# Patient Record
Sex: FEMALE | Race: WHITE | NOT HISPANIC OR LATINO | Employment: OTHER | URBAN - METROPOLITAN AREA
[De-identification: names, ages, dates, MRNs, and addresses within clinical notes are randomized per-mention and may not be internally consistent; named-entity substitution may affect disease eponyms.]

---

## 2017-01-25 ENCOUNTER — HISTORICAL (OUTPATIENT)
Dept: ADMINISTRATIVE | Facility: HOSPITAL | Age: 72
End: 2017-01-25

## 2017-01-25 LAB
ALBUMIN SERPL-MCNC: 4.4 G/DL (ref 3.1–4.7)
ALP SERPL-CCNC: 67 IU/L (ref 40–104)
ALT (SGPT): 21 IU/L (ref 3–33)
AST SERPL-CCNC: 46 IU/L (ref 10–40)
BASOPHILS NFR BLD: 0 K/UL (ref 0–0.2)
BASOPHILS NFR BLD: 0.6 %
BILIRUB SERPL-MCNC: 0.7 MG/DL (ref 0.3–1)
BUN SERPL-MCNC: 16 MG/DL (ref 8–20)
CALCIUM SERPL-MCNC: 9.7 MG/DL (ref 7.7–10.4)
CHLORIDE: 103 MMOL/L (ref 98–110)
CO2 SERPL-SCNC: 27.5 MMOL/L (ref 22.8–31.6)
CREATININE: 0.7 MG/DL (ref 0.6–1.4)
CRP SERPL-MCNC: 0.04 MG/DL (ref 0–1.4)
EOSINOPHIL NFR BLD: 0.2 K/UL (ref 0–0.7)
EOSINOPHIL NFR BLD: 2.2 %
ERYTHROCYTE [DISTWIDTH] IN BLOOD BY AUTOMATED COUNT: 13.2 % (ref 11.7–14.9)
GLUCOSE: 74 MG/DL (ref 70–99)
GRAN #: 3.5 K/UL (ref 1.4–6.5)
GRAN%: 52.1 %
HCT VFR BLD AUTO: 40.7 % (ref 36–48)
HGB BLD-MCNC: 13.5 G/DL (ref 12–15)
IMMATURE GRANS (ABS): 0 K/UL (ref 0–1)
IMMATURE GRANULOCYTES: 0.1 %
LYMPH #: 2.4 K/UL (ref 1.2–3.4)
LYMPH%: 35.6 %
MCH RBC QN AUTO: 33.5 PG (ref 25–35)
MCHC RBC AUTO-ENTMCNC: 33.2 G/DL (ref 31–36)
MCV RBC AUTO: 101 FL (ref 79–98)
MONO #: 0.6 K/UL (ref 0.1–0.6)
MONO%: 9.4 %
NUCLEATED RBCS: 0 %
PLATELET # BLD AUTO: 199 K/UL (ref 140–440)
PMV BLD AUTO: 12.7 FL (ref 8.8–12.7)
POTASSIUM SERPL-SCNC: 4.6 MMOL/L (ref 3.5–5)
PROT SERPL-MCNC: 7.5 G/DL (ref 6–8.2)
RBC # BLD AUTO: 4.03 M/UL (ref 3.5–5.5)
SODIUM: 138 MMOL/L (ref 134–144)
WBC # BLD AUTO: 6.7 K/UL (ref 5–10)

## 2017-01-31 DIAGNOSIS — E03.9 ACQUIRED HYPOTHYROIDISM: ICD-10-CM

## 2017-01-31 RX ORDER — LEVOTHYROXINE SODIUM 112 UG/1
TABLET ORAL
Qty: 30 TABLET | Refills: 0 | Status: SHIPPED | OUTPATIENT
Start: 2017-01-31 | End: 2017-03-02 | Stop reason: SDUPTHER

## 2017-02-20 ENCOUNTER — DOCUMENTATION ONLY (OUTPATIENT)
Dept: FAMILY MEDICINE | Facility: CLINIC | Age: 72
End: 2017-02-20

## 2017-02-20 NOTE — PROGRESS NOTES
Pre-Visit Chart Review  For Appointment Scheduled on (date) 3/2/17    Health Maintenance Due   Topic Date Due    Hepatitis C Screening  1945    TETANUS VACCINE  01/24/1964    Mammogram  01/24/1986    Zoster Vaccine  01/24/2006    Pneumococcal (65+) (1 of 2 - PCV13) 01/24/2011    Influenza Vaccine  08/01/2016

## 2017-03-02 ENCOUNTER — OFFICE VISIT (OUTPATIENT)
Dept: FAMILY MEDICINE | Facility: CLINIC | Age: 72
End: 2017-03-02
Payer: MEDICARE

## 2017-03-02 ENCOUNTER — LAB VISIT (OUTPATIENT)
Dept: LAB | Facility: HOSPITAL | Age: 72
End: 2017-03-02
Attending: FAMILY MEDICINE
Payer: MEDICARE

## 2017-03-02 VITALS
BODY MASS INDEX: 27.06 KG/M2 | HEIGHT: 68 IN | SYSTOLIC BLOOD PRESSURE: 131 MMHG | WEIGHT: 178.56 LBS | TEMPERATURE: 98 F | HEART RATE: 73 BPM | RESPIRATION RATE: 18 BRPM | DIASTOLIC BLOOD PRESSURE: 72 MMHG

## 2017-03-02 DIAGNOSIS — I10 ESSENTIAL HYPERTENSION, BENIGN: Primary | ICD-10-CM

## 2017-03-02 DIAGNOSIS — R30.0 DYSURIA: ICD-10-CM

## 2017-03-02 DIAGNOSIS — J45.20 ASTHMA, CHRONIC, MILD INTERMITTENT, UNCOMPLICATED: ICD-10-CM

## 2017-03-02 DIAGNOSIS — E03.9 ACQUIRED HYPOTHYROIDISM: ICD-10-CM

## 2017-03-02 DIAGNOSIS — M06.9 RHEUMATOID ARTHRITIS, INVOLVING UNSPECIFIED SITE, UNSPECIFIED RHEUMATOID FACTOR PRESENCE: ICD-10-CM

## 2017-03-02 DIAGNOSIS — E78.2 MIXED HYPERLIPIDEMIA: ICD-10-CM

## 2017-03-02 LAB
BACTERIA #/AREA URNS AUTO: ABNORMAL /HPF
BILIRUB UR QL STRIP: NEGATIVE
CLARITY UR REFRACT.AUTO: CLEAR
COLOR UR AUTO: YELLOW
GLUCOSE UR QL STRIP: NEGATIVE
HGB UR QL STRIP: NEGATIVE
KETONES UR QL STRIP: NEGATIVE
LEUKOCYTE ESTERASE UR QL STRIP: ABNORMAL
MICROSCOPIC COMMENT: ABNORMAL
NITRITE UR QL STRIP: NEGATIVE
PH UR STRIP: 6 [PH] (ref 5–8)
PROT UR QL STRIP: NEGATIVE
RBC #/AREA URNS AUTO: 6 /HPF (ref 0–4)
SP GR UR STRIP: 1.01 (ref 1–1.03)
URN SPEC COLLECT METH UR: ABNORMAL
UROBILINOGEN UR STRIP-ACNC: NEGATIVE EU/DL
WBC #/AREA URNS AUTO: 24 /HPF (ref 0–5)

## 2017-03-02 PROCEDURE — 81001 URINALYSIS AUTO W/SCOPE: CPT

## 2017-03-02 PROCEDURE — 99999 PR PBB SHADOW E&M-EST. PATIENT-LVL III: CPT | Mod: PBBFAC,,, | Performed by: FAMILY MEDICINE

## 2017-03-02 PROCEDURE — 99213 OFFICE O/P EST LOW 20 MIN: CPT | Mod: S$PBB,,, | Performed by: FAMILY MEDICINE

## 2017-03-02 RX ORDER — FLUTICASONE PROPIONATE AND SALMETEROL 250; 50 UG/1; UG/1
1 POWDER RESPIRATORY (INHALATION) 2 TIMES DAILY
Qty: 3 EACH | Refills: 3 | Status: SHIPPED | OUTPATIENT
Start: 2017-03-02 | End: 2018-03-25 | Stop reason: SDUPTHER

## 2017-03-02 RX ORDER — AMOXICILLIN 875 MG/1
875 TABLET, FILM COATED ORAL 2 TIMES DAILY
Qty: 10 TABLET | Refills: 0 | Status: SHIPPED | OUTPATIENT
Start: 2017-03-02 | End: 2017-03-07

## 2017-03-02 RX ORDER — LEVOTHYROXINE SODIUM 112 UG/1
112 TABLET ORAL DAILY
Qty: 90 TABLET | Refills: 3 | Status: SHIPPED | OUTPATIENT
Start: 2017-03-02 | End: 2018-03-08 | Stop reason: SDUPTHER

## 2017-03-02 NOTE — MR AVS SNAPSHOT
[facility]  [address]  [place] [address]  Phone: [phone]  Fax: [phone]                  Kirstie Bowden   3/2/2017 10:00 AM   Office Visit    Description:  Female : 1945   Provider:  Elías Luis Jr., MD   Department:  [place]           Reason for Visit     Annual Exam           Diagnoses this Visit        Comments    Essential hypertension, benign    -  Primary     Mixed hyperlipidemia         Acquired hypothyroidism         Rheumatoid arthritis, involving unspecified site, unspecified rheumatoid factor presence         Asthma, chronic, mild intermittent, uncomplicated         Dysuria                To Do List           Goals (5 Years of Data)     None      Follow-Up and Disposition     Return in about 1 year (around 3/2/2018).       These Medications        Disp Refills Start End    fluticasone-salmeterol 250-50 mcg/dose (ADVAIR DISKUS) 250-50 mcg/dose diskus inhaler 3 each 3 3/2/2017     Inhale 1 puff into the lungs 2 (two) times daily. - Inhalation    Pharmacy: [address] #: [phone]       levothyroxine (SYNTHROID) 112 MCG tablet 90 tablet 3 3/2/2017     Take 1 tablet (112 mcg total) by mouth once daily. - Oral    Pharmacy: [address] #: [phone]       amoxicillin (AMOXIL) 875 MG tablet 10 tablet 0 3/2/2017 3/7/2017    Take 1 tablet (875 mg total) by mouth 2 (two) times daily. - Oral    Pharmacy: [address] Ph #: [phone]         [facility]     [facility] On Call Nurse Care Line -  Assistance  Registered nurses in the [facility] provide clinical advisement, health education, appointment booking, and other advisory services.  Call for this free service at [phone].             Medications           Message regarding Medications     Verify the changes and/or  additions to your medication regime listed below are the same as discussed with your clinician today.  If any of these changes or additions are incorrect, please notify your healthcare provider.        START taking these NEW medications        Refills    amoxicillin (AMOXIL) 875 MG tablet 0    Sig: Take 1 tablet (875 mg total) by mouth 2 (two) times daily.    Class: Print    Route: Oral      CHANGE how you are taking these medications     Start Taking Instead of    levothyroxine (SYNTHROID) 112 MCG tablet levothyroxine (SYNTHROID) 112 MCG tablet    Dosage:  Take 1 tablet (112 mcg total) by mouth once daily. Dosage:  TAKE ONE TABLET BY MOUTH ONCE DAILY    Reason for Change:  Reorder       STOP taking these medications     ENBREL SURECLICK 50 mg/mL (0.98 mL) PnIj     FLUZONE HIGH-DOSE 2015-16, PF, 180 mcg/0.5 mL Syrg     MULTIVITAMIN (MULTIPLE VITAMIN ORAL) Every day    scopolamine (TRANSDERM-SCOP) 1.5 mg Place 1 patch (1.5 mg total) onto the skin Every 3 (three) days.    triamterene-hydrochlorothiazide 75-50 mg (MAXZIDE) 75-50 mg per tablet TAKE ONE-HALF TABLET BY MOUTH ONCE DAILY           Verify that the below list of medications is an accurate representation of the medications you are currently taking.  If none reported, the list may be blank. If incorrect, please contact your healthcare provider. Carry this list with you in case of emergency.           Current Medications     amlodipine (NORVASC) 5 MG tablet Take 1 tablet (5 mg total) by mouth once daily.    atorvastatin (LIPITOR) 20 MG tablet Take 1 tablet (20 mg total) by mouth once daily.    CALCIUM CARBONATE/VITAMIN D3 (VITAMIN D-3 ORAL) Take by mouth.    etanercept (ENBREL) 50 mg/mL (0.98 mL) injection Inject 50 mg into the skin once a week.    FLAXSEED ORAL Take by mouth.    fluticasone-salmeterol 250-50 mcg/dose (ADVAIR DISKUS) 250-50 mcg/dose diskus inhaler Inhale 1 puff into the lungs 2 (two) times daily.    folic acid (FOLVITE) 1 MG tablet Every day     levothyroxine (SYNTHROID) 112 MCG tablet Take 1 tablet (112 mcg total) by mouth once daily.    methotrexate 2.5 MG tablet 4 Tablet(s) Oral PRN Weekly.    VITAMIN B COMPLEX ORAL Every day    amoxicillin (AMOXIL) 875 MG tablet Take 1 tablet (875 mg total) by mouth 2 (two) times daily.           Clinical Reference Information           Your Vitals Were     BP                   131/72 (BP Location: Right arm, Patient Position: Sitting, BP Method: Automatic)           Blood Pressure          Most Recent Value    BP  131/72      Allergies as of 3/2/2017     Sulfa (Sulfonamide Antibiotics)    Sulfamethoxazole-trimethoprim      Immunizations Administered on Date of Encounter - 3/2/2017     None      Orders Placed During Today's Visit     Future Labs/Procedures Expected by Expires    Lipid panel  3/2/2017 3/3/2018    TSH  3/2/2017 3/3/2018    Urinalysis  3/2/2017 5/1/2018      [facility] Sign-Up     Activating your [facility] account is as easy as 1-2-3!     1) Visit [email or web address], select Sign Up Now, enter this activation code and your date of birth, then select Next.  [email or web address]  Expires: 4/16/2017 10:58 AM      2) Create a username and password to use when you visit [facility] in the future and select a security question in case you lose your password and select Next.    3) Enter your e-mail address and click Sign Up!    Additional Information  If you have questions, please e-mail [email or web address] or call [phone] to talk to our [facility] staff. Remember, [facility] is NOT to be used for urgent needs. For medical emergencies, dial 911.         Smoking Cessation     If you would like to quit smoking:   You may be eligible for free services if you are a [place] resident and started smoking cigarettes before September 1, 1988.  Call the [employer]oking [employer]essation [employer] (SCT) toll free at (935) 574-3870 or (263) 235-9300.   Call 4-603-QUIT-NOW if you do not meet the above criteria.            Language Assistance  Services     ATTENTION: Language assistance services are available, free of charge. Please call [phone].      ATENCIÓN: Si habla amadeo, tiene a schreiber disposición servicios gratuitos de asistencia lingüística. Llame al [phone].     CHÚ Ý: N?u b?n nói Ti?ng Vi?t, có các d?ch v? h? tr? ngôn ng? mi?n phí dành cho b?n. G?i s? [phone].         [facility] complies with applicable Federal civil rights laws and does not discriminate on the basis of race, color, national origin, age, disability, or sex.

## 2017-03-03 ENCOUNTER — TELEPHONE (OUTPATIENT)
Dept: FAMILY MEDICINE | Facility: CLINIC | Age: 72
End: 2017-03-03

## 2017-03-03 NOTE — TELEPHONE ENCOUNTER
----- Message from Elías Luis Jr., MD sent at 3/3/2017  2:23 PM CST -----  Please let patient know that her urinalysis does show a UTI, and she needs to take the antibiotic I gave to her yesterday.

## 2017-03-06 NOTE — PROGRESS NOTES
Subjective:       Patient ID: Kirstie Bowden is a 72 y.o. female.    Chief Complaint: Hypertension; Hyperlipidemia; Hypothyroidism; Rheumatoid Arthritis; and Asthma    HPI Comments: Patient presents here for annual follow-up of hypertension, hyperlipidemia, rheumatoid arthritis, hypothyroidism, and asthma.  Her hypertension is well controlled on her present medications and she is tolerating her medications well.  She is following a low-sodium diet and is exercising on a regular basis.  Her hyperlipidemia is also well controlled with her low-fat low-cholesterol diet and her present dose of atorvastatin.  She is having no side effects with the atorvastatin.  Her hypothyroidism is stable at this time on her present dose of Synthroid 112 µg daily.  Her rheumatoid arthritis is stable on her present medications which include methotrexate and Enbrel.  She is followed on a regular basis by rheumatology and her liver and renal functions are stable and her CBC is stable.  She does complain of some dysuria over the last few days and is concerned about having a urinary tract infection.  As far as screening, she does need lab work including hepatitis C screening and lipid profile.  She also needs a tetanus vaccine and her screening mammogram.  She cannot take the shingles vaccine due to the fact that it is a live virus, and she is on immunosuppressive drugs for her rheumatoid arthritis.  She has had her mammogram and DEXA scan.    Hypertension   This is a chronic problem. The problem is unchanged. The problem is controlled. Pertinent negatives include no chest pain, headaches, palpitations or shortness of breath. Risk factors for coronary artery disease include dyslipidemia, obesity and post-menopausal state. Past treatments include calcium channel blockers and diuretics. The current treatment provides moderate improvement. There are no compliance problems.  There is no history of kidney disease, CAD/MI, CVA or heart  failure.   Hyperlipidemia   This is a chronic problem. The problem is controlled. Recent lipid tests were reviewed and are normal. Pertinent negatives include no chest pain or shortness of breath. Current antihyperlipidemic treatment includes statins. The current treatment provides moderate improvement of lipids. There are no compliance problems.  Risk factors for coronary artery disease include dyslipidemia, hypertension and post-menopausal.     Review of Systems   Constitutional: Negative for chills, fatigue, fever and unexpected weight change.   HENT: Negative for congestion, ear pain, postnasal drip and sore throat.    Respiratory: Negative for cough and shortness of breath.    Cardiovascular: Negative for chest pain and palpitations.   Gastrointestinal: Negative for abdominal pain, blood in stool, constipation, diarrhea, nausea and vomiting.   Genitourinary: Positive for dysuria. Negative for difficulty urinating, flank pain and pelvic pain.   Musculoskeletal: Negative for arthralgias and back pain.   Neurological: Negative for dizziness, light-headedness and headaches.   Hematological: Negative for adenopathy. Does not bruise/bleed easily.   Psychiatric/Behavioral: Negative for sleep disturbance. The patient is not nervous/anxious.        Objective:      Physical Exam   Constitutional: She is oriented to person, place, and time. She appears well-developed and well-nourished.   HENT:   Head: Normocephalic and atraumatic.   Right Ear: External ear normal.   Left Ear: External ear normal.   Nose: Nose normal.   Mouth/Throat: Oropharynx is clear and moist.   Eyes: EOM are normal.   Neck: Normal range of motion. Neck supple. No thyromegaly present.   Cardiovascular: Normal rate, regular rhythm, normal heart sounds and intact distal pulses.    No murmur heard.  Pulmonary/Chest: Effort normal and breath sounds normal. She has no wheezes. She has no rales.   Abdominal: Soft. Bowel sounds are normal. She exhibits no  mass. There is no tenderness. There is no rebound.   Musculoskeletal: Normal range of motion. She exhibits no edema or tenderness.   Lymphadenopathy:     She has no cervical adenopathy.   Neurological: She is alert and oriented to person, place, and time. She has normal reflexes. No cranial nerve deficit.   Skin: Skin is warm and dry. No rash noted.   Psychiatric: She has a normal mood and affect. Her behavior is normal.   Vitals reviewed.      Assessment:       1. Essential hypertension, benign    2. Mixed hyperlipidemia    3. Acquired hypothyroidism    4. Rheumatoid arthritis, involving unspecified site, unspecified rheumatoid factor presence    5. Asthma, chronic, mild intermittent, uncomplicated    6. Dysuria        Plan:       1.  Continue present medications as her hypertension, hyperlipidemia, rheumatoid arthritis, and hypothyroidism are all stable as is her asthma  2.  Urinalysis today to evaluate for UTI  3.  Lipid profile and TSH today  4.  Patient is strongly encouraged to increase her exercise  5.  Continue low-sodium, low-fat low-cholesterol diet  6.  Follow-up with me in one year or when necessary        Patient readiness: acceptance and barriers:none    During the course of the visit the patient was educated and counseled about the following:     Hypertension:   Dietary sodium restriction.  Regular aerobic exercise.  Follow up: 6 months and as needed.    Goals: Hypertension: Reduce Blood Pressure    Did patient meet goals/outcomes: Yes    The following self management tools provided: blood pressure log    Patient Instructions  was given to the patient/family.     Time spent with patient: 30 minutes

## 2017-03-30 RX ORDER — AMOXICILLIN 875 MG/1
TABLET, FILM COATED ORAL
Qty: 10 TABLET | Refills: 0 | Status: SHIPPED | OUTPATIENT
Start: 2017-03-30 | End: 2017-10-25 | Stop reason: ALTCHOICE

## 2017-04-25 ENCOUNTER — HISTORICAL (OUTPATIENT)
Dept: ADMINISTRATIVE | Facility: HOSPITAL | Age: 72
End: 2017-04-25

## 2017-04-25 LAB
ALBUMIN SERPL-MCNC: 4.7 G/DL (ref 3.1–4.7)
ALP SERPL-CCNC: 68 IU/L (ref 40–104)
ALT (SGPT): 24 IU/L (ref 3–33)
AST SERPL-CCNC: 24 IU/L (ref 10–40)
BASOPHILS NFR BLD: 0 K/UL (ref 0–0.2)
BASOPHILS NFR BLD: 0.5 %
BILIRUB SERPL-MCNC: 0.7 MG/DL (ref 0.3–1)
BUN SERPL-MCNC: 16 MG/DL (ref 8–20)
CALCIUM SERPL-MCNC: 10.2 MG/DL (ref 7.7–10.4)
CHLORIDE: 105 MMOL/L (ref 98–110)
CO2 SERPL-SCNC: 28.2 MMOL/L (ref 22.8–31.6)
CREATININE: 0.81 MG/DL (ref 0.6–1.4)
CRP SERPL-MCNC: 0.15 MG/DL (ref 0–1.4)
EOSINOPHIL NFR BLD: 0.2 K/UL (ref 0–0.7)
EOSINOPHIL NFR BLD: 2.8 %
ERYTHROCYTE [DISTWIDTH] IN BLOOD BY AUTOMATED COUNT: 13.4 % (ref 11.7–14.9)
GLUCOSE: 76 MG/DL (ref 70–99)
GRAN #: 2.9 K/UL (ref 1.4–6.5)
GRAN%: 49.4 %
HCT VFR BLD AUTO: 41.2 % (ref 36–48)
HGB BLD-MCNC: 13.8 G/DL (ref 12–15)
IMMATURE GRANS (ABS): 0 K/UL (ref 0–1)
IMMATURE GRANULOCYTES: 0.2 %
LYMPH #: 2.2 K/UL (ref 1.2–3.4)
LYMPH%: 37.2 %
MCH RBC QN AUTO: 34.5 PG (ref 25–35)
MCHC RBC AUTO-ENTMCNC: 33.5 G/DL (ref 31–36)
MCV RBC AUTO: 103 FL (ref 79–98)
MONO #: 0.6 K/UL (ref 0.1–0.6)
MONO%: 9.9 %
NUCLEATED RBCS: 0 %
PLATELET # BLD AUTO: 218 K/UL (ref 140–440)
PMV BLD AUTO: 12.4 FL (ref 8.8–12.7)
POTASSIUM SERPL-SCNC: 4.7 MMOL/L (ref 3.5–5)
PROT SERPL-MCNC: 7.8 G/DL (ref 6–8.2)
RBC # BLD AUTO: 4 M/UL (ref 3.5–5.5)
SODIUM: 142 MMOL/L (ref 134–144)
TSH SERPL DL<=0.005 MIU/L-ACNC: 0.38 ULU/ML (ref 0.3–5.6)
WBC # BLD AUTO: 5.8 K/UL (ref 5–10)

## 2017-05-04 DIAGNOSIS — E78.5 HYPERLIPIDEMIA: ICD-10-CM

## 2017-05-05 RX ORDER — ATORVASTATIN CALCIUM 20 MG/1
TABLET, FILM COATED ORAL
Qty: 90 TABLET | Refills: 0 | Status: SHIPPED | OUTPATIENT
Start: 2017-05-05 | End: 2017-10-04 | Stop reason: SDUPTHER

## 2017-05-05 NOTE — TELEPHONE ENCOUNTER
Patient has been notified.  Patient also states she had labs done on last week by Dr Medrano at [facility]

## 2017-05-10 ENCOUNTER — TELEPHONE (OUTPATIENT)
Dept: FAMILY MEDICINE | Facility: CLINIC | Age: 72
End: 2017-05-10

## 2017-05-10 NOTE — TELEPHONE ENCOUNTER
----- Message from Price Brown sent at 5/10/2017 10:01 AM CDT -----  Contact: self    [phone]  Patient called stating she had ordered labs by Dr Luis done at  Dr Jelani Stacy office. The doctor will fax the lab results to the office.  Thanks!

## 2017-05-16 RX ORDER — METHOTREXATE 2.5 MG/1
TABLET ORAL
Qty: 48 TABLET | Refills: 0 | Status: SHIPPED | OUTPATIENT
Start: 2017-05-16 | End: 2017-08-23 | Stop reason: SDUPTHER

## 2017-07-04 DIAGNOSIS — I10 ESSENTIAL HYPERTENSION, BENIGN: ICD-10-CM

## 2017-07-04 RX ORDER — AMLODIPINE BESYLATE 5 MG/1
TABLET ORAL
Qty: 90 TABLET | Refills: 3 | Status: SHIPPED | OUTPATIENT
Start: 2017-07-04 | End: 2018-07-13 | Stop reason: SDUPTHER

## 2017-07-25 ENCOUNTER — OFFICE VISIT (OUTPATIENT)
Dept: RHEUMATOLOGY | Facility: CLINIC | Age: 72
End: 2017-07-25
Payer: MEDICARE

## 2017-07-25 VITALS
HEIGHT: 68 IN | DIASTOLIC BLOOD PRESSURE: 64 MMHG | SYSTOLIC BLOOD PRESSURE: 122 MMHG | BODY MASS INDEX: 27.74 KG/M2 | WEIGHT: 183 LBS

## 2017-07-25 DIAGNOSIS — M05.79 SEROPOSITIVE RHEUMATOID ARTHRITIS OF MULTIPLE SITES: Primary | ICD-10-CM

## 2017-07-25 LAB
ALBUMIN SERPL-MCNC: 4.5 G/DL (ref 3.1–4.7)
ALP SERPL-CCNC: 59 IU/L (ref 40–104)
ALT (SGPT): 25 IU/L (ref 3–33)
AST SERPL-CCNC: 26 IU/L (ref 10–40)
BASOPHILS NFR BLD: 0 K/UL (ref 0–0.2)
BASOPHILS NFR BLD: 0.4 %
BILIRUB SERPL-MCNC: 0.7 MG/DL (ref 0.3–1)
BUN SERPL-MCNC: 14 MG/DL (ref 8–20)
CALCIUM SERPL-MCNC: 10 MG/DL (ref 7.7–10.4)
CHLORIDE: 101 MMOL/L (ref 98–110)
CO2 SERPL-SCNC: 29.6 MMOL/L (ref 22.8–31.6)
CREATININE: 0.88 MG/DL (ref 0.6–1.4)
CRP SERPL-MCNC: 0.52 MG/DL (ref 0–1.4)
EOSINOPHIL NFR BLD: 0.1 K/UL (ref 0–0.7)
EOSINOPHIL NFR BLD: 1.9 %
ERYTHROCYTE [DISTWIDTH] IN BLOOD BY AUTOMATED COUNT: 13.3 % (ref 11.7–14.9)
GLUCOSE: 80 MG/DL (ref 70–99)
GRAN #: 4.2 K/UL (ref 1.4–6.5)
GRAN%: 55.3 %
HCT VFR BLD AUTO: 40.6 % (ref 36–48)
HGB BLD-MCNC: 13.9 G/DL (ref 12–15)
IMMATURE GRANS (ABS): 0 K/UL (ref 0–1)
IMMATURE GRANULOCYTES: 0.3 %
IMMATURE PLATELET FRACTION: 12.4 % (ref 0.5–7.5)
LYMPH #: 2.7 K/UL (ref 1.2–3.4)
LYMPH%: 35.1 %
MCH RBC QN AUTO: 34.2 PG (ref 25–35)
MCHC RBC AUTO-ENTMCNC: 34.2 G/DL (ref 31–36)
MCV RBC AUTO: 99.8 FL (ref 79–98)
MONO #: 0.5 K/UL (ref 0.1–0.6)
MONO%: 7 %
NUCLEATED RBCS: 0 %
PLATELET # BLD AUTO: 155 K/UL (ref 140–440)
PMV BLD AUTO: 13.1 FL (ref 8.8–12.7)
POTASSIUM SERPL-SCNC: 4.7 MMOL/L (ref 3.5–5)
PROT SERPL-MCNC: 8 G/DL (ref 6–8.2)
RBC # BLD AUTO: 4.07 M/UL (ref 3.5–5.5)
SODIUM: 140 MMOL/L (ref 134–144)
WBC # BLD AUTO: 7.6 K/UL (ref 5–10)

## 2017-07-25 PROCEDURE — 1126F AMNT PAIN NOTED NONE PRSNT: CPT | Mod: ,,, | Performed by: INTERNAL MEDICINE

## 2017-07-25 PROCEDURE — 99213 OFFICE O/P EST LOW 20 MIN: CPT | Mod: ,,, | Performed by: INTERNAL MEDICINE

## 2017-07-25 PROCEDURE — 1159F MED LIST DOCD IN RCRD: CPT | Mod: ,,, | Performed by: INTERNAL MEDICINE

## 2017-07-25 NOTE — PROGRESS NOTES
[facility] RHEUMATOLOGY            PROGRESS NOTE      Subjective:       Patient ID:   NAME: Kirstie Bowden : 1945     72 y.o. female    Referring Doc: No ref. provider found  Other Physicians:    Chief Complaint:  Rheumatoid Arthritis (follow up. no changes since last visit)      History of Present Illness:     Patient returns today for a regularly scheduled follow-up visit for  RA     The patient is doing well  Occ arthralgias in wrists  No fevers,cough or SOB            ROS:   GEN:  No  fever, night sweats . weight is stable   No fatigue  SKIN: no rashes, no bruising, no ulcerations, no Raynaud's  HEENT: no HA's, No visual changes, no mucosal ulcers, no sicca symptoms,  CV:   no CP, SOB, PND, MCKEON, no orthopnea, no palpitations  PULM: normal with no SOB, cough, hemoptysis, sputum or pleuritic pain  GI:  no abdominal pain, nausea, vomiting, constipation, diarrhea, melanotic stools, BRBPR, hematemesis, no dysphagia  :   no dysuria  NEURO:  Paresthesias in hands,no  headaches, visual disturbances, muscle weakness  MUSCULOSKELETAL:no joint swelling, prolonged AM stiffness, no back pain, no muscle pain  Allergies:  Review of patient's allergies indicates:   Allergen Reactions    Sulfa (sulfonamide antibiotics)     Sulfamethoxazole-trimethoprim      Other reaction(s): per dr stearns       Medications:    Current Outpatient Prescriptions:     amlodipine (NORVASC) 5 MG tablet, TAKE ONE TABLET BY MOUTH ONCE DAILY, Disp: 90 tablet, Rfl: 3    amoxicillin (AMOXIL) 875 MG tablet, TAKE ONE TABLET BY MOUTH TWICE DAILY, Disp: 10 tablet, Rfl: 0    atorvastatin (LIPITOR) 20 MG tablet, TAKE ONE TABLET BY MOUTH ONCE DAILY, Disp: 90 tablet, Rfl: 0    CALCIUM CARBONATE/VITAMIN D3 (VITAMIN D-3 ORAL), Take by mouth., Disp: , Rfl:     etanercept (ENBREL) 50 mg/mL (0.98 mL) injection, Inject 50 mg into the skin once a week., Disp: , Rfl:     FLAXSEED ORAL, Take by mouth., Disp: , Rfl:     fluticasone-salmeterol  "250-50 mcg/dose (ADVAIR DISKUS) 250-50 mcg/dose diskus inhaler, Inhale 1 puff into the lungs 2 (two) times daily., Disp: 3 each, Rfl: 3    folic acid (FOLVITE) 1 MG tablet, Every day, Disp: , Rfl:     levothyroxine (SYNTHROID) 112 MCG tablet, Take 1 tablet (112 mcg total) by mouth once daily., Disp: 90 tablet, Rfl: 3    methotrexate 2.5 MG Tab, TAKE FOUR TABLETS BY MOUTH ONCE A WEEK, Disp: 48 tablet, Rfl: 0    VITAMIN B COMPLEX ORAL, Every day, Disp: , Rfl:     PMHx/PSHx Updates:    Objective:     Vitals:  Blood pressure 122/64, height 5' 8" (1.727 m), weight 83 kg (183 lb).    Physical Examination:   GEN: no apparent distress, comfortable; AAOx3  SKIN: no rashes,no ulceration, no Raynaud's, no petechiae, no SQ nodules,  HEAD: normal  EYES: no pallor, no icterus,  NECK: no masses, thyroid normal, trachea midline, no LAD/LN's, supple  CV: RRR with no murmur; l S1 and S2 reg. ,no gallop no rubs,   CHEST: Normal respiratory effort; CTAB; normal breath sounds; no wheeze or crackles  ABDOM: nontender and nondistended; soft; no masses; no rebound/guarding  MUSC/Skeletal: ROM normal; no crepitus; joints without synovitis,  no deformities  No joint swelling or tenderness of PIP, MCP, wrist, elbow, shoulder, or knee joints  EXTREM: no clubbing, cyanosis, no edema,normal  pulses   NEURO: grossly intact; motor WNL; AAOx3; ,   PSYCH: normal mood, affect and behavior  LYMPH: normal cervical, supraclavicular          Labs:   Lab Results   Component Value Date    WBC 5.8 04/25/2017    HGB 13.8 04/25/2017    HCT 41.2 04/25/2017    .0 (H) 04/25/2017     04/25/2017    CMP  @LASTLAB(NA,K,CL,CO2,GLU,BUN,Creatinine,Calcium,PROT,Albumin,Bilitot,Alkphos,AST,ALT,CRP,ESR,RF,CCP,RODNEY,SSA,CPK,uric acid) )@  I have reviewed all available lab results and radiology reports.    Radiology/Diagnostic Studies:        Assessment/Plan:   (1) 72 y.o. female with diagnosis of RA.  She is stable      CBC,CMP,CRP        Discussion:     I " have explained all of the above in detail and the patient understands all of the current recommendation(s). I have answered all questions to the best of my ability and to their complete satisfaction.       The patient is to continue with the current management plan         RTC in  3 months       Electronically signed by Jelani Stacy MD

## 2017-07-27 RX ORDER — ETANERCEPT 50 MG/ML
SOLUTION SUBCUTANEOUS
Qty: 4 SYRINGE | Refills: 0 | Status: SHIPPED | OUTPATIENT
Start: 2017-07-27 | End: 2017-08-07 | Stop reason: SDUPTHER

## 2017-08-07 RX ORDER — ETANERCEPT 50 MG/ML
SOLUTION SUBCUTANEOUS
Qty: 4 SYRINGE | Refills: 3 | Status: SHIPPED | OUTPATIENT
Start: 2017-08-07 | End: 2017-10-25 | Stop reason: SDUPTHER

## 2017-08-23 RX ORDER — METHOTREXATE 2.5 MG/1
TABLET ORAL
Qty: 48 TABLET | Refills: 0 | Status: SHIPPED | OUTPATIENT
Start: 2017-08-23 | End: 2017-10-25 | Stop reason: SDUPTHER

## 2017-10-04 DIAGNOSIS — E78.5 HYPERLIPIDEMIA: ICD-10-CM

## 2017-10-04 RX ORDER — ATORVASTATIN CALCIUM 20 MG/1
TABLET, FILM COATED ORAL
Qty: 90 TABLET | Refills: 0 | Status: SHIPPED | OUTPATIENT
Start: 2017-10-04 | End: 2017-10-25 | Stop reason: ALTCHOICE

## 2017-10-11 ENCOUNTER — LAB VISIT (OUTPATIENT)
Dept: LAB | Facility: HOSPITAL | Age: 72
End: 2017-10-11
Attending: FAMILY MEDICINE
Payer: MEDICARE

## 2017-10-11 DIAGNOSIS — E78.5 HYPERLIPIDEMIA: ICD-10-CM

## 2017-10-11 LAB
CHOLEST SERPL-MCNC: 164 MG/DL
CHOLEST/HDLC SERPL: 3.9 {RATIO}
HDLC SERPL-MCNC: 42 MG/DL
HDLC SERPL: 25.6 %
LDLC SERPL CALC-MCNC: 76.8 MG/DL
NONHDLC SERPL-MCNC: 122 MG/DL
TRIGL SERPL-MCNC: 226 MG/DL

## 2017-10-11 PROCEDURE — 80061 LIPID PANEL: CPT

## 2017-10-11 PROCEDURE — 36415 COLL VENOUS BLD VENIPUNCTURE: CPT | Mod: PO

## 2017-10-12 ENCOUNTER — TELEPHONE (OUTPATIENT)
Dept: FAMILY MEDICINE | Facility: CLINIC | Age: 72
End: 2017-10-12

## 2017-10-12 NOTE — TELEPHONE ENCOUNTER
----- Message from Elías Luis Jr., MD sent at 10/12/2017  3:43 PM CDT -----  Your total cholesterol is  164, HDL (good cholesterol)  42, LDL (bad cholesterol)  76, Triglycerides  226 . Continue present treatment.

## 2017-10-25 ENCOUNTER — OFFICE VISIT (OUTPATIENT)
Dept: RHEUMATOLOGY | Facility: CLINIC | Age: 72
End: 2017-10-25
Payer: MEDICARE

## 2017-10-25 VITALS
HEIGHT: 68 IN | SYSTOLIC BLOOD PRESSURE: 148 MMHG | BODY MASS INDEX: 27.74 KG/M2 | DIASTOLIC BLOOD PRESSURE: 88 MMHG | WEIGHT: 183 LBS

## 2017-10-25 DIAGNOSIS — M15.9 OSTEOARTHRITIS, GENERALIZED: ICD-10-CM

## 2017-10-25 DIAGNOSIS — M05.79 RHEUMATOID ARTHRITIS INVOLVING MULTIPLE SITES WITH POSITIVE RHEUMATOID FACTOR: Primary | ICD-10-CM

## 2017-10-25 LAB
ALBUMIN SERPL-MCNC: 4.7 G/DL (ref 3.1–4.7)
ALP SERPL-CCNC: 56 IU/L (ref 40–104)
ALT (SGPT): 33 IU/L (ref 3–33)
AST SERPL-CCNC: 29 IU/L (ref 10–40)
BASOPHILS NFR BLD: 0 K/UL (ref 0–0.2)
BASOPHILS NFR BLD: 0.5 %
BILIRUB SERPL-MCNC: 0.5 MG/DL (ref 0.3–1)
BUN SERPL-MCNC: 15 MG/DL (ref 8–20)
CALCIUM SERPL-MCNC: 10.3 MG/DL (ref 7.7–10.4)
CHLORIDE: 102 MMOL/L (ref 98–110)
CO2 SERPL-SCNC: 27.7 MMOL/L (ref 22.8–31.6)
CREATININE: 0.81 MG/DL (ref 0.6–1.4)
CRP SERPL-MCNC: 0.06 MG/DL (ref 0–1.4)
EOSINOPHIL NFR BLD: 0.2 K/UL (ref 0–0.7)
EOSINOPHIL NFR BLD: 2.4 %
ERYTHROCYTE [DISTWIDTH] IN BLOOD BY AUTOMATED COUNT: 13.3 % (ref 11.7–14.9)
GLUCOSE: 65 MG/DL (ref 70–99)
GRAN #: 3.6 K/UL (ref 1.4–6.5)
GRAN%: 47.6 %
HCT VFR BLD AUTO: 40.6 % (ref 36–48)
HGB BLD-MCNC: 13.6 G/DL (ref 12–15)
IMMATURE GRANS (ABS): 0 K/UL (ref 0–1)
IMMATURE GRANULOCYTES: 0.3 %
LYMPH #: 2.9 K/UL (ref 1.2–3.4)
LYMPH%: 38 %
MCH RBC QN AUTO: 34.5 PG (ref 25–35)
MCHC RBC AUTO-ENTMCNC: 33.5 G/DL (ref 31–36)
MCV RBC AUTO: 103 FL (ref 79–98)
MONO #: 0.9 K/UL (ref 0.1–0.6)
MONO%: 11.2 %
NUCLEATED RBCS: 0 %
PLATELET # BLD AUTO: 218 K/UL (ref 140–440)
PMV BLD AUTO: 12.2 FL (ref 8.8–12.7)
POTASSIUM SERPL-SCNC: 4.3 MMOL/L (ref 3.5–5)
PROT SERPL-MCNC: 7.7 G/DL (ref 6–8.2)
RBC # BLD AUTO: 3.94 M/UL (ref 3.5–5.5)
SODIUM: 143 MMOL/L (ref 134–144)
WBC # BLD AUTO: 7.7 K/UL (ref 5–10)

## 2017-10-25 PROCEDURE — 99212 OFFICE O/P EST SF 10 MIN: CPT | Mod: ,,, | Performed by: INTERNAL MEDICINE

## 2017-10-25 RX ORDER — METHOTREXATE 2.5 MG/1
TABLET ORAL
Qty: 48 TABLET | Refills: 1 | Status: SHIPPED | OUTPATIENT
Start: 2017-10-25 | End: 2018-01-23 | Stop reason: SDUPTHER

## 2017-10-25 NOTE — PROGRESS NOTES
[facility] RHEUMATOLOGY            PROGRESS NOTE      Subjective:       Patient ID:   NAME: Kirstie Bowden : 1945     72 y.o. female    Referring Doc: No ref. provider found  Other Physicians:    Chief Complaint:  No chief complaint on file.  Rheumatoid Arthritis    History of Present Illness:     Patient returns today for a regularly scheduled follow-up visit for  Rheumatoid arthritis     The patient is doing well. No fevers, cough or shortness of breath. No chest pains. No fatigue. No prolonged morning stiffness or joint swelling. No gastrointestinal complaints            ROS:   GEN:  No  fever, night sweats . weight is stable   No fatigue  SKIN: no rashes, no bruising, no ulcerations, no Raynaud's  HEENT: no HA's, No visual changes, no mucosal ulcers, no sicca symptoms,  CV:   no CP, SOB, PND, MCKEON, no orthopnea, no palpitations  PULM: normal with no SOB, cough, hemoptysis, sputum or pleuritic pain  GI:  no abdominal pain, nausea, vomiting, constipation, diarrhea, melanotic stools, BRBPR, hematemesis, no dysphagia  :   no dysuria  NEURO: no paresthesias, headaches, visual disturbances, muscle weakness  MUSCULOSKELETAL:no joint swelling, prolonged AM stiffness, no back pain, no muscle pain  Allergies:  Review of patient's allergies indicates:   Allergen Reactions    Sulfa (sulfonamide antibiotics)     Sulfamethoxazole-trimethoprim      Other reaction(s): per dr stearns       Medications:    Current Outpatient Prescriptions:     amlodipine (NORVASC) 5 MG tablet, TAKE ONE TABLET BY MOUTH ONCE DAILY, Disp: 90 tablet, Rfl: 3    CALCIUM CARBONATE/VITAMIN D3 (VITAMIN D-3 ORAL), Take by mouth., Disp: , Rfl:     ENBREL SURECLICK 50 mg/mL (0.98 mL) PnIj, INJECT THE CONTENTS OF ONE SURECLICK (50 MG) SUBCUTANEOUSLY ONCE PER WEEK AS DIRECTED BY YOUR PHYSICIAN. SINGLE-USE DEVICE. KEEP REFRIGERATE, Disp: 4 Syringe, Rfl: 3    FLAXSEED ORAL, Take by mouth., Disp: , Rfl:     fluticasone-salmeterol 250-50  "mcg/dose (ADVAIR DISKUS) 250-50 mcg/dose diskus inhaler, Inhale 1 puff into the lungs 2 (two) times daily., Disp: 3 each, Rfl: 3    folic acid (FOLVITE) 1 MG tablet, Every day, Disp: , Rfl:     levothyroxine (SYNTHROID) 112 MCG tablet, Take 1 tablet (112 mcg total) by mouth once daily., Disp: 90 tablet, Rfl: 3    methotrexate 2.5 MG Tab, TAKE FOUR TABLETS BY MOUTH ONCE A WEEK, Disp: 48 tablet, Rfl: 0    VITAMIN B COMPLEX ORAL, Every day, Disp: , Rfl:     PMHx/PSHx Upd    Objective:     Vitals:  Blood pressure (!) 148/88, height 5' 8" (1.727 m), weight 83 kg (183 lb).    Physical Examination:   GEN: no apparent distress, comfortable; AAOx3  SKIN: no rashes,no ulceration, no Raynaud's, no petechiae, no SQ nodules,  HEAD: normal  EYES: no pallor, no icterus,   NECK: no masses, thyroid normal, trachea midline, no LAD/LN's, supple  CV: RRR with no murmur; l S1 and S2 reg. ,no gallop no rubs,   CHEST: Normal respiratory effort; CTAB; normal breath sounds; no wheeze or crackles  MUSC/Skeletal: ROM normal; no crepitus; joints without synovitis,  no deformities  No joint swelling or tenderness of PIP, MCP, wrist, elbow, shoulder, or knee joints  EXTREM: no clubbing, cyanosis, no edema,normal  pulses   NEURO: grossly intact; motor WNL; AAOx3; ,  PSYCH: normal mood, affect and behavior  LYMPH: normal cervical, supraclavicular          Labs:   Lab Results   Component Value Date    WBC 7.6 07/25/2017    HGB 13.9 07/25/2017    HCT 40.6 07/25/2017    MCV 99.8 (H) 07/25/2017     07/25/2017    CMP  @LASTLAB(NA,K,CL,CO2,GLU,BUN,Creatinine,Calcium,PROT,Albumin,Bilitot,Alkphos,AST,ALT,CRP,ESR,RF,CCP,RODNEY,SSA,CPK,uric acid) )@  I have reviewed all available lab results and radiology reports.    Radiology/Diagnostic Studies:Last TB test July 2017: negative    Assessment/Plan:   (1) 72 y.o. female with diagnosis of Rheumatoid arthritis. Doing well.      CBC CMP and CRP      Discussion:     I have explained all of the above in " detail and the patient understands all of the current recommendation(s). I have answered all questions to the best of my ability and to their complete satisfaction.       The patient is to continue with the current management plan         RTC in   3 months      Electronically signed by Jelani Stacy MD

## 2018-01-04 DIAGNOSIS — E78.5 HYPERLIPIDEMIA: ICD-10-CM

## 2018-01-04 RX ORDER — ATORVASTATIN CALCIUM 20 MG/1
TABLET, FILM COATED ORAL
Qty: 90 TABLET | Refills: 3 | Status: SHIPPED | OUTPATIENT
Start: 2018-01-04 | End: 2019-01-11 | Stop reason: SDUPTHER

## 2018-01-23 ENCOUNTER — OFFICE VISIT (OUTPATIENT)
Dept: RHEUMATOLOGY | Facility: CLINIC | Age: 73
End: 2018-01-23
Payer: MEDICARE

## 2018-01-23 VITALS
HEIGHT: 68 IN | SYSTOLIC BLOOD PRESSURE: 130 MMHG | BODY MASS INDEX: 27.58 KG/M2 | DIASTOLIC BLOOD PRESSURE: 68 MMHG | WEIGHT: 182 LBS

## 2018-01-23 DIAGNOSIS — M89.9 BONE DISEASE: ICD-10-CM

## 2018-01-23 DIAGNOSIS — M05.79 RHEUMATOID ARTHRITIS INVOLVING MULTIPLE SITES WITH POSITIVE RHEUMATOID FACTOR: Primary | ICD-10-CM

## 2018-01-23 DIAGNOSIS — M15.9 OSTEOARTHRITIS, GENERALIZED: ICD-10-CM

## 2018-01-23 LAB
ALBUMIN SERPL-MCNC: 4.6 G/DL (ref 3.1–4.7)
ALP SERPL-CCNC: 51 IU/L (ref 40–104)
ALT (SGPT): 32 IU/L (ref 3–33)
AST SERPL-CCNC: 34 IU/L (ref 10–40)
BASOPHILS NFR BLD: 0 K/UL (ref 0–0.2)
BASOPHILS NFR BLD: 0.3 %
BILIRUB SERPL-MCNC: 0.6 MG/DL (ref 0.3–1)
BUN SERPL-MCNC: 20 MG/DL (ref 8–20)
CALCIUM SERPL-MCNC: 10.4 MG/DL (ref 7.7–10.4)
CHLORIDE: 103 MMOL/L (ref 98–110)
CO2 SERPL-SCNC: 29.3 MMOL/L (ref 22.8–31.6)
CREATININE: 0.94 MG/DL (ref 0.6–1.4)
CRP SERPL-MCNC: 0.16 MG/DL (ref 0–1.4)
EOSINOPHIL NFR BLD: 0.1 K/UL (ref 0–0.7)
EOSINOPHIL NFR BLD: 2.1 %
ERYTHROCYTE [DISTWIDTH] IN BLOOD BY AUTOMATED COUNT: 13.6 % (ref 11.7–14.9)
GLUCOSE: 92 MG/DL (ref 70–99)
GRAN #: 3.3 K/UL (ref 1.4–6.5)
GRAN%: 52.9 %
HCT VFR BLD AUTO: 40.8 % (ref 36–48)
HGB BLD-MCNC: 13.8 G/DL (ref 12–15)
IMMATURE GRANS (ABS): 0 K/UL (ref 0–1)
IMMATURE GRANULOCYTES: 0.2 %
LYMPH #: 2.1 K/UL (ref 1.2–3.4)
LYMPH%: 34 %
MCH RBC QN AUTO: 34.8 PG (ref 25–35)
MCHC RBC AUTO-ENTMCNC: 33.8 G/DL (ref 31–36)
MCV RBC AUTO: 102.8 FL (ref 79–98)
MONO #: 0.7 K/UL (ref 0.1–0.6)
MONO%: 10.5 %
NUCLEATED RBCS: 0 %
PLATELET # BLD AUTO: 202 K/UL (ref 140–440)
PMV BLD AUTO: 12.4 FL (ref 8.8–12.7)
POTASSIUM SERPL-SCNC: 4.3 MMOL/L (ref 3.5–5)
PROT SERPL-MCNC: 7.5 G/DL (ref 6–8.2)
RBC # BLD AUTO: 3.97 M/UL (ref 3.5–5.5)
SODIUM: 141 MMOL/L (ref 134–144)
WBC # BLD AUTO: 6.3 K/UL (ref 5–10)

## 2018-01-23 PROCEDURE — 99212 OFFICE O/P EST SF 10 MIN: CPT | Mod: ,,, | Performed by: INTERNAL MEDICINE

## 2018-01-23 RX ORDER — METHOTREXATE 2.5 MG/1
TABLET ORAL
Qty: 48 TABLET | Refills: 1 | Status: SHIPPED | OUTPATIENT
Start: 2018-01-23 | End: 2018-05-01 | Stop reason: SDUPTHER

## 2018-01-23 NOTE — PROGRESS NOTES
[facility] RHEUMATOLOGY            PROGRESS NOTE      Subjective:       Patient ID:   NAME: Kirstie Bowden : 1945     72 y.o. female    Referring Doc: No ref. provider found  Other Physicians:    Chief Complaint:  No chief complaint on file.      History of Present Illness:     Patient returns today for a regularly scheduled follow-up visit for  Rheumatoid Arthritis     The patient is doing well now  Had flare up over the holidays, with pain and swelling in both hand joints  Ok now  No prolonged AM stiffness.No joint swelling            ROS:   GEN:  No  fever, night sweats . weight is stable   No fatigue  SKIN: no rashes, no bruising, no ulcerations, no Raynaud's  HEENT: no HA's, No visual changes, no mucosal ulcers, no sicca symptoms,  CV:   no CP, SOB, PND, MCKEON, no orthopnea, no palpitations  PULM: normal with no SOB, cough, hemoptysis, sputum or pleuritic pain  GI:  no abdominal pain, nausea, vomiting, constipation, diarrhea, melanotic stools, BRBPR, hematemesis, no dysphagia  :   no dysuria  NEURO: no paresthesias, headaches, visual disturbances, muscle weakness  MUSCULOSKELETAL:no joint swelling, prolonged AM stiffness, no back pain, no muscle pain  Allergies:  Review of patient's allergies indicates:   Allergen Reactions    Sulfa (sulfonamide antibiotics)     Sulfamethoxazole-trimethoprim      Other reaction(s): per dr stearns       Medications:    Current Outpatient Prescriptions:     amlodipine (NORVASC) 5 MG tablet, TAKE ONE TABLET BY MOUTH ONCE DAILY, Disp: 90 tablet, Rfl: 3    atorvastatin (LIPITOR) 20 MG tablet, TAKE ONE TABLET BY MOUTH ONCE DAILY, Disp: 90 tablet, Rfl: 3    CALCIUM CARBONATE/VITAMIN D3 (VITAMIN D-3 ORAL), Take by mouth., Disp: , Rfl:     etanercept (ENBREL SURECLICK) 50 mg/mL (0.98 mL) PnIj, INJECT THE CONTENTS OF ONE SURECLICK (50 MG) SUBCUTANEOUSLY ONCE PER WEEK AS DIRECTED BY YOUR PHYSICIAN. SINGLE-USE DEVICE. KEEP REFRIGERATE, Disp: 12 Syringe, Rfl: 3     "FLAXSEED ORAL, Take by mouth., Disp: , Rfl:     fluticasone-salmeterol 250-50 mcg/dose (ADVAIR DISKUS) 250-50 mcg/dose diskus inhaler, Inhale 1 puff into the lungs 2 (two) times daily., Disp: 3 each, Rfl: 3    folic acid (FOLVITE) 1 MG tablet, Every day, Disp: , Rfl:     levothyroxine (SYNTHROID) 112 MCG tablet, Take 1 tablet (112 mcg total) by mouth once daily., Disp: 90 tablet, Rfl: 3    methotrexate 2.5 MG Tab, TAKE FOUR TABLETS BY MOUTH ONCE A WEEK, Disp: 48 tablet, Rfl: 1    VITAMIN B COMPLEX ORAL, Every day, Disp: , Rfl:     PMHx/PSHx Updates:    Objective:     Vitals:  Blood pressure 130/68, height 5' 8" (1.727 m), weight 82.6 kg (182 lb).    Physical Examination:   GEN: no apparent distress, comfortable; AAOx3  SKIN: no rashes,no ulceration, no Raynaud's, no petechiae, no SQ nodules,  HEAD: normal  EYES: no pallor, no icterus,  NECK: no masses, thyroid normal, trachea midline, no LAD/LN's, supple  CV: RRR with no murmur; l S1 and S2 reg. ,no gallop no rubs,   CHEST: Normal respiratory effort; CTAB; normal breath sounds; no wheeze or crackles  MUSC/Skeletal: ROM normal; no crepitus; joints without synovitis,  no deformities  No joint swelling or tenderness of PIP, MCP, wrist, elbow, shoulder, or knee joints  EXTREM: no clubbing, cyanosis, no edema,normal  pulses   NEURO: grossly intact; motor WNL; AAOx3; ,  PSYCH: normal mood, affect and behavior  LYMPH: normal cervical, supraclavicular          Labs:   Lab Results   Component Value Date    WBC 7.7 10/25/2017    HGB 13.6 10/25/2017    HCT 40.6 10/25/2017    .0 (H) 10/25/2017     10/25/2017    CMP  @LASTLAB(NA,K,CL,CO2,GLU,BUN,Creatinine,Calcium,PROT,Albumin,Bilitot,Alkphos,AST,ALT,CRP,ESR,RF,CCP,RODNEY,SSA,CPK,uric acid) )@  I have reviewed all available lab results and radiology reports.    Radiology/Diagnostic Studies:        Assessment/Plan:   (1) 72 y.o. female with diagnosis of RA, she is stable    PLAN: CBC,CMP,CRP    DEXA Bone " Density        Discussion:     I have explained all of the above in detail and the patient understands all of the current recommendation(s). I have answered all questions to the best of my ability and to their complete satisfaction.       The patient is to continue with the current management plan         RTC in   3 months      Electronically signed by Jelani Stacy MD

## 2018-02-27 RX ORDER — FOLIC ACID 1 MG/1
1 TABLET ORAL DAILY
Qty: 30 TABLET | Refills: 10 | Status: SHIPPED | OUTPATIENT
Start: 2018-02-27 | End: 2019-02-20 | Stop reason: SDUPTHER

## 2018-03-08 DIAGNOSIS — E03.9 ACQUIRED HYPOTHYROIDISM: ICD-10-CM

## 2018-03-08 RX ORDER — LEVOTHYROXINE SODIUM 112 UG/1
TABLET ORAL
Qty: 90 TABLET | Refills: 3 | Status: SHIPPED | OUTPATIENT
Start: 2018-03-08 | End: 2019-03-06 | Stop reason: SDUPTHER

## 2018-03-25 DIAGNOSIS — J45.20 ASTHMA, CHRONIC, MILD INTERMITTENT, UNCOMPLICATED: ICD-10-CM

## 2018-03-25 RX ORDER — FLUTICASONE PROPIONATE AND SALMETEROL 50; 250 UG/1; UG/1
POWDER RESPIRATORY (INHALATION)
Qty: 60 EACH | Refills: 11 | Status: SHIPPED | OUTPATIENT
Start: 2018-03-25 | End: 2019-06-17 | Stop reason: SDUPTHER

## 2018-05-01 ENCOUNTER — OFFICE VISIT (OUTPATIENT)
Dept: RHEUMATOLOGY | Facility: CLINIC | Age: 73
End: 2018-05-01
Payer: MEDICARE

## 2018-05-01 VITALS
BODY MASS INDEX: 27.78 KG/M2 | DIASTOLIC BLOOD PRESSURE: 84 MMHG | SYSTOLIC BLOOD PRESSURE: 146 MMHG | WEIGHT: 182.69 LBS

## 2018-05-01 DIAGNOSIS — M15.9 OSTEOARTHRITIS, GENERALIZED: ICD-10-CM

## 2018-05-01 DIAGNOSIS — M05.79 RHEUMATOID ARTHRITIS INVOLVING MULTIPLE SITES WITH POSITIVE RHEUMATOID FACTOR: Primary | ICD-10-CM

## 2018-05-01 LAB
ALBUMIN SERPL-MCNC: 4.2 G/DL (ref 3.1–4.7)
ALP SERPL-CCNC: 56 IU/L (ref 40–104)
ALT (SGPT): 25 IU/L (ref 3–33)
AST SERPL-CCNC: 30 IU/L (ref 10–40)
BASOPHILS NFR BLD: 0 K/UL (ref 0–0.2)
BASOPHILS NFR BLD: 0.4 %
BILIRUB SERPL-MCNC: 0.4 MG/DL (ref 0.3–1)
BUN SERPL-MCNC: 19 MG/DL (ref 8–20)
CALCIUM SERPL-MCNC: 10.3 MG/DL (ref 7.7–10.4)
CHLORIDE: 104 MMOL/L (ref 98–110)
CO2 SERPL-SCNC: 31 MMOL/L (ref 22.8–31.6)
CREATININE: 0.81 MG/DL (ref 0.6–1.4)
CRP SERPL-MCNC: 0.13 MG/DL (ref 0–1.4)
EOSINOPHIL NFR BLD: 0.1 K/UL (ref 0–0.7)
EOSINOPHIL NFR BLD: 2.5 %
ERYTHROCYTE [DISTWIDTH] IN BLOOD BY AUTOMATED COUNT: 13.2 % (ref 11.7–14.9)
GLUCOSE: 105 MG/DL (ref 70–99)
GRAN #: 3.1 K/UL (ref 1.4–6.5)
GRAN%: 53.6 %
HCT VFR BLD AUTO: 40.6 % (ref 36–48)
HGB BLD-MCNC: 13.6 G/DL (ref 12–15)
IMMATURE GRANS (ABS): 0 K/UL (ref 0–1)
IMMATURE GRANULOCYTES: 0.4 %
LYMPH #: 2 K/UL (ref 1.2–3.4)
LYMPH%: 34.2 %
MCH RBC QN AUTO: 34.5 PG (ref 25–35)
MCHC RBC AUTO-ENTMCNC: 33.5 G/DL (ref 31–36)
MCV RBC AUTO: 103 FL (ref 79–98)
MONO #: 0.5 K/UL (ref 0.1–0.6)
MONO%: 8.9 %
NUCLEATED RBCS: 0 %
PLATELET # BLD AUTO: 208 K/UL (ref 140–440)
PMV BLD AUTO: 11.6 FL (ref 8.8–12.7)
POTASSIUM SERPL-SCNC: 4.5 MMOL/L (ref 3.5–5)
PROT SERPL-MCNC: 7.7 G/DL (ref 6–8.2)
RBC # BLD AUTO: 3.94 M/UL (ref 3.5–5.5)
SODIUM: 142 MMOL/L (ref 134–144)
WBC # BLD AUTO: 5.7 K/UL (ref 5–10)

## 2018-05-01 PROCEDURE — 99212 OFFICE O/P EST SF 10 MIN: CPT | Mod: ,,, | Performed by: INTERNAL MEDICINE

## 2018-05-01 RX ORDER — METHOTREXATE 2.5 MG/1
TABLET ORAL
Qty: 48 TABLET | Refills: 1 | Status: SHIPPED | OUTPATIENT
Start: 2018-05-01 | End: 2019-02-26 | Stop reason: SDUPTHER

## 2018-05-01 NOTE — PROGRESS NOTES
[facility] RHEUMATOLOGY            PROGRESS NOTE      Subjective:       Patient ID:   NAME: Kirstie Bowden : 1945     73 y.o. female    Referring Doc: No ref. provider found  Other Physicians:    Chief Complaint:  Rheumatoid Arthritis      History of Present Illness:     Patient returns today for a regularly scheduled follow-up visit for Rheumatoid arthritis      The patient is doing well. Occasional arthralgias but no joint swelling. No prolonged morning stiffness. No chest pains, cough shortness of breath.            ROS:   GEN:  No  fever, night sweats . weight is stable   No fatigue  SKIN: no rashes, no bruising, no ulcerations, no Raynaud's  HEENT: no HA's, No visual changes, no mucosal ulcers, no sicca symptoms,  CV:   no CP, SOB, PND, MCKEON, no orthopnea, no palpitations  PULM: normal with no SOB, cough, hemoptysis, sputum or pleuritic pain  GI:  no abdominal pain, nausea, vomiting, constipation, diarrhea, melanotic stools, BRBPR, hematemesis, no dysphagia  :   no dysuria  NEURO: no paresthesias, headaches, visual disturbances, muscle weakness  MUSCULOSKELETAL:no joint swelling, prolonged AM stiffness, no back pain, no muscle pain  Allergies:  Review of patient's allergies indicates:   Allergen Reactions    Sulfa (sulfonamide antibiotics)     Sulfamethoxazole-trimethoprim      Other reaction(s): per dr stearns       Medications:    Current Outpatient Prescriptions:     ADVAIR DISKUS 250-50 mcg/dose diskus inhaler, INHALE ONE DOSE BY MOUTH TWICE DAILY, Disp: 60 each, Rfl: 11    amlodipine (NORVASC) 5 MG tablet, TAKE ONE TABLET BY MOUTH ONCE DAILY, Disp: 90 tablet, Rfl: 3    atorvastatin (LIPITOR) 20 MG tablet, TAKE ONE TABLET BY MOUTH ONCE DAILY, Disp: 90 tablet, Rfl: 3    CALCIUM CARBONATE/VITAMIN D3 (VITAMIN D-3 ORAL), Take by mouth., Disp: , Rfl:     etanercept (ENBREL SURECLICK) 50 mg/mL (0.98 mL) Bora, INJECT THE CONTENTS OF ONE SURECLICK (50 MG) SUBCUTANEOUSLY ONCE PER WEEK AS  DIRECTED BY YOUR PHYSICIAN. SINGLE-USE DEVICE. KEEP REFRIGERATE, Disp: 12 Syringe, Rfl: 3    FLAXSEED ORAL, Take by mouth., Disp: , Rfl:     folic acid (FOLVITE) 1 MG tablet, Take 1 tablet (1 mg total) by mouth once daily. Every day, Disp: 30 tablet, Rfl: 10    levothyroxine (SYNTHROID) 112 MCG tablet, TAKE ONE TABLET BY MOUTH ONCE DAILY, Disp: 90 tablet, Rfl: 3    methotrexate 2.5 MG Tab, TAKE FOUR TABLETS BY MOUTH ONCE A WEEK, Disp: 48 tablet, Rfl: 1    VITAMIN B COMPLEX ORAL, Every day, Disp: , Rfl:     PMHx/PSHx Updates:        Objective:     Vitals:  Blood pressure (!) 146/84, weight 82.9 kg (182 lb 11.2 oz).    Physical Examination:   GEN: no apparent distress, comfortable; AAOx3  SKIN: no rashes,no ulceration, no Raynaud's, no petechiae, no SQ nodules,  HEAD: normal  EYES: no pallor, no icterus,  NECK: no masses, thyroid normal, trachea midline, no LAD/LN's, supple  CV: RRR with no murmur; l S1 and S2 reg. ,no gallop no rubs,   CHEST: Normal respiratory effort; CTAB; normal breath sounds; no wheeze or crackles  MUSC/Skeletal: ROM normal; no crepitus; joints without synovitis,  no deformities  No joint swelling or tenderness of PIP, MCP, wrist, elbow, shoulder, or knee joints  EXTREM: no clubbing, cyanosis, no edema,normal  pulses   NEURO: grossly intact; motor WNL; AAOx3;   PSYCH: normal mood, affect and behavior  LYMPH: normal cervical, supraclavicular          Labs:   Lab Results   Component Value Date    WBC 6.3 01/23/2018    HGB 13.8 01/23/2018    HCT 40.8 01/23/2018    .8 (H) 01/23/2018     01/23/2018    CMP  @LASTLAB(NA,K,CL,CO2,GLU,BUN,Creatinine,Calcium,PROT,Albumin,Bilitot,Alkphos,AST,ALT,CRP,ESR,RF,CCP,RODNEY,SSA,CPK,uric acid) )@  I have reviewed all available lab results and radiology reports.    Radiology/Diagnostic Studies:        Assessment/Plan:   (1) 73 y.o. female with diagnosis of Rheumatoid arthritis. The patient is stable.  PLAN: Blood work                Discussion:     I  have explained all of the above in detail and the patient understands all of the current recommendation(s). I have answered all questions to the best of my ability and to their complete satisfaction.       The patient is to continue with the current management plan         RTC in   3 months or before if needed      Electronically signed by Jelani Stacy MD

## 2018-07-13 DIAGNOSIS — I10 ESSENTIAL HYPERTENSION, BENIGN: ICD-10-CM

## 2018-07-13 RX ORDER — AMLODIPINE BESYLATE 5 MG/1
TABLET ORAL
Qty: 90 TABLET | Refills: 1 | Status: SHIPPED | OUTPATIENT
Start: 2018-07-13 | End: 2019-01-11 | Stop reason: SDUPTHER

## 2018-08-10 DIAGNOSIS — I10 ESSENTIAL HYPERTENSION, BENIGN: ICD-10-CM

## 2018-08-10 RX ORDER — TRIAMTERENE AND HYDROCHLOROTHIAZIDE 75; 50 MG/1; MG/1
TABLET ORAL
Qty: 30 TABLET | Refills: 0 | Status: SHIPPED | OUTPATIENT
Start: 2018-08-10 | End: 2019-02-21

## 2018-08-10 NOTE — TELEPHONE ENCOUNTER
I will refill the patient's medication for 1 month supply.  It has been over 1 year since she has been seen.  She must make appointment for follow-up unless she is being seen elsewhere for her primary care needs

## 2018-08-13 NOTE — TELEPHONE ENCOUNTER
Patient states he is a patient of Dr. Luis and she will see him in January. Understanding verbalized.

## 2018-08-29 ENCOUNTER — OFFICE VISIT (OUTPATIENT)
Dept: RHEUMATOLOGY | Facility: CLINIC | Age: 73
End: 2018-08-29
Payer: MEDICARE

## 2018-08-29 VITALS
DIASTOLIC BLOOD PRESSURE: 72 MMHG | BODY MASS INDEX: 27.08 KG/M2 | SYSTOLIC BLOOD PRESSURE: 122 MMHG | WEIGHT: 178.13 LBS

## 2018-08-29 DIAGNOSIS — M06.9 RHEUMATOID ARTHRITIS INVOLVING MULTIPLE SITES, UNSPECIFIED RHEUMATOID FACTOR PRESENCE: Primary | ICD-10-CM

## 2018-08-29 LAB
ALBUMIN SERPL-MCNC: 4.6 G/DL (ref 3.1–4.7)
ALP SERPL-CCNC: 61 IU/L (ref 40–104)
ALT (SGPT): 22 IU/L (ref 3–33)
AST SERPL-CCNC: 25 IU/L (ref 10–40)
BASOPHILS NFR BLD: 0 K/UL (ref 0–0.2)
BASOPHILS NFR BLD: 0.3 %
BILIRUB SERPL-MCNC: 0.8 MG/DL (ref 0.3–1)
BUN SERPL-MCNC: 17 MG/DL (ref 8–20)
CALCIUM SERPL-MCNC: 9.7 MG/DL (ref 7.7–10.4)
CHLORIDE: 102 MMOL/L (ref 98–110)
CO2 SERPL-SCNC: 30.1 MMOL/L (ref 22.8–31.6)
CREATININE: 0.82 MG/DL (ref 0.6–1.4)
CRP SERPL-MCNC: 0.2 MG/DL (ref 0–1.4)
EOSINOPHIL NFR BLD: 0.2 K/UL (ref 0–0.7)
EOSINOPHIL NFR BLD: 2.8 %
ERYTHROCYTE [DISTWIDTH] IN BLOOD BY AUTOMATED COUNT: 13.5 % (ref 11.7–14.9)
GLUCOSE: 102 MG/DL (ref 70–99)
GRAN #: 3 K/UL (ref 1.4–6.5)
GRAN%: 49.6 %
HCT VFR BLD AUTO: 40.1 % (ref 36–48)
HGB BLD-MCNC: 13.3 G/DL (ref 12–15)
IMMATURE GRANS (ABS): 0 K/UL (ref 0–1)
IMMATURE GRANULOCYTES: 0.2 %
LYMPH #: 2.3 K/UL (ref 1.2–3.4)
LYMPH%: 37.5 %
MCH RBC QN AUTO: 33.3 PG (ref 25–35)
MCHC RBC AUTO-ENTMCNC: 33.2 G/DL (ref 31–36)
MCV RBC AUTO: 100.3 FL (ref 79–98)
MONO #: 0.6 K/UL (ref 0.1–0.6)
MONO%: 9.6 %
NUCLEATED RBCS: 0 %
PLATELET # BLD AUTO: 211 K/UL (ref 140–440)
PMV BLD AUTO: 11.4 FL (ref 8.8–12.7)
POTASSIUM SERPL-SCNC: 4 MMOL/L (ref 3.5–5)
PROT SERPL-MCNC: 7.6 G/DL (ref 6–8.2)
RBC # BLD AUTO: 4 M/UL (ref 3.5–5.5)
SODIUM: 140 MMOL/L (ref 134–144)
WBC # BLD AUTO: 6 K/UL (ref 5–10)

## 2018-08-29 PROCEDURE — 99213 OFFICE O/P EST LOW 20 MIN: CPT | Mod: ,,, | Performed by: INTERNAL MEDICINE

## 2018-08-29 RX ORDER — METHOTREXATE 2.5 MG/1
10 TABLET ORAL
Qty: 48 TABLET | Refills: 1 | Status: SHIPPED | OUTPATIENT
Start: 2018-08-29 | End: 2018-11-28 | Stop reason: SDUPTHER

## 2018-08-29 NOTE — PROGRESS NOTES
[facility] RHEUMATOLOGY            PROGRESS NOTE      Subjective:       Patient ID:   NAME: Kirstie Bowden : 1945     73 y.o. female    Referring Doc: No ref. provider found  Other Physicians:    Chief Complaint:  Rheumatoid Arthritis      History of Present Illness:     Patient returns today for a regularly scheduled follow-up visit for   Rheumatoid arthritis.    The patient is doing well. No chronic prolonged morning stiffness or significant arthralgias. No joint swelling. No significant fatigue, fevers cough or shortness of breath. No chest pains. No gastrointestinal complaints            ROS:   GEN:  No  fever, night sweats . weight is stable   No fatigue  SKIN: no rashes, no bruising, no ulcerations, no Raynaud's  HEENT: no HA's, No visual changes, no mucosal ulcers, no sicca symptoms,  CV:   no CP, SOB, PND, MCKEON, no orthopnea, no palpitations  PULM: normal with no SOB, cough, hemoptysis, sputum or pleuritic pain  GI:  no abdominal pain, nausea, vomiting, constipation, diarrhea, melanotic stools, BRBPR, hematemesis, no dysphagia  :   no dysuria  NEURO: no paresthesias, headaches, visual disturbances, muscle weakness  MUSCULOSKELETAL:no joint swelling, prolonged AM stiffness, no back pain, no muscle pain  Allergies:  Review of patient's allergies indicates:   Allergen Reactions    Sulfa (sulfonamide antibiotics)     Sulfamethoxazole-trimethoprim      Other reaction(s): per dr stearns       Medications:    Current Outpatient Medications:     ADVAIR DISKUS 250-50 mcg/dose diskus inhaler, INHALE ONE DOSE BY MOUTH TWICE DAILY, Disp: 60 each, Rfl: 11    amLODIPine (NORVASC) 5 MG tablet, TAKE ONE TABLET BY MOUTH ONCE DAILY, Disp: 90 tablet, Rfl: 1    atorvastatin (LIPITOR) 20 MG tablet, TAKE ONE TABLET BY MOUTH ONCE DAILY, Disp: 90 tablet, Rfl: 3    CALCIUM CARBONATE/VITAMIN D3 (VITAMIN D-3 ORAL), Take by mouth., Disp: , Rfl:     etanercept (ENBREL SURECLICK) 50 mg/mL (0.98 mL) PnAndressa, INJECT  THE CONTENTS OF ONE SURECLICK (50 MG) SUBCUTANEOUSLY ONCE PER WEEK AS DIRECTED BY YOUR PHYSICIAN. SINGLE-USE DEVICE. KEEP REFRIGERATE, Disp: 12 Syringe, Rfl: 3    FLAXSEED ORAL, Take by mouth., Disp: , Rfl:     folic acid (FOLVITE) 1 MG tablet, Take 1 tablet (1 mg total) by mouth once daily. Every day, Disp: 30 tablet, Rfl: 10    levothyroxine (SYNTHROID) 112 MCG tablet, TAKE ONE TABLET BY MOUTH ONCE DAILY, Disp: 90 tablet, Rfl: 3    methotrexate 2.5 MG Tab, TAKE FOUR TABLETS BY MOUTH ONCE A WEEK, Disp: 48 tablet, Rfl: 1    triamterene-hydrochlorothiazide 75-50 mg (MAXZIDE) 75-50 mg per tablet, TAKE ONE-HALF TABLET BY MOUTH ONCE DAILY, Disp: 30 tablet, Rfl: 0    VITAMIN B COMPLEX ORAL, Every day, Disp: , Rfl:     methotrexate 2.5 MG Tab, Take 4 tablets (10 mg total) by mouth every 7 days., Disp: 48 tablet, Rfl: 1    PMHx/PSHx Updates:          Objective:     Vitals:  Blood pressure 122/72, weight 80.8 kg (178 lb 1.6 oz).    Physical Examination:   GEN: no apparent distress, comfortable; AAOx3  SKIN: no rashes,no ulceration, no Raynaud's, no petechiae, no SQ nodules,  HEAD: normal  EYES: no pallor, no icterus,  NECK: no masses, thyroid normal, trachea midline, no LAD/LN's, supple  CV: RRR with no murmur; l S1 and S2 reg. ,no gallop no rubs,   CHEST: Normal respiratory effort; CTAB; normal breath sounds; no wheeze or crackles  MUSC/Skeletal: ROM normal; no crepitus; joints without synovitis,  no deformities  No joint swelling or tenderness of PIP, MCP, wrist, elbow, shoulder, or knee joints  EXTREM: no clubbing, cyanosis, no edema,normal  pulses   NEURO: grossly intact; motor WNL; AAOx3; ,  PSYCH: normal mood, affect and behavior  LYMPH: normal cervical, supraclavicular          Labs:   Lab Results   Component Value Date    WBC 5.7 05/01/2018    HGB 13.6 05/01/2018    HCT 40.6 05/01/2018    .0 (H) 05/01/2018     05/01/2018     CMP  @LASTLAB(NA,K,CL,CO2,GLU,BUN,Creatinine,Calcium,PROT,Albumin,Bilitot,Alkphos,AST,ALT,CRP,ESR,RF,CCP,RODNEY,SSA,CPK,uric acid) )@  I have reviewed all available lab results and radiology reports.    Radiology/Diagnostic Studies:        Assessment/Plan:   (1) 73 y.o. female with diagnosis of rheumatoid arthritis. She is stable.      CBC CMP CRP and TB Gold          Discussion:     I have explained all of the above in detail and the patient understands all of the current recommendation(s). I have answered all questions to the best of my ability and to their complete satisfaction.       The patient is to continue with the current management plan         RTC in   3 months or before if needed      Electronically signed by Jelani Stacy MD

## 2018-11-04 DIAGNOSIS — I10 ESSENTIAL HYPERTENSION, BENIGN: ICD-10-CM

## 2018-11-05 RX ORDER — TRIAMTERENE AND HYDROCHLOROTHIAZIDE 75; 50 MG/1; MG/1
TABLET ORAL
Qty: 30 TABLET | Refills: 0 | OUTPATIENT
Start: 2018-11-05

## 2018-11-28 ENCOUNTER — OFFICE VISIT (OUTPATIENT)
Dept: RHEUMATOLOGY | Facility: CLINIC | Age: 73
End: 2018-11-28
Payer: MEDICARE

## 2018-11-28 VITALS
SYSTOLIC BLOOD PRESSURE: 135 MMHG | DIASTOLIC BLOOD PRESSURE: 74 MMHG | HEART RATE: 70 BPM | WEIGHT: 179 LBS | BODY MASS INDEX: 27.22 KG/M2

## 2018-11-28 DIAGNOSIS — M05.79 RHEUMATOID ARTHRITIS INVOLVING MULTIPLE SITES WITH POSITIVE RHEUMATOID FACTOR: Primary | ICD-10-CM

## 2018-11-28 LAB
ALBUMIN SERPL-MCNC: 4 G/DL (ref 3.1–4.7)
ALP SERPL-CCNC: 58 IU/L (ref 40–104)
ALT (SGPT): 35 IU/L (ref 3–33)
AST SERPL-CCNC: 30 IU/L (ref 10–40)
BASOPHILS NFR BLD: 0 K/UL (ref 0–0.2)
BASOPHILS NFR BLD: 0.5 %
BILIRUB SERPL-MCNC: 0.4 MG/DL (ref 0.3–1)
BUN SERPL-MCNC: 14 MG/DL (ref 8–20)
CALCIUM SERPL-MCNC: 9.5 MG/DL (ref 7.7–10.4)
CHLORIDE: 103 MMOL/L (ref 98–110)
CO2 SERPL-SCNC: 29.8 MMOL/L (ref 22.8–31.6)
CREATININE: 0.86 MG/DL (ref 0.6–1.4)
CRP SERPL-MCNC: 0.09 MG/DL (ref 0–1.4)
EOSINOPHIL NFR BLD: 0.2 K/UL (ref 0–0.7)
EOSINOPHIL NFR BLD: 3.2 %
ERYTHROCYTE [DISTWIDTH] IN BLOOD BY AUTOMATED COUNT: 14.2 % (ref 11.7–14.9)
GLUCOSE: 118 MG/DL (ref 70–99)
GRAN #: 2.8 K/UL (ref 1.4–6.5)
GRAN%: 49.4 %
HCT VFR BLD AUTO: 40.7 % (ref 36–48)
HGB BLD-MCNC: 13.5 G/DL (ref 12–15)
IMMATURE GRANS (ABS): 0 K/UL (ref 0–1)
IMMATURE GRANULOCYTES: 0.2 %
LYMPH #: 2.1 K/UL (ref 1.2–3.4)
LYMPH%: 38.4 %
MCH RBC QN AUTO: 35.1 PG (ref 25–35)
MCHC RBC AUTO-ENTMCNC: 33.2 G/DL (ref 31–36)
MCV RBC AUTO: 105.7 FL (ref 79–98)
MONO #: 0.5 K/UL (ref 0.1–0.6)
MONO%: 8.3 %
NUCLEATED RBCS: 0 %
PLATELET # BLD AUTO: 198 K/UL (ref 140–440)
PMV BLD AUTO: 11.4 FL (ref 8.8–12.7)
POTASSIUM SERPL-SCNC: 4.4 MMOL/L (ref 3.5–5)
PROT SERPL-MCNC: 7.3 G/DL (ref 6–8.2)
RBC # BLD AUTO: 3.85 M/UL (ref 3.5–5.5)
SODIUM: 141 MMOL/L (ref 134–144)
URATE SERPL-MCNC: 4.3 MG/DL (ref 2.6–7.8)
WBC # BLD AUTO: 5.6 K/UL (ref 5–10)

## 2018-11-28 PROCEDURE — 99213 OFFICE O/P EST LOW 20 MIN: CPT | Mod: ,,, | Performed by: INTERNAL MEDICINE

## 2018-11-28 RX ORDER — METHOTREXATE 2.5 MG/1
10 TABLET ORAL
Qty: 48 TABLET | Refills: 1 | Status: SHIPPED | OUTPATIENT
Start: 2018-11-28 | End: 2019-02-26 | Stop reason: SDUPTHER

## 2018-11-28 NOTE — PROGRESS NOTES
[facility] RHEUMATOLOGY            PROGRESS NOTE      Subjective:       Patient ID:   NAME: Kirstie Bowden : 1945     73 y.o. female    Referring Doc: No ref. provider found  Other Physicians:    Chief Complaint:  Rheumatoid Arthritis      History of Present Illness:     Patient returns today for a regularly scheduled follow-up visit for  Rheumatoid arthritis.     The patient is doing well. No fevers or  chest pains. Occasional cough. No shortness of breath ( had CXRay recently,ok as per pt). No prolonged morning stiffness or joint swelling. No GI complaints            ROS:   GEN:  No  fever, night sweats . weight is stable   No fatigue  SKIN: no rashes, no bruising, no ulcerations, no Raynaud's  HEENT: no HA's, No visual changes, no mucosal ulcers, no sicca symptoms,  CV:   no CP, SOB, PND, MCKEON, no orthopnea, no palpitations  PULM: normal with no SOB, cough, hemoptysis, sputum or pleuritic pain  GI:  no abdominal pain, nausea, vomiting, constipation, diarrhea, melanotic stools, BRBPR, hematemesis, no dysphagia  :   no dysuria  NEURO: no paresthesias, headaches, visual disturbances, muscle weakness  MUSCULOSKELETAL:no joint swelling, prolonged AM stiffness, no back pain, no muscle pain  Allergies:  Review of patient's allergies indicates:   Allergen Reactions    Sulfa (sulfonamide antibiotics)     Sulfamethoxazole-trimethoprim      Other reaction(s): per dr stearns       Medications:    Current Outpatient Medications:     ADVAIR DISKUS 250-50 mcg/dose diskus inhaler, INHALE ONE DOSE BY MOUTH TWICE DAILY, Disp: 60 each, Rfl: 11    amLODIPine (NORVASC) 5 MG tablet, TAKE ONE TABLET BY MOUTH ONCE DAILY, Disp: 90 tablet, Rfl: 1    atorvastatin (LIPITOR) 20 MG tablet, TAKE ONE TABLET BY MOUTH ONCE DAILY, Disp: 90 tablet, Rfl: 3    CALCIUM CARBONATE/VITAMIN D3 (VITAMIN D-3 ORAL), Take by mouth., Disp: , Rfl:     etanercept (ENBREL SURECLICK) 50 mg/mL (0.98 mL) Bora, INJECT THE CONTENTS OF ONE  SURECLICK (50 MG) SUBCUTANEOUSLY ONCE PER WEEK AS DIRECTED BY YOUR PHYSICIAN. SINGLE-USE DEVICE. KEEP REFRIGERATE, Disp: 12 Syringe, Rfl: 3    FLAXSEED ORAL, Take by mouth., Disp: , Rfl:     folic acid (FOLVITE) 1 MG tablet, Take 1 tablet (1 mg total) by mouth once daily. Every day, Disp: 30 tablet, Rfl: 10    levothyroxine (SYNTHROID) 112 MCG tablet, TAKE ONE TABLET BY MOUTH ONCE DAILY, Disp: 90 tablet, Rfl: 3    methotrexate 2.5 MG Tab, TAKE FOUR TABLETS BY MOUTH ONCE A WEEK, Disp: 48 tablet, Rfl: 1    methotrexate 2.5 MG Tab, Take 4 tablets (10 mg total) by mouth every 7 days., Disp: 48 tablet, Rfl: 1    triamterene-hydrochlorothiazide 75-50 mg (MAXZIDE) 75-50 mg per tablet, TAKE ONE-HALF TABLET BY MOUTH ONCE DAILY, Disp: 30 tablet, Rfl: 0    VITAMIN B COMPLEX ORAL, Every day, Disp: , Rfl:     PMHx/PSHx Updates:        Objective:     Vitals:  Blood pressure 135/74, pulse 70, weight 81.2 kg (179 lb).    Physical Examination:   GEN: no apparent distress, comfortable; AAOx3  SKIN: no rashes,no ulceration, no Raynaud's, no petechiae, no SQ nodules,  HEAD: normal  EYES: no pallor, no icterus,  NECK: no masses, thyroid normal, trachea midline, no LAD/LN's, supple  CV: RRR with no murmur; l S1 and S2 reg. ,no gallop no rubs,   CHEST: Normal respiratory effort; CTAB; normal breath sounds; no wheeze or crackles  MUSC/Skeletal: ROM normal; no crepitus; joints without synovitis,  no deformities  No joint swelling or tenderness of PIP, MCP, wrist, elbow, shoulder, or knee joints  EXTREM: no clubbing, cyanosis, no edema,normal  pulses   NEURO: grossly intact; motor WNL; AAOx3; ,  PSYCH: normal mood, affect and behavior  LYMPH: normal cervical, supraclavicular          Labs:   Lab Results   Component Value Date    WBC 6.0 08/29/2018    HGB 13.3 08/29/2018    HCT 40.1 08/29/2018    .3 (H) 08/29/2018     08/29/2018     CMP  @LASTLAB(NA,K,CL,CO2,GLU,BUN,Creatinine,Calcium,PROT,Albumin,Bilitot,Alkphos,AST,ALT,CRP,ESR,RF,CCP,RODNEY,SSA,CPK,uric acid) )@  I have reviewed all available lab results and radiology reports.    Radiology/Diagnostic Studies:        Assessment/Plan:   (1) 73 y.o. female with diagnosis of Rheumatoid  arthritis. She is stable      CBC CMP CRP          Discussion:     I have explained all of the above in detail and the patient understands all of the current recommendation(s). I have answered all questions to the best of my ability and to their complete satisfaction.       The patient is to continue with the current management plan         RTC in   3 months or before if needed      Electronically signed by Jelani Stacy MD

## 2018-12-19 ENCOUNTER — PATIENT OUTREACH (OUTPATIENT)
Dept: ADMINISTRATIVE | Facility: HOSPITAL | Age: 73
End: 2018-12-19

## 2018-12-19 DIAGNOSIS — Z12.39 SCREENING FOR BREAST CANCER: Primary | ICD-10-CM

## 2018-12-28 ENCOUNTER — HOSPITAL ENCOUNTER (OUTPATIENT)
Dept: RADIOLOGY | Facility: CLINIC | Age: 73
Discharge: HOME | End: 2018-12-28
Attending: FAMILY MEDICINE
Payer: MEDICARE

## 2018-12-28 DIAGNOSIS — Z12.39 SCREENING FOR BREAST CANCER: ICD-10-CM

## 2018-12-28 PROCEDURE — 77063 BREAST TOMOSYNTHESIS BI: CPT | Mod: TC,PO

## 2018-12-28 PROCEDURE — 77063 BREAST TOMOSYNTHESIS BI: CPT | Mod: 26,,, | Performed by: RADIOLOGY

## 2018-12-28 PROCEDURE — 77067 SCR MAMMO BI INCL CAD: CPT | Mod: 26,,, | Performed by: RADIOLOGY

## 2019-01-04 ENCOUNTER — TELEPHONE (OUTPATIENT)
Dept: RADIOLOGY | Facility: HOSPITAL | Age: 74
End: 2019-01-04

## 2019-01-11 DIAGNOSIS — E78.5 HYPERLIPIDEMIA: ICD-10-CM

## 2019-01-11 DIAGNOSIS — I10 ESSENTIAL HYPERTENSION, BENIGN: ICD-10-CM

## 2019-01-12 RX ORDER — ATORVASTATIN CALCIUM 20 MG/1
TABLET, FILM COATED ORAL
Qty: 90 TABLET | Refills: 1 | Status: SHIPPED | OUTPATIENT
Start: 2019-01-12 | End: 2019-07-21 | Stop reason: SDUPTHER

## 2019-01-12 RX ORDER — AMLODIPINE BESYLATE 5 MG/1
TABLET ORAL
Qty: 90 TABLET | Refills: 1 | Status: SHIPPED | OUTPATIENT
Start: 2019-01-12 | End: 2019-07-21 | Stop reason: SDUPTHER

## 2019-01-15 ENCOUNTER — HOSPITAL ENCOUNTER (OUTPATIENT)
Dept: RADIOLOGY | Facility: HOSPITAL | Age: 74
Discharge: HOME | End: 2019-01-15
Attending: FAMILY MEDICINE
Payer: MEDICARE

## 2019-01-15 ENCOUNTER — TELEPHONE (OUTPATIENT)
Dept: FAMILY MEDICINE | Facility: CLINIC | Age: 74
End: 2019-01-15

## 2019-01-15 DIAGNOSIS — R92.8 ABNORMAL MAMMOGRAM OF LEFT BREAST: ICD-10-CM

## 2019-01-15 PROCEDURE — 77061 BREAST TOMOSYNTHESIS UNI: CPT | Mod: TC,LT

## 2019-01-15 PROCEDURE — 76642 ULTRASOUND BREAST LIMITED: CPT | Mod: TC,LT

## 2019-01-15 PROCEDURE — 77061 BREAST TOMOSYNTHESIS UNI: CPT | Mod: 26,LT,, | Performed by: RADIOLOGY

## 2019-01-15 PROCEDURE — 77061 MAMMO DIGITAL DIAGNOSTIC LEFT WITH TOMOSYNTHESIS_CAD: ICD-10-PCS | Mod: 26,LT,, | Performed by: RADIOLOGY

## 2019-01-15 PROCEDURE — 77065 DX MAMMO INCL CAD UNI: CPT | Mod: 26,LT,, | Performed by: RADIOLOGY

## 2019-01-15 PROCEDURE — 76642 US BREAST LEFT LIMITED: ICD-10-PCS | Mod: 26,LT,, | Performed by: RADIOLOGY

## 2019-01-15 PROCEDURE — 77065 DX MAMMO INCL CAD UNI: CPT | Mod: TC,LT

## 2019-01-15 PROCEDURE — 77065 MAMMO DIGITAL DIAGNOSTIC LEFT WITH TOMOSYNTHESIS_CAD: ICD-10-PCS | Mod: 26,LT,, | Performed by: RADIOLOGY

## 2019-01-15 PROCEDURE — 76642 ULTRASOUND BREAST LIMITED: CPT | Mod: 26,LT,, | Performed by: RADIOLOGY

## 2019-01-15 NOTE — TELEPHONE ENCOUNTER
Dr. Giron from Radiology called wanting to update that they found a mass on pt's left breast and they will need to do biopsy. They are taking care of this matter.

## 2019-01-16 ENCOUNTER — TELEPHONE (OUTPATIENT)
Dept: RADIOLOGY | Facility: HOSPITAL | Age: 74
End: 2019-01-16

## 2019-01-16 NOTE — TELEPHONE ENCOUNTER
..Patient notified of diagnostic mammogram results.  Left breast biopsy is scheduled on  1/23/2019.

## 2019-01-18 ENCOUNTER — TELEPHONE (OUTPATIENT)
Dept: FAMILY MEDICINE | Facility: CLINIC | Age: 74
End: 2019-01-18

## 2019-01-18 DIAGNOSIS — R22.2 MASS ON BACK: Primary | ICD-10-CM

## 2019-01-18 NOTE — TELEPHONE ENCOUNTER
Pt stated she has a lump on her back x1 month where her bra sits, plum size, feels like soft lemon.  Denied pain or discomfort. She's asking if she needs to be seen in office or can referral be placed for her to see a specialist. Please advise.

## 2019-01-18 NOTE — TELEPHONE ENCOUNTER
----- Message from Price Brown sent at 1/18/2019 11:17 AM CST -----  Type:  Sooner Apoointment Request    Caller is requesting a sooner appointment.  Caller declined first available appointment listed below.  Caller will not accept being placed on the waitlist and is requesting a message be sent to doctor.    Name of Caller:  When is the first available appointment?    Symptoms:  Lump on back   Best Call Back Number:  [phone]  Additional Information:  Patient asking for an earlier appointment to be seen for lump on back.

## 2019-01-21 NOTE — TELEPHONE ENCOUNTER
Advised pt general surgery referral was placed. Scheduled pt on 01/29/19. Understanding verbalized.

## 2019-01-23 ENCOUNTER — HOSPITAL ENCOUNTER (OUTPATIENT)
Dept: RADIOLOGY | Facility: HOSPITAL | Age: 74
Discharge: HOME | End: 2019-01-23
Attending: FAMILY MEDICINE
Payer: MEDICARE

## 2019-01-23 DIAGNOSIS — R92.8 ABNORMAL MAMMOGRAM OF LEFT BREAST: ICD-10-CM

## 2019-01-23 PROCEDURE — 88360 TISSUE SPECIMEN TO PATHOLOGY, RADIOLOGY: ICD-10-PCS | Mod: 26,,, | Performed by: PATHOLOGY

## 2019-01-23 PROCEDURE — A4648 IMPLANTABLE TISSUE MARKER: HCPCS

## 2019-01-23 PROCEDURE — 88305 TISSUE EXAM BY PATHOLOGIST: CPT | Mod: 26,,, | Performed by: PATHOLOGY

## 2019-01-23 PROCEDURE — 88305 TISSUE SPECIMEN TO PATHOLOGY, RADIOLOGY: ICD-10-PCS | Mod: 26,,, | Performed by: PATHOLOGY

## 2019-01-23 PROCEDURE — 88305 TISSUE EXAM BY PATHOLOGIST: CPT | Performed by: PATHOLOGY

## 2019-01-23 PROCEDURE — 19083 BX BREAST 1ST LESION US IMAG: CPT | Mod: 50,,, | Performed by: FAMILY MEDICINE

## 2019-01-23 PROCEDURE — 88342 IMHCHEM/IMCYTCHM 1ST ANTB: CPT | Mod: 26,59,, | Performed by: PATHOLOGY

## 2019-01-23 PROCEDURE — 27201044 US BREAST BIOPSY WITH IMAGING 1ST SITE LEFT

## 2019-01-23 PROCEDURE — 88360 TUMOR IMMUNOHISTOCHEM/MANUAL: CPT | Mod: 26,,, | Performed by: PATHOLOGY

## 2019-01-23 PROCEDURE — 19083 US BREAST BIOPSY WITH IMAGING 1ST SITE LEFT: ICD-10-PCS | Mod: 50,,, | Performed by: FAMILY MEDICINE

## 2019-01-23 PROCEDURE — 88342 TISSUE SPECIMEN TO PATHOLOGY, RADIOLOGY: ICD-10-PCS | Mod: 26,59,, | Performed by: PATHOLOGY

## 2019-01-23 PROCEDURE — 88360 TUMOR IMMUNOHISTOCHEM/MANUAL: CPT | Performed by: PATHOLOGY

## 2019-01-28 ENCOUNTER — TELEPHONE (OUTPATIENT)
Dept: RADIOLOGY | Facility: HOSPITAL | Age: 74
End: 2019-01-28

## 2019-01-28 NOTE — TELEPHONE ENCOUNTER
..Patient notified of breast biopsy results     FINAL PATHOLOGIC DIAGNOSIS  LEFT BREAST MASS, BIOPSY:  -Invasive lobular carcinoma, combined grade 2  Tubule formation: Score 3  Nuclear pleomorphism: Score 2  Mitotic rate: Score 1  -Longest continuous tumor focus is 11 mm.  -Immunohistochemical stains for hormone receptors with adequate controls showed the following results:  Estrogen receptor: Positive, strong staining in more than 90% of tumor cell nuclei.  Progesterone receptor: Positive, weak staining in 2-3% of tumor cell nuclei.  Her2: Pending, will be reported in an addendum.    Appointment scheduled with Dr Gonzalez on 2./13/2019

## 2019-02-13 ENCOUNTER — OFFICE VISIT (OUTPATIENT)
Dept: SURGERY | Facility: CLINIC | Age: 74
End: 2019-02-13
Payer: MEDICARE

## 2019-02-13 VITALS
SYSTOLIC BLOOD PRESSURE: 163 MMHG | BODY MASS INDEX: 27.22 KG/M2 | WEIGHT: 179 LBS | HEART RATE: 75 BPM | DIASTOLIC BLOOD PRESSURE: 76 MMHG

## 2019-02-13 DIAGNOSIS — Z01.818 PREOP TESTING: ICD-10-CM

## 2019-02-13 DIAGNOSIS — C50.912 MALIGNANT NEOPLASM OF LEFT FEMALE BREAST, UNSPECIFIED ESTROGEN RECEPTOR STATUS, UNSPECIFIED SITE OF BREAST: Primary | ICD-10-CM

## 2019-02-13 PROCEDURE — 99204 PR OFFICE/OUTPT VISIT, NEW, LEVL IV, 45-59 MIN: ICD-10-PCS | Mod: S$PBB,,, | Performed by: SURGERY

## 2019-02-13 PROCEDURE — 99215 OFFICE O/P EST HI 40 MIN: CPT | Mod: PBBFAC,PO | Performed by: SURGERY

## 2019-02-13 PROCEDURE — 99204 OFFICE O/P NEW MOD 45 MIN: CPT | Mod: S$PBB,,, | Performed by: SURGERY

## 2019-02-13 PROCEDURE — 99999 PR PBB SHADOW E&M-EST. PATIENT-LVL V: CPT | Mod: PBBFAC,,, | Performed by: SURGERY

## 2019-02-13 PROCEDURE — 99999 PR PBB SHADOW E&M-EST. PATIENT-LVL V: ICD-10-PCS | Mod: PBBFAC,,, | Performed by: SURGERY

## 2019-02-13 RX ORDER — LIDOCAINE HYDROCHLORIDE 10 MG/ML
1 INJECTION, SOLUTION EPIDURAL; INFILTRATION; INTRACAUDAL; PERINEURAL ONCE
Status: CANCELLED | OUTPATIENT
Start: 2019-03-14

## 2019-02-13 RX ORDER — SODIUM CHLORIDE 9 MG/ML
INJECTION, SOLUTION INTRAVENOUS CONTINUOUS
Status: CANCELLED | OUTPATIENT
Start: 2019-03-14

## 2019-02-13 NOTE — PATIENT INSTRUCTIONS
.PRE-OP INSTRUCTIONS    Your procedure has been scheduled at:    [facility]pre-admit nurse  [phone]      DAY Thursday     DATE 03/14/19       Please call the pre-op nurse to schedule your pre-op testing and registration  Someone from the hospital will call you the evening before surgery to let you know what time you need to be at the hospital for surgery.                                               1:  DO NOT EAT OR DRINK ANYTHING AFTER MIDNIGHT THE NIGHT BEFORE SURGERY.     2:  You will need to stop any blood thinners 1 week prior to surgery.  This includes Aspirin, fish oil, Pradaxa, Coumadin, Plavix, Pletal.  Please contact the prescribing doctor to be sure it is ok to stop these medicines.    3:  Pre-admit nurse will go over your medicines and let you know which ones not to take the morning of surgery    4:  Plan to have someone drive you home after you are released from the hospital.  You WILL NOT be able to drive yourself.    5:  If you have any questions or need to change your surgery date, please call Jen at [phone]    AFTER SURGERY:    You can shower 48 hours after surgery, REMOVE WET BANDAGES AND BANDAIDS, leave the steri- strips on.  If you have not had a bowel movement within 3 days after surgery you may take a laxative of your choice.  Do not lift anything over 5-10 pounds.    You need to have a follow up appointment 7-14 days after surgery, call the office to schedule or if you have questions or concerns.    For [facility] patients, you will receive a [facility] message with a link to schedule your post op appointment.

## 2019-02-20 RX ORDER — FOLIC ACID 1 MG/1
TABLET ORAL
Qty: 30 TABLET | Refills: 10 | Status: SHIPPED | OUTPATIENT
Start: 2019-02-20 | End: 2019-02-26 | Stop reason: SDUPTHER

## 2019-02-21 ENCOUNTER — HOSPITAL ENCOUNTER (OUTPATIENT)
Dept: PREADMISSION TESTING | Facility: HOSPITAL | Age: 74
Discharge: HOME | End: 2019-02-21
Attending: SURGERY
Payer: MEDICARE

## 2019-02-21 ENCOUNTER — HOSPITAL ENCOUNTER (OUTPATIENT)
Dept: RADIOLOGY | Facility: HOSPITAL | Age: 74
Discharge: HOME | End: 2019-02-21
Attending: SURGERY
Payer: MEDICARE

## 2019-02-21 VITALS — WEIGHT: 179 LBS | BODY MASS INDEX: 27.13 KG/M2 | HEIGHT: 68 IN

## 2019-02-21 DIAGNOSIS — Z01.818 PRE-OP EXAM: ICD-10-CM

## 2019-02-21 DIAGNOSIS — C50.912 MALIGNANT NEOPLASM OF LEFT FEMALE BREAST, UNSPECIFIED ESTROGEN RECEPTOR STATUS, UNSPECIFIED SITE OF BREAST: Primary | ICD-10-CM

## 2019-02-21 LAB
ALBUMIN SERPL BCP-MCNC: 4.2 G/DL
ALP SERPL-CCNC: 68 U/L
ALT SERPL W/O P-5'-P-CCNC: 22 U/L
ANION GAP SERPL CALC-SCNC: 9 MMOL/L
AST SERPL-CCNC: 22 U/L
BASOPHILS # BLD AUTO: 0 K/UL
BASOPHILS NFR BLD: 0.2 %
BILIRUB SERPL-MCNC: 0.4 MG/DL
BUN SERPL-MCNC: 18 MG/DL
CALCIUM SERPL-MCNC: 10.7 MG/DL
CHLORIDE SERPL-SCNC: 105 MMOL/L
CO2 SERPL-SCNC: 30 MMOL/L
CREAT SERPL-MCNC: 0.9 MG/DL
DIFFERENTIAL METHOD: ABNORMAL
EOSINOPHIL # BLD AUTO: 0.1 K/UL
EOSINOPHIL NFR BLD: 1.8 %
ERYTHROCYTE [DISTWIDTH] IN BLOOD BY AUTOMATED COUNT: 14.1 %
EST. GFR  (AFRICAN AMERICAN): >60 ML/MIN/1.73 M^2
EST. GFR  (NON AFRICAN AMERICAN): >60 ML/MIN/1.73 M^2
GLUCOSE SERPL-MCNC: 113 MG/DL
HCT VFR BLD AUTO: 42.1 %
HGB BLD-MCNC: 14 G/DL
LYMPHOCYTES # BLD AUTO: 2.5 K/UL
LYMPHOCYTES NFR BLD: 36.5 %
MCH RBC QN AUTO: 33.2 PG
MCHC RBC AUTO-ENTMCNC: 33.2 G/DL
MCV RBC AUTO: 100 FL
MONOCYTES # BLD AUTO: 0.5 K/UL
MONOCYTES NFR BLD: 7.7 %
NEUTROPHILS # BLD AUTO: 3.7 K/UL
NEUTROPHILS NFR BLD: 53.8 %
PLATELET # BLD AUTO: 213 K/UL
PMV BLD AUTO: 10.1 FL
POTASSIUM SERPL-SCNC: 4.5 MMOL/L
PROT SERPL-MCNC: 7.6 G/DL
RBC # BLD AUTO: 4.2 M/UL
SODIUM SERPL-SCNC: 144 MMOL/L
WBC # BLD AUTO: 6.9 K/UL

## 2019-02-21 PROCEDURE — 85025 COMPLETE CBC W/AUTO DIFF WBC: CPT

## 2019-02-21 PROCEDURE — 99900103 DSU ONLY-NO CHARGE-INITIAL HR (STAT)

## 2019-02-21 PROCEDURE — 36415 COLL VENOUS BLD VENIPUNCTURE: CPT

## 2019-02-21 PROCEDURE — 80053 COMPREHEN METABOLIC PANEL: CPT

## 2019-02-21 PROCEDURE — 71046 XR CHEST PA AND LATERAL PRE-OP: ICD-10-PCS | Mod: 26,,, | Performed by: RADIOLOGY

## 2019-02-21 PROCEDURE — 71046 X-RAY EXAM CHEST 2 VIEWS: CPT | Mod: TC,FY

## 2019-02-21 PROCEDURE — 99900104 DSU ONLY-NO CHARGE-EA ADD'L HR (STAT)

## 2019-02-21 PROCEDURE — 93010 ELECTROCARDIOGRAM REPORT: CPT | Mod: ,,, | Performed by: INTERNAL MEDICINE

## 2019-02-21 PROCEDURE — 93010 EKG 12-LEAD: ICD-10-PCS | Mod: ,,, | Performed by: INTERNAL MEDICINE

## 2019-02-21 PROCEDURE — 71046 X-RAY EXAM CHEST 2 VIEWS: CPT | Mod: 26,,, | Performed by: RADIOLOGY

## 2019-02-21 PROCEDURE — 93005 ELECTROCARDIOGRAM TRACING: CPT

## 2019-02-21 NOTE — DISCHARGE INSTRUCTIONS
To confirm, Your doctor has instructed you that surgery is scheduled for:     Please report to [facility], Registration the morning of surgery. You must check-in and receive a wristband before going to your procedure.    Pre-Op will call the afternoon prior to surgery between 1:00 and 6:00 PM with the final arrival time.  Phone number: [phone]    PLEASE NOTE:  The surgery schedule has many variables which may affect the time of your surgery case.  Family members should be available if your surgery time changes.  Plan to be here the day of your procedure between 4-6 hours.    MEDICATIONS:  TAKE ONLY THESE MEDICATIONS WITH A SMALL SIP OF WATER THE MORNING OF YOUR PROCEDURE:  LEVOTHYROXINE, ADVAIR IF NEEDED      DO NOT TAKE THESE MEDICATIONS 5-7 DAYS PRIOR to your procedure or per your surgeon's request: ASPIRIN, ALEVE, ADVIL, IBUPROFEN, FISH OIL VITAMIN E, HERBALS  (May take Tylenol)  FLAXSEED OIL    ONLY if you are prescribed any types of blood thinners such as:  Aspirin, Coumadin, Plavix, Pradaxa, Xarelto, Aggrenox, Effient, Eliquis, Savasya, Brilinta, or any other, ask your surgeon whether you should stop taking them and how long before surgery you should stop.  You may also need to verify with the prescribing physician if it is ok to stop your medication.      INSTRUCTIONS IMPORTANT!!  · Do not eat or drink anything between midnight and the time of your procedure- this includes gum, mints, and candy.  · Do not smoke or drink alcoholic beverages 24 hours prior to your procedure.  · Shower the night before AND the morning of your procedure with a Chlorhexidine wash such as Hibiclens or Dial antibacterial soap from the neck down.  Do not get it on your face or in your eyes.  You may use your own shampoo and face wash. This helps your skin to be as bacteria free as possible.    · If you wear contact lenses, dentures, hearing aids or glasses, bring a container to put them in during surgery  and give to a family member for safe keeping.  Please leave all jewelry, piercing's and valuables at home.   · DO NOT remove hair from the surgery site.  Do not shave the incision site unless you are given specific instructions to do so.    · ONLY if you have been diagnosed with sleep apnea please bring your C-PAP machine.  · ONLY if you wear home oxygen please bring your portable oxygen tank the day of your procedure.  · ONLY if you have a history of OPEN HEART SURGERY you will need a clearance from your Cardiologist per Anesthesia.      · ONLY for patients requiring bowel prep, written instructions will be given by your doctor's office.  · ONLY if you have a neuro stimulator, please bring the controller with you the morning of surgery  · ONLY if a type and screen test is needed before surgery, please return:  · If your doctor has scheduled you for an overnight stay, bring a small overnight bag with any personal items you need.  · Make arrangements in advance for transportation home by a responsible adult.  It is not safe to drive a vehicle during the 24 hours after anesthesia.      · Visiting hours are 10:00AM to 8:30PM.  For the safety of all patients, visitors under the age of 12 are not allowed above the first floor of the hospital.    · All [facility] facilities and properties are tobacco free.  Smoking is NOT allowed.       If you have any questions about these instructions, call Pre-Op Admit  Nursing at [phone] or the Pre-Op Day Surgery Unit at [phone].

## 2019-02-25 NOTE — PROGRESS NOTES
Subjective:       Patient ID: Kirstie Bowden is a 74 y.o. female.    Chief Complaint: Consult (left breast abnormal mammo )      HPI 74-year-old female with recently diagnosed left breast cancer.  This is invasive lobular carcinoma which is ERPR positive and HER2 negative. This was found on a screening test.  There is no family history of breast or ovarian cancer.  Age of menarche was 14.  She had hysterectomy in her 30s and has never taken hormone replacement.  She has 2 children the 1st born when she was 24.  She has not had any previous breast problems.    Past Medical History:   Diagnosis Date    Arthritis     Asthma     Cancer     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    Hyperlipidemia     Hypertension     Thyroid disease      Past Surgical History:   Procedure Laterality Date    BREAST BIOPSY Left 20 yrs ago    benign    CHOLECYSTECTOMY      EYE SURGERY Bilateral     cataracts    HYSTERECTOMY      TONSILLECTOMY           Current Outpatient Medications:     ADVAIR DISKUS 250-50 mcg/dose diskus inhaler, INHALE ONE DOSE BY MOUTH TWICE DAILY, Disp: 60 each, Rfl: 11    amLODIPine (NORVASC) 5 MG tablet, TAKE 1 TABLET BY MOUTH ONCE DAILY, Disp: 90 tablet, Rfl: 1    atorvastatin (LIPITOR) 20 MG tablet, TAKE ONE TABLET BY MOUTH ONCE DAILY, Disp: 90 tablet, Rfl: 1    CALCIUM CARBONATE/VITAMIN D3 (VITAMIN D-3 ORAL), Take by mouth once daily. , Disp: , Rfl:     etanercept (ENBREL SURECLICK) 50 mg/mL (0.98 mL) PnIj, INJECT THE CONTENTS OF ONE SURECLICK (50 MG) SUBCUTANEOUSLY ONCE PER WEEK AS DIRECTED BY YOUR PHYSICIAN. SINGLE-USE DEVICE. KEEP REFRIGERATE, Disp: 12 Syringe, Rfl: 3    FLAXSEED ORAL, Take by mouth., Disp: , Rfl:     levothyroxine (SYNTHROID) 112 MCG tablet, TAKE ONE TABLET BY MOUTH ONCE DAILY, Disp: 90 tablet, Rfl: 3    methotrexate 2.5 MG Tab, TAKE FOUR TABLETS BY MOUTH ONCE A WEEK, Disp: 48 tablet, Rfl: 1    methotrexate 2.5 MG Tab, Take 4 tablets (10 mg total) by mouth every 7 days., Disp: 48  tablet, Rfl: 1    VITAMIN B COMPLEX ORAL, Every day, Disp: , Rfl:     folic acid (FOLVITE) 1 MG tablet, TAKE 1 TABLET BY MOUTH ONCE DAILY EVERY DAY, Disp: 30 tablet, Rfl: 10    UNABLE TO FIND, once daily. VITAMIN D1 AND D6 Q DAY, Disp: , Rfl:     Review of patient's allergies indicates:   Allergen Reactions    Sulfamethoxazole-trimethoprim      Other reaction(s): per dr daryn Rodríguez (sulfonamide antibiotics)      NOT SURE REACTION       Family History   Problem Relation Age of Onset    Heart disease Mother     Hypertension Mother     Alzheimer's disease Mother     Cancer Father      Social History     Socioeconomic History    Marital status:      Spouse name: Not on file    Number of children: Not on file    Years of education: Not on file    Highest education level: Not on file   Social Needs    Financial resource strain: Not on file    Food insecurity - worry: Not on file    Food insecurity - inability: Not on file    Transportation needs - medical: Not on file    Transportation needs - non-medical: Not on file   Occupational History     Employer: [employer]   Tobacco Use    Smoking status: Current Every Day Smoker     Packs/day: 0.50     Years: 46.00     Pack years: 23.00     Types: Cigarettes    Smokeless tobacco: Never Used   Substance and Sexual Activity    Alcohol use: Yes     Comment: Social    Drug use: No    Sexual activity: No   Other Topics Concern    Not on file   Social History Narrative    Not on file       Review of Systems   Constitutional: Negative for activity change, chills, fever and unexpected weight change.   HENT: Negative for congestion, sore throat, trouble swallowing and voice change.    Eyes: Negative for redness and visual disturbance.   Respiratory: Negative for cough, shortness of breath and wheezing.    Cardiovascular: Negative for chest pain and palpitations.   Gastrointestinal: Negative for abdominal pain, blood in stool, nausea and vomiting.    Endocrine: Negative.    Genitourinary: Negative for dysuria, frequency and hematuria.   Musculoskeletal: Negative for arthralgias, back pain and neck pain.   Skin: Negative for rash and wound.   Allergic/Immunologic: Negative.    Neurological: Negative for dizziness, weakness and headaches.   Hematological: Negative for adenopathy.   Psychiatric/Behavioral: Negative for agitation and dysphoric mood. The patient is not nervous/anxious.      Objective:     Physical Exam   Constitutional: She is oriented to person, place, and time. She appears well-developed and well-nourished. No distress.   HENT:   Head: Normocephalic and atraumatic.   Mouth/Throat: Oropharynx is clear and moist. No oropharyngeal exudate.   Eyes: Conjunctivae and EOM are normal. Pupils are equal, round, and reactive to light. No scleral icterus.   Neck: Normal range of motion. No thyromegaly present.   Cardiovascular: Normal rate and regular rhythm.   No murmur heard.  Pulmonary/Chest: Effort normal and breath sounds normal. She has no wheezes. She has no rales.   Left Breast Exam:  There are no palpable dominant masses.  There are no overlying skin changes.  There is no contour change of the breast.  There is no nipple discharge.  There is no significant breast tenderness.  There is no palpable axillary or supraclavicular adenopathy.    There is no abnormality detected on physical exam of the contralateral breast.   Abdominal: Soft. Bowel sounds are normal. She exhibits no distension and no mass. There is no tenderness. No hernia.   Musculoskeletal: Normal range of motion. She exhibits no edema.   Lymphadenopathy:     She has no cervical adenopathy.   Neurological: She is alert and oriented to person, place, and time. No cranial nerve deficit.   Skin: Skin is warm and dry. No rash noted. No erythema.   Psychiatric: She has a normal mood and affect. Her behavior is normal.     FINAL PATHOLOGIC DIAGNOSIS  LEFT BREAST MASS, BIOPSY:  -Invasive lobular  carcinoma, combined grade 2  Tubule formation: Score 3  Nuclear pleomorphism: Score 2  Mitotic rate: Score 1  -Longest continuous tumor focus is 11 mm.  -Immunohistochemical stains for hormone receptors with adequate controls showed the following results:  Estrogen receptor: Positive, strong staining in more than 90% of tumor cell nuclei.  Progesterone receptor: Positive, weak staining in 2-3% of tumor cell nuclei.  Her2: Pending, will be reported in an addendum.  Ki67: Approximately 14%.  -E cadherin stain is negative in tumor cells which supports the lobular nature. Control stained appropriately.  Also reviewed by Dr. Baugh who agrees with the diagnosis.      Assessment:     Encounter Diagnoses   Name Primary?    Preop testing     Malignant neoplasm of left female breast, unspecified estrogen receptor status, unspecified site of breast Yes       Plan:      1.  Long discussion about the options with the patient. She has decided to proceed with a left partial mastectomy and sentinel node biopsy followed by radiation.  All questions were answered.  2. Risks and benefits of the planned procedure were discussed at length with the patient.  Risks and benefits of not proceeding with the procedure were discussed as well. All questions were answered. The patient expressed clear understanding and would like to proceed with the procedure as discussed.

## 2019-02-25 NOTE — H&P (VIEW-ONLY)
Subjective:       Patient ID: Kirstie Bowden is a 74 y.o. female.    Chief Complaint: Consult (left breast abnormal mammo )      HPI 74-year-old female with recently diagnosed left breast cancer.  This is invasive lobular carcinoma which is ERPR positive and HER2 negative. This was found on a screening test.  There is no family history of breast or ovarian cancer.  Age of menarche was 14.  She had hysterectomy in her 30s and has never taken hormone replacement.  She has 2 children the 1st born when she was 24.  She has not had any previous breast problems.    Past Medical History:   Diagnosis Date    Arthritis     Asthma     Cancer     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    Hyperlipidemia     Hypertension     Thyroid disease      Past Surgical History:   Procedure Laterality Date    BREAST BIOPSY Left 20 yrs ago    benign    CHOLECYSTECTOMY      EYE SURGERY Bilateral     cataracts    HYSTERECTOMY      TONSILLECTOMY           Current Outpatient Medications:     ADVAIR DISKUS 250-50 mcg/dose diskus inhaler, INHALE ONE DOSE BY MOUTH TWICE DAILY, Disp: 60 each, Rfl: 11    amLODIPine (NORVASC) 5 MG tablet, TAKE 1 TABLET BY MOUTH ONCE DAILY, Disp: 90 tablet, Rfl: 1    atorvastatin (LIPITOR) 20 MG tablet, TAKE ONE TABLET BY MOUTH ONCE DAILY, Disp: 90 tablet, Rfl: 1    CALCIUM CARBONATE/VITAMIN D3 (VITAMIN D-3 ORAL), Take by mouth once daily. , Disp: , Rfl:     etanercept (ENBREL SURECLICK) 50 mg/mL (0.98 mL) PnIj, INJECT THE CONTENTS OF ONE SURECLICK (50 MG) SUBCUTANEOUSLY ONCE PER WEEK AS DIRECTED BY YOUR PHYSICIAN. SINGLE-USE DEVICE. KEEP REFRIGERATE, Disp: 12 Syringe, Rfl: 3    FLAXSEED ORAL, Take by mouth., Disp: , Rfl:     levothyroxine (SYNTHROID) 112 MCG tablet, TAKE ONE TABLET BY MOUTH ONCE DAILY, Disp: 90 tablet, Rfl: 3    methotrexate 2.5 MG Tab, TAKE FOUR TABLETS BY MOUTH ONCE A WEEK, Disp: 48 tablet, Rfl: 1    methotrexate 2.5 MG Tab, Take 4 tablets (10 mg total) by mouth every 7 days., Disp: 48  tablet, Rfl: 1    VITAMIN B COMPLEX ORAL, Every day, Disp: , Rfl:     folic acid (FOLVITE) 1 MG tablet, TAKE 1 TABLET BY MOUTH ONCE DAILY EVERY DAY, Disp: 30 tablet, Rfl: 10    UNABLE TO FIND, once daily. VITAMIN D1 AND D6 Q DAY, Disp: , Rfl:     Review of patient's allergies indicates:   Allergen Reactions    Sulfamethoxazole-trimethoprim      Other reaction(s): per dr daryn Rodríguez (sulfonamide antibiotics)      NOT SURE REACTION       Family History   Problem Relation Age of Onset    Heart disease Mother     Hypertension Mother     Alzheimer's disease Mother     Cancer Father      Social History     Socioeconomic History    Marital status:      Spouse name: Not on file    Number of children: Not on file    Years of education: Not on file    Highest education level: Not on file   Social Needs    Financial resource strain: Not on file    Food insecurity - worry: Not on file    Food insecurity - inability: Not on file    Transportation needs - medical: Not on file    Transportation needs - non-medical: Not on file   Occupational History     Employer: [employer]   Tobacco Use    Smoking status: Current Every Day Smoker     Packs/day: 0.50     Years: 46.00     Pack years: 23.00     Types: Cigarettes    Smokeless tobacco: Never Used   Substance and Sexual Activity    Alcohol use: Yes     Comment: Social    Drug use: No    Sexual activity: No   Other Topics Concern    Not on file   Social History Narrative    Not on file       Review of Systems   Constitutional: Negative for activity change, chills, fever and unexpected weight change.   HENT: Negative for congestion, sore throat, trouble swallowing and voice change.    Eyes: Negative for redness and visual disturbance.   Respiratory: Negative for cough, shortness of breath and wheezing.    Cardiovascular: Negative for chest pain and palpitations.   Gastrointestinal: Negative for abdominal pain, blood in stool, nausea and vomiting.    Endocrine: Negative.    Genitourinary: Negative for dysuria, frequency and hematuria.   Musculoskeletal: Negative for arthralgias, back pain and neck pain.   Skin: Negative for rash and wound.   Allergic/Immunologic: Negative.    Neurological: Negative for dizziness, weakness and headaches.   Hematological: Negative for adenopathy.   Psychiatric/Behavioral: Negative for agitation and dysphoric mood. The patient is not nervous/anxious.      Objective:     Physical Exam   Constitutional: She is oriented to person, place, and time. She appears well-developed and well-nourished. No distress.   HENT:   Head: Normocephalic and atraumatic.   Mouth/Throat: Oropharynx is clear and moist. No oropharyngeal exudate.   Eyes: Conjunctivae and EOM are normal. Pupils are equal, round, and reactive to light. No scleral icterus.   Neck: Normal range of motion. No thyromegaly present.   Cardiovascular: Normal rate and regular rhythm.   No murmur heard.  Pulmonary/Chest: Effort normal and breath sounds normal. She has no wheezes. She has no rales.   Left Breast Exam:  There are no palpable dominant masses.  There are no overlying skin changes.  There is no contour change of the breast.  There is no nipple discharge.  There is no significant breast tenderness.  There is no palpable axillary or supraclavicular adenopathy.    There is no abnormality detected on physical exam of the contralateral breast.   Abdominal: Soft. Bowel sounds are normal. She exhibits no distension and no mass. There is no tenderness. No hernia.   Musculoskeletal: Normal range of motion. She exhibits no edema.   Lymphadenopathy:     She has no cervical adenopathy.   Neurological: She is alert and oriented to person, place, and time. No cranial nerve deficit.   Skin: Skin is warm and dry. No rash noted. No erythema.   Psychiatric: She has a normal mood and affect. Her behavior is normal.     FINAL PATHOLOGIC DIAGNOSIS  LEFT BREAST MASS, BIOPSY:  -Invasive lobular  carcinoma, combined grade 2  Tubule formation: Score 3  Nuclear pleomorphism: Score 2  Mitotic rate: Score 1  -Longest continuous tumor focus is 11 mm.  -Immunohistochemical stains for hormone receptors with adequate controls showed the following results:  Estrogen receptor: Positive, strong staining in more than 90% of tumor cell nuclei.  Progesterone receptor: Positive, weak staining in 2-3% of tumor cell nuclei.  Her2: Pending, will be reported in an addendum.  Ki67: Approximately 14%.  -E cadherin stain is negative in tumor cells which supports the lobular nature. Control stained appropriately.  Also reviewed by Dr. Baugh who agrees with the diagnosis.      Assessment:     Encounter Diagnoses   Name Primary?    Preop testing     Malignant neoplasm of left female breast, unspecified estrogen receptor status, unspecified site of breast Yes       Plan:      1.  Long discussion about the options with the patient. She has decided to proceed with a left partial mastectomy and sentinel node biopsy followed by radiation.  All questions were answered.  2. Risks and benefits of the planned procedure were discussed at length with the patient.  Risks and benefits of not proceeding with the procedure were discussed as well. All questions were answered. The patient expressed clear understanding and would like to proceed with the procedure as discussed.

## 2019-02-26 ENCOUNTER — OFFICE VISIT (OUTPATIENT)
Dept: RHEUMATOLOGY | Facility: CLINIC | Age: 74
End: 2019-02-26
Payer: MEDICARE

## 2019-02-26 VITALS — WEIGHT: 177.63 LBS | DIASTOLIC BLOOD PRESSURE: 78 MMHG | BODY MASS INDEX: 27 KG/M2 | SYSTOLIC BLOOD PRESSURE: 146 MMHG

## 2019-02-26 DIAGNOSIS — E83.52 HYPERCALCEMIA: Primary | ICD-10-CM

## 2019-02-26 LAB
CALCIUM SERPL-MCNC: 9.8 MG/DL (ref 7.7–10.4)
MAGNESIUM SERPL-MCNC: 2.1 MG/DL (ref 1.5–2.6)

## 2019-02-26 PROCEDURE — 99213 PR OFFICE/OUTPT VISIT, EST, LEVL III, 20-29 MIN: ICD-10-PCS | Mod: ,,, | Performed by: INTERNAL MEDICINE

## 2019-02-26 PROCEDURE — 99213 OFFICE O/P EST LOW 20 MIN: CPT | Mod: ,,, | Performed by: INTERNAL MEDICINE

## 2019-02-26 RX ORDER — METHOTREXATE 2.5 MG/1
10 TABLET ORAL
Qty: 48 TABLET | Refills: 1 | Status: SHIPPED | OUTPATIENT
Start: 2019-02-26 | End: 2019-06-10

## 2019-02-26 RX ORDER — FOLIC ACID 1 MG/1
TABLET ORAL
Qty: 30 TABLET | Refills: 10 | Status: SHIPPED | OUTPATIENT
Start: 2019-02-26 | End: 2020-03-25

## 2019-02-26 NOTE — PROGRESS NOTES
[facility] RHEUMATOLOGY            PROGRESS NOTE      Subjective:       Patient ID:   NAME: Kirstie Bowden : 1945     74 y.o. female    Referring Doc: No ref. provider found  Other Physicians:    Chief Complaint:  Rheumatoid Arthritis      History of Present Illness:     Patient returns today for a regularly scheduled follow-up visit for  RA     The patient shortly a flareup 2 months ago of her rheumatoid affecting  MCP and PIP joints. Doing well now. No prolonged morning stiffness. No chest pains cough or shortness of breath. She was recently diagnosed with left breast cancer. Will have lumpectomy in a few weeks.            ROS:   GEN:  No  fever, night sweats . weight is stable   No fatigue  SKIN: no rashes, no bruising, no ulcerations, no Raynaud's  HEENT: no HA's, No visual changes, no mucosal ulcers, no sicca symptoms,  CV:   no CP, SOB, PND, MCKEON, no orthopnea, no palpitations  PULM: normal with no SOB, cough, hemoptysis, sputum or pleuritic pain  GI:  no abdominal pain, nausea, vomiting, constipation, diarrhea, melanotic stools, BRBPR, hematemesis, no dysphagia  :   no dysuria  NEURO: no paresthesias, headaches, visual disturbances, muscle weakness  MUSCULOSKELETAL:no joint swelling, prolonged AM stiffness, no back pain, no muscle pain  Allergies:  Review of patient's allergies indicates:   Allergen Reactions    Sulfamethoxazole-trimethoprim      Other reaction(s): per dr daryn Rodríguez (sulfonamide antibiotics)      NOT SURE REACTION       Medications:    Current Outpatient Medications:     ADVAIR DISKUS 250-50 mcg/dose diskus inhaler, INHALE ONE DOSE BY MOUTH TWICE DAILY, Disp: 60 each, Rfl: 11    amLODIPine (NORVASC) 5 MG tablet, TAKE 1 TABLET BY MOUTH ONCE DAILY, Disp: 90 tablet, Rfl: 1    atorvastatin (LIPITOR) 20 MG tablet, TAKE ONE TABLET BY MOUTH ONCE DAILY, Disp: 90 tablet, Rfl: 1    CALCIUM CARBONATE/VITAMIN D3 (VITAMIN D-3 ORAL), Take by mouth once daily. , Disp: , Rfl:      etanercept (ENBREL SURECLICK) 50 mg/mL (0.98 mL) PnIj, INJECT THE CONTENTS OF ONE SURECLICK (50 MG) SUBCUTANEOUSLY ONCE PER WEEK AS DIRECTED BY YOUR PHYSICIAN. SINGLE-USE DEVICE. KEEP REFRIGERATE, Disp: 12 Syringe, Rfl: 3    FLAXSEED ORAL, Take by mouth., Disp: , Rfl:     folic acid (FOLVITE) 1 MG tablet, TAKE 1 TABLET BY MOUTH ONCE DAILY EVERY DAY, Disp: 30 tablet, Rfl: 10    levothyroxine (SYNTHROID) 112 MCG tablet, TAKE ONE TABLET BY MOUTH ONCE DAILY, Disp: 90 tablet, Rfl: 3    methotrexate 2.5 MG Tab, Take 4 tablets (10 mg total) by mouth every 7 days., Disp: 48 tablet, Rfl: 1    UNABLE TO FIND, once daily. VITAMIN D1 AND D6 Q DAY, Disp: , Rfl:     VITAMIN B COMPLEX ORAL, Every day, Disp: , Rfl:     PMHx/PSHx Updates:          Objective:     Vitals:  Blood pressure (!) 146/78, weight 80.6 kg (177 lb 9.6 oz).    Physical Examination:   GEN: no apparent distress, comfortable; AAOx3  SKIN: no rashes,no ulceration, no Raynaud's, no petechiae, no SQ nodules,  HEAD: normal  EYES: no pallor, no icterus  NECK: no masses, thyroid normal, trachea midline, no LAD/LN's, supple  CV: RRR with no murmur; l S1 and S2 reg. ,no gallop no rubs,   CHEST: Normal respiratory effort; CTAB; normal breath sounds; no wheeze or crackles  MUSC/Skeletal: ROM normal; no crepitus; joints without synovitis,  no deformities  No joint swelling or tenderness of PIP, MCP, wrist, elbow, shoulder, or knee joints  EXTREM: no clubbing, cyanosis, no edema,normal  pulses   NEURO: grossly intact; motor WNL; AAOx3; ,  PSYCH: normal mood, affect and behavior  LYMPH: normal cervical, supraclavicular          Labs:   Lab Results   Component Value Date    WBC 6.90 02/21/2019    HGB 14.0 02/21/2019    HCT 42.1 02/21/2019     (H) 02/21/2019     02/21/2019    CMP  @LASTLAB(NA,K,CL,CO2,GLU,BUN,Creatinine,Calcium,PROT,Albumin,Bilitot,Alkphos,AST,ALT,CRP,ESR,RF,CCP,RODNEY,SSA,CPK,uric acid) )@  I have reviewed all available lab results and  radiology reports.    Radiology/Diagnostic Studies:        Assessment/Plan:   (1) 74 y.o. female with diagnosis of rheumatoid  arthritis. She is stable  2) Recent dx of l breast cancer      PLAN: stop Enbrel  labs        Discussion:     I have explained all of the above in detail and the patient understands all of the current recommendation(s). I have answered all questions to the best of my ability and to their complete satisfaction.       The patient is to continue with the current management plan         RTC in   3 months      Electronically signed by Jelani Stacy MD

## 2019-03-06 DIAGNOSIS — E03.9 ACQUIRED HYPOTHYROIDISM: ICD-10-CM

## 2019-03-06 RX ORDER — LEVOTHYROXINE SODIUM 112 UG/1
TABLET ORAL
Qty: 90 TABLET | Refills: 3 | Status: SHIPPED | OUTPATIENT
Start: 2019-03-06 | End: 2020-03-02

## 2019-03-13 ENCOUNTER — ANESTHESIA EVENT (OUTPATIENT)
Dept: SURGERY | Facility: HOSPITAL | Age: 74
End: 2019-03-13
Payer: MEDICARE

## 2019-03-14 ENCOUNTER — HOSPITAL ENCOUNTER (OUTPATIENT)
Dept: RADIOLOGY | Facility: HOSPITAL | Age: 74
Discharge: HOME | End: 2019-03-14
Attending: SURGERY | Admitting: SURGERY
Payer: MEDICARE

## 2019-03-14 ENCOUNTER — HOSPITAL ENCOUNTER (OUTPATIENT)
Facility: HOSPITAL | Age: 74
Discharge: HOME | End: 2019-03-14
Attending: SURGERY | Admitting: SURGERY
Payer: MEDICARE

## 2019-03-14 ENCOUNTER — HOSPITAL ENCOUNTER (OUTPATIENT)
Dept: RADIOLOGY | Facility: HOSPITAL | Age: 74
Discharge: HOME | End: 2019-03-14
Attending: SURGERY
Payer: MEDICARE

## 2019-03-14 ENCOUNTER — ANESTHESIA (OUTPATIENT)
Dept: SURGERY | Facility: HOSPITAL | Age: 74
End: 2019-03-14
Payer: MEDICARE

## 2019-03-14 DIAGNOSIS — C50.912 MALIGNANT NEOPLASM OF LEFT FEMALE BREAST, UNSPECIFIED ESTROGEN RECEPTOR STATUS, UNSPECIFIED SITE OF BREAST: ICD-10-CM

## 2019-03-14 DIAGNOSIS — C50.912 MALIGNANT NEOPLASM OF LEFT FEMALE BREAST, UNSPECIFIED ESTROGEN RECEPTOR STATUS, UNSPECIFIED SITE OF BREAST: Primary | ICD-10-CM

## 2019-03-14 DIAGNOSIS — Z01.818 PREOP TESTING: ICD-10-CM

## 2019-03-14 PROCEDURE — 37000009 HC ANESTHESIA EA ADD 15 MINS: Performed by: SURGERY

## 2019-03-14 PROCEDURE — 88307 TISSUE SPECIMEN TO PATHOLOGY - SURGERY: ICD-10-PCS | Mod: 26,,, | Performed by: PATHOLOGY

## 2019-03-14 PROCEDURE — 19281 MAMMO NEEDLE LOC WITH MAMMO GUIDANCE 1ST SITE: ICD-10-PCS | Mod: LT,,, | Performed by: RADIOLOGY

## 2019-03-14 PROCEDURE — 76098 MAMMO BREAST SPECIMEN: ICD-10-PCS | Mod: 26,LT,, | Performed by: RADIOLOGY

## 2019-03-14 PROCEDURE — 19281 PERQ DEVICE BREAST 1ST IMAG: CPT | Mod: LT,,, | Performed by: RADIOLOGY

## 2019-03-14 PROCEDURE — 38792 RA TRACER ID OF SENTINL NODE: CPT | Mod: LT,,, | Performed by: RADIOLOGY

## 2019-03-14 PROCEDURE — 19301 PR MASTECTOMY, PARTIAL: ICD-10-PCS | Mod: LT,,, | Performed by: SURGERY

## 2019-03-14 PROCEDURE — 38792 NM SENTINEL LYMPH NODE INJECTION ONLY_RAD PERFORMED: ICD-10-PCS | Mod: LT,,, | Performed by: RADIOLOGY

## 2019-03-14 PROCEDURE — 25000003 PHARM REV CODE 250: Performed by: NURSE ANESTHETIST, CERTIFIED REGISTERED

## 2019-03-14 PROCEDURE — 88304 TISSUE SPECIMEN TO PATHOLOGY - SURGERY: ICD-10-PCS | Mod: 26,,, | Performed by: PATHOLOGY

## 2019-03-14 PROCEDURE — 38900 PR INTRAOPERATIVE SENTINEL LYMPH NODE ID W DYE INJECTION: ICD-10-PCS | Mod: LT,,, | Performed by: SURGERY

## 2019-03-14 PROCEDURE — 38525 PR BIOPSY/REM LYMPH NODES, AXILLARY: ICD-10-PCS | Mod: 51,LT,, | Performed by: SURGERY

## 2019-03-14 PROCEDURE — A9520 TC99 TILMANOCEPT DIAG 0.5MCI: HCPCS

## 2019-03-14 PROCEDURE — 88342 IMHCHEM/IMCYTCHM 1ST ANTB: CPT | Performed by: PATHOLOGY

## 2019-03-14 PROCEDURE — 88307 TISSUE EXAM BY PATHOLOGIST: CPT | Mod: 26,,, | Performed by: PATHOLOGY

## 2019-03-14 PROCEDURE — 71000033 HC RECOVERY, INTIAL HOUR: Performed by: SURGERY

## 2019-03-14 PROCEDURE — 99900104 DSU ONLY-NO CHARGE-EA ADD'L HR (STAT): Performed by: SURGERY

## 2019-03-14 PROCEDURE — 21931 PR EXCISION TUMOR SOFT TISSUE BACK/FLANK SUBQ 3+CM: ICD-10-PCS | Mod: 51,,, | Performed by: SURGERY

## 2019-03-14 PROCEDURE — 71000015 HC POSTOP RECOV 1ST HR: Performed by: SURGERY

## 2019-03-14 PROCEDURE — 99900103 DSU ONLY-NO CHARGE-INITIAL HR (STAT): Performed by: SURGERY

## 2019-03-14 PROCEDURE — 88342 TISSUE SPECIMEN TO PATHOLOGY - SURGERY: ICD-10-PCS | Mod: 26,,, | Performed by: PATHOLOGY

## 2019-03-14 PROCEDURE — 71000039 HC RECOVERY, EACH ADD'L HOUR: Performed by: SURGERY

## 2019-03-14 PROCEDURE — 27200651 HC AIRWAY, LMA: Performed by: NURSE ANESTHETIST, CERTIFIED REGISTERED

## 2019-03-14 PROCEDURE — 38792 RA TRACER ID OF SENTINL NODE: CPT | Mod: TC,59

## 2019-03-14 PROCEDURE — 19281 PERQ DEVICE BREAST 1ST IMAG: CPT | Mod: TC

## 2019-03-14 PROCEDURE — 76098 X-RAY EXAM SURGICAL SPECIMEN: CPT | Mod: 26,LT,, | Performed by: RADIOLOGY

## 2019-03-14 PROCEDURE — D9220A PRA ANESTHESIA: Mod: CRNA,,, | Performed by: NURSE ANESTHETIST, CERTIFIED REGISTERED

## 2019-03-14 PROCEDURE — D9220A PRA ANESTHESIA: Mod: ANES,,, | Performed by: ANESTHESIOLOGY

## 2019-03-14 PROCEDURE — C1729 CATH, DRAINAGE: HCPCS | Performed by: SURGERY

## 2019-03-14 PROCEDURE — 37000008 HC ANESTHESIA 1ST 15 MINUTES: Performed by: SURGERY

## 2019-03-14 PROCEDURE — 63600175 PHARM REV CODE 636 W HCPCS: Performed by: SURGERY

## 2019-03-14 PROCEDURE — 36000706: Performed by: SURGERY

## 2019-03-14 PROCEDURE — D9220A PRA ANESTHESIA: ICD-10-PCS | Mod: ANES,,, | Performed by: ANESTHESIOLOGY

## 2019-03-14 PROCEDURE — 19301 PARTIAL MASTECTOMY: CPT | Mod: LT,,, | Performed by: SURGERY

## 2019-03-14 PROCEDURE — 76098 X-RAY EXAM SURGICAL SPECIMEN: CPT | Mod: TC

## 2019-03-14 PROCEDURE — 63600175 PHARM REV CODE 636 W HCPCS: Performed by: ANESTHESIOLOGY

## 2019-03-14 PROCEDURE — 88341 IMHCHEM/IMCYTCHM EA ADD ANTB: CPT | Mod: 26,,, | Performed by: PATHOLOGY

## 2019-03-14 PROCEDURE — 63600175 PHARM REV CODE 636 W HCPCS: Performed by: NURSE ANESTHETIST, CERTIFIED REGISTERED

## 2019-03-14 PROCEDURE — 36000707: Performed by: SURGERY

## 2019-03-14 PROCEDURE — 88304 TISSUE EXAM BY PATHOLOGIST: CPT | Mod: 26,,, | Performed by: PATHOLOGY

## 2019-03-14 PROCEDURE — 25000003 PHARM REV CODE 250: Performed by: ANESTHESIOLOGY

## 2019-03-14 PROCEDURE — D9220A PRA ANESTHESIA: ICD-10-PCS | Mod: CRNA,,, | Performed by: NURSE ANESTHETIST, CERTIFIED REGISTERED

## 2019-03-14 PROCEDURE — 25000003 PHARM REV CODE 250: Performed by: SURGERY

## 2019-03-14 PROCEDURE — 88341 PR IHC OR ICC EACH ADD'L SINGLE ANTIBODY  STAINPR: ICD-10-PCS | Mod: 26,,, | Performed by: PATHOLOGY

## 2019-03-14 PROCEDURE — 38900 IO MAP OF SENT LYMPH NODE: CPT | Mod: LT,,, | Performed by: SURGERY

## 2019-03-14 PROCEDURE — 21931 EXC BACK LES SC 3 CM/>: CPT | Mod: 51,,, | Performed by: SURGERY

## 2019-03-14 PROCEDURE — 38525 BIOPSY/REMOVAL LYMPH NODES: CPT | Mod: 51,LT,, | Performed by: SURGERY

## 2019-03-14 RX ORDER — PROPOFOL 10 MG/ML
VIAL (ML) INTRAVENOUS
Status: DISCONTINUED | OUTPATIENT
Start: 2019-03-14 | End: 2019-03-14

## 2019-03-14 RX ORDER — DEXAMETHASONE SODIUM PHOSPHATE 4 MG/ML
INJECTION, SOLUTION INTRA-ARTICULAR; INTRALESIONAL; INTRAMUSCULAR; INTRAVENOUS; SOFT TISSUE
Status: DISCONTINUED | OUTPATIENT
Start: 2019-03-14 | End: 2019-03-14

## 2019-03-14 RX ORDER — MIDAZOLAM HYDROCHLORIDE 1 MG/ML
INJECTION, SOLUTION INTRAMUSCULAR; INTRAVENOUS
Status: DISCONTINUED | OUTPATIENT
Start: 2019-03-14 | End: 2019-03-14

## 2019-03-14 RX ORDER — SODIUM CHLORIDE 0.9 % (FLUSH) 0.9 %
3 SYRINGE (ML) INJECTION
Status: CANCELLED | OUTPATIENT
Start: 2019-03-14

## 2019-03-14 RX ORDER — MEPERIDINE HYDROCHLORIDE 50 MG/ML
12.5 INJECTION INTRAMUSCULAR; INTRAVENOUS; SUBCUTANEOUS ONCE AS NEEDED
Status: DISCONTINUED | OUTPATIENT
Start: 2019-03-14 | End: 2019-03-14 | Stop reason: HOSPADM

## 2019-03-14 RX ORDER — DIPHENHYDRAMINE HYDROCHLORIDE 50 MG/ML
25 INJECTION INTRAMUSCULAR; INTRAVENOUS EVERY 6 HOURS PRN
Status: DISCONTINUED | OUTPATIENT
Start: 2019-03-14 | End: 2019-03-14 | Stop reason: HOSPADM

## 2019-03-14 RX ORDER — FENTANYL CITRATE 50 UG/ML
INJECTION, SOLUTION INTRAMUSCULAR; INTRAVENOUS
Status: DISCONTINUED | OUTPATIENT
Start: 2019-03-14 | End: 2019-03-14

## 2019-03-14 RX ORDER — LIDOCAINE HCL/PF 100 MG/5ML
SYRINGE (ML) INTRAVENOUS
Status: DISCONTINUED | OUTPATIENT
Start: 2019-03-14 | End: 2019-03-14

## 2019-03-14 RX ORDER — BUPIVACAINE HYDROCHLORIDE AND EPINEPHRINE 2.5; 5 MG/ML; UG/ML
INJECTION, SOLUTION EPIDURAL; INFILTRATION; INTRACAUDAL; PERINEURAL
Status: DISCONTINUED | OUTPATIENT
Start: 2019-03-14 | End: 2019-03-14 | Stop reason: HOSPADM

## 2019-03-14 RX ORDER — METHYLENE BLUE 10 MG/ML
INJECTION INTRAVENOUS
Status: DISCONTINUED | OUTPATIENT
Start: 2019-03-14 | End: 2019-03-14 | Stop reason: HOSPADM

## 2019-03-14 RX ORDER — ONDANSETRON HYDROCHLORIDE 8 MG/1
8 TABLET, FILM COATED ORAL EVERY 8 HOURS PRN
Qty: 10 TABLET | Refills: 0 | Status: SHIPPED | OUTPATIENT
Start: 2019-03-14 | End: 2019-05-27

## 2019-03-14 RX ORDER — SODIUM CHLORIDE, SODIUM LACTATE, POTASSIUM CHLORIDE, CALCIUM CHLORIDE 600; 310; 30; 20 MG/100ML; MG/100ML; MG/100ML; MG/100ML
INJECTION, SOLUTION INTRAVENOUS CONTINUOUS
Status: DISCONTINUED | OUTPATIENT
Start: 2019-03-14 | End: 2019-03-14 | Stop reason: HOSPADM

## 2019-03-14 RX ORDER — LIDOCAINE HYDROCHLORIDE 10 MG/ML
1 INJECTION, SOLUTION EPIDURAL; INFILTRATION; INTRACAUDAL; PERINEURAL ONCE
Status: DISCONTINUED | OUTPATIENT
Start: 2019-03-14 | End: 2019-03-14

## 2019-03-14 RX ORDER — ONDANSETRON 2 MG/ML
INJECTION INTRAMUSCULAR; INTRAVENOUS
Status: DISCONTINUED | OUTPATIENT
Start: 2019-03-14 | End: 2019-03-14

## 2019-03-14 RX ORDER — CEFAZOLIN SODIUM 2 G/50ML
2 SOLUTION INTRAVENOUS
Status: COMPLETED | OUTPATIENT
Start: 2019-03-14 | End: 2019-03-14

## 2019-03-14 RX ORDER — EPHEDRINE SULFATE 50 MG/ML
INJECTION, SOLUTION INTRAVENOUS
Status: DISCONTINUED | OUTPATIENT
Start: 2019-03-14 | End: 2019-03-14

## 2019-03-14 RX ORDER — ONDANSETRON 2 MG/ML
4 INJECTION INTRAMUSCULAR; INTRAVENOUS ONCE
Status: DISCONTINUED | OUTPATIENT
Start: 2019-03-14 | End: 2019-03-14 | Stop reason: HOSPADM

## 2019-03-14 RX ORDER — SODIUM CHLORIDE 9 MG/ML
INJECTION, SOLUTION INTRAVENOUS CONTINUOUS
Status: CANCELLED | OUTPATIENT
Start: 2019-03-14

## 2019-03-14 RX ORDER — HYDROCODONE BITARTRATE AND ACETAMINOPHEN 5; 325 MG/1; MG/1
1 TABLET ORAL EVERY 6 HOURS PRN
Qty: 20 TABLET | Refills: 0 | Status: SHIPPED | OUTPATIENT
Start: 2019-03-14 | End: 2019-05-27

## 2019-03-14 RX ORDER — FENTANYL CITRATE 50 UG/ML
25 INJECTION, SOLUTION INTRAMUSCULAR; INTRAVENOUS EVERY 5 MIN PRN
Status: COMPLETED | OUTPATIENT
Start: 2019-03-14 | End: 2019-03-14

## 2019-03-14 RX ORDER — OXYCODONE HYDROCHLORIDE 5 MG/1
5 TABLET ORAL
Status: DISCONTINUED | OUTPATIENT
Start: 2019-03-14 | End: 2019-03-14 | Stop reason: HOSPADM

## 2019-03-14 RX ORDER — HYDROMORPHONE HYDROCHLORIDE 2 MG/ML
0.2 INJECTION, SOLUTION INTRAMUSCULAR; INTRAVENOUS; SUBCUTANEOUS EVERY 5 MIN PRN
Status: DISCONTINUED | OUTPATIENT
Start: 2019-03-14 | End: 2019-03-14 | Stop reason: HOSPADM

## 2019-03-14 RX ORDER — SODIUM CHLORIDE, SODIUM LACTATE, POTASSIUM CHLORIDE, CALCIUM CHLORIDE 600; 310; 30; 20 MG/100ML; MG/100ML; MG/100ML; MG/100ML
75 INJECTION, SOLUTION INTRAVENOUS CONTINUOUS
Status: CANCELLED | OUTPATIENT
Start: 2019-03-14

## 2019-03-14 RX ADMIN — EPHEDRINE SULFATE 10 MG: 50 INJECTION, SOLUTION INTRAMUSCULAR; INTRAVENOUS; SUBCUTANEOUS at 09:03

## 2019-03-14 RX ADMIN — MIDAZOLAM 2 MG: 1 INJECTION INTRAMUSCULAR; INTRAVENOUS at 08:03

## 2019-03-14 RX ADMIN — DEXAMETHASONE SODIUM PHOSPHATE 4 MG: 4 INJECTION, SOLUTION INTRAMUSCULAR; INTRAVENOUS at 09:03

## 2019-03-14 RX ADMIN — CEFAZOLIN SODIUM 2 G: 2 SOLUTION INTRAVENOUS at 08:03

## 2019-03-14 RX ADMIN — ONDANSETRON 4 MG: 2 INJECTION, SOLUTION INTRAMUSCULAR; INTRAVENOUS at 09:03

## 2019-03-14 RX ADMIN — FENTANYL CITRATE 25 MCG: 50 INJECTION, SOLUTION INTRAMUSCULAR; INTRAVENOUS at 10:03

## 2019-03-14 RX ADMIN — EPHEDRINE SULFATE 10 MG: 50 INJECTION, SOLUTION INTRAMUSCULAR; INTRAVENOUS; SUBCUTANEOUS at 08:03

## 2019-03-14 RX ADMIN — SODIUM CHLORIDE, SODIUM LACTATE, POTASSIUM CHLORIDE, AND CALCIUM CHLORIDE: .6; .31; .03; .02 INJECTION, SOLUTION INTRAVENOUS at 08:03

## 2019-03-14 RX ADMIN — FENTANYL CITRATE 25 MCG: 50 INJECTION, SOLUTION INTRAMUSCULAR; INTRAVENOUS at 11:03

## 2019-03-14 RX ADMIN — PROPOFOL 200 MG: 10 INJECTION, EMULSION INTRAVENOUS at 08:03

## 2019-03-14 RX ADMIN — OXYCODONE HYDROCHLORIDE 5 MG: 5 TABLET ORAL at 10:03

## 2019-03-14 RX ADMIN — FENTANYL CITRATE 50 MCG: 50 INJECTION, SOLUTION INTRAMUSCULAR; INTRAVENOUS at 08:03

## 2019-03-14 RX ADMIN — LIDOCAINE HYDROCHLORIDE 100 MG: 20 INJECTION, SOLUTION INTRAVENOUS at 08:03

## 2019-03-14 NOTE — BRIEF OP NOTE
Patient: Kirstie Bowden     Date of Procedure: 3/14/2019    Procedure: Left partial mastectomy with wire localization  Prosper node injection  Excision of deep left axillary sentinel node  Excision of back mass (5 cm)    Surgeon: Mp Siddiqui MD    Assistant: Ro Santiago    Pre-op Diagnosis: Malignant neoplasm of left female breast, unspecified estrogen receptor status, unspecified site of breast [C50.912]     Post-op Diagnosis: Malignant neoplasm of left female breast, unspecified estrogen receptor status, unspecified site of breast [C50.912]    Findings: breast cancer  Back mass    Specimen: partial mastectomy  Prosper node  Back mass    EBL: 20 cc    Complications: None

## 2019-03-14 NOTE — DISCHARGE SUMMARY
Discharge Summary  General Surgery      Admit Date: 3/14/2019    Discharge Date :3/14/2019    Attending Physician: Mp Gonzalez     Discharge Physician: Mp Gonzalez    Discharged Condition: good    Discharge Diagnosis: Malignant neoplasm of left female breast, unspecified estrogen receptor status, unspecified site of breast [C50.912]    Treatments/Procedures: Procedure(s) (LRB):  LUMPECTOMY, BREAST (Left)  NEEDLE LOCALIZATION (Left)  BIOPSY, LYMPH NODE, SENTINEL (Left)  LYMPHADENECTOMY, AXILLARY (Left)  REMOVAL-MASS (N/A)    Hospital Course: Uneventful; Discharged home from Recovery    Significant Diagnostic Studies: none    Disposition: Home or Self Care    Diet: Regular    Follow up: Office 10-14 days    Activity: No heavy lifting till seen in office.    Patient Instructions:   Current Discharge Medication List      START taking these medications    Details   HYDROcodone-acetaminophen (NORCO) 5-325 mg per tablet Take 1 tablet by mouth every 6 (six) hours as needed for Pain.  Qty: 20 tablet, Refills: 0         CONTINUE these medications which have NOT CHANGED    Details   ADVAIR DISKUS 250-50 mcg/dose diskus inhaler INHALE ONE DOSE BY MOUTH TWICE DAILY  Qty: 60 each, Refills: 11    Comments: Please consider 90 day supplies to promote better adherence  Associated Diagnoses: Asthma, chronic, mild intermittent, uncomplicated      amLODIPine (NORVASC) 5 MG tablet TAKE 1 TABLET BY MOUTH ONCE DAILY  Qty: 90 tablet, Refills: 1    Associated Diagnoses: Essential hypertension, benign      atorvastatin (LIPITOR) 20 MG tablet TAKE ONE TABLET BY MOUTH ONCE DAILY  Qty: 90 tablet, Refills: 1    Associated Diagnoses: Hyperlipidemia      CALCIUM CARBONATE/VITAMIN D3 (VITAMIN D-3 ORAL) Take by mouth once daily.       folic acid (FOLVITE) 1 MG tablet TAKE 1 TABLET BY MOUTH ONCE DAILY EVERY DAY  Qty: 30 tablet, Refills: 10    Comments: Please consider 90 day supplies to promote better adherence      levothyroxine (SYNTHROID)  112 MCG tablet TAKE 1 TABLET BY MOUTH ONCE DAILY  Qty: 90 tablet, Refills: 3    Associated Diagnoses: Acquired hypothyroidism      UNABLE TO FIND once daily. VITAMIN D1 AND D6 Q DAY      VITAMIN B COMPLEX ORAL Every day      etanercept (ENBREL SURECLICK) 50 mg/mL (0.98 mL) PnIj INJECT THE CONTENTS OF ONE SURECLICK (50 MG) SUBCUTANEOUSLY ONCE PER WEEK AS DIRECTED BY YOUR PHYSICIAN. SINGLE-USE DEVICE. KEEP REFRIGERATE  Qty: 12 Syringe, Refills: 3      FLAXSEED ORAL Take by mouth.      methotrexate 2.5 MG Tab Take 4 tablets (10 mg total) by mouth every 7 days.  Qty: 48 tablet, Refills: 1             Discharge Procedure Orders   Diet general     Remove dressing in 48 hours     Activity as tolerated

## 2019-03-14 NOTE — ANESTHESIA PREPROCEDURE EVALUATION
03/14/2019  Kirstie Bowden is a 74 y.o., female.    Anesthesia Evaluation    I have reviewed the Patient Summary Reports.    I have reviewed the Nursing Notes.   I have reviewed the Medications.     Review of Systems  Anesthesia Hx:  Hx of Anesthetic complications Pseudocholinesterase Deficiency  Family Hx of Anesthesia complications:  Pseudocholinesterase Deficiency, in a sibling  Personal Hx of Anesthesia complications  Pseudocholinesterase Deficiency, based on clinical history per patient   Cardiovascular:   Hypertension    Pulmonary:   Asthma    Neurological:   Neuromuscular Disease,    Endocrine:   Hypothyroidism    Dermatological:   Left breast mass          Anesthesia Plan  Type of Anesthesia, risks & benefits discussed:  Anesthesia Type:  general  Patient's Preference:   Intra-op Monitoring Plan: standard ASA monitors  Intra-op Monitoring Plan Comments:   Post Op Pain Control Plan:   Post Op Pain Control Plan Comments:   Induction:   IV  Beta Blocker:  Patient is not currently on a Beta-Blocker (No further documentation required).       Informed Consent: Patient understands risks and agrees with Anesthesia plan.  Questions answered. Anesthesia consent signed with patient.  ASA Score: 3     Day of Surgery Review of History & Physical: I have interviewed and examined the patient. I have reviewed the patient's H&P dated:  There are no significant changes.  H&P update referred to the surgeon.         Ready For Surgery From Anesthesia Perspective.

## 2019-03-14 NOTE — INTERVAL H&P NOTE
The patient has been examined and the H&P has been reviewed:    I concur with the findings and no changes have occurred since H&P was written.    Anesthesia/Surgery risks, benefits and alternative options discussed and understood by patient/family.          Active Hospital Problems    Diagnosis  POA    Preop testing [Z01.818]  Not Applicable      Resolved Hospital Problems   No resolved problems to display.

## 2019-03-14 NOTE — ANESTHESIA POSTPROCEDURE EVALUATION
Anesthesia Post Evaluation    Patient: Kirstie Bowden    Procedure(s) Performed: Procedure(s) (LRB):  LUMPECTOMY, BREAST (Left)  NEEDLE LOCALIZATION (Left)  BIOPSY, LYMPH NODE, SENTINEL (Left)  LYMPHADENECTOMY, AXILLARY (Left)  REMOVAL-MASS (N/A)    Final Anesthesia Type: general  Patient location during evaluation: PACU  Patient participation: Yes- Able to Participate  Level of consciousness: awake and alert and oriented  Post-procedure vital signs: reviewed and stable  Pain management: adequate  Airway patency: patent  PONV status at discharge: No PONV  Anesthetic complications: no      Cardiovascular status: blood pressure returned to baseline  Respiratory status: unassisted, spontaneous ventilation and room air  Hydration status: euvolemic  Follow-up not needed.        Visit Vitals  BP (!) 173/77   Pulse 62   Temp 36.6 °C (97.9 °F)   Resp 18   Wt 80.7 kg (178 lb)   SpO2 95%   Breastfeeding? No   BMI 27.06 kg/m²       Pain/Cecilia Score: Pain Rating Prior to Med Admin: 5 (3/14/2019 10:45 AM)  Cecilia Score: 8 (3/14/2019 10:13 AM)

## 2019-03-14 NOTE — TRANSFER OF CARE
Anesthesia Transfer of Care Note    Patient: Kirstie Bowden    Procedure(s) Performed: Procedure(s) (LRB):  LUMPECTOMY, BREAST (Left)  NEEDLE LOCALIZATION (Left)  BIOPSY, LYMPH NODE, SENTINEL (Left)  LYMPHADENECTOMY, AXILLARY (Left)  REMOVAL-MASS (N/A)    Patient location: PACU    Anesthesia Type: general    Transport from OR: Transported from OR on 2-3 L/min O2 by NC with adequate spontaneous ventilation    Post pain: adequate analgesia    Post assessment: no apparent anesthetic complications and tolerated procedure well    Post vital signs: stable    Level of consciousness: sedated    Nausea/Vomiting: no nausea/vomiting    Complications: none    Transfer of care protocol was followed      Last vitals:   Visit Vitals  BP (!) 173/77   Pulse 62   Temp 36.8 °C (98.2 °F)   Resp 18   Wt 80.7 kg (178 lb)   SpO2 95%   Breastfeeding? No   BMI 27.06 kg/m²

## 2019-03-14 NOTE — PLAN OF CARE
Report to [facility]. Patients pain level 2, no n/v noted, vs stable, dressing to left breast/back dry intact no drainage. Family presesent

## 2019-03-14 NOTE — OP NOTE
Patient: Kirstie Bowden     Date of Procedure: 3/14/2019    Procedure: 1. Left partial mastectomy with wire localization.  2. [place] node injection.  3. Excision of deep axillary sentinel node.  4. Excision of back mass.    Surgeon: Mp Siddiqui MD    Assistant: Ro Santiago    Pre-op Diagnosis: Malignant neoplasm of left female breast, unspecified estrogen receptor status, unspecified site of breast [C50.912]   Back mass    Post-op Diagnosis: Malignant neoplasm of left female breast, unspecified estrogen receptor status, unspecified site of breast [C50.912]  Back mass    Findings: Breast Cancer.  Axillary sentinel node.  Back mass    Specimen: Partial mastectomy.  Axillary sentinel node.  Back mass    EBL: 20 cc    Complications: None      Procedure in detail:    After appropriate consent was obtained, consent forms signed and questions answered, the breast was marked and the patient was taken to the operating room.  She was positioned and general anesthesia was induced.  2-3 cc of Methylene Blue was injected into the subareolar tissue of the left breast and the breast was massaged in the usual fashion. The x-ray images were reviewed.  The chest was then prepped and draped in the usual sterile fashion.  Prior to the procedure the patient had had wire localization performed by radiology.  Radiolabeled colloid was injected by radiology at that time for identification of the axillary sentinel node.    The neoprobe was used to identify the site of signal in the axilla.  A skin incision was made overlying this site and dissection was performed into the axillary tissue.  One to three sentinel nodes were identified and removed.  These were sent to pathology for permanent section.  Adequate hemostasis was achieved.  Quarter percent Marcaine with epinephrine was injected for local anesthesia.  The deep tissue was reapproximated with 3-0 Vicryl suture and the skin was closed with a 4-0 Monocryl subcuticular  stitch.   The x-ray images were again reviewed identifying the wire and the lesion.  Skin incision was made and dissection was performed into the breast tissue with electrocautery.  A partial mastectomy specimen was then removed which encased the wire.  The specimen was then oriented for the pathologist with a long stitch on the lateral margin, a short stitch on the superior margin, and a double stitch on the deep margin.  The cavity was examined and adequate hemostasis was achieved.  Quarter percent Marcaine with epinephrine was injected for local anesthesia.  The deep tissue was reapproximated with 3-0 Vicryl suture and the skin was then closed with a 4-0 Monocryl subcuticular stitch.   Specimen radiograph confirmed the lesion to be within the partial mastectomy specimen.  Steri-Strips and dressings were then applied.      The patient was repositioned in the lateral position and the area was prepped and draped. Skin incision was made and the back mass was dissected free of surrounding tissue. The incision was closed with Vicryl and 4-0 Monocryl.    The patient was then awakened, extubated and transferred to the recovery room in stable condition.  She tolerated the procedure without complication.

## 2019-03-14 NOTE — DISCHARGE INSTRUCTIONS
Ice to incisional area x24-48 hours.  20 min. On, 20 min. Off.    Remove bandage in 48 hours.  May shower at that time.  Dry well, leave steristrips in place until they fall off.  May cover with bandage if desired.  Just keep dry and clean.   Call MD for any swelling or drainage, redness if it appears.          Getting Your Breast Biopsy Results    Waiting for biopsy results is never easy. But, in most cases, you will know your results within days. You may get the results during a follow-up visit with your health care provider. Or your provider may call you with the results. Either way, your provider will discuss what the test results mean for you. Many noncancerous (benign)  lumps need no care at all. If a lump is cancer (malignant), you will have many things to think about. Try to gain comfort from the fact that more treatment options exist now than ever before.  If a lump is benign  Learning that a lump is not cancer can be a great relief. To stay healthy, be sure to have mammograms as often as directed. Also, be aware of how your breasts normally look and feel so you can notice any changes right away and see your doctor to have them checked.  Treating benign lumps  Lumps that come and go with your periods may not need any treatment. Limiting salt, caffeine, and alcohol can help relieve any soreness and swelling. But in some cases, you may want to have a cyst drained or a painful lump removed.  If a lump is malignant  Having breast cancer means that some cells in your breast are growing abnormally. These cells divide more quickly than normal cells and may spread into other areas of tissue. Yet today the outlook is hopeful. There are new and better treatments. And you play an active role in your care. Talk to your doctor about what needs to happen next and the treatments options that are best for you.  Treating malignant lumps  There is no one right treatment for breast cancer. The best approach for you will  "depend on the type and the stage of cancer you have. It will also depend on your age, overall health, feelings, and needs. Give yourself some time to weigh all your options. Learning as much as you can about breast cancer can help you make wise decisions.  Date Last Reviewed: 10/31/2015  © 3160-6466 [employer]. [address] [place] [address], [place] [address]. All rights reserved. This information is not intended as a substitute for professional medical care. Always follow your healthcare professional's instructions.    Discharge Instructions: After Your Surgery/Procedure  Youve just had surgery. During surgery you were given medicine called anesthesia to keep you relaxed and free of pain. After surgery you may have some pain or nausea. This is common. Here are some tips for feeling better and getting well after surgery.     Stay on schedule with your medication.   Going home  Your doctor or nurse will show you how to take care of yourself when you go home. He or she will also answer your questions. Have an adult family member or friend drive you home.      For your safety we recommend these precaution for the first 24 hours after your procedure:  · Do not drive or use heavy equipment.  · Do not make important decisions or sign legal papers.  · Do not drink alcohol.  · Have someone stay with you, if needed. He or she can watch for problems and help keep you safe.  · Your concentration, balance, coordination, and judgement may be impaired for many hours after anesthesia.  Use caution when ambulating or standing up.     · You may feel weak and "washed out" after anesthesia and surgery.      Subtle residual effects of general anesthesia or sedation with regional / local anesthesia can last more than 24 hours.  Rest for the remainder of the day or longer if your Doctor/Surgeon has advised you to do so.  Although you may feel normal within the first 24 hours, your reflexes and mental ability may be impaired " without you realizing it.  You may feel dizzy, lightheaded or sleepy for 24 hours or longer.      Be sure to go to all follow-up visits with your doctor. And rest after your surgery for as long as your doctor tells you to.  Coping with pain  If you have pain after surgery, pain medicine will help you feel better. Take it as told, before pain becomes severe. Also, ask your doctor or pharmacist about other ways to control pain. This might be with heat, ice, or relaxation. And follow any other instructions your surgeon or nurse gives you.  Tips for taking pain medicine  To get the best relief possible, remember these points:  · Pain medicines can upset your stomach. Taking them with a little food may help.  · Most pain relievers taken by mouth need at least 20 to 30 minutes to start to work.  · Taking medicine on a schedule can help you remember to take it. Try to time your medicine so that you can take it before starting an activity. This might be before you get dressed, go for a walk, or sit down for dinner.  · Constipation is a common side effect of pain medicines. Call your doctor before taking any medicines such as laxatives or stool softeners to help ease constipation. Also ask if you should skip any foods. Drinking lots of fluids and eating foods such as fruits and vegetables that are high in fiber can also help. Remember, do not take laxatives unless your surgeon has prescribed them.  · Drinking alcohol and taking pain medicine can cause dizziness and slow your breathing. It can even be deadly. Do not drink alcohol while taking pain medicine.  · Pain medicine can make you react more slowly to things. Do not drive or run machinery while taking pain medicine.  Your health care provider may tell you to take acetaminophen to help ease your pain. Ask him or her how much you are supposed to take each day. Acetaminophen or other pain relievers may interact with your prescription medicines or other over-the-counter  (OTC) drugs. Some prescription medicines have acetaminophen and other ingredients. Using both prescription and OTC acetaminophen for pain can cause you to overdose. Read the labels on your OTC medicines with care. This will help you to clearly know the list of ingredients, how much to take, and any warnings. It may also help you not take too much acetaminophen. If you have questions or do not understand the information, ask your pharmacist or health care provider to explain it to you before you take the OTC medicine.  Managing nausea  Some people have an upset stomach after surgery. This is often because of anesthesia, pain, or pain medicine, or the stress of surgery. These tips will help you handle nausea and eat healthy foods as you get better. If you were on a special food plan before surgery, ask your doctor if you should follow it while you get better. These tips may help:  · Do not push yourself to eat. Your body will tell you when to eat and how much.  · Start off with clear liquids and soup. They are easier to digest.  · Next try semi-solid foods, such as mashed potatoes, applesauce, and gelatin, as you feel ready.  · Slowly move to solid foods. Dont eat fatty, rich, or spicy foods at first.  · Do not force yourself to have 3 large meals a day. Instead eat smaller amounts more often.  · Take pain medicines with a small amount of solid food, such as crackers or toast, to avoid nausea.     Call your surgeon if  · You still have pain an hour after taking medicine. The medicine may not be strong enough.  · You feel too sleepy, dizzy, or groggy. The medicine may be too strong.  · You have side effects like nausea, vomiting, or skin changes, such as rash, itching, or hives.       If you have obstructive sleep apnea  You were given anesthesia medicine during surgery to keep you comfortable and free of pain. After surgery, you may have more apnea spells because of this medicine and other medicines you were given.  The spells may last longer than usual.   At home:  · Keep using the continuous positive airway pressure (CPAP) device when you sleep. Unless your health care provider tells you not to, use it when you sleep, day or night. CPAP is a common device used to treat obstructive sleep apnea.  · Talk with your provider before taking any pain medicine, muscle relaxants, or sedatives. Your provider will tell you about the possible dangers of taking these medicines.  © 0973-0575 [employer]. [address] [place] [address]. All rights reserved. This information is not intended as a substitute for professional medical care. Always follow your healthcare professional's instructions.      General Information:    1.  Do not drink alcoholic beverages including beer for 24 hours or as long as you are on pain medication..  2.  Do not drive a motor vehicle, operate machinery or power tools, or signs legal papers for 24 hours or as long as you are on pain medication.   3.  You may experience light-headedness, dizziness, and sleepiness following surgery. Please do not stay alone. A responsible adult should be with you for this 24 hour period.  4.  Go home and rest.    5. Progress slowly to a normal diet unless instructed.  Otherwise, begin with liquids such as soft drinks, then soup and crackers working up to solid foods. Drink plenty of nonalcoholic fluids.  6.  Certain anesthetics and pain medications produce nausea and vomiting in certain       individuals. If nausea becomes a problem at home, call you doctor.    7. A nurse will be calling you sometime after surgery. Do not be alarmed. This is our way of finding out how you are doing.    8. Several times every hour while you are awake, take 2-3 deep breaths and cough. If you had stomach surgery hold a pillow or rolled towel firmly against your stomach before you cough. This will help with any pain the cough might cause.  9. Several times every hour while you  are awake, pump and flex your feet 5-6 times and do foot circles. This will help prevent blood clots.    10.Call your doctor for severe pain, bleeding, fever, or signs or symptoms of infection (pain, swelling, redness, foul odor, drainage).    Post op instructions for prevention of DVT  What is deep vein thrombosis?  Deep vein thrombosis (DVT) is the medical term for blood clots in the deep veins of the leg.  These blood clots can be dangerous.  A DVT can block a blood vessel and keep blood from getting where it needs to go.  Another problem is that the clot can travel to other parts of the body such as the lungs.  A clot that travels to the lungs is called a pulmonary embolus (PE) and can cause serious problems with breathing which can lead to death.  Am I at risk for DVT/PE?  If you are not very active, you are at risk of DVT.  Anyone confined to bed, sitting for long periods of time, recovering from surgery, etc. increases the risk of DVT.  Other risk factors are cancer diagnosis, certain medications, estrogen replacement in any form,older age, obesity, pregnancy, smoking, history of clotting disorders, and dehydration.  How will I know if I have a DVT?   Swelling in the lower leg   Pain   Warmth, redness, hardness or bulging of the vein  If you have any of these symptoms, call your doctors office right away.  Some people will not have any symptoms until the clot moves to the lungs.  What are the symptoms of a PE?   Panting, shortness of breath, or trouble breathing   Sharp, knife-like chest pain when you breathe   Coughing or coughing up blood   Rapid heartbeat  If you have any of these symptoms or get worse quickly, call 911 for emergency treatment.  How can I prevent a DVT?   Avoid long periods of inactivity and dont cross your legs--get up and walk around every hour or so.   Stay active--walking after surgery is highly encouraged.  This means you should get out of the house and walk in the  neighborhood.  Going up and down stairs will not impair healing (unless advised against such activity by your doctor).     Drink plenty of noncaffeinated, nonalcoholic fluids each day to prevent dehydration.   Wear special support stockings, if they have been advised by your doctor.   If you travel, stop at least once an hour and walk around.   Avoid smoking (assistance with stopping is available through your healthcare provider)  Always notify your doctor if you are not able to follow the post operative instructions that are given to you at the time of discharge.  It may be necessary to prescribe one of the medications available to prevent DVT.    We hope your stay was comfortable as you heal now, mend and rest.    For we have enjoyed taking care of you by giving your our best.    And as you get better, by regaining your health and strength;   We count it as a privilege to have served you and hope your time at [facility] was well spent.      Thank  You!!!

## 2019-03-15 VITALS
RESPIRATION RATE: 17 BRPM | OXYGEN SATURATION: 97 % | WEIGHT: 178 LBS | DIASTOLIC BLOOD PRESSURE: 77 MMHG | HEART RATE: 79 BPM | BODY MASS INDEX: 27.06 KG/M2 | SYSTOLIC BLOOD PRESSURE: 136 MMHG | TEMPERATURE: 98 F

## 2019-03-20 ENCOUNTER — OFFICE VISIT (OUTPATIENT)
Dept: SURGERY | Facility: CLINIC | Age: 74
End: 2019-03-20
Payer: MEDICARE

## 2019-03-20 VITALS — BODY MASS INDEX: 27.22 KG/M2 | TEMPERATURE: 98 F | WEIGHT: 179 LBS

## 2019-03-20 DIAGNOSIS — Z98.890 POST-OPERATIVE STATE: Primary | ICD-10-CM

## 2019-03-20 PROCEDURE — 99024 PR POST-OP FOLLOW-UP VISIT: ICD-10-PCS | Mod: POP,,, | Performed by: SURGERY

## 2019-03-20 PROCEDURE — 99999 PR PBB SHADOW E&M-EST. PATIENT-LVL III: CPT | Mod: PBBFAC,,, | Performed by: SURGERY

## 2019-03-20 PROCEDURE — 99999 PR PBB SHADOW E&M-EST. PATIENT-LVL III: ICD-10-PCS | Mod: PBBFAC,,, | Performed by: SURGERY

## 2019-03-20 PROCEDURE — 99024 POSTOP FOLLOW-UP VISIT: CPT | Mod: POP,,, | Performed by: SURGERY

## 2019-03-20 PROCEDURE — 99213 OFFICE O/P EST LOW 20 MIN: CPT | Mod: PBBFAC,PO | Performed by: SURGERY

## 2019-03-28 ENCOUNTER — TELEPHONE (OUTPATIENT)
Dept: SURGERY | Facility: CLINIC | Age: 74
End: 2019-03-28

## 2019-03-28 NOTE — TELEPHONE ENCOUNTER
----- Message from Chelsey Sauceda sent at 3/28/2019  4:07 PM CDT -----  Contact: patient  Type:  Test Results    Who Called:  patient  Name of Test (Lab/Mammo/Etc):    Date of Test:  03/14/2019  Ordering Provider:  Dr. Gonzalez  Where the test was performed:    Best Call Back Number:  [phone]  Additional Information:

## 2019-04-01 NOTE — PROGRESS NOTES
POST-OP NOTE    PROCEDURE:  Left partial mastectomy    COMPLAINTS: None.    EXAM: Incision well approximated. No drainage. No infection.      IMPRESSION:  Pathology pending.    PLAN: FU as needed.

## 2019-04-03 ENCOUNTER — OFFICE VISIT (OUTPATIENT)
Dept: SURGERY | Facility: CLINIC | Age: 74
End: 2019-04-03
Payer: MEDICARE

## 2019-04-03 VITALS
HEART RATE: 77 BPM | HEIGHT: 68 IN | TEMPERATURE: 98 F | SYSTOLIC BLOOD PRESSURE: 170 MMHG | DIASTOLIC BLOOD PRESSURE: 72 MMHG | WEIGHT: 178.81 LBS | BODY MASS INDEX: 27.1 KG/M2

## 2019-04-03 DIAGNOSIS — Z17.0 MALIGNANT NEOPLASM OF LEFT BREAST IN FEMALE, ESTROGEN RECEPTOR POSITIVE, UNSPECIFIED SITE OF BREAST: Primary | ICD-10-CM

## 2019-04-03 DIAGNOSIS — C50.912 MALIGNANT NEOPLASM OF LEFT BREAST IN FEMALE, ESTROGEN RECEPTOR POSITIVE, UNSPECIFIED SITE OF BREAST: Primary | ICD-10-CM

## 2019-04-03 PROCEDURE — 99024 POSTOP FOLLOW-UP VISIT: CPT | Mod: POP,,, | Performed by: SURGERY

## 2019-04-03 PROCEDURE — 99999 PR PBB SHADOW E&M-EST. PATIENT-LVL III: ICD-10-PCS | Mod: PBBFAC,,, | Performed by: SURGERY

## 2019-04-03 PROCEDURE — 99999 PR PBB SHADOW E&M-EST. PATIENT-LVL III: CPT | Mod: PBBFAC,,, | Performed by: SURGERY

## 2019-04-03 PROCEDURE — 99213 OFFICE O/P EST LOW 20 MIN: CPT | Mod: PBBFAC,PO | Performed by: SURGERY

## 2019-04-03 PROCEDURE — 99024 PR POST-OP FOLLOW-UP VISIT: ICD-10-PCS | Mod: POP,,, | Performed by: SURGERY

## 2019-04-03 NOTE — PROGRESS NOTES
Patient ID: Kirstie Bowden is a 74 y.o. female.     Chief Complaint: Consult (left breast abnormal mammo )        HPI 74-year-old female with recently diagnosed left breast cancer.  This is invasive lobular carcinoma which is ERPR positive and HER2 negative. This was found on a screening test.  There is no family history of breast or ovarian cancer.  Age of menarche was 14.  She had hysterectomy in her 30s and has never taken hormone replacement.  She has 2 children the 1st born when she was 24.  She has not had any previous breast problems.  Patient underwent left partial mastectomy with sentinel node biopsy.  Final pathology reveals negative sentinel nodes.  The margins on the invasive lobular carcinoma were clear.  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  This will need a re-excision.          Past Medical History:   Diagnosis Date    Arthritis      Asthma      Cancer       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    Hyperlipidemia      Hypertension      Thyroid disease              Past Surgical History:   Procedure Laterality Date    BREAST BIOPSY Left 20 yrs ago     benign    CHOLECYSTECTOMY        EYE SURGERY Bilateral       cataracts    HYSTERECTOMY        TONSILLECTOMY                Current Outpatient Medications:     ADVAIR DISKUS 250-50 mcg/dose diskus inhaler, INHALE ONE DOSE BY MOUTH TWICE DAILY, Disp: 60 each, Rfl: 11    amLODIPine (NORVASC) 5 MG tablet, TAKE 1 TABLET BY MOUTH ONCE DAILY, Disp: 90 tablet, Rfl: 1    atorvastatin (LIPITOR) 20 MG tablet, TAKE ONE TABLET BY MOUTH ONCE DAILY, Disp: 90 tablet, Rfl: 1    CALCIUM CARBONATE/VITAMIN D3 (VITAMIN D-3 ORAL), Take by mouth once daily. , Disp: , Rfl:     etanercept (ENBREL SURECLICK) 50 mg/mL (0.98 mL) PnIj, INJECT THE CONTENTS OF ONE SURECLICK (50 MG) SUBCUTANEOUSLY ONCE PER WEEK AS DIRECTED BY YOUR PHYSICIAN. SINGLE-USE DEVICE. KEEP REFRIGERATE, Disp: 12 Syringe, Rfl: 3     FLAXSEED ORAL, Take by mouth., Disp: , Rfl:     levothyroxine (SYNTHROID) 112 MCG tablet, TAKE ONE TABLET BY MOUTH ONCE DAILY, Disp: 90 tablet, Rfl: 3    methotrexate 2.5 MG Tab, TAKE FOUR TABLETS BY MOUTH ONCE A WEEK, Disp: 48 tablet, Rfl: 1    methotrexate 2.5 MG Tab, Take 4 tablets (10 mg total) by mouth every 7 days., Disp: 48 tablet, Rfl: 1    VITAMIN B COMPLEX ORAL, Every day, Disp: , Rfl:     folic acid (FOLVITE) 1 MG tablet, TAKE 1 TABLET BY MOUTH ONCE DAILY EVERY DAY, Disp: 30 tablet, Rfl: 10    UNABLE TO FIND, once daily. VITAMIN D1 AND D6 Q DAY, Disp: , Rfl:            Review of patient's allergies indicates:   Allergen Reactions    Sulfamethoxazole-trimethoprim         Other reaction(s): per dr daryn Rodríguez (sulfonamide antibiotics)         NOT SURE REACTION               Family History   Problem Relation Age of Onset    Heart disease Mother      Hypertension Mother      Alzheimer's disease Mother      Cancer Father        Social History            Socioeconomic History    Marital status:        Spouse name: Not on file    Number of children: Not on file    Years of education: Not on file    Highest education level: Not on file   Social Needs    Financial resource strain: Not on file    Food insecurity - worry: Not on file    Food insecurity - inability: Not on file    Transportation needs - medical: Not on file    Transportation needs - non-medical: Not on file   Occupational History       Employer: [employer]   Tobacco Use    Smoking status: Current Every Day Smoker       Packs/day: 0.50       Years: 46.00       Pack years: 23.00       Types: Cigarettes    Smokeless tobacco: Never Used   Substance and Sexual Activity    Alcohol use: Yes       Comment: Social    Drug use: No    Sexual activity: No   Other Topics Concern    Not on file   Social History Narrative    Not on file         Review of Systems   Constitutional: Negative for activity change, chills, fever  and unexpected weight change.   HENT: Negative for congestion, sore throat, trouble swallowing and voice change.    Eyes: Negative for redness and visual disturbance.   Respiratory: Negative for cough, shortness of breath and wheezing.    Cardiovascular: Negative for chest pain and palpitations.   Gastrointestinal: Negative for abdominal pain, blood in stool, nausea and vomiting.   Endocrine: Negative.    Genitourinary: Negative for dysuria, frequency and hematuria.   Musculoskeletal: Negative for arthralgias, back pain and neck pain.   Skin: Negative for rash and wound.   Allergic/Immunologic: Negative.    Neurological: Negative for dizziness, weakness and headaches.   Hematological: Negative for adenopathy.   Psychiatric/Behavioral: Negative for agitation and dysphoric mood. The patient is not nervous/anxious.       Objective:      Physical Exam   Constitutional: She is oriented to person, place, and time. She appears well-developed and well-nourished. No distress.   HENT:   Head: Normocephalic and atraumatic.   Mouth/Throat: Oropharynx is clear and moist. No oropharyngeal exudate.   Eyes: Conjunctivae and EOM are normal. Pupils are equal, round, and reactive to light. No scleral icterus.   Neck: Normal range of motion. No thyromegaly present.   Cardiovascular: Normal rate and regular rhythm.   No murmur heard.  Pulmonary/Chest: Effort normal and breath sounds normal. She has no wheezes. She has no rales.   Left Breast Exam:   incisions healing well.    There is no abnormality detected on physical exam of the contralateral breast.   Abdominal: Soft. Bowel sounds are normal. She exhibits no distension and no mass. There is no tenderness. No hernia.   Musculoskeletal: Normal range of motion. She exhibits no edema.   Lymphadenopathy:     She has no cervical adenopathy.   Neurological: She is alert and oriented to person, place, and time. No cranial nerve deficit.   Skin: Skin is warm and dry. No rash noted. No  erythema.   Psychiatric: She has a normal mood and affect. Her behavior is normal.      FINAL PATHOLOGIC DIAGNOSIS  1. LEFT AXILLARY SENTINEL LYMPH NODES (2):  - 2 out of 2 lymph nodes are negative for malignancy by routine and immunohistochemistry staining.  2. LEFT BREAST, WITHOUT SKIN OR NIPPLE, PARTIAL MASTECTOMY:  - Invasive pleomorphic lobular carcinoma, Tana histologic grade 2. All surgical margins are free of invasive  carcinoma; however, the invasive carcinoma is 0.5 mm from the anterior margin (see comment and cancer case  summary).  - Pleomorphic lobular carcinoma in situ and rare focus of classical lobular carcinoma in situ. The pleomorphic  lobular carcinoma in situ is 0.2 mm from the inferior medial margin (see comment).  - Small fibroadenoma, size = 0.8 cm.  - Fibrocystic changes with columnar cell change, columnar cell hyperplasia, focal blunt duct adenosis, focal  sclerosing adenosis, focal usual ductal hyperplasia, apocrine metaplasia and focal microcalcifications.  - Arteriosclerosis.  - Organizing biopsy site with hemorrhage, fibrosis, chronic inflammation and fat necrosis.  3. LEFT BREAST TISSUE, RE-EXCISION OF ANTERIOR MARGIN:  - No evidence of malignancy; there is no invasive or in situ pleomorphic lobular carcinoma.  - Fibrocystic changes.  4. BACK MASS, EXCISION:  - Lipoma with myxoid changes (see comment).  - No evidence of malignancy.  COMMENT: After        Assessment:           Encounter Diagnoses   Name Primary?    Preop testing      Malignant neoplasm of left female breast, unspecified estrogen receptor status, unspecified site of breast Yes         Plan:      1. Plan re-excision of the inferior medial margin.  2. Risks and benefits of the planned procedure were discussed at length with the patient.  Risks and benefits of not proceeding with the procedure were discussed as well. All questions were answered. The patient expressed clear understanding and would like to proceed with  the procedure as discussed.

## 2019-04-03 NOTE — H&P (VIEW-ONLY)
Patient ID: Kirstie Bowden is a 74 y.o. female.     Chief Complaint: Consult (left breast abnormal mammo )        HPI 74-year-old female with recently diagnosed left breast cancer.  This is invasive lobular carcinoma which is ERPR positive and HER2 negative. This was found on a screening test.  There is no family history of breast or ovarian cancer.  Age of menarche was 14.  She had hysterectomy in her 30s and has never taken hormone replacement.  She has 2 children the 1st born when she was 24.  She has not had any previous breast problems.  Patient underwent left partial mastectomy with sentinel node biopsy.  Final pathology reveals negative sentinel nodes.  The margins on the invasive lobular carcinoma were clear.  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  This will need a re-excision.          Past Medical History:   Diagnosis Date    Arthritis      Asthma      Cancer       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    Hyperlipidemia      Hypertension      Thyroid disease              Past Surgical History:   Procedure Laterality Date    BREAST BIOPSY Left 20 yrs ago     benign    CHOLECYSTECTOMY        EYE SURGERY Bilateral       cataracts    HYSTERECTOMY        TONSILLECTOMY                Current Outpatient Medications:     ADVAIR DISKUS 250-50 mcg/dose diskus inhaler, INHALE ONE DOSE BY MOUTH TWICE DAILY, Disp: 60 each, Rfl: 11    amLODIPine (NORVASC) 5 MG tablet, TAKE 1 TABLET BY MOUTH ONCE DAILY, Disp: 90 tablet, Rfl: 1    atorvastatin (LIPITOR) 20 MG tablet, TAKE ONE TABLET BY MOUTH ONCE DAILY, Disp: 90 tablet, Rfl: 1    CALCIUM CARBONATE/VITAMIN D3 (VITAMIN D-3 ORAL), Take by mouth once daily. , Disp: , Rfl:     etanercept (ENBREL SURECLICK) 50 mg/mL (0.98 mL) PnIj, INJECT THE CONTENTS OF ONE SURECLICK (50 MG) SUBCUTANEOUSLY ONCE PER WEEK AS DIRECTED BY YOUR PHYSICIAN. SINGLE-USE DEVICE. KEEP REFRIGERATE, Disp: 12 Syringe, Rfl: 3     FLAXSEED ORAL, Take by mouth., Disp: , Rfl:     levothyroxine (SYNTHROID) 112 MCG tablet, TAKE ONE TABLET BY MOUTH ONCE DAILY, Disp: 90 tablet, Rfl: 3    methotrexate 2.5 MG Tab, TAKE FOUR TABLETS BY MOUTH ONCE A WEEK, Disp: 48 tablet, Rfl: 1    methotrexate 2.5 MG Tab, Take 4 tablets (10 mg total) by mouth every 7 days., Disp: 48 tablet, Rfl: 1    VITAMIN B COMPLEX ORAL, Every day, Disp: , Rfl:     folic acid (FOLVITE) 1 MG tablet, TAKE 1 TABLET BY MOUTH ONCE DAILY EVERY DAY, Disp: 30 tablet, Rfl: 10    UNABLE TO FIND, once daily. VITAMIN D1 AND D6 Q DAY, Disp: , Rfl:            Review of patient's allergies indicates:   Allergen Reactions    Sulfamethoxazole-trimethoprim         Other reaction(s): per dr daryn Rodríguez (sulfonamide antibiotics)         NOT SURE REACTION               Family History   Problem Relation Age of Onset    Heart disease Mother      Hypertension Mother      Alzheimer's disease Mother      Cancer Father        Social History            Socioeconomic History    Marital status:        Spouse name: Not on file    Number of children: Not on file    Years of education: Not on file    Highest education level: Not on file   Social Needs    Financial resource strain: Not on file    Food insecurity - worry: Not on file    Food insecurity - inability: Not on file    Transportation needs - medical: Not on file    Transportation needs - non-medical: Not on file   Occupational History       Employer: [employer]   Tobacco Use    Smoking status: Current Every Day Smoker       Packs/day: 0.50       Years: 46.00       Pack years: 23.00       Types: Cigarettes    Smokeless tobacco: Never Used   Substance and Sexual Activity    Alcohol use: Yes       Comment: Social    Drug use: No    Sexual activity: No   Other Topics Concern    Not on file   Social History Narrative    Not on file         Review of Systems   Constitutional: Negative for activity change, chills, fever  and unexpected weight change.   HENT: Negative for congestion, sore throat, trouble swallowing and voice change.    Eyes: Negative for redness and visual disturbance.   Respiratory: Negative for cough, shortness of breath and wheezing.    Cardiovascular: Negative for chest pain and palpitations.   Gastrointestinal: Negative for abdominal pain, blood in stool, nausea and vomiting.   Endocrine: Negative.    Genitourinary: Negative for dysuria, frequency and hematuria.   Musculoskeletal: Negative for arthralgias, back pain and neck pain.   Skin: Negative for rash and wound.   Allergic/Immunologic: Negative.    Neurological: Negative for dizziness, weakness and headaches.   Hematological: Negative for adenopathy.   Psychiatric/Behavioral: Negative for agitation and dysphoric mood. The patient is not nervous/anxious.       Objective:      Physical Exam   Constitutional: She is oriented to person, place, and time. She appears well-developed and well-nourished. No distress.   HENT:   Head: Normocephalic and atraumatic.   Mouth/Throat: Oropharynx is clear and moist. No oropharyngeal exudate.   Eyes: Conjunctivae and EOM are normal. Pupils are equal, round, and reactive to light. No scleral icterus.   Neck: Normal range of motion. No thyromegaly present.   Cardiovascular: Normal rate and regular rhythm.   No murmur heard.  Pulmonary/Chest: Effort normal and breath sounds normal. She has no wheezes. She has no rales.   Left Breast Exam:   incisions healing well.    There is no abnormality detected on physical exam of the contralateral breast.   Abdominal: Soft. Bowel sounds are normal. She exhibits no distension and no mass. There is no tenderness. No hernia.   Musculoskeletal: Normal range of motion. She exhibits no edema.   Lymphadenopathy:     She has no cervical adenopathy.   Neurological: She is alert and oriented to person, place, and time. No cranial nerve deficit.   Skin: Skin is warm and dry. No rash noted. No  erythema.   Psychiatric: She has a normal mood and affect. Her behavior is normal.      FINAL PATHOLOGIC DIAGNOSIS  1. LEFT AXILLARY SENTINEL LYMPH NODES (2):  - 2 out of 2 lymph nodes are negative for malignancy by routine and immunohistochemistry staining.  2. LEFT BREAST, WITHOUT SKIN OR NIPPLE, PARTIAL MASTECTOMY:  - Invasive pleomorphic lobular carcinoma, Tana histologic grade 2. All surgical margins are free of invasive  carcinoma; however, the invasive carcinoma is 0.5 mm from the anterior margin (see comment and cancer case  summary).  - Pleomorphic lobular carcinoma in situ and rare focus of classical lobular carcinoma in situ. The pleomorphic  lobular carcinoma in situ is 0.2 mm from the inferior medial margin (see comment).  - Small fibroadenoma, size = 0.8 cm.  - Fibrocystic changes with columnar cell change, columnar cell hyperplasia, focal blunt duct adenosis, focal  sclerosing adenosis, focal usual ductal hyperplasia, apocrine metaplasia and focal microcalcifications.  - Arteriosclerosis.  - Organizing biopsy site with hemorrhage, fibrosis, chronic inflammation and fat necrosis.  3. LEFT BREAST TISSUE, RE-EXCISION OF ANTERIOR MARGIN:  - No evidence of malignancy; there is no invasive or in situ pleomorphic lobular carcinoma.  - Fibrocystic changes.  4. BACK MASS, EXCISION:  - Lipoma with myxoid changes (see comment).  - No evidence of malignancy.  COMMENT: After        Assessment:           Encounter Diagnoses   Name Primary?    Preop testing      Malignant neoplasm of left female breast, unspecified estrogen receptor status, unspecified site of breast Yes         Plan:      1. Plan re-excision of the inferior medial margin.  2. Risks and benefits of the planned procedure were discussed at length with the patient.  Risks and benefits of not proceeding with the procedure were discussed as well. All questions were answered. The patient expressed clear understanding and would like to proceed with  the procedure as discussed.

## 2019-04-03 NOTE — PATIENT INSTRUCTIONS
.PRE-OP INSTRUCTIONS    Your procedure has been scheduled at:    [facility]pre-admit nurse  [phone]      DAY thursday    DATE 04/25/19       Please call the pre-op nurse to schedule your pre-op testing and registration  Someone from the hospital will call you the evening before surgery to let you know what time you need to be at the hospital for surgery.                                               1:  DO NOT EAT OR DRINK ANYTHING AFTER MIDNIGHT THE NIGHT BEFORE SURGERY.     2:  You will need to stop any blood thinners 1 week prior to surgery.  This includes Aspirin, fish oil, Pradaxa, Coumadin, Plavix, Pletal.  Please contact the prescribing doctor to be sure it is ok to stop these medicines.    3:  Pre-admit nurse will go over your medicines and let you know which ones not to take the morning of surgery    4:  Plan to have someone drive you home after you are released from the hospital.  You WILL NOT be able to drive yourself.    5:  If you have any questions or need to change your surgery date, please call Jen at [phone]    AFTER SURGERY:    You can shower 48 hours after surgery, REMOVE WET BANDAGES AND BANDAIDS, leave the steri- strips on.  If you have not had a bowel movement within 3 days after surgery you may take a laxative of your choice.  Do not lift anything over 5-10 pounds.    You need to have a follow up appointment 7-14 days after surgery, call the office to schedule or if you have questions or concerns.    For [facility] patients, you will receive a [facility] message with a link to schedule your post op appointment.

## 2019-04-04 RX ORDER — SODIUM CHLORIDE 9 MG/ML
INJECTION, SOLUTION INTRAVENOUS CONTINUOUS
Status: CANCELLED | OUTPATIENT
Start: 2019-04-25

## 2019-04-04 RX ORDER — LIDOCAINE HYDROCHLORIDE 10 MG/ML
1 INJECTION, SOLUTION EPIDURAL; INFILTRATION; INTRACAUDAL; PERINEURAL ONCE
Status: CANCELLED | OUTPATIENT
Start: 2019-04-25

## 2019-04-24 ENCOUNTER — ANESTHESIA EVENT (OUTPATIENT)
Dept: SURGERY | Facility: HOSPITAL | Age: 74
End: 2019-04-24
Payer: MEDICARE

## 2019-04-25 ENCOUNTER — HOSPITAL ENCOUNTER (OUTPATIENT)
Facility: HOSPITAL | Age: 74
Discharge: HOME | End: 2019-04-25
Attending: SURGERY | Admitting: SURGERY
Payer: MEDICARE

## 2019-04-25 ENCOUNTER — ANESTHESIA (OUTPATIENT)
Dept: SURGERY | Facility: HOSPITAL | Age: 74
End: 2019-04-25
Payer: MEDICARE

## 2019-04-25 DIAGNOSIS — C50.912 MALIGNANT NEOPLASM OF LEFT BREAST IN FEMALE, ESTROGEN RECEPTOR POSITIVE, UNSPECIFIED SITE OF BREAST: Primary | ICD-10-CM

## 2019-04-25 DIAGNOSIS — Z17.0 MALIGNANT NEOPLASM OF LEFT BREAST IN FEMALE, ESTROGEN RECEPTOR POSITIVE, UNSPECIFIED SITE OF BREAST: Primary | ICD-10-CM

## 2019-04-25 DIAGNOSIS — Z17.0 ESTROGEN RECEPTOR POSITIVE: ICD-10-CM

## 2019-04-25 DIAGNOSIS — Z01.818 PREOP TESTING: ICD-10-CM

## 2019-04-25 LAB
ANION GAP SERPL CALC-SCNC: 9 MMOL/L (ref 8–16)
BASOPHILS # BLD AUTO: 0 K/UL (ref 0–0.2)
BASOPHILS NFR BLD: 0.6 % (ref 0–1.9)
BUN SERPL-MCNC: 17 MG/DL (ref 8–23)
CALCIUM SERPL-MCNC: 10.6 MG/DL (ref 8.7–10.5)
CHLORIDE SERPL-SCNC: 105 MMOL/L (ref 95–110)
CO2 SERPL-SCNC: 29 MMOL/L (ref 23–29)
CREAT SERPL-MCNC: 1 MG/DL (ref 0.5–1.4)
DIFFERENTIAL METHOD: ABNORMAL
EOSINOPHIL # BLD AUTO: 0.2 K/UL (ref 0–0.5)
EOSINOPHIL NFR BLD: 2.2 % (ref 0–8)
ERYTHROCYTE [DISTWIDTH] IN BLOOD BY AUTOMATED COUNT: 14.4 % (ref 11.5–14.5)
EST. GFR  (AFRICAN AMERICAN): >60 ML/MIN/1.73 M^2
EST. GFR  (NON AFRICAN AMERICAN): 56 ML/MIN/1.73 M^2
GLUCOSE SERPL-MCNC: 90 MG/DL (ref 70–110)
HCT VFR BLD AUTO: 41.9 % (ref 37–48.5)
HGB BLD-MCNC: 13.9 G/DL (ref 12–16)
LYMPHOCYTES # BLD AUTO: 2.7 K/UL (ref 1–4.8)
LYMPHOCYTES NFR BLD: 37.2 % (ref 18–48)
MCH RBC QN AUTO: 32.7 PG (ref 27–31)
MCHC RBC AUTO-ENTMCNC: 33.2 G/DL (ref 32–36)
MCV RBC AUTO: 99 FL (ref 82–98)
MONOCYTES # BLD AUTO: 0.9 K/UL (ref 0.3–1)
MONOCYTES NFR BLD: 12.9 % (ref 4–15)
NEUTROPHILS # BLD AUTO: 3.4 K/UL (ref 1.8–7.7)
NEUTROPHILS NFR BLD: 47.1 % (ref 38–73)
PLATELET # BLD AUTO: 190 K/UL (ref 150–350)
PMV BLD AUTO: 10.1 FL (ref 9.2–12.9)
POTASSIUM SERPL-SCNC: 4.5 MMOL/L (ref 3.5–5.1)
RBC # BLD AUTO: 4.25 M/UL (ref 4–5.4)
SODIUM SERPL-SCNC: 143 MMOL/L (ref 136–145)
WBC # BLD AUTO: 7.3 K/UL (ref 3.9–12.7)

## 2019-04-25 PROCEDURE — 71000015 HC POSTOP RECOV 1ST HR: Performed by: SURGERY

## 2019-04-25 PROCEDURE — 99900103 DSU ONLY-NO CHARGE-INITIAL HR (STAT): Performed by: SURGERY

## 2019-04-25 PROCEDURE — D9220A PRA ANESTHESIA: ICD-10-PCS | Mod: CRNA,,, | Performed by: NURSE ANESTHETIST, CERTIFIED REGISTERED

## 2019-04-25 PROCEDURE — D9220A PRA ANESTHESIA: Mod: CRNA,,, | Performed by: NURSE ANESTHETIST, CERTIFIED REGISTERED

## 2019-04-25 PROCEDURE — 37000008 HC ANESTHESIA 1ST 15 MINUTES: Performed by: SURGERY

## 2019-04-25 PROCEDURE — 88307 TISSUE EXAM BY PATHOLOGIST: CPT | Mod: 26,,, | Performed by: PATHOLOGY

## 2019-04-25 PROCEDURE — 25000003 PHARM REV CODE 250: Performed by: ANESTHESIOLOGY

## 2019-04-25 PROCEDURE — D9220A PRA ANESTHESIA: Mod: ANES,,, | Performed by: ANESTHESIOLOGY

## 2019-04-25 PROCEDURE — 88341 TISSUE SPECIMEN TO PATHOLOGY - SURGERY: ICD-10-PCS | Mod: 26,,, | Performed by: PATHOLOGY

## 2019-04-25 PROCEDURE — 25000003 PHARM REV CODE 250: Performed by: SURGERY

## 2019-04-25 PROCEDURE — 19301 PARTIAL MASTECTOMY: CPT | Mod: 58,LT,, | Performed by: SURGERY

## 2019-04-25 PROCEDURE — 25000003 PHARM REV CODE 250: Performed by: NURSE ANESTHETIST, CERTIFIED REGISTERED

## 2019-04-25 PROCEDURE — 71000033 HC RECOVERY, INTIAL HOUR: Performed by: SURGERY

## 2019-04-25 PROCEDURE — 99900104 DSU ONLY-NO CHARGE-EA ADD'L HR (STAT): Performed by: SURGERY

## 2019-04-25 PROCEDURE — 88342 IMHCHEM/IMCYTCHM 1ST ANTB: CPT | Mod: 26,,, | Performed by: PATHOLOGY

## 2019-04-25 PROCEDURE — 36000706: Performed by: SURGERY

## 2019-04-25 PROCEDURE — 88342 IMHCHEM/IMCYTCHM 1ST ANTB: CPT | Performed by: PATHOLOGY

## 2019-04-25 PROCEDURE — 85025 COMPLETE CBC W/AUTO DIFF WBC: CPT

## 2019-04-25 PROCEDURE — D9220A PRA ANESTHESIA: ICD-10-PCS | Mod: ANES,,, | Performed by: ANESTHESIOLOGY

## 2019-04-25 PROCEDURE — 63600175 PHARM REV CODE 636 W HCPCS: Performed by: NURSE ANESTHETIST, CERTIFIED REGISTERED

## 2019-04-25 PROCEDURE — 27200651 HC AIRWAY, LMA: Performed by: NURSE ANESTHETIST, CERTIFIED REGISTERED

## 2019-04-25 PROCEDURE — 80048 BASIC METABOLIC PNL TOTAL CA: CPT

## 2019-04-25 PROCEDURE — 19301 PR MASTECTOMY, PARTIAL: ICD-10-PCS | Mod: 58,LT,, | Performed by: SURGERY

## 2019-04-25 PROCEDURE — 88307 TISSUE SPECIMEN TO PATHOLOGY - SURGERY: ICD-10-PCS | Mod: 26,,, | Performed by: PATHOLOGY

## 2019-04-25 PROCEDURE — 36000707: Performed by: SURGERY

## 2019-04-25 PROCEDURE — 37000009 HC ANESTHESIA EA ADD 15 MINS: Performed by: SURGERY

## 2019-04-25 PROCEDURE — 88341 IMHCHEM/IMCYTCHM EA ADD ANTB: CPT | Mod: 26,,, | Performed by: PATHOLOGY

## 2019-04-25 PROCEDURE — 63600175 PHARM REV CODE 636 W HCPCS: Performed by: SURGERY

## 2019-04-25 PROCEDURE — 88341 IMHCHEM/IMCYTCHM EA ADD ANTB: CPT | Performed by: PATHOLOGY

## 2019-04-25 PROCEDURE — 36415 COLL VENOUS BLD VENIPUNCTURE: CPT

## 2019-04-25 PROCEDURE — 88342 TISSUE SPECIMEN TO PATHOLOGY - SURGERY: ICD-10-PCS | Mod: 26,,, | Performed by: PATHOLOGY

## 2019-04-25 RX ORDER — PROPOFOL 10 MG/ML
VIAL (ML) INTRAVENOUS
Status: DISCONTINUED | OUTPATIENT
Start: 2019-04-25 | End: 2019-04-25

## 2019-04-25 RX ORDER — LIDOCAINE HCL/PF 100 MG/5ML
SYRINGE (ML) INTRAVENOUS
Status: DISCONTINUED | OUTPATIENT
Start: 2019-04-25 | End: 2019-04-25

## 2019-04-25 RX ORDER — MIDAZOLAM HYDROCHLORIDE 1 MG/ML
INJECTION, SOLUTION INTRAMUSCULAR; INTRAVENOUS
Status: DISCONTINUED | OUTPATIENT
Start: 2019-04-25 | End: 2019-04-25

## 2019-04-25 RX ORDER — ACETAMINOPHEN 10 MG/ML
INJECTION, SOLUTION INTRAVENOUS
Status: DISCONTINUED | OUTPATIENT
Start: 2019-04-25 | End: 2019-04-25

## 2019-04-25 RX ORDER — OXYCODONE HYDROCHLORIDE 5 MG/1
5 TABLET ORAL
Status: DISCONTINUED | OUTPATIENT
Start: 2019-04-25 | End: 2019-04-25 | Stop reason: HOSPADM

## 2019-04-25 RX ORDER — SODIUM CHLORIDE 9 MG/ML
INJECTION, SOLUTION INTRAVENOUS CONTINUOUS
Status: CANCELLED | OUTPATIENT
Start: 2019-04-25

## 2019-04-25 RX ORDER — SODIUM CHLORIDE 0.9 % (FLUSH) 0.9 %
10 SYRINGE (ML) INJECTION
Status: DISCONTINUED | OUTPATIENT
Start: 2019-04-25 | End: 2019-04-25 | Stop reason: HOSPADM

## 2019-04-25 RX ORDER — SODIUM CHLORIDE, SODIUM LACTATE, POTASSIUM CHLORIDE, CALCIUM CHLORIDE 600; 310; 30; 20 MG/100ML; MG/100ML; MG/100ML; MG/100ML
INJECTION, SOLUTION INTRAVENOUS CONTINUOUS
Status: DISCONTINUED | OUTPATIENT
Start: 2019-04-25 | End: 2019-04-25 | Stop reason: HOSPADM

## 2019-04-25 RX ORDER — EPHEDRINE SULFATE 50 MG/ML
INJECTION, SOLUTION INTRAVENOUS
Status: DISCONTINUED | OUTPATIENT
Start: 2019-04-25 | End: 2019-04-25

## 2019-04-25 RX ORDER — METOCLOPRAMIDE HYDROCHLORIDE 5 MG/ML
10 INJECTION INTRAMUSCULAR; INTRAVENOUS EVERY 10 MIN PRN
Status: DISCONTINUED | OUTPATIENT
Start: 2019-04-25 | End: 2019-04-25 | Stop reason: HOSPADM

## 2019-04-25 RX ORDER — FENTANYL CITRATE 50 UG/ML
25 INJECTION, SOLUTION INTRAMUSCULAR; INTRAVENOUS EVERY 5 MIN PRN
Status: DISCONTINUED | OUTPATIENT
Start: 2019-04-25 | End: 2019-04-25 | Stop reason: HOSPADM

## 2019-04-25 RX ORDER — FENTANYL CITRATE 50 UG/ML
INJECTION, SOLUTION INTRAMUSCULAR; INTRAVENOUS
Status: DISCONTINUED | OUTPATIENT
Start: 2019-04-25 | End: 2019-04-25

## 2019-04-25 RX ORDER — DEXAMETHASONE SODIUM PHOSPHATE 4 MG/ML
INJECTION, SOLUTION INTRA-ARTICULAR; INTRALESIONAL; INTRAMUSCULAR; INTRAVENOUS; SOFT TISSUE
Status: DISCONTINUED | OUTPATIENT
Start: 2019-04-25 | End: 2019-04-25

## 2019-04-25 RX ORDER — CEFAZOLIN SODIUM 2 G/50ML
2 SOLUTION INTRAVENOUS
Status: COMPLETED | OUTPATIENT
Start: 2019-04-25 | End: 2019-04-25

## 2019-04-25 RX ORDER — HYDROCODONE BITARTRATE AND ACETAMINOPHEN 5; 325 MG/1; MG/1
1 TABLET ORAL EVERY 6 HOURS PRN
Qty: 10 TABLET | Refills: 0 | Status: SHIPPED | OUTPATIENT
Start: 2019-04-25 | End: 2019-05-27

## 2019-04-25 RX ORDER — ONDANSETRON 2 MG/ML
INJECTION INTRAMUSCULAR; INTRAVENOUS
Status: DISCONTINUED | OUTPATIENT
Start: 2019-04-25 | End: 2019-04-25

## 2019-04-25 RX ORDER — BUPIVACAINE HYDROCHLORIDE AND EPINEPHRINE 2.5; 5 MG/ML; UG/ML
INJECTION, SOLUTION EPIDURAL; INFILTRATION; INTRACAUDAL; PERINEURAL
Status: DISCONTINUED | OUTPATIENT
Start: 2019-04-25 | End: 2019-04-25 | Stop reason: HOSPADM

## 2019-04-25 RX ORDER — LIDOCAINE HYDROCHLORIDE 10 MG/ML
1 INJECTION, SOLUTION EPIDURAL; INFILTRATION; INTRACAUDAL; PERINEURAL ONCE
Status: DISCONTINUED | OUTPATIENT
Start: 2019-04-25 | End: 2019-04-25 | Stop reason: HOSPADM

## 2019-04-25 RX ORDER — LIDOCAINE HYDROCHLORIDE 10 MG/ML
0.5 INJECTION, SOLUTION EPIDURAL; INFILTRATION; INTRACAUDAL; PERINEURAL ONCE
Status: COMPLETED | OUTPATIENT
Start: 2019-04-25 | End: 2019-04-25

## 2019-04-25 RX ADMIN — EPHEDRINE SULFATE 10 MG: 50 INJECTION, SOLUTION INTRAMUSCULAR; INTRAVENOUS; SUBCUTANEOUS at 08:04

## 2019-04-25 RX ADMIN — MIDAZOLAM 2 MG: 1 INJECTION INTRAMUSCULAR; INTRAVENOUS at 07:04

## 2019-04-25 RX ADMIN — DEXAMETHASONE SODIUM PHOSPHATE 8 MG: 4 INJECTION, SOLUTION INTRAMUSCULAR; INTRAVENOUS at 08:04

## 2019-04-25 RX ADMIN — FENTANYL CITRATE 50 MCG: 50 INJECTION, SOLUTION INTRAMUSCULAR; INTRAVENOUS at 08:04

## 2019-04-25 RX ADMIN — OXYCODONE HYDROCHLORIDE 5 MG: 5 TABLET ORAL at 09:04

## 2019-04-25 RX ADMIN — CEFAZOLIN SODIUM 2 G: 2 SOLUTION INTRAVENOUS at 08:04

## 2019-04-25 RX ADMIN — SODIUM CHLORIDE, SODIUM LACTATE, POTASSIUM CHLORIDE, AND CALCIUM CHLORIDE: .6; .31; .03; .02 INJECTION, SOLUTION INTRAVENOUS at 07:04

## 2019-04-25 RX ADMIN — LIDOCAINE HYDROCHLORIDE 100 MG: 20 INJECTION, SOLUTION INTRAVENOUS at 08:04

## 2019-04-25 RX ADMIN — EPHEDRINE SULFATE 15 MG: 50 INJECTION, SOLUTION INTRAMUSCULAR; INTRAVENOUS; SUBCUTANEOUS at 08:04

## 2019-04-25 RX ADMIN — LIDOCAINE HYDROCHLORIDE 5 MG: 10 INJECTION, SOLUTION EPIDURAL; INFILTRATION; INTRACAUDAL; PERINEURAL at 06:04

## 2019-04-25 RX ADMIN — ACETAMINOPHEN 1000 MG: 10 INJECTION, SOLUTION INTRAVENOUS at 08:04

## 2019-04-25 RX ADMIN — ONDANSETRON 4 MG: 2 INJECTION, SOLUTION INTRAMUSCULAR; INTRAVENOUS at 08:04

## 2019-04-25 RX ADMIN — PROPOFOL 110 MG: 10 INJECTION, EMULSION INTRAVENOUS at 08:04

## 2019-04-25 NOTE — OR NURSING
Pt. Awake and alert, vital signs stable.  Tolerated liquids without nausea. Pt. States her incision site has some soreness, very tolerable at 2/10. Ambulates readily to bathroom and able to void spontaneously. IV removed per policy, catheter intact. Discharge instructions reviewed with patient and family members who all were able to teach back..  Written instructions given to patient, who verbalized understanding.  Pt. Meets discharge criteria. Discharge home with family per wheelchair to lobby.

## 2019-04-25 NOTE — BRIEF OP NOTE
Patient: Kirstie Bowden     Date of Procedure: 4/25/2019    Procedure: Re excision of left breast partial mastectomy margin    Surgeon: Mp Siddiqui MD    Assistant: None    Pre-op Diagnosis: Malignant neoplasm of left breast in female, estrogen receptor positive, unspecified site of breast [C50.912, Z17.0]     Post-op Diagnosis: Malignant neoplasm of left breast in female, estrogen receptor positive, unspecified site of breast [C50.912, Z17.0]    Findings: breast tissue    Specimen: inferior medial margin    EBL: 20 cc    Complications: None

## 2019-04-25 NOTE — ANESTHESIA PREPROCEDURE EVALUATION
04/25/2019  Kirstie Bowden is a 74 y.o., female.    Pre-op Assessment    I have reviewed the Patient Summary Reports.     I have reviewed the Nursing Notes.   I have reviewed the Medications.     Review of Systems  Anesthesia Hx:  Hx of Anesthetic complications Pseudocholinesterase Deficiency  Family Hx of Anesthesia complications:  Pseudocholinesterase Deficiency, in a sibling  Personal Hx of Anesthesia complications  Pseudocholinesterase Deficiency, based on clinical history per patient   Cardiovascular:   Hypertension    Pulmonary:   Asthma    Neurological:   Neuromuscular Disease,    Endocrine:   Hypothyroidism    Dermatological:   Left breast mass       Physical Exam  General:  Well nourished    Airway/Jaw/Neck:  Airway Findings: Mouth Opening: Normal Tongue: Normal  General Airway Assessment: Adult  Mallampati: III  TM Distance: Normal, at least 6 cm  Jaw/Neck Findings:  Neck ROM: Extension Decreased, Mod.      Dental:  Dental Findings: Upper front caps   Chest/Lungs:  Chest/Lungs Clear    Heart/Vascular:  Heart Findings: Normal            Anesthesia Plan  Type of Anesthesia, risks & benefits discussed:  Anesthesia Type:  general  Patient's Preference:   Intra-op Monitoring Plan: standard ASA monitors  Intra-op Monitoring Plan Comments:   Post Op Pain Control Plan:   Post Op Pain Control Plan Comments:   Induction:   IV  Beta Blocker:  Patient is not currently on a Beta-Blocker (No further documentation required).       Informed Consent: Patient understands risks and agrees with Anesthesia plan.  Questions answered. Anesthesia consent signed with patient.  ASA Score: 3     Day of Surgery Review of History & Physical:    H&P update referred to the surgeon.         Ready For Surgery From Anesthesia Perspective.

## 2019-04-25 NOTE — PLAN OF CARE
Report to [facility]. Patient in no pain, no nausea present. VS stale, dressing to left breast dry intact no drainage, ice to  Incision. Family present

## 2019-04-25 NOTE — DISCHARGE SUMMARY
Discharge Summary  General Surgery      Admit Date: 4/25/2019    Discharge Date :4/25/2019    Attending Physician: Mp Gonzalez     Discharge Physician: Mp Gonzalez    Discharged Condition: good    Discharge Diagnosis: Malignant neoplasm of left breast in female, estrogen receptor positive, unspecified site of breast [C50.912, Z17.0]    Treatments/Procedures: Procedure(s) (LRB):  REEXCISION (Left)    Hospital Course: Uneventful; Discharged home from [facility]    Significant Diagnostic Studies: none    Disposition: Home or Self Care    Diet: Regular    Follow up: Office 10-14 days    Activity: No heavy lifting till seen in office.    Patient Instructions:   Current Discharge Medication List      START taking these medications    Details   !! HYDROcodone-acetaminophen (NORCO) 5-325 mg per tablet Take 1 tablet by mouth every 6 (six) hours as needed for Pain.  Qty: 10 tablet, Refills: 0       !! - Potential duplicate medications found. Please discuss with provider.      CONTINUE these medications which have NOT CHANGED    Details   ADVAIR DISKUS 250-50 mcg/dose diskus inhaler INHALE ONE DOSE BY MOUTH TWICE DAILY  Qty: 60 each, Refills: 11    Comments: Please consider 90 day supplies to promote better adherence  Associated Diagnoses: Asthma, chronic, mild intermittent, uncomplicated      amLODIPine (NORVASC) 5 MG tablet TAKE 1 TABLET BY MOUTH ONCE DAILY  Qty: 90 tablet, Refills: 1    Associated Diagnoses: Essential hypertension, benign      atorvastatin (LIPITOR) 20 MG tablet TAKE ONE TABLET BY MOUTH ONCE DAILY  Qty: 90 tablet, Refills: 1    Associated Diagnoses: Hyperlipidemia      CALCIUM CARBONATE/VITAMIN D3 (VITAMIN D-3 ORAL) Take by mouth once daily.       etanercept (ENBREL SURECLICK) 50 mg/mL (0.98 mL) PnIj INJECT THE CONTENTS OF ONE SURECLICK (50 MG) SUBCUTANEOUSLY ONCE PER WEEK AS DIRECTED BY YOUR PHYSICIAN. SINGLE-USE DEVICE. KEEP REFRIGERATE  Qty: 12 Syringe, Refills: 3      FLAXSEED ORAL Take by mouth.       folic acid (FOLVITE) 1 MG tablet TAKE 1 TABLET BY MOUTH ONCE DAILY EVERY DAY  Qty: 30 tablet, Refills: 10    Comments: Please consider 90 day supplies to promote better adherence      levothyroxine (SYNTHROID) 112 MCG tablet TAKE 1 TABLET BY MOUTH ONCE DAILY  Qty: 90 tablet, Refills: 3    Associated Diagnoses: Acquired hypothyroidism      methotrexate 2.5 MG Tab Take 4 tablets (10 mg total) by mouth every 7 days.  Qty: 48 tablet, Refills: 1      ondansetron (ZOFRAN) 8 MG tablet Take 1 tablet (8 mg total) by mouth every 8 (eight) hours as needed for Nausea.  Qty: 10 tablet, Refills: 0      UNABLE TO FIND once daily. VITAMIN D1 AND D6 Q DAY      VITAMIN B COMPLEX ORAL Every day      !! HYDROcodone-acetaminophen (NORCO) 5-325 mg per tablet Take 1 tablet by mouth every 6 (six) hours as needed for Pain.  Qty: 20 tablet, Refills: 0       !! - Potential duplicate medications found. Please discuss with provider.          Discharge Procedure Orders   Diet general     Remove dressing in 48 hours

## 2019-04-25 NOTE — OP NOTE
DATE OF PROCEDURE:  04/25/2019.    PROCEDURE:  Reexcision of left breast partial mastectomy.  This included the   inferior medial margin.    PREOPERATIVE DIAGNOSIS:  Closed margin on the left breast post partial   mastectomy.    POSTOPERATIVE DIAGNOSIS:  Closed margin on the left breast post partial   mastectomy.    SURGEON:  Dr. aCrlos M.D.    ASSISTANT:  None.    SPECIMENS:  Left breast inferior medial margin.    PROCEDURE IN DETAIL:  After appropriate consent was obtained, consent forms   signed and questions answered, the patient was taken to the Operating Room,   placed on the operating table where general anesthesia was induced.  Preop   antibiotics were administered and SCDs were in place.  Time-out procedure was   performed.  The breast was prepped and draped in the usual sterile fashion.  A   skin incision was made at the site of the previous partial mastectomy incision.    I dissected into the cavity and the seroma was evacuated.  This healthy   reexcision was performed of the inferior medial margin.  This included the   entire inferior medial margin.  We then marked the specimen with silk at the new   inferior medial margin sent this to Pathology.  A 0.25% Marcaine was injected   for local anesthetic.  Adequate hemostasis was ensured after irrigation.  Deep   tissue was then reapproximated with a running 3-0 Vicryl suture and the skin was   closed with a 4-0 Monocryl subcuticular stitch.  Steri-Strips and dressings   were applied.  The patient was awakened, extubated and transferred to Recovery   Room in stable condition.  She tolerated the procedure without complication.    Estimated blood loss was approximately 20 mL.  Counts were correct at the end of   the procedure.      RTL/HN  dd: 04/25/2019 09:06:15 (CDT)  td: 04/25/2019 09:48:26 (CDT)  Doc ID   #[id number]  Job ID #[id number]    CC:

## 2019-04-25 NOTE — ANESTHESIA POSTPROCEDURE EVALUATION
Anesthesia Post Evaluation    Patient: Kirstie Bowden    Procedure(s) Performed: Procedure(s) (LRB):  REEXCISION (Left)    Final Anesthesia Type: general  Patient location during evaluation: PACU  Patient participation: Yes- Able to Participate  Level of consciousness: awake and alert  Post-procedure vital signs: reviewed and stable  Pain management: adequate  Airway patency: patent  PONV status at discharge: No PONV  Anesthetic complications: no      Cardiovascular status: blood pressure returned to baseline  Respiratory status: unassisted  Hydration status: euvolemic  Follow-up not needed.          Vitals Value Taken Time   /74 4/25/2019  9:47 AM   Temp 36.7 °C (98.1 °F) 4/25/2019  9:30 AM   Pulse 80 4/25/2019  9:47 AM   Resp 17 4/25/2019  9:47 AM   SpO2 95 % 4/25/2019  9:47 AM         Event Time     Out of Recovery 09:25:00          Pain/Cecilia Score: Pain Rating Prior to Med Admin: 2 (4/25/2019  9:15 AM)  Cecilia Score: 10 (4/25/2019 10:12 AM)

## 2019-04-25 NOTE — DISCHARGE INSTRUCTIONS
Lumpectomy     You will have an incision near the tumor on the breast for the lumpectomy. You may also have a second incision under the arm, near the lymph nodes, for sentinel node biopsy or lymph node removal.     Lumpectomy is surgery to remove cancer. It's a breast-conserving surgery, which means most of your breast remains intact. There will be one incision (cut) on the breast for the lumpectomy procedure. You may also have a second incision under the arm for a sentinel lymph node biopsy, or for removal of the lymph nodes. If you're having a lumpectomy, you'll probably also need radiation therapy later, after you heal.    Before surgery  A week or more before the procedure, you will have an exam and routine tests. Before surgery:  · Sign any consent forms.  · Tell your healthcare provider about any medicines, herbs, or supplements that you are taking.  · Avoid eating or drinking for 8 to 12 hours before your surgery.  · Arrange for a trusted adult to drive you home after surgery.  · Bring a soft shirt that buttons in front to wear home.  · Talk to the anesthesia care provider. He or she will explain how you will be kept free of pain during surgery.  ·   During surgery  Your surgeon will make an incision near the tumor. The tumor and a surrounding margin of normal tissue will be removed. A second incision may also be made under the arm to remove some of the nearby axillary lymph nodes. These are checked to see if the cancer has spread to them. When the surgery is finished, the incisions will be closed using stitches. A gauze dressing will cover the incisions.     Right after surgery  You will wake up in the recovery room. You may have an IV (intravenous) line for fluids and medicines. Pain medicines will be given to you as needed. A nurse will check your temperature, pulse, and blood pressure. You'll likely go home the same day.  You will be given instructions on how to care for the incisions, what kind of  pain medicines you should use, and how to take care of yourself as you recover. Make sure you understand all the instructions and know when you need to next see the healthcare provider.     When to call your healthcare provider  Call your healthcare provider right away if you have any of the following after surgery:  · Fever  · Chills  · Increased pain, warmth, drainage, swelling, or redness at the incision(s)  · Cough or shortness of breath  · Pain in the chest or calf  · Bleeding that soaks the dressing  · Any other problems your healthcare providers told you to watch for and report  Be sure you know how to reach your healthcare provider if you have any problems. Know how to get help after office hours, on weekends, and on holidays, too.   Date Last Reviewed: 10/31/2015  © 4655-9306 [employer]. [address] [place] [address]. All rights reserved. This information is not intended as a substitute for professional medical care. Always follow your healthcare professional's instructions.    Discharge Instructions: After Your Surgery/Procedure  Youve just had surgery. During surgery you were given medicine called anesthesia to keep you relaxed and free of pain. After surgery you may have some pain or nausea. This is common. Here are some tips for feeling better and getting well after surgery.     Stay on schedule with your medication.   Going home  Your doctor or nurse will show you how to take care of yourself when you go home. He or she will also answer your questions. Have an adult family member or friend drive you home.      For your safety we recommend these precaution for the first 24 hours after your procedure:  · Do not drive or use heavy equipment.  · Do not make important decisions or sign legal papers.  · Do not drink alcohol.  · Have someone stay with you, if needed. He or she can watch for problems and help keep you safe.  · Your concentration, balance, coordination, and judgement  "may be impaired for many hours after anesthesia.  Use caution when ambulating or standing up.     · You may feel weak and "washed out" after anesthesia and surgery.      Subtle residual effects of general anesthesia or sedation with regional / local anesthesia can last more than 24 hours.  Rest for the remainder of the day or longer if your Doctor/Surgeon has advised you to do so.  Although you may feel normal within the first 24 hours, your reflexes and mental ability may be impaired without you realizing it.  You may feel dizzy, lightheaded or sleepy for 24 hours or longer.      Be sure to go to all follow-up visits with your doctor. And rest after your surgery for as long as your doctor tells you to.    Coping with pain  If you have pain after surgery, pain medicine will help you feel better. Take it as told, before pain becomes severe. Also, ask your doctor or pharmacist about other ways to control pain. This might be with heat, ice, or relaxation. And follow any other instructions your surgeon or nurse gives you.    Tips for taking pain medicine  To get the best relief possible, remember these points:  · Pain medicines can upset your stomach. Taking them with a little food may help.  · Most pain relievers taken by mouth need at least 20 to 30 minutes to start to work.  · Taking medicine on a schedule can help you remember to take it. Try to time your medicine so that you can take it before starting an activity. This might be before you get dressed, go for a walk, or sit down for dinner.  · Constipation is a common side effect of pain medicines. Call your doctor before taking any medicines such as laxatives or stool softeners to help ease constipation. Also ask if you should skip any foods. Drinking lots of fluids and eating foods such as fruits and vegetables that are high in fiber can also help. Remember, do not take laxatives unless your surgeon has prescribed them.  · Drinking alcohol and taking pain " medicine can cause dizziness and slow your breathing. It can even be deadly. Do not drink alcohol while taking pain medicine.  · Pain medicine can make you react more slowly to things. Do not drive or run machinery while taking pain medicine.  Your health care provider may tell you to take acetaminophen to help ease your pain. Ask him or her how much you are supposed to take each day. Acetaminophen or other pain relievers may interact with your prescription medicines or other over-the-counter (OTC) drugs. Some prescription medicines have acetaminophen and other ingredients. Using both prescription and OTC acetaminophen for pain can cause you to overdose. Read the labels on your OTC medicines with care. This will help you to clearly know the list of ingredients, how much to take, and any warnings. It may also help you not take too much acetaminophen. If you have questions or do not understand the information, ask your pharmacist or health care provider to explain it to you before you take the OTC medicine.    Managing nausea  Some people have an upset stomach after surgery. This is often because of anesthesia, pain, or pain medicine, or the stress of surgery. These tips will help you handle nausea and eat healthy foods as you get better. If you were on a special food plan before surgery, ask your doctor if you should follow it while you get better. These tips may help:  · Do not push yourself to eat. Your body will tell you when to eat and how much.  · Start off with clear liquids and soup. They are easier to digest.  · Next try semi-solid foods, such as mashed potatoes, applesauce, and gelatin, as you feel ready.  · Slowly move to solid foods. Dont eat fatty, rich, or spicy foods at first.  · Do not force yourself to have 3 large meals a day. Instead eat smaller amounts more often.  · Take pain medicines with a small amount of solid food, such as crackers or toast, to avoid nausea.     Call your surgeon if  · You  still have pain an hour after taking medicine. The medicine may not be strong enough.  · You feel too sleepy, dizzy, or groggy. The medicine may be too strong.  · You have side effects like nausea, vomiting, or skin changes, such as rash, itching, or hives.       If you have obstructive sleep apnea  You were given anesthesia medicine during surgery to keep you comfortable and free of pain. After surgery, you may have more apnea spells because of this medicine and other medicines you were given. The spells may last longer than usual.   At home:  · Keep using the continuous positive airway pressure (CPAP) device when you sleep. Unless your health care provider tells you not to, use it when you sleep, day or night. CPAP is a common device used to treat obstructive sleep apnea.  · Talk with your provider before taking any pain medicine, muscle relaxants, or sedatives. Your provider will tell you about the possible dangers of taking these medicines.  © 0141-0107 [employer]. [address] [place] [address], [place] [address]. All rights reserved. This information is not intended as a substitute for professional medical care. Always follow your healthcare professional's instructions.    General Information:    1.  Do not drink alcoholic beverages including beer for 24 hours or as long as you are on pain medication..  2.  Do not drive a motor vehicle, operate machinery or power tools, or signs legal papers for 24 hours or as long as you are on pain medication.   3.  You may experience light-headedness, dizziness, and sleepiness following surgery. Please do not stay alone. A responsible adult should be with you for this 24 hour period.  4.  Go home and rest.    5. Progress slowly to a normal diet unless instructed.  Otherwise, begin with liquids such as soft drinks, then soup and crackers working up to solid foods. Drink plenty of nonalcoholic fluids.  6.  Certain anesthetics and pain medications produce nausea and  vomiting in certain       individuals. If nausea becomes a problem at home, call you doctor.    7. A nurse will be calling you sometime after surgery. Do not be alarmed. This is our way of finding out how you are doing.    8. Several times every hour while you are awake, take 2-3 deep breaths and cough. If you had stomach surgery hold a pillow or rolled towel firmly against your stomach before you cough. This will help with any pain the cough might cause.  9. Several times every hour while you are awake, pump and flex your feet 5-6 times and do foot circles. This will help prevent blood clots.    10.Call your doctor for severe pain, bleeding, fever, or signs or symptoms of infection (pain, swelling, redness, foul odor, drainage).    Post op instructions for prevention of DVT  What is deep vein thrombosis?  Deep vein thrombosis (DVT) is the medical term for blood clots in the deep veins of the leg.  These blood clots can be dangerous.  A DVT can block a blood vessel and keep blood from getting where it needs to go.  Another problem is that the clot can travel to other parts of the body such as the lungs.  A clot that travels to the lungs is called a pulmonary embolus (PE) and can cause serious problems with breathing which can lead to death.  Am I at risk for DVT/PE?  If you are not very active, you are at risk of DVT.  Anyone confined to bed, sitting for long periods of time, recovering from surgery, etc. increases the risk of DVT.  Other risk factors are cancer diagnosis, certain medications, estrogen replacement in any form,older age, obesity, pregnancy, smoking, history of clotting disorders, and dehydration.  How will I know if I have a DVT?   Swelling in the lower leg   Pain   Warmth, redness, hardness or bulging of the vein  If you have any of these symptoms, call your doctors office right away.  Some people will not have any symptoms until the clot moves to the lungs.  What are the symptoms of a  PE?   Panting, shortness of breath, or trouble breathing   Sharp, knife-like chest pain when you breathe   Coughing or coughing up blood   Rapid heartbeat  If you have any of these symptoms or get worse quickly, call 911 for emergency treatment.  How can I prevent a DVT?   Avoid long periods of inactivity and dont cross your legs--get up and walk around every hour or so.   Stay active--walking after surgery is highly encouraged.  This means you should get out of the house and walk in the neighborhood.  Going up and down stairs will not impair healing (unless advised against such activity by your doctor).     Drink plenty of noncaffeinated, nonalcoholic fluids each day to prevent dehydration.   Wear special support stockings, if they have been advised by your doctor.   If you travel, stop at least once an hour and walk around.   Avoid smoking (assistance with stopping is available through your healthcare provider)  Always notify your doctor if you are not able to follow the post operative instructions that are given to you at the time of discharge.  It may be necessary to prescribe one of the medications available to prevent DVT.We hope your stay was comfortable as you heal now, mend and rest.    For we have enjoyed taking care of you by giving your our best.    And as you get better, by regaining your health and strength;   We count it as a privilege to have served you and hope your time at [facility] was well spent.      Thank  You!!!

## 2019-04-25 NOTE — TRANSFER OF CARE
"Anesthesia Transfer of Care Note    Patient: Kirstie Bowden    Procedure(s) Performed: Procedure(s) (LRB):  REEXCISION (Left)    Patient location: PACU    Anesthesia Type: general    Transport from OR: Transported from OR on 2-3 L/min O2 by NC with adequate spontaneous ventilation    Post pain: adequate analgesia    Post assessment: no apparent anesthetic complications    Post vital signs: stable    Level of consciousness: awake    Nausea/Vomiting: no nausea/vomiting    Complications: none    Transfer of care protocol was followed      Last vitals:   Visit Vitals  BP (!) 177/77   Pulse 78   Temp 36.7 °C (98.1 °F) (Skin)   Resp 20   Ht 5' 8" (1.727 m)   Wt 80.3 kg (177 lb)   SpO2 96%   Breastfeeding? No   BMI 26.91 kg/m²     "

## 2019-04-26 VITALS
HEART RATE: 80 BPM | DIASTOLIC BLOOD PRESSURE: 74 MMHG | HEIGHT: 68 IN | RESPIRATION RATE: 17 BRPM | SYSTOLIC BLOOD PRESSURE: 173 MMHG | WEIGHT: 177 LBS | TEMPERATURE: 98 F | OXYGEN SATURATION: 95 % | BODY MASS INDEX: 26.83 KG/M2

## 2019-05-08 ENCOUNTER — TELEPHONE (OUTPATIENT)
Dept: SURGERY | Facility: CLINIC | Age: 74
End: 2019-05-08

## 2019-05-08 NOTE — TELEPHONE ENCOUNTER
----- Message from Sarwat Aranda sent at 5/8/2019 12:05 PM CDT -----  Contact: pt  Type: Needs Medical Advice    Who Called:  pt    Best Call Back Number: [phone]  Additional Information: pt would like to know if the pathology report has come back and the results of the report. Pt would like to know where she goes from here with the oncologist.  Please call to advise.

## 2019-05-10 DIAGNOSIS — C50.912 MALIGNANT NEOPLASM OF LEFT FEMALE BREAST, UNSPECIFIED ESTROGEN RECEPTOR STATUS, UNSPECIFIED SITE OF BREAST: Primary | ICD-10-CM

## 2019-05-27 ENCOUNTER — OFFICE VISIT (OUTPATIENT)
Dept: RHEUMATOLOGY | Facility: CLINIC | Age: 74
End: 2019-05-27
Payer: MEDICARE

## 2019-05-27 ENCOUNTER — SOCIAL WORK (OUTPATIENT)
Dept: HEMATOLOGY/ONCOLOGY | Facility: CLINIC | Age: 74
End: 2019-05-27

## 2019-05-27 ENCOUNTER — DOCUMENTATION ONLY (OUTPATIENT)
Dept: RADIATION ONCOLOGY | Facility: CLINIC | Age: 74
End: 2019-05-27

## 2019-05-27 ENCOUNTER — INITIAL CONSULT (OUTPATIENT)
Dept: HEMATOLOGY/ONCOLOGY | Facility: CLINIC | Age: 74
End: 2019-05-27
Payer: MEDICARE

## 2019-05-27 ENCOUNTER — OFFICE VISIT (OUTPATIENT)
Dept: RADIATION ONCOLOGY | Facility: CLINIC | Age: 74
End: 2019-05-27
Payer: MEDICARE

## 2019-05-27 VITALS
SYSTOLIC BLOOD PRESSURE: 158 MMHG | BODY MASS INDEX: 26.53 KG/M2 | TEMPERATURE: 98 F | OXYGEN SATURATION: 90 % | DIASTOLIC BLOOD PRESSURE: 69 MMHG | WEIGHT: 175.06 LBS | RESPIRATION RATE: 24 BRPM | HEIGHT: 68 IN | HEART RATE: 84 BPM

## 2019-05-27 VITALS — WEIGHT: 174 LBS | BODY MASS INDEX: 26.46 KG/M2 | SYSTOLIC BLOOD PRESSURE: 147 MMHG | DIASTOLIC BLOOD PRESSURE: 74 MMHG

## 2019-05-27 VITALS
SYSTOLIC BLOOD PRESSURE: 165 MMHG | BODY MASS INDEX: 26.64 KG/M2 | DIASTOLIC BLOOD PRESSURE: 85 MMHG | WEIGHT: 175.19 LBS | TEMPERATURE: 98 F | RESPIRATION RATE: 20 BRPM | HEART RATE: 75 BPM

## 2019-05-27 DIAGNOSIS — M05.711 RHEUMATOID ARTHRITIS INVOLVING RIGHT SHOULDER WITH POSITIVE RHEUMATOID FACTOR: ICD-10-CM

## 2019-05-27 DIAGNOSIS — I10 ESSENTIAL HYPERTENSION, BENIGN: ICD-10-CM

## 2019-05-27 DIAGNOSIS — M05.79 RHEUMATOID ARTHRITIS INVOLVING MULTIPLE SITES WITH POSITIVE RHEUMATOID FACTOR: Primary | ICD-10-CM

## 2019-05-27 DIAGNOSIS — Z17.0 CARCINOMA OF CENTRAL PORTION OF LEFT BREAST IN FEMALE, ESTROGEN RECEPTOR POSITIVE: ICD-10-CM

## 2019-05-27 DIAGNOSIS — E03.9 ACQUIRED HYPOTHYROIDISM: ICD-10-CM

## 2019-05-27 DIAGNOSIS — Z17.0 MALIGNANT NEOPLASM OF NIPPLE OF LEFT BREAST IN FEMALE, ESTROGEN RECEPTOR POSITIVE: Primary | ICD-10-CM

## 2019-05-27 DIAGNOSIS — E78.00 PURE HYPERCHOLESTEROLEMIA: ICD-10-CM

## 2019-05-27 DIAGNOSIS — C50.112 CARCINOMA OF CENTRAL PORTION OF LEFT BREAST IN FEMALE, ESTROGEN RECEPTOR POSITIVE: ICD-10-CM

## 2019-05-27 DIAGNOSIS — C50.012 MALIGNANT NEOPLASM OF NIPPLE OF LEFT BREAST IN FEMALE, ESTROGEN RECEPTOR POSITIVE: Primary | ICD-10-CM

## 2019-05-27 PROCEDURE — 99205 OFFICE O/P NEW HI 60 MIN: CPT | Mod: ,,, | Performed by: RADIOLOGY

## 2019-05-27 PROCEDURE — 99213 OFFICE O/P EST LOW 20 MIN: CPT | Mod: ,,, | Performed by: INTERNAL MEDICINE

## 2019-05-27 PROCEDURE — 99213 PR OFFICE/OUTPT VISIT, EST, LEVL III, 20-29 MIN: ICD-10-PCS | Mod: ,,, | Performed by: INTERNAL MEDICINE

## 2019-05-27 PROCEDURE — 99204 PR OFFICE/OUTPT VISIT, NEW, LEVL IV, 45-59 MIN: ICD-10-PCS | Mod: S$PBB,,, | Performed by: INTERNAL MEDICINE

## 2019-05-27 PROCEDURE — 99999 PR PBB SHADOW E&M-EST. PATIENT-LVL IV: CPT | Mod: PBBFAC,,, | Performed by: INTERNAL MEDICINE

## 2019-05-27 PROCEDURE — 99999 PR PBB SHADOW E&M-EST. PATIENT-LVL IV: ICD-10-PCS | Mod: PBBFAC,,, | Performed by: INTERNAL MEDICINE

## 2019-05-27 PROCEDURE — 99204 OFFICE O/P NEW MOD 45 MIN: CPT | Mod: S$PBB,,, | Performed by: INTERNAL MEDICINE

## 2019-05-27 PROCEDURE — 99205 PR OFFICE/OUTPT VISIT, NEW, LEVL V, 60-74 MIN: ICD-10-PCS | Mod: ,,, | Performed by: RADIOLOGY

## 2019-05-27 PROCEDURE — 99214 OFFICE O/P EST MOD 30 MIN: CPT | Mod: PBBFAC,PO | Performed by: INTERNAL MEDICINE

## 2019-05-27 RX ORDER — BENAZEPRIL HYDROCHLORIDE 10 MG/1
10 TABLET ORAL DAILY
COMMUNITY
End: 2019-06-17 | Stop reason: ALTCHOICE

## 2019-05-27 RX ORDER — CEFDINIR 300 MG/1
300 CAPSULE ORAL 2 TIMES DAILY
COMMUNITY
End: 2019-06-10 | Stop reason: ALTCHOICE

## 2019-05-27 RX ORDER — LANOLIN ALCOHOL/MO/W.PET/CERES
100 CREAM (GRAM) TOPICAL DAILY
COMMUNITY

## 2019-05-27 NOTE — LETTER
May 27, 2019      Mp Gonzalez MD  [address]  [address]  [place] [address]           [facility]  [address], [address]  [place] [address]  Phone: [phone]          Patient: Kirstie Bowden   MR Number: [id number]   YOB: 1945   Date of Visit: 5/27/2019       Dear Dr. Mp Gonzalez:    Thank you for referring Kirstie Bowden to me for evaluation. Attached you will find relevant portions of my assessment and plan of care.    If you have questions, please do not hesitate to call me. I look forward to following Kirstie Bowden along with you.    Sincerely,    Malina Li MD    Enclosure  CC:  No Recipients    If you would like to receive this communication electronically, please contact [email or web address] or [phone] to request more information on [employer] Link access.    For providers and/or their staff who would like to refer a patient to [facility], please contact us through our one-stop-shop provider referral line, [facility], at [phone].    If you feel you have received this communication in error or would no longer like to receive these types of communications, please e-mail [email or web address]

## 2019-05-27 NOTE — PROGRESS NOTES
Met with patient to complete New Patient Orientation and distress screening; she indicated a distress rating of 3.  Patient declined to sign the consent form to receive information from the [employer].  She denied needing any supportive services at this time.  She has my contact information in the event she needs assistance in the future.

## 2019-05-27 NOTE — PROGRESS NOTES
Kirstie Bowden  [id number]  1945 5/27/2019  [facility]  [address]  [address]  [place] [address]    REASON FOR CONSULTATION: Breast cancer  TREATMENT GOAL: adjuvant    HISTORY OF PRESENT ILLNESS:   74-year-old patient was undergoing routine mammogram screening when it abnormality was discovered at the 1:00 position of the left breast. The lesion had an angular appearance and was measuring 1.1 cm. She did not feeling mass in the left breast and she had no evidence of nipple discharge crusting or nipple inversion.  She underwent biopsy making the diagnosis of lobular carcinoma in situ.    Subsequent lumpectomy, breast conservation therapy revealed the following pathology:  INVASIVE CARCINOMA BREAST, CANCER CASE SUMMARY:  PROCEDURE: Partial mastectomy without skin or nipple.  SPECIMEN LATERALITY: Left.  TUMOR SIZE, GREATEST DIMENSION: 2.5 cm.  HISTOLOGIC TYPE: Invasive pleomorphic lobular carcinoma.  HISTOLOGIC GRADE (LUTHER HISTOLOGIC SCORE):  Glandular (acinar)/tubular differentiation: Score 3.  Nuclear pleomorphism: Score 2.  Mitotic rate: Score 1.  Overall grade: Grade 2  DUCTAL CARCINOMA IN SITU (DCIS): Not identified.  LOBULAR CARCINOMA IN SITU (LCIS): Present, pleomorphic lobular carcinoma in situ with rare focus of  classical lobular carcinoma situ.  Estimated size (extent) of pleomorphic LCIS: At least 2.8 cm.  TUMOR EXTENSION: Not applicable.  MARGINS:  INVASIVE CARCINOMA MARGINS: Uninvolved by invasive carcinoma.  Distance from inferior (closest) margin: 5 mm.  PLEOMORPHIC LOBULAR CARCINOMA IN SITU MARGINS: Uninvolved by pleomorphic LCIS.  Distance from inferior medial (closest) margin: 0.2 mm (see above comment).  REGIONAL LYMPH NODES: Uninvolved by tumor cells.  Number of lymph nodes examined: 2.  Number of sentinel nodes examined: 2.  TREATMENT EFFECT: No known presurgical therapy    She underwent to re-excisions of margins make sure there were clear. This included the  anterior and inferior margins.    In addition her tissue specimen has been sent for Oncotype . A PET scan is also been ordered for staging purposes.    Clinically she is doing very well. The left breast area of surgery has healed quite nicely. She has no evidence of inflammation pain or swelling. She has had no weight loss. She unfortunately does continue to smoke. I do note also she does have active rheumatoid arthritis and is on Embral and methotrexate    Review of Systems   Constitutional: Negative for fatigue and unexpected weight change.   HENT:   Negative for sore throat and voice change.    Respiratory: Negative for chest tightness and cough.    Cardiovascular: Negative for chest pain and leg swelling.   Gastrointestinal: Negative for abdominal distention and abdominal pain.   Genitourinary: Negative for dysuria and pelvic pain.    Musculoskeletal: Negative for arthralgias, back pain and gait problem.   Neurological: Negative for gait problem and headaches.   Hematological: Negative for adenopathy. Does not bruise/bleed easily.   Psychiatric/Behavioral: Negative for confusion. The patient is not nervous/anxious.      Past Medical History:   Diagnosis Date    Arthritis     Asthma     Cancer     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    Hyperlipidemia     Hypertension     Pseudocholinesterase deficiency     family history    Thyroid disease      Past Surgical History:   Procedure Laterality Date    BIOPSY, LYMPH NODE, SENTINEL Left 3/14/2019    [facility]    BREAST BIOPSY Left 20 yrs ago    benign    CHOLECYSTECTOMY      EYE SURGERY Bilateral     cataracts    HYSTERECTOMY      LUMPECTOMY, BREAST Left 3/14/2019    [facility]    LYMPHADENECTOMY, AXILLARY Left 3/14/2019    [facility]    MASTECTOMY, PARTIAL Left 4/25/2019    [facility]    NEEDLE LOCALIZATION Left 3/14/2019     [facility]    REMOVAL-MASS N/A 3/14/2019    [facility]    TONSILLECTOMY       Social History     Socioeconomic History    Marital status:      Spouse name: Not on file    Number of children: Not on file    Years of education: Not on file    Highest education level: Not on file   Occupational History     Employer: [employer]   Social Needs    Financial resource strain: Not on file    Food insecurity:     Worry: Not on file     Inability: Not on file    Transportation needs:     Medical: Not on file     Non-medical: Not on file   Tobacco Use    Smoking status: Current Every Day Smoker     Packs/day: 0.50     Years: 46.00     Pack years: 23.00     Types: Cigarettes     Start date: 4/25/1960    Smokeless tobacco: Never Used    Tobacco comment: patch available    Substance and Sexual Activity    Alcohol use: Yes     Alcohol/week: 1.2 oz     Types: 2 Glasses of wine per week     Comment: Social    Drug use: No    Sexual activity: Never   Lifestyle    Physical activity:     Days per week: Not on file     Minutes per session: Not on file    Stress: Not on file   Relationships    Social connections:     Talks on phone: Not on file     Gets together: Not on file     Attends [employer] service: Not on file     Active member of club or organization: Not on file     Attends meetings of clubs or organizations: Not on file     Relationship status: Not on file   Other Topics Concern    Not on file   Social History Narrative    Not on file     Family History   Problem Relation Age of Onset    Heart disease Mother     Hypertension Mother     Alzheimer's disease Mother     Cancer Father        PRIOR HISTORY OF CHEMOTHERAPY OR RADIOTHERAPY: Please see HPI for patients prior oncologic history.    Medication List with Changes/Refills   Current Medications    ADVAIR DISKUS 250-50 MCG/DOSE DISKUS INHALER    INHALE ONE DOSE BY MOUTH TWICE  DAILY    AMLODIPINE (NORVASC) 5 MG TABLET    TAKE 1 TABLET BY MOUTH ONCE DAILY    ATORVASTATIN (LIPITOR) 20 MG TABLET    TAKE ONE TABLET BY MOUTH ONCE DAILY    BENAZEPRIL (LOTENSIN) 10 MG TABLET    Take 10 mg by mouth once daily.    CALCIUM CARBONATE/VITAMIN D3 (VITAMIN D-3 ORAL)    Take by mouth once daily.     CEFDINIR (OMNICEF) 300 MG CAPSULE    Take 300 mg by mouth 2 (two) times daily.    FLAXSEED ORAL    Take by mouth.    FOLIC ACID (FOLVITE) 1 MG TABLET    TAKE 1 TABLET BY MOUTH ONCE DAILY EVERY DAY    LEVOTHYROXINE (SYNTHROID) 112 MCG TABLET    TAKE 1 TABLET BY MOUTH ONCE DAILY    METHOTREXATE 2.5 MG TAB    Take 4 tablets (10 mg total) by mouth every 7 days.    THIAMINE (VITAMIN B-1) 100 MG TABLET    Take 100 mg by mouth once daily.    VITAMIN B COMPLEX ORAL    Every day    VITAMIN E/FLAXSEED OIL (FLAXSEED OIL-VITAMIN E ORAL)    Take by mouth.     Review of patient's allergies indicates:   Allergen Reactions    Sulfamethoxazole-trimethoprim      Other reaction(s): per dr daryn Rodríguez (sulfonamide antibiotics)      NOT SURE REACTION       QUALITY OF LIFE: 90%- Able to Carry on Normal Activity: Minor Symptoms of Disease    Vitals:    05/27/19 1503   BP: (!) 165/85   Pulse: 75   Resp: 20   Temp: 98.2 °F (36.8 °C)   Weight: 79.5 kg (175 lb 3.2 oz)   PainSc: 0-No pain       PHYSICAL EXAM: Well groomed alert oriented  GENERAL: alert; in no apparent distress.   HEAD: normocephalic, atraumatic.  EYES: pupils are equal, round, reactive to light and accommodation. Sclera anicteric. Conjunctiva not injected.   NOSE/THROAT: no nasal erythema or rhinorrhea. Oropharynx pink, without erythema, ulcerations or thrush.   Right breast-soft and supple no palpable mass nipple discharge or crusting  Left breast- well-healed lumpectomy site, no palpable mass nipple discharge or crusting. There is some inversion noted of the nipple.  NECK: no cervical motion rigidity; supple with no masses.  CHEST: clear to auscultation  bilaterally; no wheezes, crackles or rubs. Patient is speaking comfortably on room air with normal work of breathing without using accessory muscles of respiration.  CARDIOVASCULAR: regular rate and rhythm; no murmurs, rubs or gallops.  ABDOMEN: soft, nontender, nondistended. Bowel sounds present.   MUSCULOSKELETAL: no tenderness to palpation along the spine or scapulae. Normal range of motion.  NEUROLOGIC: cranial nerves II-XII intact bilaterally. Strength 5/5 in bilateral upper and lower extremities. No sensory deficits appreciated. Reflexes globally intact. No cerebellar signs. Normal gait.  LYMPHATIC: no cervical, supraclavicular or axillary adenopathy appreciated bilaterally.   EXTREMITIES: no clubbing, cyanosis, edema.  SKIN: no erythema, rashes, ulcerations noted.     REVIEW OF IMAGING/PATHOLOGY/LABS: Please see HPI. All images reviewed personally by dictating physician.     ASSESSMENT: 74-year-old patient with invasive lobular carcinoma of the left breast status post lumpectomy, tumor size 2.5 cm stage, pT2 pN0M0, negative lymph nodes, ER positive, PET scan and Oncotype score pending  PLAN: I explained in detail to her and her daughter that as part of breast conservation therapy radition therapy is indicated to treat the remaining breast tissue to eradicate possible microscopic disease. I told that this is been shown to be equivalent to mastectomy in terms of controlling the disease.    I will wait for her to complete her Oncotype score testing. Anticipate her PET scan to show no evidence of metastases or metastatic disease.    In terms of radiation therapy I did explain to her that having rheumatoid arthritis this could enhance the acute effects of radiation therapy including but  not limited to more skin irritation and inflammation of the chest wall and lung causing pneumonitis. I did recommend she stop her rheumatoid arthritis medications including methotrexate to avoid methotrexate lung.    I will have  her return to us for simulation and treatment planning if her Oncotype score is favorable., Otherwise she will return to us after she undergoes chemotherapy.    Because of the tumor size and her rheumatoid arthritis condition I do recommend standard fractionation. I recommend 50.4 gray to the left breast using breath-hold technique with a 10 gray boost to the tumor site.      We discussed the risks and benefits of the above treatment and have gone over in detail the acute and late toxicities of radiation therapy to the left breast. The patient expressed  understanding and has signed a consent form which is included in the patients chart. The patient has our contact information and understands that they are free to contact us at any time with questions or concerns regarding radiation therapy.    DISPOSITION: RTC FOR CT SIM    TIME SPENT WITH PATIENT: I have personally seen and evaluated this patient. Greater than 50% of this time was spent discussing coordination of care and/or counseling.     PHYSICIAN: Colby Collins MD

## 2019-05-27 NOTE — PROGRESS NOTES
Subjective:       Patient ID: Kirstie Bowden is a 74 y.o. female.    Chief Complaint: new dx breast ca    Oncologic History:   INVASIVE CARCINOMA BREAST, CANCER CASE SUMMARY:  PROCEDURE: Partial mastectomy without skin or nipple.3/2019  SPECIMEN LATERALITY: Left.  TUMOR SIZE, GREATEST DIMENSION: 2.5 cm.  HISTOLOGIC TYPE: Invasive pleomorphic lobular carcinoma.  HISTOLOGIC GRADE (LUTHER HISTOLOGIC SCORE):  Glandular (acinar)/tubular differentiation: Score 3.  Nuclear pleomorphism: Score 2.  Mitotic rate: Score 1.  Overall grade: Grade 2  DUCTAL CARCINOMA IN SITU (DCIS): Not identified.  LOBULAR CARCINOMA IN SITU (LCIS): Present, pleomorphic lobular carcinoma in situ with rare focus of  classical lobular carcinoma situ.  Estimated size (extent) of pleomorphic LCIS: At least 2.8 cm.  TUMOR EXTENSION: Not applicable.  MARGINS:  INVASIVE CARCINOMA MARGINS: Uninvolved by invasive carcinoma.  Distance from inferior (closest) margin: 5 mm.  PLEOMORPHIC LOBULAR CARCINOMA IN SITU MARGINS: Uninvolved by pleomorphic LCIS.  Distance from inferior medial (closest) margin: 0.2 mm (see above comment).  REGIONAL LYMPH NODES: Uninvolved by tumor cells.  Number of lymph nodes examined: 2.  Number of sentinel nodes examined: 2.  TREATMENT EFFECT: No known presurgical therapy.  PATHOLOGIC STAGE CLASSIFICATION (pTNM, AJCC 8TH EDITION):  pT2: Tumor size = 2.5 cm in greatest dimension.  pN0: No regional lymph node metastasis identified.  pM: Not applicable.    ER: Positive.  HI: Positive.  HER2: Negative (IHC - Equivocal (score 2) and FISH - Negative).  Ki-67: Approximately 14%.  HPI:     Social History     Socioeconomic History    Marital status:      Spouse name: Not on file    Number of children: Not on file    Years of education: Not on file    Highest education level: Not on file   Occupational History     Employer: [employer]   Social Needs    Financial resource strain: Not on file    Food insecurity:      Worry: Not on file     Inability: Not on file    Transportation needs:     Medical: Not on file     Non-medical: Not on file   Tobacco Use    Smoking status: Current Every Day Smoker     Packs/day: 0.50     Years: 46.00     Pack years: 23.00     Types: Cigarettes     Start date: 4/25/1960    Smokeless tobacco: Never Used    Tobacco comment: patch available    Substance and Sexual Activity    Alcohol use: Yes     Alcohol/week: 1.2 oz     Types: 2 Glasses of wine per week     Comment: Social    Drug use: No    Sexual activity: Never   Lifestyle    Physical activity:     Days per week: Not on file     Minutes per session: Not on file    Stress: Not on file   Relationships    Social connections:     Talks on phone: Not on file     Gets together: Not on file     Attends Gnosticism service: Not on file     Active member of club or organization: Not on file     Attends meetings of clubs or organizations: Not on file     Relationship status: Not on file   Other Topics Concern    Not on file   Social History Narrative    Not on file     Family History   Problem Relation Age of Onset    Heart disease Mother     Hypertension Mother     Alzheimer's disease Mother     Cancer Father      Past Surgical History:   Procedure Laterality Date    BIOPSY, LYMPH NODE, SENTINEL Left 3/14/2019    [facility]    BREAST BIOPSY Left 20 yrs ago    benign    CHOLECYSTECTOMY      EYE SURGERY Bilateral     cataracts    HYSTERECTOMY      LUMPECTOMY, BREAST Left 3/14/2019    [facility]    LYMPHADENECTOMY, AXILLARY Left 3/14/2019    [facility]    MASTECTOMY, PARTIAL Left 4/25/2019    [facility]    NEEDLE LOCALIZATION Left 3/14/2019    [facility]    REMOVAL-MASS N/A 3/14/2019    [facility]    TONSILLECTOMY        Past Medical History:   Diagnosis Date    Arthritis     Asthma     Cancer     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    Hyperlipidemia     Hypertension     Pseudocholinesterase deficiency     family history    Thyroid disease        Current Outpatient Medications:     ADVAIR DISKUS 250-50 mcg/dose diskus inhaler, INHALE ONE DOSE BY MOUTH TWICE DAILY, Disp: 60 each, Rfl: 11    amLODIPine (NORVASC) 5 MG tablet, TAKE 1 TABLET BY MOUTH ONCE DAILY, Disp: 90 tablet, Rfl: 1    atorvastatin (LIPITOR) 20 MG tablet, TAKE ONE TABLET BY MOUTH ONCE DAILY, Disp: 90 tablet, Rfl: 1    benazepril (LOTENSIN) 10 MG tablet, Take 10 mg by mouth once daily., Disp: , Rfl:     CALCIUM CARBONATE/VITAMIN D3 (VITAMIN D-3 ORAL), Take by mouth once daily. , Disp: , Rfl:     cefdinir (OMNICEF) 300 MG capsule, Take 300 mg by mouth 2 (two) times daily., Disp: , Rfl:     FLAXSEED ORAL, Take by mouth., Disp: , Rfl:     folic acid (FOLVITE) 1 MG tablet, TAKE 1 TABLET BY MOUTH ONCE DAILY EVERY DAY, Disp: 30 tablet, Rfl: 10    levothyroxine (SYNTHROID) 112 MCG tablet, TAKE 1 TABLET BY MOUTH ONCE DAILY, Disp: 90 tablet, Rfl: 3    methotrexate 2.5 MG Tab, Take 4 tablets (10 mg total) by mouth every 7 days., Disp: 48 tablet, Rfl: 1    thiamine (VITAMIN B-1) 100 MG tablet, Take 100 mg by mouth once daily., Disp: , Rfl:     VITAMIN B COMPLEX ORAL, Every day, Disp: , Rfl:     vitamin E/flaxseed oil (FLAXSEED OIL-VITAMIN E ORAL), Take by mouth., Disp: , Rfl:   Review of patient's allergies indicates:   Allergen Reactions    Sulfamethoxazole-trimethoprim      Other reaction(s): per dr daryn Rodríguez (sulfonamide antibiotics)      NOT SURE REACTION         REVIEW OF SYSTEMS:     CONSTITUTIONAL: The patient denies any weight change. There is no apparent    change in appetite, fever, night sweats, headaches, fatigue, dizziness, or    weakness.      SKIN: Denies rash, issues with nails, non-healing sores, bleeding, blotching    skin or abnormal bruising.  Denies new moles or changes to existing moles.      BREASTS: healing well since lumpectomy    EYES: Denies eye pain, blurred vision, swelling, redness or discharge.      ENT AND MOUTH: Denies runny nose, stuffiness, sinus trouble or sores. Denies    nosebleeds. Denies, hoarseness, change in voice or swelling in front of the    neck.      CARDIOVASCULAR: Denies chest pain, discomfort or palpitations. Denies neck    swelling or episodes of passing out.      RESPIRATORY: Denies cough, sputum production, blood in sputum, and denies    shortness of breath.      GI: Denies trouble swallowing, indigestion, heartburn, abdominal pain, nausea,    vomiting, diarrhea, altered bowel habits, blood in stool, discoloration of    stools, change in nature of stool, bloating, increased abdominal girth.      GENITOURINARY: No discharge. No pelvic pain or lumps. No rash around groin or  lesions. No urinary frequency, hesitation, painful urination or blood in    urine. Denies incontinence. No problems with intercourse.      MUSCULOSKELETAL: Denies neck or back pain. Denies weakness in arms or legs,    joint problems or distended inflamed veins in legs. Denies swelling or abnormal  glands.      NEUROLOGICAL: Denies tingling, numbness, altered mentation changes to nerve    function in the face, weakness to one or both of the body. Denies changes to    gait and denies multiple falls or accidents.      PSYCHIATRIC: Denies nervousness, anxiety, hallucinations, depression, suicidal    ideation, trouble sleeping or changes in behavior noticed by family.      The patient denies recent foreign travel or recent exposure to chemicals or    products of concern or infectious diseases.     PHYSICAL EXAM:     Vitals:    05/27/19 1319   BP: (!) 158/69   Pulse: 84   Resp: (!) 24   Temp: 98.1 °F (36.7 °C)       GENERAL: Comfortable looking patient. Patient is in no distress.  Awake, alert and oriented to time, person and place.  No anxiety, or agitation.       HEENT: Normal conjunctivae and eyelids. WNL.  PERRLA 3 to 4 mm. No icterus, no pallor, no congestion, and no discharge noted.     NECK:  Supple. Trachea is central.  No crepitus.  No JVD or masses.    RESPIRATORY:  No intercostal retractions.  No dullness to percussion.  Chest is clear to auscultation.  No rales, rhonchi or wheezes.  No crepitus.  Good air entry bilaterally.    CARDIOVASCULAR:  S1 and S2 are normally heard without murmurs or gallops.  All peripheral pulses are present.    ABDOMEN:  Normal abdomen.  No hepatosplenomegaly.  No free fluid.  Bowel sounds are present.  No hernia noted. No masses.  No rebound or tenderness.  No guarding or rigidity.  Umbilicus is midline.    LYMPHATICS:  No axillary, cervical, supraclavicular, submental, or inguinal lymphadenopathy.    SKIN/MUSCULOSKELETAL:  There is no evidence of excoriation marks or ecchmosis.  No rashes.  No cyanosis.  No clubbing.  No joint or skeletal deformities noted.  Normal range of motion.    NEUROLOGIC:  Higher functions are appropriate.  No cranial nerve deficits.  Normal asher.  Normal strength.  Motor and sensory functions are normal.  Deep tendon reflexes are normal.    GENITAL/RECTAL:  Exams are deferred.      Laboratory:     CBC:  Lab Results   Component Value Date    WBC 7.30 04/25/2019    RBC 4.25 04/25/2019    HGB 13.9 04/25/2019    HCT 41.9 04/25/2019    MCV 99 (H) 04/25/2019    MCH 32.7 (H) 04/25/2019    MCHC 33.2 04/25/2019    RDW 14.4 04/25/2019     04/25/2019    MPV 10.1 04/25/2019    GRAN 3.4 04/25/2019    GRAN 47.1 04/25/2019    LYMPH 2.7 04/25/2019    LYMPH 37.2 04/25/2019    MONO 0.9 04/25/2019    MONO 12.9 04/25/2019    EOS 0.2 04/25/2019    BASO 0.00 04/25/2019    EOSINOPHIL 2.2 04/25/2019    BASOPHIL 0.6 04/25/2019       BMP: BMP  Lab Results   Component Value Date     04/25/2019    K 4.5 04/25/2019     04/25/2019    CO2 29 04/25/2019    BUN 17 04/25/2019    CREATININE 1.0 04/25/2019    CALCIUM 10.6  (H) 04/25/2019    ANIONGAP 9 04/25/2019    ESTGFRAFRICA >60 04/25/2019    EGFRNONAA 56 (A) 04/25/2019       LFT:   Lab Results   Component Value Date    ALT 22 02/21/2019    AST 22 02/21/2019    ALKPHOS 68 02/21/2019    BILITOT 0.4 02/21/2019         Assessment/Plan:     T2N0Mx left breast ca s/p lumpectmkoy 3/2019    get oncotype    get pET rtc for discussion regrding adj. Therapy   xrt consult will follow   cont f/u for RA, HTH. Lipids, hypothyroidism  Living Will  During this visit, I engaged the patient in the advance care planning process.  The patient and I reviewed the role for advance directives and their purpose in directing future healthcare if the patient's unable to speak for herself.  At this point in time, the patient does have full decision-making capacity.  We discussed different extreme health states that she could experience, and reviewed what kind of medical care she would want in those situations.  The patient communicated that if she were comatose and had little chance of a meaningful recovery, she would notwant machines/life-sustaining treatments used.*  The patient has completed a living will to reflect these preferences.  The patient has already designated a healthcare power of  to make decisions on her behalf.

## 2019-05-27 NOTE — PROGRESS NOTES
[facility] RHEUMATOLOGY            PROGRESS NOTE      Subjective:       Patient ID:   NAME: Kirstie Bowden : 1945     74 y.o. female    Referring Doc: No ref. provider found  Other Physicians:    Chief Complaint:  Rheumatoid Arthritis      History of Present Illness:     Patient returns today for a regularly scheduled follow-up visit for rheumatoid arthritis    The patient is doing well except for upper respiratory infection symptoms. She was seen by primary care provider and  prescribed Medrol Dosepak and other medications. No fevers. No chest pains. No GI complaints. No joint complaints. She held methotrexate and Enbrel this wk. She had surgery since her last visit for breast cancer. She will start radiation treatments soon.            ROS:   GEN:  No  fever, night sweats . weight is stable   No fatigue  SKIN: no rashes, no bruising, no ulcerations, no Raynaud's  HEENT: no HA's, No visual changes, no mucosal ulcers, no sicca symptoms,  CV:   no CP, SOB, PND, MCKEON, no orthopnea, no palpitations  PULM: normal with no SOB, +cough, No hemoptysis, sputum or pleuritic pain  GI:  no abdominal pain, nausea, vomiting, constipation, diarrhea, melanotic stools, BRBPR, hematemesis, no dysphagia  :   no dysuria  NEURO: no paresthesias, headaches, visual disturbances, muscle weakness  MUSCULOSKELETAL:no joint swelling, prolonged AM stiffness, no back pain, no muscle pain  Allergies:  Review of patient's allergies indicates:   Allergen Reactions    Sulfamethoxazole-trimethoprim      Other reaction(s): per dr daryn Rodríguez (sulfonamide antibiotics)      NOT SURE REACTION       Medications:    Current Outpatient Medications:     ADVAIR DISKUS 250-50 mcg/dose diskus inhaler, INHALE ONE DOSE BY MOUTH TWICE DAILY, Disp: 60 each, Rfl: 11    amLODIPine (NORVASC) 5 MG tablet, TAKE 1 TABLET BY MOUTH ONCE DAILY, Disp: 90 tablet, Rfl: 1    atorvastatin (LIPITOR) 20 MG tablet, TAKE ONE TABLET BY MOUTH ONCE DAILY,  Disp: 90 tablet, Rfl: 1    benazepril (LOTENSIN) 10 MG tablet, Take 10 mg by mouth once daily., Disp: , Rfl:     CALCIUM CARBONATE/VITAMIN D3 (VITAMIN D-3 ORAL), Take by mouth once daily. , Disp: , Rfl:     etanercept (ENBREL SURECLICK) 50 mg/mL (0.98 mL) PnIj, INJECT THE CONTENTS OF ONE SURECLICK (50 MG) SUBCUTANEOUSLY ONCE PER WEEK AS DIRECTED BY YOUR PHYSICIAN. SINGLE-USE DEVICE. KEEP REFRIGERATE, Disp: 12 Syringe, Rfl: 3    FLAXSEED ORAL, Take by mouth., Disp: , Rfl:     folic acid (FOLVITE) 1 MG tablet, TAKE 1 TABLET BY MOUTH ONCE DAILY EVERY DAY, Disp: 30 tablet, Rfl: 10    HYDROcodone-acetaminophen (NORCO) 5-325 mg per tablet, Take 1 tablet by mouth every 6 (six) hours as needed for Pain., Disp: 20 tablet, Rfl: 0    HYDROcodone-acetaminophen (NORCO) 5-325 mg per tablet, Take 1 tablet by mouth every 6 (six) hours as needed for Pain., Disp: 10 tablet, Rfl: 0    levothyroxine (SYNTHROID) 112 MCG tablet, TAKE 1 TABLET BY MOUTH ONCE DAILY, Disp: 90 tablet, Rfl: 3    methotrexate 2.5 MG Tab, Take 4 tablets (10 mg total) by mouth every 7 days., Disp: 48 tablet, Rfl: 1    ondansetron (ZOFRAN) 8 MG tablet, Take 1 tablet (8 mg total) by mouth every 8 (eight) hours as needed for Nausea., Disp: 10 tablet, Rfl: 0    UNABLE TO FIND, once daily. VITAMIN D1 AND D6 Q DAY, Disp: , Rfl:     VITAMIN B COMPLEX ORAL, Every day, Disp: , Rfl:     PMHx/PSHx Updates:          Objective:     Vitals:  Blood pressure (!) 147/74, weight 78.9 kg (174 lb).    Physical Examination:   GEN: no apparent distress, comfortable; AAOx3  SKIN: no rashes,no ulceration, no Raynaud's, no petechiae, no SQ nodules,  HEAD: normal  EYES: no pallor, no icterus,  NECK: no masses, thyroid normal, trachea midline, no LAD/LN's, supple  CV: RRR with no murmur; l S1 and S2 reg. ,no gallop no rubs,   CHEST: Normal respiratory effort; CTAB; normal breath sounds; + sl exp wheezes ,no crackles  MUSC/Skeletal: ROM normal; no crepitus; joints without  synovitis,  no deformities  No joint swelling or tenderness of PIP, MCP, wrist, elbow, shoulder, or knee joints  EXTREM: no clubbing, cyanosis, no edema,normal  pulses   NEURO: grossly intact; motor WNL; AAOx3;  PSYCH: normal mood, affect and behavior  LYMPH: normal cervical, supraclavicular          Labs:   Lab Results   Component Value Date    WBC 7.30 04/25/2019    HGB 13.9 04/25/2019    HCT 41.9 04/25/2019    MCV 99 (H) 04/25/2019     04/25/2019    CMP  @LASTLAB(NA,K,CL,CO2,GLU,BUN,Creatinine,Calcium,PROT,Albumin,Bilitot,Alkphos,AST,ALT,CRP,ESR,RF,CCP,RODNEY,SSA,CPK,uric acid) )@  I have reviewed all available lab results and radiology reports.    Radiology/Diagnostic Studies:        Assessment/Plan:   (1) 74 y.o. female with diagnosis of rheumatoid arthritis.This is stable  Patient stopped methotrexate this week due to upper respiratory infection symptoms. She also stopped Enbrel. She was advised not to restart Enbrel in view of her diagnosis of cancer. She can restart methotrexate next week if no infection  2) Breast Cancer              Discussion:     I have explained all of the above in detail and the patient understands all of the current recommendation(s). I have answered all questions to the best of my ability and to their complete satisfaction.       The patient is to continue with the current management plan         RTC in  3 months      Electronically signed by Jelani Stacy MD

## 2019-05-27 NOTE — PATIENT INSTRUCTIONS
Instructions given on breast radiation and skin care.  [facility] program booklet given.  Patient verbalized understanding.

## 2019-06-03 ENCOUNTER — PATIENT OUTREACH (OUTPATIENT)
Dept: ADMINISTRATIVE | Facility: HOSPITAL | Age: 74
End: 2019-06-03

## 2019-06-03 DIAGNOSIS — Z13.6 ENCOUNTER FOR LIPID SCREENING FOR CARDIOVASCULAR DISEASE: ICD-10-CM

## 2019-06-03 DIAGNOSIS — Z13.220 ENCOUNTER FOR LIPID SCREENING FOR CARDIOVASCULAR DISEASE: ICD-10-CM

## 2019-06-03 DIAGNOSIS — Z11.59 ENCOUNTER FOR HEPATITIS C SCREENING TEST FOR LOW RISK PATIENT: Primary | ICD-10-CM

## 2019-06-04 DIAGNOSIS — N13.30 HYDRONEPHROSIS, UNSPECIFIED HYDRONEPHROSIS TYPE: Primary | ICD-10-CM

## 2019-06-10 ENCOUNTER — OFFICE VISIT (OUTPATIENT)
Dept: UROLOGY | Facility: CLINIC | Age: 74
End: 2019-06-10
Payer: MEDICARE

## 2019-06-10 VITALS
SYSTOLIC BLOOD PRESSURE: 135 MMHG | HEART RATE: 79 BPM | DIASTOLIC BLOOD PRESSURE: 71 MMHG | HEIGHT: 68 IN | TEMPERATURE: 98 F | RESPIRATION RATE: 18 BRPM | WEIGHT: 174.19 LBS | BODY MASS INDEX: 26.4 KG/M2

## 2019-06-10 DIAGNOSIS — N13.2 URETERAL STONE WITH HYDRONEPHROSIS: Primary | ICD-10-CM

## 2019-06-10 LAB
BILIRUB SERPL-MCNC: ABNORMAL MG/DL
BLOOD URINE, POC: ABNORMAL
COLOR, POC UA: YELLOW
GLUCOSE UR QL STRIP: ABNORMAL
KETONES UR QL STRIP: ABNORMAL
LEUKOCYTE ESTERASE URINE, POC: ABNORMAL
NITRITE, POC UA: ABNORMAL
PH, POC UA: 5.5
PROTEIN, POC: ABNORMAL
SPECIFIC GRAVITY, POC UA: 1.02
UROBILINOGEN, POC UA: 0.2

## 2019-06-10 PROCEDURE — 99204 PR OFFICE/OUTPT VISIT, NEW, LEVL IV, 45-59 MIN: ICD-10-PCS | Mod: S$PBB,,, | Performed by: NURSE PRACTITIONER

## 2019-06-10 PROCEDURE — 99215 OFFICE O/P EST HI 40 MIN: CPT | Mod: PBBFAC,PN | Performed by: NURSE PRACTITIONER

## 2019-06-10 PROCEDURE — 99999 PR PBB SHADOW E&M-EST. PATIENT-LVL V: CPT | Mod: PBBFAC,,, | Performed by: NURSE PRACTITIONER

## 2019-06-10 PROCEDURE — 87086 URINE CULTURE/COLONY COUNT: CPT

## 2019-06-10 PROCEDURE — 99204 OFFICE O/P NEW MOD 45 MIN: CPT | Mod: S$PBB,,, | Performed by: NURSE PRACTITIONER

## 2019-06-10 PROCEDURE — 99999 PR PBB SHADOW E&M-EST. PATIENT-LVL V: ICD-10-PCS | Mod: PBBFAC,,, | Performed by: NURSE PRACTITIONER

## 2019-06-10 PROCEDURE — 81002 URINALYSIS NONAUTO W/O SCOPE: CPT | Mod: PBBFAC,PN | Performed by: NURSE PRACTITIONER

## 2019-06-10 RX ORDER — CIPROFLOXACIN 500 MG/1
500 TABLET ORAL 2 TIMES DAILY
Qty: 6 TABLET | Refills: 0 | Status: SHIPPED | OUTPATIENT
Start: 2019-06-10 | End: 2019-06-14 | Stop reason: ALTCHOICE

## 2019-06-10 RX ORDER — TAMSULOSIN HYDROCHLORIDE 0.4 MG/1
0.4 CAPSULE ORAL
Qty: 30 CAPSULE | Refills: 1 | Status: SHIPPED | OUTPATIENT
Start: 2019-06-10 | End: 2019-06-14 | Stop reason: ALTCHOICE

## 2019-06-10 NOTE — PATIENT INSTRUCTIONS
Understanding Kidney Stones  Your kidneys are bean-shaped organs. They help filter extra salts, waste, and water from your body. You need to drink enough water every day to help flush the extra salts into your urine.     What are kidney stones?  Kidney stones are made up of chemical crystals that separate out from urine. These crystals clump together to make stones. They form in the calyx of the kidney. They may stay in the kidney or move into the urinary tract.   Why kidney stones form  Kidneys form stones for many reasons. If you dont drink enough water, for instance, you wont have enough urine to dilute chemicals. Then the chemicals may form crystals, which can develop into stones. Here are some reasons why kidney stones form:  · Fluid loss (dehydration). This can concentrate urine, causing stones to form.  · Certain foods. Some foods contain large amounts of the chemicals that sometimes crystallize into stones. Eating foods that contain a lot of meat or salt can lead to more kidney stones.  · Kidney infections. These infections foster stones by slowing urine flow or changing the acid balance of your urine.  · Family history. If family members have had kidney stones, youre more likely to have them, too.  · A lack of certain substances in your urine. Some substances can help protect you from forming stones. If you dont have enough of these in your urine, stone formation can increase.  Where stones form  Stones begin in the cup-shaped part of the kidney (calyx). Some stay in the calyx and grow. Others move into the kidney, pelvis, or into the ureter. There they can lodge, block the flow of urine, and cause pain.  Symptoms  Many stones cause sudden and severe pain and bloody urine. Others cause nausea or frequent, burning urination. Symptoms often depend on your stones size and location. Fever may indicate a serious infection. Call your healthcare provider right away if you develop a fever.  Date Last  Reviewed: 1/1/2017 © 2000-2017 [employer]. [address] [place] [address], [place] [address]. All rights reserved. This information is not intended as a substitute for professional medical care. Always follow your healthcare professional's instructions.        Identifying Kidney Stones  There are 4 general types of kidney stones. Your kidney stones size and shape determine whether it is likely to pass by itself. Knowing what a stone is made of (its composition) helps your healthcare provider find its cause. Then he or she can suggest the best treatment. X-rays or scans can help show the stone's size and shape. Your healthcare provider may also give you a strainer. You can use this to catch the stone while passing urine, and the provider can then test the stone. Other urine and blood tests may be done to help identify the stone. These tests can also help identify causes for different types of stones.     Size  A stone may be as small as a grain of sand. Or it may be as large as a golf ball. Small stones may pass out of your body when you urinate.   Shape  Small, smooth, round stones may pass easily. Jagged-edged stones often lodge inside the kidney or ureter. Staghorn stones can fill the entire renal pelvis and calyces.   Composition  Most stones are made of calcium oxalate, a hard compound. Stones made of cystine or uric acid, or caused by infection (struvite stones), are less dense. Stones often contain more than one chemical.      Your kidneys filter your blood and release chemicals into the urine. If certain chemicals build up in the kidneys, they can form a stone.   Treating your stones  You and your healthcare provider will work together to form a treatment plan. Your provider may suggest that you let your stone pass naturally. Or you may manage it with medicines. Certain procedures may also help, such as SWL (shock wave lithotripsy) or using a camera inside the body to remove the stone (ureteroscopy).  And you will be told how you can help prevent kidney stones in the future.  Date Last Reviewed: 1/1/2017  © 1080-2294 [employer]. [address] [place] [address], [place] [address]. All rights reserved. This information is not intended as a substitute for professional medical care. Always follow your healthcare professional's instructions.

## 2019-06-10 NOTE — PROGRESS NOTES
[facility] Note  Staff: MARGIE Summers-C    PCP: Dr. Elías Luis Jr.    Chief Complaint: Ureteral stone with hydronephrosis    Subjective:        HPI: Kirstie Bowden is a 74 y.o. female NEW PT presents for evaluation of recent incidental finding within her PET scan results.  Pt is here today accompanied by her sister to office visit.  At this time, the pt denies dysuria, hematuria or problems with her daily urinary habits/functions.  She has no pain and has never had any prior pain to this office visit today.      Recent NM PET CT showed: (6/4/19)  Severe left hydronephrosis and proximal hydroureter due to 6 x 6 mm proximal  left ureteral calculus.    February 2019-Pt was diagnosed with breast cancer and she is currently awaiting to start radiation therapy.    REVIEW OF SYSTEMS:  A comprehensive 10 system review was performed and is negative except as noted above in HPI    PMHx:  Past Medical History:   Diagnosis Date    Arthritis     Asthma     Cancer     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    Hyperlipidemia     Hypertension     Pseudocholinesterase deficiency     family history    Thyroid disease      Kidney stones: No history of stones    PSHx:  Past Surgical History:   Procedure Laterality Date    BIOPSY, LYMPH NODE, SENTINEL Left 3/14/2019    [facility]    BREAST BIOPSY Left 20 yrs ago    benign    CHOLECYSTECTOMY      EYE SURGERY Bilateral     cataracts    HYSTERECTOMY      LUMPECTOMY, BREAST Left 3/14/2019    [facility]    LYMPHADENECTOMY, AXILLARY Left 3/14/2019    [facility]    MASTECTOMY, PARTIAL Left 4/25/2019    [facility] OR    NEEDLE LOCALIZATION Left 3/14/2019    [facility]    REMOVAL-MASS N/A 3/14/2019    [facility]    TONSILLECTOMY        Stents/Valves/Foreign Bodies: No  Cardiac Evaluation: No    Screening Studies  Colonoscopy: Last test was this year with Kathy and showed normal findings.    Fam Hx:   malignancies: No    kidney stones: Yes - Mother and siblings have stones     Allergies:  Sulfamethoxazole-trimethoprim and Sulfa (sulfonamide antibiotics)    Medications: reviewed   Anticoagulation: No    Objective:     Vitals:    06/10/19 1000   BP: 135/71   Pulse: 79   Resp: 18   Temp: 98.1 °F (36.7 °C)     Physical Exam   Vitals reviewed.  Constitutional: She is oriented to person, place, and time. She appears well-developed and well-nourished.   HENT:   Head: Normocephalic and atraumatic.   Eyes: Conjunctivae and EOM are normal. Pupils are equal, round, and reactive to light.   Neck: Normal range of motion. Neck supple.   Cardiovascular: Normal rate, regular rhythm, normal heart sounds and intact distal pulses.    Pulmonary/Chest: Effort normal and breath sounds normal.   Abdominal: Soft. Bowel sounds are normal.   Musculoskeletal: Normal range of motion.   Neurological: She is alert and oriented to person, place, and time. She has normal reflexes.   Skin: Skin is warm and dry.     Psychiatric: She has a normal mood and affect. Her behavior is normal. Judgment and thought content normal.     LABS REVIEW:  UA today:   Color:Clear, Yellow  Spec. Grav.  1.025  PH  5.5  Moderate amt Leukocytes  Trace of blood  Assessment:       1. Ureteral stone with hydronephrosis          Plan:   LEFT ureteral stone with hydronephrosis:    1.  Urine culture to be performed today.  2.  KUB and RBUS to be performed for adequate evaluation of ureteral stone and hydro  3.  Cipro 500 mg BID x 3 days prescribed to pt.  4.  Flomax 0.4 mg one tablet daily prescribed to pt today.    Urine strainer, specimen cups, and info regarding kidney stones have been given to pt today with all questions answered.    F/u---We will contact this pt after we review lab/imaging  results in order to determine next best POC for this pt.  Pt and family verbalized understanding at this time.    [facility]: Active    Veronica Stroud, DAY[facility]-C

## 2019-06-10 NOTE — LETTER
Elana 10, 2019      Malina Li MD  [address]  [place] [address]           [place] [facility]  [address] [place] [address] [address]  [place] [address]  Phone: [phone]  Fax: [phone]          Patient: Kirstie Bowden   MR Number: [id number]   YOB: 1945   Date of Visit: 6/10/2019       Dear Dr. Malina Li:    Thank you for referring Kirstie Bowden to me for evaluation. Attached you will find relevant portions of my assessment and plan of care.    If you have questions, please do not hesitate to call me. I look forward to following Kirstie Bowden along with you.    Sincerely,    Veronica Stroud, [facility]    Enclosure  CC:  No Recipients    If you would like to receive this communication electronically, please contact [email or web address] or [phone] to request more information on mention Link access.    For providers and/or their staff who would like to refer a patient to [facility], please contact us through our one-stop-shop provider referral line, [facility], at [phone].    If you feel you have received this communication in error or would no longer like to receive these types of communications, please e-mail [email or web address]

## 2019-06-11 LAB — BACTERIA UR CULT: NO GROWTH

## 2019-06-13 ENCOUNTER — HOSPITAL ENCOUNTER (OUTPATIENT)
Dept: RADIOLOGY | Facility: HOSPITAL | Age: 74
Discharge: HOME | End: 2019-06-13
Attending: NURSE PRACTITIONER
Payer: MEDICARE

## 2019-06-13 DIAGNOSIS — N13.2 URETERAL STONE WITH HYDRONEPHROSIS: ICD-10-CM

## 2019-06-13 PROCEDURE — 74018 RADEX ABDOMEN 1 VIEW: CPT | Mod: 26,,, | Performed by: RADIOLOGY

## 2019-06-13 PROCEDURE — 76770 US RETROPERITONEAL COMPLETE: ICD-10-PCS | Mod: 26,,, | Performed by: RADIOLOGY

## 2019-06-13 PROCEDURE — 76770 US EXAM ABDO BACK WALL COMP: CPT | Mod: 26,,, | Performed by: RADIOLOGY

## 2019-06-13 PROCEDURE — 74018 RADEX ABDOMEN 1 VIEW: CPT | Mod: TC,FY

## 2019-06-13 PROCEDURE — 74018 XR ABDOMEN AP 1 VIEW: ICD-10-PCS | Mod: 26,,, | Performed by: RADIOLOGY

## 2019-06-13 PROCEDURE — 76770 US EXAM ABDO BACK WALL COMP: CPT | Mod: TC

## 2019-06-14 ENCOUNTER — OFFICE VISIT (OUTPATIENT)
Dept: UROLOGY | Facility: CLINIC | Age: 74
End: 2019-06-14
Payer: MEDICARE

## 2019-06-14 VITALS
DIASTOLIC BLOOD PRESSURE: 65 MMHG | TEMPERATURE: 98 F | SYSTOLIC BLOOD PRESSURE: 134 MMHG | HEIGHT: 68 IN | HEART RATE: 72 BPM | WEIGHT: 174.19 LBS | RESPIRATION RATE: 18 BRPM | BODY MASS INDEX: 26.4 KG/M2

## 2019-06-14 DIAGNOSIS — N20.1 URETERAL CALCULUS, LEFT: ICD-10-CM

## 2019-06-14 DIAGNOSIS — N13.2 HYDRONEPHROSIS WITH URINARY OBSTRUCTION DUE TO URETERAL CALCULUS: Primary | ICD-10-CM

## 2019-06-14 DIAGNOSIS — C50.912 MALIGNANT NEOPLASM OF LEFT FEMALE BREAST, UNSPECIFIED ESTROGEN RECEPTOR STATUS, UNSPECIFIED SITE OF BREAST: ICD-10-CM

## 2019-06-14 PROCEDURE — 99214 OFFICE O/P EST MOD 30 MIN: CPT | Mod: S$PBB,,, | Performed by: UROLOGY

## 2019-06-14 PROCEDURE — 99999 PR PBB SHADOW E&M-EST. PATIENT-LVL III: CPT | Mod: PBBFAC,,, | Performed by: UROLOGY

## 2019-06-14 PROCEDURE — 99213 OFFICE O/P EST LOW 20 MIN: CPT | Mod: PBBFAC,PN | Performed by: UROLOGY

## 2019-06-14 PROCEDURE — 99999 PR PBB SHADOW E&M-EST. PATIENT-LVL III: ICD-10-PCS | Mod: PBBFAC,,, | Performed by: UROLOGY

## 2019-06-14 PROCEDURE — 99214 PR OFFICE/OUTPT VISIT, EST, LEVL IV, 30-39 MIN: ICD-10-PCS | Mod: S$PBB,,, | Performed by: UROLOGY

## 2019-06-14 NOTE — PROGRESS NOTES
OFFICE NOTE    CHIEF COMPLAINT:  Left hydronephrosis and proximal left ureteral calculus.    HISTORY OF PRESENT ILLNESS:  This 74-year-old female with a diagnosis of breast   cancer had undergone a PET/CT scan on 06/04/2019, for followup evaluation of her   diagnosis of breast cancer prior to undergoing radiation therapy and as an   incidental finding, she was found to have left hydronephrosis and a proximal   left ureteral calculus of approximately 6 mm, for which she is coming to us for   further evaluation.  She denies any pain.  She has no prior history of urinary   calculi, although she did mention she does have multiple other family members   who have had a history of stones.  On today's visit, she is doing well and   denies any complaints.    In terms of breast cancer, she had undergone left lumpectomy and lymph node   biopsy and excision in March 2019, and April 2019, and subsequently as discussed   above, the plan is for her to undergo external beam radiation therapy.    PHYSICAL EXAMINATION:  ABDOMEN:  Soft, benign and nontender.  No masses.    FINAL IMPRESSION:  1.  Left hydronephrosis with proximal left ureteral calculus with obstruction in   spite of the lack of any flank pain.  2.  Breast cancer.    RECOMMENDATIONS:  We will plan for cystoscopy, retrogrades and left ureteral   stent placement and possible stone manipulation that we are tentatively planning   for on 06/24/2019, prior to her undergoing her external beam radiation therapy   to which she stated she understood and agreed and orders will be placed.      MD/IN  dd: 06/14/2019 09:33:01 (CDT)  td: 06/14/2019 22:18:58 (CDT)  Doc ID   #[id number]  Job ID #[id number]    CC:

## 2019-06-17 ENCOUNTER — DOCUMENTATION ONLY (OUTPATIENT)
Dept: FAMILY MEDICINE | Facility: CLINIC | Age: 74
End: 2019-06-17

## 2019-06-17 ENCOUNTER — OFFICE VISIT (OUTPATIENT)
Dept: FAMILY MEDICINE | Facility: CLINIC | Age: 74
End: 2019-06-17
Payer: MEDICARE

## 2019-06-17 VITALS
DIASTOLIC BLOOD PRESSURE: 70 MMHG | WEIGHT: 175.06 LBS | BODY MASS INDEX: 26.53 KG/M2 | TEMPERATURE: 98 F | OXYGEN SATURATION: 95 % | HEIGHT: 68 IN | HEART RATE: 73 BPM | SYSTOLIC BLOOD PRESSURE: 128 MMHG

## 2019-06-17 DIAGNOSIS — J45.20 ASTHMA, CHRONIC, MILD INTERMITTENT, UNCOMPLICATED: ICD-10-CM

## 2019-06-17 DIAGNOSIS — Z17.0 MALIGNANT NEOPLASM OF NIPPLE OF LEFT BREAST IN FEMALE, ESTROGEN RECEPTOR POSITIVE: ICD-10-CM

## 2019-06-17 DIAGNOSIS — E78.2 MIXED HYPERLIPIDEMIA: ICD-10-CM

## 2019-06-17 DIAGNOSIS — C50.012 MALIGNANT NEOPLASM OF NIPPLE OF LEFT BREAST IN FEMALE, ESTROGEN RECEPTOR POSITIVE: ICD-10-CM

## 2019-06-17 DIAGNOSIS — M05.711 RHEUMATOID ARTHRITIS INVOLVING RIGHT SHOULDER WITH POSITIVE RHEUMATOID FACTOR: ICD-10-CM

## 2019-06-17 DIAGNOSIS — I10 ESSENTIAL HYPERTENSION, BENIGN: Primary | ICD-10-CM

## 2019-06-17 DIAGNOSIS — E03.9 ACQUIRED HYPOTHYROIDISM: ICD-10-CM

## 2019-06-17 PROCEDURE — 99214 OFFICE O/P EST MOD 30 MIN: CPT | Mod: S$PBB,,, | Performed by: FAMILY MEDICINE

## 2019-06-17 PROCEDURE — 99214 OFFICE O/P EST MOD 30 MIN: CPT | Mod: PBBFAC,PO | Performed by: FAMILY MEDICINE

## 2019-06-17 PROCEDURE — 99999 PR PBB SHADOW E&M-EST. PATIENT-LVL IV: ICD-10-PCS | Mod: PBBFAC,,, | Performed by: FAMILY MEDICINE

## 2019-06-17 PROCEDURE — 99999 PR PBB SHADOW E&M-EST. PATIENT-LVL IV: CPT | Mod: PBBFAC,,, | Performed by: FAMILY MEDICINE

## 2019-06-17 PROCEDURE — 99214 PR OFFICE/OUTPT VISIT, EST, LEVL IV, 30-39 MIN: ICD-10-PCS | Mod: S$PBB,,, | Performed by: FAMILY MEDICINE

## 2019-06-17 RX ORDER — PROMETHAZINE HYDROCHLORIDE AND PHENYLEPHRINE HYDROCHLORIDE 6.25; 5 MG/5ML; MG/5ML
5 SYRUP ORAL EVERY 6 HOURS PRN
Qty: 240 ML | Refills: 2 | Status: SHIPPED | OUTPATIENT
Start: 2019-06-17 | End: 2019-09-10

## 2019-06-17 RX ORDER — VARENICLINE TARTRATE 0.5 (11)-1
KIT ORAL
Qty: 1 PACKAGE | Refills: 0 | Status: SHIPPED | OUTPATIENT
Start: 2019-06-17 | End: 2019-09-10

## 2019-06-17 RX ORDER — FLUTICASONE PROPIONATE AND SALMETEROL 250; 50 UG/1; UG/1
POWDER RESPIRATORY (INHALATION)
Qty: 60 EACH | Refills: 11 | Status: ON HOLD | OUTPATIENT
Start: 2019-06-17 | End: 2022-01-04

## 2019-06-17 NOTE — PROGRESS NOTES
Pre-Visit Chart Review  For Appointment Scheduled on 06/17/19    Health Maintenance Due   Topic Date Due    Hepatitis C Screening  1945    TETANUS VACCINE  01/24/1964    Pneumococcal Vaccine (65+ High/Highest Risk) (1 of 2 - PCV13) 01/24/2011    Lipid Panel  10/11/2018

## 2019-06-18 DIAGNOSIS — N20.1 LEFT URETERAL CALCULUS: ICD-10-CM

## 2019-06-18 DIAGNOSIS — R31.29 MICROSCOPIC HEMATURIA: ICD-10-CM

## 2019-06-18 DIAGNOSIS — N13.2 HYDRONEPHROSIS WITH URINARY OBSTRUCTION DUE TO URETERAL CALCULUS: ICD-10-CM

## 2019-06-18 DIAGNOSIS — N20.1 URETERAL CALCULUS, LEFT: Primary | ICD-10-CM

## 2019-06-18 NOTE — H&P
Subjective:       Patient ID: Kirstie Bowden is a 74 y.o. female.    Chief Complaint:  Patient with a diagnosis of breast cancer had undergone a PET-CT scan on 06/04/2019 for follow-up evaluation of a breast cancer diagnosis and also prior to undergoing radiation therapy.  As an incidental finding she was found to have left hydronephrosis with a proximal 6 mm left ureteral calculus.  The plan is for her to undergo cystoscopy and stent placement.    Past Medical History:   Diagnosis Date    Arthritis     Asthma     Cancer     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    Hyperlipidemia     Hypertension     Pseudocholinesterase deficiency     family history    Thyroid disease      Past Surgical History:   Procedure Laterality Date    BIOPSY, LYMPH NODE, SENTINEL Left 3/14/2019    [facility]    BREAST BIOPSY Left 20 yrs ago    benign    CHOLECYSTECTOMY      EYE SURGERY Bilateral     cataracts    HYSTERECTOMY      LUMPECTOMY, BREAST Left 3/14/2019    [facility]    LYMPHADENECTOMY, AXILLARY Left 3/14/2019    [facility]    MASTECTOMY, PARTIAL Left 4/25/2019    [facility]    NEEDLE LOCALIZATION Left 3/14/2019    [facility]    REMOVAL-MASS N/A 3/14/2019    [facility]    TONSILLECTOMY       Family History   Problem Relation Age of Onset    Heart disease Mother     Hypertension Mother     Alzheimer's disease Mother     Cancer Father      Social History     Socioeconomic History    Marital status:      Spouse name: Not on file    Number of children: Not on file    Years of education: Not on file    Highest education level: Not on file   Occupational History     Employer: [employer]   Social Needs    Financial resource strain: Not very hard    Food insecurity:     Worry: Never true     Inability:  Never true    Transportation needs:     Medical: No     Non-medical: No   Tobacco Use    Smoking status: Current Every Day Smoker     Packs/day: 0.50     Years: 46.00     Pack years: 23.00     Types: Cigarettes     Start date: 4/25/1960    Smokeless tobacco: Never Used    Tobacco comment: patch available    Substance and Sexual Activity    Alcohol use: Yes     Alcohol/week: 1.2 oz     Types: 2 Glasses of wine per week     Frequency: Monthly or less     Drinks per session: Patient refused     Binge frequency: Never     Comment: Social    Drug use: No    Sexual activity: Never   Lifestyle    Physical activity:     Days per week: 3 days     Minutes per session: 20 min    Stress: To some extent   Relationships    Social connections:     Talks on phone: More than three times a week     Gets together: Three times a week     Attends Hindu service: Not on file     Active member of club or organization: Yes     Attends meetings of clubs or organizations: Never     Relationship status:    Other Topics Concern    Not on file   Social History Narrative    Not on file       Current Outpatient Medications   Medication Sig Dispense Refill    amLODIPine (NORVASC) 5 MG tablet TAKE 1 TABLET BY MOUTH ONCE DAILY 90 tablet 1    atorvastatin (LIPITOR) 20 MG tablet TAKE ONE TABLET BY MOUTH ONCE DAILY 90 tablet 1    CALCIUM CARBONATE/VITAMIN D3 (VITAMIN D-3 ORAL) Take by mouth once daily.       fluticasone-salmeterol diskus inhaler 250-50 mcg INHALE ONE DOSE BY MOUTH TWICE DAILY 60 each 11    folic acid (FOLVITE) 1 MG tablet TAKE 1 TABLET BY MOUTH ONCE DAILY EVERY DAY 30 tablet 10    levothyroxine (SYNTHROID) 112 MCG tablet TAKE 1 TABLET BY MOUTH ONCE DAILY 90 tablet 3    promethazine-phenylephrine (PROMETHAZINE VC) 6.25-5 mg/5 mL Syrp Take 5 mLs by mouth every 6 (six) hours as needed. 240 mL 2    thiamine (VITAMIN B-1) 100 MG tablet Take 100 mg by mouth once daily.      varenicline (CHANTIX STARTING MONTH  BOX) 0.5 mg (11)- 1 mg (42) tablet Take one 0.5mg tab by mouth once daily X3 days,then increase to one 0.5mg tab twice daily X4 days,then increase to one 1mg tab twice daily 1 Package 0    VITAMIN B COMPLEX ORAL Every day      vitamin E/flaxseed oil (FLAXSEED OIL-VITAMIN E ORAL) Take by mouth.       No current facility-administered medications for this visit.      Review of patient's allergies indicates:   Allergen Reactions    Sulfamethoxazole-trimethoprim      Other reaction(s): per dr daryn Rodríguez (sulfonamide antibiotics)      NOT SURE REACTION       Review of Systems   All other systems reviewed and are negative.      Objective:      There were no vitals filed for this visit.  Physical Exam   Cardiovascular: Normal rate.   Pulmonary/Chest: Effort normal.   Abdominal: Soft.   Genitourinary: Vagina normal.            Assessment:       1. Ureteral calculus, left    2. Hydronephrosis with urinary obstruction due to ureteral calculus    3. Microscopic hematuria    4. Left ureteral calculus        Plan:       Cystoscopy retrogrades and left ureteral stent placement and possible stone manipulation.

## 2019-06-18 NOTE — H&P (VIEW-ONLY)
Subjective:       Patient ID: Kirstie Bowden is a 74 y.o. female.    Chief Complaint:  Patient with a diagnosis of breast cancer had undergone a PET-CT scan on 06/04/2019 for follow-up evaluation of a breast cancer diagnosis and also prior to undergoing radiation therapy.  As an incidental finding she was found to have left hydronephrosis with a proximal 6 mm left ureteral calculus.  The plan is for her to undergo cystoscopy and stent placement.    Past Medical History:   Diagnosis Date    Arthritis     Asthma     Cancer     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    Hyperlipidemia     Hypertension     Pseudocholinesterase deficiency     family history    Thyroid disease      Past Surgical History:   Procedure Laterality Date    BIOPSY, LYMPH NODE, SENTINEL Left 3/14/2019    [facility]    BREAST BIOPSY Left 20 yrs ago    benign    CHOLECYSTECTOMY      EYE SURGERY Bilateral     cataracts    HYSTERECTOMY      LUMPECTOMY, BREAST Left 3/14/2019    [facility]    LYMPHADENECTOMY, AXILLARY Left 3/14/2019    [facility]    MASTECTOMY, PARTIAL Left 4/25/2019    [facility]    NEEDLE LOCALIZATION Left 3/14/2019    [facility]    REMOVAL-MASS N/A 3/14/2019    [facility]    TONSILLECTOMY       Family History   Problem Relation Age of Onset    Heart disease Mother     Hypertension Mother     Alzheimer's disease Mother     Cancer Father      Social History     Socioeconomic History    Marital status:      Spouse name: Not on file    Number of children: Not on file    Years of education: Not on file    Highest education level: Not on file   Occupational History     Employer: [employer]   Social Needs    Financial resource strain: Not very hard    Food insecurity:     Worry: Never true     Inability:  Never true    Transportation needs:     Medical: No     Non-medical: No   Tobacco Use    Smoking status: Current Every Day Smoker     Packs/day: 0.50     Years: 46.00     Pack years: 23.00     Types: Cigarettes     Start date: 4/25/1960    Smokeless tobacco: Never Used    Tobacco comment: patch available    Substance and Sexual Activity    Alcohol use: Yes     Alcohol/week: 1.2 oz     Types: 2 Glasses of wine per week     Frequency: Monthly or less     Drinks per session: Patient refused     Binge frequency: Never     Comment: Social    Drug use: No    Sexual activity: Never   Lifestyle    Physical activity:     Days per week: 3 days     Minutes per session: 20 min    Stress: To some extent   Relationships    Social connections:     Talks on phone: More than three times a week     Gets together: Three times a week     Attends Sabianist service: Not on file     Active member of club or organization: Yes     Attends meetings of clubs or organizations: Never     Relationship status:    Other Topics Concern    Not on file   Social History Narrative    Not on file       Current Outpatient Medications   Medication Sig Dispense Refill    amLODIPine (NORVASC) 5 MG tablet TAKE 1 TABLET BY MOUTH ONCE DAILY 90 tablet 1    atorvastatin (LIPITOR) 20 MG tablet TAKE ONE TABLET BY MOUTH ONCE DAILY 90 tablet 1    CALCIUM CARBONATE/VITAMIN D3 (VITAMIN D-3 ORAL) Take by mouth once daily.       fluticasone-salmeterol diskus inhaler 250-50 mcg INHALE ONE DOSE BY MOUTH TWICE DAILY 60 each 11    folic acid (FOLVITE) 1 MG tablet TAKE 1 TABLET BY MOUTH ONCE DAILY EVERY DAY 30 tablet 10    levothyroxine (SYNTHROID) 112 MCG tablet TAKE 1 TABLET BY MOUTH ONCE DAILY 90 tablet 3    promethazine-phenylephrine (PROMETHAZINE VC) 6.25-5 mg/5 mL Syrp Take 5 mLs by mouth every 6 (six) hours as needed. 240 mL 2    thiamine (VITAMIN B-1) 100 MG tablet Take 100 mg by mouth once daily.      varenicline (CHANTIX STARTING MONTH  BOX) 0.5 mg (11)- 1 mg (42) tablet Take one 0.5mg tab by mouth once daily X3 days,then increase to one 0.5mg tab twice daily X4 days,then increase to one 1mg tab twice daily 1 Package 0    VITAMIN B COMPLEX ORAL Every day      vitamin E/flaxseed oil (FLAXSEED OIL-VITAMIN E ORAL) Take by mouth.       No current facility-administered medications for this visit.      Review of patient's allergies indicates:   Allergen Reactions    Sulfamethoxazole-trimethoprim      Other reaction(s): per dr daryn Rodríguez (sulfonamide antibiotics)      NOT SURE REACTION       Review of Systems   All other systems reviewed and are negative.      Objective:      There were no vitals filed for this visit.  Physical Exam   Cardiovascular: Normal rate.   Pulmonary/Chest: Effort normal.   Abdominal: Soft.   Genitourinary: Vagina normal.            Assessment:       1. Ureteral calculus, left    2. Hydronephrosis with urinary obstruction due to ureteral calculus    3. Microscopic hematuria    4. Left ureteral calculus        Plan:       Cystoscopy retrogrades and left ureteral stent placement and possible stone manipulation.

## 2019-06-19 ENCOUNTER — OFFICE VISIT (OUTPATIENT)
Dept: HEMATOLOGY/ONCOLOGY | Facility: CLINIC | Age: 74
End: 2019-06-19
Payer: MEDICARE

## 2019-06-19 ENCOUNTER — HOSPITAL ENCOUNTER (OUTPATIENT)
Dept: PREADMISSION TESTING | Facility: HOSPITAL | Age: 74
Discharge: HOME | End: 2019-06-19
Attending: UROLOGY
Payer: MEDICARE

## 2019-06-19 VITALS
TEMPERATURE: 98 F | DIASTOLIC BLOOD PRESSURE: 62 MMHG | BODY MASS INDEX: 26.43 KG/M2 | HEART RATE: 77 BPM | WEIGHT: 174.38 LBS | HEIGHT: 68 IN | SYSTOLIC BLOOD PRESSURE: 123 MMHG

## 2019-06-19 VITALS — WEIGHT: 179 LBS | BODY MASS INDEX: 27.13 KG/M2 | HEIGHT: 68 IN

## 2019-06-19 DIAGNOSIS — E03.9 ACQUIRED HYPOTHYROIDISM: ICD-10-CM

## 2019-06-19 DIAGNOSIS — Z17.0 MALIGNANT NEOPLASM OF NIPPLE OF LEFT BREAST IN FEMALE, ESTROGEN RECEPTOR POSITIVE: Primary | ICD-10-CM

## 2019-06-19 DIAGNOSIS — C50.012 MALIGNANT NEOPLASM OF NIPPLE OF LEFT BREAST IN FEMALE, ESTROGEN RECEPTOR POSITIVE: Primary | ICD-10-CM

## 2019-06-19 DIAGNOSIS — M05.711 RHEUMATOID ARTHRITIS INVOLVING RIGHT SHOULDER WITH POSITIVE RHEUMATOID FACTOR: ICD-10-CM

## 2019-06-19 PROCEDURE — 99214 OFFICE O/P EST MOD 30 MIN: CPT | Mod: S$PBB,,, | Performed by: INTERNAL MEDICINE

## 2019-06-19 PROCEDURE — 99900104 DSU ONLY-NO CHARGE-EA ADD'L HR (STAT)

## 2019-06-19 PROCEDURE — 99999 PR PBB SHADOW E&M-EST. PATIENT-LVL III: ICD-10-PCS | Mod: PBBFAC,,, | Performed by: INTERNAL MEDICINE

## 2019-06-19 PROCEDURE — 99213 OFFICE O/P EST LOW 20 MIN: CPT | Mod: PBBFAC,PO | Performed by: INTERNAL MEDICINE

## 2019-06-19 PROCEDURE — 99214 PR OFFICE/OUTPT VISIT, EST, LEVL IV, 30-39 MIN: ICD-10-PCS | Mod: S$PBB,,, | Performed by: INTERNAL MEDICINE

## 2019-06-19 PROCEDURE — 99999 PR PBB SHADOW E&M-EST. PATIENT-LVL III: CPT | Mod: PBBFAC,,, | Performed by: INTERNAL MEDICINE

## 2019-06-19 PROCEDURE — 99900103 DSU ONLY-NO CHARGE-INITIAL HR (STAT)

## 2019-06-19 NOTE — PROGRESS NOTES
Subjective:       Patient ID: Kirstie Bowden is a 74 y.o. female.    Chief Complaint: new dx breast ca   here with PET and onco type results   hydronephrosis+ , getting stent placed in a couple of days    Oncologic History:   INVASIVE CARCINOMA BREAST, CANCER CASE SUMMARY:  PROCEDURE: Partial mastectomy without skin or nipple.3/2019  SPECIMEN LATERALITY: Left.  TUMOR SIZE, GREATEST DIMENSION: 2.5 cm.  HISTOLOGIC TYPE: Invasive pleomorphic lobular carcinoma.  HISTOLOGIC GRADE (LUTHER HISTOLOGIC SCORE):  Glandular (acinar)/tubular differentiation: Score 3.  Nuclear pleomorphism: Score 2.  Mitotic rate: Score 1.  Overall grade: Grade 2  DUCTAL CARCINOMA IN SITU (DCIS): Not identified.  LOBULAR CARCINOMA IN SITU (LCIS): Present, pleomorphic lobular carcinoma in situ with rare focus of  classical lobular carcinoma situ.  Estimated size (extent) of pleomorphic LCIS: At least 2.8 cm.  TUMOR EXTENSION: Not applicable.  MARGINS:  INVASIVE CARCINOMA MARGINS: Uninvolved by invasive carcinoma.  Distance from inferior (closest) margin: 5 mm.  PLEOMORPHIC LOBULAR CARCINOMA IN SITU MARGINS: Uninvolved by pleomorphic LCIS.  Distance from inferior medial (closest) margin: 0.2 mm (see above comment).  REGIONAL LYMPH NODES: Uninvolved by tumor cells.  Number of lymph nodes examined: 2.  Number of sentinel nodes examined: 2.  TREATMENT EFFECT: No known presurgical therapy.  PATHOLOGIC STAGE CLASSIFICATION (pTNM, AJCC 8TH EDITION):  pT2: Tumor size = 2.5 cm in greatest dimension.  pN0: No regional lymph node metastasis identified.  pM: Not applicable.    ER: Positive.  SC: Positive.  HER2: Negative (IHC - Equivocal (score 2) and FISH - Negative).  Ki-67: Approximately 14%.  HPI:     Social History     Socioeconomic History    Marital status:      Spouse name: Not on file    Number of children: Not on file    Years of education: Not on file    Highest education level: Not on file   Occupational History      Employer: [employer]   Social Needs    Financial resource strain: Not very hard    Food insecurity:     Worry: Never true     Inability: Never true    Transportation needs:     Medical: No     Non-medical: No   Tobacco Use    Smoking status: Current Every Day Smoker     Packs/day: 0.50     Years: 46.00     Pack years: 23.00     Types: Cigarettes     Start date: 4/25/1960    Smokeless tobacco: Never Used    Tobacco comment: patch available    Substance and Sexual Activity    Alcohol use: Yes     Alcohol/week: 1.2 oz     Types: 2 Glasses of wine per week     Frequency: Monthly or less     Drinks per session: Patient refused     Binge frequency: Never     Comment: Social    Drug use: No    Sexual activity: Never   Lifestyle    Physical activity:     Days per week: 3 days     Minutes per session: 20 min    Stress: To some extent   Relationships    Social connections:     Talks on phone: More than three times a week     Gets together: Three times a week     Attends Congregation service: Not on file     Active member of club or organization: Yes     Attends meetings of clubs or organizations: Never     Relationship status:    Other Topics Concern    Not on file   Social History Narrative    Not on file     Family History   Problem Relation Age of Onset    Heart disease Mother     Hypertension Mother     Alzheimer's disease Mother     Cancer Father      Past Surgical History:   Procedure Laterality Date    BIOPSY, LYMPH NODE, SENTINEL Left 3/14/2019    [facility]    BREAST BIOPSY Left 20 yrs ago    benign    CHOLECYSTECTOMY      EYE SURGERY Bilateral     cataracts    HYSTERECTOMY      LUMPECTOMY, BREAST Left 3/14/2019    [facility]    LYMPHADENECTOMY, AXILLARY Left 3/14/2019    [facility]    MASTECTOMY, PARTIAL Left 4/25/2019    [facility]    NEEDLE  LOCALIZATION Left 3/14/2019    [facility]    REMOVAL-MASS N/A 3/14/2019    [facility]    TONSILLECTOMY       Past Medical History:   Diagnosis Date    Arthritis     Asthma     Cancer     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    Hyperlipidemia     Hypertension     Pseudocholinesterase deficiency     family history    Thyroid disease        Current Outpatient Medications:     amLODIPine (NORVASC) 5 MG tablet, TAKE 1 TABLET BY MOUTH ONCE DAILY, Disp: 90 tablet, Rfl: 1    atorvastatin (LIPITOR) 20 MG tablet, TAKE ONE TABLET BY MOUTH ONCE DAILY, Disp: 90 tablet, Rfl: 1    CALCIUM CARBONATE/VITAMIN D3 (VITAMIN D-3 ORAL), Take by mouth once daily. , Disp: , Rfl:     fluticasone-salmeterol diskus inhaler 250-50 mcg, INHALE ONE DOSE BY MOUTH TWICE DAILY, Disp: 60 each, Rfl: 11    folic acid (FOLVITE) 1 MG tablet, TAKE 1 TABLET BY MOUTH ONCE DAILY EVERY DAY, Disp: 30 tablet, Rfl: 10    levothyroxine (SYNTHROID) 112 MCG tablet, TAKE 1 TABLET BY MOUTH ONCE DAILY, Disp: 90 tablet, Rfl: 3    promethazine-phenylephrine (PROMETHAZINE VC) 6.25-5 mg/5 mL Syrp, Take 5 mLs by mouth every 6 (six) hours as needed., Disp: 240 mL, Rfl: 2    thiamine (VITAMIN B-1) 100 MG tablet, Take 100 mg by mouth once daily., Disp: , Rfl:     varenicline (CHANTIX STARTING MONTH BOX) 0.5 mg (11)- 1 mg (42) tablet, Take one 0.5mg tab by mouth once daily X3 days,then increase to one 0.5mg tab twice daily X4 days,then increase to one 1mg tab twice daily, Disp: 1 Package, Rfl: 0    VITAMIN B COMPLEX ORAL, Every day, Disp: , Rfl:     vitamin E/flaxseed oil (FLAXSEED OIL-VITAMIN E ORAL), Take by mouth., Disp: , Rfl:   Review of patient's allergies indicates:   Allergen Reactions    Sulfamethoxazole-trimethoprim      Other reaction(s): per dr daryn Rodríguez (sulfonamide antibiotics)      NOT SURE REACTION         REVIEW OF SYSTEMS:     CONSTITUTIONAL: The patient denies any weight change.  There is no apparent    change in appetite, fever, night sweats, headaches, fatigue, dizziness, or    weakness.      SKIN: Denies rash, issues with nails, non-healing sores, bleeding, blotching    skin or abnormal bruising. Denies new moles or changes to existing moles.      BREASTS: healing well since lumpectomy    EYES: Denies eye pain, blurred vision, swelling, redness or discharge.      ENT AND MOUTH: Denies runny nose, stuffiness, sinus trouble or sores. Denies    nosebleeds. Denies, hoarseness, change in voice or swelling in front of the    neck.      CARDIOVASCULAR: Denies chest pain, discomfort or palpitations. Denies neck    swelling or episodes of passing out.      RESPIRATORY: Denies cough, sputum production, blood in sputum, and denies    shortness of breath.      GI: Denies trouble swallowing, indigestion, heartburn, abdominal pain, nausea,    vomiting, diarrhea, altered bowel habits, blood in stool, discoloration of    stools, change in nature of stool, bloating, increased abdominal girth.      GENITOURINARY: No discharge. No pelvic pain or lumps. No rash around groin or  lesions. No urinary frequency, hesitation, painful urination or blood in    urine. Denies incontinence. No problems with intercourse.      MUSCULOSKELETAL: Denies neck or back pain. Denies weakness in arms or legs,    joint problems or distended inflamed veins in legs. Denies swelling or abnormal  glands.      NEUROLOGICAL: Denies tingling, numbness, altered mentation changes to nerve    function in the face, weakness to one or both of the body. Denies changes to    gait and denies multiple falls or accidents.      PSYCHIATRIC: Denies nervousness, anxiety, hallucinations, depression, suicidal    ideation, trouble sleeping or changes in behavior noticed by family.      The patient denies recent foreign travel or recent exposure to chemicals or    products of concern or infectious diseases.     PHYSICAL EXAM:     Vitals:    06/19/19  1314   BP: 123/62   Pulse: 77   Temp: 98.3 °F (36.8 °C)       GENERAL: Comfortable looking patient. Patient is in no distress.  Awake, alert and oriented to time, person and place.  No anxiety, or agitation.      HEENT: Normal conjunctivae and eyelids. WNL.  PERRLA 3 to 4 mm. No icterus, no pallor, no congestion, and no discharge noted.     NECK:  Supple. Trachea is central.  No crepitus.  No JVD or masses.    RESPIRATORY:  No intercostal retractions.  No dullness to percussion.  Chest is clear to auscultation.  No rales, rhonchi or wheezes.  No crepitus.  Good air entry bilaterally.    CARDIOVASCULAR:  S1 and S2 are normally heard without murmurs or gallops.  All peripheral pulses are present.    ABDOMEN:  Normal abdomen.  No hepatosplenomegaly.  No free fluid.  Bowel sounds are present.  No hernia noted. No masses.  No rebound or tenderness.  No guarding or rigidity.  Umbilicus is midline.    LYMPHATICS:  No axillary, cervical, supraclavicular, submental, or inguinal lymphadenopathy.    SKIN/MUSCULOSKELETAL:  There is no evidence of excoriation marks or ecchmosis.  No rashes.  No cyanosis.  No clubbing.  No joint or skeletal deformities noted.  Normal range of motion.    NEUROLOGIC:  Higher functions are appropriate.  No cranial nerve deficits.  Normal asher.  Normal strength.  Motor and sensory functions are normal.  Deep tendon reflexes are normal.    GENITAL/RECTAL:  Exams are deferred.      Laboratory:     CBC:  Lab Results   Component Value Date    WBC 7.30 04/25/2019    RBC 4.25 04/25/2019    HGB 13.9 04/25/2019    HCT 41.9 04/25/2019    MCV 99 (H) 04/25/2019    MCH 32.7 (H) 04/25/2019    MCHC 33.2 04/25/2019    RDW 14.4 04/25/2019     04/25/2019    MPV 10.1 04/25/2019    GRAN 3.4 04/25/2019    GRAN 47.1 04/25/2019    LYMPH 2.7 04/25/2019    LYMPH 37.2 04/25/2019    MONO 0.9 04/25/2019    MONO 12.9 04/25/2019    EOS 0.2 04/25/2019    BASO 0.00 04/25/2019    EOSINOPHIL 2.2 04/25/2019    BASOPHIL 0.6  04/25/2019       BMP: BMP  Lab Results   Component Value Date     04/25/2019    K 4.5 04/25/2019     04/25/2019    CO2 29 04/25/2019    BUN 17 04/25/2019    CREATININE 1.0 04/25/2019    CALCIUM 10.6 (H) 04/25/2019    ANIONGAP 9 04/25/2019    ESTGFRAFRICA >60 04/25/2019    EGFRNONAA 56 (A) 04/25/2019       LFT:   Lab Results   Component Value Date    ALT 22 02/21/2019    AST 22 02/21/2019    ALKPHOS 68 02/21/2019    BILITOT 0.4 02/21/2019     oncotype rec. score is 29  PET 6/2019 neg for malignancy   has renal stones and has hydronephrosis, getting stent placed in 2 days  Assessment/Plan:     T2N0Mx left breast ca s/p lumpectmkoy 3/2019    rec score is borderline elevated, options of chemotherapy discussed with additional benefit of rec. Risk reductiomn   pt will call after she decides which way shed like to proceed     xrt consult will follow ( has seen pt once)   cont f/u for RA, HTH. Lipids, hypothyroidism  Living Will  During past visit, I engaged the patient in the advance care planning process.  The patient and I reviewed the role for advance directives and their purpose in directing future healthcare if the patient's unable to speak for herself.  At this point in time, the patient does have full decision-making capacity.  We discussed different extreme health states that she could experience, and reviewed what kind of medical care she would want in those situations.  The patient communicated that if she were comatose and had little chance of a meaningful recovery, she would notwant machines/life-sustaining treatments used. The patient has completed a living will to reflect these preferences.  The patient has already designated a healthcare power of  to make decisions on her behalf.

## 2019-06-20 ENCOUNTER — TELEPHONE (OUTPATIENT)
Dept: HEMATOLOGY/ONCOLOGY | Facility: CLINIC | Age: 74
End: 2019-06-20

## 2019-06-20 NOTE — TELEPHONE ENCOUNTER
Patient called and she has questions in regards to what type and how long would she have chemo. Patient notified that I will find out what type of chemo Dr Li is going to put her on and get her a time frame and the names of drugs so that she can make a decision if she wants to proceed with chemotherapy.

## 2019-06-20 NOTE — TELEPHONE ENCOUNTER
----- Message from Velia Saini sent at 6/20/2019  3:41 PM CDT -----  Contact: self  Patient requesting to speak to nurse regarding questions she has before scheduling for chemo per patient       Please call to advice [phone] (home)

## 2019-06-20 NOTE — TELEPHONE ENCOUNTER
----- Message from Kiya Pat sent at 6/20/2019  8:34 AM CDT -----  Contact: self  Type: Needs Medical Advice    Who Called:  self  Symptoms (please be specific):    How long has patient had these symptoms:    Pharmacy name and phone #:    Best Call Back Number: [phone]  Additional Information: Patient is calling regarding her recent appointment and decision regarding chemo. Patient also has a few questions she would like to ask. Please call patient. Thanks!

## 2019-06-20 NOTE — TELEPHONE ENCOUNTER
Attempted to contact pt regarding her message. Pt to talk with our Nurse Navigator LEA Franklin.  Left message with contact number to call back.

## 2019-06-21 ENCOUNTER — ANESTHESIA EVENT (OUTPATIENT)
Dept: SURGERY | Facility: HOSPITAL | Age: 74
End: 2019-06-21
Payer: MEDICARE

## 2019-06-23 NOTE — PROGRESS NOTES
Subjective:       Patient ID: Kirstie Bowden is a 74 y.o. female.    Chief Complaint: Hypertension; Hyperlipidemia; Hypothyroidism; Asthma; and Rheumatoid Arthritis    Patient presents here for follow-up of hypertension, hyperlipidemia, hypothyroidism, rheumatoid arthritis, and asthma.  She was diagnosed with left breast cancer in March of 2019 and underwent surgery consisting of lumpectomy and dissection of nodes.  She has been followed up by Oncology and Radiation Oncology and is waiting start her radiation treatments.  Note from oncology were reviewed.  She was also recently diagnosed with kidney stones and is schedule have a stent placement on 06/24/2019.  As far as her hypertension, this is well controlled on her present dose of ACE-inhibitor and amlodipine.  She states she is trying to follow her low-sodium, low-fat low-cholesterol diet fairly well.  As far as her hyperlipidemia, she is on atorvastatin for treatment for this.  Her lipids have been well controlled but her last lipid profile was in 2017.  As far as her hypothyroidism, this is stable on her present dose of levothyroxine.  Her asthma is stable at this time with no acute flare ups are exacerbations recently.  As far as her rheumatoid arthritis, she has been having a few issues with this because she has been off of her methotrexate for her treatment of breast cancer.  As far as health maintenance, she is up-to-date with all of her recommended screening exams except that she needs her lipid profile and hepatitis C screen, and also shingles vaccine and pneumococcal vaccine.  It should be noted that she cannot get a shingles vaccine as it is a live virus and she is on methotrexate for her rheumatoid arthritis.    Hypertension   This is a chronic problem. The problem is unchanged. The problem is controlled. Pertinent negatives include no chest pain, headaches, palpitations or shortness of breath. Past treatments include ACE inhibitors and  calcium channel blockers. The current treatment provides significant improvement. Compliance problems include exercise.  There is no history of kidney disease, CAD/MI, CVA or heart failure.   Hyperlipidemia   This is a chronic problem. The problem is controlled. Recent lipid tests were reviewed and are normal. Exacerbating diseases include hypothyroidism. Pertinent negatives include no chest pain, myalgias or shortness of breath. Current antihyperlipidemic treatment includes statins. The current treatment provides moderate improvement of lipids. Compliance problems include adherence to exercise.  Risk factors for coronary artery disease include dyslipidemia, hypertension, obesity and post-menopausal.     Review of Systems   Constitutional: Negative for chills, fatigue, fever and unexpected weight change.   HENT: Negative for congestion, ear pain, postnasal drip and sore throat.    Respiratory: Negative for cough and shortness of breath.    Cardiovascular: Negative for chest pain and palpitations.   Gastrointestinal: Negative for abdominal pain, diarrhea, nausea and vomiting.   Genitourinary: Negative for difficulty urinating, dysuria, flank pain and pelvic pain.   Musculoskeletal: Positive for arthralgias. Negative for back pain and myalgias.   Neurological: Negative for dizziness, light-headedness and headaches.   Psychiatric/Behavioral: Negative for sleep disturbance. The patient is not nervous/anxious.        Objective:      Physical Exam   Constitutional: She is oriented to person, place, and time. She appears well-developed and well-nourished.   HENT:   Head: Normocephalic and atraumatic.   Right Ear: External ear normal.   Left Ear: External ear normal.   Nose: Nose normal.   Mouth/Throat: Oropharynx is clear and moist.   Neck: Normal range of motion. Neck supple. No thyromegaly present.   Cardiovascular: Normal rate, regular rhythm, normal heart sounds and intact distal pulses.   No murmur  heard.  Pulmonary/Chest: Effort normal and breath sounds normal. She has no wheezes. She has no rales.   Musculoskeletal: Normal range of motion. She exhibits no edema or tenderness.   Lymphadenopathy:     She has no cervical adenopathy.   Neurological: She is alert and oriented to person, place, and time. She has normal reflexes. No cranial nerve deficit.   Psychiatric: She has a normal mood and affect. Her behavior is normal.   Vitals reviewed.      Assessment:       1. Essential hypertension, benign    2. Mixed hyperlipidemia    3. Asthma, chronic, mild intermittent, uncomplicated    4. Acquired hypothyroidism    5. Rheumatoid arthritis involving right shoulder with positive rheumatoid factor    6. Malignant neoplasm of nipple of left breast in female, estrogen receptor positive        Plan:       1.  Continue present medication as all of her chronic medical problems noted above are stable and controlled  2.  Continue low-sodium, low-fat low-cholesterol diet and increase exercise  3.  Continue follow-up with Oncology, Radiation Oncology, and Rheumatology as scheduled  4.  Patient needs lipid profile and hepatitis C screen, and I will try to coordinate this with her blood draws for cancer treatment  5.  Follow up with me in 6 months or p.r.n.  6.  Patient is prescribed Chantix to help her stop smoking.  She is a resident of [place] and is not eligible for the smoking cessation Clinic here.        Patient readiness: acceptance and barriers:none    During the course of the visit the patient was educated and counseled about the following:     Hypertension:   Dietary sodium restriction.  Regular aerobic exercise.  Follow up: 6 months and as needed.    Goals: Hypertension: Reduce Blood Pressure    Did patient meet goals/outcomes: Yes    The following self management tools provided: blood pressure log    Patient Instructions (the written plan) was given to the patient/family.     Time spent with patient: 30  minutes    Barriers to medications present (no )    Adverse reactions to current medications (no)    Over the counter medications reviewed (Yes)

## 2019-06-24 ENCOUNTER — HOSPITAL ENCOUNTER (OUTPATIENT)
Facility: HOSPITAL | Age: 74
Discharge: HOME | End: 2019-06-24
Attending: UROLOGY | Admitting: UROLOGY
Payer: MEDICARE

## 2019-06-24 ENCOUNTER — ANESTHESIA (OUTPATIENT)
Dept: SURGERY | Facility: HOSPITAL | Age: 74
End: 2019-06-24
Payer: MEDICARE

## 2019-06-24 ENCOUNTER — TELEPHONE (OUTPATIENT)
Dept: HEMATOLOGY/ONCOLOGY | Facility: CLINIC | Age: 74
End: 2019-06-24

## 2019-06-24 VITALS
TEMPERATURE: 98 F | RESPIRATION RATE: 14 BRPM | DIASTOLIC BLOOD PRESSURE: 65 MMHG | HEART RATE: 84 BPM | SYSTOLIC BLOOD PRESSURE: 141 MMHG | OXYGEN SATURATION: 97 %

## 2019-06-24 DIAGNOSIS — N20.1 LEFT URETERAL CALCULUS: ICD-10-CM

## 2019-06-24 DIAGNOSIS — N13.2 HYDRONEPHROSIS WITH URINARY OBSTRUCTION DUE TO URETERAL CALCULUS: ICD-10-CM

## 2019-06-24 DIAGNOSIS — N20.1 URETERAL CALCULUS, LEFT: ICD-10-CM

## 2019-06-24 DIAGNOSIS — R31.29 MICROSCOPIC HEMATURIA: ICD-10-CM

## 2019-06-24 PROCEDURE — 74420 PR  X-RAY RETROGRADE PYELOGRAM: ICD-10-PCS | Mod: 26,,, | Performed by: UROLOGY

## 2019-06-24 PROCEDURE — C1726 CATH, BAL DIL, NON-VASCULAR: HCPCS | Performed by: UROLOGY

## 2019-06-24 PROCEDURE — 37000008 HC ANESTHESIA 1ST 15 MINUTES: Performed by: UROLOGY

## 2019-06-24 PROCEDURE — 25000003 PHARM REV CODE 250

## 2019-06-24 PROCEDURE — 52332 CYSTOSCOPY AND TREATMENT: CPT | Mod: 51,LT,, | Performed by: UROLOGY

## 2019-06-24 PROCEDURE — 36000706: Performed by: UROLOGY

## 2019-06-24 PROCEDURE — 74420 UROGRAPHY RTRGR +-KUB: CPT | Mod: 26,,, | Performed by: UROLOGY

## 2019-06-24 PROCEDURE — C2617 STENT, NON-COR, TEM W/O DEL: HCPCS | Performed by: UROLOGY

## 2019-06-24 PROCEDURE — 52351 PR CYSTO/URETERO/PYELOSCOPY, DX: ICD-10-PCS | Mod: ,,, | Performed by: UROLOGY

## 2019-06-24 PROCEDURE — 99900104 DSU ONLY-NO CHARGE-EA ADD'L HR (STAT): Performed by: UROLOGY

## 2019-06-24 PROCEDURE — 52351 CYSTOURETERO & OR PYELOSCOPE: CPT | Mod: ,,, | Performed by: UROLOGY

## 2019-06-24 PROCEDURE — 52332 PR CYSTOSCOPY,INSERT URETERAL STENT: ICD-10-PCS | Mod: 51,LT,, | Performed by: UROLOGY

## 2019-06-24 PROCEDURE — 25000003 PHARM REV CODE 250: Performed by: UROLOGY

## 2019-06-24 PROCEDURE — 63600175 PHARM REV CODE 636 W HCPCS: Performed by: NURSE ANESTHETIST, CERTIFIED REGISTERED

## 2019-06-24 PROCEDURE — C1758 CATHETER, URETERAL: HCPCS | Performed by: UROLOGY

## 2019-06-24 PROCEDURE — 25500020 PHARM REV CODE 255: Performed by: UROLOGY

## 2019-06-24 PROCEDURE — D9220A PRA ANESTHESIA: Mod: CRNA,,, | Performed by: NURSE ANESTHETIST, CERTIFIED REGISTERED

## 2019-06-24 PROCEDURE — 71000033 HC RECOVERY, INTIAL HOUR: Performed by: UROLOGY

## 2019-06-24 PROCEDURE — D9220A PRA ANESTHESIA: ICD-10-PCS | Mod: CRNA,,, | Performed by: NURSE ANESTHETIST, CERTIFIED REGISTERED

## 2019-06-24 PROCEDURE — D9220A PRA ANESTHESIA: ICD-10-PCS | Mod: ANES,,, | Performed by: ANESTHESIOLOGY

## 2019-06-24 PROCEDURE — 37000009 HC ANESTHESIA EA ADD 15 MINS: Performed by: UROLOGY

## 2019-06-24 PROCEDURE — 25000003 PHARM REV CODE 250: Performed by: ANESTHESIOLOGY

## 2019-06-24 PROCEDURE — 71000015 HC POSTOP RECOV 1ST HR: Performed by: UROLOGY

## 2019-06-24 PROCEDURE — 99900103 DSU ONLY-NO CHARGE-INITIAL HR (STAT): Performed by: UROLOGY

## 2019-06-24 PROCEDURE — C1769 GUIDE WIRE: HCPCS | Performed by: UROLOGY

## 2019-06-24 PROCEDURE — 36000707: Performed by: UROLOGY

## 2019-06-24 PROCEDURE — 63600175 PHARM REV CODE 636 W HCPCS: Performed by: UROLOGY

## 2019-06-24 PROCEDURE — D9220A PRA ANESTHESIA: Mod: ANES,,, | Performed by: ANESTHESIOLOGY

## 2019-06-24 DEVICE — STENT URETERAL UNIV 6FR 26CM: Type: IMPLANTABLE DEVICE | Site: URETER | Status: FUNCTIONAL

## 2019-06-24 RX ORDER — MIDAZOLAM HYDROCHLORIDE 1 MG/ML
INJECTION INTRAMUSCULAR; INTRAVENOUS
Status: DISCONTINUED | OUTPATIENT
Start: 2019-06-24 | End: 2019-06-24

## 2019-06-24 RX ORDER — FENTANYL CITRATE 50 UG/ML
25 INJECTION, SOLUTION INTRAMUSCULAR; INTRAVENOUS EVERY 5 MIN PRN
Status: DISCONTINUED | OUTPATIENT
Start: 2019-06-24 | End: 2019-06-24 | Stop reason: HOSPADM

## 2019-06-24 RX ORDER — LIDOCAINE HYDROCHLORIDE 20 MG/ML
JELLY TOPICAL
Status: DISCONTINUED | OUTPATIENT
Start: 2019-06-24 | End: 2019-06-24 | Stop reason: HOSPADM

## 2019-06-24 RX ORDER — PROPOFOL 10 MG/ML
VIAL (ML) INTRAVENOUS
Status: DISCONTINUED | OUTPATIENT
Start: 2019-06-24 | End: 2019-06-24

## 2019-06-24 RX ORDER — HYDROCODONE BITARTRATE AND ACETAMINOPHEN 5; 325 MG/1; MG/1
1 TABLET ORAL EVERY 4 HOURS PRN
Status: DISCONTINUED | OUTPATIENT
Start: 2019-06-24 | End: 2019-06-24 | Stop reason: HOSPADM

## 2019-06-24 RX ORDER — FENTANYL CITRATE 50 UG/ML
INJECTION, SOLUTION INTRAMUSCULAR; INTRAVENOUS
Status: DISCONTINUED | OUTPATIENT
Start: 2019-06-24 | End: 2019-06-24

## 2019-06-24 RX ORDER — LIDOCAINE HCL/PF 100 MG/5ML
SYRINGE (ML) INTRAVENOUS
Status: DISCONTINUED | OUTPATIENT
Start: 2019-06-24 | End: 2019-06-24

## 2019-06-24 RX ORDER — SODIUM CHLORIDE, SODIUM LACTATE, POTASSIUM CHLORIDE, CALCIUM CHLORIDE 600; 310; 30; 20 MG/100ML; MG/100ML; MG/100ML; MG/100ML
INJECTION, SOLUTION INTRAVENOUS CONTINUOUS
Status: DISCONTINUED | OUTPATIENT
Start: 2019-06-24 | End: 2019-06-24

## 2019-06-24 RX ORDER — ONDANSETRON HYDROCHLORIDE 2 MG/ML
INJECTION, SOLUTION INTRAMUSCULAR; INTRAVENOUS
Status: DISCONTINUED | OUTPATIENT
Start: 2019-06-24 | End: 2019-06-24

## 2019-06-24 RX ORDER — PHENAZOPYRIDINE HYDROCHLORIDE 200 MG/1
200 TABLET, FILM COATED ORAL ONCE
Status: COMPLETED | OUTPATIENT
Start: 2019-06-24 | End: 2019-06-24

## 2019-06-24 RX ORDER — METOCLOPRAMIDE HYDROCHLORIDE 5 MG/ML
10 INJECTION INTRAMUSCULAR; INTRAVENOUS EVERY 10 MIN PRN
Status: DISCONTINUED | OUTPATIENT
Start: 2019-06-24 | End: 2019-06-24 | Stop reason: HOSPADM

## 2019-06-24 RX ORDER — HYDROCODONE BITARTRATE AND ACETAMINOPHEN 5; 325 MG/1; MG/1
1 TABLET ORAL
Qty: 20 TABLET | Refills: 0 | Status: SHIPPED | OUTPATIENT
Start: 2019-06-24 | End: 2019-07-11 | Stop reason: ALTCHOICE

## 2019-06-24 RX ORDER — SODIUM CHLORIDE, SODIUM LACTATE, POTASSIUM CHLORIDE, CALCIUM CHLORIDE 600; 310; 30; 20 MG/100ML; MG/100ML; MG/100ML; MG/100ML
INJECTION, SOLUTION INTRAVENOUS CONTINUOUS
Status: DISCONTINUED | OUTPATIENT
Start: 2019-06-24 | End: 2019-06-24 | Stop reason: HOSPADM

## 2019-06-24 RX ORDER — CEFUROXIME AXETIL 500 MG/1
500 TABLET ORAL 2 TIMES DAILY
Qty: 20 TABLET | Refills: 0 | Status: SHIPPED | OUTPATIENT
Start: 2019-06-24 | End: 2019-07-11 | Stop reason: ALTCHOICE

## 2019-06-24 RX ORDER — PHENAZOPYRIDINE HYDROCHLORIDE 100 MG/1
200 TABLET, FILM COATED ORAL
Qty: 20 TABLET | Refills: 0 | Status: SHIPPED | OUTPATIENT
Start: 2019-06-24 | End: 2019-07-11 | Stop reason: ALTCHOICE

## 2019-06-24 RX ORDER — LIDOCAINE HYDROCHLORIDE 10 MG/ML
1 INJECTION, SOLUTION EPIDURAL; INFILTRATION; INTRACAUDAL; PERINEURAL ONCE
Status: ACTIVE | OUTPATIENT
Start: 2019-06-24

## 2019-06-24 RX ORDER — DEXAMETHASONE SODIUM PHOSPHATE 4 MG/ML
INJECTION, SOLUTION INTRA-ARTICULAR; INTRALESIONAL; INTRAMUSCULAR; INTRAVENOUS; SOFT TISSUE
Status: DISCONTINUED | OUTPATIENT
Start: 2019-06-24 | End: 2019-06-24

## 2019-06-24 RX ORDER — CEFAZOLIN SODIUM 2 G/50ML
2 SOLUTION INTRAVENOUS
Status: COMPLETED | OUTPATIENT
Start: 2019-06-24 | End: 2019-06-24

## 2019-06-24 RX ORDER — OXYCODONE HYDROCHLORIDE 5 MG/1
5 TABLET ORAL
Status: DISCONTINUED | OUTPATIENT
Start: 2019-06-24 | End: 2019-06-24 | Stop reason: HOSPADM

## 2019-06-24 RX ADMIN — DEXAMETHASONE SODIUM PHOSPHATE 4 MG: 4 INJECTION, SOLUTION INTRAMUSCULAR; INTRAVENOUS at 02:06

## 2019-06-24 RX ADMIN — LIDOCAINE HYDROCHLORIDE 100 MG: 20 INJECTION, SOLUTION INTRAVENOUS at 02:06

## 2019-06-24 RX ADMIN — MIDAZOLAM HYDROCHLORIDE 1 MG: 1 INJECTION, SOLUTION INTRAMUSCULAR; INTRAVENOUS at 02:06

## 2019-06-24 RX ADMIN — PHENAZOPYRIDINE HYDROCHLORIDE 200 MG: 200 TABLET ORAL at 04:06

## 2019-06-24 RX ADMIN — CEFAZOLIN SODIUM 2 G: 2 SOLUTION INTRAVENOUS at 02:06

## 2019-06-24 RX ADMIN — PROPOFOL 200 MG: 10 INJECTION, EMULSION INTRAVENOUS at 02:06

## 2019-06-24 RX ADMIN — SODIUM CHLORIDE, SODIUM LACTATE, POTASSIUM CHLORIDE, AND CALCIUM CHLORIDE: .6; .31; .03; .02 INJECTION, SOLUTION INTRAVENOUS at 01:06

## 2019-06-24 RX ADMIN — ONDANSETRON 4 MG: 2 INJECTION, SOLUTION INTRAMUSCULAR; INTRAVENOUS at 02:06

## 2019-06-24 RX ADMIN — FENTANYL CITRATE 50 MCG: 50 INJECTION, SOLUTION INTRAMUSCULAR; INTRAVENOUS at 02:06

## 2019-06-24 RX ADMIN — FENTANYL CITRATE 50 MCG: 50 INJECTION, SOLUTION INTRAMUSCULAR; INTRAVENOUS at 03:06

## 2019-06-24 NOTE — BRIEF OP NOTE
Operative Note     SUMMARY     Surgery Date: 6/24/2019     Surgeon(s) and Role:     * Jorden Dickson MD - Primary    Pre-op Diagnosis:  Ureteral calculus, left [N20.1]  Hydronephrosis with urinary obstruction due to ureteral calculus [N13.2]  Microscopic hematuria [R31.29]    Post-op Diagnosis:  Ureteral calculus, left [N20.1]  Hydronephrosis with urinary obstruction due to ureteral calculus [N13.2]  Microscopic hematuria [R31.29]    Procedure(s) (LRB):  CYSTOSCOPY, WITH URETERAL STENT INSERTION (Left)  PYELOGRAM, RETROGRADE (Bilateral)  DILATION, URETER, USING BALLOON (Left)  URETEROSCOPY (Left)    Anesthesia: General    Findings/Key Components:  See Op Note      Estimated Blood Loss: * No values recorded between 6/24/2019  2:47 PM and 6/24/2019  3:38 PM *         Specimens (From admission, onward)    None            Discharge Note      SUMMARY     Admit Date: 6/24/2019    Attending Physician: Jorden Dickson MD     Discharge Physician: Jorden Dickson MD    Discharge Date: 6/24/2019     Final Diagnosis: Ureteral calculus, left [N20.1]  Hydronephrosis with urinary obstruction due to ureteral calculus [N13.2]  Microscopic hematuria [R31.29]    Hospital Course: uneventful, going home same day    Disposition: Home or Self Care    Patient Instructions:   Current Discharge Medication List      START taking these medications    Details   cefUROXime (CEFTIN) 500 MG tablet Take 1 tablet (500 mg total) by mouth 2 (two) times daily.  Qty: 20 tablet, Refills: 0      HYDROcodone-acetaminophen (NORCO) 5-325 mg per tablet Take 1 tablet by mouth every 4 to 6 hours as needed for Pain.  Qty: 20 tablet, Refills: 0      phenazopyridine (PYRIDIUM) 100 MG tablet Take 2 tablets (200 mg total) by mouth 3 (three) times daily with meals.  Qty: 20 tablet, Refills: 0         CONTINUE these medications which have NOT CHANGED    Details   amLODIPine (NORVASC) 5 MG tablet TAKE 1 TABLET BY MOUTH ONCE DAILY  Qty: 90 tablet, Refills: 1     Associated Diagnoses: Essential hypertension, benign      atorvastatin (LIPITOR) 20 MG tablet TAKE ONE TABLET BY MOUTH ONCE DAILY  Qty: 90 tablet, Refills: 1    Associated Diagnoses: Hyperlipidemia      CALCIUM CARBONATE/VITAMIN D3 (VITAMIN D-3 ORAL) Take by mouth once daily.       fluticasone-salmeterol diskus inhaler 250-50 mcg INHALE ONE DOSE BY MOUTH TWICE DAILY  Qty: 60 each, Refills: 11    Comments: Please consider 90 day supplies to promote better adherence  Associated Diagnoses: Asthma, chronic, mild intermittent, uncomplicated      folic acid (FOLVITE) 1 MG tablet TAKE 1 TABLET BY MOUTH ONCE DAILY EVERY DAY  Qty: 30 tablet, Refills: 10    Comments: Please consider 90 day supplies to promote better adherence      levothyroxine (SYNTHROID) 112 MCG tablet TAKE 1 TABLET BY MOUTH ONCE DAILY  Qty: 90 tablet, Refills: 3    Associated Diagnoses: Acquired hypothyroidism      promethazine-phenylephrine (PROMETHAZINE VC) 6.25-5 mg/5 mL Syrp Take 5 mLs by mouth every 6 (six) hours as needed.  Qty: 240 mL, Refills: 2      thiamine (VITAMIN B-1) 100 MG tablet Take 100 mg by mouth once daily.      VITAMIN B COMPLEX ORAL Every day      vitamin E/flaxseed oil (FLAXSEED OIL-VITAMIN E ORAL) Take by mouth.      varenicline (CHANTIX STARTING MONTH BOX) 0.5 mg (11)- 1 mg (42) tablet Take one 0.5mg tab by mouth once daily X3 days,then increase to one 0.5mg tab twice daily X4 days,then increase to one 1mg tab twice daily  Qty: 1 Package, Refills: 0             Discharge Procedure Orders (must include Diet, Follow-up, Activity)  Resume previous diet.  Follow up with PCP as needed.

## 2019-06-24 NOTE — TELEPHONE ENCOUNTER
----- Message from Angeles Peña LPN sent at 6/24/2019  1:34 PM CDT -----  Contact: pt        ----- Message -----  From: RT Jamie  Sent: 6/24/2019   8:38 AM  To: [facility] Staff    pt , requesting a call soon from Nurse Grecia, she have chemo medical questions, thanks.

## 2019-06-24 NOTE — PLAN OF CARE
Discharge instructions given to pt and family who voice understanding.  Taking po fluids without nausea.  Denies pain.  Voiding clear red urine without difficulty

## 2019-06-24 NOTE — ANESTHESIA POSTPROCEDURE EVALUATION
Anesthesia Post Evaluation    Patient: Kirstie Bowden    Procedure(s) Performed: Procedure(s) (LRB):  CYSTOSCOPY, WITH URETERAL STENT INSERTION (Left)  PYELOGRAM, RETROGRADE (Bilateral)  DILATION, URETER, USING BALLOON (Left)  URETEROSCOPY (Left)    Final Anesthesia Type: general  Patient location during evaluation: PACU  Patient participation: Yes- Able to Participate  Level of consciousness: awake and alert  Post-procedure vital signs: reviewed and stable  Pain management: adequate  Airway patency: patent  PONV status at discharge: No PONV  Anesthetic complications: no      Cardiovascular status: blood pressure returned to baseline  Respiratory status: unassisted  Hydration status: euvolemic  Follow-up not needed.          Vitals Value Taken Time   /63 6/24/2019  4:08 PM   Temp 36.6 °C (97.9 °F) 6/24/2019  3:40 PM   Pulse 82 6/24/2019  4:12 PM   Resp 25 6/24/2019  4:12 PM   SpO2 96 % 6/24/2019  4:11 PM   Vitals shown include unvalidated device data.      No case tracking events are documented in the log.      Pain/Cecilia Score: Pain Rating Prior to Med Admin: 0 (6/24/2019  4:00 PM)  Cecilia Score: 10 (6/24/2019  4:00 PM)

## 2019-06-24 NOTE — OP NOTE
DATE OF PROCEDURE:  06/24/2019.    SURGEON:  Jorden Dickson M.D.    PREOPERATIVE DIAGNOSIS:  Left hydronephrosis, proximal left ureteral calculus.    POSTOPERATIVE DIAGNOSIS:  Complete proximal left ureteral obstruction.    PROCEDURES PERFORMED:  Cystourethroscopy, retrograde pyelograms, left ureteral   balloon dilatation, left ureteroscopy and insertion of left 6-Sammarinese 26 cm   double-J stent.    PROCEDURE IN DETAIL:  The patient was brought to Cystoscopy Suite and after   adequate induction of general anesthesia, she was placed in lithotomy position.    Genitalia were prepped and draped in usual manner.  Plain fluoroscopic images   were obtained of the abdomen and pelvis and there was some irregularly edged   radiopaque densities of the area of what appeared to be the proximal left   ureter.  It is unclear whether these were probably  the calculus   that had been seen on CT scan.  Otherwise, no other significant findings were   encountered.  A 22-Sammarinese cystoscope sheath with a foroblique lens and video   camera was inserted under direct vision into the urethra.  Examination of   urethra was unremarkable.  The interior of the bladder was then examined.    Examination of trigone revealed a normal slit-like right ureteral orifice, but   the left ureteral orifice was located in a bulging area of the left side of the   trigone, which had a smooth surface and more of a rounded appearance, almost   obstructive in appearance.  The bladder mucosa was otherwise smooth throughout,   no evidence of any trabeculation or diverticula, no cellules, no lesions, no   hemorrhagic areas.  Initially, an 8-Sammarinese cone tip ureteral catheter was   inserted into the left ureteral orifice and contrast injected.  In this fashion,   a left retrograde pyelogram was obtained.  This revealed essentially normal,   mid and distal ureters, but when the contrast reached the proximal ureter,   complete obstruction was encountered that would not  allow passage of the   contrast beyond it into the proximal ureter or into the pyelocalyceal system.    This was attempted multiple times, it was always unsuccessful.  Subsequently,   attempt was made to pass a #35 guidewire with a soft tip up the left ureter and   again when the point of obstruction was encountered, this ended up with coiling   of the guidewire and would not allow passage of the guidewire into the renal   pelvis.  An attempt was also made to pass a 5-Malagasy open-ended catheter over   the guidewire in an attempt to advance the guidewire and this was also   unsuccessful.  It was eventually decided we will attempt to proceed with left   ureteroscopy.  A ureteral balloon dilatation catheter was threaded over the   guidewire and the balloon positioned in the distal intramural ureter and was   inflated with a Teresa pressure gauge to its full diameter of 15-Malagasy and   maintained in place for several minutes and it was then deflated and removed and   the rigid ureteroscope was then introduced into the left ureter and advanced up   the ureter.  There were some stone fragments encountered in the process, but   when the area of obstruction of the proximal left ureter was reached, there was   a large concentration  of multiple stone fragments within the lumen, which made it very   difficult for passage of any instrument beyond it, but eventually a guidewire   was successfully advanced through the ureteroscope and advanced through a very   narrow opening that the calculus did allow and eventually the guidewire was   advanced all the way up into the renal pelvis.  The 5-Malagasy open-ended catheter   was then threaded over the guidewire until the tip was felt to be in the renal   pelvis.  The guidewire was removed and the contrast was injected through the   open-ended catheter and this did confirm that the tip was in the markedly   dilated hydronephrotic renal pelvis.  It was thus decided that we will proceed    with placement of a double-J stent and plan and further stone management of the   calculi at a later date, but initially to place a stent.  So, a 6-Albanian 26 cm   double-J stent was then threaded over the guidewire, which had been reintroduced   and it was passed up the ureter with the aid of the pusher until the proximal   end was in the renal pelvis and distal end protruding into the bladder from the   left ureteral orifice and x-rays confirmed good positioning of the stent.    Subsequently, right retrograde pyelogram was obtained, which revealed   essentially normal right ureter and pyelocalyceal system.  There was a small   rounded filling defect noted in the mid ritu area.  Repeat images revealed that   it is changing in shape and appeared to be consistent with an air bubble.    Subsequently, the bladder was then drained, instruments removed and the patient   having tolerated the procedure well was then transferred to the Recovery Room in   stable condition.      MD/IN  dd: 06/24/2019 15:54:37 (CDT)  td: 06/24/2019 16:54:32 (OLIVERIO)  Doc ID   #[id number]  Job ID #[id number]    CC:

## 2019-06-24 NOTE — INTERVAL H&P NOTE
The patient has been examined and the H&P has been reviewed:    I concur with the findings and no changes have occurred since H&P was written.    Anesthesia/Surgery risks, benefits and alternative options discussed and understood by patient/family.          Active Hospital Problems    Diagnosis  POA    Left ureteral calculus [N20.1]  Yes      Resolved Hospital Problems   No resolved problems to display.

## 2019-06-24 NOTE — TRANSFER OF CARE
Anesthesia Transfer of Care Note    Patient: Kirstie Bowden    Procedure(s) Performed: Procedure(s) (LRB):  CYSTOSCOPY, WITH URETERAL STENT INSERTION (Left)  PYELOGRAM, RETROGRADE (Bilateral)  DILATION, URETER, USING BALLOON (Left)  URETEROSCOPY (Left)    Patient location: PACU    Anesthesia Type: general    Transport from OR: Transported from OR on 2-3 L/min O2 by NC with adequate spontaneous ventilation    Post pain: adequate analgesia    Post assessment: no apparent anesthetic complications and tolerated procedure well    Post vital signs: stable    Level of consciousness: sedated    Nausea/Vomiting: no nausea/vomiting    Complications: none    Transfer of care protocol was followed      Last vitals:   Visit Vitals  BP (!) 145/67 (BP Location: Left arm, Patient Position: Lying)   Pulse 68   Temp 36.4 °C (97.5 °F) (Temporal)   Resp 16   SpO2 (!) 94%   Breastfeeding? No

## 2019-06-24 NOTE — PLAN OF CARE
Pt. Is AAOx4, calm and cooperative.  Pt. States she had PONV after the most recent surgery, but none after the first procedure here in March.  RN reviewed plan of care with pt. And family who voiced understanding.  Questions answered, comfort assured. Prepared for surgery.

## 2019-06-24 NOTE — ANESTHESIA PREPROCEDURE EVALUATION
06/24/2019  Kirstie Bowden is a 74 y.o., female.    Pre-op Assessment    I have reviewed the Patient Summary Reports.     I have reviewed the Nursing Notes.   I have reviewed the Medications.     Review of Systems  Anesthesia Hx:  Hx of Anesthetic complications Pseudocholinesterase Deficiency  Family Hx of Anesthesia complications:  Pseudocholinesterase Deficiency, in a sibling  Personal Hx of Anesthesia complications  Pseudocholinesterase Deficiency, based on clinical history per patient   Cardiovascular:   Hypertension    Pulmonary:   Asthma    Neurological:   Neuromuscular Disease,    Endocrine:   Hypothyroidism    Dermatological:   Left breast mass       Physical Exam  General:  Well nourished    Airway/Jaw/Neck:  Airway Findings: Mouth Opening: Normal Tongue: Normal  General Airway Assessment: Adult  Mallampati: III  TM Distance: Normal, at least 6 cm  Jaw/Neck Findings:  Neck ROM: Extension Decreased, Mod.      Dental:  Dental Findings: Upper front caps   Chest/Lungs:  Chest/Lungs Clear    Heart/Vascular:  Heart Findings: Normal            Anesthesia Plan  Type of Anesthesia, risks & benefits discussed:  Anesthesia Type:  general  Patient's Preference:   Intra-op Monitoring Plan: standard ASA monitors  Intra-op Monitoring Plan Comments:   Post Op Pain Control Plan:   Post Op Pain Control Plan Comments:   Induction:   IV  Beta Blocker:  Patient is not currently on a Beta-Blocker (No further documentation required).       Informed Consent: Patient understands risks and agrees with Anesthesia plan.  Questions answered. Anesthesia consent signed with patient.  ASA Score: 3     Day of Surgery Review of History & Physical:    H&P update referred to the surgeon.         Ready For Surgery From Anesthesia Perspective.

## 2019-06-24 NOTE — DISCHARGE INSTRUCTIONS
"Drink plenty of fluids  You might see blood in your urine  If urine becomes extremely bloody; you are passing blood clots or unable to urinate, call your physician  Call physician with any sign of infection such as fever or chills      Discharge Instructions: After Your Surgery/Procedure  Youve just had surgery. During surgery you were given medicine called anesthesia to keep you relaxed and free of pain. After surgery you may have some pain or nausea. This is common. Here are some tips for feeling better and getting well after surgery.     Stay on schedule with your medication.   Going home  Your doctor or nurse will show you how to take care of yourself when you go home. He or she will also answer your questions. Have an adult family member or friend drive you home.      For your safety we recommend these precaution for the first 24 hours after your procedure:  · Do not drive or use heavy equipment.  · Do not make important decisions or sign legal papers.  · Do not drink alcohol.  · Have someone stay with you, if needed. He or she can watch for problems and help keep you safe.  · Your concentration, balance, coordination, and judgement may be impaired for many hours after anesthesia.  Use caution when ambulating or standing up.     · You may feel weak and "washed out" after anesthesia and surgery.      Subtle residual effects of general anesthesia or sedation with regional / local anesthesia can last more than 24 hours.  Rest for the remainder of the day or longer if your Doctor/Surgeon has advised you to do so.  Although you may feel normal within the first 24 hours, your reflexes and mental ability may be impaired without you realizing it.  You may feel dizzy, lightheaded or sleepy for 24 hours or longer.      Be sure to go to all follow-up visits with your doctor. And rest after your surgery for as long as your doctor tells you to.  Coping with pain  If you have pain after surgery, pain medicine will help you " feel better. Take it as told, before pain becomes severe. Also, ask your doctor or pharmacist about other ways to control pain. This might be with heat, ice, or relaxation. And follow any other instructions your surgeon or nurse gives you.  Tips for taking pain medicine  To get the best relief possible, remember these points:  · Pain medicines can upset your stomach. Taking them with a little food may help.  · Most pain relievers taken by mouth need at least 20 to 30 minutes to start to work.  · Taking medicine on a schedule can help you remember to take it. Try to time your medicine so that you can take it before starting an activity. This might be before you get dressed, go for a walk, or sit down for dinner.  · Constipation is a common side effect of pain medicines. Call your doctor before taking any medicines such as laxatives or stool softeners to help ease constipation. Also ask if you should skip any foods. Drinking lots of fluids and eating foods such as fruits and vegetables that are high in fiber can also help. Remember, do not take laxatives unless your surgeon has prescribed them.  · Drinking alcohol and taking pain medicine can cause dizziness and slow your breathing. It can even be deadly. Do not drink alcohol while taking pain medicine.  · Pain medicine can make you react more slowly to things. Do not drive or run machinery while taking pain medicine.  Your health care provider may tell you to take acetaminophen to help ease your pain. Ask him or her how much you are supposed to take each day. Acetaminophen or other pain relievers may interact with your prescription medicines or other over-the-counter (OTC) drugs. Some prescription medicines have acetaminophen and other ingredients. Using both prescription and OTC acetaminophen for pain can cause you to overdose. Read the labels on your OTC medicines with care. This will help you to clearly know the list of ingredients, how much to take, and  any warnings. It may also help you not take too much acetaminophen. If you have questions or do not understand the information, ask your pharmacist or health care provider to explain it to you before you take the OTC medicine.  Managing nausea  Some people have an upset stomach after surgery. This is often because of anesthesia, pain, or pain medicine, or the stress of surgery. These tips will help you handle nausea and eat healthy foods as you get better. If you were on a special food plan before surgery, ask your doctor if you should follow it while you get better. These tips may help:  · Do not push yourself to eat. Your body will tell you when to eat and how much.  · Start off with clear liquids and soup. They are easier to digest.  · Next try semi-solid foods, such as mashed potatoes, applesauce, and gelatin, as you feel ready.  · Slowly move to solid foods. Dont eat fatty, rich, or spicy foods at first.  · Do not force yourself to have 3 large meals a day. Instead eat smaller amounts more often.  · Take pain medicines with a small amount of solid food, such as crackers or toast, to avoid nausea.     Call your surgeon if  · You still have pain an hour after taking medicine. The medicine may not be strong enough.  · You feel too sleepy, dizzy, or groggy. The medicine may be too strong.  · You have side effects like nausea, vomiting, or skin changes, such as rash, itching, or hives.       If you have obstructive sleep apnea  You were given anesthesia medicine during surgery to keep you comfortable and free of pain. After surgery, you may have more apnea spells because of this medicine and other medicines you were given. The spells may last longer than usual.   At home:  · Keep using the continuous positive airway pressure (CPAP) device when you sleep. Unless your health care provider tells you not to, use it when you sleep, day or night. CPAP is a common device used to treat obstructive sleep apnea.  · Talk  with your provider before taking any pain medicine, muscle relaxants, or sedatives. Your provider will tell you about the possible dangers of taking these medicines.  © 5441-3304 [employer]. [address] [place] [address], [place] [address]. All rights reserved. This information is not intended as a substitute for professional medical care. Always follow your healthcare professional's instructions.            General Information:    1.  Do not drink alcoholic beverages including beer for 24 hours or as long as you are on pain medication..  2.  Do not drive a motor vehicle, operate machinery or power tools, or signs legal papers for 24 hours or as long as you are on pain medication.   3.  You may experience light-headedness, dizziness, and sleepiness following surgery. Please do not stay alone. A responsible adult should be with you for this 24 hour period.  4.  Go home and rest.    5. Progress slowly to a normal diet unless instructed.  Otherwise, begin with liquids such as soft drinks, then soup and crackers working up to solid foods. Drink plenty of nonalcoholic fluids.  6.  Certain anesthetics and pain medications produce nausea and vomiting in certain       individuals. If nausea becomes a problem at home, call you doctor.    7. A nurse will be calling you sometime after surgery. Do not be alarmed. This is our way of finding out how you are doing.    8. Several times every hour while you are awake, take 2-3 deep breaths and cough. If you had stomach surgery hold a pillow or rolled towel firmly against your stomach before you cough. This will help with any pain the cough might cause.  9. Several times every hour while you are awake, pump and flex your feet 5-6 times and do foot circles. This will help prevent blood clots.    10.Call your doctor for severe pain, bleeding, fever, or signs or symptoms of infection (pain, swelling, redness, foul odor, drainage)          .Post op instructions for prevention of  DVT  What is deep vein thrombosis?  Deep vein thrombosis (DVT) is the medical term for blood clots in the deep veins of the leg.  These blood clots can be dangerous.  A DVT can block a blood vessel and keep blood from getting where it needs to go.  Another problem is that the clot can travel to other parts of the body such as the lungs.  A clot that travels to the lungs is called a pulmonary embolus (PE) and can cause serious problems with breathing which can lead to death.  Am I at risk for DVT/PE?  If you are not very active, you are at risk of DVT.  Anyone confined to bed, sitting for long periods of time, recovering from surgery, etc. increases the risk of DVT.  Other risk factors are cancer diagnosis, certain medications, estrogen replacement in any form,older age, obesity, pregnancy, smoking, history of clotting disorders, and dehydration.  How will I know if I have a DVT?   Swelling in the lower leg   Pain   Warmth, redness, hardness or bulging of the vein  If you have any of these symptoms, call your doctors office right away.  Some people will not have any symptoms until the clot moves to the lungs.  What are the symptoms of a PE?   Panting, shortness of breath, or trouble breathing   Sharp, knife-like chest pain when you breathe   Coughing or coughing up blood   Rapid heartbeat  If you have any of these symptoms or get worse quickly, call 911 for emergency treatment.  How can I prevent a DVT?   Avoid long periods of inactivity and dont cross your legs--get up and walk around every hour or so.   Stay active--walking after surgery is highly encouraged.  This means you should get out of the house and walk in the neighborhood.  Going up and down stairs will not impair healing (unless advised against such activity by your doctor).     Drink plenty of noncaffeinated, nonalcoholic fluids each day to prevent dehydration.   Wear special support stockings, if they have been advised by your doctor.   If  you travel, stop at least once an hour and walk around.   Avoid smoking (assistance with stopping is available through your healthcare provider)  Always notify your doctor if you are not able to follow the post operative instructions that are given to you at the time of discharge.  It may be necessary to prescribe one of the medications available to prevent DVT.

## 2019-06-25 ENCOUNTER — TELEPHONE (OUTPATIENT)
Dept: HEMATOLOGY/ONCOLOGY | Facility: CLINIC | Age: 74
End: 2019-06-25

## 2019-06-25 ENCOUNTER — TELEPHONE (OUTPATIENT)
Dept: UROLOGY | Facility: CLINIC | Age: 74
End: 2019-06-25

## 2019-06-25 NOTE — PLAN OF CARE
START ON PATHWAY REGIMEN - Breast    [id number]        Docetaxel (Taxotere(R))       Cyclophosphamide (Cytoxan(R))           Additional Orders: Premedicate with dexamethasone 8 mg PO bid for three   days beginning 1 day prior to therapy    **Always confirm dose/schedule in your pharmacy ordering system**    Patient Characteristics:  Postoperative without Neoadjuvant Therapy (Pathologic Staging), Invasive   Disease, Adjuvant Therapy, HER2 Negative/Unknown/Equivocal, ER Positive, Node   Negative, pT1a-c, pN0/N1mi or pT2 or Higher, pN0, Oncotype High Risk (? 26)  Therapeutic Status: Postoperative without Neoadjuvant Therapy (Pathologic   Staging)  AJCC Grade: G2  AJCC N Category: pN0  AJCC M Category: cM0  ER Status: Positive (+)  AJCC 8 Stage Grouping: IA  HER2 Status: Negative (-)  Oncotype Dx Recurrence Score: 40  AJCC T Category: pT2  RI Status: Positive (+)  Has this patient completed genomic testing<= Yes - Oncotype DX(R)  Intent of Therapy:  Curative Intent, Discussed with Patient

## 2019-06-25 NOTE — TELEPHONE ENCOUNTER
----- Message from Vivian Alcocer RN sent at 6/24/2019  4:57 PM CDT -----      ----- Message -----  From: John Paul Hernandez LPN  Sent: 6/21/2019   3:48 PM  To: Vivian Alcocer RN    Please call/ advise Pt. She called yesterday  ----- Message -----  From: Price Brown  Sent: 6/21/2019   2:46 PM  To: Guillermo [facility] Staff    Type: Needs Medical Advice    Who Called:  Self   Symptoms (please be specific):   How long has patient had these symptoms: Pharmacy name and phone #:  Best Call Back Number: [phone]  Additional Information: Patient called asking for an update regarding chemo therapy treatment.

## 2019-06-25 NOTE — TELEPHONE ENCOUNTER
----- Message from Lionel Castro sent at 6/25/2019 11:01 AM CDT -----  Contact: Patient  Type: Needs Medical Advice    Who Called:  Patient  Pharmacy name and phone #:    [id number] - [place], [place] - [address]  [address]  [place] [address]  Phone: [phone] Fax: [phone]  Best Call Back Number: [phone]  Additional Information: Patient would like to discuss medication prescribed from previous surgery as the pharmacy only has the generic version. Please call to advise. Thanks!

## 2019-06-25 NOTE — TELEPHONE ENCOUNTER
Returned call and spoke the pharmacy needed the okay to give the generic for pyridium. Call placed to pharmacy and okay given to use generic, they verbally understood.

## 2019-06-28 ENCOUNTER — TELEPHONE (OUTPATIENT)
Dept: HEMATOLOGY/ONCOLOGY | Facility: CLINIC | Age: 74
End: 2019-06-28

## 2019-06-28 DIAGNOSIS — Z17.0 MALIGNANT NEOPLASM OF NIPPLE OF LEFT BREAST IN FEMALE, ESTROGEN RECEPTOR POSITIVE: Primary | ICD-10-CM

## 2019-06-28 DIAGNOSIS — C50.012 MALIGNANT NEOPLASM OF NIPPLE OF LEFT BREAST IN FEMALE, ESTROGEN RECEPTOR POSITIVE: Primary | ICD-10-CM

## 2019-06-28 NOTE — TELEPHONE ENCOUNTER
Spoke with patient and notified her that Dr Li would like her to finish up with Urology before starting chemo.

## 2019-06-28 NOTE — TELEPHONE ENCOUNTER
----- Message from Mauro Giles sent at 6/28/2019 12:23 PM CDT -----  Contact: self   Patient want to speak with a nurse regarding rescheduling upcoming appointment please call back at [phone] (home)

## 2019-07-11 ENCOUNTER — TELEPHONE (OUTPATIENT)
Dept: RHEUMATOLOGY | Facility: CLINIC | Age: 74
End: 2019-07-11

## 2019-07-11 ENCOUNTER — HOSPITAL ENCOUNTER (OUTPATIENT)
Dept: RADIOLOGY | Facility: HOSPITAL | Age: 74
Discharge: HOME | End: 2019-07-11
Attending: UROLOGY
Payer: MEDICARE

## 2019-07-11 ENCOUNTER — OFFICE VISIT (OUTPATIENT)
Dept: UROLOGY | Facility: CLINIC | Age: 74
End: 2019-07-11
Payer: MEDICARE

## 2019-07-11 VITALS
RESPIRATION RATE: 18 BRPM | SYSTOLIC BLOOD PRESSURE: 133 MMHG | HEIGHT: 68 IN | BODY MASS INDEX: 26.43 KG/M2 | DIASTOLIC BLOOD PRESSURE: 77 MMHG | WEIGHT: 174.38 LBS | TEMPERATURE: 98 F | HEART RATE: 78 BPM

## 2019-07-11 DIAGNOSIS — N20.1 URETERAL CALCULUS, LEFT: ICD-10-CM

## 2019-07-11 DIAGNOSIS — N13.2 HYDRONEPHROSIS WITH URINARY OBSTRUCTION DUE TO URETERAL CALCULUS: ICD-10-CM

## 2019-07-11 DIAGNOSIS — N20.1 CALCULUS OF URETER: ICD-10-CM

## 2019-07-11 DIAGNOSIS — C50.912 MALIGNANT NEOPLASM OF LEFT FEMALE BREAST, UNSPECIFIED ESTROGEN RECEPTOR STATUS, UNSPECIFIED SITE OF BREAST: ICD-10-CM

## 2019-07-11 DIAGNOSIS — N20.1 CALCULUS OF URETER: Primary | ICD-10-CM

## 2019-07-11 DIAGNOSIS — Z96.0 RETAINED URETERAL STENT: ICD-10-CM

## 2019-07-11 PROCEDURE — 74018 RADEX ABDOMEN 1 VIEW: CPT | Mod: TC,FY

## 2019-07-11 PROCEDURE — 99213 OFFICE O/P EST LOW 20 MIN: CPT | Mod: PBBFAC,25,PN | Performed by: UROLOGY

## 2019-07-11 PROCEDURE — 99213 PR OFFICE/OUTPT VISIT, EST, LEVL III, 20-29 MIN: ICD-10-PCS | Mod: S$PBB,,, | Performed by: UROLOGY

## 2019-07-11 PROCEDURE — 99213 OFFICE O/P EST LOW 20 MIN: CPT | Mod: S$PBB,,, | Performed by: UROLOGY

## 2019-07-11 PROCEDURE — 74018 XR ABDOMEN AP 1 VIEW: ICD-10-PCS | Mod: 26,,, | Performed by: RADIOLOGY

## 2019-07-11 PROCEDURE — 99999 PR PBB SHADOW E&M-EST. PATIENT-LVL III: CPT | Mod: PBBFAC,,, | Performed by: UROLOGY

## 2019-07-11 PROCEDURE — 99999 PR PBB SHADOW E&M-EST. PATIENT-LVL III: ICD-10-PCS | Mod: PBBFAC,,, | Performed by: UROLOGY

## 2019-07-11 PROCEDURE — 74018 RADEX ABDOMEN 1 VIEW: CPT | Mod: 26,,, | Performed by: RADIOLOGY

## 2019-07-11 NOTE — TELEPHONE ENCOUNTER
Patient has arthritis flare to both hands, left upper arm and neck.   She asks for something to help it.   She starts chemo in 2 weeks for breast cancer.  She is off her Enbrel and MTX    Please send med to [facility] in [place].

## 2019-07-11 NOTE — PROGRESS NOTES
OFFICE NOTE  [unfilled]  [id number]  7/11/2019       CHIEF COMPLAINT:   Left proximal ureteral calculus with left hydronephrosis, left ureteral stent.     HISTORY OF PRESENT ILLNESS:   This 74-year-old female returns for routine recheck.  On 06/24/2019 she had undergone cystoscopy retrograde pyelograms left ureteral balloon dilatation left ureteroscopy and insertion of left double-J ureteral stent.  She was found to have significant left ureteral obstruction with calculi.  It must be noted that she was found to have as an incidental finding left hydronephrosis on a PET-CT scan on 06/04/2019 for further follow-up evaluation of her diagnosis of breast cancer prior to undergoing radiation therapy and chemotherapy.  In spite of the above finding she never did experience any flank pain. On follow-up visit today she is doing well and tolerating the stent without any problems.    Medical history update - she is scheduled to see her oncologist Dr. Li for chemotherapy treatment plan  for her breast cancer    Physical examination - abdomen is soft benign and no evidence of any tenderness       FINAL IMPRESSION:  Obstructing proximal left ureteral calculus with hydronephrosis status post left ureteral stent placement.  Breast cancer.     RECOMMENDATIONS:   KUB to observe the position and status of the ureteral calculi and subsequently to be considered for ESWL but it was mentioned to the patient that we will need to await the timing of the ESWL   around her chemotherapy for her breast cancer which is to be scheduled by Dr. Li.

## 2019-07-11 NOTE — TELEPHONE ENCOUNTER
Patient BS and BP are good she had both done today.  She will continue to monitor both.  MDP called into [facility] in [place]

## 2019-07-15 DIAGNOSIS — N20.1 LEFT URETERAL CALCULUS: Primary | ICD-10-CM

## 2019-07-15 NOTE — H&P (VIEW-ONLY)
Subjective:       Patient ID: Kirstie Bowden is a 74 y.o. female.    Chief Complaint:  13 mm proximal left ureteral calculus for which ureteral stent was placed on 06/24/2019.  She is now scheduled undergo ESWL.    Past Medical History:   Diagnosis Date    Arthritis     Asthma     Cancer     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    Hyperlipidemia     Hypertension     Pseudocholinesterase deficiency     family history    Thyroid disease      Past Surgical History:   Procedure Laterality Date    BIOPSY, LYMPH NODE, SENTINEL Left 3/14/2019    [facility]    BREAST BIOPSY Left 20 yrs ago    benign    CHOLECYSTECTOMY      CYSTOSCOPY, WITH URETERAL STENT INSERTION Left 6/24/2019    [facility] by [facility] at [facility] OR    DILATION, URETER, USING BALLOON Left 6/24/2019    [facility] at [facility] OR    EYE SURGERY Bilateral     cataracts    HYSTERECTOMY      LUMPECTOMY, BREAST Left 3/14/2019    [facility] at [facility]    LYMPHADENECTOMY, AXILLARY Left 3/14/2019    [facility]p [facility] at [facility]    MASTECTOMY, PARTIAL Left 4/25/2019    [facility]p [facility] at [facility]    NEEDLE LOCALIZATION Left 3/14/2019    [facility] at [facility]    PYELOGRAM, RETROGRADE Bilateral 6/24/2019    [facility] at [facility] OR    REMOVAL-MASS N/A 3/14/2019    [facility] at [facility]    TONSILLECTOMY      URETEROSCOPY Left 6/24/2019    [facility] at [facility] OR     Family History   Problem Relation Age of Onset    Heart disease Mother     Hypertension Mother     Alzheimer's disease Mother     Cancer Father      Social History     Socioeconomic History    Marital status:      Spouse name: Not on file    Number of children: Not on file    Years of education: Not on file    Highest education level: Not on file   Occupational History      Employer: [employer]   Social Needs    Financial resource strain: Not very hard    Food insecurity:     Worry: Never true     Inability: Never true    Transportation needs:     Medical: No     Non-medical: No   Tobacco Use    Smoking status: Current Every Day Smoker     Packs/day: 0.50     Years: 46.00     Pack years: 23.00     Types: Cigarettes     Start date: 4/25/1960    Smokeless tobacco: Never Used    Tobacco comment: patch available    Substance and Sexual Activity    Alcohol use: Yes     Alcohol/week: 1.2 oz     Types: 2 Glasses of wine per week     Frequency: Monthly or less     Drinks per session: Patient refused     Binge frequency: Never     Comment: Social    Drug use: No    Sexual activity: Never   Lifestyle    Physical activity:     Days per week: 3 days     Minutes per session: 20 min    Stress: To some extent   Relationships    Social connections:     Talks on phone: More than three times a week     Gets together: Three times a week     Attends [employer] service: Not on file     Active member of club or organization: Yes     Attends meetings of clubs or organizations: Never     Relationship status:    Other Topics Concern    Not on file   Social History Narrative    Not on file       Current Outpatient Medications   Medication Sig Dispense Refill    amLODIPine (NORVASC) 5 MG tablet TAKE 1 TABLET BY MOUTH ONCE DAILY 90 tablet 1    atorvastatin (LIPITOR) 20 MG tablet TAKE ONE TABLET BY MOUTH ONCE DAILY 90 tablet 1    CALCIUM CARBONATE/VITAMIN D3 (VITAMIN D-3 ORAL) Take by mouth once daily.       fluticasone-salmeterol diskus inhaler 250-50 mcg INHALE ONE DOSE BY MOUTH TWICE DAILY 60 each 11    folic acid (FOLVITE) 1 MG tablet TAKE 1 TABLET BY MOUTH ONCE DAILY EVERY DAY 30 tablet 10    levothyroxine (SYNTHROID) 112 MCG tablet TAKE 1 TABLET BY MOUTH ONCE DAILY 90 tablet 3    promethazine-phenylephrine (PROMETHAZINE VC) 6.25-5 mg/5 mL Syrp Take 5 mLs by mouth every 6 (six) hours  as needed. 240 mL 2    thiamine (VITAMIN B-1) 100 MG tablet Take 100 mg by mouth once daily.      varenicline (CHANTIX STARTING MONTH BOX) 0.5 mg (11)- 1 mg (42) tablet Take one 0.5mg tab by mouth once daily X3 days,then increase to one 0.5mg tab twice daily X4 days,then increase to one 1mg tab twice daily 1 Package 0    VITAMIN B COMPLEX ORAL Every day      vitamin E/flaxseed oil (FLAXSEED OIL-VITAMIN E ORAL) Take by mouth.       No current facility-administered medications for this visit.      Facility-Administered Medications Ordered in Other Visits   Medication Dose Route Frequency Provider Last Rate Last Dose    lidocaine (PF) 10 mg/ml (1%) injection 10 mg  1 mL Intradermal Once Eladia Schwartz MD         Review of patient's allergies indicates:   Allergen Reactions    Sulfamethoxazole-trimethoprim      Other reaction(s): per dr daryn Rodríguez (sulfonamide antibiotics)      NOT SURE REACTION       Review of Systems   All other systems reviewed and are negative.      Objective:      There were no vitals filed for this visit.  Physical Exam   Cardiovascular: Normal rate.   Pulmonary/Chest: Effort normal.   Abdominal: Soft.   Genitourinary: Vagina normal.            Assessment:       1. Left ureteral calculus        Plan:       Left ESWL with cystoscopy and stent removal

## 2019-07-15 NOTE — H&P
Subjective:       Patient ID: Kirstie Bowden is a 74 y.o. female.    Chief Complaint:  13 mm proximal left ureteral calculus for which ureteral stent was placed on 06/24/2019.  She is now scheduled undergo ESWL.    Past Medical History:   Diagnosis Date    Arthritis     Asthma     Cancer     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    Hyperlipidemia     Hypertension     Pseudocholinesterase deficiency     family history    Thyroid disease      Past Surgical History:   Procedure Laterality Date    BIOPSY, LYMPH NODE, SENTINEL Left 3/14/2019    [facility]    BREAST BIOPSY Left 20 yrs ago    benign    CHOLECYSTECTOMY      CYSTOSCOPY, WITH URETERAL STENT INSERTION Left 6/24/2019    Performed by [facility] at [facility]    DILATION, URETER, USING BALLOON Left 6/24/2019    Per[facility] at [facility]    EYE SURGERY Bilateral     cataracts    HYSTERECTOMY      LUMPECTOMY, BREAST Left 3/14/2019    Performed by [facility] at [facility]    LYMPHADENECTOMY, AXILLARY Left 3/14/2019    Performed by [facility]    MASTECTOMY, PARTIAL Left 4/25/2019    Performed by [facility] at [facility]    NEEDLE LOCALIZATION Left 3/14/2019    Performed by [facility] at [facility]    PYELOGRAM, RETROGRADE Bilateral 6/24/2019    Performed [facility] at [facility]    REMOVAL-MASS N/A 3/14/2019    Performed by [facility] at [facility]    TONSILLECTOMY      URETEROSCOPY Left 6/24/2019    Performed by [facility] at [facility]     Family History   Problem Relation Age of Onset    Heart disease Mother     Hypertension Mother     Alzheimer's disease Mother     Cancer Father      Social History     Socioeconomic History    Marital status:      Spouse name: Not on file    Number of children: Not on file    Years of education: Not on file    Highest education level: Not on file   Occupational History      Employer: [employer]   Social Needs    Financial resource strain: Not very hard    Food insecurity:     Worry: Never true     Inability: Never true    Transportation needs:     Medical: No     Non-medical: No   Tobacco Use    Smoking status: Current Every Day Smoker     Packs/day: 0.50     Years: 46.00     Pack years: 23.00     Types: Cigarettes     Start date: 4/25/1960    Smokeless tobacco: Never Used    Tobacco comment: patch available    Substance and Sexual Activity    Alcohol use: Yes     Alcohol/week: 1.2 oz     Types: 2 Glasses of wine per week     Frequency: Monthly or less     Drinks per session: Patient refused     Binge frequency: Never     Comment: Social    Drug use: No    Sexual activity: Never   Lifestyle    Physical activity:     Days per week: 3 days     Minutes per session: 20 min    Stress: To some extent   Relationships    Social connections:     Talks on phone: More than three times a week     Gets together: Three times a week     Attends Jehovah's witness service: Not on file     Active member of club or organization: Yes     Attends meetings of clubs or organizations: Never     Relationship status:    Other Topics Concern    Not on file   Social History Narrative    Not on file       Current Outpatient Medications   Medication Sig Dispense Refill    amLODIPine (NORVASC) 5 MG tablet TAKE 1 TABLET BY MOUTH ONCE DAILY 90 tablet 1    atorvastatin (LIPITOR) 20 MG tablet TAKE ONE TABLET BY MOUTH ONCE DAILY 90 tablet 1    CALCIUM CARBONATE/VITAMIN D3 (VITAMIN D-3 ORAL) Take by mouth once daily.       fluticasone-salmeterol diskus inhaler 250-50 mcg INHALE ONE DOSE BY MOUTH TWICE DAILY 60 each 11    folic acid (FOLVITE) 1 MG tablet TAKE 1 TABLET BY MOUTH ONCE DAILY EVERY DAY 30 tablet 10    levothyroxine (SYNTHROID) 112 MCG tablet TAKE 1 TABLET BY MOUTH ONCE DAILY 90 tablet 3    promethazine-phenylephrine (PROMETHAZINE VC) 6.25-5 mg/5 mL Syrp Take 5 mLs by mouth every 6 (six) hours  as needed. 240 mL 2    thiamine (VITAMIN B-1) 100 MG tablet Take 100 mg by mouth once daily.      varenicline (CHANTIX STARTING MONTH BOX) 0.5 mg (11)- 1 mg (42) tablet Take one 0.5mg tab by mouth once daily X3 days,then increase to one 0.5mg tab twice daily X4 days,then increase to one 1mg tab twice daily 1 Package 0    VITAMIN B COMPLEX ORAL Every day      vitamin E/flaxseed oil (FLAXSEED OIL-VITAMIN E ORAL) Take by mouth.       No current facility-administered medications for this visit.      Facility-Administered Medications Ordered in Other Visits   Medication Dose Route Frequency Provider Last Rate Last Dose    lidocaine (PF) 10 mg/ml (1%) injection 10 mg  1 mL Intradermal Once Eladia Schwartz MD         Review of patient's allergies indicates:   Allergen Reactions    Sulfamethoxazole-trimethoprim      Other reaction(s): per dr daryn Rodríguez (sulfonamide antibiotics)      NOT SURE REACTION       Review of Systems   All other systems reviewed and are negative.      Objective:      There were no vitals filed for this visit.  Physical Exam   Cardiovascular: Normal rate.   Pulmonary/Chest: Effort normal.   Abdominal: Soft.   Genitourinary: Vagina normal.            Assessment:       1. Left ureteral calculus        Plan:       Left ESWL with cystoscopy and stent removal

## 2019-07-17 ENCOUNTER — DOCUMENTATION ONLY (OUTPATIENT)
Dept: HEMATOLOGY/ONCOLOGY | Facility: CLINIC | Age: 74
End: 2019-07-17

## 2019-07-17 ENCOUNTER — HOSPITAL ENCOUNTER (OUTPATIENT)
Dept: PREADMISSION TESTING | Facility: HOSPITAL | Age: 74
Discharge: HOME | End: 2019-07-17
Attending: UROLOGY
Payer: MEDICARE

## 2019-07-17 VITALS — WEIGHT: 174 LBS | BODY MASS INDEX: 26.37 KG/M2 | HEIGHT: 68 IN

## 2019-07-17 PROCEDURE — 99900104 DSU ONLY-NO CHARGE-EA ADD'L HR (STAT)

## 2019-07-17 PROCEDURE — 99900103 DSU ONLY-NO CHARGE-INITIAL HR (STAT)

## 2019-07-17 NOTE — DISCHARGE INSTRUCTIONS
To confirm, Your doctor has instructed you that surgery is scheduled for: 7/23/19   [facility]  [phone]    Please report to [facility] the morning of surgery. You must check-in and receive a wristband before going to your procedure.    Pre-Op will call the afternoon prior to surgery between 1:00 and 6:00 PM with the final arrival time.  Phone number: [phone]    PLEASE NOTE:  The surgery schedule has many variables which may affect the time of your surgery case.  Family members should be available if your surgery time changes.  Plan to be here the day of your procedure between 4-6 hours.    MEDICATIONS:  TAKE ONLY THESE MEDICATIONS WITH A SMALL SIP OF WATER THE MORNING OF YOUR PROCEDURE:    LEVOTHYROXINE    DO NOT TAKE THESE MEDICATIONS 5-7 DAYS PRIOR to your procedure or per your surgeon's request: ASPIRIN, ALEVE, ADVIL, IBUPROFEN, FISH OIL VITAMIN E, HERBALS  (May take Tylenol)     LAST DOSE 7/17/19      ONLY if you are prescribed any types of blood thinners such as:  Aspirin, Coumadin, Plavix, Pradaxa, Xarelto, Aggrenox, Effient, Eliquis, Savasya, Brilinta, or any other, ask your surgeon whether you should stop taking them and how long before surgery you should stop.  You may also need to verify with the prescribing physician if it is ok to stop your medication.      INSTRUCTIONS IMPORTANT!!  · Do not eat or drink anything between midnight and the time of your procedure- this includes gum, mints, and candy.  · Do not smoke or drink alcoholic beverages 24 hours prior to your procedure.  · Shower the night before AND the morning of your procedure with a Chlorhexidine wash such as Hibiclens or Dial antibacterial soap from the neck down.  Do not get it on your face or in your eyes.  You may use your own shampoo and face wash. This helps your skin to be as bacteria free as possible.    · If you wear contact lenses, dentures, hearing aids or glasses, bring a container to put  them in during surgery and give to a family member for safe keeping.  Please leave all jewelry, piercing's and valuables at home.   · DO NOT remove hair from the surgery site.  Do not shave the incision site unless you are given specific instructions to do so.    · ONLY if you have been diagnosed with sleep apnea please bring your C-PAP machine.  · ONLY if you wear home oxygen please bring your portable oxygen tank the day of your procedure.  · ONLY if you have a history of OPEN HEART SURGERY you will need a clearance from your Cardiologist per Anesthesia.      · ONLY for patients requiring bowel prep, written instructions will be given by your doctor's office.  · ONLY if you have a neuro stimulator, please bring the controller with you the morning of surgery  · ONLY if a type and screen test is needed before surgery, please return:  · If your doctor has scheduled you for an overnight stay, bring a small overnight bag with any personal items you need.  · Make arrangements in advance for transportation home by a responsible adult.  It is not safe to drive a vehicle during the 24 hours after anesthesia.      · Visiting hours are 10:00AM to 8:30PM.  For the safety of all patients, visitors under the age of 12 are not allowed above the first floor of the hospital.    · All [facility] facilities and properties are tobacco free.  Smoking is NOT allowed.       If you have any questions about these instructions, call Pre-Op Admit  Nursing at [phone] or the Pre-Op Day Surgery Unit at [phone].

## 2019-07-18 ENCOUNTER — CLINICAL SUPPORT (OUTPATIENT)
Dept: HEMATOLOGY/ONCOLOGY | Facility: CLINIC | Age: 74
End: 2019-07-18
Payer: COMMERCIAL

## 2019-07-18 NOTE — PROGRESS NOTES
Tracy Guerrero, RN   [facility] Infusion Nurse       Progress Notes                       Kirstie Bowden   1945     [facility]   Cancer Center     TITLE: PLAN OF CARE FOR THE CHEMOTHERAPY PATIENT / TEACHING PROTOCOL     PURPOSE:     To involve the patient / family in the plan of care and to provide teaching to the significant other & patient receiving chemotherapy.     LEVEL:           Independent.     CONTENT:     The Plan of Care for the chemotherapy patient is individualized and appropriate to the patients needs, strengths, limitations, & goals.  Education includes information regarding chemotherapy side effects, the treatment itself, and self-care  Activities.     GOAL / OUTCOME STANDARDS                  PHYSIOLOGIC: The client will remain free or experience minimal side effects or toxicities throughout the chemotherapy treatment period.                              PSYCHOLOGIC: The client/significant others will demonstrate positive coping mechanisms in relation to chemotherapy and its side effects.                                               COGINITIVE: The client/significant others will verbalize understanding of self-care measure to avoid/minimize side effects of the chemotherapy regime.     EVALUATION / COMMENT KEY:     V =       Audiovisual/Video  S =       Successfully meets outcome  N =       Needs further instruction  NA =    Not applicable to the patient  P =       Previous knowledge  U =       Unable to comprehend  * =        See progress notes              PLAN OF CARE  INFORMATION TO BE DELIVERED / NURSING INTERVENTIONS    DATE EVALUATION 1.   Assessment of client/caregiver,          knowledge of cancer diagnosis,          and chemotherapy as a treatment.  1a. Evaluate patient/caregiver learning ability     b. Plan teaching sessions with patient/caregiver according to needs and present anxiety level/ability to learn.     c. Provide Chemotherapy  Education Packet,         Mouth Care Protocol,          Specific Patient Education Sheets. 07/18/2019  S 2.   Individual chemotherapy treatment          plan.  2a. Review of Chemotherapy Education handout from [email or web address]         07/18/2019      S 3.   Knowledge Deficit & Self-Management of general side effects common to all chemotherapy:  a. Nausea/Vomiting   b.   Diarrhea  c. Mouth Care  d. Dental care  e. Constipation  f. Hair Loss  g. Potential for infection  h. Fatigue    3a. Reinforce that the majority of side effects from chemotherapy are reversible and are  controlled both in the hospital and at home        (blood counts recover, hair grows back).   b.  Refer to the following for reinforcement of         information post-treatment:  1. Mouth Care Protocol.  2. Bowel Protocol for constipation or diarrhea.  3.  Drug Specific Chemotherapy Information Sheets for each medication patient receiving.     07/18/2019       S      PLAN OF CARE  INFORMATION TO BE DELIVERED / NURSING INTERVENTIONS    DATE EVALUATION   h. Potential for bleeding         i. Potential anemia/fatigue         j. Potential sunburn         k. Birth control measures  l. Safety measures post treatment 4.  Chemotherapy Home Care Instruction  and Safety Information Sheet.  A. patient/caregivers to thoroughly cook shellfish (shrimp, crab, etc) to decrease the chance of infection.    B.  Use sunscreen and protective clothing while in the sun.    07/18/2019      4.   Knowledge deficit & Self Management of Drug Specific  Side Effects.     a. BLADDER EFFECTS         (Hemorrhagic Cystitis)           Preventable with adequate hydration; occurs 2-3 days or more post treatment.      1.  Instruct patient to:   a.   Void at least every 2 hours; increase intake.   b.   DO NOT hold urine; go when urge is felt.   c.    Empty bladder at bedtime and on          awakening.   d.   Observe for color changes (red to tea            colored), amount and frequency  changes.   e.   Notify oncologist of any abnormalities           in urine or voiding or if you cannot               drink adequate fluids.     07/18/2019      S    b.   CHANGES IN URINE        COLOR:              1.   Instruct patient:   a.   Most evident in first 2-3 voidings after            administration.  b. Lasts less than 24 hours.  c. If urine is discolored 2 or more days post- treatment, notify oncologist.       07/18/2019 S   c.    KIDNEY EFFECTS           (Nephrotoxicity)    1.  Instruct patient to:  a.   Drink 8-16 glasses of fluid/day the day   pre-treatment and 3-4 days post-treatment to maintain hydration; the best way to minimize kidney problems.  b.   Notify oncologist immediately if unable to drink fluids or if changes are noted in urinary elimination.         7/18/2019      S a.   PULMONARY TOXICITY     1. Instruct patient to report symptoms such as shortness of breath, chest pain, shallow breathing, or chest wall discomfort to physician.  2. Reinforce preventative measures used by the health care team.  a. Baseline and periodic PFT and chest x-ray.    07/18/2019    S      PLAN OF CARE INFORMATION TO BE DELIVERED / NURSING INTERVENTIONS    DATE EVALUATION b.   NERVE & MUSCLE EFFECTS (neurotoxocity; neuropathy, possible visual/hearing changes)           3. Instruct patient to:     a. Report numbness or tingling of the hands/feet, loss of fine motor movement (buttoning shirt, tying shoelaces), or gait changes to your oncologist.  b. If numbness/tingling are present:  1. protect feet with shoes at all times.  2. Use gloves for washing dishes/gardening & potholders in kitchen.          07/18/2019    S c.   CARDIOTOXICITY  Decreased effectiveness of             cardiac function. Effective are                  cumulative and irreversible.                  CARDIAC ARRYTHMIAS              4   Instruct:  a. Heart function may be tested before treatment and perdiocally during treatment.  b. Notify  oncologist of irregular pulse, palpitations, shortness of breath, or swelling in lower extremities/feet.             Taxol and Taxotere can cause arrhythmias on infusion that resolve once infusion discontinued. Instruct nurse if any irregularity felt.    07/18/2019    S d.   EXTRAVASTION  Occurs when vesicants leak outside of vein and cause damage to the skin and underlying tissues.    1. Reinforce preventive measures used to avoid complications.  a. Fresh IV site or central line monitored continuously with vesicant IVP.  b. Continuous infusion via central line site and blood return monitored periodically around the clock.  2. Instruct to:  a. Notify nurse of any discomfort, burning, stinging, etc. at IV site during chemotherapy administration.  b. Notify oncologist of any redness, pain, or swelling at IV site after discharge from hospital.    07/18/2019    S e.   HYPERSENSITIVITY can happen with any medication.    1. Instruct patient:  a. Nurse is with them during the initial part of treatment and will be close by to monitor.  b. Pre-medication ordered by the oncologist must be taken on time. If doses are missed, treatment will need to be re-scheduled.  c. Skin redness, itching, or hives appearing after discharge should be reported to oncologist. 07/18/2019    S         PLAN OF CARE INFORMATION TO BE DELIVERED / NURSING INTERVENTIONS    DATE EVALUATION f.   FLU-LIKE SYNDROME           1. Instruct patient symptoms are hard to prevent and may include fever, shaking chills, muscle and body aches.  a. Taking prescribed medications from physician if needed.  b. Adequate fluids are important.    2. Reinforce the need to call if temperature is         elevated to 100.4 or more  07/18/2019    S g.   HAND-FOOT SYNDROME  causes painful, symmetric swelling and redness of palms and soles          5. Instruct patient to report any numbness or tingling in the hands or feet.  6. Explain prevention techniques, such as     a. Use  heavy moisturizers to lessen skin dryness and itching, but to avoid if skin is cracked or broken  b. Bathe in tepid water, use non-perfumed soap, and wash gently. Baths with oatmeal or diluted baking soda may be soothing.  c. Avoid tight fitting shoes and repetitive actions, such as rubbing hands or applying pressure to hands/feet.  7. Review measures to take should syndrome occur:  a. Cold compresses and elevation for          edema  b. Pain medications and other measures as ordered by oncologist.   4.   Syndrome resolves few weeks after therapy. 07/18/2019    S   5. DISCHARGE PLANNING /        EDUCATION 1.    Explain importance of compliance with follow- up  tests (CBC, CMP).  2.    Verify patient/caregiver know:  a.    Oncologists office phone number.  b.    Dates of follow-up appointments.  c.    Prescriptions given for nausea  3.   Review side effects to monitor and notify          oncologist about.  4.   Reinforce the need for patient and caregivers to:  a.    Review information given.  b.    Call oncologists office with questions          or symptoms  5.   Provide [facility] [place] Brochure make referrals if needed for financial or .    07/18/2019    S      PROGRESS NOTES: I met with the patient and family  today for chemotherapy education. She will be starting treatment with Cytoxan and Taxotere. We discussed the mechanism of action, potential side effects of this treatment as well as ways he can manage them at home. Some of these side effects include but or not limited to fever, nausea, vomiting, decreased appetite, fatigue, weakness, cytopenias, myalgia/arthralgia, constipation, diarrhea, bleeding, headache, shortness of breath, nail changes, taste change, hair thinning/loss, mood disturbances, or edema. We also discussed dietary modifications she should make although this will be discussed in more detail with the dietician. She was provided with a copy of all of the information we  discussed today as well as our contact information. She will be provided a schedule on his first day of treatment.. Patient states deanne get prescription for anti-emetics at next appointment with Dr Li.  All questions were answered and an informed consent was obtained. She was reminded to certainly contact us sooner if needed.  Attached to the patients folder and discussed with the patient the 24 hour/ 7 days a week after hours telephone number for the physician.  Patient notified to call anytime 24/7 because there is a physician on call for any problems that may arise.  Patient also notified to report to [facility] if they can not get in touch with a physician after hours.

## 2019-07-18 NOTE — PROGRESS NOTES
"I met with patient to update her distress screening; she indicated a rating of 3.  She has spoken with her PCP and Oncologist regarding the items checked "yes" under the "physical problems" on the distress screening.  She continues to deny needing psychosocial support or community resource information at this time.  She has my contact information in the event she needs assistance in the future.   "

## 2019-07-18 NOTE — PROGRESS NOTES
Met with pt during chemo school, she has /BC [employer], states her BC picks up the 20% - will keep an eye on her account in case she needs a micah.  Chemo josseline given.

## 2019-07-21 DIAGNOSIS — I10 ESSENTIAL HYPERTENSION, BENIGN: ICD-10-CM

## 2019-07-21 DIAGNOSIS — E78.5 HYPERLIPIDEMIA: ICD-10-CM

## 2019-07-21 RX ORDER — AMLODIPINE BESYLATE 5 MG/1
TABLET ORAL
Qty: 90 TABLET | Refills: 3 | Status: SHIPPED | OUTPATIENT
Start: 2019-07-21 | End: 2020-07-24

## 2019-07-21 RX ORDER — ATORVASTATIN CALCIUM 20 MG/1
TABLET, FILM COATED ORAL
Qty: 90 TABLET | Refills: 3 | Status: SHIPPED | OUTPATIENT
Start: 2019-07-21 | End: 2020-07-24

## 2019-07-22 ENCOUNTER — ANESTHESIA EVENT (OUTPATIENT)
Dept: SURGERY | Facility: HOSPITAL | Age: 74
End: 2019-07-22
Payer: MEDICARE

## 2019-07-23 ENCOUNTER — ANESTHESIA (OUTPATIENT)
Dept: SURGERY | Facility: HOSPITAL | Age: 74
End: 2019-07-23
Payer: MEDICARE

## 2019-07-23 ENCOUNTER — HOSPITAL ENCOUNTER (OUTPATIENT)
Facility: HOSPITAL | Age: 74
Discharge: HOME | End: 2019-07-23
Attending: UROLOGY | Admitting: UROLOGY
Payer: MEDICARE

## 2019-07-23 DIAGNOSIS — N20.1 LEFT URETERAL CALCULUS: ICD-10-CM

## 2019-07-23 PROCEDURE — D9220A PRA ANESTHESIA: ICD-10-PCS | Mod: CRNA,,, | Performed by: NURSE ANESTHETIST, CERTIFIED REGISTERED

## 2019-07-23 PROCEDURE — 52310 PR CYSTOSCOPY,REMV CALCULUS,SIMPLE: ICD-10-PCS | Mod: 59,,, | Performed by: UROLOGY

## 2019-07-23 PROCEDURE — 63600175 PHARM REV CODE 636 W HCPCS: Performed by: UROLOGY

## 2019-07-23 PROCEDURE — 36000707: Performed by: UROLOGY

## 2019-07-23 PROCEDURE — 71000015 HC POSTOP RECOV 1ST HR: Performed by: UROLOGY

## 2019-07-23 PROCEDURE — 27200651 HC AIRWAY, LMA: Performed by: NURSE ANESTHETIST, CERTIFIED REGISTERED

## 2019-07-23 PROCEDURE — 71000033 HC RECOVERY, INTIAL HOUR: Performed by: UROLOGY

## 2019-07-23 PROCEDURE — 63600175 PHARM REV CODE 636 W HCPCS: Performed by: NURSE ANESTHETIST, CERTIFIED REGISTERED

## 2019-07-23 PROCEDURE — D9220A PRA ANESTHESIA: Mod: CRNA,,, | Performed by: NURSE ANESTHETIST, CERTIFIED REGISTERED

## 2019-07-23 PROCEDURE — 25000003 PHARM REV CODE 250: Performed by: UROLOGY

## 2019-07-23 PROCEDURE — 25000003 PHARM REV CODE 250: Performed by: ANESTHESIOLOGY

## 2019-07-23 PROCEDURE — D9220A PRA ANESTHESIA: ICD-10-PCS | Mod: ANES,,, | Performed by: ANESTHESIOLOGY

## 2019-07-23 PROCEDURE — 36000706: Performed by: UROLOGY

## 2019-07-23 PROCEDURE — 52310 CYSTOSCOPY AND TREATMENT: CPT | Mod: 59,,, | Performed by: UROLOGY

## 2019-07-23 PROCEDURE — D9220A PRA ANESTHESIA: Mod: ANES,,, | Performed by: ANESTHESIOLOGY

## 2019-07-23 PROCEDURE — 99900103 DSU ONLY-NO CHARGE-INITIAL HR (STAT): Performed by: UROLOGY

## 2019-07-23 PROCEDURE — 50590 FRAGMENTING OF KIDNEY STONE: CPT | Mod: LT,,, | Performed by: UROLOGY

## 2019-07-23 PROCEDURE — 50590 PR FRAGMENT KIDNEY STONE/ ESWL: ICD-10-PCS | Mod: LT,,, | Performed by: UROLOGY

## 2019-07-23 PROCEDURE — 99900104 DSU ONLY-NO CHARGE-EA ADD'L HR (STAT): Performed by: UROLOGY

## 2019-07-23 PROCEDURE — 37000008 HC ANESTHESIA 1ST 15 MINUTES: Performed by: UROLOGY

## 2019-07-23 PROCEDURE — 37000009 HC ANESTHESIA EA ADD 15 MINS: Performed by: UROLOGY

## 2019-07-23 RX ORDER — LIDOCAINE HYDROCHLORIDE 20 MG/ML
JELLY TOPICAL
Status: DISCONTINUED | OUTPATIENT
Start: 2019-07-23 | End: 2019-07-23 | Stop reason: HOSPADM

## 2019-07-23 RX ORDER — MEPERIDINE HYDROCHLORIDE 50 MG/ML
12.5 INJECTION INTRAMUSCULAR; INTRAVENOUS; SUBCUTANEOUS ONCE AS NEEDED
Status: DISCONTINUED | OUTPATIENT
Start: 2019-07-23 | End: 2019-07-23 | Stop reason: HOSPADM

## 2019-07-23 RX ORDER — PROMETHAZINE HYDROCHLORIDE 25 MG/ML
INJECTION, SOLUTION INTRAMUSCULAR; INTRAVENOUS
Status: DISCONTINUED | OUTPATIENT
Start: 2019-07-23 | End: 2019-07-23

## 2019-07-23 RX ORDER — CEPHALEXIN 500 MG/1
500 CAPSULE ORAL 3 TIMES DAILY
Qty: 15 CAPSULE | Refills: 0 | Status: SHIPPED | OUTPATIENT
Start: 2019-07-23 | End: 2019-08-08 | Stop reason: ALTCHOICE

## 2019-07-23 RX ORDER — PHENAZOPYRIDINE HYDROCHLORIDE 100 MG/1
200 TABLET, FILM COATED ORAL
Qty: 20 TABLET | Refills: 0 | Status: SHIPPED | OUTPATIENT
Start: 2019-07-23 | End: 2019-08-08 | Stop reason: ALTCHOICE

## 2019-07-23 RX ORDER — SCOLOPAMINE TRANSDERMAL SYSTEM 1 MG/1
1 PATCH, EXTENDED RELEASE TRANSDERMAL
Status: DISCONTINUED | OUTPATIENT
Start: 2019-07-23 | End: 2019-07-23 | Stop reason: HOSPADM

## 2019-07-23 RX ORDER — DIPHENHYDRAMINE HYDROCHLORIDE 50 MG/ML
25 INJECTION INTRAMUSCULAR; INTRAVENOUS EVERY 6 HOURS PRN
Status: DISCONTINUED | OUTPATIENT
Start: 2019-07-23 | End: 2019-07-23 | Stop reason: HOSPADM

## 2019-07-23 RX ORDER — HYDROMORPHONE HYDROCHLORIDE 2 MG/ML
0.2 INJECTION, SOLUTION INTRAMUSCULAR; INTRAVENOUS; SUBCUTANEOUS EVERY 5 MIN PRN
Status: DISCONTINUED | OUTPATIENT
Start: 2019-07-23 | End: 2019-07-23 | Stop reason: HOSPADM

## 2019-07-23 RX ORDER — ONDANSETRON 2 MG/ML
4 INJECTION INTRAMUSCULAR; INTRAVENOUS DAILY PRN
Status: DISCONTINUED | OUTPATIENT
Start: 2019-07-23 | End: 2019-07-23 | Stop reason: HOSPADM

## 2019-07-23 RX ORDER — FENTANYL CITRATE 50 UG/ML
25 INJECTION, SOLUTION INTRAMUSCULAR; INTRAVENOUS EVERY 5 MIN PRN
Status: DISCONTINUED | OUTPATIENT
Start: 2019-07-23 | End: 2019-07-23 | Stop reason: HOSPADM

## 2019-07-23 RX ORDER — LIDOCAINE HYDROCHLORIDE 10 MG/ML
1 INJECTION, SOLUTION EPIDURAL; INFILTRATION; INTRACAUDAL; PERINEURAL ONCE
Status: DISCONTINUED | OUTPATIENT
Start: 2019-07-23 | End: 2019-07-23 | Stop reason: HOSPADM

## 2019-07-23 RX ORDER — LIDOCAINE HCL/PF 100 MG/5ML
SYRINGE (ML) INTRAVENOUS
Status: DISCONTINUED | OUTPATIENT
Start: 2019-07-23 | End: 2019-07-23

## 2019-07-23 RX ORDER — FUROSEMIDE 10 MG/ML
20 INJECTION INTRAMUSCULAR; INTRAVENOUS ONCE
Status: COMPLETED | OUTPATIENT
Start: 2019-07-23 | End: 2019-07-23

## 2019-07-23 RX ORDER — HYDROCODONE BITARTRATE AND ACETAMINOPHEN 5; 325 MG/1; MG/1
1 TABLET ORAL
Qty: 20 TABLET | Refills: 0 | Status: SHIPPED | OUTPATIENT
Start: 2019-07-23 | End: 2019-08-08 | Stop reason: ALTCHOICE

## 2019-07-23 RX ORDER — OXYCODONE HYDROCHLORIDE 5 MG/1
5 TABLET ORAL
Status: DISCONTINUED | OUTPATIENT
Start: 2019-07-23 | End: 2019-07-23 | Stop reason: HOSPADM

## 2019-07-23 RX ORDER — DEXAMETHASONE SODIUM PHOSPHATE 4 MG/ML
INJECTION, SOLUTION INTRA-ARTICULAR; INTRALESIONAL; INTRAMUSCULAR; INTRAVENOUS; SOFT TISSUE
Status: DISCONTINUED | OUTPATIENT
Start: 2019-07-23 | End: 2019-07-23

## 2019-07-23 RX ORDER — HYDROCODONE BITARTRATE AND ACETAMINOPHEN 5; 325 MG/1; MG/1
1 TABLET ORAL EVERY 4 HOURS PRN
Status: DISCONTINUED | OUTPATIENT
Start: 2019-07-23 | End: 2019-07-23 | Stop reason: HOSPADM

## 2019-07-23 RX ORDER — CEFAZOLIN SODIUM 2 G/50ML
2 SOLUTION INTRAVENOUS
Status: COMPLETED | OUTPATIENT
Start: 2019-07-23 | End: 2019-07-23

## 2019-07-23 RX ORDER — ONDANSETRON HYDROCHLORIDE 2 MG/ML
INJECTION, SOLUTION INTRAMUSCULAR; INTRAVENOUS
Status: DISCONTINUED | OUTPATIENT
Start: 2019-07-23 | End: 2019-07-23

## 2019-07-23 RX ORDER — SODIUM CHLORIDE 0.9 % (FLUSH) 0.9 %
3 SYRINGE (ML) INJECTION
Status: DISCONTINUED | OUTPATIENT
Start: 2019-07-23 | End: 2019-07-23 | Stop reason: HOSPADM

## 2019-07-23 RX ORDER — PHENAZOPYRIDINE HYDROCHLORIDE 200 MG/1
200 TABLET, FILM COATED ORAL ONCE
Status: COMPLETED | OUTPATIENT
Start: 2019-07-23 | End: 2019-07-23

## 2019-07-23 RX ORDER — SODIUM CHLORIDE 0.9 % (FLUSH) 0.9 %
3 SYRINGE (ML) INJECTION EVERY 8 HOURS
Status: DISCONTINUED | OUTPATIENT
Start: 2019-07-23 | End: 2019-07-23 | Stop reason: HOSPADM

## 2019-07-23 RX ORDER — FENTANYL CITRATE 50 UG/ML
INJECTION, SOLUTION INTRAMUSCULAR; INTRAVENOUS
Status: DISCONTINUED | OUTPATIENT
Start: 2019-07-23 | End: 2019-07-23

## 2019-07-23 RX ORDER — KETOROLAC TROMETHAMINE 30 MG/ML
INJECTION, SOLUTION INTRAMUSCULAR; INTRAVENOUS
Status: DISCONTINUED | OUTPATIENT
Start: 2019-07-23 | End: 2019-07-23

## 2019-07-23 RX ORDER — PROPOFOL 10 MG/ML
VIAL (ML) INTRAVENOUS
Status: DISCONTINUED | OUTPATIENT
Start: 2019-07-23 | End: 2019-07-23

## 2019-07-23 RX ORDER — SODIUM CHLORIDE, SODIUM LACTATE, POTASSIUM CHLORIDE, CALCIUM CHLORIDE 600; 310; 30; 20 MG/100ML; MG/100ML; MG/100ML; MG/100ML
INJECTION, SOLUTION INTRAVENOUS CONTINUOUS
Status: DISCONTINUED | OUTPATIENT
Start: 2019-07-23 | End: 2019-07-23 | Stop reason: HOSPADM

## 2019-07-23 RX ORDER — PHENYLEPHRINE HYDROCHLORIDE 10 MG/ML
INJECTION INTRAVENOUS
Status: DISCONTINUED | OUTPATIENT
Start: 2019-07-23 | End: 2019-07-23

## 2019-07-23 RX ADMIN — PROMETHAZINE HYDROCHLORIDE 6.25 MG: 25 INJECTION INTRAMUSCULAR; INTRAVENOUS at 07:07

## 2019-07-23 RX ADMIN — DEXAMETHASONE SODIUM PHOSPHATE 4 MG: 4 INJECTION, SOLUTION INTRAMUSCULAR; INTRAVENOUS at 07:07

## 2019-07-23 RX ADMIN — SCOPALAMINE 1 PATCH: 1 PATCH, EXTENDED RELEASE TRANSDERMAL at 07:07

## 2019-07-23 RX ADMIN — OXYCODONE HYDROCHLORIDE 5 MG: 5 TABLET ORAL at 08:07

## 2019-07-23 RX ADMIN — LIDOCAINE HYDROCHLORIDE 50 MG: 20 INJECTION, SOLUTION INTRAVENOUS at 07:07

## 2019-07-23 RX ADMIN — FENTANYL CITRATE 50 MCG: 50 INJECTION, SOLUTION INTRAMUSCULAR; INTRAVENOUS at 07:07

## 2019-07-23 RX ADMIN — ONDANSETRON 4 MG: 2 INJECTION, SOLUTION INTRAMUSCULAR; INTRAVENOUS at 07:07

## 2019-07-23 RX ADMIN — PHENYLEPHRINE HYDROCHLORIDE 200 MCG: 10 INJECTION INTRAVENOUS at 07:07

## 2019-07-23 RX ADMIN — SODIUM CHLORIDE, SODIUM LACTATE, POTASSIUM CHLORIDE, AND CALCIUM CHLORIDE: .6; .31; .03; .02 INJECTION, SOLUTION INTRAVENOUS at 06:07

## 2019-07-23 RX ADMIN — PHENAZOPYRIDINE HYDROCHLORIDE 200 MG: 200 TABLET ORAL at 08:07

## 2019-07-23 RX ADMIN — CEFAZOLIN SODIUM 2 G: 2 SOLUTION INTRAVENOUS at 07:07

## 2019-07-23 RX ADMIN — PROPOFOL 140 MG: 10 INJECTION, EMULSION INTRAVENOUS at 07:07

## 2019-07-23 RX ADMIN — SODIUM CHLORIDE, SODIUM LACTATE, POTASSIUM CHLORIDE, AND CALCIUM CHLORIDE: .6; .31; .03; .02 INJECTION, SOLUTION INTRAVENOUS at 08:07

## 2019-07-23 RX ADMIN — FUROSEMIDE 20 MG: 10 INJECTION, SOLUTION INTRAMUSCULAR; INTRAVENOUS at 09:07

## 2019-07-23 RX ADMIN — KETOROLAC TROMETHAMINE 30 MG: 30 INJECTION, SOLUTION INTRAMUSCULAR; INTRAVENOUS at 08:07

## 2019-07-23 NOTE — OP NOTE
PREOPERATIVE DIAGNOSIS: left proximal ureteral calculus, left ureteral stent.    POST OPERATIVE DIAGNOSIS: left proximal ureteral calculus, left ureteral stent    OPERATION: left extracorporeal shock wave lithotripsy, cystoscopy with stent removal    DATE: 7/23/2019    SURGEON: Jorden Dickson MD    ANESTHESIA: General    PROCEDURE IN DETAIL: The patient was brought to the lithotripsy treatment room and placed on the Litho Celine treatment table where the patient's left flank was over the water bag. After adequate induction of general anesthesia, plain and AP fluoroscopic images were obtained of the left renal area and the 13 mm calculus was visualized over the level of the proximal left ureter lying next to the stent. The calculus was positioned at the F2 point. Shock waves were administered at the rate of 120 shocks per minute using the automatic pacing mechanism. This was initially started at 16 kV and then increased to 26 kV and this was tolerated well throughout the procedure. Periodic repeat fluoroscopic images both AP and oblique were obtained of the left kidney and when necessary, repositioning was carried out. The repeat fluoroscopic images did reveal satisfactory fragmentation of the calculus. Cystoscopy was then carried out with removal of the stent.A total of 3000 shocks were administered at the end of which the patient having tolerated the procedure well, was transferred to the recovery room in stable condition.

## 2019-07-23 NOTE — PLAN OF CARE
Discharge instruction given to pt and family member, verbalized understanding. Pt ambulated to the restroom and voided 450mL.

## 2019-07-23 NOTE — TRANSFER OF CARE
"Anesthesia Transfer of Care Note    Patient: Kirstie Bowden    Procedure(s) Performed: Procedure(s) (LRB):  LITHOTRIPSY, ESWL (Left)  CYSTOSCOPY, WITH URETERAL STENT REMOVAL (Left)    Patient location: PACU    Anesthesia Type: general    Transport from OR: Transported from OR on 2-3 L/min O2 by NC with adequate spontaneous ventilation    Post pain: adequate analgesia    Post assessment: no apparent anesthetic complications and tolerated procedure well    Post vital signs: stable    Level of consciousness: awake, alert and oriented    Nausea/Vomiting: no nausea/vomiting    Complications: none    Transfer of care protocol was followed      Last vitals:   Visit Vitals  BP (!) 143/63 (BP Location: Left arm, Patient Position: Lying)   Pulse 77   Temp 36.2 °C (97.2 °F) (Temporal)   Resp 16   Ht 5' 8" (1.727 m)   Wt 78.9 kg (174 lb)   Breastfeeding? No   BMI 26.46 kg/m²     "

## 2019-07-23 NOTE — ANESTHESIA PREPROCEDURE EVALUATION
07/23/2019  iKrstie Bowden is a 74 y.o., female.    Anesthesia Evaluation    I have reviewed the Patient Summary Reports.    I have reviewed the Nursing Notes.   I have reviewed the Medications.     Review of Systems  Anesthesia Hx:  No problems with previous Anesthesia Pseudocholinesterase Deficiency   Social:  Smoker    Hematology/Oncology:         -- Cancer in past history:   Cardiovascular:   Hypertension, well controlled hyperlipidemia    Pulmonary:   Asthma asymptomatic and mild    Renal/:  Renal/ Normal     Musculoskeletal:   Arthritis (RA)     Neurological:   Neuromuscular Disease,    Endocrine:   Hypothyroidism        Physical Exam  General:  Well nourished    Airway/Jaw/Neck:  Airway Findings: Mouth Opening: Normal Tongue: Normal  General Airway Assessment: Adult  Oropharynx Findings:  Mallampati: II  Jaw/Neck Findings:  Neck ROM: Normal ROM     Eyes/Ears/Nose:  Eyes/Ears/Nose Findings:    Dental:  Dental Findings:   Chest/Lungs:  Chest/Lungs Findings: Normal Respiratory Rate     Heart/Vascular:  Heart Findings: Rate: Normal  Rhythm: Regular Rhythm        Mental Status:  Mental Status Findings:  Cooperative, Alert and Oriented         Anesthesia Plan  Type of Anesthesia, risks & benefits discussed:  Anesthesia Type:  general  Patient's Preference:   Intra-op Monitoring Plan: standard ASA monitors  Intra-op Monitoring Plan Comments:   Post Op Pain Control Plan: multimodal analgesia  Post Op Pain Control Plan Comments:   Induction:   IV  Beta Blocker:  Patient is not currently on a Beta-Blocker (No further documentation required).       Informed Consent: Patient understands risks and agrees with Anesthesia plan.  Questions answered. Anesthesia consent signed with patient.  ASA Score: 3     Day of Surgery Review of History & Physical:  There are no significant changes.   H&P completed by  Anesthesiologist.       Ready For Surgery From Anesthesia Perspective.

## 2019-07-23 NOTE — OR NURSING
Pt. Is AAOx4, calm and cooperative.  Uses Advair inhaler as needed.  Has history of PONV and Dr. Jolly was informed and ordered scopolamine patch which was applied behind her left ear.  RN reviewed plan of care with pt. And granddtr. Who both voiced understanding.  Questions answered, comfort assured. Pt. Prepared for surgery.

## 2019-07-23 NOTE — ANESTHESIA POSTPROCEDURE EVALUATION
Anesthesia Post Evaluation    Patient: Kirstie Bowden    Procedure(s) Performed: Procedure(s) (LRB):  LITHOTRIPSY, ESWL (Left)  CYSTOSCOPY, WITH URETERAL STENT REMOVAL (Left)    Final Anesthesia Type: general  Patient location during evaluation: PACU  Patient participation: Yes- Able to Participate  Level of consciousness: awake and alert and oriented  Post-procedure vital signs: reviewed and stable  Pain management: adequate  Airway patency: patent  PONV status at discharge: No PONV  Anesthetic complications: no      Cardiovascular status: blood pressure returned to baseline and stable  Respiratory status: unassisted and spontaneous ventilation  Hydration status: euvolemic  Follow-up not needed.          Vitals Value Taken Time   /64 7/23/2019  9:31 AM   Temp 36.2 °C (97.2 °F) 7/23/2019  9:31 AM   Pulse 67 7/23/2019  9:31 AM   Resp 14 7/23/2019  9:31 AM   SpO2 95 % 7/23/2019  9:31 AM         Event Time     Out of Recovery 09:13:00          Pain/Cecilia Score: Pain Rating Prior to Med Admin: 2 (7/23/2019  8:47 AM)  Pain Rating Post Med Admin: 0 (7/23/2019  9:16 AM)  Cecilia Score: 10 (7/23/2019  9:33 AM)

## 2019-07-23 NOTE — DISCHARGE INSTRUCTIONS
Cystoscopy  Cystoscopy is a procedure that lets your doctor look directly inside your urethra and bladder. It can be used to:  · Help diagnose a problem with your urethra, bladder, or kidneys.  · Take a sample (biopsy) of bladder or urethral tissue.  · Treat certain problems (such as removing kidney stones).  · Place a stent to bypass an obstruction.  · Take special x-rays of the kidneys.  Based on the findings, your doctor may recommend other tests or treatments.  What Is a Cystoscope?  A cystoscope is a telescope-like instrument that contains lenses and fiberoptics (small glass wires that make bright light). The cystoscope may be straight and rigid, or flexible to bend around curves in the urethra. The doctor may look directly into the cystoscope, or project the image onto a monitor.  Getting Ready  To prepare, stop taking any medications as instructed. Ask whether you should avoid eating or drinking anything after midnight before the procedure. Follow any other instructions your doctor gives you.  Tell your doctor before the exam if you:  · Take any medications, such as aspirin or blood thinners  · Have allergies to any medications  · Are pregnant   The Procedure  Cystoscopy is done in the doctors office or hospital. The doctor and sometimes a nurse are present during the procedure. It takes only a few minutes, longer if a biopsy, x-ray, or treatment needs to be done. During the procedure:  · You lie on an exam table on your back, knees bent and legs apart. You are covered with a drape.  · Your urethra and the area around it are washed. Anesthetic jelly may be applied to numb the urethra. Other pain medication is usually not needed. In some cases, you may be offered a mild sedative to help you relax. If a more extensive procedure is to be done, such as a biopsy or kidney stone removal, general anesthesia may be needed.  · The cystoscope is inserted. A sterile fluid is put into the bladder to expand it. You may  feel pressure from this fluid.  · When the procedure is done, the cystoscope is removed.  After the Procedure  If you had a sedative, general anesthesia, or spinal anesthesia, you must have someone drive you home. Once youre home:  · Drink plenty of fluids.  · You may have burning or light bleeding when you urinate--this is normal.  · Medications may be prescribed to ease any discomfort or prevent infection. Take these as directed.  · Call your doctor if you have heavy bleeding or blood clots, burning that lasts more than a day, a fever over 101°F , or trouble urinating.  © 2296-8599 [employer]. [address] [place] [address], [place] [address]. All rights reserved. This information is not intended as a substitute for professional medical care. Always follow your healthcare professional's instructions.      Kidney Stones: Are You at Risk?  People who form kidney stones often share certain risk factors. Middle-aged men, for instance, are more likely to form stones than other people. A family history of stones also increases your risk. Assess your risk factors by checking the questions below yes or no.    Yes No   Do you drink fewer than 8 glasses of water a day? ? ?   Do you live in the [place] or another hot climate? ? ?   Have you ever had a kidney stone before? ? ?   Has anyone in your family had kidney stones? ? ?   Are you a male between the ages of 30 and 50? ? ?   Have you had a kidney infection in the last few months? ? ?   Do you take large doses of vitamin C supplements? ? ?   Does your diet include only low amounts of calcium or potassium?  ? ?   Do you often drink cola, or eat chocolates, spinach, or peanuts (high-oxalate foods)? ? ?   Do you eat foods high in salt and meat content? (Eating a high-animal-protein diet is a risk for uric acid and calcium stones. A high-salt diet is a risk for all types of kidney stones.) ? ?   Do you have gout or hyperparathyroidism? ? ?   Do you eat foods  with a high sugar content? ? ?   How great is your risk?  The more times you answered yes, the greater your risk of forming kidney stones. But you can help reduce your risk. Learn more about kidney stones, how they form, and how to prevent them.  Date Last Reviewed: 1/1/2017  © 7992-3040 [employer]. [address] [place] [address], [place] [address]. All rights reserved. This information is not intended as a substitute for professional medical care. Always follow your healthcare professional's instructions.      General Information:    1.  Do not drink alcoholic beverages including beer for 24 hours or as long as you are on pain medication..  2.  Do not drive a motor vehicle, operate machinery or power tools, or signs legal papers for 24 hours or as long as you are on pain medication.   3.  You may experience light-headedness, dizziness, and sleepiness following surgery. Please do not stay alone. A responsible adult should be with you for this 24 hour period.  4.  Go home and rest.    5. Progress slowly to a normal diet unless instructed.  Otherwise, begin with liquids such as soft drinks, then soup and crackers working up to solid foods. Drink plenty of nonalcoholic fluids.  6.  Certain anesthetics and pain medications produce nausea and vomiting in certain       individuals. If nausea becomes a problem at home, call you doctor.    7. A nurse will be calling you sometime after surgery. Do not be alarmed. This is our way of finding out how you are doing.    8. Several times every hour while you are awake, take 2-3 deep breaths and cough. If you had stomach surgery hold a pillow or rolled towel firmly against your stomach before you cough. This will help with any pain the cough might cause.  9. Several times every hour while you are awake, pump and flex your feet 5-6 times and do foot circles. This will help prevent blood clots.    10.Call your doctor for severe pain, bleeding, fever, or signs or symptoms of  infection (pain, swelling, redness, foul odor, drainage).      Discharge Instructions: After Your Surgery  Youve just had surgery. During surgery, you were given medicine called anesthesia to keep you relaxed and free of pain. After surgery, you may have some pain or nausea. This is common. Here are some tips for feeling better and getting well after surgery.     Stay on schedule with your medicine.   Going home  Your healthcare provider will show you how to take care of yourself when you go home. He or she will also answer your questions. Have an adult family member or friend drive you home. For the first 24 hours after your surgery:  · Do not drive or use heavy equipment.  · Do not make important decisions or sign legal papers.  · Do not drink alcohol.  · Have someone stay with you, if needed. He or she can watch for problems and help keep you safe.  Be sure to go to all follow-up visits with your healthcare provider. And rest after your surgery for as long as your healthcare provider tells you to.  Coping with pain  If you have pain after surgery, pain medicine will help you feel better. Take it as told, before pain becomes severe. Also, ask your healthcare provider or pharmacist about other ways to control pain. This might be with heat, ice, or relaxation. And follow any other instructions your surgeon or nurse gives you.  Tips for taking pain medicine  To get the best relief possible, remember these points:  · Pain medicines can upset your stomach. Taking them with a little food may help.  · Most pain relievers taken by mouth need at least 20 to 30 minutes to start to work.  · Taking medicine on a schedule can help you remember to take it. Try to time your medicine so that you can take it before starting an activity. This might be before you get dressed, go for a walk, or sit down for dinner.  · Constipation is a common side effect of pain medicines. Call your healthcare provider before taking any medicines  such as laxatives or stool softeners to help ease constipation. Also ask if you should skip any foods. Drinking lots of fluids and eating foods such as fruits and vegetables that are high in fiber can also help. Remember, do not take laxatives unless your surgeon has prescribed them.  · Drinking alcohol and taking pain medicine can cause dizziness and slow your breathing. It can even be deadly. Do not drink alcohol while taking pain medicine.  · Pain medicine can make you react more slowly to things. Do not drive or run machinery while taking pain medicine.  Your healthcare provider may tell you to take acetaminophen to help ease your pain. Ask him or her how much you are supposed to take each day. Acetaminophen or other pain relievers may interact with your prescription medicines or other over-the-counter (OTC) medicines. Some prescription medicines have acetaminophen and other ingredients. Using both prescription and OTC acetaminophen for pain can cause you to overdose. Read the labels on your OTC medicines with care. This will help you to clearly know the list of ingredients, how much to take, and any warnings. It may also help you not take too much acetaminophen. If you have questions or do not understand the information, ask your pharmacist or healthcare provider to explain it to you before you take the OTC medicine.  Managing nausea  Some people have an upset stomach after surgery. This is often because of anesthesia, pain, or pain medicine, or the stress of surgery. These tips will help you handle nausea and eat healthy foods as you get better. If you were on a special food plan before surgery, ask your healthcare provider if you should follow it while you get better. These tips may help:  · Do not push yourself to eat. Your body will tell you when to eat and how much.  · Start off with clear liquids and soup. They are easier to digest.  · Next try semi-solid foods, such as mashed potatoes, applesauce, and  gelatin, as you feel ready.  · Slowly move to solid foods. Dont eat fatty, rich, or spicy foods at first.  · Do not force yourself to have 3 large meals a day. Instead eat smaller amounts more often.  · Take pain medicines with a small amount of solid food, such as crackers or toast, to avoid nausea.     Call your surgeon if  · You still have pain an hour after taking medicine. The medicine may not be strong enough.  · You feel too sleepy, dizzy, or groggy. The medicine may be too strong.  · You have side effects like nausea, vomiting, or skin changes, such as rash, itching, or hives.       If you have obstructive sleep apnea  You were given anesthesia medicine during surgery to keep you comfortable and free of pain. After surgery, you may have more apnea spells because of this medicine and other medicines you were given. The spells may last longer than usual.   At home:  · Keep using the continuous positive airway pressure (CPAP) device when you sleep. Unless your healthcare provider tells you not to, use it when you sleep, day or night. CPAP is a common device used to treat obstructive sleep apnea.  · Talk with your provider before taking any pain medicine, muscle relaxants, or sedatives. Your provider will tell you about the possible dangers of taking these medicines.  Date Last Reviewed: 12/1/2016 © 2000-2017 [employer]. [address] [place] [address]. All rights reserved. This information is not intended as a substitute for professional medical care. Always follow your healthcare professional's instructions.    Post op instructions for prevention of DVT  What is deep vein thrombosis?  Deep vein thrombosis (DVT) is the medical term for blood clots in the deep veins of the leg.  These blood clots can be dangerous.  A DVT can block a blood vessel and keep blood from getting where it needs to go.  Another problem is that the clot can travel to other parts of the body such as the lungs.   A clot that travels to the lungs is called a pulmonary embolus (PE) and can cause serious problems with breathing which can lead to death.  Am I at risk for DVT/PE?  If you are not very active, you are at risk of DVT.  Anyone confined to bed, sitting for long periods of time, recovering from surgery, etc. increases the risk of DVT.  Other risk factors are cancer diagnosis, certain medications, estrogen replacement in any form,older age, obesity, pregnancy, smoking, history of clotting disorders, and dehydration.  How will I know if I have a DVT?   Swelling in the lower leg   Pain   Warmth, redness, hardness or bulging of the vein  If you have any of these symptoms, call your doctors office right away.  Some people will not have any symptoms until the clot moves to the lungs.  What are the symptoms of a PE?   Panting, shortness of breath, or trouble breathing   Sharp, knife-like chest pain when you breathe   Coughing or coughing up blood   Rapid heartbeat  If you have any of these symptoms or get worse quickly, call 911 for emergency treatment.  How can I prevent a DVT?   Avoid long periods of inactivity and dont cross your legs--get up and walk around every hour or so.   Stay active--walking after surgery is highly encouraged.  This means you should get out of the house and walk in the neighborhood.  Going up and down stairs will not impair healing (unless advised against such activity by your doctor).     Drink plenty of noncaffeinated, nonalcoholic fluids each day to prevent dehydration.   Wear special support stockings, if they have been advised by your doctor.   If you travel, stop at least once an hour and walk around.   Avoid smoking (assistance with stopping is available through your healthcare provider)  Always notify your doctor if you are not able to follow the post operative instructions that are given to you at the time of discharge.  It may be necessary to prescribe one of the medications  available to prevent DVT.    We hope your stay was comfortable as you heal now, mend and rest.    For we have enjoyed taking care of you by giving your our best.    And as you get better, by regaining your health and strength;   We count it as a privilege to have served you and hope your time at [facility] was well spent.      Thank  You!!!

## 2019-07-23 NOTE — BRIEF OP NOTE
Operative Note     SUMMARY     Surgery Date: 7/23/2019     Surgeon(s) and Role:     * Jorden Dickson MD - Primary    Pre-op Diagnosis:  Left ureteral calculus [N20.1]    Post-op Diagnosis:  Left ureteral calculus [N20.1]    Procedure(s) (LRB):  LITHOTRIPSY, ESWL (Left)  CYSTOSCOPY, WITH URETERAL STENT REMOVAL (Left)    Anesthesia: General    Findings/Key Components:  See Op Note      Estimated Blood Loss: 1 mL         Specimens (From admission, onward)    None            Discharge Note      SUMMARY     Admit Date: 7/23/2019    Attending Physician: Jorden Dickson MD     Discharge Physician: Jorden Dickson MD    Discharge Date: 7/23/2019     Final Diagnosis: Left ureteral calculus [N20.1]    Hospital Course: uneventful, going home same day    Disposition: Home or Self Care    Patient Instructions:   Current Discharge Medication List      START taking these medications    Details   cephALEXin (KEFLEX) 500 MG capsule Take 1 capsule (500 mg total) by mouth 3 (three) times daily.  Qty: 15 capsule, Refills: 0      HYDROcodone-acetaminophen (NORCO) 5-325 mg per tablet Take 1 tablet by mouth every 4 to 6 hours as needed for Pain.  Qty: 20 tablet, Refills: 0      phenazopyridine (PYRIDIUM) 100 MG tablet Take 2 tablets (200 mg total) by mouth 3 (three) times daily with meals.  Qty: 20 tablet, Refills: 0         CONTINUE these medications which have NOT CHANGED    Details   amLODIPine (NORVASC) 5 MG tablet TAKE 1 TABLET BY MOUTH ONCE DAILY  Qty: 90 tablet, Refills: 3    Associated Diagnoses: Essential hypertension, benign      atorvastatin (LIPITOR) 20 MG tablet TAKE 1 TABLET BY MOUTH ONCE DAILY  Qty: 90 tablet, Refills: 3    Associated Diagnoses: Hyperlipidemia      CALCIUM CARBONATE/VITAMIN D3 (VITAMIN D-3 ORAL) Take by mouth once daily.       fluticasone-salmeterol diskus inhaler 250-50 mcg INHALE ONE DOSE BY MOUTH TWICE DAILY  Qty: 60 each, Refills: 11    Comments: Please consider 90 day supplies to promote better  adherence  Associated Diagnoses: Asthma, chronic, mild intermittent, uncomplicated      folic acid (FOLVITE) 1 MG tablet TAKE 1 TABLET BY MOUTH ONCE DAILY EVERY DAY  Qty: 30 tablet, Refills: 10    Comments: Please consider 90 day supplies to promote better adherence      levothyroxine (SYNTHROID) 112 MCG tablet TAKE 1 TABLET BY MOUTH ONCE DAILY  Qty: 90 tablet, Refills: 3    Associated Diagnoses: Acquired hypothyroidism      promethazine-phenylephrine (PROMETHAZINE VC) 6.25-5 mg/5 mL Syrp Take 5 mLs by mouth every 6 (six) hours as needed.  Qty: 240 mL, Refills: 2      thiamine (VITAMIN B-1) 100 MG tablet Take 100 mg by mouth once daily.      VITAMIN B COMPLEX ORAL Every day      vitamin E/flaxseed oil (FLAXSEED OIL-VITAMIN E ORAL) Take by mouth.      varenicline (CHANTIX STARTING MONTH BOX) 0.5 mg (11)- 1 mg (42) tablet Take one 0.5mg tab by mouth once daily X3 days,then increase to one 0.5mg tab twice daily X4 days,then increase to one 1mg tab twice daily  Qty: 1 Package, Refills: 0             Discharge Procedure Orders (must include Diet, Follow-up, Activity)  Resume previous diet.  Follow up with PCP as needed.

## 2019-07-24 VITALS
OXYGEN SATURATION: 95 % | HEART RATE: 67 BPM | DIASTOLIC BLOOD PRESSURE: 64 MMHG | SYSTOLIC BLOOD PRESSURE: 143 MMHG | BODY MASS INDEX: 26.37 KG/M2 | RESPIRATION RATE: 14 BRPM | WEIGHT: 174 LBS | HEIGHT: 68 IN | TEMPERATURE: 97 F

## 2019-07-25 ENCOUNTER — TELEPHONE (OUTPATIENT)
Dept: UROLOGY | Facility: CLINIC | Age: 74
End: 2019-07-25

## 2019-07-25 NOTE — TELEPHONE ENCOUNTER
----- Message from Halima Ruby sent at 7/25/2019  4:12 PM CDT -----  Contact: Pharmacy  Type:  Pharmacy Calling to Clarify an RX    Name of Caller: Marcela  Pharmacy Name:   [id number] - [place], [place] - [address]  [address]  [place] [address]  Phone: [phone] Fax: [phone]  Prescription Name: Pyridium  What do they need to clarify?: Verbal okay is needed to fill the generic brand  Best Call Back Number: Please call her at [phone]  Additional Information: n/a

## 2019-07-25 NOTE — TELEPHONE ENCOUNTER
Returned call to pharmacy, informed per MD, okay to use generic pyridium, she verbally understood.

## 2019-07-26 ENCOUNTER — LAB VISIT (OUTPATIENT)
Dept: LAB | Facility: HOSPITAL | Age: 74
End: 2019-07-26
Attending: INTERNAL MEDICINE
Payer: MEDICARE

## 2019-07-26 ENCOUNTER — OFFICE VISIT (OUTPATIENT)
Dept: HEMATOLOGY/ONCOLOGY | Facility: CLINIC | Age: 74
End: 2019-07-26
Payer: MEDICARE

## 2019-07-26 VITALS
BODY MASS INDEX: 26.9 KG/M2 | HEART RATE: 74 BPM | HEIGHT: 68 IN | SYSTOLIC BLOOD PRESSURE: 160 MMHG | RESPIRATION RATE: 20 BRPM | DIASTOLIC BLOOD PRESSURE: 66 MMHG | OXYGEN SATURATION: 97 % | WEIGHT: 177.5 LBS | TEMPERATURE: 99 F

## 2019-07-26 DIAGNOSIS — Z17.0 MALIGNANT NEOPLASM OF NIPPLE OF LEFT BREAST IN FEMALE, ESTROGEN RECEPTOR POSITIVE: Primary | ICD-10-CM

## 2019-07-26 DIAGNOSIS — C50.012 MALIGNANT NEOPLASM OF NIPPLE OF LEFT BREAST IN FEMALE, ESTROGEN RECEPTOR POSITIVE: Primary | ICD-10-CM

## 2019-07-26 DIAGNOSIS — E03.9 ACQUIRED HYPOTHYROIDISM: ICD-10-CM

## 2019-07-26 DIAGNOSIS — M05.711 RHEUMATOID ARTHRITIS INVOLVING RIGHT SHOULDER WITH POSITIVE RHEUMATOID FACTOR: ICD-10-CM

## 2019-07-26 DIAGNOSIS — Z17.0 MALIGNANT NEOPLASM OF NIPPLE OF LEFT BREAST IN FEMALE, ESTROGEN RECEPTOR POSITIVE: ICD-10-CM

## 2019-07-26 DIAGNOSIS — C50.012 MALIGNANT NEOPLASM OF NIPPLE OF LEFT BREAST IN FEMALE, ESTROGEN RECEPTOR POSITIVE: ICD-10-CM

## 2019-07-26 DIAGNOSIS — I10 ESSENTIAL HYPERTENSION, BENIGN: ICD-10-CM

## 2019-07-26 DIAGNOSIS — E78.00 PURE HYPERCHOLESTEROLEMIA: ICD-10-CM

## 2019-07-26 LAB
ALBUMIN SERPL BCP-MCNC: 3.6 G/DL (ref 3.5–5.2)
ALP SERPL-CCNC: 64 U/L (ref 55–135)
ALT SERPL W/O P-5'-P-CCNC: 18 U/L (ref 10–44)
ANION GAP SERPL CALC-SCNC: 8 MMOL/L (ref 8–16)
AST SERPL-CCNC: 17 U/L (ref 10–40)
BASOPHILS # BLD AUTO: 0.03 K/UL (ref 0–0.2)
BASOPHILS NFR BLD: 0.3 % (ref 0–1.9)
BILIRUB SERPL-MCNC: 0.3 MG/DL (ref 0.1–1)
BUN SERPL-MCNC: 13 MG/DL (ref 8–23)
CALCIUM SERPL-MCNC: 10.1 MG/DL (ref 8.7–10.5)
CHLORIDE SERPL-SCNC: 107 MMOL/L (ref 95–110)
CO2 SERPL-SCNC: 27 MMOL/L (ref 23–29)
CREAT SERPL-MCNC: 0.8 MG/DL (ref 0.5–1.4)
DIFFERENTIAL METHOD: ABNORMAL
EOSINOPHIL # BLD AUTO: 0.2 K/UL (ref 0–0.5)
EOSINOPHIL NFR BLD: 1.6 % (ref 0–8)
ERYTHROCYTE [DISTWIDTH] IN BLOOD BY AUTOMATED COUNT: 13.3 % (ref 11.5–14.5)
EST. GFR  (AFRICAN AMERICAN): >60 ML/MIN/1.73 M^2
EST. GFR  (NON AFRICAN AMERICAN): >60 ML/MIN/1.73 M^2
GLUCOSE SERPL-MCNC: 137 MG/DL (ref 70–110)
HCT VFR BLD AUTO: 41.7 % (ref 37–48.5)
HGB BLD-MCNC: 13.4 G/DL (ref 12–16)
IMM GRANULOCYTES # BLD AUTO: 0.07 K/UL (ref 0–0.04)
LYMPHOCYTES # BLD AUTO: 2.7 K/UL (ref 1–4.8)
LYMPHOCYTES NFR BLD: 24.8 % (ref 18–48)
MCH RBC QN AUTO: 32.1 PG (ref 27–31)
MCHC RBC AUTO-ENTMCNC: 32.1 G/DL (ref 32–36)
MCV RBC AUTO: 100 FL (ref 82–98)
MONOCYTES # BLD AUTO: 1 K/UL (ref 0.3–1)
MONOCYTES NFR BLD: 9.5 % (ref 4–15)
NEUTROPHILS # BLD AUTO: 6.7 K/UL (ref 1.8–7.7)
NEUTROPHILS NFR BLD: 63.1 % (ref 38–73)
NRBC BLD-RTO: 0 /100 WBC
PLATELET # BLD AUTO: 182 K/UL (ref 150–350)
PMV BLD AUTO: 11.2 FL (ref 9.2–12.9)
POTASSIUM SERPL-SCNC: 4 MMOL/L (ref 3.5–5.1)
PROT SERPL-MCNC: 6.9 G/DL (ref 6–8.4)
RBC # BLD AUTO: 4.17 M/UL (ref 4–5.4)
SODIUM SERPL-SCNC: 142 MMOL/L (ref 136–145)
WBC # BLD AUTO: 10.67 K/UL (ref 3.9–12.7)

## 2019-07-26 PROCEDURE — 36415 COLL VENOUS BLD VENIPUNCTURE: CPT

## 2019-07-26 PROCEDURE — 99215 PR OFFICE/OUTPT VISIT, EST, LEVL V, 40-54 MIN: ICD-10-PCS | Mod: S$PBB,,, | Performed by: INTERNAL MEDICINE

## 2019-07-26 PROCEDURE — 99999 PR PBB SHADOW E&M-EST. PATIENT-LVL IV: CPT | Mod: PBBFAC,,, | Performed by: INTERNAL MEDICINE

## 2019-07-26 PROCEDURE — 85025 COMPLETE CBC W/AUTO DIFF WBC: CPT

## 2019-07-26 PROCEDURE — 80053 COMPREHEN METABOLIC PANEL: CPT

## 2019-07-26 PROCEDURE — 99999 PR PBB SHADOW E&M-EST. PATIENT-LVL IV: ICD-10-PCS | Mod: PBBFAC,,, | Performed by: INTERNAL MEDICINE

## 2019-07-26 PROCEDURE — 99215 OFFICE O/P EST HI 40 MIN: CPT | Mod: S$PBB,,, | Performed by: INTERNAL MEDICINE

## 2019-07-26 PROCEDURE — 99214 OFFICE O/P EST MOD 30 MIN: CPT | Mod: PBBFAC,PO | Performed by: INTERNAL MEDICINE

## 2019-07-26 RX ORDER — SODIUM CHLORIDE 0.9 % (FLUSH) 0.9 %
10 SYRINGE (ML) INJECTION
Status: CANCELLED | OUTPATIENT
Start: 2019-07-29

## 2019-07-26 RX ORDER — PROCHLORPERAZINE MALEATE 10 MG
10 TABLET ORAL EVERY 6 HOURS PRN
Qty: 30 TABLET | Refills: 1 | Status: SHIPPED | OUTPATIENT
Start: 2019-07-26 | End: 2019-09-10

## 2019-07-26 RX ORDER — ONDANSETRON 8 MG/1
8 TABLET, ORALLY DISINTEGRATING ORAL EVERY 12 HOURS PRN
Qty: 30 TABLET | Refills: 1 | Status: SHIPPED | OUTPATIENT
Start: 2019-07-26 | End: 2019-09-10

## 2019-07-26 RX ORDER — HEPARIN 100 UNIT/ML
500 SYRINGE INTRAVENOUS
Status: CANCELLED | OUTPATIENT
Start: 2019-07-29

## 2019-07-26 NOTE — PROGRESS NOTES
Subjective:       Patient ID: Kirstie Bowden is a 74 y.o. female.    Chief Complaint: new dx breast ca   here with PET and onco type 29   hydronephrosis+ ,  had stent placed  Discussed options with patient she is here for 4 cycles of TC to start cycle 1 has undergone chemo class does not have antiemetics here with a friend  Oncologic History:   INVASIVE CARCINOMA BREAST, CANCER CASE SUMMARY:  PROCEDURE: Partial mastectomy without skin or nipple.3/2019  SPECIMEN LATERALITY: Left.  TUMOR SIZE, GREATEST DIMENSION: 2.5 cm.  HISTOLOGIC TYPE: Invasive pleomorphic lobular carcinoma.  HISTOLOGIC GRADE (LUTHER HISTOLOGIC SCORE):  Glandular (acinar)/tubular differentiation: Score 3.  Nuclear pleomorphism: Score 2.  Mitotic rate: Score 1.  Overall grade: Grade 2  DUCTAL CARCINOMA IN SITU (DCIS): Not identified.  LOBULAR CARCINOMA IN SITU (LCIS): Present, pleomorphic lobular carcinoma in situ with rare focus of  classical lobular carcinoma situ.  Estimated size (extent) of pleomorphic LCIS: At least 2.8 cm.  TUMOR EXTENSION: Not applicable.  MARGINS:  INVASIVE CARCINOMA MARGINS: Uninvolved by invasive carcinoma.  Distance from inferior (closest) margin: 5 mm.  PLEOMORPHIC LOBULAR CARCINOMA IN SITU MARGINS: Uninvolved by pleomorphic LCIS.  Distance from inferior medial (closest) margin: 0.2 mm (see above comment).  REGIONAL LYMPH NODES: Uninvolved by tumor cells.  Number of lymph nodes examined: 2.  Number of sentinel nodes examined: 2.  TREATMENT EFFECT: No known presurgical therapy.  PATHOLOGIC STAGE CLASSIFICATION (pTNM, AJCC 8TH EDITION):  pT2: Tumor size = 2.5 cm in greatest dimension.  pN0: No regional lymph node metastasis identified.  pM: Not applicable.    ER: Positive.  NV: Positive.  HER2: Negative (IHC - Equivocal (score 2) and FISH - Negative).  Ki-67: Approximately 14%.  HPI:     Social History     Socioeconomic History    Marital status:      Spouse name: Not on file    Number of children:  Not on file    Years of education: Not on file    Highest education level: Not on file   Occupational History     Employer: [employer]   Social Needs    Financial resource strain: Not very hard    Food insecurity:     Worry: Never true     Inability: Never true    Transportation needs:     Medical: No     Non-medical: No   Tobacco Use    Smoking status: Current Every Day Smoker     Packs/day: 0.50     Years: 46.00     Pack years: 23.00     Types: Cigarettes     Start date: 4/25/1960    Smokeless tobacco: Never Used    Tobacco comment: patch available    Substance and Sexual Activity    Alcohol use: Yes     Alcohol/week: 1.2 oz     Types: 2 Glasses of wine per week     Frequency: Monthly or less     Drinks per session: Patient refused     Binge frequency: Never     Comment: Social    Drug use: No    Sexual activity: Never   Lifestyle    Physical activity:     Days per week: 3 days     Minutes per session: 20 min    Stress: To some extent   Relationships    Social connections:     Talks on phone: More than three times a week     Gets together: Three times a week     Attends Yazidi service: Not on file     Active member of club or organization: Yes     Attends meetings of clubs or organizations: Never     Relationship status:    Other Topics Concern    Not on file   Social History Narrative    Not on file     Family History   Problem Relation Age of Onset    Heart disease Mother     Hypertension Mother     Alzheimer's disease Mother     Cancer Father      Past Surgical History:   Procedure Laterality Date    BIOPSY, LYMPH NODE, SENTINEL Left 3/14/2019    [facility]    BREAST BIOPSY Left 20 yrs ago    benign    CHOLECYSTECTOMY      CYSTOSCOPY, WITH URETERAL STENT INSERTION Left 6/24/2019    [facility]    CYSTOSCOPY, WITH URETERAL STENT REMOVAL Left 7/23/2019    [facility]    DILATION,  URETER, USING BALLOON Left 6/24/2019    [facility]    EYE SURGERY Bilateral     cataracts    HYSTERECTOMY      LITHOTRIPSY, ESWL Left 7/23/2019    [facility]    LUMPECTOMY, BREAST Left 3/14/2019    [facility]    LYMPHADENECTOMY, AXILLARY Left 3/14/2019    [facility]    MASTECTOMY, PARTIAL Left 4/25/2019    [facility]    NEEDLE LOCALIZATION Left 3/14/2019    [facility]    PYELOGRAM, RETROGRADE Bilateral 6/24/2019    [facility]    REMOVAL-MASS N/A 3/14/2019    [facility]    TONSILLECTOMY      URETEROSCOPY Left 6/24/2019    [facility]     Past Medical History:   Diagnosis Date    Arthritis     Asthma     Cancer     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    Hyperlipidemia     Hypertension     Pseudocholinesterase deficiency     family history    Thyroid disease        Current Outpatient Medications:     amLODIPine (NORVASC) 5 MG tablet, TAKE 1 TABLET BY MOUTH ONCE DAILY, Disp: 90 tablet, Rfl: 3    atorvastatin (LIPITOR) 20 MG tablet, TAKE 1 TABLET BY MOUTH ONCE DAILY, Disp: 90 tablet, Rfl: 3    CALCIUM CARBONATE/VITAMIN D3 (VITAMIN D-3 ORAL), Take by mouth once daily. , Disp: , Rfl:     cephALEXin (KEFLEX) 500 MG capsule, Take 1 capsule (500 mg total) by mouth 3 (three) times daily., Disp: 15 capsule, Rfl: 0    fluticasone-salmeterol diskus inhaler 250-50 mcg, INHALE ONE DOSE BY MOUTH TWICE DAILY, Disp: 60 each, Rfl: 11    folic acid (FOLVITE) 1 MG tablet, TAKE 1 TABLET BY MOUTH ONCE DAILY EVERY DAY, Disp: 30 tablet, Rfl: 10    HYDROcodone-acetaminophen (NORCO) 5-325 mg per tablet, Take 1 tablet by mouth every 4 to 6 hours as needed for Pain., Disp: 20 tablet, Rfl: 0    levothyroxine (SYNTHROID) 112 MCG  tablet, TAKE 1 TABLET BY MOUTH ONCE DAILY, Disp: 90 tablet, Rfl: 3    phenazopyridine (PYRIDIUM) 100 MG tablet, Take 2 tablets (200 mg total) by mouth 3 (three) times daily with meals., Disp: 20 tablet, Rfl: 0    promethazine-phenylephrine (PROMETHAZINE VC) 6.25-5 mg/5 mL Syrp, Take 5 mLs by mouth every 6 (six) hours as needed., Disp: 240 mL, Rfl: 2    thiamine (VITAMIN B-1) 100 MG tablet, Take 100 mg by mouth once daily., Disp: , Rfl:     varenicline (CHANTIX STARTING MONTH BOX) 0.5 mg (11)- 1 mg (42) tablet, Take one 0.5mg tab by mouth once daily X3 days,then increase to one 0.5mg tab twice daily X4 days,then increase to one 1mg tab twice daily, Disp: 1 Package, Rfl: 0    VITAMIN B COMPLEX ORAL, Every day, Disp: , Rfl:     vitamin E/flaxseed oil (FLAXSEED OIL-VITAMIN E ORAL), Take by mouth., Disp: , Rfl:   No current facility-administered medications for this visit.     Facility-Administered Medications Ordered in Other Visits:     lidocaine (PF) 10 mg/ml (1%) injection 10 mg, 1 mL, Intradermal, Once, Eladia Schwartz MD  Review of patient's allergies indicates:   Allergen Reactions    Sulfamethoxazole-trimethoprim      Other reaction(s): per dr daryn Rodríguez (sulfonamide antibiotics)      NOT SURE REACTION         REVIEW OF SYSTEMS:     CONSTITUTIONAL: The patient denies any weight change. There is no apparent    change in appetite, fever, night sweats, headaches, fatigue, dizziness, or    weakness.      SKIN: Denies rash, issues with nails, non-healing sores, bleeding, blotching    skin or abnormal bruising. Denies new moles or changes to existing moles.      BREASTS: healing well since lumpectomy    EYES: Denies eye pain, blurred vision, swelling, redness or discharge.      ENT AND MOUTH: Denies runny nose, stuffiness, sinus trouble or sores. Denies    nosebleeds. Denies, hoarseness, change in voice or swelling in front of the    neck.      CARDIOVASCULAR: Denies chest pain, discomfort or  palpitations. Denies neck    swelling or episodes of passing out.      RESPIRATORY: Denies cough, sputum production, blood in sputum, and denies    shortness of breath.      GI: Denies trouble swallowing, indigestion, heartburn, abdominal pain, nausea,    vomiting, diarrhea, altered bowel habits, blood in stool, discoloration of    stools, change in nature of stool, bloating, increased abdominal girth.      GENITOURINARY: No discharge. No pelvic pain or lumps. No rash around groin or  lesions. No urinary frequency, hesitation, painful urination or blood in    urine. Denies incontinence. No problems with intercourse.      MUSCULOSKELETAL: Denies neck or back pain. Denies weakness in arms or legs,    joint problems or distended inflamed veins in legs. Denies swelling or abnormal  glands.      NEUROLOGICAL: Denies tingling, numbness, altered mentation changes to nerve    function in the face, weakness to one or both of the body. Denies changes to    gait and denies multiple falls or accidents.        PHYSICAL EXAM:     Vitals:    07/26/19 0916   BP: (!) 160/66   Pulse: 74   Resp: 20   Temp: 98.7 °F (37.1 °C)       GENERAL: Comfortable looking patient. Patient is in no distress.  Awake, alert and oriented to time, person and place.  No anxiety, or agitation.      HEENT: Normal conjunctivae and eyelids. WNL.  PERRLA 3 to 4 mm. No icterus, no pallor, no congestion, and no discharge noted.     NECK:  Supple. Trachea is central.  No crepitus.  No JVD or masses.    RESPIRATORY:  No intercostal retractions.  No dullness to percussion.  Chest is clear to auscultation.  No rales, rhonchi or wheezes.  No crepitus.  Good air entry bilaterally.    CARDIOVASCULAR:  S1 and S2 are normally heard without murmurs or gallops.  All peripheral pulses are present.    ABDOMEN:  Normal abdomen.  No hepatosplenomegaly.  No free fluid.  Bowel sounds are present.  No hernia noted. No masses.  No rebound or tenderness.  No guarding or rigidity.   Umbilicus is midline.    LYMPHATICS:  No axillary, cervical, supraclavicular, submental, or inguinal lymphadenopathy.    SKIN/MUSCULOSKELETAL:  There is no evidence of excoriation marks or ecchmosis.  No rashes.  No cyanosis.  No clubbing.  No joint or skeletal deformities noted.  Normal range of motion.    NEUROLOGIC:  Higher functions are appropriate.  No cranial nerve deficits.  Normal asher.  Normal strength.  Motor and sensory functions are normal.  Deep tendon reflexes are normal.    GENITAL/RECTAL:  Exams are deferred.      Laboratory:     CBC:  Lab Results   Component Value Date    WBC 8.30 06/19/2019    RBC 4.48 06/19/2019    HGB 14.5 06/19/2019    HCT 43.9 06/19/2019    MCV 98 06/19/2019    MCH 32.3 (H) 06/19/2019    MCHC 33.0 06/19/2019    RDW 13.4 06/19/2019     06/19/2019    MPV 10.6 06/19/2019    GRAN 4.9 06/19/2019    GRAN 59.1 06/19/2019    LYMPH 2.4 06/19/2019    LYMPH 29.2 06/19/2019    MONO 0.8 06/19/2019    MONO 9.4 06/19/2019    EOS 0.2 06/19/2019    BASO 0.00 06/19/2019    EOSINOPHIL 2.0 06/19/2019    BASOPHIL 0.3 06/19/2019       BMP: BMP  Lab Results   Component Value Date     06/19/2019    K 4.1 06/19/2019     06/19/2019    CO2 28 06/19/2019    BUN 14 06/19/2019    CREATININE 1.0 06/19/2019    CALCIUM 10.4 06/19/2019    ANIONGAP 9 06/19/2019    ESTGFRAFRICA >60 06/19/2019    EGFRNONAA 56 (A) 06/19/2019       LFT:   Lab Results   Component Value Date    ALT 21 06/19/2019    AST 21 06/19/2019    ALKPHOS 76 06/19/2019    BILITOT 0.3 06/19/2019     oncotype rec. score is 29  PET 6/2019 neg for malignancy   has renal stones and has hydronephrosis, getting stent placed in 2 days  Assessment/Plan:     T2N0Mx left breast ca s/p lumpectomy 3/2019 recurrent score 29  Patient opted to go with 4 cycles of TC  Here for day 1 start  CBC, CMP today proceed with chemotherapy  The Zofran Compazine sent to pharmacy  Last a support for prevention of chemotherapy-induced neutropenia  Return to  clinic in 3 weeks CBC CMP for cycle 2.      xrt consult will follow ( has seen pt once)   cont f/u for RA, HTH. Lipids, hypothyroidism      Living Will  During past visit, I engaged the patient in the advance care planning process.  The patient and I reviewed the role for advance directives and their purpose in directing future healthcare if the patient's unable to speak for herself.  At this point in time, the patient does have full decision-making capacity.  We discussed different extreme health states that she could experience, and reviewed what kind of medical care she would want in those situations.  The patient communicated that if she were comatose and had little chance of a meaningful recovery, she would notwant machines/life-sustaining treatments used. The patient has completed a living will to reflect these preferences.  The patient has already designated a healthcare power of  to make decisions on her behalf.

## 2019-07-29 ENCOUNTER — INFUSION (OUTPATIENT)
Dept: INFUSION THERAPY | Facility: HOSPITAL | Age: 74
End: 2019-07-29
Attending: FAMILY MEDICINE
Payer: MEDICARE

## 2019-07-29 VITALS
OXYGEN SATURATION: 95 % | BODY MASS INDEX: 26.53 KG/M2 | HEART RATE: 74 BPM | DIASTOLIC BLOOD PRESSURE: 77 MMHG | RESPIRATION RATE: 18 BRPM | TEMPERATURE: 98 F | SYSTOLIC BLOOD PRESSURE: 138 MMHG | WEIGHT: 175.06 LBS | HEIGHT: 68 IN

## 2019-07-29 DIAGNOSIS — C50.012 MALIGNANT NEOPLASM OF NIPPLE OF LEFT BREAST IN FEMALE, ESTROGEN RECEPTOR POSITIVE: Primary | ICD-10-CM

## 2019-07-29 DIAGNOSIS — Z17.0 MALIGNANT NEOPLASM OF NIPPLE OF LEFT BREAST IN FEMALE, ESTROGEN RECEPTOR POSITIVE: Primary | ICD-10-CM

## 2019-07-29 PROCEDURE — 96417 CHEMO IV INFUS EACH ADDL SEQ: CPT

## 2019-07-29 PROCEDURE — 96413 CHEMO IV INFUSION 1 HR: CPT

## 2019-07-29 PROCEDURE — 63600175 PHARM REV CODE 636 W HCPCS: Mod: TB | Performed by: INTERNAL MEDICINE

## 2019-07-29 PROCEDURE — 96367 TX/PROPH/DG ADDL SEQ IV INF: CPT

## 2019-07-29 RX ORDER — SODIUM CHLORIDE 0.9 % (FLUSH) 0.9 %
10 SYRINGE (ML) INJECTION
Status: DISCONTINUED | OUTPATIENT
Start: 2019-07-29 | End: 2019-07-29 | Stop reason: HOSPADM

## 2019-07-29 RX ADMIN — PALONOSETRON HYDROCHLORIDE: 0.25 INJECTION, SOLUTION INTRAVENOUS at 09:07

## 2019-07-29 RX ADMIN — DOCETAXEL ANHYDROUS 145 MG: 10 INJECTION, SOLUTION INTRAVENOUS at 10:07

## 2019-07-29 RX ADMIN — APREPITANT 130 MG: 130 INJECTION, EMULSION INTRAVENOUS at 09:07

## 2019-07-29 RX ADMIN — CYCLOPHOSPHAMIDE 1170 MG: 1 INJECTION, POWDER, FOR SOLUTION INTRAVENOUS; ORAL at 12:07

## 2019-07-29 NOTE — PLAN OF CARE
Problem: Nausea and Vomiting  Goal: Fluid and Electrolyte Balance    Intervention: Prevent and Manage Nausea and Vomiting  Antiemetics explained to patient and given prescriptions per MD. Patient voiced understanding

## 2019-07-30 ENCOUNTER — INFUSION (OUTPATIENT)
Dept: INFUSION THERAPY | Facility: HOSPITAL | Age: 74
End: 2019-07-30
Attending: INTERNAL MEDICINE
Payer: MEDICARE

## 2019-07-30 VITALS
WEIGHT: 178.88 LBS | DIASTOLIC BLOOD PRESSURE: 74 MMHG | TEMPERATURE: 98 F | SYSTOLIC BLOOD PRESSURE: 149 MMHG | RESPIRATION RATE: 18 BRPM | BODY MASS INDEX: 27.2 KG/M2 | HEART RATE: 82 BPM

## 2019-07-30 DIAGNOSIS — Z17.0 MALIGNANT NEOPLASM OF NIPPLE OF LEFT BREAST IN FEMALE, ESTROGEN RECEPTOR POSITIVE: Primary | ICD-10-CM

## 2019-07-30 DIAGNOSIS — C50.012 MALIGNANT NEOPLASM OF NIPPLE OF LEFT BREAST IN FEMALE, ESTROGEN RECEPTOR POSITIVE: Primary | ICD-10-CM

## 2019-07-30 PROCEDURE — 63600175 PHARM REV CODE 636 W HCPCS: Mod: JG | Performed by: INTERNAL MEDICINE

## 2019-07-30 PROCEDURE — 96372 THER/PROPH/DIAG INJ SC/IM: CPT

## 2019-07-30 RX ADMIN — PEGFILGRASTIM-CBQV 6 MG: 6 INJECTION, SOLUTION SUBCUTANEOUS at 09:07

## 2019-08-05 ENCOUNTER — TELEPHONE (OUTPATIENT)
Dept: HEMATOLOGY/ONCOLOGY | Facility: CLINIC | Age: 74
End: 2019-08-05

## 2019-08-05 NOTE — TELEPHONE ENCOUNTER
Pt called to state that she had low grade fever 99.9, metal taste,  and diahrrea. Pt did say she took some Pepto Bismol.  Pt notified that 100.4 fever is when she needs to go to the ER. Pt also instructed to take OTC Immodium for the diahrrea. Pt notified to refer to her Chemo book to help with understanding her side effects during chemo and what she can take OTC. Pt verbalized understanding.

## 2019-08-08 ENCOUNTER — HOSPITAL ENCOUNTER (OUTPATIENT)
Dept: RADIOLOGY | Facility: HOSPITAL | Age: 74
Discharge: HOME | End: 2019-08-08
Attending: UROLOGY
Payer: MEDICARE

## 2019-08-08 ENCOUNTER — OFFICE VISIT (OUTPATIENT)
Dept: UROLOGY | Facility: CLINIC | Age: 74
End: 2019-08-08
Payer: MEDICARE

## 2019-08-08 VITALS
DIASTOLIC BLOOD PRESSURE: 61 MMHG | HEIGHT: 68 IN | RESPIRATION RATE: 18 BRPM | TEMPERATURE: 99 F | SYSTOLIC BLOOD PRESSURE: 112 MMHG | BODY MASS INDEX: 27.1 KG/M2 | WEIGHT: 178.81 LBS | HEART RATE: 79 BPM

## 2019-08-08 DIAGNOSIS — Z09 POSTOP CHECK: ICD-10-CM

## 2019-08-08 DIAGNOSIS — N20.1 LEFT URETERAL CALCULUS: ICD-10-CM

## 2019-08-08 DIAGNOSIS — N20.1 LEFT URETERAL CALCULUS: Primary | ICD-10-CM

## 2019-08-08 PROCEDURE — 99999 PR PBB SHADOW E&M-EST. PATIENT-LVL IV: CPT | Mod: PBBFAC,,, | Performed by: UROLOGY

## 2019-08-08 PROCEDURE — 74018 XR ABDOMEN AP 1 VIEW: ICD-10-PCS | Mod: 26,,, | Performed by: RADIOLOGY

## 2019-08-08 PROCEDURE — 99214 OFFICE O/P EST MOD 30 MIN: CPT | Mod: PBBFAC,25,PN | Performed by: UROLOGY

## 2019-08-08 PROCEDURE — 74018 RADEX ABDOMEN 1 VIEW: CPT | Mod: TC,FY

## 2019-08-08 PROCEDURE — 99999 PR PBB SHADOW E&M-EST. PATIENT-LVL IV: ICD-10-PCS | Mod: PBBFAC,,, | Performed by: UROLOGY

## 2019-08-08 PROCEDURE — 74018 RADEX ABDOMEN 1 VIEW: CPT | Mod: 26,,, | Performed by: RADIOLOGY

## 2019-08-08 PROCEDURE — 99024 POSTOP FOLLOW-UP VISIT: CPT | Mod: POP,,, | Performed by: UROLOGY

## 2019-08-08 PROCEDURE — 99024 PR POST-OP FOLLOW-UP VISIT: ICD-10-PCS | Mod: POP,,, | Performed by: UROLOGY

## 2019-08-08 NOTE — PROGRESS NOTES
POST OPERATIVE UROLOGY NOTE    PATIENT NAME: Kirstie Bowden  : 1945    PROCEDURE/DATE OF PROCEDURE:  ESWL proximal left ureteral calculus and cystoscopy with stent removal on 2019    COMPLAINTS/COMMENTS:  Doing very well, no more pain has passed multiple stone fragments.                                                    Patient has also started on her chemotherapy for treatment of her breast cancer    PHYSICAL EXAM:  No flank tenderness    RECOMMENDATIONS:  KUB, stone analysis, recheck 3 weeks

## 2019-08-15 LAB
ANNOTATION COMMENT IMP: NORMAL
COMPN STONE: NORMAL
SPECIMEN SOURCE: NORMAL
STONE ANALYSIS IR-IMP: NORMAL

## 2019-08-16 ENCOUNTER — LAB VISIT (OUTPATIENT)
Dept: LAB | Facility: HOSPITAL | Age: 74
End: 2019-08-16
Attending: INTERNAL MEDICINE
Payer: MEDICARE

## 2019-08-16 ENCOUNTER — OFFICE VISIT (OUTPATIENT)
Dept: HEMATOLOGY/ONCOLOGY | Facility: CLINIC | Age: 74
End: 2019-08-16
Payer: MEDICARE

## 2019-08-16 VITALS
DIASTOLIC BLOOD PRESSURE: 60 MMHG | TEMPERATURE: 99 F | HEART RATE: 84 BPM | WEIGHT: 177.5 LBS | SYSTOLIC BLOOD PRESSURE: 127 MMHG | BODY MASS INDEX: 26.9 KG/M2 | HEIGHT: 68 IN | OXYGEN SATURATION: 96 % | RESPIRATION RATE: 20 BRPM

## 2019-08-16 DIAGNOSIS — C50.012 MALIGNANT NEOPLASM OF NIPPLE OF LEFT BREAST IN FEMALE, ESTROGEN RECEPTOR POSITIVE: ICD-10-CM

## 2019-08-16 DIAGNOSIS — E78.00 PURE HYPERCHOLESTEROLEMIA: ICD-10-CM

## 2019-08-16 DIAGNOSIS — E03.9 ACQUIRED HYPOTHYROIDISM: Primary | ICD-10-CM

## 2019-08-16 DIAGNOSIS — Z17.0 MALIGNANT NEOPLASM OF NIPPLE OF LEFT BREAST IN FEMALE, ESTROGEN RECEPTOR POSITIVE: ICD-10-CM

## 2019-08-16 DIAGNOSIS — M05.711 RHEUMATOID ARTHRITIS INVOLVING RIGHT SHOULDER WITH POSITIVE RHEUMATOID FACTOR: ICD-10-CM

## 2019-08-16 DIAGNOSIS — I10 ESSENTIAL HYPERTENSION, BENIGN: ICD-10-CM

## 2019-08-16 LAB
ALBUMIN SERPL BCP-MCNC: 3.6 G/DL (ref 3.5–5.2)
ALP SERPL-CCNC: 76 U/L (ref 55–135)
ALT SERPL W/O P-5'-P-CCNC: 17 U/L (ref 10–44)
ANION GAP SERPL CALC-SCNC: 9 MMOL/L (ref 8–16)
AST SERPL-CCNC: 18 U/L (ref 10–40)
BASOPHILS # BLD AUTO: 0.07 K/UL (ref 0–0.2)
BASOPHILS NFR BLD: 0.8 % (ref 0–1.9)
BILIRUB SERPL-MCNC: 0.3 MG/DL (ref 0.1–1)
BUN SERPL-MCNC: 12 MG/DL (ref 8–23)
CALCIUM SERPL-MCNC: 10.1 MG/DL (ref 8.7–10.5)
CHLORIDE SERPL-SCNC: 105 MMOL/L (ref 95–110)
CO2 SERPL-SCNC: 30 MMOL/L (ref 23–29)
CREAT SERPL-MCNC: 0.8 MG/DL (ref 0.5–1.4)
DIFFERENTIAL METHOD: ABNORMAL
EOSINOPHIL # BLD AUTO: 0.1 K/UL (ref 0–0.5)
EOSINOPHIL NFR BLD: 0.6 % (ref 0–8)
ERYTHROCYTE [DISTWIDTH] IN BLOOD BY AUTOMATED COUNT: 14.2 % (ref 11.5–14.5)
EST. GFR  (AFRICAN AMERICAN): >60 ML/MIN/1.73 M^2
EST. GFR  (NON AFRICAN AMERICAN): >60 ML/MIN/1.73 M^2
GLUCOSE SERPL-MCNC: 105 MG/DL (ref 70–110)
HCT VFR BLD AUTO: 39.9 % (ref 37–48.5)
HGB BLD-MCNC: 12.7 G/DL (ref 12–16)
IMM GRANULOCYTES # BLD AUTO: 0.03 K/UL (ref 0–0.04)
LYMPHOCYTES # BLD AUTO: 1.6 K/UL (ref 1–4.8)
LYMPHOCYTES NFR BLD: 17.4 % (ref 18–48)
MCH RBC QN AUTO: 32 PG (ref 27–31)
MCHC RBC AUTO-ENTMCNC: 31.8 G/DL (ref 32–36)
MCV RBC AUTO: 101 FL (ref 82–98)
MONOCYTES # BLD AUTO: 1 K/UL (ref 0.3–1)
MONOCYTES NFR BLD: 10.2 % (ref 4–15)
NEUTROPHILS # BLD AUTO: 6.6 K/UL (ref 1.8–7.7)
NEUTROPHILS NFR BLD: 70.7 % (ref 38–73)
NRBC BLD-RTO: 0 /100 WBC
PLATELET # BLD AUTO: 288 K/UL (ref 150–350)
PMV BLD AUTO: 10.4 FL (ref 9.2–12.9)
POTASSIUM SERPL-SCNC: 5 MMOL/L (ref 3.5–5.1)
PROT SERPL-MCNC: 7.1 G/DL (ref 6–8.4)
RBC # BLD AUTO: 3.97 M/UL (ref 4–5.4)
SODIUM SERPL-SCNC: 144 MMOL/L (ref 136–145)
WBC # BLD AUTO: 9.29 K/UL (ref 3.9–12.7)

## 2019-08-16 PROCEDURE — 99213 OFFICE O/P EST LOW 20 MIN: CPT | Mod: PBBFAC,PO | Performed by: INTERNAL MEDICINE

## 2019-08-16 PROCEDURE — 80053 COMPREHEN METABOLIC PANEL: CPT

## 2019-08-16 PROCEDURE — 36415 COLL VENOUS BLD VENIPUNCTURE: CPT

## 2019-08-16 PROCEDURE — 99215 PR OFFICE/OUTPT VISIT, EST, LEVL V, 40-54 MIN: ICD-10-PCS | Mod: S$PBB,,, | Performed by: INTERNAL MEDICINE

## 2019-08-16 PROCEDURE — 99215 OFFICE O/P EST HI 40 MIN: CPT | Mod: S$PBB,,, | Performed by: INTERNAL MEDICINE

## 2019-08-16 PROCEDURE — 99999 PR PBB SHADOW E&M-EST. PATIENT-LVL III: ICD-10-PCS | Mod: PBBFAC,,, | Performed by: INTERNAL MEDICINE

## 2019-08-16 PROCEDURE — 99999 PR PBB SHADOW E&M-EST. PATIENT-LVL III: CPT | Mod: PBBFAC,,, | Performed by: INTERNAL MEDICINE

## 2019-08-16 PROCEDURE — 85025 COMPLETE CBC W/AUTO DIFF WBC: CPT

## 2019-08-16 RX ORDER — SODIUM CHLORIDE 0.9 % (FLUSH) 0.9 %
10 SYRINGE (ML) INJECTION
Status: CANCELLED | OUTPATIENT
Start: 2019-08-19

## 2019-08-16 RX ORDER — ONDANSETRON 8 MG/1
8 TABLET, ORALLY DISINTEGRATING ORAL EVERY 12 HOURS PRN
Qty: 30 TABLET | Refills: 1 | Status: SHIPPED | OUTPATIENT
Start: 2019-08-16 | End: 2019-09-10

## 2019-08-16 RX ORDER — HEPARIN 100 UNIT/ML
500 SYRINGE INTRAVENOUS
Status: CANCELLED | OUTPATIENT
Start: 2019-08-19

## 2019-08-16 NOTE — PROGRESS NOTES
Subjective:       Patient ID: Kirstie Bowden is a 74 y.o. female.    Chief Complaint: new dx breast ca started TC chemo s/p first cycle   rec score of 29   hydronephrosis+ ,  had stent placed    Oncologic History:   INVASIVE CARCINOMA BREAST, CANCER CASE SUMMARY:  PROCEDURE: Partial mastectomy without skin or nipple.3/2019  SPECIMEN LATERALITY: Left.  TUMOR SIZE, GREATEST DIMENSION: 2.5 cm.  HISTOLOGIC TYPE: Invasive pleomorphic lobular carcinoma.  HISTOLOGIC GRADE (LUTHER HISTOLOGIC SCORE):  Glandular (acinar)/tubular differentiation: Score 3.  Nuclear pleomorphism: Score 2.  Mitotic rate: Score 1.  Overall grade: Grade 2  DUCTAL CARCINOMA IN SITU (DCIS): Not identified.  LOBULAR CARCINOMA IN SITU (LCIS): Present, pleomorphic lobular carcinoma in situ with rare focus of  classical lobular carcinoma situ.  Estimated size (extent) of pleomorphic LCIS: At least 2.8 cm.  TUMOR EXTENSION: Not applicable.  MARGINS:  INVASIVE CARCINOMA MARGINS: Uninvolved by invasive carcinoma.  Distance from inferior (closest) margin: 5 mm.  PLEOMORPHIC LOBULAR CARCINOMA IN SITU MARGINS: Uninvolved by pleomorphic LCIS.  Distance from inferior medial (closest) margin: 0.2 mm (see above comment).  REGIONAL LYMPH NODES: Uninvolved by tumor cells.  Number of lymph nodes examined: 2.  Number of sentinel nodes examined: 2.  TREATMENT EFFECT: No known presurgical therapy.  PATHOLOGIC STAGE CLASSIFICATION (pTNM, AJCC 8TH EDITION):  pT2: Tumor size = 2.5 cm in greatest dimension.  pN0: No regional lymph node metastasis identified.  pM: Not applicable.    ER: Positive.  AK: Positive.  HER2: Negative (IHC - Equivocal (score 2) and FISH - Negative).  Ki-67: Approximately 14%.  HPI:     Social History     Socioeconomic History    Marital status:      Spouse name: Not on file    Number of children: Not on file    Years of education: Not on file    Highest education level: Not on file   Occupational History     Employer:  [employer]   Social Needs    Financial resource strain: Not very hard    Food insecurity:     Worry: Never true     Inability: Never true    Transportation needs:     Medical: No     Non-medical: No   Tobacco Use    Smoking status: Current Every Day Smoker     Packs/day: 0.50     Years: 46.00     Pack years: 23.00     Types: Cigarettes     Start date: 4/25/1960    Smokeless tobacco: Never Used    Tobacco comment: patch available    Substance and Sexual Activity    Alcohol use: Yes     Alcohol/week: 1.2 oz     Types: 2 Glasses of wine per week     Frequency: Monthly or less     Drinks per session: Patient refused     Binge frequency: Never     Comment: Social    Drug use: No    Sexual activity: Never   Lifestyle    Physical activity:     Days per week: 3 days     Minutes per session: 20 min    Stress: To some extent   Relationships    Social connections:     Talks on phone: More than three times a week     Gets together: Three times a week     Attends Christianity service: Not on file     Active member of club or organization: Yes     Attends meetings of clubs or organizations: Never     Relationship status:    Other Topics Concern    Not on file   Social History Narrative    Not on file     Family History   Problem Relation Age of Onset    Heart disease Mother     Hypertension Mother     Alzheimer's disease Mother     Cancer Father      Past Surgical History:   Procedure Laterality Date    BIOPSY, LYMPH NODE, SENTINEL Left 3/14/2019    [facility]    BREAST BIOPSY Left 20 yrs ago    benign    CHOLECYSTECTOMY      CYSTOSCOPY, WITH URETERAL STENT INSERTION Left 6/24/2019    [facility]    CYSTOSCOPY, WITH URETERAL STENT REMOVAL Left 7/23/2019    [facility]    DILATION, URETER, USING BALLOON Left 6/24/2019    [facility]    EYE SURGERY Bilateral     cataracts     HYSTERECTOMY      LITHOTRIPSY, ESWL Left 7/23/2019    [facility]    LUMPECTOMY, BREAST Left 3/14/2019    [facility]    LYMPHADENECTOMY, AXILLARY Left 3/14/2019    [facility]    MASTECTOMY, PARTIAL Left 4/25/2019    [facility]    NEEDLE LOCALIZATION Left 3/14/2019    [facility]    PYELOGRAM, RETROGRADE Bilateral 6/24/2019    [facility]    REMOVAL-MASS N/A 3/14/2019    [facility]    TONSILLECTOMY      URETEROSCOPY Left 6/24/2019    [facility]     Past Medical History:   Diagnosis Date    Arthritis     Asthma     Cancer     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    Hyperlipidemia     Hypertension     Pseudocholinesterase deficiency     family history    Thyroid disease        Current Outpatient Medications:     amLODIPine (NORVASC) 5 MG tablet, TAKE 1 TABLET BY MOUTH ONCE DAILY, Disp: 90 tablet, Rfl: 3    atorvastatin (LIPITOR) 20 MG tablet, TAKE 1 TABLET BY MOUTH ONCE DAILY, Disp: 90 tablet, Rfl: 3    CALCIUM CARBONATE/VITAMIN D3 (VITAMIN D-3 ORAL), Take by mouth once daily. , Disp: , Rfl:     etanercept (ENBREL SURECLICK) 50 mg/mL (0.98 mL) PnIj, INJECT THE CONTENTS OF 1 PEN (50 MG) SUBCUTANEOUSLY ONCE PER WEEK AS DIRECTED BY YOUR PHYSICIAN. SINGLE-USE DEVICE. KEEP REFRIGERATED, Disp: 4 Syringe, Rfl: 3    fluticasone-salmeterol diskus inhaler 250-50 mcg, INHALE ONE DOSE BY MOUTH TWICE DAILY, Disp: 60 each, Rfl: 11    folic acid (FOLVITE) 1 MG tablet, TAKE 1 TABLET BY MOUTH ONCE DAILY EVERY DAY, Disp: 30 tablet, Rfl: 10    levothyroxine (SYNTHROID) 112 MCG tablet, TAKE 1 TABLET BY MOUTH ONCE DAILY, Disp: 90 tablet, Rfl: 3    ondansetron (ZOFRAN-ODT) 8 MG TbDL, Take 1 tablet (8 mg total) by mouth every 12 (twelve) hours as needed., Disp:  30 tablet, Rfl: 1    prochlorperazine (COMPAZINE) 10 MG tablet, Take 1 tablet (10 mg total) by mouth every 6 (six) hours as needed., Disp: 30 tablet, Rfl: 1    promethazine-phenylephrine (PROMETHAZINE VC) 6.25-5 mg/5 mL Syrp, Take 5 mLs by mouth every 6 (six) hours as needed., Disp: 240 mL, Rfl: 2    thiamine (VITAMIN B-1) 100 MG tablet, Take 100 mg by mouth once daily., Disp: , Rfl:     varenicline (CHANTIX STARTING MONTH BOX) 0.5 mg (11)- 1 mg (42) tablet, Take one 0.5mg tab by mouth once daily X3 days,then increase to one 0.5mg tab twice daily X4 days,then increase to one 1mg tab twice daily, Disp: 1 Package, Rfl: 0    VITAMIN B COMPLEX ORAL, Every day, Disp: , Rfl:     vitamin E/flaxseed oil (FLAXSEED OIL-VITAMIN E ORAL), Take by mouth., Disp: , Rfl:   No current facility-administered medications for this visit.     Facility-Administered Medications Ordered in Other Visits:     lidocaine (PF) 10 mg/ml (1%) injection 10 mg, 1 mL, Intradermal, Once, Eladia Schwartz MD  Review of patient's allergies indicates:   Allergen Reactions    Sulfamethoxazole-trimethoprim      Other reaction(s): per dr daryn Rodríguez (sulfonamide antibiotics)      NOT SURE REACTION         REVIEW OF SYSTEMS:     CONSTITUTIONAL: The patient denies any weight change. There is no apparent    change in appetite, fever, night sweats, headaches, fatigue, dizziness, or    weakness.      Losing some hair, fatigue+some lose stools    SKIN: Denies rash, issues with nails, non-healing sores, bleeding, blotching    skin or abnormal bruising. Denies new moles or changes to existing moles.      BREASTS: healing well since lumpectomy    EYES: Denies eye pain, blurred vision, swelling, redness or discharge.      ENT AND MOUTH: Denies runny nose, stuffiness, sinus trouble or sores. Denies    nosebleeds. Denies, hoarseness, change in voice or swelling in front of the    neck.      CARDIOVASCULAR: Denies chest pain, discomfort or palpitations.  Denies neck    swelling or episodes of passing out.      RESPIRATORY: Denies cough, sputum production, blood in sputum, and denies    shortness of breath.      GI: Denies trouble swallowing, indigestion, heartburn, abdominal pain, nausea,    vomiting, diarrhea, altered bowel habits, blood in stool, discoloration of    stools, change in nature of stool, bloating, increased abdominal girth.      GENITOURINARY: No discharge. No pelvic pain or lumps. No rash around groin or  lesions. No urinary frequency, hesitation, painful urination or blood in    urine. Denies incontinence. No problems with intercourse.      MUSCULOSKELETAL: Denies neck or back pain. Denies weakness in arms or legs,    joint problems or distended inflamed veins in legs. Denies swelling or abnormal  glands.      NEUROLOGICAL: Denies tingling, numbness, altered mentation changes to nerve    function in the face, weakness to one or both of the body. Denies changes to    gait and denies multiple falls or accidents.        PHYSICAL EXAM:     Vitals:    08/16/19 1317   BP: 127/60   Pulse: 84   Resp: 20   Temp: 98.8 °F (37.1 °C)       GENERAL: Comfortable looking patient. Patient is in no distress.  Awake, alert and oriented to time, person and place.  No anxiety, or agitation.      HEENT: Normal conjunctivae and eyelids. WNL.  PERRLA 3 to 4 mm. No icterus, no pallor, no congestion, and no discharge noted.     NECK:  Supple. Trachea is central.  No crepitus.  No JVD or masses.    RESPIRATORY:  No intercostal retractions.  No dullness to percussion.  Chest is clear to auscultation.  No rales, rhonchi or wheezes.  No crepitus.  Good air entry bilaterally.    CARDIOVASCULAR:  S1 and S2 are normally heard without murmurs or gallops.  All peripheral pulses are present.    ABDOMEN:  Normal abdomen.  No hepatosplenomegaly.  No free fluid.  Bowel sounds are present.  No hernia noted. No masses.  No rebound or tenderness.  No guarding or rigidity.  Umbilicus is  midline.    LYMPHATICS:  No axillary, cervical, supraclavicular, submental, or inguinal lymphadenopathy.    SKIN/MUSCULOSKELETAL:  There is no evidence of excoriation marks or ecchmosis.  No rashes.  No cyanosis.  No clubbing.  No joint or skeletal deformities noted.  Normal range of motion.    NEUROLOGIC:  Higher functions are appropriate.  No cranial nerve deficits.  Normal asher.  Normal strength.  Motor and sensory functions are normal.  Deep tendon reflexes are normal.    GENITAL/RECTAL:  Exams are deferred.      Laboratory:     CBC:  Lab Results   Component Value Date    WBC 9.29 08/16/2019    RBC 3.97 (L) 08/16/2019    HGB 12.7 08/16/2019    HCT 39.9 08/16/2019     (H) 08/16/2019    MCH 32.0 (H) 08/16/2019    MCHC 31.8 (L) 08/16/2019    RDW 14.2 08/16/2019     08/16/2019    MPV 10.4 08/16/2019    GRAN 6.6 08/16/2019    GRAN 70.7 08/16/2019    LYMPH 1.6 08/16/2019    LYMPH 17.4 (L) 08/16/2019    MONO 1.0 08/16/2019    MONO 10.2 08/16/2019    EOS 0.1 08/16/2019    BASO 0.07 08/16/2019    EOSINOPHIL 0.6 08/16/2019    BASOPHIL 0.8 08/16/2019       BMP: BMP  Lab Results   Component Value Date     08/16/2019    K 5.0 08/16/2019     08/16/2019    CO2 30 (H) 08/16/2019    BUN 12 08/16/2019    CREATININE 0.8 08/16/2019    CALCIUM 10.1 08/16/2019    ANIONGAP 9 08/16/2019    ESTGFRAFRICA >60 08/16/2019    EGFRNONAA >60 08/16/2019       LFT:   Lab Results   Component Value Date    ALT 17 08/16/2019    AST 18 08/16/2019    ALKPHOS 76 08/16/2019    BILITOT 0.3 08/16/2019     oncotype rec. score is 29  PET 6/2019 neg for mets   has renal stones and has hydronephrosis, getting stent placed in 2 days  Assessment/Plan:     T2N0Mx left breast ca s/p lumpectomy 3/2019 recurrent score 29  Patients/p cycle #1. 4 cycles of TC ok to proceed with cycle #2  CBC, CMP today proceed with chemotherapy  The Zofran Compazine sent to pharmacy  neulasta for  prevention of chemotherapy-induced neutropenia  Return to  clinic in 3 weeks CBC CMP for cycle 3.      xrt consult will follow ( has seen pt once)   cont f/u for RA, HTH. Lipids, hypothyroidism      Living Will  During past visit, I engaged the patient in the advance care planning process.  The patient and I reviewed the role for advance directives and their purpose in directing future healthcare if the patient's unable to speak for herself.  At this point in time, the patient does have full decision-making capacity.  We discussed different extreme health states that she could experience, and reviewed what kind of medical care she would want in those situations.  The patient communicated that if she were comatose and had little chance of a meaningful recovery, she would notwant machines/life-sustaining treatments used. The patient has completed a living will to reflect these preferences.  The patient has already designated a healthcare power of  to make decisions on her behalf.

## 2019-08-19 ENCOUNTER — INFUSION (OUTPATIENT)
Dept: INFUSION THERAPY | Facility: HOSPITAL | Age: 74
End: 2019-08-19
Attending: INTERNAL MEDICINE
Payer: MEDICARE

## 2019-08-19 VITALS
WEIGHT: 177.56 LBS | BODY MASS INDEX: 26.91 KG/M2 | HEART RATE: 71 BPM | SYSTOLIC BLOOD PRESSURE: 116 MMHG | OXYGEN SATURATION: 93 % | HEIGHT: 68 IN | DIASTOLIC BLOOD PRESSURE: 76 MMHG | RESPIRATION RATE: 18 BRPM | TEMPERATURE: 98 F

## 2019-08-19 DIAGNOSIS — Z17.0 MALIGNANT NEOPLASM OF NIPPLE OF LEFT BREAST IN FEMALE, ESTROGEN RECEPTOR POSITIVE: Primary | ICD-10-CM

## 2019-08-19 DIAGNOSIS — C50.012 MALIGNANT NEOPLASM OF NIPPLE OF LEFT BREAST IN FEMALE, ESTROGEN RECEPTOR POSITIVE: Primary | ICD-10-CM

## 2019-08-19 PROCEDURE — 96413 CHEMO IV INFUSION 1 HR: CPT

## 2019-08-19 PROCEDURE — 96367 TX/PROPH/DG ADDL SEQ IV INF: CPT | Mod: PO

## 2019-08-19 PROCEDURE — 63600175 PHARM REV CODE 636 W HCPCS: Mod: TB,PO | Performed by: INTERNAL MEDICINE

## 2019-08-19 PROCEDURE — 96417 CHEMO IV INFUS EACH ADDL SEQ: CPT

## 2019-08-19 RX ORDER — SODIUM CHLORIDE 0.9 % (FLUSH) 0.9 %
10 SYRINGE (ML) INJECTION
Status: DISCONTINUED | OUTPATIENT
Start: 2019-08-19 | End: 2019-08-19 | Stop reason: HOSPADM

## 2019-08-19 RX ADMIN — CYCLOPHOSPHAMIDE 1170 MG: 1 INJECTION, POWDER, FOR SOLUTION INTRAVENOUS; ORAL at 11:08

## 2019-08-19 RX ADMIN — PALONOSETRON HYDROCHLORIDE: 0.05 INJECTION INTRAVENOUS at 09:08

## 2019-08-19 RX ADMIN — SODIUM CHLORIDE: 0.9 INJECTION, SOLUTION INTRAVENOUS at 09:08

## 2019-08-19 RX ADMIN — DOCETAXEL ANHYDROUS 145 MG: 10 INJECTION, SOLUTION INTRAVENOUS at 10:08

## 2019-08-19 RX ADMIN — APREPITANT 130 MG: 130 INJECTION, EMULSION INTRAVENOUS at 09:08

## 2019-08-19 NOTE — PLAN OF CARE
Problem: Nausea and Vomiting (Chemotherapy Effects)  Goal: Fluid and Electrolyte Balance  Outcome: Ongoing (interventions implemented as appropriate)  Patient given antiemetics per MD order. Patient eduated on home medication therapy

## 2019-08-20 ENCOUNTER — INFUSION (OUTPATIENT)
Dept: INFUSION THERAPY | Facility: HOSPITAL | Age: 74
End: 2019-08-20
Attending: INTERNAL MEDICINE
Payer: MEDICARE

## 2019-08-20 VITALS
HEIGHT: 68 IN | HEART RATE: 84 BPM | SYSTOLIC BLOOD PRESSURE: 135 MMHG | RESPIRATION RATE: 18 BRPM | DIASTOLIC BLOOD PRESSURE: 69 MMHG | BODY MASS INDEX: 27.19 KG/M2 | WEIGHT: 179.44 LBS | TEMPERATURE: 98 F

## 2019-08-20 DIAGNOSIS — C50.012 MALIGNANT NEOPLASM OF NIPPLE OF LEFT BREAST IN FEMALE, ESTROGEN RECEPTOR POSITIVE: Primary | ICD-10-CM

## 2019-08-20 DIAGNOSIS — Z17.0 MALIGNANT NEOPLASM OF NIPPLE OF LEFT BREAST IN FEMALE, ESTROGEN RECEPTOR POSITIVE: Primary | ICD-10-CM

## 2019-08-20 PROCEDURE — 96372 THER/PROPH/DIAG INJ SC/IM: CPT

## 2019-08-20 PROCEDURE — 63600175 PHARM REV CODE 636 W HCPCS: Mod: JG | Performed by: INTERNAL MEDICINE

## 2019-08-20 RX ADMIN — PEGFILGRASTIM-CBQV 6 MG: 6 INJECTION, SOLUTION SUBCUTANEOUS at 01:08

## 2019-08-23 ENCOUNTER — TELEPHONE (OUTPATIENT)
Dept: HEMATOLOGY/ONCOLOGY | Facility: CLINIC | Age: 74
End: 2019-08-23

## 2019-08-23 NOTE — TELEPHONE ENCOUNTER
----- Message from Susan Parker sent at 8/23/2019  1:07 PM CDT -----  Contact: pt  Pt calling states that she had chemo on Monday and is very nauseated and can't take her medication . Would like a call back,please at [phone] today.

## 2019-08-23 NOTE — TELEPHONE ENCOUNTER
Spoke with pt.  Pt is nauseated and can't take po nausea meds, making her gag.  Insrtucted to try otc nausea sea sick patches.  Pt verbalized understanding.  Pt will call office if it doesn't help.

## 2019-09-03 ENCOUNTER — OFFICE VISIT (OUTPATIENT)
Dept: RHEUMATOLOGY | Facility: CLINIC | Age: 74
End: 2019-09-03
Payer: MEDICARE

## 2019-09-03 VITALS
DIASTOLIC BLOOD PRESSURE: 66 MMHG | WEIGHT: 174.38 LBS | SYSTOLIC BLOOD PRESSURE: 118 MMHG | BODY MASS INDEX: 26.52 KG/M2

## 2019-09-03 DIAGNOSIS — M05.79 RHEUMATOID ARTHRITIS INVOLVING MULTIPLE SITES WITH POSITIVE RHEUMATOID FACTOR: Primary | ICD-10-CM

## 2019-09-03 PROCEDURE — 99213 PR OFFICE/OUTPT VISIT, EST, LEVL III, 20-29 MIN: ICD-10-PCS | Mod: S$GLB,,, | Performed by: INTERNAL MEDICINE

## 2019-09-03 PROCEDURE — 99213 OFFICE O/P EST LOW 20 MIN: CPT | Mod: S$GLB,,, | Performed by: INTERNAL MEDICINE

## 2019-09-03 RX ORDER — PREDNISONE 2.5 MG/1
TABLET ORAL
Qty: 100 TABLET | Refills: 0 | Status: SHIPPED | OUTPATIENT
Start: 2019-09-03 | End: 2019-10-10 | Stop reason: SDUPTHER

## 2019-09-03 RX ORDER — HYDROXYCHLOROQUINE SULFATE 200 MG/1
200 TABLET, FILM COATED ORAL DAILY
Qty: 60 TABLET | Refills: 1 | Status: SHIPPED | OUTPATIENT
Start: 2019-09-03 | End: 2019-10-10 | Stop reason: SDUPTHER

## 2019-09-03 NOTE — PROGRESS NOTES
[facility] RHEUMATOLOGY            PROGRESS NOTE      Subjective:       Patient ID:   NAME: Kirstie Bowden : 1945     74 y.o. female    Referring Doc: No ref. provider found  Other Physicians:    Chief Complaint:  Rheumatoid Arthritis      History of Present Illness:     Patient returns today for a regularly scheduled follow-up visit for  RA     The patient is experiencing increased pain and swelling in based on MCP joints. She underwent 2 rounds of chemotherapy for breast cancer. Patient states she has decided not to continue with 2 remaining courses. She has increased fatigue, GI complaints and joint complaints.he has been off Enbrel since diagnosis of breast cancer. She has fatigue. No rashes ;no chest pains, cough or shortness of breath. Prolonged morning stiffness and pain and swelling of wrists and MCP joints            ROS:   GEN:  No  fever, night sweats . weight is stable   No fatigue  SKIN: no rashes, no bruising, no ulcerations, no Raynaud's  HEENT: no HA's, No visual changes, no mucosal ulcers, no sicca symptoms,  CV:   no CP, SOB, PND, MCKEON, no orthopnea, no palpitations  PULM: normal with no SOB, cough, hemoptysis, sputum or pleuritic pain  GI:  no abdominal pain, nausea, vomiting, constipation, diarrhea, melanotic stools, BRBPR, hematemesis, no dysphagia  :   no dysuria  NEURO: no paresthesias, headaches, visual disturbances, muscle weakness  MUSCULOSKELETAL:no joint swelling, prolonged AM stiffness, no back pain, no muscle pain  Allergies:  Review of patient's allergies indicates:   Allergen Reactions    Sulfamethoxazole-trimethoprim      Other reaction(s): per dr daryn Rodríguez (sulfonamide antibiotics)      NOT SURE REACTION       Medications:    Current Outpatient Medications:     amLODIPine (NORVASC) 5 MG tablet, TAKE 1 TABLET BY MOUTH ONCE DAILY, Disp: 90 tablet, Rfl: 3    atorvastatin (LIPITOR) 20 MG tablet, TAKE 1 TABLET BY MOUTH ONCE DAILY, Disp: 90 tablet, Rfl: 3     CALCIUM CARBONATE/VITAMIN D3 (VITAMIN D-3 ORAL), Take by mouth once daily. , Disp: , Rfl:     etanercept (ENBREL SURECLICK) 50 mg/mL (0.98 mL) PnIj, INJECT THE CONTENTS OF 1 PEN (50 MG) SUBCUTANEOUSLY ONCE PER WEEK AS DIRECTED BY YOUR PHYSICIAN. SINGLE-USE DEVICE. KEEP REFRIGERATED, Disp: 4 Syringe, Rfl: 3    fluticasone-salmeterol diskus inhaler 250-50 mcg, INHALE ONE DOSE BY MOUTH TWICE DAILY, Disp: 60 each, Rfl: 11    folic acid (FOLVITE) 1 MG tablet, TAKE 1 TABLET BY MOUTH ONCE DAILY EVERY DAY, Disp: 30 tablet, Rfl: 10    levothyroxine (SYNTHROID) 112 MCG tablet, TAKE 1 TABLET BY MOUTH ONCE DAILY, Disp: 90 tablet, Rfl: 3    ondansetron (ZOFRAN-ODT) 8 MG TbDL, Take 1 tablet (8 mg total) by mouth every 12 (twelve) hours as needed., Disp: 30 tablet, Rfl: 1    ondansetron (ZOFRAN-ODT) 8 MG TbDL, Take 1 tablet (8 mg total) by mouth every 12 (twelve) hours as needed., Disp: 30 tablet, Rfl: 1    prochlorperazine (COMPAZINE) 10 MG tablet, Take 1 tablet (10 mg total) by mouth every 6 (six) hours as needed., Disp: 30 tablet, Rfl: 1    promethazine-phenylephrine (PROMETHAZINE VC) 6.25-5 mg/5 mL Syrp, Take 5 mLs by mouth every 6 (six) hours as needed., Disp: 240 mL, Rfl: 2    thiamine (VITAMIN B-1) 100 MG tablet, Take 100 mg by mouth once daily., Disp: , Rfl:     varenicline (CHANTIX STARTING MONTH BOX) 0.5 mg (11)- 1 mg (42) tablet, Take one 0.5mg tab by mouth once daily X3 days,then increase to one 0.5mg tab twice daily X4 days,then increase to one 1mg tab twice daily, Disp: 1 Package, Rfl: 0    VITAMIN B COMPLEX ORAL, Every day, Disp: , Rfl:     vitamin E/flaxseed oil (FLAXSEED OIL-VITAMIN E ORAL), Take by mouth., Disp: , Rfl:   No current facility-administered medications for this visit.     Facility-Administered Medications Ordered in Other Visits:     lidocaine (PF) 10 mg/ml (1%) injection 10 mg, 1 mL, Intradermal, Once, Eladia Schwartz MD    PMHx/PSHx Updates:          Objective:     Vitals:  Blood  pressure 118/66, weight 79.1 kg (174 lb 6.4 oz).    Physical Examination:   GEN: no apparent distress, comfortable; AAOx3  SKIN: no rashes,no ulceration, no Raynaud's, no petechiae, no SQ nodules,  HEAD: normal  EYES: no pallor, no icterus  NECK: no masses, thyroid normal, trachea midline, no LAD/LN's, supple  CV: RRR with no murmur; l S1 and S2 reg. ,no gallop no rubs,   CHEST: Normal respiratory effort; CTAB; normal breath sounds; no wheeze or crackles  MUSC/Skeletal: ROM normal; no crepitus; joints without synovitis,  no deformities  No joint swelling or tenderness of PIP, MCP, wrist, elbow, shoulder, or knee joints  EXTREM: no clubbing, cyanosis, no edema,normal  pulses   NEURO: grossly intact; motor WNL; AAOx3  PSYCH: normal mood, affect and behavior  LYMPH: normal cervical, supraclavicular          Labs:   Lab Results   Component Value Date    WBC 9.29 08/16/2019    HGB 12.7 08/16/2019    HCT 39.9 08/16/2019     (H) 08/16/2019     08/16/2019    CMP  @LAST[facility](NA,K,CL,CO2,GLU,BUN,Creatinine,Calcium,PROT,Albumin,Bilitot,Alkphos,AST,ALT,CRP,ESR,RF,CCP,RODNEY,SSA,CPK,uric acid) )@  I have reviewed all available lab results and radiology reports.    Radiology/Diagnostic Studies:        Assessment/Plan:   (1) 74 y.o. female with diagnosis of RA..active  Patient is adamant she does not wish to restart chemotherapy.   She can start low dose prednisone and Plaquenil 200 mg twice a day for active Rheumatoid.. Explained to her that Plaquenil is not an immunosuppressive drug,it is a immunomodulator.  He did still have an eye exam before she starts her shortly after and every year.  Follow up in 3 weeks            Discussion:     I have explained all of the above in detail and the patient understands all of the current recommendation(s). I have answered all questions to the best of my ability and to their complete satisfaction.       The patient is to continue with the current management plan         RTC in          Electronically signed by Jelani Stacy MD

## 2019-09-04 ENCOUNTER — LAB VISIT (OUTPATIENT)
Dept: LAB | Facility: HOSPITAL | Age: 74
End: 2019-09-04
Attending: INTERNAL MEDICINE
Payer: MEDICARE

## 2019-09-04 ENCOUNTER — OFFICE VISIT (OUTPATIENT)
Dept: HEMATOLOGY/ONCOLOGY | Facility: CLINIC | Age: 74
End: 2019-09-04
Payer: MEDICARE

## 2019-09-04 VITALS
HEART RATE: 75 BPM | WEIGHT: 175.69 LBS | TEMPERATURE: 99 F | SYSTOLIC BLOOD PRESSURE: 120 MMHG | BODY MASS INDEX: 26.63 KG/M2 | RESPIRATION RATE: 20 BRPM | DIASTOLIC BLOOD PRESSURE: 59 MMHG | OXYGEN SATURATION: 94 % | HEIGHT: 68 IN

## 2019-09-04 DIAGNOSIS — I10 ESSENTIAL HYPERTENSION, BENIGN: ICD-10-CM

## 2019-09-04 DIAGNOSIS — M05.711 RHEUMATOID ARTHRITIS INVOLVING RIGHT SHOULDER WITH POSITIVE RHEUMATOID FACTOR: ICD-10-CM

## 2019-09-04 DIAGNOSIS — C50.012 MALIGNANT NEOPLASM OF NIPPLE OF LEFT BREAST IN FEMALE, ESTROGEN RECEPTOR POSITIVE: Primary | ICD-10-CM

## 2019-09-04 DIAGNOSIS — C50.012 MALIGNANT NEOPLASM OF NIPPLE OF LEFT BREAST IN FEMALE, ESTROGEN RECEPTOR POSITIVE: ICD-10-CM

## 2019-09-04 DIAGNOSIS — M80.00XA AGE-RELATED OSTEOPOROSIS WITH CURRENT PATHOLOGICAL FRACTURE, INITIAL ENCOUNTER: ICD-10-CM

## 2019-09-04 DIAGNOSIS — Z17.0 CARCINOMA OF CENTRAL PORTION OF LEFT BREAST IN FEMALE, ESTROGEN RECEPTOR POSITIVE: ICD-10-CM

## 2019-09-04 DIAGNOSIS — E03.9 ACQUIRED HYPOTHYROIDISM: ICD-10-CM

## 2019-09-04 DIAGNOSIS — Z17.0 MALIGNANT NEOPLASM OF NIPPLE OF LEFT BREAST IN FEMALE, ESTROGEN RECEPTOR POSITIVE: ICD-10-CM

## 2019-09-04 DIAGNOSIS — Z17.0 MALIGNANT NEOPLASM OF NIPPLE OF LEFT BREAST IN FEMALE, ESTROGEN RECEPTOR POSITIVE: Primary | ICD-10-CM

## 2019-09-04 DIAGNOSIS — M15.8 OTHER OSTEOARTHRITIS INVOLVING MULTIPLE JOINTS: ICD-10-CM

## 2019-09-04 DIAGNOSIS — Z79.811 LONG TERM CURRENT USE OF AROMATASE INHIBITOR: ICD-10-CM

## 2019-09-04 DIAGNOSIS — C50.112 CARCINOMA OF CENTRAL PORTION OF LEFT BREAST IN FEMALE, ESTROGEN RECEPTOR POSITIVE: ICD-10-CM

## 2019-09-04 DIAGNOSIS — M15.8 OTHER OSTEOARTHRITIS INVOLVING MULTIPLE JOINTS: Primary | ICD-10-CM

## 2019-09-04 LAB
ALBUMIN SERPL BCP-MCNC: 3.5 G/DL (ref 3.5–5.2)
ALP SERPL-CCNC: 77 U/L (ref 55–135)
ALT SERPL W/O P-5'-P-CCNC: 15 U/L (ref 10–44)
ANION GAP SERPL CALC-SCNC: 8 MMOL/L (ref 8–16)
AST SERPL-CCNC: 17 U/L (ref 10–40)
BASOPHILS # BLD AUTO: 0.05 K/UL (ref 0–0.2)
BASOPHILS NFR BLD: 0.8 % (ref 0–1.9)
BILIRUB SERPL-MCNC: 0.3 MG/DL (ref 0.1–1)
BUN SERPL-MCNC: 13 MG/DL (ref 8–23)
CALCIUM SERPL-MCNC: 9.3 MG/DL (ref 8.7–10.5)
CHLORIDE SERPL-SCNC: 107 MMOL/L (ref 95–110)
CO2 SERPL-SCNC: 27 MMOL/L (ref 23–29)
CREAT SERPL-MCNC: 0.8 MG/DL (ref 0.5–1.4)
DIFFERENTIAL METHOD: ABNORMAL
EOSINOPHIL # BLD AUTO: 0 K/UL (ref 0–0.5)
EOSINOPHIL NFR BLD: 0.6 % (ref 0–8)
ERYTHROCYTE [DISTWIDTH] IN BLOOD BY AUTOMATED COUNT: 14.6 % (ref 11.5–14.5)
EST. GFR  (AFRICAN AMERICAN): >60 ML/MIN/1.73 M^2
EST. GFR  (NON AFRICAN AMERICAN): >60 ML/MIN/1.73 M^2
GLUCOSE SERPL-MCNC: 150 MG/DL (ref 70–110)
HCT VFR BLD AUTO: 37.7 % (ref 37–48.5)
HGB BLD-MCNC: 12 G/DL (ref 12–16)
IMM GRANULOCYTES # BLD AUTO: 0.06 K/UL (ref 0–0.04)
LYMPHOCYTES # BLD AUTO: 1.4 K/UL (ref 1–4.8)
LYMPHOCYTES NFR BLD: 22.1 % (ref 18–48)
MCH RBC QN AUTO: 31.5 PG (ref 27–31)
MCHC RBC AUTO-ENTMCNC: 31.8 G/DL (ref 32–36)
MCV RBC AUTO: 99 FL (ref 82–98)
MONOCYTES # BLD AUTO: 0.6 K/UL (ref 0.3–1)
MONOCYTES NFR BLD: 8.8 % (ref 4–15)
NEUTROPHILS # BLD AUTO: 4.3 K/UL (ref 1.8–7.7)
NEUTROPHILS NFR BLD: 66.8 % (ref 38–73)
NRBC BLD-RTO: 0 /100 WBC
PLATELET # BLD AUTO: 147 K/UL (ref 150–350)
PMV BLD AUTO: 10.5 FL (ref 9.2–12.9)
POTASSIUM SERPL-SCNC: 4.3 MMOL/L (ref 3.5–5.1)
PROT SERPL-MCNC: 6.7 G/DL (ref 6–8.4)
RBC # BLD AUTO: 3.81 M/UL (ref 4–5.4)
SODIUM SERPL-SCNC: 142 MMOL/L (ref 136–145)
WBC # BLD AUTO: 6.46 K/UL (ref 3.9–12.7)

## 2019-09-04 PROCEDURE — 99214 OFFICE O/P EST MOD 30 MIN: CPT | Mod: S$PBB,,, | Performed by: INTERNAL MEDICINE

## 2019-09-04 PROCEDURE — 36415 COLL VENOUS BLD VENIPUNCTURE: CPT

## 2019-09-04 PROCEDURE — 85025 COMPLETE CBC W/AUTO DIFF WBC: CPT

## 2019-09-04 PROCEDURE — 99999 PR PBB SHADOW E&M-EST. PATIENT-LVL IV: CPT | Mod: PBBFAC,,, | Performed by: INTERNAL MEDICINE

## 2019-09-04 PROCEDURE — 99214 PR OFFICE/OUTPT VISIT, EST, LEVL IV, 30-39 MIN: ICD-10-PCS | Mod: S$PBB,,, | Performed by: INTERNAL MEDICINE

## 2019-09-04 PROCEDURE — 99999 PR PBB SHADOW E&M-EST. PATIENT-LVL IV: ICD-10-PCS | Mod: PBBFAC,,, | Performed by: INTERNAL MEDICINE

## 2019-09-04 PROCEDURE — 80053 COMPREHEN METABOLIC PANEL: CPT

## 2019-09-04 PROCEDURE — 99214 OFFICE O/P EST MOD 30 MIN: CPT | Mod: PBBFAC,PO | Performed by: INTERNAL MEDICINE

## 2019-09-04 RX ORDER — SODIUM CHLORIDE 0.9 % (FLUSH) 0.9 %
10 SYRINGE (ML) INJECTION
OUTPATIENT
Start: 2019-09-09

## 2019-09-04 RX ORDER — HEPARIN 100 UNIT/ML
500 SYRINGE INTRAVENOUS
OUTPATIENT
Start: 2019-09-09

## 2019-09-04 RX ORDER — ANASTROZOLE 1 MG/1
1 TABLET ORAL DAILY
Qty: 90 TABLET | Refills: 4 | Status: SHIPPED | OUTPATIENT
Start: 2019-09-04 | End: 2020-09-03

## 2019-09-04 NOTE — PROGRESS NOTES
Subjective:       Patient ID: Kirstie Bowden is a 74 y.o. female.    Chief Complaint: new dx breast ca started TC chemo s/p 3  cycles   rec score of 29   pt states she is too tired  And wants to stop her treatments   hydronephrosis+ ,  had stent placed    Oncologic History:   INVASIVE CARCINOMA BREAST, CANCER CASE SUMMARY:  PROCEDURE: Partial mastectomy without skin or nipple.3/2019  SPECIMEN LATERALITY: Left.  TUMOR SIZE, GREATEST DIMENSION: 2.5 cm.  HISTOLOGIC TYPE: Invasive pleomorphic lobular carcinoma.  HISTOLOGIC GRADE (LUTHER HISTOLOGIC SCORE):  Glandular (acinar)/tubular differentiation: Score 3.  Nuclear pleomorphism: Score 2.  Mitotic rate: Score 1.  Overall grade: Grade 2  DUCTAL CARCINOMA IN SITU (DCIS): Not identified.  LOBULAR CARCINOMA IN SITU (LCIS): Present, pleomorphic lobular carcinoma in situ with rare focus of  classical lobular carcinoma situ.  Estimated size (extent) of pleomorphic LCIS: At least 2.8 cm.  TUMOR EXTENSION: Not applicable.  MARGINS:  INVASIVE CARCINOMA MARGINS: Uninvolved by invasive carcinoma.  Distance from inferior (closest) margin: 5 mm.  PLEOMORPHIC LOBULAR CARCINOMA IN SITU MARGINS: Uninvolved by pleomorphic LCIS.  Distance from inferior medial (closest) margin: 0.2 mm (see above comment).  REGIONAL LYMPH NODES: Uninvolved by tumor cells.  Number of lymph nodes examined: 2.  Number of sentinel nodes examined: 2.  TREATMENT EFFECT: No known presurgical therapy.  PATHOLOGIC STAGE CLASSIFICATION (pTNM, AJCC 8TH EDITION):  pT2: Tumor size = 2.5 cm in greatest dimension.  pN0: No regional lymph node metastasis identified.  pM: Not applicable.    ER: Positive.  RI: Positive.  HER2: Negative (IHC - Equivocal (score 2) and FISH - Negative).  Ki-67: Approximately 14%.  HPI:     Social History     Socioeconomic History    Marital status:      Spouse name: Not on file    Number of children: Not on file    Years of education: Not on file    Highest education  level: Not on file   Occupational History     Employer: [employer]   Social Needs    Financial resource strain: Not very hard    Food insecurity:     Worry: Never true     Inability: Never true    Transportation needs:     Medical: No     Non-medical: No   Tobacco Use    Smoking status: Current Every Day Smoker     Packs/day: 0.50     Years: 46.00     Pack years: 23.00     Types: Cigarettes     Start date: 4/25/1960    Smokeless tobacco: Never Used    Tobacco comment: patch available    Substance and Sexual Activity    Alcohol use: Yes     Alcohol/week: 1.2 oz     Types: 2 Glasses of wine per week     Frequency: Monthly or less     Drinks per session: Patient refused     Binge frequency: Never     Comment: Social    Drug use: No    Sexual activity: Never   Lifestyle    Physical activity:     Days per week: 3 days     Minutes per session: 20 min    Stress: To some extent   Relationships    Social connections:     Talks on phone: More than three times a week     Gets together: Three times a week     Attends [employer] service: Not on file     Active member of club or organization: Yes     Attends meetings of clubs or organizations: Never     Relationship status:    Other Topics Concern    Not on file   Social History Narrative    Not on file     Family History   Problem Relation Age of Onset    Heart disease Mother     Hypertension Mother     Alzheimer's disease Mother     Cancer Father      Past Surgical History:   Procedure Laterality Date    BIOPSY, LYMPH NODE, SENTINEL Left 3/14/2019    Performed [facility] at [facility] OR    BREAST BIOPSY Left 20 yrs ago    benign    CHOLECYSTECTOMY      CYSTOSCOPY, WITH URETERAL STENT INSERTION Left 6/24/2019    [facility] at [facility] OR    CYSTOSCOPY, WITH URETERAL STENT REMOVAL Left 7/23/2019    [facility] at [facility]    DILATION, URETER, USING BALLOON Left 6/24/2019    [facility] at [facility]    EYE SURGERY Bilateral     cataracts    HYSTERECTOMY      LITHOTRIPSY, ESWL Left 7/23/2019    [facility]    LUMPECTOMY, BREAST Left 3/14/2019    [facility]    LYMPHADENECTOMY, AXILLARY Left 3/14/2019    [facility]    MASTECTOMY, PARTIAL Left 4/25/2019    [facility]    NEEDLE LOCALIZATION Left 3/14/2019    [facility]    PYELOGRAM, RETROGRADE Bilateral 6/24/2019    [facility]    REMOVAL-MASS N/A 3/14/2019    [facility]    TONSILLECTOMY      URETEROSCOPY Left 6/24/2019    [facility]     Past Medical History:   Diagnosis Date    Arthritis     Asthma     Cancer     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    Hyperlipidemia     Hypertension     Pseudocholinesterase deficiency     family history    Thyroid disease        Current Outpatient Medications:     amLODIPine (NORVASC) 5 MG tablet, TAKE 1 TABLET BY MOUTH ONCE DAILY, Disp: 90 tablet, Rfl: 3    atorvastatin (LIPITOR) 20 MG tablet, TAKE 1 TABLET BY MOUTH ONCE DAILY, Disp: 90 tablet, Rfl: 3    CALCIUM CARBONATE/VITAMIN D3 (VITAMIN D-3 ORAL), Take by mouth once daily. , Disp: , Rfl:     etanercept (ENBREL SURECLICK) 50 mg/mL (0.98 mL) PnIj, INJECT THE CONTENTS OF 1 PEN (50 MG) SUBCUTANEOUSLY ONCE PER WEEK AS DIRECTED BY YOUR PHYSICIAN. SINGLE-USE DEVICE. KEEP REFRIGERATED, Disp: 4 Syringe, Rfl: 3    fluticasone-salmeterol diskus inhaler 250-50 mcg, INHALE ONE DOSE BY MOUTH TWICE DAILY, Disp: 60 each, Rfl: 11    folic acid (FOLVITE) 1 MG tablet, TAKE 1 TABLET BY MOUTH ONCE DAILY EVERY DAY, Disp: 30 tablet, Rfl: 10    hydroxychloroquine (PLAQUENIL) 200 mg tablet, Take 1 tablet (200 mg total) by mouth once daily., Disp: 60 tablet, Rfl: 1    levothyroxine (SYNTHROID) 112 MCG tablet,  TAKE 1 TABLET BY MOUTH ONCE DAILY, Disp: 90 tablet, Rfl: 3    ondansetron (ZOFRAN-ODT) 8 MG TbDL, Take 1 tablet (8 mg total) by mouth every 12 (twelve) hours as needed., Disp: 30 tablet, Rfl: 1    ondansetron (ZOFRAN-ODT) 8 MG TbDL, Take 1 tablet (8 mg total) by mouth every 12 (twelve) hours as needed., Disp: 30 tablet, Rfl: 1    predniSONE (DELTASONE) 2.5 MG tablet, One po bid-tid pc, Disp: 100 tablet, Rfl: 0    prochlorperazine (COMPAZINE) 10 MG tablet, Take 1 tablet (10 mg total) by mouth every 6 (six) hours as needed., Disp: 30 tablet, Rfl: 1    promethazine-phenylephrine (PROMETHAZINE VC) 6.25-5 mg/5 mL Syrp, Take 5 mLs by mouth every 6 (six) hours as needed., Disp: 240 mL, Rfl: 2    thiamine (VITAMIN B-1) 100 MG tablet, Take 100 mg by mouth once daily., Disp: , Rfl:     varenicline (CHANTIX STARTING MONTH BOX) 0.5 mg (11)- 1 mg (42) tablet, Take one 0.5mg tab by mouth once daily X3 days,then increase to one 0.5mg tab twice daily X4 days,then increase to one 1mg tab twice daily, Disp: 1 Package, Rfl: 0    VITAMIN B COMPLEX ORAL, Every day, Disp: , Rfl:     vitamin E/flaxseed oil (FLAXSEED OIL-VITAMIN E ORAL), Take by mouth., Disp: , Rfl:   No current facility-administered medications for this visit.     Facility-Administered Medications Ordered in Other Visits:     lidocaine (PF) 10 mg/ml (1%) injection 10 mg, 1 mL, Intradermal, Once, Eladia Schwartz MD  Review of patient's allergies indicates:   Allergen Reactions    Sulfamethoxazole-trimethoprim      Other reaction(s): per dr daryn Rodríguez (sulfonamide antibiotics)      NOT SURE REACTION         REVIEW OF SYSTEMS:     CONSTITUTIONAL: The patient denies any weight change. There is no apparent    change in appetite, fever, night sweats, headaches, fatigue, dizziness, or    weakness.      Losing some hair, fatigue+some lose stools    SKIN: Denies rash, issues with nails, non-healing sores, bleeding, blotching    skin or abnormal bruising.  Denies new moles or changes to existing moles.      BREASTS: healing well since lumpectomy    EYES: Denies eye pain, blurred vision, swelling, redness or discharge.      ENT AND MOUTH: Denies runny nose, stuffiness, sinus trouble or sores. Denies    nosebleeds. Denies, hoarseness, change in voice or swelling in front of the    neck.      CARDIOVASCULAR: Denies chest pain, discomfort or palpitations. Denies neck    swelling or episodes of passing out.      RESPIRATORY: Denies cough, sputum production, blood in sputum, and denies    shortness of breath.      GI: Denies trouble swallowing, indigestion, heartburn, abdominal pain, nausea,    vomiting, diarrhea, altered bowel habits, blood in stool, discoloration of    stools, change in nature of stool, bloating, increased abdominal girth.      GENITOURINARY: No discharge. No pelvic pain or lumps. No rash around groin or  lesions. No urinary frequency, hesitation, painful urination or blood in    urine. Denies incontinence. No problems with intercourse.      MUSCULOSKELETAL: Denies neck or back pain. Denies weakness in arms or legs,    joint problems or distended inflamed veins in legs. Denies swelling or abnormal  glands.      NEUROLOGICAL: Denies tingling, numbness, altered mentation changes to nerve    function in the face, weakness to one or both of the body. Denies changes to    gait and denies multiple falls or accidents.        PHYSICAL EXAM:     Wt Readings from Last 3 Encounters:   09/04/19 79.7 kg (175 lb 11.3 oz)   09/03/19 79.1 kg (174 lb 6.4 oz)   08/20/19 81.4 kg (179 lb 7.3 oz)     Temp Readings from Last 3 Encounters:   09/04/19 98.6 °F (37 °C)   08/20/19 98.1 °F (36.7 °C)   08/19/19 97.7 °F (36.5 °C)     BP Readings from Last 3 Encounters:   09/04/19 (!) 120/59   09/03/19 118/66   08/20/19 135/69     Pulse Readings from Last 3 Encounters:   09/04/19 75   08/20/19 84   08/19/19 71     GENERAL: Comfortable looking patient. Patient is in no distress.  Awake,  alert and oriented to time, person and place.  No anxiety, or agitation.      HEENT: Normal conjunctivae and eyelids. WNL.  PERRLA 3 to 4 mm. No icterus, no pallor, no congestion, and no discharge noted.     NECK:  Supple. Trachea is central.  No crepitus.  No JVD or masses.    RESPIRATORY:  No intercostal retractions.  No dullness to percussion.  Chest is clear to auscultation.  No rales, rhonchi or wheezes.  No crepitus.  Good air entry bilaterally.    CARDIOVASCULAR:  S1 and S2 are normally heard without murmurs or gallops.  All peripheral pulses are present.    ABDOMEN:  Normal abdomen.  No hepatosplenomegaly.  No free fluid.  Bowel sounds are present.  No hernia noted. No masses.  No rebound or tenderness.  No guarding or rigidity.  Umbilicus is midline.    LYMPHATICS:  No axillary, cervical, supraclavicular, submental, or inguinal lymphadenopathy.    SKIN/MUSCULOSKELETAL:  There is no evidence of excoriation marks or ecchmosis.  No rashes.  No cyanosis.  No clubbing.  No joint or skeletal deformities noted.  Normal range of motion.    NEUROLOGIC:  Higher functions are appropriate.  No cranial nerve deficits.  Normal asher.  Normal strength.  Motor and sensory functions are normal.  Deep tendon reflexes are normal.    GENITAL/RECTAL:  Exams are deferred.      Laboratory:     CBC:  Lab Results   Component Value Date    WBC 6.46 09/04/2019    RBC 3.81 (L) 09/04/2019    HGB 12.0 09/04/2019    HCT 37.7 09/04/2019    MCV 99 (H) 09/04/2019    MCH 31.5 (H) 09/04/2019    MCHC 31.8 (L) 09/04/2019    RDW 14.6 (H) 09/04/2019     (L) 09/04/2019    MPV 10.5 09/04/2019    GRAN 4.3 09/04/2019    GRAN 66.8 09/04/2019    LYMPH 1.4 09/04/2019    LYMPH 22.1 09/04/2019    MONO 0.6 09/04/2019    MONO 8.8 09/04/2019    EOS 0.0 09/04/2019    BASO 0.05 09/04/2019    EOSINOPHIL 0.6 09/04/2019    BASOPHIL 0.8 09/04/2019       BMP: BMP  Lab Results   Component Value Date     08/16/2019    K 5.0 08/16/2019     08/16/2019     CO2 30 (H) 08/16/2019    BUN 12 08/16/2019    CREATININE 0.8 08/16/2019    CALCIUM 10.1 08/16/2019    ANIONGAP 9 08/16/2019    ESTGFRAFRICA >60 08/16/2019    EGFRNONAA >60 08/16/2019       LFT:   Lab Results   Component Value Date    ALT 17 08/16/2019    AST 18 08/16/2019    ALKPHOS 76 08/16/2019    BILITOT 0.3 08/16/2019     oncotype rec. score is 29  PET 6/2019 neg for mets   has renal stones and has hydronephrosis, getting stent placed in 2 days  Assessment/Plan:     T2N0Mx left breast ca s/p lumpectomy 3/2019 recurrent score 29  Patients/p cycle #2. 4 cycles of TC  Was planned pt wants to stop  Due to qol.    The Zofran Compazine sent to pharmacy    Has quit smoking      xrt consult due to lumpectomy   add arimidex, bone density   cont f/u for RA, HTH. Lipids, hypothyroidism      Living Will   discussed at prev visit

## 2019-09-06 ENCOUNTER — HOSPITAL ENCOUNTER (OUTPATIENT)
Dept: RADIOLOGY | Facility: HOSPITAL | Age: 74
Discharge: HOME | End: 2019-09-06
Attending: INTERNAL MEDICINE
Payer: MEDICARE

## 2019-09-06 DIAGNOSIS — Z79.811 LONG TERM CURRENT USE OF AROMATASE INHIBITOR: ICD-10-CM

## 2019-09-06 DIAGNOSIS — M15.8 OTHER OSTEOARTHRITIS INVOLVING MULTIPLE JOINTS: ICD-10-CM

## 2019-09-06 DIAGNOSIS — M80.00XA AGE-RELATED OSTEOPOROSIS WITH CURRENT PATHOLOGICAL FRACTURE, INITIAL ENCOUNTER: ICD-10-CM

## 2019-09-06 PROCEDURE — 77080 DEXA BONE DENSITY SPINE HIP: ICD-10-PCS | Mod: 26,,, | Performed by: RADIOLOGY

## 2019-09-06 PROCEDURE — 77080 DXA BONE DENSITY AXIAL: CPT | Mod: 26,,, | Performed by: RADIOLOGY

## 2019-09-06 PROCEDURE — 77080 DXA BONE DENSITY AXIAL: CPT | Mod: TC

## 2019-09-10 ENCOUNTER — OFFICE VISIT (OUTPATIENT)
Dept: UROLOGY | Facility: CLINIC | Age: 74
End: 2019-09-10
Payer: MEDICARE

## 2019-09-10 ENCOUNTER — HOSPITAL ENCOUNTER (OUTPATIENT)
Dept: RADIOLOGY | Facility: HOSPITAL | Age: 74
Discharge: HOME | End: 2019-09-10
Attending: UROLOGY
Payer: MEDICARE

## 2019-09-10 VITALS
HEIGHT: 68 IN | HEART RATE: 68 BPM | WEIGHT: 175 LBS | BODY MASS INDEX: 26.52 KG/M2 | TEMPERATURE: 98 F | DIASTOLIC BLOOD PRESSURE: 71 MMHG | SYSTOLIC BLOOD PRESSURE: 148 MMHG

## 2019-09-10 DIAGNOSIS — N20.0 RENAL CALCULUS, LEFT: Primary | ICD-10-CM

## 2019-09-10 DIAGNOSIS — N20.0 RENAL CALCULUS, LEFT: ICD-10-CM

## 2019-09-10 DIAGNOSIS — Z09 POSTOP CHECK: ICD-10-CM

## 2019-09-10 PROCEDURE — 74018 XR ABDOMEN AP 1 VIEW: ICD-10-PCS | Mod: 26,,, | Performed by: RADIOLOGY

## 2019-09-10 PROCEDURE — 99999 PR PBB SHADOW E&M-EST. PATIENT-LVL III: CPT | Mod: PBBFAC,,, | Performed by: UROLOGY

## 2019-09-10 PROCEDURE — 99024 PR POST-OP FOLLOW-UP VISIT: ICD-10-PCS | Mod: POP,,, | Performed by: UROLOGY

## 2019-09-10 PROCEDURE — 99999 PR PBB SHADOW E&M-EST. PATIENT-LVL III: ICD-10-PCS | Mod: PBBFAC,,, | Performed by: UROLOGY

## 2019-09-10 PROCEDURE — 99024 POSTOP FOLLOW-UP VISIT: CPT | Mod: POP,,, | Performed by: UROLOGY

## 2019-09-10 PROCEDURE — 74018 RADEX ABDOMEN 1 VIEW: CPT | Mod: 26,,, | Performed by: RADIOLOGY

## 2019-09-10 PROCEDURE — 74018 RADEX ABDOMEN 1 VIEW: CPT | Mod: TC,FY

## 2019-09-10 PROCEDURE — 99213 OFFICE O/P EST LOW 20 MIN: CPT | Mod: PBBFAC,25,PN | Performed by: UROLOGY

## 2019-09-10 NOTE — PROGRESS NOTES
POST OPERATIVE UROLOGY NOTE    PATIENT NAME: Kirstie Bowden  : 1945    PROCEDURE/DATE OF PROCEDURE:  ESWL proximal left ureteral calculus and cystoscopy with stent removal on 2019    COMPLAINTS/COMMENTS:  No complaints no pain, follow-up KUB did reveal some apparent  small calcifications in the left ureter or kidney.                                                    Patient had started on chemotherapy for management of her breast cancer but she could not tolerate it and it was changed to external beam radiation therapy.    PHYSICAL EXAM:  No flank tenderness    RECOMMENDATIONS:  KUB

## 2019-10-04 ENCOUNTER — DOCUMENTATION ONLY (OUTPATIENT)
Dept: RADIATION ONCOLOGY | Facility: CLINIC | Age: 74
End: 2019-10-04

## 2019-10-04 ENCOUNTER — TELEPHONE (OUTPATIENT)
Dept: HEMATOLOGY/ONCOLOGY | Facility: CLINIC | Age: 74
End: 2019-10-04

## 2019-10-04 DIAGNOSIS — Z17.0 MALIGNANT NEOPLASM OF NIPPLE OF LEFT BREAST IN FEMALE, ESTROGEN RECEPTOR POSITIVE: Primary | ICD-10-CM

## 2019-10-04 DIAGNOSIS — C50.012 MALIGNANT NEOPLASM OF NIPPLE OF LEFT BREAST IN FEMALE, ESTROGEN RECEPTOR POSITIVE: Primary | ICD-10-CM

## 2019-10-04 NOTE — TELEPHONE ENCOUNTER
Spoke to pt to r/s  Apt on 10/16 due to dr maria being out of clinic. Pt confirmed apt date and time r/s and verbalized understanding.

## 2019-10-04 NOTE — PROGRESS NOTES
Patient given 5 tubes of aquaphor samples with instructions to apply twice daily to affected area.  Do not apply within 6 hours of radiation therapy.  Patient verbalized understanding.

## 2019-10-09 ENCOUNTER — DOCUMENTATION ONLY (OUTPATIENT)
Dept: RADIATION ONCOLOGY | Facility: CLINIC | Age: 74
End: 2019-10-09

## 2019-10-09 NOTE — PROGRESS NOTES
Patient given 10 tubes of aquaphor samples with instructions to apply twice daily to affected area.  Do not apply within 6 hours of radiation therapy.  Patient verbalized understanding.

## 2019-10-10 ENCOUNTER — OFFICE VISIT (OUTPATIENT)
Dept: RHEUMATOLOGY | Facility: CLINIC | Age: 74
End: 2019-10-10
Payer: MEDICARE

## 2019-10-10 VITALS — BODY MASS INDEX: 26.61 KG/M2 | SYSTOLIC BLOOD PRESSURE: 129 MMHG | DIASTOLIC BLOOD PRESSURE: 73 MMHG | WEIGHT: 175 LBS

## 2019-10-10 DIAGNOSIS — M05.79 RHEUMATOID ARTHRITIS INVOLVING MULTIPLE SITES WITH POSITIVE RHEUMATOID FACTOR: Primary | ICD-10-CM

## 2019-10-10 DIAGNOSIS — M15.9 OSTEOARTHRITIS, GENERALIZED: ICD-10-CM

## 2019-10-10 PROCEDURE — 99213 OFFICE O/P EST LOW 20 MIN: CPT | Mod: S$GLB,,, | Performed by: INTERNAL MEDICINE

## 2019-10-10 PROCEDURE — 99213 PR OFFICE/OUTPT VISIT, EST, LEVL III, 20-29 MIN: ICD-10-PCS | Mod: S$GLB,,, | Performed by: INTERNAL MEDICINE

## 2019-10-10 RX ORDER — HYDROXYCHLOROQUINE SULFATE 200 MG/1
200 TABLET, FILM COATED ORAL DAILY
Qty: 60 TABLET | Refills: 1 | Status: SHIPPED | OUTPATIENT
Start: 2019-10-10 | End: 2019-10-28

## 2019-10-10 RX ORDER — PREDNISONE 2.5 MG/1
TABLET ORAL
Qty: 100 TABLET | Refills: 2 | Status: SHIPPED | OUTPATIENT
Start: 2019-10-10 | End: 2020-07-10

## 2019-10-10 NOTE — PROGRESS NOTES
[facility] RHEUMATOLOGY            PROGRESS NOTE      Subjective:       Patient ID:   NAME: Kirstie Bowden : 1945     74 y.o. female    Referring Doc: No ref. provider found  Other Physicians:    Chief Complaint:  Rheumatoid Arthritis      History of Present Illness:     Patient returns today for a regularly scheduled follow-up visit for  rheumatoid arthritis     The patient has been experiencing increased pain and swelling in the MCP joints and wrist for the past few weeks.  No significant relief with some prednisone 7-1/2 mg a day.  She has fatigue.  No fevers cough or shortness of breath.  No chest pains.  No GI complaints.  She completed chemotherapy for breast cancer in August and will complete radiation treatment in a few weeks.            ROS:   GEN:  No  fever, night sweats . weight is stable   + fatigue  SKIN: no rashes, no bruising, no ulcerations, no Raynaud's  HEENT: no HA's, No visual changes, no mucosal ulcers, no sicca symptoms,  CV:   no CP, SOB, PND, MCKEON, no orthopnea, no palpitations  PULM: normal with no SOB, cough, hemoptysis, sputum or pleuritic pain  GI:  no abdominal pain, nausea, vomiting, constipation, diarrhea, melanotic stools, BRBPR, hematemesis, no dysphagia  :   no dysuria  NEURO: no paresthesias, headaches, visual disturbances, muscle weakness  MUSCULOSKELETAL:no joint swelling, prolonged AM stiffness, no back pain, no muscle pain  Allergies:  Review of patient's allergies indicates:   Allergen Reactions    Sulfamethoxazole-trimethoprim      Other reaction(s): per dr daryn Rodríguez (sulfonamide antibiotics)      NOT SURE REACTION       Medications:    Current Outpatient Medications:     amLODIPine (NORVASC) 5 MG tablet, TAKE 1 TABLET BY MOUTH ONCE DAILY, Disp: 90 tablet, Rfl: 3    anastrozole (ARIMIDEX) 1 mg Tab, Take 1 tablet (1 mg total) by mouth once daily., Disp: 90 tablet, Rfl: 4    atorvastatin (LIPITOR) 20 MG tablet, TAKE 1 TABLET BY MOUTH ONCE DAILY,  Disp: 90 tablet, Rfl: 3    CALCIUM CARBONATE/VITAMIN D3 (VITAMIN D-3 ORAL), Take by mouth once daily. , Disp: , Rfl:     fluticasone-salmeterol diskus inhaler 250-50 mcg, INHALE ONE DOSE BY MOUTH TWICE DAILY, Disp: 60 each, Rfl: 11    folic acid (FOLVITE) 1 MG tablet, TAKE 1 TABLET BY MOUTH ONCE DAILY EVERY DAY, Disp: 30 tablet, Rfl: 10    hydroxychloroquine (PLAQUENIL) 200 mg tablet, Take 1 tablet (200 mg total) by mouth once daily., Disp: 60 tablet, Rfl: 1    levothyroxine (SYNTHROID) 112 MCG tablet, TAKE 1 TABLET BY MOUTH ONCE DAILY, Disp: 90 tablet, Rfl: 3    predniSONE (DELTASONE) 2.5 MG tablet, One po bid-tid pc, Disp: 100 tablet, Rfl: 2    thiamine (VITAMIN B-1) 100 MG tablet, Take 100 mg by mouth once daily., Disp: , Rfl:     VITAMIN B COMPLEX ORAL, Every day, Disp: , Rfl:     vitamin E/flaxseed oil (FLAXSEED OIL-VITAMIN E ORAL), Take by mouth., Disp: , Rfl:   No current facility-administered medications for this visit.     Facility-Administered Medications Ordered in Other Visits:     lidocaine (PF) 10 mg/ml (1%) injection 10 mg, 1 mL, Intradermal, Once, Eladia Schwartz MD    PMHx/PSHx Updates:        Objective:     Vitals:  Blood pressure 129/73, weight 79.4 kg (175 lb).    Physical Examination:   GEN: no apparent distress, comfortable; AAOx3  SKIN: no rashes,no ulceration, no Raynaud's, no petechiae, no SQ nodules,  HEAD: normal  EYES: no pallor, no icterus,   NECK: no masses, thyroid normal, trachea midline, no LAD/LN's, supple  CV: RRR with no murmur; l S1 and S2 reg. ,no gallop no rubs,   CHEST: Normal respiratory effort; CTAB; normal breath sounds; no wheeze or crackles  ABDOM: nontender and nondistended; soft; no masses; no rebound/guarding  MUSC/Skeletal: ROM normal; no crepitus;  wrists and 2nd to 5th MCP joints bilaterally  with synovitis,  no deformities  No joint swelling or tenderness of, elbow, shoulder, or knee joints  EXTREM: no clubbing, cyanosis, no edema,normal  pulses    NEURO: grossly intact; motor WNL; AAOx3; ,   PSYCH: normal mood, affect and behavior  LYMPH: normal cervical, supraclavicular          Labs:   Lab Results   Component Value Date    WBC 6.46 09/04/2019    HGB 12.0 09/04/2019    HCT 37.7 09/04/2019    MCV 99 (H) 09/04/2019     (L) 09/04/2019    CMP  @LASTLAB(NA,K,CL,CO2,GLU,BUN,Creatinine,Calcium,PROT,Albumin,Bilitot,Alkphos,AST,ALT,CRP,ESR,RF,CCP,RODNEY,SSA,CPK,uric acid) )@  I have reviewed all available lab results and radiology reports.    Radiology/Diagnostic Studies:        Assessment/Plan:   (1) 74 y.o. female with diagnosis of rheumatoid arthritis.  This is active.  She has been off Enbrel and methotrexate due to recent diagnosis of breast cancer and chemotherapy.  She completed chemotherapy in August.  She will complete radiation therapy in a few weeks.        PLAN:  Restart methotrexate 12.5 mg once a week.  Continue with folic acid 1 mg a day.  Continue with Plaquenil.  She can increase prednisone up to 5 mg b.i.d. to t.i.d. p.c..  She will monitor her blood pressure at home.  She will have blood work in a few weeks.      Discussion:     I have explained all of the above in detail and the patient understands all of the current recommendation(s). I have answered all questions to the best of my ability and to their complete satisfaction.       The patient is to continue with the current management plan         RTC in   4 weeks      Electronically signed by Jelani Stacy MD

## 2019-10-14 DIAGNOSIS — Z17.0 CARCINOMA OF CENTRAL PORTION OF LEFT BREAST IN FEMALE, ESTROGEN RECEPTOR POSITIVE: ICD-10-CM

## 2019-10-14 DIAGNOSIS — C50.112 CARCINOMA OF CENTRAL PORTION OF LEFT BREAST IN FEMALE, ESTROGEN RECEPTOR POSITIVE: ICD-10-CM

## 2019-10-14 DIAGNOSIS — L58.9 RADIATION DERMATITIS: Primary | ICD-10-CM

## 2019-10-25 ENCOUNTER — LAB VISIT (OUTPATIENT)
Dept: LAB | Facility: HOSPITAL | Age: 74
End: 2019-10-25
Attending: INTERNAL MEDICINE
Payer: MEDICARE

## 2019-10-25 DIAGNOSIS — Z17.0 MALIGNANT NEOPLASM OF NIPPLE OF LEFT BREAST IN FEMALE, ESTROGEN RECEPTOR POSITIVE: ICD-10-CM

## 2019-10-25 DIAGNOSIS — C50.012 MALIGNANT NEOPLASM OF NIPPLE OF LEFT BREAST IN FEMALE, ESTROGEN RECEPTOR POSITIVE: ICD-10-CM

## 2019-10-25 LAB
ALBUMIN SERPL BCP-MCNC: 4 G/DL (ref 3.5–5.2)
ALP SERPL-CCNC: 54 U/L (ref 55–135)
ALT SERPL W/O P-5'-P-CCNC: 22 U/L (ref 10–44)
ANION GAP SERPL CALC-SCNC: 9 MMOL/L (ref 8–16)
AST SERPL-CCNC: 19 U/L (ref 10–40)
BASOPHILS # BLD AUTO: 0.04 K/UL (ref 0–0.2)
BASOPHILS NFR BLD: 0.4 % (ref 0–1.9)
BILIRUB SERPL-MCNC: 0.4 MG/DL (ref 0.1–1)
BUN SERPL-MCNC: 20 MG/DL (ref 8–23)
CALCIUM SERPL-MCNC: 9.8 MG/DL (ref 8.7–10.5)
CHLORIDE SERPL-SCNC: 106 MMOL/L (ref 95–110)
CO2 SERPL-SCNC: 27 MMOL/L (ref 23–29)
CREAT SERPL-MCNC: 0.8 MG/DL (ref 0.5–1.4)
DIFFERENTIAL METHOD: ABNORMAL
EOSINOPHIL # BLD AUTO: 0.1 K/UL (ref 0–0.5)
EOSINOPHIL NFR BLD: 1.3 % (ref 0–8)
ERYTHROCYTE [DISTWIDTH] IN BLOOD BY AUTOMATED COUNT: 13.7 % (ref 11.5–14.5)
EST. GFR  (AFRICAN AMERICAN): >60 ML/MIN/1.73 M^2
EST. GFR  (NON AFRICAN AMERICAN): >60 ML/MIN/1.73 M^2
GLUCOSE SERPL-MCNC: 88 MG/DL (ref 70–110)
HCT VFR BLD AUTO: 39.9 % (ref 37–48.5)
HGB BLD-MCNC: 12.5 G/DL (ref 12–16)
IMM GRANULOCYTES # BLD AUTO: 0.03 K/UL (ref 0–0.04)
LYMPHOCYTES # BLD AUTO: 1.1 K/UL (ref 1–4.8)
LYMPHOCYTES NFR BLD: 11.3 % (ref 18–48)
MCH RBC QN AUTO: 33.2 PG (ref 27–31)
MCHC RBC AUTO-ENTMCNC: 31.3 G/DL (ref 32–36)
MCV RBC AUTO: 106 FL (ref 82–98)
MONOCYTES # BLD AUTO: 0.8 K/UL (ref 0.3–1)
MONOCYTES NFR BLD: 8.1 % (ref 4–15)
NEUTROPHILS # BLD AUTO: 7.5 K/UL (ref 1.8–7.7)
NEUTROPHILS NFR BLD: 78.6 % (ref 38–73)
NRBC BLD-RTO: 0 /100 WBC
PLATELET # BLD AUTO: 182 K/UL (ref 150–350)
PMV BLD AUTO: 10.2 FL (ref 9.2–12.9)
POTASSIUM SERPL-SCNC: 4.1 MMOL/L (ref 3.5–5.1)
PROT SERPL-MCNC: 7.2 G/DL (ref 6–8.4)
RBC # BLD AUTO: 3.77 M/UL (ref 4–5.4)
SODIUM SERPL-SCNC: 142 MMOL/L (ref 136–145)
WBC # BLD AUTO: 9.59 K/UL (ref 3.9–12.7)

## 2019-10-25 PROCEDURE — 85025 COMPLETE CBC W/AUTO DIFF WBC: CPT

## 2019-10-25 PROCEDURE — 36415 COLL VENOUS BLD VENIPUNCTURE: CPT

## 2019-10-25 PROCEDURE — 80053 COMPREHEN METABOLIC PANEL: CPT

## 2019-10-28 ENCOUNTER — OFFICE VISIT (OUTPATIENT)
Dept: HEMATOLOGY/ONCOLOGY | Facility: CLINIC | Age: 74
End: 2019-10-28
Payer: MEDICARE

## 2019-10-28 VITALS
WEIGHT: 176.56 LBS | BODY MASS INDEX: 26.76 KG/M2 | SYSTOLIC BLOOD PRESSURE: 169 MMHG | RESPIRATION RATE: 16 BRPM | OXYGEN SATURATION: 97 % | TEMPERATURE: 98 F | HEART RATE: 70 BPM | DIASTOLIC BLOOD PRESSURE: 72 MMHG | HEIGHT: 68 IN

## 2019-10-28 DIAGNOSIS — Z17.0 CARCINOMA OF CENTRAL PORTION OF LEFT BREAST IN FEMALE, ESTROGEN RECEPTOR POSITIVE: ICD-10-CM

## 2019-10-28 DIAGNOSIS — C50.012 MALIGNANT NEOPLASM OF NIPPLE OF LEFT BREAST IN FEMALE, ESTROGEN RECEPTOR POSITIVE: Primary | ICD-10-CM

## 2019-10-28 DIAGNOSIS — C50.112 CARCINOMA OF CENTRAL PORTION OF LEFT BREAST IN FEMALE, ESTROGEN RECEPTOR POSITIVE: ICD-10-CM

## 2019-10-28 DIAGNOSIS — E78.00 PURE HYPERCHOLESTEROLEMIA: ICD-10-CM

## 2019-10-28 DIAGNOSIS — Z17.0 MALIGNANT NEOPLASM OF NIPPLE OF LEFT BREAST IN FEMALE, ESTROGEN RECEPTOR POSITIVE: Primary | ICD-10-CM

## 2019-10-28 DIAGNOSIS — M05.711 RHEUMATOID ARTHRITIS INVOLVING RIGHT SHOULDER WITH POSITIVE RHEUMATOID FACTOR: ICD-10-CM

## 2019-10-28 PROCEDURE — 99999 PR PBB SHADOW E&M-EST. PATIENT-LVL IV: ICD-10-PCS | Mod: PBBFAC,,, | Performed by: INTERNAL MEDICINE

## 2019-10-28 PROCEDURE — 99214 OFFICE O/P EST MOD 30 MIN: CPT | Mod: PBBFAC,PO | Performed by: INTERNAL MEDICINE

## 2019-10-28 PROCEDURE — 99214 OFFICE O/P EST MOD 30 MIN: CPT | Mod: S$PBB,,, | Performed by: INTERNAL MEDICINE

## 2019-10-28 PROCEDURE — 99999 PR PBB SHADOW E&M-EST. PATIENT-LVL IV: CPT | Mod: PBBFAC,,, | Performed by: INTERNAL MEDICINE

## 2019-10-28 PROCEDURE — 99214 PR OFFICE/OUTPT VISIT, EST, LEVL IV, 30-39 MIN: ICD-10-PCS | Mod: S$PBB,,, | Performed by: INTERNAL MEDICINE

## 2019-10-28 NOTE — PROGRESS NOTES
Subjective:       Patient ID: Kirstie Bowden is a 74 y.o. female.    Chief Complaint: new dx breast ca started TC chemo s/p 3  cycles   rec score of 29   pt states she is too tired  And wants to stop her treatments   hydronephrosis+ ,  had stent placed    Oncologic History:   INVASIVE CARCINOMA BREAST, CANCER CASE SUMMARY:  PROCEDURE: Partial mastectomy without skin or nipple.3/2019  SPECIMEN LATERALITY: Left.  TUMOR SIZE, GREATEST DIMENSION: 2.5 cm.  HISTOLOGIC TYPE: Invasive pleomorphic lobular carcinoma.  HISTOLOGIC GRADE (LUTHER HISTOLOGIC SCORE):  Glandular (acinar)/tubular differentiation: Score 3.  Nuclear pleomorphism: Score 2.  Mitotic rate: Score 1.  Overall grade: Grade 2  DUCTAL CARCINOMA IN SITU (DCIS): Not identified.  LOBULAR CARCINOMA IN SITU (LCIS): Present, pleomorphic lobular carcinoma in situ with rare focus of  classical lobular carcinoma situ.  Estimated size (extent) of pleomorphic LCIS: At least 2.8 cm.  TUMOR EXTENSION: Not applicable.  MARGINS:  INVASIVE CARCINOMA MARGINS: Uninvolved by invasive carcinoma.  Distance from inferior (closest) margin: 5 mm.  PLEOMORPHIC LOBULAR CARCINOMA IN SITU MARGINS: Uninvolved by pleomorphic LCIS.  Distance from inferior medial (closest) margin: 0.2 mm (see above comment).  REGIONAL LYMPH NODES: Uninvolved by tumor cells.  Number of lymph nodes examined: 2.  Number of sentinel nodes examined: 2.  TREATMENT EFFECT: No known presurgical therapy.  PATHOLOGIC STAGE CLASSIFICATION (pTNM, AJCC 8TH EDITION):  pT2: Tumor size = 2.5 cm in greatest dimension.  pN0: No regional lymph node metastasis identified.  pM: Not applicable.    ER: Positive.  DC: Positive.  HER2: Negative (IHC - Equivocal (score 2) and FISH - Negative).  Ki-67: Approximately 14%.  HPI:     Social History     Socioeconomic History    Marital status:      Spouse name: Not on file    Number of children: Not on file    Years of education: Not on file    Highest education  level: Not on file   Occupational History     Employer: [employer]   Social Needs    Financial resource strain: Not very hard    Food insecurity:     Worry: Never true     Inability: Never true    Transportation needs:     Medical: No     Non-medical: No   Tobacco Use    Smoking status: Current Every Day Smoker     Packs/day: 0.50     Years: 46.00     Pack years: 23.00     Types: Cigarettes     Start date: 4/25/1960    Smokeless tobacco: Never Used    Tobacco comment: patch available    Substance and Sexual Activity    Alcohol use: Yes     Alcohol/week: 2.0 standard drinks     Types: 2 Glasses of wine per week     Frequency: Monthly or less     Drinks per session: Patient refused     Binge frequency: Never     Comment: Social    Drug use: No    Sexual activity: Never   Lifestyle    Physical activity:     Days per week: 3 days     Minutes per session: 20 min    Stress: To some extent   Relationships    Social connections:     Talks on phone: More than three times a week     Gets together: Three times a week     Attends Nondenominational service: Not on file     Active member of club or organization: Yes     Attends meetings of clubs or organizations: Never     Relationship status:    Other Topics Concern    Not on file   Social History Narrative    Not on file     Family History   Problem Relation Age of Onset    Heart disease Mother     Hypertension Mother     Alzheimer's disease Mother     Cancer Father      Past Surgical History:   Procedure Laterality Date    AXILLARY NODE DISSECTION Left 3/14/2019    Procedure: LYMPHADENECTOMY, AXILLARY;  Surgeon: Mp Gonzalez MD;  Location: [facility] OR;  Service: General;  Laterality: Left;  left lumpectomy with axillary lypmh node dissection    BALLOON DILATION OF URETER Left 6/24/2019    Procedure: DILATION, URETER, USING BALLOON;  Surgeon: Jorden Dickson MD;  Location: [facility] OR;  Service: Urology;  Laterality: Left;    BREAST BIOPSY Left 20 yrs ago     benign    CHOLECYSTECTOMY      CYSTOSCOPY W/ URETERAL STENT PLACEMENT Left 6/24/2019    Procedure: CYSTOSCOPY, WITH URETERAL STENT INSERTION;  Surgeon: Jorden Dickson MD;  Location: [facility] OR;  Service: Urology;  Laterality: Left;    CYSTOSCOPY W/ URETERAL STENT REMOVAL Left 7/23/2019    Procedure: CYSTOSCOPY, WITH URETERAL STENT REMOVAL;  Surgeon: Jorden Dickson MD;  Location: [facility] OR;  Service: Urology;  Laterality: Left;    EXTRACORPOREAL SHOCK WAVE LITHOTRIPSY Left 7/23/2019    Procedure: LITHOTRIPSY, ESWL;  Surgeon: Jorden Dickson MD;  Location: [facility] OR;  Service: Urology;  Laterality: Left;    EYE SURGERY Bilateral     cataracts    HYSTERECTOMY      MASTECTOMY, PARTIAL Left 4/25/2019    Procedure: MASTECTOMY, PARTIAL;  Surgeon: Mp Gonzalez MD;  Location: [facility] OR;  Service: General;  Laterality: Left;    NEEDLE LOCALIZATION Left 3/14/2019    Procedure: NEEDLE LOCALIZATION;  Surgeon: Mp Gonzalez MD;  Location: [facility] OR;  Service: General;  Laterality: Left;  left lumpectomy with wire needle localization     RETROGRADE PYELOGRAPHY Bilateral 6/24/2019    Procedure: PYELOGRAM, RETROGRADE;  Surgeon: Jorden Dickson MD;  Location: [facility] OR;  Service: Urology;  Laterality: Bilateral;    SENTINEL LYMPH NODE BIOPSY Left 3/14/2019    Procedure: BIOPSY, LYMPH NODE, SENTINEL;  Surgeon: Mp Gonzalez MD;  Location: [facility] OR;  Service: General;  Laterality: Left;  left sentinel node lymph node biopsy    TONSILLECTOMY      URETEROSCOPY Left 6/24/2019    Procedure: URETEROSCOPY;  Surgeon: Jorden Dickson MD;  Location: [facility] OR;  Service: Urology;  Laterality: Left;     Past Medical History:   Diagnosis Date    Arthritis     Asthma     Cancer     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    Hyperlipidemia     Hypertension     Pseudocholinesterase deficiency     family history    Thyroid disease        Current Outpatient Medications:     amLODIPine (NORVASC) 5 MG tablet, TAKE 1 TABLET BY MOUTH ONCE DAILY,  Disp: 90 tablet, Rfl: 3    anastrozole (ARIMIDEX) 1 mg Tab, Take 1 tablet (1 mg total) by mouth once daily., Disp: 90 tablet, Rfl: 4    atorvastatin (LIPITOR) 20 MG tablet, TAKE 1 TABLET BY MOUTH ONCE DAILY, Disp: 90 tablet, Rfl: 3    CALCIUM CARBONATE/VITAMIN D3 (VITAMIN D-3 ORAL), Take by mouth once daily. , Disp: , Rfl:     fluticasone-salmeterol diskus inhaler 250-50 mcg, INHALE ONE DOSE BY MOUTH TWICE DAILY, Disp: 60 each, Rfl: 11    folic acid (FOLVITE) 1 MG tablet, TAKE 1 TABLET BY MOUTH ONCE DAILY EVERY DAY, Disp: 30 tablet, Rfl: 10    hyaluronic Na-allantoin-aloe Gel, Apply 1 application topically 2 (two) times daily., Disp: 170 g, Rfl: 5    hydroxychloroquine (PLAQUENIL) 200 mg tablet, Take 1 tablet (200 mg total) by mouth once daily., Disp: 60 tablet, Rfl: 1    levothyroxine (SYNTHROID) 112 MCG tablet, TAKE 1 TABLET BY MOUTH ONCE DAILY, Disp: 90 tablet, Rfl: 3    predniSONE (DELTASONE) 2.5 MG tablet, One po bid-tid pc, Disp: 100 tablet, Rfl: 2    thiamine (VITAMIN B-1) 100 MG tablet, Take 100 mg by mouth once daily., Disp: , Rfl:     VITAMIN B COMPLEX ORAL, Every day, Disp: , Rfl:     vitamin E/flaxseed oil (FLAXSEED OIL-VITAMIN E ORAL), Take by mouth., Disp: , Rfl:   No current facility-administered medications for this visit.     Facility-Administered Medications Ordered in Other Visits:     lidocaine (PF) 10 mg/ml (1%) injection 10 mg, 1 mL, Intradermal, Once, Eladia Schwartz MD  Review of patient's allergies indicates:   Allergen Reactions    Sulfamethoxazole-trimethoprim      Other reaction(s): per dr daryn Rodríguez (sulfonamide antibiotics)      NOT SURE REACTION         REVIEW OF SYSTEMS:     CONSTITUTIONAL: The patient denies any weight change. There is no apparent    change in appetite, fever, night sweats, headaches, fatigue, dizziness, or    weakness.      Losing some hair, fatigue+some lose stools    SKIN: Denies rash, issues with nails, non-healing sores, bleeding,  blotching    skin or abnormal bruising. Denies new moles or changes to existing moles.      BREASTS: healing well since lumpectomy    EYES: Denies eye pain, blurred vision, swelling, redness or discharge.      ENT AND MOUTH: Denies runny nose, stuffiness, sinus trouble or sores. Denies    nosebleeds. Denies, hoarseness, change in voice or swelling in front of the    neck.      CARDIOVASCULAR: Denies chest pain, discomfort or palpitations. Denies neck    swelling or episodes of passing out.      RESPIRATORY: Denies cough, sputum production, blood in sputum, and denies    shortness of breath.      GI: Denies trouble swallowing, indigestion, heartburn, abdominal pain, nausea,    vomiting, diarrhea, altered bowel habits, blood in stool, discoloration of    stools, change in nature of stool, bloating, increased abdominal girth.      GENITOURINARY: No discharge. No pelvic pain or lumps. No rash around groin or  lesions. No urinary frequency, hesitation, painful urination or blood in    urine. Denies incontinence. No problems with intercourse.      MUSCULOSKELETAL: Denies neck or back pain. Denies weakness in arms or legs,    joint problems or distended inflamed veins in legs. Denies swelling or abnormal  glands.      NEUROLOGICAL: Denies tingling, numbness, altered mentation changes to nerve    function in the face, weakness to one or both of the body. Denies changes to    gait and denies multiple falls or accidents.        PHYSICAL EXAM:     Wt Readings from Last 3 Encounters:   10/10/19 79.4 kg (175 lb)   09/10/19 79.4 kg (175 lb)   09/04/19 79.7 kg (175 lb 11.3 oz)     Temp Readings from Last 3 Encounters:   09/10/19 98.1 °F (36.7 °C) (Oral)   09/04/19 98.6 °F (37 °C)   08/20/19 98.1 °F (36.7 °C)     BP Readings from Last 3 Encounters:   10/10/19 129/73   09/10/19 (!) 148/71   09/04/19 (!) 120/59     Pulse Readings from Last 3 Encounters:   09/10/19 68   09/04/19 75   08/20/19 84     GENERAL: Comfortable looking  patient. Patient is in no distress.  Awake, alert and oriented to time, person and place.  No anxiety, or agitation.      HEENT: Normal conjunctivae and eyelids. WNL.  PERRLA 3 to 4 mm. No icterus, no pallor, no congestion, and no discharge noted.     NECK:  Supple. Trachea is central.  No crepitus.  No JVD or masses.    RESPIRATORY:  No intercostal retractions.  No dullness to percussion.  Chest is clear to auscultation.  No rales, rhonchi or wheezes.  No crepitus.  Good air entry bilaterally.    CARDIOVASCULAR:  S1 and S2 are normally heard without murmurs or gallops.  All peripheral pulses are present.    ABDOMEN:  Normal abdomen.  No hepatosplenomegaly.  No free fluid.  Bowel sounds are present.  No hernia noted. No masses.  No rebound or tenderness.  No guarding or rigidity.  Umbilicus is midline.    LYMPHATICS:  No axillary, cervical, supraclavicular, submental, or inguinal lymphadenopathy.    SKIN/MUSCULOSKELETAL:  There is no evidence of excoriation marks or ecchmosis.  No rashes.  No cyanosis.  No clubbing.  No joint or skeletal deformities noted.  Normal range of motion.    NEUROLOGIC:  Higher functions are appropriate.  No cranial nerve deficits.  Normal asher.  Normal strength.  Motor and sensory functions are normal.  Deep tendon reflexes are normal.    GENITAL/RECTAL:  Exams are deferred.      Laboratory:     CBC:  Lab Results   Component Value Date    WBC 9.59 10/25/2019    RBC 3.77 (L) 10/25/2019    HGB 12.5 10/25/2019    HCT 39.9 10/25/2019     (H) 10/25/2019    MCH 33.2 (H) 10/25/2019    MCHC 31.3 (L) 10/25/2019    RDW 13.7 10/25/2019     10/25/2019    MPV 10.2 10/25/2019    GRAN 7.5 10/25/2019    GRAN 78.6 (H) 10/25/2019    LYMPH 1.1 10/25/2019    LYMPH 11.3 (L) 10/25/2019    MONO 0.8 10/25/2019    MONO 8.1 10/25/2019    EOS 0.1 10/25/2019    BASO 0.04 10/25/2019    EOSINOPHIL 1.3 10/25/2019    BASOPHIL 0.4 10/25/2019       BMP: BMP  Lab Results   Component Value Date      10/25/2019    K 4.1 10/25/2019     10/25/2019    CO2 27 10/25/2019    BUN 20 10/25/2019    CREATININE 0.8 10/25/2019    CALCIUM 9.8 10/25/2019    ANIONGAP 9 10/25/2019    ESTGFRAFRICA >60 10/25/2019    EGFRNONAA >60 10/25/2019       LFT:   Lab Results   Component Value Date    ALT 22 10/25/2019    AST 19 10/25/2019    ALKPHOS 54 (L) 10/25/2019    BILITOT 0.4 10/25/2019    Bone density September 2 1019-for osteopenia  oncotype rec. score is 29  PET 6/2019 neg for mets   has renal stones and has hydronephrosis, getting stent placed in 2 days  Assessment/Plan:     T2N0Mx left breast ca s/p lumpectomy 3/2019 recurrent score 29  Patients/p cycle #2. 4 cycles of TC  Was planned pt discontinued Due to qol.    The Zofran Compazine sent to pharmacy  mammo due in 3/2020   rtc 12/2019 to 1/202 with pet. Cbc. Cmp, [id number]  Has quit smoking      xrt on going, 2 more days left  Started arimidex, tolerating well bone density negative   cont f/u for RA, HTH. Lipids, hypothyroidism      Living Will   discussed at prev visit

## 2019-10-30 DIAGNOSIS — C50.012 MALIGNANT NEOPLASM OF NIPPLE OF LEFT BREAST IN FEMALE, ESTROGEN RECEPTOR POSITIVE: Primary | ICD-10-CM

## 2019-10-30 DIAGNOSIS — Z17.0 MALIGNANT NEOPLASM OF NIPPLE OF LEFT BREAST IN FEMALE, ESTROGEN RECEPTOR POSITIVE: Primary | ICD-10-CM

## 2019-11-12 ENCOUNTER — OFFICE VISIT (OUTPATIENT)
Dept: RHEUMATOLOGY | Facility: CLINIC | Age: 74
End: 2019-11-12
Payer: MEDICARE

## 2019-11-12 VITALS
BODY MASS INDEX: 27.23 KG/M2 | WEIGHT: 179.13 LBS | DIASTOLIC BLOOD PRESSURE: 86 MMHG | SYSTOLIC BLOOD PRESSURE: 144 MMHG

## 2019-11-12 DIAGNOSIS — M15.9 OSTEOARTHRITIS, GENERALIZED: ICD-10-CM

## 2019-11-12 DIAGNOSIS — M05.79 RHEUMATOID ARTHRITIS INVOLVING MULTIPLE SITES WITH POSITIVE RHEUMATOID FACTOR: Primary | ICD-10-CM

## 2019-11-12 PROCEDURE — 99213 PR OFFICE/OUTPT VISIT, EST, LEVL III, 20-29 MIN: ICD-10-PCS | Mod: S$GLB,,, | Performed by: INTERNAL MEDICINE

## 2019-11-12 PROCEDURE — 99213 OFFICE O/P EST LOW 20 MIN: CPT | Mod: S$GLB,,, | Performed by: INTERNAL MEDICINE

## 2019-11-12 RX ORDER — TRAMADOL HYDROCHLORIDE 50 MG/1
TABLET ORAL
Qty: 90 TABLET | Refills: 1 | Status: SHIPPED | OUTPATIENT
Start: 2019-11-12 | End: 2020-06-03

## 2019-11-12 RX ORDER — HYDROXYCHLOROQUINE SULFATE 200 MG/1
200 TABLET, FILM COATED ORAL DAILY
Refills: 1 | COMMUNITY
Start: 2019-10-31 | End: 2020-01-19 | Stop reason: SDUPTHER

## 2019-11-12 RX ORDER — METHOTREXATE 2.5 MG/1
TABLET ORAL
Refills: 1 | COMMUNITY
Start: 2019-10-28 | End: 2020-04-16 | Stop reason: SDUPTHER

## 2019-11-12 NOTE — PROGRESS NOTES
[facility] RHEUMATOLOGY            PROGRESS NOTE      Subjective:       Patient ID:   NAME: Kirstie Bowden : 1945     74 y.o. female    Referring Doc: No ref. provider found  Other Physicians:    Chief Complaint:  Rheumatoid Arthritis      History of Present Illness:     Patient returns today for a regularly scheduled follow-up visit for    RA   The patient is doing better  Sl arthralgias but no swelling  No GI or respiratory  complaints            ROS:   GEN:  No  fever, night sweats . weight is stable   + sl fatigue  SKIN: no rashes, no bruising, no ulcerations, no Raynaud's  HEENT: no HA's, No visual changes, no mucosal ulcers, no sicca symptoms,  CV:   no CP, SOB, PND, MCKEON, no orthopnea, no palpitations  PULM: normal with no SOB, cough, hemoptysis, sputum or pleuritic pain  GI:  no abdominal pain, nausea, vomiting, constipation, diarrhea, melanotic stools, BRBPR, hematemesis, no dysphagia  :   no dysuria  NEURO: no paresthesias, headaches, visual disturbances, muscle weakness  MUSCULOSKELETAL:no joint swelling, prolonged AM stiffness, no back pain, no muscle pain  Allergies:  Review of patient's allergies indicates:   Allergen Reactions    Sulfamethoxazole-trimethoprim      Other reaction(s): per dr daryn Rodríguez (sulfonamide antibiotics)      NOT SURE REACTION       Medications:    Current Outpatient Medications:     amLODIPine (NORVASC) 5 MG tablet, TAKE 1 TABLET BY MOUTH ONCE DAILY, Disp: 90 tablet, Rfl: 3    anastrozole (ARIMIDEX) 1 mg Tab, Take 1 tablet (1 mg total) by mouth once daily., Disp: 90 tablet, Rfl: 4    atorvastatin (LIPITOR) 20 MG tablet, TAKE 1 TABLET BY MOUTH ONCE DAILY, Disp: 90 tablet, Rfl: 3    CALCIUM CARBONATE/VITAMIN D3 (VITAMIN D-3 ORAL), Take by mouth once daily. , Disp: , Rfl:     fluticasone-salmeterol diskus inhaler 250-50 mcg, INHALE ONE DOSE BY MOUTH TWICE DAILY, Disp: 60 each, Rfl: 11    folic acid (FOLVITE) 1 MG tablet, TAKE 1 TABLET BY MOUTH ONCE  DAILY EVERY DAY, Disp: 30 tablet, Rfl: 10    hyaluronic Na-allantoin-aloe Gel, Apply 1 application topically 2 (two) times daily., Disp: 170 g, Rfl: 5    hydroxychloroquine (PLAQUENIL) 200 mg tablet, Take 200 mg by mouth once daily., Disp: , Rfl: 1    levothyroxine (SYNTHROID) 112 MCG tablet, TAKE 1 TABLET BY MOUTH ONCE DAILY, Disp: 90 tablet, Rfl: 3    methotrexate 2.5 MG Tab, TAKE 4 TABLETS BY MOUTH EVERY 7 DAYS, Disp: , Rfl: 1    predniSONE (DELTASONE) 2.5 MG tablet, One po bid-tid pc, Disp: 100 tablet, Rfl: 2    thiamine (VITAMIN B-1) 100 MG tablet, Take 100 mg by mouth once daily., Disp: , Rfl:     VITAMIN B COMPLEX ORAL, Every day, Disp: , Rfl:     vitamin E/flaxseed oil (FLAXSEED OIL-VITAMIN E ORAL), Take by mouth., Disp: , Rfl:   No current facility-administered medications for this visit.     Facility-Administered Medications Ordered in Other Visits:     lidocaine (PF) 10 mg/ml (1%) injection 10 mg, 1 mL, Intradermal, Once, Eladia Schwartz MD    PMHx/PSHx Updates:        Objective:     Vitals:  Blood pressure (!) 144/86, weight 81.2 kg (179 lb 1.6 oz).    Physical Examination:   GEN: no apparent distress, comfortable; AAOx3  SKIN: no rashes,no ulceration, no Raynaud's, no petechiae, no SQ nodules,  HEAD: normal  EYES: no pallor, no icterus,  NECK: no masses, thyroid normal, trachea midline, no LAD/LN's, supple  CV: RRR with no murmur; l S1 and S2 reg. ,no gallop no rubs,   CHEST: Normal respiratory effort; CTAB; normal breath sounds; no wheeze or crackles  MUSC/Skeletal: ROM normal; no crepitus; joints without synovitis,  no deformities  No joint swelling or tenderness of PIP, MCP, wrist, elbow, shoulder, or knee joints  EXTREM: no clubbing, cyanosis, no edema,normal  pulses   NEURO: grossly intact; motor WNL; AAOx3; ,  PSYCH: normal mood, affect and behavior  LYMPH: normal cervical, supraclavicular          Labs:   Lab Results   Component Value Date    WBC 9.59 10/25/2019    HGB 12.5  10/25/2019    HCT 39.9 10/25/2019     (H) 10/25/2019     10/25/2019    CMP  @LASTLAB(NA,K,CL,CO2,GLU,BUN,Creatinine,Calcium,PROT,Albumin,Bilitot,Alkphos,AST,ALT,CRP,ESR,RF,CCP,RODNEY,SSA,CPK,uric acid) )@  I have reviewed all available lab results and radiology reports.    Radiology/Diagnostic Studies:        Assessment/Plan:   (1) 74 y.o. female with diagnosis of RA improving  Decrease prednisone to 7.5 mg a day for a few wks then 5 mg a day      FU in 3 wks          Discussion:     I have explained all of the above in detail and the patient understands all of the current recommendation(s). I have answered all questions to the best of my ability and to their complete satisfaction.       The patient is to continue with the current management plan         RTC in         Electronically signed by Jelani Stacy MD

## 2019-12-16 ENCOUNTER — OFFICE VISIT (OUTPATIENT)
Dept: RHEUMATOLOGY | Facility: CLINIC | Age: 74
End: 2019-12-16
Payer: MEDICARE

## 2019-12-16 VITALS
DIASTOLIC BLOOD PRESSURE: 82 MMHG | WEIGHT: 186.19 LBS | BODY MASS INDEX: 28.31 KG/M2 | SYSTOLIC BLOOD PRESSURE: 126 MMHG

## 2019-12-16 DIAGNOSIS — M75.52 BURSITIS OF SHOULDER, LEFT: ICD-10-CM

## 2019-12-16 DIAGNOSIS — M05.79 RHEUMATOID ARTHRITIS INVOLVING MULTIPLE SITES WITH POSITIVE RHEUMATOID FACTOR: Primary | ICD-10-CM

## 2019-12-16 PROCEDURE — 1159F PR MEDICATION LIST DOCUMENTED IN MEDICAL RECORD: ICD-10-PCS | Mod: S$GLB,,, | Performed by: INTERNAL MEDICINE

## 2019-12-16 PROCEDURE — 1125F AMNT PAIN NOTED PAIN PRSNT: CPT | Mod: S$GLB,,, | Performed by: INTERNAL MEDICINE

## 2019-12-16 PROCEDURE — 1159F MED LIST DOCD IN RCRD: CPT | Mod: S$GLB,,, | Performed by: INTERNAL MEDICINE

## 2019-12-16 PROCEDURE — 99213 OFFICE O/P EST LOW 20 MIN: CPT | Mod: 25,S$GLB,, | Performed by: INTERNAL MEDICINE

## 2019-12-16 PROCEDURE — 99213 PR OFFICE/OUTPT VISIT, EST, LEVL III, 20-29 MIN: ICD-10-PCS | Mod: 25,S$GLB,, | Performed by: INTERNAL MEDICINE

## 2019-12-16 PROCEDURE — 20610 LARGE JOINT ASPIRATION/INJECTION: R SUBACROMIAL BURSA: ICD-10-PCS | Mod: LT,S$GLB,, | Performed by: INTERNAL MEDICINE

## 2019-12-16 PROCEDURE — 1125F PR PAIN SEVERITY QUANTIFIED, PAIN PRESENT: ICD-10-PCS | Mod: S$GLB,,, | Performed by: INTERNAL MEDICINE

## 2019-12-16 PROCEDURE — 20610 DRAIN/INJ JOINT/BURSA W/O US: CPT | Mod: LT,S$GLB,, | Performed by: INTERNAL MEDICINE

## 2019-12-16 RX ORDER — TRIAMCINOLONE ACETONIDE 40 MG/ML
40 INJECTION, SUSPENSION INTRA-ARTICULAR; INTRAMUSCULAR
Status: DISCONTINUED | OUTPATIENT
Start: 2019-12-16 | End: 2019-12-16 | Stop reason: HOSPADM

## 2019-12-16 RX ORDER — DEXAMETHASONE SODIUM PHOSPHATE 4 MG/ML
2 INJECTION, SOLUTION INTRA-ARTICULAR; INTRALESIONAL; INTRAMUSCULAR; INTRAVENOUS; SOFT TISSUE
Status: DISCONTINUED | OUTPATIENT
Start: 2019-12-16 | End: 2019-12-16 | Stop reason: HOSPADM

## 2019-12-16 RX ADMIN — DEXAMETHASONE SODIUM PHOSPHATE 2 MG: 4 INJECTION, SOLUTION INTRA-ARTICULAR; INTRALESIONAL; INTRAMUSCULAR; INTRAVENOUS; SOFT TISSUE at 01:12

## 2019-12-16 RX ADMIN — TRIAMCINOLONE ACETONIDE 40 MG: 40 INJECTION, SUSPENSION INTRA-ARTICULAR; INTRAMUSCULAR at 01:12

## 2019-12-16 NOTE — PROGRESS NOTES
[facility] RHEUMATOLOGY            PROGRESS NOTE      Subjective:       Patient ID:   NAME: Kirstie Bowden : 1945     74 y.o. female    Referring Doc: No ref. provider found  Other Physicians:    Chief Complaint:  Rheumatoid Arthritis      History of Present Illness:     Patient returns today for a regularly scheduled follow-up visit for    RA   The patient still has some arthralgias in MCP and PIP joints.  No fevers cough shortness of breath.  No chest pains.  She has left shoulder pain with abduction.  No swelling.  No history of trauma.            ROS:   GEN:  No  fever, night sweats . weight is stable   + fatigue  SKIN: no rashes, no bruising, no ulcerations, no Raynaud's  HEENT: no HA's, No visual changes, no mucosal ulcers, no sicca symptoms,  CV:   no CP, SOB, PND, MCKEON, no orthopnea, no palpitations  PULM: normal with no SOB, cough, hemoptysis, sputum or pleuritic pain  GI:  no abdominal pain, nausea, vomiting, constipation, diarrhea, melanotic stools, BRBPR, hematemesis, no dysphagia  :   no dysuria  NEURO: no paresthesias, headaches, visual disturbances, muscle weakness  MUSCULOSKELETAL:no joint swelling, prolonged AM stiffness, no back pain, no muscle pain  Allergies:  Review of patient's allergies indicates:   Allergen Reactions    Sulfamethoxazole-trimethoprim      Other reaction(s): per dr daryn Rodríguez (sulfonamide antibiotics)      NOT SURE REACTION       Medications:    Current Outpatient Medications:     amLODIPine (NORVASC) 5 MG tablet, TAKE 1 TABLET BY MOUTH ONCE DAILY, Disp: 90 tablet, Rfl: 3    anastrozole (ARIMIDEX) 1 mg Tab, Take 1 tablet (1 mg total) by mouth once daily., Disp: 90 tablet, Rfl: 4    atorvastatin (LIPITOR) 20 MG tablet, TAKE 1 TABLET BY MOUTH ONCE DAILY, Disp: 90 tablet, Rfl: 3    CALCIUM CARBONATE/VITAMIN D3 (VITAMIN D-3 ORAL), Take by mouth once daily. , Disp: , Rfl:     fluticasone-salmeterol diskus inhaler 250-50 mcg, INHALE ONE DOSE BY MOUTH  TWICE DAILY, Disp: 60 each, Rfl: 11    folic acid (FOLVITE) 1 MG tablet, TAKE 1 TABLET BY MOUTH ONCE DAILY EVERY DAY, Disp: 30 tablet, Rfl: 10    hyaluronic Na-allantoin-aloe Gel, Apply 1 application topically 2 (two) times daily., Disp: 170 g, Rfl: 5    hydroxychloroquine (PLAQUENIL) 200 mg tablet, Take 200 mg by mouth once daily., Disp: , Rfl: 1    levothyroxine (SYNTHROID) 112 MCG tablet, TAKE 1 TABLET BY MOUTH ONCE DAILY, Disp: 90 tablet, Rfl: 3    methotrexate 2.5 MG Tab, , Disp: , Rfl: 1    predniSONE (DELTASONE) 2.5 MG tablet, One po bid-tid pc, Disp: 100 tablet, Rfl: 2    thiamine (VITAMIN B-1) 100 MG tablet, Take 100 mg by mouth once daily., Disp: , Rfl:     traMADol (ULTRAM) 50 mg tablet, 1-2 po q 8 hrs prn severe pain, Disp: 90 tablet, Rfl: 1    VITAMIN B COMPLEX ORAL, Every day, Disp: , Rfl:     vitamin E/flaxseed oil (FLAXSEED OIL-VITAMIN E ORAL), Take by mouth., Disp: , Rfl:   No current facility-administered medications for this visit.     Facility-Administered Medications Ordered in Other Visits:     lidocaine (PF) 10 mg/ml (1%) injection 10 mg, 1 mL, Intradermal, Once, Eladia Schwartz MD    PMHx/PSHx Updates:      Last Eye Exam; UTD      Objective:     Vitals:  Blood pressure 126/82, weight 84.5 kg (186 lb 3.2 oz).    Physical Examination:   GEN: no apparent distress, comfortable; AAOx3  SKIN: no rashes,no ulceration, no Raynaud's, no petechiae, no SQ nodules,  HEAD: normal  EYES: no pallor, no icterus  NECK: no masses, thyroid normal, trachea midline, no LAD/LN's, supple  CV: RRR with no murmur; l S1 and S2 reg. ,no gallop no rubs,   CHEST: Normal respiratory effort; CTAB; normal breath sounds; no wheeze or crackles  MUSC/Skeletal: ROM normal; no crepitus; joints without synovitis,  no deformities  No joint swelling or tenderness of PIP, MCP, wrist, elbow, shoulder, or knee joints.  Slight tenderness some MCP and PIP joints.  Left shoulder without swelling.  Abduction up to 100  internal and external rotation preserved.  Negative drop-arm  EXTREM: no clubbing, cyanosis, no edema,normal  pulses   NEURO: grossly intact; motor WNL; AAOx3; ,  PSYCH: normal mood, affect and behavior  LYMPH: normal cervical, supraclavicular          Labs:   Lab Results   Component Value Date    WBC 9.59 10/25/2019    HGB 12.5 10/25/2019    HCT 39.9 10/25/2019     (H) 10/25/2019     10/25/2019    CMP  @LASTLAB(NA,K,CL,CO2,GLU,BUN,Creatinine,Calcium,PROT,Albumin,Bilitot,Alkphos,AST,ALT,CRP,ESR,RF,CCP,RODNEY,SSA,CPK,uric acid) )@  I have reviewed all available lab results and radiology reports.    Radiology/Diagnostic Studies:        Assessment/Plan:   (1) 74 y.o. female with diagnosis of left subacromial bursitis.  Injected as per procedure note.  2)Rheumatoid arthritis.  This is improving.On 15 mg of MTX a WEEK  3)Recent diagnosis of breast cancer      Labs      Discussion:     I have explained all of the above in detail and the patient understands all of the current recommendation(s). I have answered all questions to the best of my ability and to their complete satisfaction.       The patient is to continue with the current management plan         RTC in   3 months      Electronically signed by Jelani Stacy MD

## 2020-01-06 ENCOUNTER — HOSPITAL ENCOUNTER (OUTPATIENT)
Dept: RADIOLOGY | Facility: HOSPITAL | Age: 75
Discharge: HOME | End: 2020-01-06
Attending: INTERNAL MEDICINE
Payer: MEDICARE

## 2020-01-06 VITALS — BODY MASS INDEX: 27.28 KG/M2 | WEIGHT: 180 LBS | HEIGHT: 68 IN

## 2020-01-06 DIAGNOSIS — Z17.0 MALIGNANT NEOPLASM OF NIPPLE OF LEFT BREAST IN FEMALE, ESTROGEN RECEPTOR POSITIVE: ICD-10-CM

## 2020-01-06 DIAGNOSIS — C50.012 MALIGNANT NEOPLASM OF NIPPLE OF LEFT BREAST IN FEMALE, ESTROGEN RECEPTOR POSITIVE: ICD-10-CM

## 2020-01-06 LAB — GLUCOSE SERPL-MCNC: 101 MG/DL (ref 70–110)

## 2020-01-06 PROCEDURE — 78815 PET IMAGE W/CT SKULL-THIGH: CPT | Mod: TC,PO,PS

## 2020-01-06 PROCEDURE — A9552 F18 FDG: HCPCS | Mod: PO

## 2020-01-10 RX ORDER — PROMETHAZINE HYDROCHLORIDE AND PHENYLEPHRINE HYDROCHLORIDE 6.25; 5 MG/5ML; MG/5ML
SYRUP ORAL
Qty: 240 ML | Refills: 2 | Status: SHIPPED | OUTPATIENT
Start: 2020-01-10 | End: 2020-07-10

## 2020-01-13 RX ORDER — HYDROXYCHLOROQUINE SULFATE 200 MG/1
TABLET, FILM COATED ORAL
Refills: 0 | OUTPATIENT
Start: 2020-01-13

## 2020-01-19 RX ORDER — HYDROXYCHLOROQUINE SULFATE 200 MG/1
TABLET, FILM COATED ORAL
Qty: 30 TABLET | Refills: 3 | Status: SHIPPED | OUTPATIENT
Start: 2020-01-19 | End: 2020-07-10

## 2020-01-29 ENCOUNTER — LAB VISIT (OUTPATIENT)
Dept: LAB | Facility: HOSPITAL | Age: 75
End: 2020-01-29
Attending: INTERNAL MEDICINE
Payer: MEDICARE

## 2020-01-29 DIAGNOSIS — C50.012 MALIGNANT NEOPLASM OF NIPPLE OF LEFT BREAST IN FEMALE, ESTROGEN RECEPTOR POSITIVE: ICD-10-CM

## 2020-01-29 DIAGNOSIS — Z17.0 MALIGNANT NEOPLASM OF NIPPLE OF LEFT BREAST IN FEMALE, ESTROGEN RECEPTOR POSITIVE: ICD-10-CM

## 2020-01-29 LAB
ALBUMIN SERPL BCP-MCNC: 3.6 G/DL (ref 3.5–5.2)
ALP SERPL-CCNC: 68 U/L (ref 55–135)
ALT SERPL W/O P-5'-P-CCNC: 15 U/L (ref 10–44)
ANION GAP SERPL CALC-SCNC: 10 MMOL/L (ref 8–16)
AST SERPL-CCNC: 17 U/L (ref 10–40)
BASOPHILS # BLD AUTO: 0.02 K/UL (ref 0–0.2)
BASOPHILS NFR BLD: 0.3 % (ref 0–1.9)
BILIRUB SERPL-MCNC: 0.4 MG/DL (ref 0.1–1)
BUN SERPL-MCNC: 12 MG/DL (ref 8–23)
CALCIUM SERPL-MCNC: 10 MG/DL (ref 8.7–10.5)
CHLORIDE SERPL-SCNC: 102 MMOL/L (ref 95–110)
CO2 SERPL-SCNC: 28 MMOL/L (ref 23–29)
CREAT SERPL-MCNC: 0.8 MG/DL (ref 0.5–1.4)
DIFFERENTIAL METHOD: ABNORMAL
EOSINOPHIL # BLD AUTO: 0.2 K/UL (ref 0–0.5)
EOSINOPHIL NFR BLD: 3.1 % (ref 0–8)
ERYTHROCYTE [DISTWIDTH] IN BLOOD BY AUTOMATED COUNT: 13.3 % (ref 11.5–14.5)
EST. GFR  (AFRICAN AMERICAN): >60 ML/MIN/1.73 M^2
EST. GFR  (NON AFRICAN AMERICAN): >60 ML/MIN/1.73 M^2
GLUCOSE SERPL-MCNC: 106 MG/DL (ref 70–110)
HCT VFR BLD AUTO: 36.3 % (ref 37–48.5)
HGB BLD-MCNC: 11.2 G/DL (ref 12–16)
IMM GRANULOCYTES # BLD AUTO: 0.01 K/UL (ref 0–0.04)
LYMPHOCYTES # BLD AUTO: 1 K/UL (ref 1–4.8)
LYMPHOCYTES NFR BLD: 14.8 % (ref 18–48)
MCH RBC QN AUTO: 32.3 PG (ref 27–31)
MCHC RBC AUTO-ENTMCNC: 30.9 G/DL (ref 32–36)
MCV RBC AUTO: 105 FL (ref 82–98)
MONOCYTES # BLD AUTO: 1.1 K/UL (ref 0.3–1)
MONOCYTES NFR BLD: 15.5 % (ref 4–15)
NEUTROPHILS # BLD AUTO: 4.5 K/UL (ref 1.8–7.7)
NEUTROPHILS NFR BLD: 66.2 % (ref 38–73)
NRBC BLD-RTO: 0 /100 WBC
PLATELET # BLD AUTO: 227 K/UL (ref 150–350)
PMV BLD AUTO: 9.8 FL (ref 9.2–12.9)
POTASSIUM SERPL-SCNC: 3.8 MMOL/L (ref 3.5–5.1)
PROT SERPL-MCNC: 7.3 G/DL (ref 6–8.4)
RBC # BLD AUTO: 3.47 M/UL (ref 4–5.4)
SODIUM SERPL-SCNC: 140 MMOL/L (ref 136–145)
WBC # BLD AUTO: 6.77 K/UL (ref 3.9–12.7)

## 2020-01-29 PROCEDURE — 36415 COLL VENOUS BLD VENIPUNCTURE: CPT

## 2020-01-29 PROCEDURE — 85025 COMPLETE CBC W/AUTO DIFF WBC: CPT

## 2020-01-29 PROCEDURE — 86300 IMMUNOASSAY TUMOR CA 15-3: CPT

## 2020-01-30 ENCOUNTER — TELEPHONE (OUTPATIENT)
Dept: FAMILY MEDICINE | Facility: CLINIC | Age: 75
End: 2020-01-30

## 2020-01-30 LAB — CANCER AG27-29 SERPL-ACNC: 15.2 U/ML

## 2020-01-30 NOTE — TELEPHONE ENCOUNTER
----- Message from Bella Leach sent at 1/30/2020 12:48 PM CST -----  Contact: patient  Type:  Sooner Apoointment Request    Caller is requesting a sooner appointment.  Caller declined first available appointment listed below.  Caller will not accept being placed on the waitlist and is requesting a message be sent to doctor.    Name of Caller:  Patient  When is the first available appointment?  9/21  Symptoms:  Patients 1/29 appointment was cancelled by office  Best Call Back Number:    Additional Information:  Please advise-thank you

## 2020-01-30 NOTE — TELEPHONE ENCOUNTER
Informed pt that appt was cancelled due to provider not being in office. Offered pt appt to see CAIO. Pt declined and stated she would wait until she can be seen with Dr Luis. Informed office would give her a call to r/s appt. Pt verbalized understanding.

## 2020-01-31 ENCOUNTER — OFFICE VISIT (OUTPATIENT)
Dept: HEMATOLOGY/ONCOLOGY | Facility: CLINIC | Age: 75
End: 2020-01-31
Payer: MEDICARE

## 2020-01-31 VITALS
TEMPERATURE: 99 F | DIASTOLIC BLOOD PRESSURE: 62 MMHG | WEIGHT: 186.5 LBS | HEIGHT: 68 IN | SYSTOLIC BLOOD PRESSURE: 135 MMHG | BODY MASS INDEX: 28.26 KG/M2 | RESPIRATION RATE: 20 BRPM | OXYGEN SATURATION: 87 % | HEART RATE: 86 BPM

## 2020-01-31 DIAGNOSIS — I10 ESSENTIAL HYPERTENSION, BENIGN: ICD-10-CM

## 2020-01-31 DIAGNOSIS — Z87.442 HISTORY OF KIDNEY STONES: Primary | ICD-10-CM

## 2020-01-31 DIAGNOSIS — R94.2 ABNORMAL PET SCAN, LUNG: ICD-10-CM

## 2020-01-31 DIAGNOSIS — C50.012 MALIGNANT NEOPLASM OF NIPPLE OF LEFT BREAST IN FEMALE, ESTROGEN RECEPTOR POSITIVE: Primary | ICD-10-CM

## 2020-01-31 DIAGNOSIS — Z17.0 MALIGNANT NEOPLASM OF NIPPLE OF LEFT BREAST IN FEMALE, ESTROGEN RECEPTOR POSITIVE: Primary | ICD-10-CM

## 2020-01-31 DIAGNOSIS — I70.0 ATHEROSCLEROSIS OF AORTA: ICD-10-CM

## 2020-01-31 DIAGNOSIS — E03.9 ACQUIRED HYPOTHYROIDISM: ICD-10-CM

## 2020-01-31 DIAGNOSIS — E78.00 PURE HYPERCHOLESTEROLEMIA: ICD-10-CM

## 2020-01-31 PROCEDURE — 99999 PR PBB SHADOW E&M-EST. PATIENT-LVL IV: ICD-10-PCS | Mod: PBBFAC,,, | Performed by: INTERNAL MEDICINE

## 2020-01-31 PROCEDURE — 99214 OFFICE O/P EST MOD 30 MIN: CPT | Mod: S$PBB,,, | Performed by: INTERNAL MEDICINE

## 2020-01-31 PROCEDURE — 99999 PR PBB SHADOW E&M-EST. PATIENT-LVL IV: CPT | Mod: PBBFAC,,, | Performed by: INTERNAL MEDICINE

## 2020-01-31 PROCEDURE — 99214 PR OFFICE/OUTPT VISIT, EST, LEVL IV, 30-39 MIN: ICD-10-PCS | Mod: S$PBB,,, | Performed by: INTERNAL MEDICINE

## 2020-01-31 PROCEDURE — 99214 OFFICE O/P EST MOD 30 MIN: CPT | Mod: PBBFAC,PO | Performed by: INTERNAL MEDICINE

## 2020-01-31 NOTE — PROGRESS NOTES
Subjective:       Patient ID: Kirstie Bowden is a 75 y.o. female.    Chief Complaint:  breast ca had TC chemo s/p 3  cycles but discontinued due to quality of life issues and started Arimidex here with her daughters for follow-up   rec score of 29    hydronephrosis+ ,  had stent placed    Oncologic History:   INVASIVE CARCINOMA BREAST, CANCER CASE SUMMARY:  PROCEDURE: Partial mastectomy without skin or nipple.3/2019  SPECIMEN LATERALITY: Left.  TUMOR SIZE, GREATEST DIMENSION: 2.5 cm.  HISTOLOGIC TYPE: Invasive pleomorphic lobular carcinoma.  HISTOLOGIC GRADE (LUTHER HISTOLOGIC SCORE):  Glandular (acinar)/tubular differentiation: Score 3.  Nuclear pleomorphism: Score 2.  Mitotic rate: Score 1.  Overall grade: Grade 2  DUCTAL CARCINOMA IN SITU (DCIS): Not identified.  LOBULAR CARCINOMA IN SITU (LCIS): Present, pleomorphic lobular carcinoma in situ with rare focus of  classical lobular carcinoma situ.  Estimated size (extent) of pleomorphic LCIS: At least 2.8 cm.  TUMOR EXTENSION: Not applicable.  MARGINS:  INVASIVE CARCINOMA MARGINS: Uninvolved by invasive carcinoma.  Distance from inferior (closest) margin: 5 mm.  PLEOMORPHIC LOBULAR CARCINOMA IN SITU MARGINS: Uninvolved by pleomorphic LCIS.  Distance from inferior medial (closest) margin: 0.2 mm (see above comment).  REGIONAL LYMPH NODES: Uninvolved by tumor cells.  Number of lymph nodes examined: 2.  Number of sentinel nodes examined: 2.  TREATMENT EFFECT: No known presurgical therapy.  PATHOLOGIC STAGE CLASSIFICATION (pTNM, AJCC 8TH EDITION):  pT2: Tumor size = 2.5 cm in greatest dimension.  pN0: No regional lymph node metastasis identified.  pM: Not applicable.    ER: Positive.  MA: Positive.  HER2: Negative (IHC - Equivocal (score 2) and FISH - Negative).  Ki-67: Approximately 14%.  HPI:     Social History     Socioeconomic History    Marital status:      Spouse name: Not on file    Number of children: Not on file    Years of education:  Not on file    Highest education level: Not on file   Occupational History     Employer: [employer]   Social Needs    Financial resource strain: Not very hard    Food insecurity:     Worry: Never true     Inability: Never true    Transportation needs:     Medical: No     Non-medical: No   Tobacco Use    Smoking status: Current Every Day Smoker     Packs/day: 0.50     Years: 46.00     Pack years: 23.00     Types: Cigarettes     Start date: 4/25/1960    Smokeless tobacco: Never Used    Tobacco comment: patch available    Substance and Sexual Activity    Alcohol use: Yes     Alcohol/week: 2.0 standard drinks     Types: 2 Glasses of wine per week     Frequency: Monthly or less     Drinks per session: Patient refused     Binge frequency: Never     Comment: Social    Drug use: No    Sexual activity: Never   Lifestyle    Physical activity:     Days per week: 3 days     Minutes per session: 20 min    Stress: To some extent   Relationships    Social connections:     Talks on phone: More than three times a week     Gets together: Three times a week     Attends Restoration service: Not on file     Active member of club or organization: Yes     Attends meetings of clubs or organizations: Never     Relationship status:    Other Topics Concern    Not on file   Social History Narrative    Not on file     Family History   Problem Relation Age of Onset    Heart disease Mother     Hypertension Mother     Alzheimer's disease Mother     Cancer Father      Past Surgical History:   Procedure Laterality Date    AXILLARY NODE DISSECTION Left 3/14/2019    Procedure: LYMPHADENECTOMY, AXILLARY;  Surgeon: Mp Gonzalez MD;  Location: [facility] OR;  Service: General;  Laterality: Left;  left lumpectomy with axillary lypmh node dissection    BALLOON DILATION OF URETER Left 6/24/2019    Procedure: DILATION, URETER, USING BALLOON;  Surgeon: Jorden Dickson MD;  Location: [facility] OR;  Service: Urology;  Laterality: Left;     BREAST BIOPSY Left 20 yrs ago    benign    CHOLECYSTECTOMY      CYSTOSCOPY W/ URETERAL STENT PLACEMENT Left 6/24/2019    Procedure: CYSTOSCOPY, WITH URETERAL STENT INSERTION;  Surgeon: Jorden Dickson MD;  Location: [facility] OR;  Service: Urology;  Laterality: Left;    CYSTOSCOPY W/ URETERAL STENT REMOVAL Left 7/23/2019    Procedure: CYSTOSCOPY, WITH URETERAL STENT REMOVAL;  Surgeon: Jorden Dickson MD;  Location: [facility] OR;  Service: Urology;  Laterality: Left;    EXTRACORPOREAL SHOCK WAVE LITHOTRIPSY Left 7/23/2019    Procedure: LITHOTRIPSY, ESWL;  Surgeon: Jorden Dickson MD;  Location: [facility] OR;  Service: Urology;  Laterality: Left;    EYE SURGERY Bilateral     cataracts    HYSTERECTOMY      MASTECTOMY, PARTIAL Left 4/25/2019    Procedure: MASTECTOMY, PARTIAL;  Surgeon: Mp Gonzalez MD;  Location: [facility] OR;  Service: General;  Laterality: Left;    NEEDLE LOCALIZATION Left 3/14/2019    Procedure: NEEDLE LOCALIZATION;  Surgeon: Mp Gonzalez MD;  Location: [facility] OR;  Service: General;  Laterality: Left;  left lumpectomy with wire needle localization     RETROGRADE PYELOGRAPHY Bilateral 6/24/2019    Procedure: PYELOGRAM, RETROGRADE;  Surgeon: Jorden Dickson MD;  Location: [facility] OR;  Service: Urology;  Laterality: Bilateral;    SENTINEL LYMPH NODE BIOPSY Left 3/14/2019    Procedure: BIOPSY, LYMPH NODE, SENTINEL;  Surgeon: Mp Gonzalez MD;  Location: [facility] OR;  Service: General;  Laterality: Left;  left sentinel node lymph node biopsy    TONSILLECTOMY      URETEROSCOPY Left 6/24/2019    Procedure: URETEROSCOPY;  Surgeon: Jorden Dickson MD;  Location: [facility] OR;  Service: Urology;  Laterality: Left;     Past Medical History:   Diagnosis Date    Arthritis     Asthma     Cancer     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    Hyperlipidemia     Hypertension     Pseudocholinesterase deficiency     family history    Thyroid disease        Current Outpatient Medications:     amLODIPine (NORVASC) 5 MG tablet, TAKE  1 TABLET BY MOUTH ONCE DAILY, Disp: 90 tablet, Rfl: 3    anastrozole (ARIMIDEX) 1 mg Tab, Take 1 tablet (1 mg total) by mouth once daily., Disp: 90 tablet, Rfl: 4    atorvastatin (LIPITOR) 20 MG tablet, TAKE 1 TABLET BY MOUTH ONCE DAILY, Disp: 90 tablet, Rfl: 3    CALCIUM CARBONATE/VITAMIN D3 (VITAMIN D-3 ORAL), Take by mouth once daily. , Disp: , Rfl:     fluticasone-salmeterol diskus inhaler 250-50 mcg, INHALE ONE DOSE BY MOUTH TWICE DAILY, Disp: 60 each, Rfl: 11    folic acid (FOLVITE) 1 MG tablet, TAKE 1 TABLET BY MOUTH ONCE DAILY EVERY DAY, Disp: 30 tablet, Rfl: 10    hydroxychloroquine (PLAQUENIL) 200 mg tablet, TAKE 1 TABLET BY MOUTH ONCE DAILY, Disp: 30 tablet, Rfl: 3    levothyroxine (SYNTHROID) 112 MCG tablet, TAKE 1 TABLET BY MOUTH ONCE DAILY, Disp: 90 tablet, Rfl: 3    promethazine-phenylephrine (PROMETHAZINE VC) 6.25-5 mg/5 mL Syrp, TAKE 5ML BY MOUTH EVERY 6 HOURS AS NEEDED, Disp: 240 mL, Rfl: 2    thiamine (VITAMIN B-1) 100 MG tablet, Take 100 mg by mouth once daily., Disp: , Rfl:     VITAMIN B COMPLEX ORAL, Every day, Disp: , Rfl:     vitamin E/flaxseed oil (FLAXSEED OIL-VITAMIN E ORAL), Take by mouth., Disp: , Rfl:     methotrexate 2.5 MG Tab, , Disp: , Rfl: 1    predniSONE (DELTASONE) 2.5 MG tablet, One po bid-tid pc (Patient not taking: Reported on 1/31/2020), Disp: 100 tablet, Rfl: 2    traMADol (ULTRAM) 50 mg tablet, 1-2 po q 8 hrs prn severe pain (Patient not taking: Reported on 1/31/2020), Disp: 90 tablet, Rfl: 1  No current facility-administered medications for this visit.     Facility-Administered Medications Ordered in Other Visits:     lidocaine (PF) 10 mg/ml (1%) injection 10 mg, 1 mL, Intradermal, Once, Eladia Schwartz MD  Review of patient's allergies indicates:   Allergen Reactions    Sulfamethoxazole-trimethoprim      Other reaction(s): per dr daryn Rodríguez (sulfonamide antibiotics)      NOT SURE REACTION         REVIEW OF SYSTEMS:     CONSTITUTIONAL:  Patient  voices complains with significant fatigue.  Shortness of breath on ambulation.  She is not coughing up productive sputum she reports no fever chills rigors  Has some sinus congestion  And having night sweats after starting Arimidex    SKIN: Denies rash, issues with nails, non-healing sores, bleeding, blotching    skin or abnormal bruising. Denies new moles or changes to existing moles.      BREASTS: healing well since lumpectomy    EYES: Denies eye pain, blurred vision, swelling, redness or discharge.      ENT AND MOUTH:  Has runny nose, and stuffiness, and sinus trouble no sores. Denies    nosebleeds. Denies, hoarseness, change in voice or swelling in front of the    neck.      CARDIOVASCULAR: Denies chest pain, discomfort or palpitations. Denies neck    swelling or episodes of passing out.           GI: Denies trouble swallowing, indigestion, heartburn, abdominal pain, nausea,    vomiting, diarrhea, altered bowel habits, blood in stool, discoloration of    stools, change in nature of stool, bloating, increased abdominal girth.      GENITOURINARY: No discharge. No pelvic pain or lumps. No rash around groin or  lesions. No urinary frequency, hesitation, painful urination or blood in    urine. Denies incontinence. No problems with intercourse.      MUSCULOSKELETAL: Denies neck or back pain. Denies weakness in arms or legs,    joint problems or distended inflamed veins in legs. Denies swelling or abnormal  glands.      NEUROLOGICAL: Denies tingling, numbness, altered mentation changes to nerve    function in the face, weakness to one or both of the body. Denies changes to    gait and denies multiple falls or accidents.        PHYSICAL EXAM:     Wt Readings from Last 3 Encounters:   01/31/20 84.6 kg (186 lb 8.2 oz)   01/06/20 81.6 kg (180 lb)   12/16/19 84.5 kg (186 lb 3.2 oz)     Temp Readings from Last 3 Encounters:   01/31/20 98.7 °F (37.1 °C) (Oral)   10/28/19 98.3 °F (36.8 °C)   09/10/19 98.1 °F (36.7 °C) (Oral)      BP Readings from Last 3 Encounters:   01/31/20 135/62   12/16/19 126/82   11/12/19 (!) 144/86     Pulse Readings from Last 3 Encounters:   01/31/20 86   10/28/19 70   09/10/19 68     GENERAL: Comfortable looking patient. Patient is in no distress.  Awake, alert and oriented to time, person and place.  No anxiety, or agitation.      HEENT: Normal conjunctivae and eyelids. WNL.  PERRLA 3 to 4 mm. No icterus, no pallor, no congestion, and no discharge noted.     NECK:  Supple. Trachea is central.  No crepitus.  No JVD or masses.    RESPIRATORY:  No intercostal retractions.  No dullness to percussion.  Chest is clear to auscultation.  No rales, rhonchi or wheezes.  No crepitus.  Good air entry bilaterally.    CARDIOVASCULAR:  S1 and S2 are normally heard without murmurs or gallops.  All peripheral pulses are present.    ABDOMEN:  Normal abdomen.  No hepatosplenomegaly.  No free fluid.  Bowel sounds are present.  No hernia noted. No masses.  No rebound or tenderness.  No guarding or rigidity.  Umbilicus is midline.    LYMPHATICS:  No axillary, cervical, supraclavicular, submental, or inguinal lymphadenopathy.    SKIN/MUSCULOSKELETAL:  There is no evidence of excoriation marks or ecchmosis.  No rashes.  No cyanosis.  No clubbing.  No joint or skeletal deformities noted.  Normal range of motion.    NEUROLOGIC:  Higher functions are appropriate.  No cranial nerve deficits.  Normal asher.  Normal strength.  Motor and sensory functions are normal.  Deep tendon reflexes are normal.    GENITAL/RECTAL:  Exams are deferred.      Laboratory:     CBC:  Lab Results   Component Value Date    WBC 7.01 01/29/2020    RBC 3.48 (L) 01/29/2020    HGB 11.6 (L) 01/29/2020    HCT 36.1 (L) 01/29/2020     (H) 01/29/2020    MCH 33.3 (H) 01/29/2020    MCHC 32.1 01/29/2020    RDW 13.4 01/29/2020     01/29/2020    MPV 10.2 01/29/2020    GRAN 4.7 01/29/2020    GRAN 66.6 01/29/2020    LYMPH 1.0 01/29/2020    LYMPH 14.8 (L) 01/29/2020     MONO 1.1 (H) 01/29/2020    MONO 15.1 (H) 01/29/2020    EOS 0.2 01/29/2020    BASO 0.02 01/29/2020    EOSINOPHIL 2.9 01/29/2020    BASOPHIL 0.3 01/29/2020       BMP: BMP  Lab Results   Component Value Date     01/29/2020    K 3.7 01/29/2020     01/29/2020    CO2 27 01/29/2020    BUN 12 01/29/2020    CREATININE 0.8 01/29/2020    CALCIUM 10.0 01/29/2020    ANIONGAP 9 01/29/2020    ESTGFRAFRICA >60 01/29/2020    EGFRNONAA >60 01/29/2020       LFT:   Lab Results   Component Value Date    ALT 16 01/29/2020    AST 16 01/29/2020    ALKPHOS 70 01/29/2020    BILITOT 0.4 01/29/2020    Bone density September 2 1019 negative for osteopenia  oncotype rec. score is 29  Impressionpet 1/2020       1.  Likely postoperative change in the superolateral left breast, decreased in size from the previous exam.    2.  Scattered multifocal ground-glass attenuation opacities in both lungs suggesting a combination of atypical infection/pneumonia and/or post treatment/post radiation change.  Consider follow-up CT of the chest in 6-8 weeks to document resolution.    3.  Indeterminate isoattenuating non-FDG avid structure in the anterior interpolar aspect of the right kidney, possibly representing a hemorrhagic/proteinaceous cyst or low grade malignancy, consider further characterization with contrast enhanced renal protocol CT/MRI.    4.  Numerous bilateral nonobstructing renal stones.  No hydronephrosis or hydroureter on today's exam.    5.  Interval resolution of the maxillary sinus disease.        has renal stones and has hydronephrosis, getting stent placed in 2 days  Assessment/Plan:     T2N0Mx left breast ca s/p lumpectomy 3/2019 recurrent score 29  Patient completed 3 cycles but aborted therapy.  In favor a quality of life due to side effects  Above PET scan discussed with patient  She already sees Urology for kidney stones will refer back for evaluation hemorrhagic/proteinaceous cyst all low-grade malignancy described on  PET  Will also obtain Pulmonary consult for 2.  Finding as above on PET scan  She has scattered atheromatous calcifications in the aorta and coronary arteries she will discuss this and follow up with her PCP for referral to cardiology    The Zofran Compazine sent to pharmacy  mammo due in 3/2020   rtc 12/2019 to 1/202 with pet. Cbc. Cmp, [id number]  Has quit smoking      xrt on going, 2 more days left  Started arimidex, tolerating well bone density negative   cont f/u for RA, HTH. Lipids, hypothyroidism      Living Will   discussed at prev visit

## 2020-02-03 ENCOUNTER — TELEPHONE (OUTPATIENT)
Dept: UROLOGY | Facility: CLINIC | Age: 75
End: 2020-02-03

## 2020-02-03 NOTE — TELEPHONE ENCOUNTER
----- Message from Dexter Estevez sent at 2/3/2020 10:43 AM CST -----  Contact: pt  Pt called and scheduled an appt on 4/2/20 (first available)    Pt had a PT Scan and she has kidney stones or something on her right kidney    Pt can be reached at [phone]

## 2020-02-05 ENCOUNTER — HOSPITAL ENCOUNTER (OUTPATIENT)
Dept: RADIOLOGY | Facility: HOSPITAL | Age: 75
Discharge: HOME | End: 2020-02-05
Attending: UROLOGY
Payer: MEDICARE

## 2020-02-05 ENCOUNTER — OFFICE VISIT (OUTPATIENT)
Dept: UROLOGY | Facility: CLINIC | Age: 75
End: 2020-02-05
Payer: MEDICARE

## 2020-02-05 VITALS
RESPIRATION RATE: 18 BRPM | TEMPERATURE: 98 F | HEART RATE: 85 BPM | HEIGHT: 68 IN | BODY MASS INDEX: 27.54 KG/M2 | DIASTOLIC BLOOD PRESSURE: 64 MMHG | WEIGHT: 181.69 LBS | SYSTOLIC BLOOD PRESSURE: 127 MMHG

## 2020-02-05 DIAGNOSIS — N20.0 CALCULUS OF KIDNEY: Primary | ICD-10-CM

## 2020-02-05 DIAGNOSIS — N20.0 CALCULUS OF KIDNEY: ICD-10-CM

## 2020-02-05 LAB
BILIRUB SERPL-MCNC: ABNORMAL MG/DL
BLOOD URINE, POC: ABNORMAL
COLOR, POC UA: YELLOW
GLUCOSE UR QL STRIP: ABNORMAL
KETONES UR QL STRIP: ABNORMAL
LEUKOCYTE ESTERASE URINE, POC: ABNORMAL
NITRITE, POC UA: ABNORMAL
PH, POC UA: 6.5
PROTEIN, POC: ABNORMAL
SPECIFIC GRAVITY, POC UA: 1.01
UROBILINOGEN, POC UA: 0.2

## 2020-02-05 PROCEDURE — 99214 OFFICE O/P EST MOD 30 MIN: CPT | Mod: 25,S$PBB,, | Performed by: UROLOGY

## 2020-02-05 PROCEDURE — 74018 RADEX ABDOMEN 1 VIEW: CPT | Mod: 26,,, | Performed by: RADIOLOGY

## 2020-02-05 PROCEDURE — 81000 URINALYSIS NONAUTO W/SCOPE: CPT | Mod: PBBFAC,PN | Performed by: UROLOGY

## 2020-02-05 PROCEDURE — 74018 XR ABDOMEN AP 1 VIEW: ICD-10-PCS | Mod: 26,,, | Performed by: RADIOLOGY

## 2020-02-05 PROCEDURE — 99214 PR OFFICE/OUTPT VISIT, EST, LEVL IV, 30-39 MIN: ICD-10-PCS | Mod: 25,S$PBB,, | Performed by: UROLOGY

## 2020-02-05 PROCEDURE — 99213 OFFICE O/P EST LOW 20 MIN: CPT | Mod: PBBFAC,25,PN | Performed by: UROLOGY

## 2020-02-05 PROCEDURE — 74018 RADEX ABDOMEN 1 VIEW: CPT | Mod: TC,FY

## 2020-02-05 PROCEDURE — 99999 PR PBB SHADOW E&M-EST. PATIENT-LVL III: CPT | Mod: PBBFAC,,, | Performed by: UROLOGY

## 2020-02-05 PROCEDURE — 81002 URINALYSIS NONAUTO W/O SCOPE: CPT | Mod: PBBFAC,PN | Performed by: UROLOGY

## 2020-02-05 PROCEDURE — 99999 PR PBB SHADOW E&M-EST. PATIENT-LVL III: ICD-10-PCS | Mod: PBBFAC,,, | Performed by: UROLOGY

## 2020-02-05 NOTE — PROGRESS NOTES
OFFICE NOTE  [unfilled]  [id number]  2/5/2020       CHIEF COMPLAINT:   renal calculi     HISTORY OF PRESENT ILLNESS:   this 75-year-old female returns for routine recheck.  She has a history renal calculi and had undergone ESWL of proximal left ureteral calculus on 07/23/2019.  She has had no further stone episodes since then and otherwise has been doing well has returned for routine recheck.  CT scan 01/06/2020 that revealed bilateral renal calculi but no evidence of obstruction.    Medical history updated reveals that she has received chemotherapy and radiation therapy for breast cancer under the care of Dr. Li    Physical exam:  Abdomen - soft benign nontender no masses no hernias no organomegaly                                   UA - negative pH 6.5     FINAL IMPRESSION:   renal calculi     RECOMMENDATIONS:   KUB

## 2020-02-08 DIAGNOSIS — C50.012 MALIGNANT NEOPLASM OF NIPPLE OF LEFT BREAST IN FEMALE, ESTROGEN RECEPTOR POSITIVE: Primary | ICD-10-CM

## 2020-02-08 DIAGNOSIS — Z17.0 MALIGNANT NEOPLASM OF NIPPLE OF LEFT BREAST IN FEMALE, ESTROGEN RECEPTOR POSITIVE: Primary | ICD-10-CM

## 2020-02-11 DIAGNOSIS — Z17.0 MALIGNANT NEOPLASM OF NIPPLE OF LEFT BREAST IN FEMALE, ESTROGEN RECEPTOR POSITIVE: ICD-10-CM

## 2020-02-11 DIAGNOSIS — C50.012 MALIGNANT NEOPLASM OF NIPPLE OF LEFT BREAST IN FEMALE, ESTROGEN RECEPTOR POSITIVE: ICD-10-CM

## 2020-02-11 RX ORDER — PROCHLORPERAZINE MALEATE 10 MG
TABLET ORAL
Qty: 60 TABLET | Refills: 0 | Status: SHIPPED | OUTPATIENT
Start: 2020-02-11 | End: 2020-03-25

## 2020-03-02 DIAGNOSIS — E03.9 ACQUIRED HYPOTHYROIDISM: ICD-10-CM

## 2020-03-02 RX ORDER — LEVOTHYROXINE SODIUM 112 UG/1
TABLET ORAL
Qty: 90 TABLET | Refills: 3 | Status: SHIPPED | OUTPATIENT
Start: 2020-03-02 | End: 2020-07-13 | Stop reason: DRUGHIGH

## 2020-03-11 ENCOUNTER — INITIAL CONSULT (OUTPATIENT)
Dept: PULMONOLOGY | Facility: CLINIC | Age: 75
End: 2020-03-11
Payer: MEDICARE

## 2020-03-11 VITALS
DIASTOLIC BLOOD PRESSURE: 85 MMHG | WEIGHT: 181 LBS | HEART RATE: 77 BPM | SYSTOLIC BLOOD PRESSURE: 145 MMHG | BODY MASS INDEX: 27.43 KG/M2 | HEIGHT: 68 IN | OXYGEN SATURATION: 95 %

## 2020-03-11 DIAGNOSIS — C50.912 MALIGNANT NEOPLASM OF LEFT BREAST IN FEMALE, ESTROGEN RECEPTOR POSITIVE, UNSPECIFIED SITE OF BREAST: Primary | ICD-10-CM

## 2020-03-11 DIAGNOSIS — R91.8 GROUND GLASS OPACITY PRESENT ON IMAGING OF LUNG: ICD-10-CM

## 2020-03-11 DIAGNOSIS — Z17.0 MALIGNANT NEOPLASM OF LEFT BREAST IN FEMALE, ESTROGEN RECEPTOR POSITIVE, UNSPECIFIED SITE OF BREAST: Primary | ICD-10-CM

## 2020-03-11 PROCEDURE — 99204 OFFICE O/P NEW MOD 45 MIN: CPT | Mod: S$GLB,,, | Performed by: INTERNAL MEDICINE

## 2020-03-11 PROCEDURE — 99204 PR OFFICE/OUTPT VISIT, NEW, LEVL IV, 45-59 MIN: ICD-10-PCS | Mod: S$GLB,,, | Performed by: INTERNAL MEDICINE

## 2020-03-11 NOTE — LETTER
March 11, 2020      Malina Li MD  [address]  [address]  [place] [address]           [facility] - Pulmonology  [address]  [place] [address]  Phone: [phone]  Fax: [phone]          Patient: Kirstie Bowden   MR Number: [id number]   YOB: 1945   Date of Visit: 3/11/2020       Dear Dr. Malina Li:    Thank you for referring Kirstie Bowden to me for evaluation. Attached you will find relevant portions of my assessment and plan of care.    If you have questions, please do not hesitate to call me. I look forward to following Kirstie Bowden along with you.    Sincerely,    Naila Burk MD    Enclosure  CC:  No Recipients    If you would like to receive this communication electronically, please contact [email or web address] or [phone] to request more information on [employer] access.    For providers and/or their staff who would like to refer a patient to [facility], please contact us through our one-stop-shop provider referral line, [facility], at [phone].    If you feel you have received this communication in error or would no longer like to receive these types of communications, please e-mail [email or web address]          No

## 2020-03-11 NOTE — PROGRESS NOTES
SUBJECTIVE:    Patient ID: Kirstie Bowden is a 75 y.o. female.    Chief Complaint: Abnormal Ct Scan    HPI The patient is here with a known breast Cancer.  She had a PET scan which showed something in her R lung.  She had a cold at the time.  Her breast cancer is on the L.The CT shows 3 areas of ground glass in the RUL and PEPE.    Past Medical History:   Diagnosis Date    Arthritis     rheumatoid    Asthma     Cancer     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    Hyperlipidemia     Hypertension     Pseudocholinesterase deficiency     family history    Thyroid disease      Past Surgical History:   Procedure Laterality Date    AXILLARY NODE DISSECTION Left 3/14/2019    Procedure: LYMPHADENECTOMY, AXILLARY;  Surgeon: Mp Gonzalez MD;  Location: [facility] OR;  Service: General;  Laterality: Left;  left lumpectomy with axillary lypmh node dissection    BALLOON DILATION OF URETER Left 6/24/2019    Procedure: DILATION, URETER, USING BALLOON;  Surgeon: Jorden Dickson MD;  Location: [facility] OR;  Service: Urology;  Laterality: Left;    BREAST BIOPSY Left 20 yrs ago    benign    BREAST LUMPECTOMY      x2    CHOLECYSTECTOMY      CYSTOSCOPY W/ URETERAL STENT PLACEMENT Left 6/24/2019    Procedure: CYSTOSCOPY, WITH URETERAL STENT INSERTION;  Surgeon: Jorden Dickson MD;  Location: [facility] OR;  Service: Urology;  Laterality: Left;    CYSTOSCOPY W/ URETERAL STENT REMOVAL Left 7/23/2019    Procedure: CYSTOSCOPY, WITH URETERAL STENT REMOVAL;  Surgeon: Jorden Dickson MD;  Location: [facility] OR;  Service: Urology;  Laterality: Left;    EXTRACORPOREAL SHOCK WAVE LITHOTRIPSY Left 7/23/2019    Procedure: LITHOTRIPSY, ESWL;  Surgeon: Jorden Dickson MD;  Location: [facility] OR;  Service: Urology;  Laterality: Left;    EYE SURGERY Bilateral     cataracts    HYSTERECTOMY      MASTECTOMY, PARTIAL Left 4/25/2019    Procedure: MASTECTOMY, PARTIAL;  Surgeon: Mp Gonzalez MD;  Location: [facility] OR;  Service: General;  Laterality: Left;     NEEDLE LOCALIZATION Left 3/14/2019    Procedure: NEEDLE LOCALIZATION;  Surgeon: Mp Gonzalez MD;  Location: [facility] OR;  Service: General;  Laterality: Left;  left lumpectomy with wire needle localization     RETROGRADE PYELOGRAPHY Bilateral 6/24/2019    Procedure: PYELOGRAM, RETROGRADE;  Surgeon: Jorden Dickson MD;  Location: [facility] OR;  Service: Urology;  Laterality: Bilateral;    SENTINEL LYMPH NODE BIOPSY Left 3/14/2019    Procedure: BIOPSY, LYMPH NODE, SENTINEL;  Surgeon: Mp Gonzalez MD;  Location: [facility] OR;  Service: General;  Laterality: Left;  left sentinel node lymph node biopsy    TONSILLECTOMY      URETEROSCOPY Left 6/24/2019    Procedure: URETEROSCOPY;  Surgeon: Jorden Dickson MD;  Location: [facility] OR;  Service: Urology;  Laterality: Left;     Family History   Problem Relation Age of Onset    Heart disease Mother     Hypertension Mother     Alzheimer's disease Mother     Cancer Father         Social History:   Marital Status:   Occupation: Data Unavailable  Alcohol History:  reports that she drinks about 2.0 standard drinks of alcohol per week.  Tobacco History:  reports that she quit smoking about 7 months ago. Her smoking use included cigarettes. She started smoking about 59 years ago. She has a 23.50 pack-year smoking history. She has never used smokeless tobacco.  Drug History:  reports that she does not use drugs.    Review of patient's allergies indicates:   Allergen Reactions    Sulfamethoxazole-trimethoprim      Other reaction(s): per dr daryn Rodríguez (sulfonamide antibiotics)      NOT SURE REACTION       Current Outpatient Medications   Medication Sig Dispense Refill    amLODIPine (NORVASC) 5 MG tablet TAKE 1 TABLET BY MOUTH ONCE DAILY 90 tablet 3    anastrozole (ARIMIDEX) 1 mg Tab Take 1 tablet (1 mg total) by mouth once daily. 90 tablet 4    atorvastatin (LIPITOR) 20 MG tablet TAKE 1 TABLET BY MOUTH ONCE DAILY 90 tablet 3    CALCIUM CARBONATE/VITAMIN D3  (VITAMIN D-3 ORAL) Take by mouth once daily.       fluticasone-salmeterol diskus inhaler 250-50 mcg INHALE ONE DOSE BY MOUTH TWICE DAILY 60 each 11    folic acid (FOLVITE) 1 MG tablet TAKE 1 TABLET BY MOUTH ONCE DAILY EVERY DAY 30 tablet 10    hydroxychloroquine (PLAQUENIL) 200 mg tablet TAKE 1 TABLET BY MOUTH ONCE DAILY 30 tablet 3    levothyroxine (SYNTHROID) 112 MCG tablet Take 1 tablet by mouth once daily 90 tablet 3    methotrexate 2.5 MG Tab   1    thiamine (VITAMIN B-1) 100 MG tablet Take 100 mg by mouth once daily.      traMADol (ULTRAM) 50 mg tablet 1-2 po q 8 hrs prn severe pain 90 tablet 1    VITAMIN B COMPLEX ORAL Every day      vitamin E/flaxseed oil (FLAXSEED OIL-VITAMIN E ORAL) Take by mouth.      predniSONE (DELTASONE) 2.5 MG tablet One po bid-tid pc (Patient not taking: Reported on 3/11/2020) 100 tablet 2    prochlorperazine (COMPAZINE) 10 MG tablet TAKE 1 TABLET BY MOUTH EVERY 6 HOURS AS NEEDED 60 tablet 0    promethazine-phenylephrine (PROMETHAZINE VC) 6.25-5 mg/5 mL Syrp TAKE 5ML BY MOUTH EVERY 6 HOURS AS NEEDED (Patient not taking: Reported on 3/11/2020) 240 mL 2     No current facility-administered medications for this visit.      Facility-Administered Medications Ordered in Other Visits   Medication Dose Route Frequency Provider Last Rate Last Dose    lidocaine (PF) 10 mg/ml (1%) injection 10 mg  1 mL Intradermal Once Eladia Schwartz MD           Alpha-1 Antitrypsin:  Last PFT:   Last CT:    Review of Systems  General: Feeling ok but tired.  Eyes: Vision is good.  ENT:  Feels like she needs to clear her throat   Heart:: No chest pain or palpitations.  Lungs: No cough, sputum, or wheezing.  GI: Nausea  : No dysuria, hesitancy, or nocturia.  Musculoskeletal: arms and hands hurt  Skin: No lesions or rashes.  Neuro: tingling in her hands from arthritis.  Lymph: No edema or adenopathy.  Psych: No anxiety or depression.  Endo: No weight change.    OBJECTIVE:      BP (!) 145/85  "(BP Location: Left arm, Patient Position: Sitting, BP Method: Medium (Manual))   Pulse 77   Ht 5' 8" (1.727 m)   Wt 82.1 kg (181 lb)   SpO2 95%   BMI 27.52 kg/m²     Physical Exam  GENERAL: Older patient in no distress.  HEENT: Pupils equal and reactive. Extraocular movements intact. Nose intact.      Pharynx moist.  NECK: Supple.   HEART: Regular rate and rhythm. No murmur or gallop auscultated.  LUNGS: Clear to auscultation and percussion. Lung excursion symmetrical. No change in fremitus. No adventitial noises.  ABDOMEN: Bowel sounds present. Non-tender, no masses palpated.  EXTREMITIES: Normal muscle tone and joint movement, no cyanosis or clubbing.   LYMPHATICS: No adenopathy palpated, no edema.  SKIN: Dry, intact, no lesions.   NEURO: Cranial nerves II-XII intact. Motor strength 5/5 bilaterally, upper and lower extremities.  PSYCH: Appropriate affect.    Assessment:       1. Malignant neoplasm of left breast in female, estrogen receptor positive, unspecified site of breast    2. Ground glass opacity present on imaging of lung        The small amount of ground glass could well be from an infectious process associated with the cold she had.    Plan:       Malignant neoplasm of left breast in female, estrogen receptor positive, unspecified site of breast  -     CT Chest Without Contrast; Future; Expected date: 04/11/2020    Ground glass opacity present on imaging of lung         Follow up in about 6 weeks (around 4/22/2020).         "

## 2020-03-16 ENCOUNTER — HOSPITAL ENCOUNTER (OUTPATIENT)
Dept: RADIOLOGY | Facility: HOSPITAL | Age: 75
Discharge: HOME | End: 2020-03-16
Attending: INTERNAL MEDICINE
Payer: MEDICARE

## 2020-03-16 DIAGNOSIS — C50.012 MALIGNANT NEOPLASM OF NIPPLE OF LEFT BREAST IN FEMALE, ESTROGEN RECEPTOR POSITIVE: ICD-10-CM

## 2020-03-16 DIAGNOSIS — Z17.0 MALIGNANT NEOPLASM OF NIPPLE OF LEFT BREAST IN FEMALE, ESTROGEN RECEPTOR POSITIVE: ICD-10-CM

## 2020-03-16 PROCEDURE — 77066 DX MAMMO INCL CAD BI: CPT | Mod: 26,,, | Performed by: RADIOLOGY

## 2020-03-16 PROCEDURE — 77066 DX MAMMO INCL CAD BI: CPT | Mod: TC

## 2020-03-16 PROCEDURE — 77062 BREAST TOMOSYNTHESIS BI: CPT | Mod: 26,,, | Performed by: RADIOLOGY

## 2020-03-16 PROCEDURE — 77066 MAMMO DIGITAL DIAGNOSTIC BILAT WITH TOMOSYNTHESIS_CAD: ICD-10-PCS | Mod: 26,,, | Performed by: RADIOLOGY

## 2020-03-16 PROCEDURE — 77062 MAMMO DIGITAL DIAGNOSTIC BILAT WITH TOMOSYNTHESIS_CAD: ICD-10-PCS | Mod: 26,,, | Performed by: RADIOLOGY

## 2020-03-24 ENCOUNTER — TELEPHONE (OUTPATIENT)
Dept: FAMILY MEDICINE | Facility: CLINIC | Age: 75
End: 2020-03-24

## 2020-03-24 NOTE — TELEPHONE ENCOUNTER
----- Message from LEE Lemons sent at 3/24/2020  2:24 PM CDT -----  Contact: pt  Please assist   ----- Message -----  From: Denis Green  Sent: 2/26/2020  11:36 AM CDT  To: Elías Luis Jr., MD    Type:  Sooner Apoointment Request    Caller is requesting a sooner appointment.  Caller declined first available appointment listed below.  Caller will not accept being placed on the waitlist and is requesting a message be sent to doctor.    Name of Caller:  pt  When is the first available appointment?  9/17/2020 8am  Symptoms:  Pt received letter stating time for annual exam in June 2020  Best Call Back Number:  [phone]  Additional Information:

## 2020-03-25 DIAGNOSIS — C50.012 MALIGNANT NEOPLASM OF NIPPLE OF LEFT BREAST IN FEMALE, ESTROGEN RECEPTOR POSITIVE: ICD-10-CM

## 2020-03-25 DIAGNOSIS — Z17.0 MALIGNANT NEOPLASM OF NIPPLE OF LEFT BREAST IN FEMALE, ESTROGEN RECEPTOR POSITIVE: ICD-10-CM

## 2020-03-25 RX ORDER — PROCHLORPERAZINE MALEATE 10 MG
TABLET ORAL
Qty: 60 TABLET | Refills: 0 | Status: SHIPPED | OUTPATIENT
Start: 2020-03-25 | End: 2020-06-01

## 2020-03-25 RX ORDER — FOLIC ACID 1 MG/1
TABLET ORAL
Qty: 30 TABLET | Refills: 5 | Status: SHIPPED | OUTPATIENT
Start: 2020-03-25 | End: 2020-09-28

## 2020-03-30 ENCOUNTER — TELEPHONE (OUTPATIENT)
Dept: PULMONOLOGY | Facility: CLINIC | Age: 75
End: 2020-03-30

## 2020-03-30 DIAGNOSIS — R91.1 LUNG NODULE: ICD-10-CM

## 2020-03-30 NOTE — TELEPHONE ENCOUNTER
The ct of chest can it wait or does it need to be done now if so they need a order with STAT on it

## 2020-04-16 RX ORDER — METHOTREXATE 2.5 MG/1
TABLET ORAL
Qty: 48 TABLET | Refills: 0 | Status: SHIPPED | OUTPATIENT
Start: 2020-04-16 | End: 2020-07-10

## 2020-05-04 ENCOUNTER — TELEPHONE (OUTPATIENT)
Dept: PULMONOLOGY | Facility: CLINIC | Age: 75
End: 2020-05-04

## 2020-05-04 ENCOUNTER — HOSPITAL ENCOUNTER (OUTPATIENT)
Dept: RADIOLOGY | Facility: HOSPITAL | Age: 75
Discharge: HOME | End: 2020-05-04
Attending: NURSE PRACTITIONER
Payer: MEDICARE

## 2020-05-04 DIAGNOSIS — R91.1 LUNG NODULE: ICD-10-CM

## 2020-05-04 PROCEDURE — 71250 CT THORAX DX C-: CPT | Mod: TC,PO

## 2020-05-05 ENCOUNTER — PATIENT MESSAGE (OUTPATIENT)
Dept: ADMINISTRATIVE | Facility: HOSPITAL | Age: 75
End: 2020-05-05

## 2020-05-30 DIAGNOSIS — C50.012 MALIGNANT NEOPLASM OF NIPPLE OF LEFT BREAST IN FEMALE, ESTROGEN RECEPTOR POSITIVE: ICD-10-CM

## 2020-05-30 DIAGNOSIS — Z17.0 MALIGNANT NEOPLASM OF NIPPLE OF LEFT BREAST IN FEMALE, ESTROGEN RECEPTOR POSITIVE: ICD-10-CM

## 2020-06-01 RX ORDER — PROCHLORPERAZINE MALEATE 10 MG
TABLET ORAL
Qty: 60 TABLET | Refills: 0 | Status: SHIPPED | OUTPATIENT
Start: 2020-06-01

## 2020-06-03 ENCOUNTER — OFFICE VISIT (OUTPATIENT)
Dept: PULMONOLOGY | Facility: CLINIC | Age: 75
End: 2020-06-03
Payer: MEDICARE

## 2020-06-03 VITALS
OXYGEN SATURATION: 95 % | HEIGHT: 68 IN | SYSTOLIC BLOOD PRESSURE: 150 MMHG | HEART RATE: 59 BPM | DIASTOLIC BLOOD PRESSURE: 80 MMHG | BODY MASS INDEX: 27.28 KG/M2 | WEIGHT: 180 LBS | TEMPERATURE: 99 F

## 2020-06-03 DIAGNOSIS — J45.20 MILD INTERMITTENT ASTHMA, UNSPECIFIED WHETHER COMPLICATED: ICD-10-CM

## 2020-06-03 DIAGNOSIS — G47.01 INSOMNIA DUE TO MEDICAL CONDITION: Primary | ICD-10-CM

## 2020-06-03 DIAGNOSIS — J70.0 RADIATION PNEUMONITIS: ICD-10-CM

## 2020-06-03 PROCEDURE — 99214 PR OFFICE/OUTPT VISIT, EST, LEVL IV, 30-39 MIN: ICD-10-PCS | Mod: S$GLB,,, | Performed by: INTERNAL MEDICINE

## 2020-06-03 PROCEDURE — 99214 OFFICE O/P EST MOD 30 MIN: CPT | Mod: S$GLB,,, | Performed by: INTERNAL MEDICINE

## 2020-06-03 RX ORDER — TEMAZEPAM 7.5 MG/1
15 CAPSULE ORAL NIGHTLY PRN
Qty: 30 CAPSULE | Refills: 1 | Status: SHIPPED | OUTPATIENT
Start: 2020-06-03 | End: 2020-07-03

## 2020-06-03 RX ORDER — TRAMADOL HYDROCHLORIDE 50 MG/1
TABLET ORAL
Qty: 90 TABLET | Refills: 0 | Status: SHIPPED | OUTPATIENT
Start: 2020-06-03 | End: 2020-06-09

## 2020-06-03 NOTE — LETTER
Elana 3, 2020      Malina Li MD  [address]  [address]  [place] [address]           [facility] - Pulmonology  [address]  [place] [address]  Phone: [phone]  Fax: [phone]          Patient: Kirstie Bowden   MR Number: [id number]   YOB: 1945   Date of Visit: 6/3/2020       Dear Dr. Malina Li:    Thank you for referring Kirstie Bowden to me for evaluation. Attached you will find relevant portions of my assessment and plan of care.    If you have questions, please do not hesitate to call me. I look forward to following Kirstie Bowden along with you.    Sincerely,    Naila Burk MD    Enclosure  CC:  No Recipients    If you would like to receive this communication electronically, please contact [email or web address] or [phone] to request more information on [employer] access.    For providers and/or their staff who would like to refer a patient to [facility], please contact us through our one-stop-shop provider referral line, [facility], at [phone].    If you feel you have received this communication in error or would no longer like to receive these types of communications, please e-mail [email or web address]

## 2020-06-03 NOTE — PROGRESS NOTES
SUBJECTIVE:    Patient ID: Kirstie Bowden is a 75 y.o. female.    Chief Complaint: Follow-up (CT results)    HPI The patient returns with her CT showing near complete resolution of the ground glass abnormalities.  She has been able to decrease her Advair to prn.  Her biggest problem is pain in her L arm.  She is off of her Enbrel because of the chemotherapy and radiation and therefore her arthritis is flaring and giving her fits.  She is tried Benadryl and Tylenol and all of the arthritis creams and is still unable to sleep because of this.  She did get Voltaren gel recently and finds it is helping a little but it is still interrupting her sleep.  Past Medical History:   Diagnosis Date    Arthritis     rheumatoid    Asthma     Cancer     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    Hyperlipidemia     Hypertension     Pseudocholinesterase deficiency     family history    Thyroid disease      Past Surgical History:   Procedure Laterality Date    AXILLARY NODE DISSECTION Left 3/14/2019    Procedure: LYMPHADENECTOMY, AXILLARY;  Surgeon: Mp Gonzalez MD;  Location: [facility];  Service: General;  Laterality: Left;  left lumpectomy with axillary lypmh node dissection    BALLOON DILATION OF URETER Left 6/24/2019    Procedure: DILATION, URETER, USING BALLOON;  Surgeon: Jorden Dickson MD;  Location: [facility];  Service: Urology;  Laterality: Left;    BREAST BIOPSY Left 20 yrs ago    benign    BREAST LUMPECTOMY      x2    CHOLECYSTECTOMY      CYSTOSCOPY W/ URETERAL STENT PLACEMENT Left 6/24/2019    Procedure: CYSTOSCOPY, WITH URETERAL STENT INSERTION;  Surgeon: Jorden Dickson MD;  Location: [facility];  Service: Urology;  Laterality: Left;    CYSTOSCOPY W/ URETERAL STENT REMOVAL Left 7/23/2019    Procedure: CYSTOSCOPY, WITH URETERAL STENT REMOVAL;  Surgeon: Jorden Dickson MD;  Location: [facility] OR;  Service: Urology;  Laterality: Left;    EXTRACORPOREAL SHOCK WAVE LITHOTRIPSY Left 7/23/2019    Procedure: LITHOTRIPSY, ESWL;   Surgeon: Jorden Dickson MD;  Location: [facility] OR;  Service: Urology;  Laterality: Left;    EYE SURGERY Bilateral     cataracts    HYSTERECTOMY      MASTECTOMY, PARTIAL Left 4/25/2019    Procedure: MASTECTOMY, PARTIAL;  Surgeon: Mp Gonzalez MD;  Location: [facility] OR;  Service: General;  Laterality: Left;    NEEDLE LOCALIZATION Left 3/14/2019    Procedure: NEEDLE LOCALIZATION;  Surgeon: Mp Gonzalez MD;  Location: [facility] OR;  Service: General;  Laterality: Left;  left lumpectomy with wire needle localization     RETROGRADE PYELOGRAPHY Bilateral 6/24/2019    Procedure: PYELOGRAM, RETROGRADE;  Surgeon: Jorden Dickson MD;  Location: [facility] OR;  Service: Urology;  Laterality: Bilateral;    SENTINEL LYMPH NODE BIOPSY Left 3/14/2019    Procedure: BIOPSY, LYMPH NODE, SENTINEL;  Surgeon: Mp Gonzalez MD;  Location: [facility] OR;  Service: General;  Laterality: Left;  left sentinel node lymph node biopsy    TONSILLECTOMY      URETEROSCOPY Left 6/24/2019    Procedure: URETEROSCOPY;  Surgeon: Jorden Dickson MD;  Location: [facility] OR;  Service: Urology;  Laterality: Left;     Family History   Problem Relation Age of Onset    Heart disease Mother     Hypertension Mother     Alzheimer's disease Mother     Cancer Father         Social History:   Marital Status:   Occupation: Data Unavailable  Alcohol History:  reports that she drinks about 2.0 standard drinks of alcohol per week.  Tobacco History:  reports that she quit smoking about 9 months ago. Her smoking use included cigarettes. She started smoking about 60 years ago. She has a 23.50 pack-year smoking history. She has never used smokeless tobacco.  Drug History:  reports that she does not use drugs.    Review of patient's allergies indicates:   Allergen Reactions    Sulfamethoxazole-trimethoprim      Other reaction(s): per dr daryn Rdoríguez (sulfonamide antibiotics)      NOT SURE REACTION       Current Outpatient Medications   Medication Sig  Dispense Refill    amLODIPine (NORVASC) 5 MG tablet TAKE 1 TABLET BY MOUTH ONCE DAILY 90 tablet 3    atorvastatin (LIPITOR) 20 MG tablet TAKE 1 TABLET BY MOUTH ONCE DAILY 90 tablet 3    CALCIUM CARBONATE/VITAMIN D3 (VITAMIN D-3 ORAL) Take by mouth once daily.       fluticasone-salmeterol diskus inhaler 250-50 mcg INHALE ONE DOSE BY MOUTH TWICE DAILY 60 each 11    folic acid (FOLVITE) 1 MG tablet Take 1 tablet by mouth once daily 30 tablet 5    levothyroxine (SYNTHROID) 112 MCG tablet Take 1 tablet by mouth once daily 90 tablet 3    prochlorperazine (COMPAZINE) 10 MG tablet TAKE 1 TABLET BY MOUTH EVERY 6 HOURS AS NEEDED 60 tablet 0    thiamine (VITAMIN B-1) 100 MG tablet Take 100 mg by mouth once daily.      traMADol (ULTRAM) 50 mg tablet 1-2 po q 8 hrs prn severe pain 90 tablet 1    VITAMIN B COMPLEX ORAL Every day      vitamin E/flaxseed oil (FLAXSEED OIL-VITAMIN E ORAL) Take by mouth.      anastrozole (ARIMIDEX) 1 mg Tab Take 1 tablet (1 mg total) by mouth once daily. 90 tablet 4    hydroxychloroquine (PLAQUENIL) 200 mg tablet TAKE 1 TABLET BY MOUTH ONCE DAILY 30 tablet 3    methotrexate 2.5 MG Tab TAKE 4 TABLETS (10 MG TOTAL) BY MOUTH EVERY 7 DAYS (Patient not taking: Reported on 6/3/2020) 48 tablet 0    predniSONE (DELTASONE) 2.5 MG tablet One po bid-tid pc (Patient not taking: Reported on 3/11/2020) 100 tablet 2    promethazine-phenylephrine (PROMETHAZINE VC) 6.25-5 mg/5 mL Syrp TAKE 5ML BY MOUTH EVERY 6 HOURS AS NEEDED (Patient not taking: Reported on 3/11/2020) 240 mL 2    temazepam (RESTORIL) 7.5 MG Cap Take 2 capsules (15 mg total) by mouth nightly as needed. 30 capsule 1     No current facility-administered medications for this visit.      Facility-Administered Medications Ordered in Other Visits   Medication Dose Route Frequency Provider Last Rate Last Dose    lidocaine (PF) 10 mg/ml (1%) injection 10 mg  1 mL Intradermal Once Eladia Schwartz MD           Alpha-1 Antitrypsin:  Last  "PFT:   Last CT:    Review of Systems  General: Feeling ok but tired.  She is unable to sleep with her arthritic pain.  Eyes: Vision is good.  ENT:  Feels like she needs to clear her throat   Heart:: No chest pain or palpitations.  Lungs: No cough, sputum, or wheezing.  GI: Nausea  : No dysuria, hesitancy, or nocturia.  Musculoskeletal: arms and hands hurt  Skin: No lesions or rashes.  Neuro: tingling in her hands from arthritis.  Lymph: No edema or adenopathy.  Psych: No anxiety or depression.  Endo: No weight change.    OBJECTIVE:      BP (!) 150/80 (BP Location: Left arm, Patient Position: Sitting, BP Method: Medium (Manual))   Pulse (!) 59   Temp 98.7 °F (37.1 °C)   Ht 5' 8" (1.727 m)   Wt 81.6 kg (180 lb)   SpO2 95%   BMI 27.37 kg/m²     Physical Exam  GENERAL: Older patient in no distress.  HEENT: Pupils equal and reactive. Extraocular movements intact. Nose intact.      Pharynx moist.  NECK: Supple.   HEART: Regular rate and rhythm. No murmur or gallop auscultated.  LUNGS: Clear to auscultation and percussion. Lung excursion symmetrical. No change in fremitus. No adventitial noises.  ABDOMEN: Bowel sounds present. Non-tender, no masses palpated.  EXTREMITIES: Normal muscle tone and joint movement, no cyanosis or clubbing.  Compression sleeve over the left hand  LYMPHATICS: No adenopathy palpated, no edema.  SKIN: Dry, intact, no lesions.   NEURO: Cranial nerves II-XII intact. Motor strength 5/5 bilaterally, upper and lower extremities.  PSYCH: Appropriate affect.    Assessment:       1. Insomnia due to medical condition    2. Mild intermittent asthma, unspecified whether complicated    3. Radiation pneumonitis        Her radiation pneumonitis is dramatically better.  Her asthma is also dramatically better.  Her insomnia is the problem.  Plan:       Insomnia due to medical condition  -     temazepam (RESTORIL) 7.5 MG Cap; Take 2 capsules (15 mg total) by mouth nightly as needed.  Dispense: 30 capsule; " Refill: 1    Mild intermittent asthma, unspecified whether complicated    Radiation pneumonitis         Follow up if symptoms worsen or fail to improve.

## 2020-06-08 ENCOUNTER — TELEPHONE (OUTPATIENT)
Dept: PULMONOLOGY | Facility: CLINIC | Age: 75
End: 2020-06-08

## 2020-06-08 NOTE — TELEPHONE ENCOUNTER
[facility] in [place] wants to know if it is ok for bipin to take clonazepam prescribed by  an Tramadol which was prescribed by a different provider .

## 2020-07-10 ENCOUNTER — OFFICE VISIT (OUTPATIENT)
Dept: FAMILY MEDICINE | Facility: CLINIC | Age: 75
End: 2020-07-10
Payer: MEDICARE

## 2020-07-10 ENCOUNTER — LAB VISIT (OUTPATIENT)
Dept: LAB | Facility: HOSPITAL | Age: 75
End: 2020-07-10
Attending: FAMILY MEDICINE
Payer: MEDICARE

## 2020-07-10 VITALS
HEIGHT: 68 IN | HEART RATE: 78 BPM | DIASTOLIC BLOOD PRESSURE: 76 MMHG | SYSTOLIC BLOOD PRESSURE: 120 MMHG | TEMPERATURE: 98 F | WEIGHT: 179 LBS | BODY MASS INDEX: 27.13 KG/M2 | OXYGEN SATURATION: 95 %

## 2020-07-10 DIAGNOSIS — E78.00 PURE HYPERCHOLESTEROLEMIA: ICD-10-CM

## 2020-07-10 DIAGNOSIS — E03.9 ACQUIRED HYPOTHYROIDISM: ICD-10-CM

## 2020-07-10 DIAGNOSIS — C50.112 CARCINOMA OF CENTRAL PORTION OF LEFT BREAST IN FEMALE, ESTROGEN RECEPTOR POSITIVE: ICD-10-CM

## 2020-07-10 DIAGNOSIS — I10 ESSENTIAL HYPERTENSION, BENIGN: Primary | ICD-10-CM

## 2020-07-10 DIAGNOSIS — Z17.0 CARCINOMA OF CENTRAL PORTION OF LEFT BREAST IN FEMALE, ESTROGEN RECEPTOR POSITIVE: ICD-10-CM

## 2020-07-10 DIAGNOSIS — J45.20 MILD INTERMITTENT CHRONIC ASTHMA WITHOUT COMPLICATION: ICD-10-CM

## 2020-07-10 DIAGNOSIS — M05.711 RHEUMATOID ARTHRITIS INVOLVING RIGHT SHOULDER WITH POSITIVE RHEUMATOID FACTOR: ICD-10-CM

## 2020-07-10 DIAGNOSIS — Z11.59 ENCOUNTER FOR HEPATITIS C SCREENING TEST FOR LOW RISK PATIENT: ICD-10-CM

## 2020-07-10 LAB
CHOLEST SERPL-MCNC: 175 MG/DL (ref 120–199)
CHOLEST/HDLC SERPL: 4.2 {RATIO} (ref 2–5)
HDLC SERPL-MCNC: 42 MG/DL (ref 40–75)
HDLC SERPL: 24 % (ref 20–50)
LDLC SERPL CALC-MCNC: 89 MG/DL (ref 63–159)
NONHDLC SERPL-MCNC: 133 MG/DL
T4 FREE SERPL-MCNC: 1.16 NG/DL (ref 0.71–1.51)
TRIGL SERPL-MCNC: 220 MG/DL (ref 30–150)
TSH SERPL DL<=0.005 MIU/L-ACNC: 5.67 UIU/ML (ref 0.4–4)

## 2020-07-10 PROCEDURE — 84439 ASSAY OF FREE THYROXINE: CPT

## 2020-07-10 PROCEDURE — 80061 LIPID PANEL: CPT

## 2020-07-10 PROCEDURE — 99999 PR PBB SHADOW E&M-EST. PATIENT-LVL IV: ICD-10-PCS | Mod: PBBFAC,,, | Performed by: FAMILY MEDICINE

## 2020-07-10 PROCEDURE — 36415 COLL VENOUS BLD VENIPUNCTURE: CPT | Mod: PO

## 2020-07-10 PROCEDURE — 99999 PR PBB SHADOW E&M-EST. PATIENT-LVL IV: CPT | Mod: PBBFAC,,, | Performed by: FAMILY MEDICINE

## 2020-07-10 PROCEDURE — 99214 PR OFFICE/OUTPT VISIT, EST, LEVL IV, 30-39 MIN: ICD-10-PCS | Mod: S$PBB,,, | Performed by: FAMILY MEDICINE

## 2020-07-10 PROCEDURE — 86803 HEPATITIS C AB TEST: CPT

## 2020-07-10 PROCEDURE — 99214 OFFICE O/P EST MOD 30 MIN: CPT | Mod: S$PBB,,, | Performed by: FAMILY MEDICINE

## 2020-07-10 PROCEDURE — 99214 OFFICE O/P EST MOD 30 MIN: CPT | Mod: PBBFAC,PO | Performed by: FAMILY MEDICINE

## 2020-07-10 PROCEDURE — 84443 ASSAY THYROID STIM HORMONE: CPT

## 2020-07-10 RX ORDER — HYDROCHLOROTHIAZIDE 25 MG/1
25 TABLET ORAL DAILY
Qty: 90 TABLET | Refills: 3 | Status: SHIPPED | OUTPATIENT
Start: 2020-07-10 | End: 2021-07-13

## 2020-07-10 RX ORDER — PANTOPRAZOLE SODIUM 40 MG/1
TABLET, DELAYED RELEASE ORAL
COMMUNITY
Start: 2020-07-01

## 2020-07-13 ENCOUNTER — OFFICE VISIT (OUTPATIENT)
Dept: RHEUMATOLOGY | Facility: CLINIC | Age: 75
End: 2020-07-13
Payer: MEDICARE

## 2020-07-13 ENCOUNTER — LAB VISIT (OUTPATIENT)
Dept: LAB | Facility: HOSPITAL | Age: 75
End: 2020-07-13
Attending: INTERNAL MEDICINE
Payer: MEDICARE

## 2020-07-13 VITALS
TEMPERATURE: 97 F | BODY MASS INDEX: 26.88 KG/M2 | WEIGHT: 176.81 LBS | DIASTOLIC BLOOD PRESSURE: 79 MMHG | SYSTOLIC BLOOD PRESSURE: 139 MMHG

## 2020-07-13 DIAGNOSIS — M05.79 RHEUMATOID ARTHRITIS INVOLVING MULTIPLE SITES WITH POSITIVE RHEUMATOID FACTOR: Primary | ICD-10-CM

## 2020-07-13 DIAGNOSIS — E03.9 ACQUIRED HYPOTHYROIDISM: Primary | ICD-10-CM

## 2020-07-13 DIAGNOSIS — M05.79 RHEUMATOID ARTHRITIS INVOLVING MULTIPLE SITES WITH POSITIVE RHEUMATOID FACTOR: ICD-10-CM

## 2020-07-13 LAB
ALBUMIN SERPL BCP-MCNC: 4.5 G/DL (ref 3.5–5.2)
ALP SERPL-CCNC: 65 U/L (ref 55–135)
ALT SERPL W/O P-5'-P-CCNC: 14 U/L (ref 10–44)
ANION GAP SERPL CALC-SCNC: 14 MMOL/L (ref 8–16)
AST SERPL-CCNC: 21 U/L (ref 10–40)
BASOPHILS # BLD AUTO: 0.04 K/UL (ref 0–0.2)
BASOPHILS NFR BLD: 0.4 % (ref 0–1.9)
BILIRUB SERPL-MCNC: 0.6 MG/DL (ref 0.1–1)
BUN SERPL-MCNC: 14 MG/DL (ref 8–23)
CALCIUM SERPL-MCNC: 10.3 MG/DL (ref 8.7–10.5)
CHLORIDE SERPL-SCNC: 97 MMOL/L (ref 95–110)
CO2 SERPL-SCNC: 27 MMOL/L (ref 23–29)
CREAT SERPL-MCNC: 0.9 MG/DL (ref 0.5–1.4)
CRP SERPL-MCNC: 0.73 MG/DL
DIFFERENTIAL METHOD: ABNORMAL
EOSINOPHIL # BLD AUTO: 0.1 K/UL (ref 0–0.5)
EOSINOPHIL NFR BLD: 1.5 % (ref 0–8)
ERYTHROCYTE [DISTWIDTH] IN BLOOD BY AUTOMATED COUNT: 13 % (ref 11.5–14.5)
EST. GFR  (AFRICAN AMERICAN): >60 ML/MIN/1.73 M^2
EST. GFR  (NON AFRICAN AMERICAN): >60 ML/MIN/1.73 M^2
GLUCOSE SERPL-MCNC: 99 MG/DL (ref 70–110)
HCT VFR BLD AUTO: 41.8 % (ref 37–48.5)
HCV AB SERPL QL IA: NEGATIVE
HGB BLD-MCNC: 13.4 G/DL (ref 12–16)
IMM GRANULOCYTES # BLD AUTO: 0.04 K/UL (ref 0–0.04)
IMM GRANULOCYTES NFR BLD AUTO: 0.4 % (ref 0–0.5)
LYMPHOCYTES # BLD AUTO: 2.1 K/UL (ref 1–4.8)
LYMPHOCYTES NFR BLD: 22.2 % (ref 18–48)
MCH RBC QN AUTO: 31.8 PG (ref 27–31)
MCHC RBC AUTO-ENTMCNC: 32.1 G/DL (ref 32–36)
MCV RBC AUTO: 99 FL (ref 82–98)
MONOCYTES # BLD AUTO: 1.1 K/UL (ref 0.3–1)
MONOCYTES NFR BLD: 11 % (ref 4–15)
NEUTROPHILS # BLD AUTO: 6.2 K/UL (ref 1.8–7.7)
NEUTROPHILS NFR BLD: 64.5 % (ref 38–73)
NRBC BLD-RTO: 0 /100 WBC
PLATELET # BLD AUTO: 240 K/UL (ref 150–350)
PMV BLD AUTO: 11.6 FL (ref 9.2–12.9)
POTASSIUM SERPL-SCNC: 3.9 MMOL/L (ref 3.5–5.1)
PROT SERPL-MCNC: 8.4 G/DL (ref 6–8.4)
RBC # BLD AUTO: 4.21 M/UL (ref 4–5.4)
SODIUM SERPL-SCNC: 138 MMOL/L (ref 136–145)
URATE SERPL-MCNC: 4.6 MG/DL (ref 2.4–5.7)
WBC # BLD AUTO: 9.57 K/UL (ref 3.9–12.7)

## 2020-07-13 PROCEDURE — 84550 ASSAY OF BLOOD/URIC ACID: CPT

## 2020-07-13 PROCEDURE — 80053 COMPREHEN METABOLIC PANEL: CPT

## 2020-07-13 PROCEDURE — 36415 COLL VENOUS BLD VENIPUNCTURE: CPT

## 2020-07-13 PROCEDURE — 99213 OFFICE O/P EST LOW 20 MIN: CPT | Mod: S$GLB,,, | Performed by: INTERNAL MEDICINE

## 2020-07-13 PROCEDURE — 86140 C-REACTIVE PROTEIN: CPT

## 2020-07-13 PROCEDURE — 99213 PR OFFICE/OUTPT VISIT, EST, LEVL III, 20-29 MIN: ICD-10-PCS | Mod: S$GLB,,, | Performed by: INTERNAL MEDICINE

## 2020-07-13 PROCEDURE — 85025 COMPLETE CBC W/AUTO DIFF WBC: CPT

## 2020-07-13 RX ORDER — LEVOTHYROXINE SODIUM 125 UG/1
125 TABLET ORAL
Qty: 90 TABLET | Refills: 3 | Status: SHIPPED | OUTPATIENT
Start: 2020-07-13 | End: 2021-07-05

## 2020-07-13 RX ORDER — HYDROXYCHLOROQUINE SULFATE 200 MG/1
200 TABLET, FILM COATED ORAL 2 TIMES DAILY
Qty: 60 TABLET | Refills: 3 | Status: SHIPPED | OUTPATIENT
Start: 2020-07-13 | End: 2020-11-18 | Stop reason: SDUPTHER

## 2020-07-13 RX ORDER — METHYLPREDNISOLONE 4 MG/1
TABLET ORAL
Qty: 1 PACKAGE | Refills: 0 | Status: SHIPPED | OUTPATIENT
Start: 2020-07-13 | End: 2020-08-03

## 2020-07-13 RX ORDER — METHOTREXATE 2.5 MG/1
12.5 TABLET ORAL
Qty: 20 TABLET | Refills: 1 | Status: SHIPPED | OUTPATIENT
Start: 2020-07-13 | End: 2020-08-17 | Stop reason: SDUPTHER

## 2020-07-13 NOTE — PROGRESS NOTES
[facility] RHEUMATOLOGY            PROGRESS NOTE      Subjective:       Patient ID:   NAME: Kirstie Bowden : 1945     75 y.o. female    Referring Doc: No ref. provider found  Other Physicians:    Chief Complaint:  Rheumatoid Arthritis      History of Present Illness:     Patient returns today for a regularly scheduled follow-up visit for rheumatoid arthritis      The patient has been on Plaquenil 200 mg a day.  She stop as she felt it was not working and she has swelling prolonged morning stiffness and pain in MCP joints and wrist.  No fevers cough shortness of breath.  No chest pains.  No GI complaints            ROS:   GEN:  No  fever, night sweats . weight is stable   + fatigue  SKIN: no rashes, no bruising, no ulcerations, no Raynaud's  HEENT: no HA's, No visual changes, no mucosal ulcers, no sicca symptoms,  CV:   no CP, SOB, PND, MCKEON, no orthopnea, no palpitations  PULM: normal with no SOB, cough, hemoptysis, sputum or pleuritic pain  GI:  no abdominal pain, nausea, vomiting, constipation, diarrhea, melanotic stools, BRBPR, hematemesis, no dysphagia  :   no dysuria  NEURO: no paresthesias, headaches, visual disturbances, muscle weakness  MUSCULOSKELETAL:+ joint +, prolonged AM stiffness, no back pain, no muscle pain  Allergies:  Review of patient's allergies indicates:   Allergen Reactions    Succinylcholine chloride Other (See Comments)    Sulfur dioxide Hives    Sulfamethoxazole-trimethoprim      Other reaction(s): per dr daryn Rodríguez (sulfonamide antibiotics)      NOT SURE REACTION       Medications:    Current Outpatient Medications:     amLODIPine (NORVASC) 5 MG tablet, TAKE 1 TABLET BY MOUTH ONCE DAILY, Disp: 90 tablet, Rfl: 3    anastrozole (ARIMIDEX) 1 mg Tab, Take 1 tablet (1 mg total) by mouth once daily., Disp: 90 tablet, Rfl: 4    atorvastatin (LIPITOR) 20 MG tablet, TAKE 1 TABLET BY MOUTH ONCE DAILY, Disp: 90 tablet, Rfl: 3    CALCIUM CARBONATE/VITAMIN D3 (VITAMIN  D-3 ORAL), Take by mouth once daily. , Disp: , Rfl:     fluticasone-salmeterol diskus inhaler 250-50 mcg, INHALE ONE DOSE BY MOUTH TWICE DAILY, Disp: 60 each, Rfl: 11    folic acid (FOLVITE) 1 MG tablet, Take 1 tablet by mouth once daily, Disp: 30 tablet, Rfl: 5    hydroCHLOROthiazide (HYDRODIURIL) 25 MG tablet, Take 1 tablet (25 mg total) by mouth once daily., Disp: 90 tablet, Rfl: 3    levothyroxine (SYNTHROID) 125 MCG tablet, Take 1 tablet (125 mcg total) by mouth before breakfast., Disp: 90 tablet, Rfl: 3    pantoprazole (PROTONIX) 40 MG tablet, TAKE 1 TABLET BY MOUTH ONCE DAILY IN THE MORNING FOR 90 DAYS, Disp: , Rfl:     prochlorperazine (COMPAZINE) 10 MG tablet, TAKE 1 TABLET BY MOUTH EVERY 6 HOURS AS NEEDED, Disp: 60 tablet, Rfl: 0    thiamine (VITAMIN B-1) 100 MG tablet, Take 100 mg by mouth once daily., Disp: , Rfl:     traMADoL (ULTRAM) 50 mg tablet, TAKE 1 TO 2 TABLETS BY MOUTH EVERY 8 HOURS AS NEEDED FOR SEVERE PAIN, Disp: 90 tablet, Rfl: 0    VITAMIN B COMPLEX ORAL, Every day, Disp: , Rfl:     vitamin E/flaxseed oil (FLAXSEED OIL-VITAMIN E ORAL), Take by mouth., Disp: , Rfl:     hydroxychloroquine (PLAQUENIL) 200 mg tablet, Take 1 tablet (200 mg total) by mouth 2 (two) times daily., Disp: 60 tablet, Rfl: 3    methotrexate 2.5 MG Tab, Take 5 tablets (12.5 mg total) by mouth every 7 days., Disp: 20 tablet, Rfl: 1    methylPREDNISolone (MEDROL DOSEPACK) 4 mg tablet, use as directed, Disp: 1 Package, Rfl: 0  No current facility-administered medications for this visit.     Facility-Administered Medications Ordered in Other Visits:     lidocaine (PF) 10 mg/ml (1%) injection 10 mg, 1 mL, Intradermal, Once, Eladia Schwartz MD    PMHx/PSHx Updates:          Objective:     Vitals:  Blood pressure 139/79, temperature 97.1 °F (36.2 °C), weight 80.2 kg (176 lb 12.8 oz).    Physical Examination:   GEN: no apparent distress, comfortable; AAOx3  SKIN: no rashes,no ulceration, no Raynaud's, no  petechiae, no SQ nodules,  HEAD: normal  EYES: no pallor, no icterus,  NECK: no masses, thyroid normal, trachea midline, no LAD/LN's, supple  CV: RRR with no murmur; l S1 and S2 reg. ,no gallop no rubs,   CHEST: Normal respiratory effort; CTAB; normal breath sounds; no wheeze or crackles  MUSC/Skeletal: ROM normal; no crepitus; wrists and MCP joints with synovitis,  no deformities  No joint swelling or tenderness of elbow, shoulder, or knee joints  EXTREM: no clubbing, cyanosis, no edema,normal  pulses   NEURO: grossly intact; motor WNL; AAOx3;   PSYCH: normal mood, affect and behavior  LYMPH: normal cervical, supraclavicular          Labs:   Lab Results   Component Value Date    WBC 7.01 01/29/2020    HGB 11.6 (L) 01/29/2020    HCT 36.1 (L) 01/29/2020     (H) 01/29/2020     01/29/2020    CMP  @LASTLAB(NA,K,CL,CO2,GLU,BUN,Creatinine,Calcium,PROT,Albumin,Bilitot,Alkphos,AST,ALT,CRP,ESR,RF,CCP,RODNEY,SSA,CPK,uric acid) )@  I have reviewed all available lab results and radiology reports.    Radiology/Diagnostic Studies:        Assessment/Plan:   (1) 75 y.o. female with diagnosis of rheumatoid arthritis.  This is active  Medrol Dosepak  Labs  Restart methotrexate 12.5 mg once a week if blood tests are okay  Plaquenil 200 mg b.i.d.    History of breast cancer; patient was on Enbrel dose was discontinue after her diagnosis.    Discussion:     I have explained all of the above in detail and the patient understands all of the current recommendation(s). I have answered all questions to the best of my ability and to their complete satisfaction.       The patient is to continue with the current management plan         RTC in   3 weeks      Electronically signed by Jelani Stacy MD        Answers for HPI/ROS submitted by the patient on 7/10/2020   fever: No  eye redness: No  headaches: No  shortness of breath: No  chest pain: No  trouble swallowing: No  diarrhea: No  constipation: No  unexpected weight change:  No  genital sore: No  dysuria: No  During the last 3 days, have you had a skin rash?: No  Bruises or bleeds easily: No  cough: No

## 2020-07-13 NOTE — PROGRESS NOTES
Subjective:       Patient ID: Kirstie Bowden is a 75 y.o. female.    Chief Complaint: Hypertension, Hyperlipidemia, Hypothyroidism, Asthma, Rheumatoid Arthritis, and Breast Cancer    Patient presents here for annual follow-up of hypertension, hyperlipidemia, hypothyroidism, asthma, rheumatoid arthritis, and breast cancer.  Her hypertension is well controlled on her present dose of amlodipine 5 mg daily.  She is complaining of some mild edema that occurs most every day and does not go away with elevation of her legs.  I explained to her that this could possibly be a side effect of amlodipine but I will give her a small dose of diuretic and see if this does help.  She states she is following her low-sodium diet.  Her hyperlipidemia has been well controlled on her present dose of atorvastatin 20 mg daily and her low-fat low-cholesterol diet.  She is due for lipid profile at this time and this will be ordered to see how well controlled her hyperlipidemia is.  Her hypothyroidism is stable on her present dose of levothyroxine 112 mcg daily and she is also due for a TSH at this time.  Her asthma is stable without any recent exacerbations.  She has been compliant with her inhaler.  She is having some problems with her rheumatoid arthritis at this time and has an appointment to see her rheumatologist soon.  She had to get off of her methotrexate and other medication while undergoing chemotherapy.  Now that she has finished her therapy, she would like to get back on these medications which should help her pain.  I will defer this to her rheumatologist.  She was diagnosed with breast cancer in March of 2019 and has undergone lumpectomy as well as chemotherapy and radiation therapy.  She is followed by Oncology on a regular basis.  As far as health maintenance, she is up-to-date with all of her recommended screening exams and immunizations with exception tetanus vaccine, pneumococcal vaccine, and hepatitis C  screen.    Hypertension  This is a chronic problem. The problem is unchanged. The problem is controlled. Pertinent negatives include no chest pain, headaches, palpitations or shortness of breath. Risk factors for coronary artery disease include dyslipidemia, post-menopausal state and sedentary lifestyle. Past treatments include calcium channel blockers. The current treatment provides moderate improvement. Compliance problems include exercise.  There is no history of kidney disease, CAD/MI, CVA or heart failure.   Hyperlipidemia  This is a chronic problem. The problem is controlled. Recent lipid tests were reviewed and are normal. Pertinent negatives include no chest pain or shortness of breath. Current antihyperlipidemic treatment includes statins. The current treatment provides significant improvement of lipids. Compliance problems include adherence to exercise.  Risk factors for coronary artery disease include dyslipidemia, hypertension, post-menopausal and a sedentary lifestyle.     Review of Systems   Constitutional: Negative for chills, fatigue, fever and unexpected weight change.   HENT: Negative for nasal congestion, ear pain, postnasal drip and sore throat.    Respiratory: Negative for cough, shortness of breath and wheezing.    Cardiovascular: Negative for chest pain and palpitations.   Gastrointestinal: Negative for abdominal pain, diarrhea, nausea and vomiting.   Genitourinary: Negative for difficulty urinating, dysuria, flank pain and pelvic pain.   Musculoskeletal: Positive for arthralgias (Due to rheumatoid arthritis). Negative for back pain.   Neurological: Negative for dizziness, light-headedness and headaches.   Psychiatric/Behavioral: Negative for sleep disturbance. The patient is not nervous/anxious.          Objective:      Physical Exam  Vitals signs reviewed.   Constitutional:       General: She is not in acute distress.     Appearance: Normal appearance. She is well-developed.   HENT:       Right Ear: Tympanic membrane and external ear normal.      Left Ear: Tympanic membrane and external ear normal.      Nose: Nose normal.      Mouth/Throat:      Mouth: Mucous membranes are moist.      Pharynx: Oropharynx is clear.   Neck:      Musculoskeletal: Normal range of motion and neck supple.      Thyroid: No thyromegaly.   Cardiovascular:      Rate and Rhythm: Normal rate and regular rhythm.      Heart sounds: Normal heart sounds. No murmur.   Pulmonary:      Effort: Pulmonary effort is normal.      Breath sounds: Normal breath sounds. No wheezing or rales.   Musculoskeletal: Normal range of motion.         General: No tenderness.      Right lower leg: Edema present.      Left lower leg: Edema present.      Comments: Mild edema bilaterally   Lymphadenopathy:      Cervical: No cervical adenopathy.   Neurological:      General: No focal deficit present.      Mental Status: She is alert and oriented to person, place, and time.      Cranial Nerves: No cranial nerve deficit.      Deep Tendon Reflexes: Reflexes are normal and symmetric.   Psychiatric:         Mood and Affect: Mood normal.         Behavior: Behavior normal.         Assessment:       1. Essential hypertension, benign    2. Pure hypercholesterolemia    3. Acquired hypothyroidism    4. Rheumatoid arthritis involving right shoulder with positive rheumatoid factor    5. Mild intermittent chronic asthma without complication    6. Carcinoma of central portion of left breast in female, estrogen receptor positive    7. Encounter for hepatitis C screening test for low risk patient        Plan:       1.  Continue present medications as her hypertension, hyperlipidemia, hypothyroidism, asthma, and breast cancer stable and controlled  2.  Follow-up with rheumatology to see if she can restart her medications for control of her rheumatoid arthritis  3.  Continue low-sodium, low-fat low-cholesterol diet and exercise for weight loss  4.  Lipid profile, TSH,  hepatitis C screen  5.  Follow up with me in 1 year or p.r.n.        Patient readiness: acceptance and barriers:none    During the course of the visit the patient was educated and counseled about the following:     Hypertension:   Dietary sodium restriction.  Regular aerobic exercise.  Follow up: 1 year and as needed.    Goals: Hypertension: Reduce Blood Pressure    Did patient meet goals/outcomes: Yes    The following self management tools provided: blood pressure log    Patient Instructions (the written plan) was given to the patient/family.     Time spent with patient: 30 minutes    Barriers to medications present (no )    Adverse reactions to current medications (no)    Over the counter medications reviewed (Yes)

## 2020-07-24 DIAGNOSIS — I10 ESSENTIAL HYPERTENSION, BENIGN: ICD-10-CM

## 2020-07-24 DIAGNOSIS — E78.5 HYPERLIPIDEMIA: ICD-10-CM

## 2020-07-24 RX ORDER — AMLODIPINE BESYLATE 5 MG/1
TABLET ORAL
Qty: 90 TABLET | Refills: 3 | Status: SHIPPED | OUTPATIENT
Start: 2020-07-24 | End: 2021-07-21

## 2020-07-24 RX ORDER — ATORVASTATIN CALCIUM 20 MG/1
TABLET, FILM COATED ORAL
Qty: 90 TABLET | Refills: 3 | Status: SHIPPED | OUTPATIENT
Start: 2020-07-24 | End: 2021-07-21

## 2020-07-31 ENCOUNTER — HOSPITAL ENCOUNTER (OUTPATIENT)
Dept: RADIOLOGY | Facility: HOSPITAL | Age: 75
Discharge: HOME | End: 2020-07-31
Attending: INTERNAL MEDICINE
Payer: MEDICARE

## 2020-07-31 VITALS — BODY MASS INDEX: 27.13 KG/M2 | WEIGHT: 179 LBS | HEIGHT: 68 IN

## 2020-07-31 DIAGNOSIS — C50.012 MALIGNANT NEOPLASM OF NIPPLE OF LEFT BREAST IN FEMALE, ESTROGEN RECEPTOR POSITIVE: ICD-10-CM

## 2020-07-31 DIAGNOSIS — Z17.0 MALIGNANT NEOPLASM OF NIPPLE OF LEFT BREAST IN FEMALE, ESTROGEN RECEPTOR POSITIVE: ICD-10-CM

## 2020-07-31 LAB — GLUCOSE SERPL-MCNC: 108 MG/DL (ref 70–110)

## 2020-07-31 PROCEDURE — A9552 F18 FDG: HCPCS | Mod: PO

## 2020-07-31 PROCEDURE — 78815 PET IMAGE W/CT SKULL-THIGH: CPT | Mod: TC,PO,PS

## 2020-08-12 ENCOUNTER — OFFICE VISIT (OUTPATIENT)
Dept: HEMATOLOGY/ONCOLOGY | Facility: CLINIC | Age: 75
End: 2020-08-12
Payer: MEDICARE

## 2020-08-12 ENCOUNTER — DOCUMENTATION ONLY (OUTPATIENT)
Dept: HEMATOLOGY/ONCOLOGY | Facility: CLINIC | Age: 75
End: 2020-08-12

## 2020-08-12 VITALS
OXYGEN SATURATION: 96 % | DIASTOLIC BLOOD PRESSURE: 60 MMHG | RESPIRATION RATE: 16 BRPM | WEIGHT: 167.56 LBS | SYSTOLIC BLOOD PRESSURE: 132 MMHG | BODY MASS INDEX: 25.39 KG/M2 | HEART RATE: 73 BPM | HEIGHT: 68 IN | TEMPERATURE: 97 F

## 2020-08-12 DIAGNOSIS — Z17.0 CARCINOMA OF CENTRAL PORTION OF LEFT BREAST IN FEMALE, ESTROGEN RECEPTOR POSITIVE: ICD-10-CM

## 2020-08-12 DIAGNOSIS — I70.0 ATHEROSCLEROSIS OF AORTA: ICD-10-CM

## 2020-08-12 DIAGNOSIS — C50.112 CARCINOMA OF CENTRAL PORTION OF LEFT BREAST IN FEMALE, ESTROGEN RECEPTOR POSITIVE: ICD-10-CM

## 2020-08-12 DIAGNOSIS — C50.012 MALIGNANT NEOPLASM OF NIPPLE OF LEFT BREAST IN FEMALE, ESTROGEN RECEPTOR POSITIVE: Primary | ICD-10-CM

## 2020-08-12 DIAGNOSIS — M05.711 RHEUMATOID ARTHRITIS INVOLVING RIGHT SHOULDER WITH POSITIVE RHEUMATOID FACTOR: ICD-10-CM

## 2020-08-12 DIAGNOSIS — Z17.0 MALIGNANT NEOPLASM OF NIPPLE OF LEFT BREAST IN FEMALE, ESTROGEN RECEPTOR POSITIVE: Primary | ICD-10-CM

## 2020-08-12 PROCEDURE — 99214 OFFICE O/P EST MOD 30 MIN: CPT | Mod: S$PBB,,, | Performed by: INTERNAL MEDICINE

## 2020-08-12 PROCEDURE — 99999 PR PBB SHADOW E&M-EST. PATIENT-LVL V: ICD-10-PCS | Mod: PBBFAC,,, | Performed by: INTERNAL MEDICINE

## 2020-08-12 PROCEDURE — 99999 PR PBB SHADOW E&M-EST. PATIENT-LVL V: CPT | Mod: PBBFAC,,, | Performed by: INTERNAL MEDICINE

## 2020-08-12 PROCEDURE — 99215 OFFICE O/P EST HI 40 MIN: CPT | Mod: PBBFAC,PO | Performed by: INTERNAL MEDICINE

## 2020-08-12 PROCEDURE — 99214 PR OFFICE/OUTPT VISIT, EST, LEVL IV, 30-39 MIN: ICD-10-PCS | Mod: S$PBB,,, | Performed by: INTERNAL MEDICINE

## 2020-08-12 RX ORDER — EXEMESTANE 25 MG/1
25 TABLET ORAL DAILY
Qty: 30 TABLET | Refills: 5 | Status: SHIPPED | OUTPATIENT
Start: 2020-08-12 | End: 2021-02-11

## 2020-08-12 RX ORDER — ONDANSETRON 4 MG/1
4 TABLET, FILM COATED ORAL EVERY 12 HOURS
Status: ON HOLD | COMMUNITY
End: 2022-01-04

## 2020-08-12 NOTE — PROGRESS NOTES
Subjective:       Patient ID: Kirstie Bowden is a 75 y.o. female.    Chief Complaint:  breast ca had TC chemo s/p 3  cycles but discontinued due to quality of life issues and started Arimidex   states she is having s/e  To arimidex has been off 2 weeks  rec score of 29    hydronephrosis+ ,  had stent placed    Oncologic History:   INVASIVE CARCINOMA BREAST, CANCER CASE SUMMARY:  PROCEDURE: Partial mastectomy without skin or nipple.3/2019  SPECIMEN LATERALITY: Left.  TUMOR SIZE, GREATEST DIMENSION: 2.5 cm.  HISTOLOGIC TYPE: Invasive pleomorphic lobular carcinoma.  HISTOLOGIC GRADE (LUTHER HISTOLOGIC SCORE):  Glandular (acinar)/tubular differentiation: Score 3.  Nuclear pleomorphism: Score 2.  Mitotic rate: Score 1.  Overall grade: Grade 2  DUCTAL CARCINOMA IN SITU (DCIS): Not identified.  LOBULAR CARCINOMA IN SITU (LCIS): Present, pleomorphic lobular carcinoma in situ with rare focus of  classical lobular carcinoma situ.  Estimated size (extent) of pleomorphic LCIS: At least 2.8 cm.  TUMOR EXTENSION: Not applicable.  MARGINS:  INVASIVE CARCINOMA MARGINS: Uninvolved by invasive carcinoma.  Distance from inferior (closest) margin: 5 mm.  PLEOMORPHIC LOBULAR CARCINOMA IN SITU MARGINS: Uninvolved by pleomorphic LCIS.  Distance from inferior medial (closest) margin: 0.2 mm (see above comment).  REGIONAL LYMPH NODES: Uninvolved by tumor cells.  Number of lymph nodes examined: 2.  Number of sentinel nodes examined: 2.  TREATMENT EFFECT: No known presurgical therapy.  PATHOLOGIC STAGE CLASSIFICATION (pTNM, AJCC 8TH EDITION):  pT2: Tumor size = 2.5 cm in greatest dimension.  pN0: No regional lymph node metastasis identified.  pM: Not applicable.    ER: Positive.  [place]: Positive.  HER2: Negative (IHC - Equivocal (score 2) and FISH - Negative).  Ki-67: Approximately 14%.  HPI:     Social History     Socioeconomic History    Marital status:      Spouse name: Not on file    Number of children: Not on file     Years of education: Not on file    Highest education level: Not on file   Occupational History     Employer: [employer]   Social Needs    Financial resource strain: Not very hard    Food insecurity     Worry: Never true     Inability: Never true    Transportation needs     Medical: No     Non-medical: No   Tobacco Use    Smoking status: Former Smoker     Packs/day: 0.50     Years: 47.00     Pack years: 23.50     Types: Cigarettes     Start date: 1960     Quit date: 2019     Years since quittin.0    Smokeless tobacco: Never Used   Substance and Sexual Activity    Alcohol use: Yes     Alcohol/week: 2.0 standard drinks     Types: 2 Glasses of wine per week     Frequency: Monthly or less     Drinks per session: Patient refused     Binge frequency: Never     Comment: Social    Drug use: No    Sexual activity: Never   Lifestyle    Physical activity     Days per week: 3 days     Minutes per session: 20 min    Stress: To some extent   Relationships    Social connections     Talks on phone: More than three times a week     Gets together: Three times a week     Attends Druze service: Not on file     Active member of club or organization: Yes     Attends meetings of clubs or organizations: Never     Relationship status:    Other Topics Concern    Not on file   Social History Narrative    Not on file     Family History   Problem Relation Age of Onset    Heart disease Mother     Hypertension Mother     Alzheimer's disease Mother     Cancer Father      Past Surgical History:   Procedure Laterality Date    AXILLARY NODE DISSECTION Left 3/14/2019    Procedure: LYMPHADENECTOMY, AXILLARY;  Surgeon: Mp Gonzalez MD;  Location: [facility] OR;  Service: General;  Laterality: Left;  left lumpectomy with axillary lypmh node dissection    BALLOON DILATION OF URETER Left 2019    Procedure: DILATION, URETER, USING BALLOON;  Surgeon: Jorden Dickson MD;  Location: [facility] OR;  Service: Urology;   Laterality: Left;    BREAST BIOPSY Left 20 yrs ago    benign    BREAST LUMPECTOMY      x2    CHOLECYSTECTOMY      CYSTOSCOPY W/ URETERAL STENT PLACEMENT Left 6/24/2019    Procedure: CYSTOSCOPY, WITH URETERAL STENT INSERTION;  Surgeon: Jorden Dickson MD;  Location: [facility] OR;  Service: Urology;  Laterality: Left;    CYSTOSCOPY W/ URETERAL STENT REMOVAL Left 7/23/2019    Procedure: CYSTOSCOPY, WITH URETERAL STENT REMOVAL;  Surgeon: Jorden Dickson MD;  Location: [facility] OR;  Service: Urology;  Laterality: Left;    EXTRACORPOREAL SHOCK WAVE LITHOTRIPSY Left 7/23/2019    Procedure: LITHOTRIPSY, ESWL;  Surgeon: Jorden Dickson MD;  Location: [facility] OR;  Service: Urology;  Laterality: Left;    EYE SURGERY Bilateral     cataracts    HYSTERECTOMY      MASTECTOMY, PARTIAL Left 4/25/2019    Procedure: MASTECTOMY, PARTIAL;  Surgeon: Mp Gonzalez MD;  Location: [facility] OR;  Service: General;  Laterality: Left;    NEEDLE LOCALIZATION Left 3/14/2019    Procedure: NEEDLE LOCALIZATION;  Surgeon: Mp Gonzalez MD;  Location: [facility] OR;  Service: General;  Laterality: Left;  left lumpectomy with wire needle localization     RETROGRADE PYELOGRAPHY Bilateral 6/24/2019    Procedure: PYELOGRAM, RETROGRADE;  Surgeon: Jorden Dickson MD;  Location: [facility] OR;  Service: Urology;  Laterality: Bilateral;    SENTINEL LYMPH NODE BIOPSY Left 3/14/2019    Procedure: BIOPSY, LYMPH NODE, SENTINEL;  Surgeon: Mp Gonzalez MD;  Location: [facility] OR;  Service: General;  Laterality: Left;  left sentinel node lymph node biopsy    TONSILLECTOMY      URETEROSCOPY Left 6/24/2019    Procedure: URETEROSCOPY;  Surgeon: Jorden Dickson MD;  Location: [facility] OR;  Service: Urology;  Laterality: Left;     Past Medical History:   Diagnosis Date    Arthritis     rheumatoid    Asthma     Cancer     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    Hyperlipidemia     Hypertension     Pseudocholinesterase deficiency     family history    Thyroid disease        Current  Outpatient Medications:     amLODIPine (NORVASC) 5 MG tablet, Take 1 tablet by mouth once daily, Disp: 90 tablet, Rfl: 3    anastrozole (ARIMIDEX) 1 mg Tab, Take 1 tablet (1 mg total) by mouth once daily., Disp: 90 tablet, Rfl: 4    atorvastatin (LIPITOR) 20 MG tablet, Take 1 tablet by mouth once daily, Disp: 90 tablet, Rfl: 3    CALCIUM CARBONATE/VITAMIN D3 (VITAMIN D-3 ORAL), Take by mouth once daily. , Disp: , Rfl:     fluticasone-salmeterol diskus inhaler 250-50 mcg, INHALE ONE DOSE BY MOUTH TWICE DAILY, Disp: 60 each, Rfl: 11    folic acid (FOLVITE) 1 MG tablet, Take 1 tablet by mouth once daily, Disp: 30 tablet, Rfl: 5    hydroCHLOROthiazide (HYDRODIURIL) 25 MG tablet, Take 1 tablet (25 mg total) by mouth once daily., Disp: 90 tablet, Rfl: 3    hydroxychloroquine (PLAQUENIL) 200 mg tablet, Take 1 tablet (200 mg total) by mouth 2 (two) times daily., Disp: 60 tablet, Rfl: 3    levothyroxine (SYNTHROID) 125 MCG tablet, Take 1 tablet (125 mcg total) by mouth before breakfast., Disp: 90 tablet, Rfl: 3    methotrexate 2.5 MG Tab, Take 5 tablets (12.5 mg total) by mouth every 7 days., Disp: 20 tablet, Rfl: 1    pantoprazole (PROTONIX) 40 MG tablet, TAKE 1 TABLET BY MOUTH ONCE DAILY IN THE MORNING FOR 90 DAYS, Disp: , Rfl:     prochlorperazine (COMPAZINE) 10 MG tablet, TAKE 1 TABLET BY MOUTH EVERY 6 HOURS AS NEEDED, Disp: 60 tablet, Rfl: 0    thiamine (VITAMIN B-1) 100 MG tablet, Take 100 mg by mouth once daily., Disp: , Rfl:     traMADoL (ULTRAM) 50 mg tablet, TAKE 1 TO 2 TABLETS BY MOUTH EVERY 8 HOURS AS NEEDED FOR SEVERE PAIN, Disp: 90 tablet, Rfl: 0    VITAMIN B COMPLEX ORAL, Every day, Disp: , Rfl:     vitamin E/flaxseed oil (FLAXSEED OIL-VITAMIN E ORAL), Take by mouth., Disp: , Rfl:   No current facility-administered medications for this visit.     Facility-Administered Medications Ordered in Other Visits:     lidocaine (PF) 10 mg/ml (1%) injection 10 mg, 1 mL, Intradermal, Once, Eladia VELASCO  MD Lana  Review of patient's allergies indicates:   Allergen Reactions    Succinylcholine chloride Other (See Comments)    Sulfur dioxide Hives    Sulfamethoxazole-trimethoprim      Other reaction(s): per dr daryn Rodríguez (sulfonamide antibiotics)      NOT SURE REACTION         REVIEW OF SYSTEMS:     CONSTITUTIONAL:  Patient voices complains with significant fatigue.  Shortness of breath on ambulation.  She is not coughing up productive sputum she reports no fever chills rigors  Has some sinus congestion  And having night sweats after starting Arimidex    SKIN: Denies rash, issues with nails, non-healing sores, bleeding, blotching    skin or abnormal bruising. Denies new moles or changes to existing moles.      BREASTS: healing well since lumpectomy    EYES: Denies eye pain, blurred vision, swelling, redness or discharge.      ENT AND MOUTH:  Has runny nose, and stuffiness, and sinus trouble no sores. Denies    nosebleeds. Denies, hoarseness, change in voice or swelling in front of the    neck.      CARDIOVASCULAR: Denies chest pain, discomfort or palpitations. Denies neck    swelling or episodes of passing out.           GI: Denies trouble swallowing, indigestion, heartburn, abdominal pain, +nausea with arimidex betty in am then  Dry heaves during day, stopped arimidex and feeks much better     no vomiting, diarrhea, altered bowel habits, blood in stool, discoloration of    stools, change in nature of stool, bloating, increased abdominal girth.      GENITOURINARY: No discharge. No pelvic pain or lumps. No rash around groin or  lesions. No urinary frequency, hesitation, painful urination or blood in    urine. Denies incontinence. No problems with intercourse.      MUSCULOSKELETAL: Denies neck or back pain. Denies weakness in arms or legs,    joint problems or distended inflamed veins in legs. Denies swelling or abnormal  glands.      NEUROLOGICAL: Denies tingling, numbness, altered mentation changes to  nerve    function in the face, weakness to one or both of the body. Denies changes to    gait and denies multiple falls or accidents.        PHYSICAL EXAM:     Wt Readings from Last 3 Encounters:   07/31/20 81.2 kg (179 lb)   07/13/20 80.2 kg (176 lb 12.8 oz)   07/10/20 81.2 kg (179 lb 0.2 oz)     Temp Readings from Last 3 Encounters:   07/13/20 97.1 °F (36.2 °C)   07/10/20 98 °F (36.7 °C) (Temporal)   06/03/20 98.7 °F (37.1 °C)     BP Readings from Last 3 Encounters:   07/13/20 139/79   07/10/20 120/76   06/03/20 (!) 150/80     Pulse Readings from Last 3 Encounters:   07/10/20 78   06/03/20 (!) 59   03/11/20 77     GENERAL: Comfortable looking patient. Patient is in no distress.  Awake, alert and oriented to time, person and place.  No anxiety, or agitation.      HEENT: Normal conjunctivae and eyelids. WNL.  PERRLA 3 to 4 mm. No icterus, no pallor, no congestion, and no discharge noted.     NECK:  Supple. Trachea is central.  No crepitus.  No JVD or masses.    RESPIRATORY:  No intercostal retractions.  No dullness to percussion.  Chest is clear to auscultation.  No rales, rhonchi or wheezes.  No crepitus.  Good air entry bilaterally.    CARDIOVASCULAR:  S1 and S2 are normally heard without murmurs or gallops.  All peripheral pulses are present.    ABDOMEN:  Normal abdomen.  No hepatosplenomegaly.  No free fluid.  Bowel sounds are present.  No hernia noted. No masses.  No rebound or tenderness.  No guarding or rigidity.  Umbilicus is midline.    LYMPHATICS:  No axillary, cervical, supraclavicular, submental, or inguinal lymphadenopathy.    SKIN/MUSCULOSKELETAL:  There is no evidence of excoriation marks or ecchmosis.  No rashes.  No cyanosis.  No clubbing.  No joint or skeletal deformities noted.  Normal range of motion.    NEUROLOGIC:  Higher functions are appropriate.  No cranial nerve deficits.  Normal asher.  Normal strength.  Motor and sensory functions are normal.  Deep tendon reflexes are  normal.    GENITAL/RECTAL:  Exams are deferred.      Laboratory:     CBC:  Lab Results   Component Value Date    WBC 11.91 07/31/2020    RBC 4.23 07/31/2020    HGB 13.6 07/31/2020    HCT 42.3 07/31/2020     (H) 07/31/2020    MCH 32.2 (H) 07/31/2020    MCHC 32.2 07/31/2020    RDW 12.8 07/31/2020     07/31/2020    MPV 11.6 07/31/2020    GRAN 9.1 (H) 07/31/2020    GRAN 76.0 (H) 07/31/2020    LYMPH 1.5 07/31/2020    LYMPH 12.9 (L) 07/31/2020    MONO 1.2 (H) 07/31/2020    MONO 9.9 07/31/2020    EOS 0.1 07/31/2020    BASO 0.02 07/31/2020    EOSINOPHIL 0.6 07/31/2020    BASOPHIL 0.2 07/31/2020       BMP: BMP  Lab Results   Component Value Date     (L) 07/31/2020    K 3.9 07/31/2020    CL 95 07/31/2020    CO2 25 07/31/2020    BUN 16 07/31/2020    CREATININE 0.9 07/31/2020    CALCIUM 9.9 07/31/2020    ANIONGAP 15 07/31/2020    ESTGFRAFRICA >60.0 07/31/2020    EGFRNONAA >60.0 07/31/2020       LFT:   Lab Results   Component Value Date    ALT 19 07/31/2020    AST 20 07/31/2020    ALKPHOS 61 07/31/2020    BILITOT 1.0 07/31/2020    Bone density September 2 1019 negative for osteopenia  oncotype rec. score is 29  Impressionpet 1/2020       1.  Likely postoperative change in the superolateral left breast, decreased in size from the previous exam.    2.  Scattered multifocal ground-glass attenuation opacities in both lungs suggesting a combination of atypical infection/pneumonia and/or post treatment/post radiation change.  Consider follow-up CT of the chest in 6-8 weeks to document resolution.    3.  Indeterminate isoattenuating non-FDG avid structure in the anterior interpolar aspect of the right kidney, possibly representing a hemorrhagic/proteinaceous cyst or low grade malignancy, consider further characterization with contrast enhanced renal protocol CT/MRI.    4.  Numerous bilateral nonobstructing renal stones.  No hydronephrosis or hydroureter on today's exam.    5.  Interval resolution of the maxillary  sinus disease.     Pet 7/2020  IMPRESSION:  1. Postoperative changes of prior left breast lumpectomy.  2. Near complete resolution of the previously described groundglass  attenuation opacities in the lungs, now with only a small linear  bandlike opacity in the anterior left upper lobe (most likely related  to prior radiation/treatment change.  3. Focal increased FDG activity along the right third and fourth rib  posteriorly at the posterior thoracic junction suggesting degenerative  change with no soft tissue, lytic or sclerotic lesion identified.  4. Additional and incidental findings as noted above unchanged from  the previous exam.            has renal stones and has hydronephrosis, stented  Assessment/Plan:     T2N0Mx left breast ca s/p lumpectomy 3/2019 recurrent score 29  pT2: Tumor size = 2.5 cm in greatest dimension.  pN0: No regional lymph node metastasis identified.  pM: Not applicable.    ER: Positive.  ID: Positive.  HER2: Negative (IHC - E  Patient completed 3 cycles but aborted therapy.  In favor a quality of life due to side effects  Above PET scan discussed with patient   last two weeks she stoped anastrozole  Due to arthralgia and nausea.   got off embrel during radiation and chemo now on mtx and hydroxychroloquine  Dc arimidex and satrt aromasin sent rx will check in 4 weeks for tolerance  daily no soretopenia on bone density 9//2029 will repeat in 9/21  She already sees Urology  Hemorrhagic cyst was reportd on prev scans , had stents removed and lithotripsy for stones   cbc, cmp, ca 2729 for 6 week visit to see how she tolerates aromasin   macrocytosis: related to thyroid issues will watch she is not anemic and embark on w/u if worse or cytopenia   saw Pulmonary consult for 2.  Finding as above on PET scan thesesd have nearly resolved now on 7/2020 pet  She has scattered atheromatous calcifications in the aorta and coronary arteries she will discuss this and follow up with her PCP for referral to  cardiology      mammo due in 3/2021   rtc 1/2021 to 1/202 with pet. Cbc. Cmp, [id number]  Has quit smoking      completed xrt  Started arimidex, tolerating well bone density negative   cont f/u for RA, HTH. Lipids, hypothyroidism      Living Will   discussed at prev visit

## 2020-08-17 ENCOUNTER — OFFICE VISIT (OUTPATIENT)
Dept: RHEUMATOLOGY | Facility: CLINIC | Age: 75
End: 2020-08-17
Payer: MEDICARE

## 2020-08-17 ENCOUNTER — LAB VISIT (OUTPATIENT)
Dept: LAB | Facility: HOSPITAL | Age: 75
End: 2020-08-17
Attending: INTERNAL MEDICINE
Payer: MEDICARE

## 2020-08-17 VITALS
DIASTOLIC BLOOD PRESSURE: 80 MMHG | BODY MASS INDEX: 25.74 KG/M2 | WEIGHT: 169.31 LBS | SYSTOLIC BLOOD PRESSURE: 121 MMHG | TEMPERATURE: 97 F

## 2020-08-17 DIAGNOSIS — M05.79 RHEUMATOID ARTHRITIS INVOLVING MULTIPLE SITES WITH POSITIVE RHEUMATOID FACTOR: Primary | ICD-10-CM

## 2020-08-17 DIAGNOSIS — M05.79 RHEUMATOID ARTHRITIS INVOLVING MULTIPLE SITES WITH POSITIVE RHEUMATOID FACTOR: ICD-10-CM

## 2020-08-17 LAB
ALBUMIN SERPL BCP-MCNC: 4.3 G/DL (ref 3.5–5.2)
ALP SERPL-CCNC: 64 U/L (ref 55–135)
ALT SERPL W/O P-5'-P-CCNC: 18 U/L (ref 10–44)
ANION GAP SERPL CALC-SCNC: 10 MMOL/L (ref 8–16)
AST SERPL-CCNC: 20 U/L (ref 10–40)
BASOPHILS # BLD AUTO: 0.03 K/UL (ref 0–0.2)
BASOPHILS NFR BLD: 0.5 % (ref 0–1.9)
BILIRUB SERPL-MCNC: 0.6 MG/DL (ref 0.1–1)
BUN SERPL-MCNC: 15 MG/DL (ref 8–23)
CALCIUM SERPL-MCNC: 10 MG/DL (ref 8.7–10.5)
CHLORIDE SERPL-SCNC: 100 MMOL/L (ref 95–110)
CO2 SERPL-SCNC: 29 MMOL/L (ref 23–29)
CREAT SERPL-MCNC: 0.8 MG/DL (ref 0.5–1.4)
CRP SERPL-MCNC: 0.48 MG/DL
DIFFERENTIAL METHOD: ABNORMAL
EOSINOPHIL # BLD AUTO: 0.1 K/UL (ref 0–0.5)
EOSINOPHIL NFR BLD: 1.4 % (ref 0–8)
ERYTHROCYTE [DISTWIDTH] IN BLOOD BY AUTOMATED COUNT: 13.1 % (ref 11.5–14.5)
EST. GFR  (AFRICAN AMERICAN): >60 ML/MIN/1.73 M^2
EST. GFR  (NON AFRICAN AMERICAN): >60 ML/MIN/1.73 M^2
GLUCOSE SERPL-MCNC: 114 MG/DL (ref 70–110)
HCT VFR BLD AUTO: 41.2 % (ref 37–48.5)
HGB BLD-MCNC: 13.4 G/DL (ref 12–16)
IMM GRANULOCYTES # BLD AUTO: 0.02 K/UL (ref 0–0.04)
IMM GRANULOCYTES NFR BLD AUTO: 0.3 % (ref 0–0.5)
LYMPHOCYTES # BLD AUTO: 1.7 K/UL (ref 1–4.8)
LYMPHOCYTES NFR BLD: 25.7 % (ref 18–48)
MCH RBC QN AUTO: 31.6 PG (ref 27–31)
MCHC RBC AUTO-ENTMCNC: 32.5 G/DL (ref 32–36)
MCV RBC AUTO: 97 FL (ref 82–98)
MONOCYTES # BLD AUTO: 0.8 K/UL (ref 0.3–1)
MONOCYTES NFR BLD: 11.9 % (ref 4–15)
NEUTROPHILS # BLD AUTO: 3.9 K/UL (ref 1.8–7.7)
NEUTROPHILS NFR BLD: 60.2 % (ref 38–73)
NRBC BLD-RTO: 0 /100 WBC
PLATELET # BLD AUTO: 252 K/UL (ref 150–350)
PMV BLD AUTO: 10.2 FL (ref 9.2–12.9)
POTASSIUM SERPL-SCNC: 3.5 MMOL/L (ref 3.5–5.1)
PROT SERPL-MCNC: 7.5 G/DL (ref 6–8.4)
RBC # BLD AUTO: 4.24 M/UL (ref 4–5.4)
SODIUM SERPL-SCNC: 139 MMOL/L (ref 136–145)
WBC # BLD AUTO: 6.47 K/UL (ref 3.9–12.7)

## 2020-08-17 PROCEDURE — 36415 COLL VENOUS BLD VENIPUNCTURE: CPT

## 2020-08-17 PROCEDURE — 80053 COMPREHEN METABOLIC PANEL: CPT

## 2020-08-17 PROCEDURE — 99213 PR OFFICE/OUTPT VISIT, EST, LEVL III, 20-29 MIN: ICD-10-PCS | Mod: S$GLB,,, | Performed by: INTERNAL MEDICINE

## 2020-08-17 PROCEDURE — 86140 C-REACTIVE PROTEIN: CPT

## 2020-08-17 PROCEDURE — 99213 OFFICE O/P EST LOW 20 MIN: CPT | Mod: S$GLB,,, | Performed by: INTERNAL MEDICINE

## 2020-08-17 PROCEDURE — 85025 COMPLETE CBC W/AUTO DIFF WBC: CPT

## 2020-08-17 RX ORDER — METHOTREXATE 2.5 MG/1
12.5 TABLET ORAL
Qty: 20 TABLET | Refills: 1 | Status: SHIPPED | OUTPATIENT
Start: 2020-08-17 | End: 2020-11-18

## 2020-08-17 NOTE — PROGRESS NOTES
[facility] RHEUMATOLOGY            PROGRESS NOTE      Subjective:       Patient ID:   NAME: Kirstie Bowden : 1945     75 y.o. female    Referring Doc: No ref. provider found  Other Physicians:    Chief Complaint:  Rheumatoid Arthritis      History of Present Illness:     Patient returns today for a regularly scheduled follow-up visit for  rheumatoid arthritis     The patient feels much better than her last visit.  Very mild arthralgias in finger joints.  No joint swelling.  No prolonged morning stiffness.  Slight fatigue.  No fevers, cough or shortness of breath.  No chest pains.  She is following CDC guidelines to avoid COVID-19 infection.  No known exposure to COVID-19            ROS:   GEN:  No  fever, night sweats . weight is stable   + fatigue  SKIN: no rashes, no bruising, no ulcerations, no Raynaud's  HEENT: no HA's, No visual changes, no mucosal ulcers, no sicca symptoms,  CV:   no CP, SOB, PND, MCKEON, no orthopnea, no palpitations  PULM: normal with no SOB, cough, hemoptysis, sputum or pleuritic pain  GI:  no abdominal pain, nausea, vomiting, constipation, diarrhea, melanotic stools, BRBPR, hematemesis, no dysphagia  :   no dysuria  NEURO: no paresthesias, headaches, visual disturbances, muscle weakness  MUSCULOSKELETAL:no joint swelling, prolonged AM stiffness, no back pain, no muscle pain  Allergies:  Review of patient's allergies indicates:   Allergen Reactions    Succinylcholine chloride Other (See Comments)    Sulfur dioxide Hives    Sulfamethoxazole-trimethoprim      Other reaction(s): per dr daryn Rodríguez (sulfonamide antibiotics)      NOT SURE REACTION       Medications:    Current Outpatient Medications:     amLODIPine (NORVASC) 5 MG tablet, Take 1 tablet by mouth once daily, Disp: 90 tablet, Rfl: 3    anastrozole (ARIMIDEX) 1 mg Tab, Take 1 tablet (1 mg total) by mouth once daily., Disp: 90 tablet, Rfl: 4    atorvastatin (LIPITOR) 20 MG tablet, Take 1 tablet by mouth  once daily, Disp: 90 tablet, Rfl: 3    CALCIUM CARBONATE/VITAMIN D3 (VITAMIN D-3 ORAL), Take by mouth once daily. , Disp: , Rfl:     exemestane (AROMASIN) 25 mg tablet, Take 1 tablet (25 mg total) by mouth once daily., Disp: 30 tablet, Rfl: 5    fluticasone-salmeterol diskus inhaler 250-50 mcg, INHALE ONE DOSE BY MOUTH TWICE DAILY, Disp: 60 each, Rfl: 11    folic acid (FOLVITE) 1 MG tablet, Take 1 tablet by mouth once daily, Disp: 30 tablet, Rfl: 5    hydroCHLOROthiazide (HYDRODIURIL) 25 MG tablet, Take 1 tablet (25 mg total) by mouth once daily., Disp: 90 tablet, Rfl: 3    hydroxychloroquine (PLAQUENIL) 200 mg tablet, Take 1 tablet (200 mg total) by mouth 2 (two) times daily., Disp: 60 tablet, Rfl: 3    levothyroxine (SYNTHROID) 125 MCG tablet, Take 1 tablet (125 mcg total) by mouth before breakfast., Disp: 90 tablet, Rfl: 3    methotrexate 2.5 MG Tab, Take 5 tablets (12.5 mg total) by mouth every 7 days., Disp: 20 tablet, Rfl: 1    ondansetron (ZOFRAN) 4 MG tablet, Take 4 mg by mouth every 12 (twelve) hours., Disp: , Rfl:     pantoprazole (PROTONIX) 40 MG tablet, TAKE 1 TABLET BY MOUTH ONCE DAILY IN THE MORNING FOR 90 DAYS, Disp: , Rfl:     prochlorperazine (COMPAZINE) 10 MG tablet, TAKE 1 TABLET BY MOUTH EVERY 6 HOURS AS NEEDED, Disp: 60 tablet, Rfl: 0    thiamine (VITAMIN B-1) 100 MG tablet, Take 100 mg by mouth once daily., Disp: , Rfl:     traMADoL (ULTRAM) 50 mg tablet, TAKE 1 TO 2 TABLETS BY MOUTH EVERY 8 HOURS AS NEEDED FOR SEVERE PAIN, Disp: 90 tablet, Rfl: 0    VITAMIN B COMPLEX ORAL, Every day, Disp: , Rfl:     vitamin E/flaxseed oil (FLAXSEED OIL-VITAMIN E ORAL), Take by mouth., Disp: , Rfl:   No current facility-administered medications for this visit.     Facility-Administered Medications Ordered in Other Visits:     lidocaine (PF) 10 mg/ml (1%) injection 10 mg, 1 mL, Intradermal, Once, Eladia Schwartz MD    PMHx/PSHx Updates:        Objective:     Vitals:  Blood pressure 121/80,  temperature 97 °F (36.1 °C), weight 76.8 kg (169 lb 4.8 oz).    Physical Examination:   GEN: no apparent distress, comfortable; AAOx3  SKIN: no rashes,no ulceration, no Raynaud's, no petechiae, no SQ nodules,  HEAD: normal  EYES: no pallor, no icterus  NECK: no masses, thyroid normal, trachea midline, no LAD/LN's, supple  CV: RRR with no murmur; l S1 and S2 reg. ,no gallop no rubs,   CHEST: Normal respiratory effort; CTAB; normal breath sounds; no wheeze or crackles  MUSC/Skeletal: ROM normal; no crepitus; joints without synovitis,  no deformities  No joint swelling or tenderness of PIP, MCP, wrist, elbow, shoulder, or knee joints  EXTREM: no clubbing, cyanosis, no edema,normal  pulses   NEURO: grossly intact; motor WNL; AAOx3;   PSYCH: normal mood, affect and behavior  LYMPH: normal cervical, supraclavicular          Labs:   Lab Results   Component Value Date    WBC 6.47 08/17/2020    HGB 13.4 08/17/2020    HCT 41.2 08/17/2020    MCV 97 08/17/2020     08/17/2020    CMP  @LASTLAB(NA,K,CL,CO2,GLU,BUN,Creatinine,Calcium,PROT,Albumin,Bilitot,Alkphos,AST,ALT,CRP,ESR,RF,CCP,RODNEY,SSA,CPK,uric acid) )@  I have reviewed all available lab results and radiology reports.    Radiology/Diagnostic Studies:        Assessment/Plan:   (1) 75 y.o. female with diagnosis of rheumatoid arthritis.  She is doing better after restarting methotrexate and Plaquenil.  History of breast cancer      CBC CMP CRP        Discussion:     I have explained all of the above in detail and the patient understands all of the current recommendation(s). I have answered all questions to the best of my ability and to their complete satisfaction.       The patient is to continue with the current management plan         RTC in   3 months      Electronically signed by Jelani Stacy MD          Answers for HPI/ROS submitted by the patient on 8/15/2020   fever: No  eye redness: No  headaches: No  shortness of breath: No  chest pain: No  trouble  swallowing: No  diarrhea: No  constipation: No  unexpected weight change: No  genital sore: No  dysuria: No  During the last 3 days, have you had a skin rash?: No  Bruises or bleeds easily: No  cough: No

## 2020-09-21 ENCOUNTER — LAB VISIT (OUTPATIENT)
Dept: LAB | Facility: HOSPITAL | Age: 75
End: 2020-09-21
Attending: INTERNAL MEDICINE
Payer: MEDICARE

## 2020-09-21 DIAGNOSIS — C50.012 MALIGNANT NEOPLASM OF NIPPLE OF LEFT BREAST IN FEMALE, ESTROGEN RECEPTOR POSITIVE: ICD-10-CM

## 2020-09-21 DIAGNOSIS — Z17.0 MALIGNANT NEOPLASM OF NIPPLE OF LEFT BREAST IN FEMALE, ESTROGEN RECEPTOR POSITIVE: ICD-10-CM

## 2020-09-21 LAB
ALBUMIN SERPL BCP-MCNC: 4 G/DL (ref 3.5–5.2)
ALP SERPL-CCNC: 70 U/L (ref 55–135)
ALT SERPL W/O P-5'-P-CCNC: 15 U/L (ref 10–44)
ANION GAP SERPL CALC-SCNC: 13 MMOL/L (ref 8–16)
AST SERPL-CCNC: 21 U/L (ref 10–40)
BASOPHILS # BLD AUTO: 0.02 K/UL (ref 0–0.2)
BASOPHILS NFR BLD: 0.3 % (ref 0–1.9)
BILIRUB SERPL-MCNC: 0.4 MG/DL (ref 0.1–1)
BUN SERPL-MCNC: 11 MG/DL (ref 8–23)
CALCIUM SERPL-MCNC: 10.2 MG/DL (ref 8.7–10.5)
CHLORIDE SERPL-SCNC: 102 MMOL/L (ref 95–110)
CO2 SERPL-SCNC: 26 MMOL/L (ref 23–29)
CREAT SERPL-MCNC: 0.8 MG/DL (ref 0.5–1.4)
DIFFERENTIAL METHOD: ABNORMAL
EOSINOPHIL # BLD AUTO: 0.1 K/UL (ref 0–0.5)
EOSINOPHIL NFR BLD: 1.6 % (ref 0–8)
ERYTHROCYTE [DISTWIDTH] IN BLOOD BY AUTOMATED COUNT: 13.9 % (ref 11.5–14.5)
EST. GFR  (AFRICAN AMERICAN): >60 ML/MIN/1.73 M^2
EST. GFR  (NON AFRICAN AMERICAN): >60 ML/MIN/1.73 M^2
GLUCOSE SERPL-MCNC: 135 MG/DL (ref 70–110)
HCT VFR BLD AUTO: 40 % (ref 37–48.5)
HGB BLD-MCNC: 13.2 G/DL (ref 12–16)
IMM GRANULOCYTES # BLD AUTO: 0.01 K/UL (ref 0–0.04)
IMM GRANULOCYTES NFR BLD AUTO: 0.2 % (ref 0–0.5)
LYMPHOCYTES # BLD AUTO: 1.2 K/UL (ref 1–4.8)
LYMPHOCYTES NFR BLD: 18.5 % (ref 18–48)
MCH RBC QN AUTO: 31.7 PG (ref 27–31)
MCHC RBC AUTO-ENTMCNC: 33 G/DL (ref 32–36)
MCV RBC AUTO: 96 FL (ref 82–98)
MONOCYTES # BLD AUTO: 0.6 K/UL (ref 0.3–1)
MONOCYTES NFR BLD: 9 % (ref 4–15)
NEUTROPHILS # BLD AUTO: 4.5 K/UL (ref 1.8–7.7)
NEUTROPHILS NFR BLD: 70.4 % (ref 38–73)
NRBC BLD-RTO: 0 /100 WBC
PLATELET # BLD AUTO: 184 K/UL (ref 150–350)
PMV BLD AUTO: 11.9 FL (ref 9.2–12.9)
POTASSIUM SERPL-SCNC: 3.7 MMOL/L (ref 3.5–5.1)
PROT SERPL-MCNC: 7.5 G/DL (ref 6–8.4)
RBC # BLD AUTO: 4.17 M/UL (ref 4–5.4)
SODIUM SERPL-SCNC: 141 MMOL/L (ref 136–145)
WBC # BLD AUTO: 6.43 K/UL (ref 3.9–12.7)

## 2020-09-21 PROCEDURE — 36415 COLL VENOUS BLD VENIPUNCTURE: CPT

## 2020-09-21 PROCEDURE — 85025 COMPLETE CBC W/AUTO DIFF WBC: CPT

## 2020-09-21 PROCEDURE — 80053 COMPREHEN METABOLIC PANEL: CPT

## 2020-09-23 ENCOUNTER — TELEPHONE (OUTPATIENT)
Dept: HEMATOLOGY/ONCOLOGY | Facility: CLINIC | Age: 75
End: 2020-09-23

## 2020-09-23 ENCOUNTER — OFFICE VISIT (OUTPATIENT)
Dept: HEMATOLOGY/ONCOLOGY | Facility: CLINIC | Age: 75
End: 2020-09-23
Payer: MEDICARE

## 2020-09-23 VITALS
WEIGHT: 168.44 LBS | SYSTOLIC BLOOD PRESSURE: 131 MMHG | TEMPERATURE: 97 F | OXYGEN SATURATION: 95 % | RESPIRATION RATE: 18 BRPM | DIASTOLIC BLOOD PRESSURE: 60 MMHG | BODY MASS INDEX: 25.61 KG/M2 | HEART RATE: 73 BPM

## 2020-09-23 DIAGNOSIS — M05.711 RHEUMATOID ARTHRITIS INVOLVING RIGHT SHOULDER WITH POSITIVE RHEUMATOID FACTOR: ICD-10-CM

## 2020-09-23 DIAGNOSIS — C50.112 CARCINOMA OF CENTRAL PORTION OF LEFT BREAST IN FEMALE, ESTROGEN RECEPTOR POSITIVE: ICD-10-CM

## 2020-09-23 DIAGNOSIS — Z17.0 CARCINOMA OF CENTRAL PORTION OF LEFT BREAST IN FEMALE, ESTROGEN RECEPTOR POSITIVE: ICD-10-CM

## 2020-09-23 DIAGNOSIS — I10 ESSENTIAL HYPERTENSION, BENIGN: ICD-10-CM

## 2020-09-23 DIAGNOSIS — C50.012 MALIGNANT NEOPLASM OF NIPPLE OF LEFT BREAST IN FEMALE, ESTROGEN RECEPTOR POSITIVE: Primary | ICD-10-CM

## 2020-09-23 DIAGNOSIS — Z17.0 MALIGNANT NEOPLASM OF NIPPLE OF LEFT BREAST IN FEMALE, ESTROGEN RECEPTOR POSITIVE: Primary | ICD-10-CM

## 2020-09-23 DIAGNOSIS — E03.9 ACQUIRED HYPOTHYROIDISM: ICD-10-CM

## 2020-09-23 PROCEDURE — 99214 OFFICE O/P EST MOD 30 MIN: CPT | Mod: S$PBB,,, | Performed by: INTERNAL MEDICINE

## 2020-09-23 PROCEDURE — 99999 PR PBB SHADOW E&M-EST. PATIENT-LVL IV: CPT | Mod: PBBFAC,,, | Performed by: INTERNAL MEDICINE

## 2020-09-23 PROCEDURE — 99214 OFFICE O/P EST MOD 30 MIN: CPT | Mod: PBBFAC,PO | Performed by: INTERNAL MEDICINE

## 2020-09-23 PROCEDURE — 99214 PR OFFICE/OUTPT VISIT, EST, LEVL IV, 30-39 MIN: ICD-10-PCS | Mod: S$PBB,,, | Performed by: INTERNAL MEDICINE

## 2020-09-23 PROCEDURE — 99999 PR PBB SHADOW E&M-EST. PATIENT-LVL IV: ICD-10-PCS | Mod: PBBFAC,,, | Performed by: INTERNAL MEDICINE

## 2020-09-23 NOTE — TELEPHONE ENCOUNTER
Orders scheduled for pt as requested. Apt reminders placed in mail.       ----- Message from Malina Li MD sent at 9/23/2020 10:48 AM CDT -----  Cbc, cmp, [id number] pet in 1/2021 and rtc

## 2020-09-23 NOTE — PROGRESS NOTES
Subjective:       Patient ID: Kirstie Bowden is a 75 y.o. female.    Chief Complaint:  Here for follow-up after starting Aromasin   Left breast ca T2 N0 status post lumpectomy and re-resection margin adjuvant TC chemo s/p 3  cycles but discontinued due to quality of life issues and started Arimidex   stated  having s/e  To arimidex took herself off presented to clinic started on Aromasin here to recheck on medications   rec score of 29    hydronephrosis+ ,  had stent placed    Oncologic History:   INVASIVE CARCINOMA BREAST, CANCER CASE SUMMARY:  PROCEDURE: Partial mastectomy without skin or nipple.3/2019  SPECIMEN LATERALITY: Left.  TUMOR SIZE, GREATEST DIMENSION: 2.5 cm.  HISTOLOGIC TYPE: Invasive pleomorphic lobular carcinoma.  HISTOLOGIC GRADE (LUTHER HISTOLOGIC SCORE):  Glandular (acinar)/tubular differentiation: Score 3.  Nuclear pleomorphism: Score 2.  Mitotic rate: Score 1.  Overall grade: Grade 2  DUCTAL CARCINOMA IN SITU (DCIS): Not identified.  LOBULAR CARCINOMA IN SITU (LCIS): Present, pleomorphic lobular carcinoma in situ with rare focus of  classical lobular carcinoma situ.  Estimated size (extent) of pleomorphic LCIS: At least 2.8 cm.  TUMOR EXTENSION: Not applicable.  MARGINS:  INVASIVE CARCINOMA MARGINS: Uninvolved by invasive carcinoma.  Distance from inferior (closest) margin: 5 mm.  PLEOMORPHIC LOBULAR CARCINOMA IN SITU MARGINS: Uninvolved by pleomorphic LCIS.  Distance from inferior medial (closest) margin: 0.2 mm (see above comment).  REGIONAL LYMPH NODES: Uninvolved by tumor cells.  Number of lymph nodes examined: 2.  Number of sentinel nodes examined: 2.  TREATMENT EFFECT: No known presurgical therapy.  PATHOLOGIC STAGE CLASSIFICATION (pTNM, AJCC 8TH EDITION):  pT2: Tumor size = 2.5 cm in greatest dimension.  pN0: No regional lymph node metastasis identified.  pM: Not applicable.    ER: Positive.  NV: Positive.  HER2: Negative (IHC - Equivocal (score 2) and FISH -  Negative).  Ki-67: Approximately 14%.  HPI:     Social History     Socioeconomic History    Marital status:      Spouse name: Not on file    Number of children: Not on file    Years of education: Not on file    Highest education level: Not on file   Occupational History     Employer: [employer]   Social Needs    Financial resource strain: Not very hard    Food insecurity     Worry: Never true     Inability: Never true    Transportation needs     Medical: No     Non-medical: No   Tobacco Use    Smoking status: Former Smoker     Packs/day: 0.50     Years: 47.00     Pack years: 23.50     Types: Cigarettes     Start date: 1960     Quit date: 2019     Years since quittin.1    Smokeless tobacco: Never Used   Substance and Sexual Activity    Alcohol use: Yes     Alcohol/week: 2.0 standard drinks     Types: 2 Glasses of wine per week     Frequency: Monthly or less     Drinks per session: Patient refused     Binge frequency: Never     Comment: Social    Drug use: No    Sexual activity: Never   Lifestyle    Physical activity     Days per week: 3 days     Minutes per session: 20 min    Stress: To some extent   Relationships    Social connections     Talks on phone: More than three times a week     Gets together: Three times a week     Attends [employer] service: Not on file     Active member of club or organization: Yes     Attends meetings of clubs or organizations: Never     Relationship status:    Other Topics Concern    Not on file   Social History Narrative    Not on file     Family History   Problem Relation Age of Onset    Heart disease Mother     Hypertension Mother     Alzheimer's disease Mother     Cancer Father      Past Surgical History:   Procedure Laterality Date    AXILLARY NODE DISSECTION Left 3/14/2019    Procedure: LYMPHADENECTOMY, AXILLARY;  Surgeon: Mp Gonzalez MD;  [facility];  Service: General;  Laterality: Left;  left lumpectomy with  axillary lypmh node dissection    BALLOON DILATION OF URETER Left 6/24/2019    Procedure: DILATION, URETER, USING BALLOON;  Surgeon: Jorden Dickson MD;  Location: [facility] OR;  Service: Urology;  Laterality: Left;    BREAST BIOPSY Left 20 yrs ago    benign    BREAST LUMPECTOMY      x2    CHOLECYSTECTOMY      CYSTOSCOPY W/ URETERAL STENT PLACEMENT Left 6/24/2019    Procedure: CYSTOSCOPY, WITH URETERAL STENT INSERTION;  Surgeon: Jorden Dickson MD;  Location: [facility] OR;  Service: Urology;  Laterality: Left;    CYSTOSCOPY W/ URETERAL STENT REMOVAL Left 7/23/2019    Procedure: CYSTOSCOPY, WITH URETERAL STENT REMOVAL;  Surgeon: Jorden Dickson MD;  Location: [facility] OR;  Service: Urology;  Laterality: Left;    EXTRACORPOREAL SHOCK WAVE LITHOTRIPSY Left 7/23/2019    Procedure: LITHOTRIPSY, ESWL;  Surgeon: Jorden Dickson MD;  Location: [facility] OR;  Service: Urology;  Laterality: Left;    EYE SURGERY Bilateral     cataracts    HYSTERECTOMY      MASTECTOMY, PARTIAL Left 4/25/2019    Procedure: MASTECTOMY, PARTIAL;  Surgeon: Mp Gonzalez MD;  Location: [facility] OR;  Service: General;  Laterality: Left;    NEEDLE LOCALIZATION Left 3/14/2019    Procedure: NEEDLE LOCALIZATION;  Surgeon: Mp Gonzalez MD;  Location: [facility] OR;  Service: General;  Laterality: Left;  left lumpectomy with wire needle localization     RETROGRADE PYELOGRAPHY Bilateral 6/24/2019    Procedure: PYELOGRAM, RETROGRADE;  Surgeon: Jorden Dickson MD;  Location: [facility] OR;  Service: Urology;  Laterality: Bilateral;    SENTINEL LYMPH NODE BIOPSY Left 3/14/2019    Procedure: BIOPSY, LYMPH NODE, SENTINEL;  Surgeon: Mp Gonzalez MD;  Location: [facility] OR;  Service: General;  Laterality: Left;  left sentinel node lymph node biopsy    TONSILLECTOMY      URETEROSCOPY Left 6/24/2019    Procedure: URETEROSCOPY;  Surgeon: Jorden Dickson MD;  Location: [facility] OR;  Service: Urology;  Laterality: Left;     Past Medical History:   Diagnosis Date     Arthritis     rheumatoid    Asthma     Cancer     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    Hyperlipidemia     Hypertension     Pseudocholinesterase deficiency     family history    Thyroid disease        Current Outpatient Medications:     amLODIPine (NORVASC) 5 MG tablet, Take 1 tablet by mouth once daily, Disp: 90 tablet, Rfl: 3    atorvastatin (LIPITOR) 20 MG tablet, Take 1 tablet by mouth once daily, Disp: 90 tablet, Rfl: 3    CALCIUM CARBONATE/VITAMIN D3 (VITAMIN D-3 ORAL), Take by mouth once daily. , Disp: , Rfl:     exemestane (AROMASIN) 25 mg tablet, Take 1 tablet (25 mg total) by mouth once daily., Disp: 30 tablet, Rfl: 5    fluticasone-salmeterol diskus inhaler 250-50 mcg, INHALE ONE DOSE BY MOUTH TWICE DAILY, Disp: 60 each, Rfl: 11    folic acid (FOLVITE) 1 MG tablet, Take 1 tablet by mouth once daily, Disp: 30 tablet, Rfl: 5    hydroCHLOROthiazide (HYDRODIURIL) 25 MG tablet, Take 1 tablet (25 mg total) by mouth once daily., Disp: 90 tablet, Rfl: 3    hydroxychloroquine (PLAQUENIL) 200 mg tablet, Take 1 tablet (200 mg total) by mouth 2 (two) times daily., Disp: 60 tablet, Rfl: 3    levothyroxine (SYNTHROID) 125 MCG tablet, Take 1 tablet (125 mcg total) by mouth before breakfast., Disp: 90 tablet, Rfl: 3    methotrexate 2.5 MG Tab, Take 5 tablets (12.5 mg total) by mouth every 7 days., Disp: 20 tablet, Rfl: 1    ondansetron (ZOFRAN) 4 MG tablet, Take 4 mg by mouth every 12 (twelve) hours., Disp: , Rfl:     ondansetron (ZOFRAN-ODT) 8 MG TbDL, DISSOLVE 1 TABLET IN MOUTH EVERY 12 HOURS AS NEEDED, Disp: 30 tablet, Rfl: 0    pantoprazole (PROTONIX) 40 MG tablet, TAKE 1 TABLET BY MOUTH ONCE DAILY IN THE MORNING FOR 90 DAYS, Disp: , Rfl:     prochlorperazine (COMPAZINE) 10 MG tablet, TAKE 1 TABLET BY MOUTH EVERY 6 HOURS AS NEEDED, Disp: 60 tablet, Rfl: 0    thiamine (VITAMIN B-1) 100 MG tablet, Take 100 mg by mouth once daily., Disp: , Rfl:     traMADoL (ULTRAM) 50 mg tablet, TAKE 1 TO 2 TABLETS BY MOUTH EVERY 8  HOURS AS NEEDED FOR SEVERE PAIN, Disp: 90 tablet, Rfl: 0    VITAMIN B COMPLEX ORAL, Every day, Disp: , Rfl:     vitamin E/flaxseed oil (FLAXSEED OIL-VITAMIN E ORAL), Take by mouth., Disp: , Rfl:   No current facility-administered medications for this visit.     Facility-Administered Medications Ordered in Other Visits:     lidocaine (PF) 10 mg/ml (1%) injection 10 mg, 1 mL, Intradermal, Once, Eladia Schwartz MD  Review of patient's allergies indicates:   Allergen Reactions    Succinylcholine chloride Other (See Comments)    Sulfur dioxide Hives    Sulfamethoxazole-trimethoprim      Other reaction(s): per dr daryn Rodríguez (sulfonamide antibiotics)      NOT SURE REACTION         REVIEW OF SYSTEMS:     CONSTITUTIONAL:  Patient voices complains with significant fatigue.  Shortness of breath on ambulation.  She is not coughing up productive sputum she reports no fever chills rigors  Has some sinus congestion  And having night sweats after starting Arimidex    SKIN: Denies rash, issues with nails, non-healing sores, bleeding, blotching    skin or abnormal bruising. Denies new moles or changes to existing moles.      BREASTS: healing well since lumpectomy    EYES: Denies eye pain, blurred vision, swelling, redness or discharge.      ENT AND MOUTH:  Has runny nose, and stuffiness, and sinus trouble no sores. Denies    nosebleeds. Denies, hoarseness, change in voice or swelling in front of the    neck.      CARDIOVASCULAR: Denies chest pain, discomfort or palpitations. Denies neck    swelling or episodes of passing out.           GI: Denies trouble swallowing, indigestion, heartburn, abdominal pain, +nausea with arimidex betty in am then  Dry heaves during day, stopped arimidex and feeks much better     no vomiting, diarrhea, altered bowel habits, blood in stool, discoloration of    stools, change in nature of stool, bloating, increased abdominal girth.      GENITOURINARY: No discharge. No pelvic pain or lumps.  No rash around groin or  lesions. No urinary frequency, hesitation, painful urination or blood in    urine. Denies incontinence. No problems with intercourse.      MUSCULOSKELETAL: Denies neck or back pain. Denies weakness in arms or legs,    joint problems or distended inflamed veins in legs. Denies swelling or abnormal  glands.      NEUROLOGICAL: Denies tingling, numbness, altered mentation changes to nerve    function in the face, weakness to one or both of the body. Denies changes to    gait and denies multiple falls or accidents.        PHYSICAL EXAM:     Wt Readings from Last 3 Encounters:   09/23/20 76.4 kg (168 lb 6.9 oz)   08/17/20 76.8 kg (169 lb 4.8 oz)   08/12/20 76 kg (167 lb 8.8 oz)     Temp Readings from Last 3 Encounters:   09/23/20 97.4 °F (36.3 °C) (Temporal)   08/17/20 97 °F (36.1 °C)   08/12/20 97.1 °F (36.2 °C)     BP Readings from Last 3 Encounters:   09/23/20 131/60   08/17/20 121/80   08/12/20 132/60     Pulse Readings from Last 3 Encounters:   09/23/20 73   08/12/20 73   07/10/20 78     GENERAL: Comfortable looking patient. Patient is in no distress.  Awake, alert and oriented to time, person and place.  No anxiety, or agitation.      HEENT: Normal conjunctivae and eyelids. WNL.  PERRLA 3 to 4 mm. No icterus, no pallor, no congestion, and no discharge noted.     NECK:  Supple. Trachea is central.  No crepitus.  No JVD or masses.    RESPIRATORY:  No intercostal retractions.  No dullness to percussion.  Chest is clear to auscultation.  No rales, rhonchi or wheezes.  No crepitus.  Good air entry bilaterally.    CARDIOVASCULAR:  S1 and S2 are normally heard without murmurs or gallops.  All peripheral pulses are present.    ABDOMEN:  Normal abdomen.  No hepatosplenomegaly.  No free fluid.  Bowel sounds are present.  No hernia noted. No masses.  No rebound or tenderness.  No guarding or rigidity.  Umbilicus is midline.    LYMPHATICS:  No axillary, cervical, supraclavicular, submental, or inguinal  lymphadenopathy.    SKIN/MUSCULOSKELETAL:  There is no evidence of excoriation marks or ecchmosis.  No rashes.  No cyanosis.  No clubbing.  No joint or skeletal deformities noted.  Normal range of motion.    NEUROLOGIC:  Higher functions are appropriate.  No cranial nerve deficits.  Normal asher.  Normal strength.  Motor and sensory functions are normal.  Deep tendon reflexes are normal.    GENITAL/RECTAL:  Exams are deferred.      Laboratory:     CBC:  Lab Results   Component Value Date    WBC 6.43 09/21/2020    RBC 4.17 09/21/2020    HGB 13.2 09/21/2020    HCT 40.0 09/21/2020    MCV 96 09/21/2020    MCH 31.7 (H) 09/21/2020    MCHC 33.0 09/21/2020    RDW 13.9 09/21/2020     09/21/2020    MPV 11.9 09/21/2020    GRAN 4.5 09/21/2020    GRAN 70.4 09/21/2020    LYMPH 1.2 09/21/2020    LYMPH 18.5 09/21/2020    MONO 0.6 09/21/2020    MONO 9.0 09/21/2020    EOS 0.1 09/21/2020    BASO 0.02 09/21/2020    EOSINOPHIL 1.6 09/21/2020    BASOPHIL 0.3 09/21/2020       BMP: BMP  Lab Results   Component Value Date     09/21/2020    K 3.7 09/21/2020     09/21/2020    CO2 26 09/21/2020    BUN 11 09/21/2020    CREATININE 0.8 09/21/2020    CALCIUM 10.2 09/21/2020    ANIONGAP 13 09/21/2020    ESTGFRAFRICA >60 09/21/2020    EGFRNONAA >60 09/21/2020       LFT:   Lab Results   Component Value Date    ALT 15 09/21/2020    AST 21 09/21/2020    ALKPHOS 70 09/21/2020    BILITOT 0.4 09/21/2020    Bone density September 2 1019 negative for osteopenia  oncotype rec. score is 29  Impressionpet 1/2020       1.  Likely postoperative change in the superolateral left breast, decreased in size from the previous exam.    2.  Scattered multifocal ground-glass attenuation opacities in both lungs suggesting a combination of atypical infection/pneumonia and/or post treatment/post radiation change.  Consider follow-up CT of the chest in 6-8 weeks to document resolution.    3.  Indeterminate isoattenuating non-FDG avid structure in the  anterior interpolar aspect of the right kidney, possibly representing a hemorrhagic/proteinaceous cyst or low grade malignancy, consider further characterization with contrast enhanced renal protocol CT/MRI.    4.  Numerous bilateral nonobstructing renal stones.  No hydronephrosis or hydroureter on today's exam.    5.  Interval resolution of the maxillary sinus disease.     Pet 7/2020  IMPRESSION:  1. Postoperative changes of prior left breast lumpectomy.  2. Near complete resolution of the previously described groundglass  attenuation opacities in the lungs, now with only a small linear  bandlike opacity in the anterior left upper lobe (most likely related  to prior radiation/treatment change.  3. Focal increased FDG activity along the right third and fourth rib  posteriorly at the posterior thoracic junction suggesting degenerative  change with no soft tissue, lytic or sclerotic lesion identified.  4. Additional and incidental findings as noted above unchanged from  the previous exam.            has renal stones and has hydronephrosis, stented  Assessment/Plan:     T2N0Mx left breast ca s/p lumpectomy 3/2019 recurrent score 29  pT2: Tumor size = 2.5 cm in greatest dimension.  pN0: No regional lymph node metastasis identified.  pM: Not applicable.    ER: Positive.  LA: Positive.  HER2: Negative (IHC - E  Patient completed 3 cycles but aborted therapy.  In favor a quality of life due to side effects  Above PET scan discussed with patient   last two weeks she stoped anastrozole  Due to arthralgia and nausea.   got off embrel during radiation and chemo now on mtx and hydroxychroloquine  Tolerating aromasin well to continue for 10 years till May 2029  Normal bone density 9//2019 will repeat in 9/21  She already sees Urology  Hemorrhagic cyst was reportd on prev scans , had stents removed and lithotripsy for stones     macrocytosis: related to thyroid issues will watch she is not anemic and embark on w/u if worse or  cytopenia   saw Pulmonary consult for 2.  Finding as above on PET scan thesesd have nearly resolved now on 7/2020 pet  She has scattered atheromatous calcifications in the aorta and coronary arteries she will discuss this and follow up with her PCP for referral to cardiology      mammo due in 3/2021   rtc 1/2021 to 1/202 with pet. Cbc. Cmp, [id number]  Has quit smoking      completed xrt  Started arimidex, tolerating well bone density negative   cont f/u for RA, HTH. Lipids, hypothyroidism    Advance Care planning/ directives /living will/patient's wishes discussed with patient.  Patient has been given guidelines and instructions on completing these directives  COVID social distancing, face mask use, hand washing techniques and personal hygiene routine discussed with patient  Good exercise, nutrition and weight management discussed with patient  Health maintenance activities and follow-up with PCPs recommendations discussed with patient

## 2020-09-24 LAB — CANCER AG27-29 SERPL-ACNC: <12 U/ML

## 2020-10-01 ENCOUNTER — PATIENT MESSAGE (OUTPATIENT)
Dept: OTHER | Facility: OTHER | Age: 75
End: 2020-10-01

## 2020-11-18 ENCOUNTER — OFFICE VISIT (OUTPATIENT)
Dept: RHEUMATOLOGY | Facility: CLINIC | Age: 75
End: 2020-11-18
Payer: MEDICARE

## 2020-11-18 VITALS
DIASTOLIC BLOOD PRESSURE: 76 MMHG | WEIGHT: 167.69 LBS | TEMPERATURE: 98 F | BODY MASS INDEX: 25.5 KG/M2 | SYSTOLIC BLOOD PRESSURE: 111 MMHG

## 2020-11-18 DIAGNOSIS — M75.52 BURSITIS OF SHOULDER, LEFT: Primary | ICD-10-CM

## 2020-11-18 DIAGNOSIS — Z79.899 LONG-TERM USE OF HIGH-RISK MEDICATION: ICD-10-CM

## 2020-11-18 PROCEDURE — 20610 DRAIN/INJ JOINT/BURSA W/O US: CPT | Mod: LT,S$GLB,, | Performed by: INTERNAL MEDICINE

## 2020-11-18 PROCEDURE — 20610 LARGE JOINT ASPIRATION/INJECTION: L SUBACROMIAL BURSA: ICD-10-PCS | Mod: LT,S$GLB,, | Performed by: INTERNAL MEDICINE

## 2020-11-18 PROCEDURE — 99213 OFFICE O/P EST LOW 20 MIN: CPT | Mod: 25,S$GLB,, | Performed by: INTERNAL MEDICINE

## 2020-11-18 PROCEDURE — 99213 PR OFFICE/OUTPT VISIT, EST, LEVL III, 20-29 MIN: ICD-10-PCS | Mod: 25,S$GLB,, | Performed by: INTERNAL MEDICINE

## 2020-11-18 RX ORDER — METHOTREXATE 2.5 MG/1
12.5 TABLET ORAL
Qty: 20 TABLET | Refills: 2 | Status: SHIPPED | OUTPATIENT
Start: 2020-11-18 | End: 2021-03-31 | Stop reason: SDUPTHER

## 2020-11-18 RX ORDER — FLUTICASONE PROPIONATE 50 MCG
SPRAY, SUSPENSION (ML) NASAL
COMMUNITY
Start: 2020-11-02 | End: 2022-07-11

## 2020-11-18 RX ORDER — HYDROXYCHLOROQUINE SULFATE 200 MG/1
200 TABLET, FILM COATED ORAL 2 TIMES DAILY
Qty: 60 TABLET | Refills: 3 | Status: SHIPPED | OUTPATIENT
Start: 2020-11-18 | End: 2021-03-31 | Stop reason: SDUPTHER

## 2020-11-18 RX ORDER — CETIRIZINE HYDROCHLORIDE 10 MG/1
10 TABLET ORAL DAILY
Status: ON HOLD | COMMUNITY
Start: 2020-11-02 | End: 2022-01-04

## 2020-11-18 NOTE — PROGRESS NOTES
[facility] RHEUMATOLOGY            PROGRESS NOTE      Subjective:       Patient ID:   NAME: Kirstie Bowden : 1945     75 y.o. female    Referring Doc: No ref. provider found  Other Physicians:    Chief Complaint:  Rheumatoid Arthritis      History of Present Illness:     Patient returns today for a regularly scheduled follow-up visit for   rheumatoid arthritis    The patient is doing better overall except for left shoulder pain for the last few weeks.  Pain is with abduction.  No history of trauma.  No paresthesias.  No chest pains cough or shortness of breath.  No fevers.            ROS:   GEN:  No  fever, night sweats . weight is stable   + sl fatigue  SKIN: no rashes, no bruising, no ulcerations, no Raynaud's  HEENT: no HA's, No visual changes, no mucosal ulcers, no sicca symptoms,  CV:   no CP, SOB, PND, MCKEON, no orthopnea, no palpitations  PULM: normal with no SOB, cough, hemoptysis, sputum or pleuritic pain  GI:  no abdominal pain, nausea, vomiting, constipation, diarrhea, melanotic stools, BRBPR, hematemesis, no dysphagia  :   no dysuria  NEURO: no paresthesias, headaches, visual disturbances, muscle weakness  MUSCULOSKELETAL:no joint swelling, prolonged AM stiffness, no back pain, no muscle pain  Allergies:  Review of patient's allergies indicates:   Allergen Reactions    Succinylcholine chloride Other (See Comments)    Sulfur dioxide Hives    Sulfamethoxazole-trimethoprim      Other reaction(s): per dr daryn Rodríguez (sulfonamide antibiotics)      NOT SURE REACTION       Medications:    Current Outpatient Medications:     amLODIPine (NORVASC) 5 MG tablet, Take 1 tablet by mouth once daily, Disp: 90 tablet, Rfl: 3    atorvastatin (LIPITOR) 20 MG tablet, Take 1 tablet by mouth once daily, Disp: 90 tablet, Rfl: 3    CALCIUM CARBONATE/VITAMIN D3 (VITAMIN D-3 ORAL), Take by mouth once daily. , Disp: , Rfl:     cetirizine (ZYRTEC) 10 MG tablet, Take 10 mg by mouth once daily., Disp:  , Rfl:     exemestane (AROMASIN) 25 mg tablet, Take 1 tablet (25 mg total) by mouth once daily., Disp: 30 tablet, Rfl: 5    fluticasone propionate (FLONASE) 50 mcg/actuation nasal spray, USE 2 SPRAY(S) IN EACH NOSTRIL DAILY, Disp: , Rfl:     fluticasone-salmeterol diskus inhaler 250-50 mcg, INHALE ONE DOSE BY MOUTH TWICE DAILY, Disp: 60 each, Rfl: 11    folic acid (FOLVITE) 1 MG tablet, Take 1 tablet by mouth once daily, Disp: 30 tablet, Rfl: 0    hydroCHLOROthiazide (HYDRODIURIL) 25 MG tablet, Take 1 tablet (25 mg total) by mouth once daily., Disp: 90 tablet, Rfl: 3    hydrOXYchloroQUINE (PLAQUENIL) 200 mg tablet, Take 1 tablet (200 mg total) by mouth 2 (two) times daily., Disp: 60 tablet, Rfl: 3    levothyroxine (SYNTHROID) 125 MCG tablet, Take 1 tablet (125 mcg total) by mouth before breakfast., Disp: 90 tablet, Rfl: 3    methotrexate 2.5 MG Tab, Take 5 tablets (12.5 mg total) by mouth every 7 days., Disp: 20 tablet, Rfl: 2    ondansetron (ZOFRAN) 4 MG tablet, Take 4 mg by mouth every 12 (twelve) hours., Disp: , Rfl:     ondansetron (ZOFRAN-ODT) 8 MG TbDL, DISSOLVE 1 TABLET IN MOUTH EVERY 12 HOURS AS NEEDED, Disp: 30 tablet, Rfl: 0    pantoprazole (PROTONIX) 40 MG tablet, TAKE 1 TABLET BY MOUTH ONCE DAILY IN THE MORNING FOR 90 DAYS, Disp: , Rfl:     prochlorperazine (COMPAZINE) 10 MG tablet, TAKE 1 TABLET BY MOUTH EVERY 6 HOURS AS NEEDED, Disp: 60 tablet, Rfl: 0    thiamine (VITAMIN B-1) 100 MG tablet, Take 100 mg by mouth once daily., Disp: , Rfl:     traMADoL (ULTRAM) 50 mg tablet, TAKE 1 TO 2 TABLETS BY MOUTH EVERY 8 HOURS AS NEEDED FOR SEVERE PAIN, Disp: 90 tablet, Rfl: 0    VITAMIN B COMPLEX ORAL, Every day, Disp: , Rfl:     vitamin E/flaxseed oil (FLAXSEED OIL-VITAMIN E ORAL), Take by mouth., Disp: , Rfl:   No current facility-administered medications for this visit.     Facility-Administered Medications Ordered in Other Visits:     lidocaine (PF) 10 mg/ml (1%) injection 10 mg, 1 mL,  Intradermal, Once, Eladia Schwartz MD    PMHx/PSHx Updates:        Objective:     Vitals:  Blood pressure 111/76, temperature 97.5 °F (36.4 °C), weight 76.1 kg (167 lb 11.2 oz).    Physical Examination:   GEN: no apparent distress, comfortable; AAOx3  SKIN: no rashes,no ulceration, no Raynaud's, no petechiae, no SQ nodules,  HEAD: normal  EYES: no pallor, no icterus,   NECK: no masses, thyroid normal, trachea midline, no LAD/LN's, supple  CV: RRR with no murmur; l S1 and S2 reg. ,no gallop no rubs,   CHEST: Normal respiratory effort; CTAB; normal breath sounds; no wheeze or crackles  MUSC/Skeletal: ROM normal; no crepitus; joints without synovitis,  no deformities  No joint swelling or tenderness of PIP, MCP, wrist, elbow, shoulder, or knee joints.  Left shoulder without swelling.  Abduction to 90° internal and external rotation preserved  EXTREM: no clubbing, cyanosis, no edema  NEURO: grossly intact; motor WNL; AAOx3;  PSYCH: normal mood, affect and behavior  LYMPH: normal cervical, supraclavicular          Labs:   Lab Results   Component Value Date    WBC 6.43 09/21/2020    HGB 13.2 09/21/2020    HCT 40.0 09/21/2020    MCV 96 09/21/2020     09/21/2020    CMP  @[facility](NA,K,CL,CO2,GLU,BUN,Creatinine,Calcium,PROT,Albumin,Bilitot,Alkphos,AST,ALT,CRP,ESR,RF,CCP,RODNEY,SSA,CPK,uric acid) )@  I have reviewed all available lab results and radiology reports.    Radiology/Diagnostic Studies:        Assessment/Plan:   (1) 75 y.o. female with diagnosis of rheumatoid arthritis.  She is stable on Plaquenil and methotrexate  2) left subacromial bursitis.  Injected as per procedure note      Labs in 1 month  Follow-up in 3 months or before if needed      Discussion:     I have explained all of the above in detail and the patient understands all of the current recommendation(s). I have answered all questions to the best of my ability and to their complete satisfaction.       The patient is to continue with the current  management plan         RTC in         Electronically signed by Jelani Stacy MD        Answers for HPI/ROS submitted by the patient on 11/16/2020   fever: No  eye redness: No  mouth sores: No  headaches: No  shortness of breath: No  chest pain: No  trouble swallowing: No  diarrhea: No  constipation: No  unexpected weight change: No  genital sore: No  dysuria: No  During the last 3 days, have you had a skin rash?: No  Bruises or bleeds easily: No  cough: No

## 2020-12-11 ENCOUNTER — PATIENT MESSAGE (OUTPATIENT)
Dept: OTHER | Facility: OTHER | Age: 75
End: 2020-12-11

## 2020-12-21 ENCOUNTER — LAB VISIT (OUTPATIENT)
Dept: LAB | Facility: HOSPITAL | Age: 75
End: 2020-12-21
Attending: INTERNAL MEDICINE
Payer: MEDICARE

## 2020-12-21 DIAGNOSIS — M75.52 BURSITIS OF SHOULDER, LEFT: ICD-10-CM

## 2020-12-21 DIAGNOSIS — Z79.899 LONG-TERM USE OF HIGH-RISK MEDICATION: ICD-10-CM

## 2020-12-21 LAB
ALBUMIN SERPL BCP-MCNC: 4.2 G/DL (ref 3.5–5.2)
ALP SERPL-CCNC: 69 U/L (ref 55–135)
ALT SERPL W/O P-5'-P-CCNC: 19 U/L (ref 10–44)
ANION GAP SERPL CALC-SCNC: 10 MMOL/L (ref 8–16)
AST SERPL-CCNC: 21 U/L (ref 10–40)
BASOPHILS # BLD AUTO: 0.03 K/UL (ref 0–0.2)
BASOPHILS NFR BLD: 0.5 % (ref 0–1.9)
BILIRUB SERPL-MCNC: 0.8 MG/DL (ref 0.1–1)
BUN SERPL-MCNC: 14 MG/DL (ref 8–23)
CALCIUM SERPL-MCNC: 9.8 MG/DL (ref 8.7–10.5)
CHLORIDE SERPL-SCNC: 102 MMOL/L (ref 95–110)
CO2 SERPL-SCNC: 28 MMOL/L (ref 23–29)
CREAT SERPL-MCNC: 0.8 MG/DL (ref 0.5–1.4)
CRP SERPL-MCNC: 2.34 MG/DL
DIFFERENTIAL METHOD: ABNORMAL
EOSINOPHIL # BLD AUTO: 0.1 K/UL (ref 0–0.5)
EOSINOPHIL NFR BLD: 1.7 % (ref 0–8)
ERYTHROCYTE [DISTWIDTH] IN BLOOD BY AUTOMATED COUNT: 14.7 % (ref 11.5–14.5)
EST. GFR  (AFRICAN AMERICAN): >60 ML/MIN/1.73 M^2
EST. GFR  (NON AFRICAN AMERICAN): >60 ML/MIN/1.73 M^2
GLUCOSE SERPL-MCNC: 162 MG/DL (ref 70–110)
HCT VFR BLD AUTO: 41.3 % (ref 37–48.5)
HGB BLD-MCNC: 13.1 G/DL (ref 12–16)
IMM GRANULOCYTES # BLD AUTO: 0.02 K/UL (ref 0–0.04)
IMM GRANULOCYTES NFR BLD AUTO: 0.3 % (ref 0–0.5)
LYMPHOCYTES # BLD AUTO: 1.4 K/UL (ref 1–4.8)
LYMPHOCYTES NFR BLD: 21.4 % (ref 18–48)
MCH RBC QN AUTO: 32.3 PG (ref 27–31)
MCHC RBC AUTO-ENTMCNC: 31.7 G/DL (ref 32–36)
MCV RBC AUTO: 102 FL (ref 82–98)
MONOCYTES # BLD AUTO: 0.6 K/UL (ref 0.3–1)
MONOCYTES NFR BLD: 8.8 % (ref 4–15)
NEUTROPHILS # BLD AUTO: 4.3 K/UL (ref 1.8–7.7)
NEUTROPHILS NFR BLD: 67.3 % (ref 38–73)
NRBC BLD-RTO: 0 /100 WBC
PLATELET # BLD AUTO: 219 K/UL (ref 150–350)
PMV BLD AUTO: 11.5 FL (ref 9.2–12.9)
POTASSIUM SERPL-SCNC: 3.3 MMOL/L (ref 3.5–5.1)
PROT SERPL-MCNC: 7.7 G/DL (ref 6–8.4)
RBC # BLD AUTO: 4.05 M/UL (ref 4–5.4)
SODIUM SERPL-SCNC: 140 MMOL/L (ref 136–145)
WBC # BLD AUTO: 6.35 K/UL (ref 3.9–12.7)

## 2020-12-21 PROCEDURE — 80053 COMPREHEN METABOLIC PANEL: CPT

## 2020-12-21 PROCEDURE — 86140 C-REACTIVE PROTEIN: CPT

## 2020-12-21 PROCEDURE — 36415 COLL VENOUS BLD VENIPUNCTURE: CPT

## 2020-12-21 PROCEDURE — 85025 COMPLETE CBC W/AUTO DIFF WBC: CPT

## 2020-12-22 DIAGNOSIS — E87.6 HYPOKALEMIA: Primary | ICD-10-CM

## 2020-12-22 DIAGNOSIS — D64.9 ANEMIA, UNSPECIFIED TYPE: ICD-10-CM

## 2020-12-22 DIAGNOSIS — Z79.899 ENCOUNTER FOR LONG-TERM (CURRENT) USE OF MEDICATIONS: ICD-10-CM

## 2020-12-22 NOTE — PROGRESS NOTES
Pt notified of low potassium level. Instructed on KCL dosing and need to repeat potassium level next Monday. Patient verbalized understanding of all. Kcl Rx called into [facility]. Lab order ready.

## 2020-12-28 ENCOUNTER — LAB VISIT (OUTPATIENT)
Dept: LAB | Facility: HOSPITAL | Age: 75
End: 2020-12-28
Attending: INTERNAL MEDICINE
Payer: MEDICARE

## 2020-12-28 DIAGNOSIS — Z79.899 ENCOUNTER FOR LONG-TERM (CURRENT) USE OF MEDICATIONS: ICD-10-CM

## 2020-12-28 DIAGNOSIS — E87.6 HYPOKALEMIA: ICD-10-CM

## 2020-12-28 LAB
ANION GAP SERPL CALC-SCNC: 9 MMOL/L (ref 8–16)
BUN SERPL-MCNC: 16 MG/DL (ref 8–23)
CALCIUM SERPL-MCNC: 10.3 MG/DL (ref 8.7–10.5)
CHLORIDE SERPL-SCNC: 100 MMOL/L (ref 95–110)
CO2 SERPL-SCNC: 31 MMOL/L (ref 23–29)
CREAT SERPL-MCNC: 0.9 MG/DL (ref 0.5–1.4)
EST. GFR  (AFRICAN AMERICAN): >60 ML/MIN/1.73 M^2
EST. GFR  (NON AFRICAN AMERICAN): >60 ML/MIN/1.73 M^2
GLUCOSE SERPL-MCNC: 96 MG/DL (ref 70–110)
POTASSIUM SERPL-SCNC: 4 MMOL/L (ref 3.5–5.1)
SODIUM SERPL-SCNC: 140 MMOL/L (ref 136–145)

## 2020-12-28 PROCEDURE — 80048 BASIC METABOLIC PNL TOTAL CA: CPT

## 2020-12-28 PROCEDURE — 36415 COLL VENOUS BLD VENIPUNCTURE: CPT

## 2021-01-12 ENCOUNTER — TELEPHONE (OUTPATIENT)
Dept: HEMATOLOGY/ONCOLOGY | Facility: CLINIC | Age: 76
End: 2021-01-12

## 2021-01-25 ENCOUNTER — HOSPITAL ENCOUNTER (OUTPATIENT)
Dept: RADIOLOGY | Facility: HOSPITAL | Age: 76
Discharge: HOME | End: 2021-01-25
Attending: INTERNAL MEDICINE
Payer: MEDICARE

## 2021-01-25 ENCOUNTER — LAB VISIT (OUTPATIENT)
Dept: LAB | Facility: HOSPITAL | Age: 76
End: 2021-01-25
Attending: INTERNAL MEDICINE
Payer: MEDICARE

## 2021-01-25 VITALS — BODY MASS INDEX: 25.91 KG/M2 | HEIGHT: 68 IN | WEIGHT: 171 LBS

## 2021-01-25 DIAGNOSIS — Z17.0 MALIGNANT NEOPLASM OF NIPPLE OF LEFT BREAST IN FEMALE, ESTROGEN RECEPTOR POSITIVE: ICD-10-CM

## 2021-01-25 DIAGNOSIS — C50.012 MALIGNANT NEOPLASM OF NIPPLE OF LEFT BREAST IN FEMALE, ESTROGEN RECEPTOR POSITIVE: ICD-10-CM

## 2021-01-25 LAB
ALBUMIN SERPL BCP-MCNC: 4.4 G/DL (ref 3.5–5.2)
ALP SERPL-CCNC: 77 U/L (ref 55–135)
ALT SERPL W/O P-5'-P-CCNC: 19 U/L (ref 10–44)
ANION GAP SERPL CALC-SCNC: 11 MMOL/L (ref 8–16)
AST SERPL-CCNC: 23 U/L (ref 10–40)
BASOPHILS # BLD AUTO: 0.03 K/UL (ref 0–0.2)
BASOPHILS NFR BLD: 0.3 % (ref 0–1.9)
BILIRUB SERPL-MCNC: 0.5 MG/DL (ref 0.1–1)
BUN SERPL-MCNC: 12 MG/DL (ref 8–23)
CALCIUM SERPL-MCNC: 10.2 MG/DL (ref 8.7–10.5)
CHLORIDE SERPL-SCNC: 100 MMOL/L (ref 95–110)
CO2 SERPL-SCNC: 29 MMOL/L (ref 23–29)
CREAT SERPL-MCNC: 0.9 MG/DL (ref 0.5–1.4)
DIFFERENTIAL METHOD: ABNORMAL
EOSINOPHIL # BLD AUTO: 0.2 K/UL (ref 0–0.5)
EOSINOPHIL NFR BLD: 2 % (ref 0–8)
ERYTHROCYTE [DISTWIDTH] IN BLOOD BY AUTOMATED COUNT: 14.2 % (ref 11.5–14.5)
EST. GFR  (AFRICAN AMERICAN): >60 ML/MIN/1.73 M^2
EST. GFR  (NON AFRICAN AMERICAN): >60 ML/MIN/1.73 M^2
GLUCOSE SERPL-MCNC: 88 MG/DL (ref 70–110)
GLUCOSE SERPL-MCNC: 96 MG/DL (ref 70–110)
HCT VFR BLD AUTO: 42.7 % (ref 37–48.5)
HGB BLD-MCNC: 13.7 G/DL (ref 12–16)
IMM GRANULOCYTES # BLD AUTO: 0.03 K/UL (ref 0–0.04)
IMM GRANULOCYTES NFR BLD AUTO: 0.3 % (ref 0–0.5)
LYMPHOCYTES # BLD AUTO: 1.6 K/UL (ref 1–4.8)
LYMPHOCYTES NFR BLD: 18 % (ref 18–48)
MCH RBC QN AUTO: 32.2 PG (ref 27–31)
MCHC RBC AUTO-ENTMCNC: 32.1 G/DL (ref 32–36)
MCV RBC AUTO: 101 FL (ref 82–98)
MONOCYTES # BLD AUTO: 0.7 K/UL (ref 0.3–1)
MONOCYTES NFR BLD: 8.4 % (ref 4–15)
NEUTROPHILS # BLD AUTO: 6.1 K/UL (ref 1.8–7.7)
NEUTROPHILS NFR BLD: 71 % (ref 38–73)
NRBC BLD-RTO: 0 /100 WBC
PLATELET # BLD AUTO: 221 K/UL (ref 150–350)
PMV BLD AUTO: 10.6 FL (ref 9.2–12.9)
POTASSIUM SERPL-SCNC: 4.3 MMOL/L (ref 3.5–5.1)
PROT SERPL-MCNC: 7.8 G/DL (ref 6–8.4)
RBC # BLD AUTO: 4.25 M/UL (ref 4–5.4)
SODIUM SERPL-SCNC: 140 MMOL/L (ref 136–145)
WBC # BLD AUTO: 8.61 K/UL (ref 3.9–12.7)

## 2021-01-25 PROCEDURE — 80053 COMPREHEN METABOLIC PANEL: CPT

## 2021-01-25 PROCEDURE — 82962 GLUCOSE BLOOD TEST: CPT | Mod: PO

## 2021-01-25 PROCEDURE — 85025 COMPLETE CBC W/AUTO DIFF WBC: CPT

## 2021-01-25 PROCEDURE — 78815 PET IMAGE W/CT SKULL-THIGH: CPT | Mod: TC,PO

## 2021-01-27 ENCOUNTER — OFFICE VISIT (OUTPATIENT)
Dept: HEMATOLOGY/ONCOLOGY | Facility: CLINIC | Age: 76
End: 2021-01-27
Payer: MEDICARE

## 2021-01-27 VITALS
HEART RATE: 74 BPM | SYSTOLIC BLOOD PRESSURE: 144 MMHG | TEMPERATURE: 98 F | RESPIRATION RATE: 18 BRPM | HEIGHT: 68 IN | OXYGEN SATURATION: 97 % | DIASTOLIC BLOOD PRESSURE: 67 MMHG | WEIGHT: 173.5 LBS | BODY MASS INDEX: 26.3 KG/M2

## 2021-01-27 DIAGNOSIS — M05.711 RHEUMATOID ARTHRITIS INVOLVING RIGHT SHOULDER WITH POSITIVE RHEUMATOID FACTOR: ICD-10-CM

## 2021-01-27 DIAGNOSIS — E03.9 ACQUIRED HYPOTHYROIDISM: ICD-10-CM

## 2021-01-27 DIAGNOSIS — Z17.0 CARCINOMA OF CENTRAL PORTION OF LEFT BREAST IN FEMALE, ESTROGEN RECEPTOR POSITIVE: ICD-10-CM

## 2021-01-27 DIAGNOSIS — Z17.0 MALIGNANT NEOPLASM OF NIPPLE OF LEFT BREAST IN FEMALE, ESTROGEN RECEPTOR POSITIVE: Primary | ICD-10-CM

## 2021-01-27 DIAGNOSIS — C50.112 CARCINOMA OF CENTRAL PORTION OF LEFT BREAST IN FEMALE, ESTROGEN RECEPTOR POSITIVE: ICD-10-CM

## 2021-01-27 DIAGNOSIS — C50.012 MALIGNANT NEOPLASM OF NIPPLE OF LEFT BREAST IN FEMALE, ESTROGEN RECEPTOR POSITIVE: Primary | ICD-10-CM

## 2021-01-27 DIAGNOSIS — I10 ESSENTIAL HYPERTENSION, BENIGN: ICD-10-CM

## 2021-01-27 DIAGNOSIS — E78.00 PURE HYPERCHOLESTEROLEMIA: ICD-10-CM

## 2021-01-27 LAB — CANCER AG27-29 SERPL-ACNC: 15.6 U/ML

## 2021-01-27 PROCEDURE — 99214 PR OFFICE/OUTPT VISIT, EST, LEVL IV, 30-39 MIN: ICD-10-PCS | Mod: S$PBB,,, | Performed by: INTERNAL MEDICINE

## 2021-01-27 PROCEDURE — 99213 OFFICE O/P EST LOW 20 MIN: CPT | Mod: PBBFAC,PO | Performed by: INTERNAL MEDICINE

## 2021-01-27 PROCEDURE — 99214 OFFICE O/P EST MOD 30 MIN: CPT | Mod: S$PBB,,, | Performed by: INTERNAL MEDICINE

## 2021-01-27 PROCEDURE — 99999 PR PBB SHADOW E&M-EST. PATIENT-LVL III: CPT | Mod: PBBFAC,,, | Performed by: INTERNAL MEDICINE

## 2021-01-27 PROCEDURE — 99999 PR PBB SHADOW E&M-EST. PATIENT-LVL III: ICD-10-PCS | Mod: PBBFAC,,, | Performed by: INTERNAL MEDICINE

## 2021-01-29 ENCOUNTER — TELEPHONE (OUTPATIENT)
Dept: HEMATOLOGY/ONCOLOGY | Facility: CLINIC | Age: 76
End: 2021-01-29

## 2021-03-17 ENCOUNTER — HOSPITAL ENCOUNTER (OUTPATIENT)
Dept: RADIOLOGY | Facility: HOSPITAL | Age: 76
Discharge: HOME | End: 2021-03-17
Attending: INTERNAL MEDICINE
Payer: MEDICARE

## 2021-03-17 DIAGNOSIS — Z17.0 MALIGNANT NEOPLASM OF NIPPLE OF LEFT BREAST IN FEMALE, ESTROGEN RECEPTOR POSITIVE: ICD-10-CM

## 2021-03-17 DIAGNOSIS — C50.012 MALIGNANT NEOPLASM OF NIPPLE OF LEFT BREAST IN FEMALE, ESTROGEN RECEPTOR POSITIVE: ICD-10-CM

## 2021-03-17 PROCEDURE — 77062 BREAST TOMOSYNTHESIS BI: CPT | Mod: 26,,, | Performed by: RADIOLOGY

## 2021-03-17 PROCEDURE — 77062 BREAST TOMOSYNTHESIS BI: CPT | Mod: TC

## 2021-03-17 PROCEDURE — 77066 MAMMO DIGITAL DIAGNOSTIC BILAT WITH TOMO: ICD-10-PCS | Mod: 26,,, | Performed by: RADIOLOGY

## 2021-03-17 PROCEDURE — 77062 MAMMO DIGITAL DIAGNOSTIC BILAT WITH TOMO: ICD-10-PCS | Mod: 26,,, | Performed by: RADIOLOGY

## 2021-03-17 PROCEDURE — 77066 DX MAMMO INCL CAD BI: CPT | Mod: 26,,, | Performed by: RADIOLOGY

## 2021-03-31 ENCOUNTER — OFFICE VISIT (OUTPATIENT)
Dept: RHEUMATOLOGY | Facility: CLINIC | Age: 76
End: 2021-03-31
Payer: MEDICARE

## 2021-03-31 VITALS
DIASTOLIC BLOOD PRESSURE: 85 MMHG | BODY MASS INDEX: 27.69 KG/M2 | WEIGHT: 182.13 LBS | SYSTOLIC BLOOD PRESSURE: 127 MMHG

## 2021-03-31 DIAGNOSIS — M05.79 RHEUMATOID ARTHRITIS INVOLVING MULTIPLE SITES WITH POSITIVE RHEUMATOID FACTOR: Primary | ICD-10-CM

## 2021-03-31 DIAGNOSIS — M15.9 OSTEOARTHRITIS, GENERALIZED: ICD-10-CM

## 2021-03-31 PROCEDURE — 99213 PR OFFICE/OUTPT VISIT, EST, LEVL III, 20-29 MIN: ICD-10-PCS | Mod: S$GLB,,, | Performed by: INTERNAL MEDICINE

## 2021-03-31 PROCEDURE — 99213 OFFICE O/P EST LOW 20 MIN: CPT | Mod: S$GLB,,, | Performed by: INTERNAL MEDICINE

## 2021-03-31 RX ORDER — TRAMADOL HYDROCHLORIDE 50 MG/1
TABLET ORAL
Qty: 90 TABLET | Refills: 2 | Status: SHIPPED | OUTPATIENT
Start: 2021-03-31 | End: 2021-11-03

## 2021-03-31 RX ORDER — METHOTREXATE 2.5 MG/1
12.5 TABLET ORAL
Qty: 20 TABLET | Refills: 2 | Status: SHIPPED | OUTPATIENT
Start: 2021-03-31 | End: 2021-08-03

## 2021-03-31 RX ORDER — HYDROXYCHLOROQUINE SULFATE 200 MG/1
200 TABLET, FILM COATED ORAL 2 TIMES DAILY
Qty: 60 TABLET | Refills: 3 | Status: SHIPPED | OUTPATIENT
Start: 2021-03-31 | End: 2021-07-26

## 2021-03-31 RX ORDER — FOLIC ACID 1 MG/1
1000 TABLET ORAL DAILY
Qty: 30 TABLET | Refills: 11 | Status: SHIPPED | OUTPATIENT
Start: 2021-03-31 | End: 2021-12-14

## 2021-04-09 ENCOUNTER — LAB VISIT (OUTPATIENT)
Dept: LAB | Facility: HOSPITAL | Age: 76
End: 2021-04-09
Attending: INTERNAL MEDICINE
Payer: MEDICARE

## 2021-04-09 DIAGNOSIS — M05.79 RHEUMATOID ARTHRITIS INVOLVING MULTIPLE SITES WITH POSITIVE RHEUMATOID FACTOR: ICD-10-CM

## 2021-04-09 LAB
ALBUMIN SERPL BCP-MCNC: 4.6 G/DL (ref 3.5–5.2)
ALP SERPL-CCNC: 61 U/L (ref 55–135)
ALT SERPL W/O P-5'-P-CCNC: 26 U/L (ref 10–44)
ANION GAP SERPL CALC-SCNC: 3 MMOL/L (ref 8–16)
AST SERPL-CCNC: 29 U/L (ref 10–40)
BASOPHILS # BLD AUTO: 0.03 K/UL (ref 0–0.2)
BASOPHILS NFR BLD: 0.4 % (ref 0–1.9)
BILIRUB SERPL-MCNC: 0.9 MG/DL (ref 0.1–1)
BUN SERPL-MCNC: 15 MG/DL (ref 8–23)
CALCIUM SERPL-MCNC: 9.4 MG/DL (ref 8.7–10.5)
CHLORIDE SERPL-SCNC: 102 MMOL/L (ref 95–110)
CO2 SERPL-SCNC: 32 MMOL/L (ref 23–29)
CREAT SERPL-MCNC: 0.9 MG/DL (ref 0.5–1.4)
CRP SERPL-MCNC: 0.33 MG/DL
DIFFERENTIAL METHOD: ABNORMAL
EOSINOPHIL # BLD AUTO: 0.2 K/UL (ref 0–0.5)
EOSINOPHIL NFR BLD: 2.5 % (ref 0–8)
ERYTHROCYTE [DISTWIDTH] IN BLOOD BY AUTOMATED COUNT: 13.7 % (ref 11.5–14.5)
EST. GFR  (AFRICAN AMERICAN): >60 ML/MIN/1.73 M^2
EST. GFR  (NON AFRICAN AMERICAN): >60 ML/MIN/1.73 M^2
GLUCOSE SERPL-MCNC: 97 MG/DL (ref 70–110)
HCT VFR BLD AUTO: 42.5 % (ref 37–48.5)
HGB BLD-MCNC: 13.9 G/DL (ref 12–16)
IMM GRANULOCYTES # BLD AUTO: 0.03 K/UL (ref 0–0.04)
IMM GRANULOCYTES NFR BLD AUTO: 0.4 % (ref 0–0.5)
LYMPHOCYTES # BLD AUTO: 2.1 K/UL (ref 1–4.8)
LYMPHOCYTES NFR BLD: 25.8 % (ref 18–48)
MCH RBC QN AUTO: 32.6 PG (ref 27–31)
MCHC RBC AUTO-ENTMCNC: 32.7 G/DL (ref 32–36)
MCV RBC AUTO: 100 FL (ref 82–98)
MONOCYTES # BLD AUTO: 0.7 K/UL (ref 0.3–1)
MONOCYTES NFR BLD: 8.8 % (ref 4–15)
NEUTROPHILS # BLD AUTO: 5.2 K/UL (ref 1.8–7.7)
NEUTROPHILS NFR BLD: 62.1 % (ref 38–73)
NRBC BLD-RTO: 0 /100 WBC
PLATELET # BLD AUTO: 228 K/UL (ref 150–450)
PMV BLD AUTO: 10.9 FL (ref 9.2–12.9)
POTASSIUM SERPL-SCNC: 4 MMOL/L (ref 3.5–5.1)
PROT SERPL-MCNC: 8 G/DL (ref 6–8.4)
RBC # BLD AUTO: 4.27 M/UL (ref 4–5.4)
SODIUM SERPL-SCNC: 137 MMOL/L (ref 136–145)
WBC # BLD AUTO: 8.3 K/UL (ref 3.9–12.7)

## 2021-04-09 PROCEDURE — 85025 COMPLETE CBC W/AUTO DIFF WBC: CPT | Performed by: INTERNAL MEDICINE

## 2021-04-09 PROCEDURE — 86140 C-REACTIVE PROTEIN: CPT | Performed by: INTERNAL MEDICINE

## 2021-04-09 PROCEDURE — 80053 COMPREHEN METABOLIC PANEL: CPT | Performed by: INTERNAL MEDICINE

## 2021-04-09 PROCEDURE — 36415 COLL VENOUS BLD VENIPUNCTURE: CPT | Performed by: INTERNAL MEDICINE

## 2021-07-05 DIAGNOSIS — E03.9 ACQUIRED HYPOTHYROIDISM: ICD-10-CM

## 2021-07-05 RX ORDER — LEVOTHYROXINE SODIUM 125 UG/1
TABLET ORAL
Qty: 90 TABLET | Refills: 3 | Status: SHIPPED | OUTPATIENT
Start: 2021-07-05 | End: 2021-07-12 | Stop reason: ALTCHOICE

## 2021-07-12 ENCOUNTER — OFFICE VISIT (OUTPATIENT)
Dept: FAMILY MEDICINE | Facility: CLINIC | Age: 76
End: 2021-07-12
Payer: MEDICARE

## 2021-07-12 VITALS
SYSTOLIC BLOOD PRESSURE: 122 MMHG | OXYGEN SATURATION: 95 % | WEIGHT: 182.56 LBS | BODY MASS INDEX: 27.67 KG/M2 | HEART RATE: 70 BPM | DIASTOLIC BLOOD PRESSURE: 70 MMHG | TEMPERATURE: 99 F | HEIGHT: 68 IN

## 2021-07-12 DIAGNOSIS — Z17.0 MALIGNANT NEOPLASM OF CENTRAL PORTION OF LEFT BREAST IN FEMALE, ESTROGEN RECEPTOR POSITIVE: ICD-10-CM

## 2021-07-12 DIAGNOSIS — I10 ESSENTIAL HYPERTENSION, BENIGN: Primary | ICD-10-CM

## 2021-07-12 DIAGNOSIS — E03.9 ACQUIRED HYPOTHYROIDISM: ICD-10-CM

## 2021-07-12 DIAGNOSIS — C50.112 MALIGNANT NEOPLASM OF CENTRAL PORTION OF LEFT BREAST IN FEMALE, ESTROGEN RECEPTOR POSITIVE: ICD-10-CM

## 2021-07-12 DIAGNOSIS — E78.2 MIXED HYPERLIPIDEMIA: ICD-10-CM

## 2021-07-12 PROCEDURE — 99213 OFFICE O/P EST LOW 20 MIN: CPT | Mod: S$PBB,,, | Performed by: FAMILY MEDICINE

## 2021-07-12 PROCEDURE — 99999 PR PBB SHADOW E&M-EST. PATIENT-LVL III: CPT | Mod: PBBFAC,,, | Performed by: FAMILY MEDICINE

## 2021-07-12 PROCEDURE — 99999 PR PBB SHADOW E&M-EST. PATIENT-LVL III: ICD-10-PCS | Mod: PBBFAC,,, | Performed by: FAMILY MEDICINE

## 2021-07-12 PROCEDURE — 99213 OFFICE O/P EST LOW 20 MIN: CPT | Mod: PBBFAC,PN | Performed by: FAMILY MEDICINE

## 2021-07-12 PROCEDURE — 99213 PR OFFICE/OUTPT VISIT, EST, LEVL III, 20-29 MIN: ICD-10-PCS | Mod: S$PBB,,, | Performed by: FAMILY MEDICINE

## 2021-07-12 RX ORDER — LEVOTHYROXINE SODIUM 125 UG/1
125 TABLET ORAL
COMMUNITY
End: 2022-07-04

## 2021-07-12 RX ORDER — POTASSIUM CHLORIDE 750 MG/1
1 TABLET, EXTENDED RELEASE ORAL DAILY
COMMUNITY
Start: 2020-12-24 | End: 2021-07-12 | Stop reason: ALTCHOICE

## 2021-07-13 DIAGNOSIS — I10 ESSENTIAL HYPERTENSION, BENIGN: ICD-10-CM

## 2021-07-13 RX ORDER — HYDROCHLOROTHIAZIDE 25 MG/1
TABLET ORAL
Qty: 90 TABLET | Refills: 3 | Status: SHIPPED | OUTPATIENT
Start: 2021-07-13 | End: 2022-07-15

## 2021-07-21 ENCOUNTER — HOSPITAL ENCOUNTER (OUTPATIENT)
Dept: RADIOLOGY | Facility: HOSPITAL | Age: 76
Discharge: HOME | End: 2021-07-21
Attending: INTERNAL MEDICINE
Payer: MEDICARE

## 2021-07-21 ENCOUNTER — LAB VISIT (OUTPATIENT)
Dept: LAB | Facility: HOSPITAL | Age: 76
End: 2021-07-21
Attending: INTERNAL MEDICINE
Payer: MEDICARE

## 2021-07-21 VITALS — BODY MASS INDEX: 27.58 KG/M2 | HEIGHT: 68 IN | WEIGHT: 182 LBS

## 2021-07-21 DIAGNOSIS — E03.9 ACQUIRED HYPOTHYROIDISM: ICD-10-CM

## 2021-07-21 DIAGNOSIS — I10 ESSENTIAL HYPERTENSION, BENIGN: ICD-10-CM

## 2021-07-21 DIAGNOSIS — C50.012 MALIGNANT NEOPLASM OF NIPPLE OF LEFT BREAST IN FEMALE, ESTROGEN RECEPTOR POSITIVE: ICD-10-CM

## 2021-07-21 DIAGNOSIS — Z17.0 MALIGNANT NEOPLASM OF NIPPLE OF LEFT BREAST IN FEMALE, ESTROGEN RECEPTOR POSITIVE: ICD-10-CM

## 2021-07-21 DIAGNOSIS — E78.5 HYPERLIPIDEMIA: ICD-10-CM

## 2021-07-21 DIAGNOSIS — E78.2 MIXED HYPERLIPIDEMIA: ICD-10-CM

## 2021-07-21 LAB
ALBUMIN SERPL BCP-MCNC: 4.6 G/DL (ref 3.5–5.2)
ALP SERPL-CCNC: 64 U/L (ref 55–135)
ALT SERPL W/O P-5'-P-CCNC: 36 U/L (ref 10–44)
ANION GAP SERPL CALC-SCNC: 10 MMOL/L (ref 8–16)
AST SERPL-CCNC: 39 U/L (ref 10–40)
BASOPHILS # BLD AUTO: 0.04 K/UL (ref 0–0.2)
BASOPHILS NFR BLD: 0.5 % (ref 0–1.9)
BILIRUB SERPL-MCNC: 0.5 MG/DL (ref 0.1–1)
BUN SERPL-MCNC: 12 MG/DL (ref 8–23)
CALCIUM SERPL-MCNC: 10.5 MG/DL (ref 8.7–10.5)
CHLORIDE SERPL-SCNC: 104 MMOL/L (ref 95–110)
CHOLEST SERPL-MCNC: 161 MG/DL (ref 120–199)
CHOLEST/HDLC SERPL: 3.7 {RATIO} (ref 2–5)
CO2 SERPL-SCNC: 27 MMOL/L (ref 23–29)
CREAT SERPL-MCNC: 0.8 MG/DL (ref 0.5–1.4)
DIFFERENTIAL METHOD: ABNORMAL
EOSINOPHIL # BLD AUTO: 0.1 K/UL (ref 0–0.5)
EOSINOPHIL NFR BLD: 1.6 % (ref 0–8)
ERYTHROCYTE [DISTWIDTH] IN BLOOD BY AUTOMATED COUNT: 14.8 % (ref 11.5–14.5)
EST. GFR  (AFRICAN AMERICAN): >60 ML/MIN/1.73 M^2
EST. GFR  (NON AFRICAN AMERICAN): >60 ML/MIN/1.73 M^2
GLUCOSE SERPL-MCNC: 86 MG/DL (ref 70–110)
GLUCOSE SERPL-MCNC: 87 MG/DL (ref 70–110)
HCT VFR BLD AUTO: 43.8 % (ref 37–48.5)
HDLC SERPL-MCNC: 44 MG/DL (ref 40–75)
HDLC SERPL: 27.3 % (ref 20–50)
HGB BLD-MCNC: 13.9 G/DL (ref 12–16)
IMM GRANULOCYTES # BLD AUTO: 0.02 K/UL (ref 0–0.04)
IMM GRANULOCYTES NFR BLD AUTO: 0.3 % (ref 0–0.5)
LDLC SERPL CALC-MCNC: 79 MG/DL (ref 63–159)
LYMPHOCYTES # BLD AUTO: 1.4 K/UL (ref 1–4.8)
LYMPHOCYTES NFR BLD: 19 % (ref 18–48)
MCH RBC QN AUTO: 32.2 PG (ref 27–31)
MCHC RBC AUTO-ENTMCNC: 31.7 G/DL (ref 32–36)
MCV RBC AUTO: 101 FL (ref 82–98)
MONOCYTES # BLD AUTO: 0.5 K/UL (ref 0.3–1)
MONOCYTES NFR BLD: 6.8 % (ref 4–15)
NEUTROPHILS # BLD AUTO: 5.4 K/UL (ref 1.8–7.7)
NEUTROPHILS NFR BLD: 71.8 % (ref 38–73)
NONHDLC SERPL-MCNC: 117 MG/DL
NRBC BLD-RTO: 0 /100 WBC
PLATELET # BLD AUTO: 210 K/UL (ref 150–450)
PMV BLD AUTO: 11.1 FL (ref 9.2–12.9)
POTASSIUM SERPL-SCNC: 4.1 MMOL/L (ref 3.5–5.1)
PROT SERPL-MCNC: 8.2 G/DL (ref 6–8.4)
RBC # BLD AUTO: 4.32 M/UL (ref 4–5.4)
SODIUM SERPL-SCNC: 141 MMOL/L (ref 136–145)
TRIGL SERPL-MCNC: 190 MG/DL (ref 30–150)
TSH SERPL DL<=0.005 MIU/L-ACNC: 2.23 UIU/ML (ref 0.4–4)
WBC # BLD AUTO: 7.52 K/UL (ref 3.9–12.7)

## 2021-07-21 PROCEDURE — 85025 COMPLETE CBC W/AUTO DIFF WBC: CPT | Performed by: INTERNAL MEDICINE

## 2021-07-21 PROCEDURE — 84443 ASSAY THYROID STIM HORMONE: CPT | Performed by: FAMILY MEDICINE

## 2021-07-21 PROCEDURE — 78815 PET IMAGE W/CT SKULL-THIGH: CPT | Mod: PS,TC,PO

## 2021-07-21 PROCEDURE — 82962 GLUCOSE BLOOD TEST: CPT | Mod: PO

## 2021-07-21 PROCEDURE — 80053 COMPREHEN METABOLIC PANEL: CPT | Performed by: INTERNAL MEDICINE

## 2021-07-21 PROCEDURE — 80061 LIPID PANEL: CPT | Performed by: FAMILY MEDICINE

## 2021-07-21 PROCEDURE — 36415 COLL VENOUS BLD VENIPUNCTURE: CPT | Performed by: INTERNAL MEDICINE

## 2021-07-21 RX ORDER — AMLODIPINE BESYLATE 5 MG/1
TABLET ORAL
Qty: 90 TABLET | Refills: 3 | Status: SHIPPED | OUTPATIENT
Start: 2021-07-21 | End: 2022-07-15

## 2021-07-21 RX ORDER — ATORVASTATIN CALCIUM 20 MG/1
TABLET, FILM COATED ORAL
Qty: 90 TABLET | Refills: 3 | Status: SHIPPED | OUTPATIENT
Start: 2021-07-21 | End: 2022-07-20

## 2021-07-23 LAB — CANCER AG27-29 SERPL-ACNC: 18.9 U/ML

## 2021-07-30 ENCOUNTER — OFFICE VISIT (OUTPATIENT)
Dept: HEMATOLOGY/ONCOLOGY | Facility: CLINIC | Age: 76
End: 2021-07-30
Payer: MEDICARE

## 2021-07-30 VITALS
SYSTOLIC BLOOD PRESSURE: 133 MMHG | HEIGHT: 68 IN | TEMPERATURE: 96 F | RESPIRATION RATE: 18 BRPM | HEART RATE: 72 BPM | BODY MASS INDEX: 27.7 KG/M2 | WEIGHT: 182.75 LBS | DIASTOLIC BLOOD PRESSURE: 63 MMHG | OXYGEN SATURATION: 94 %

## 2021-07-30 DIAGNOSIS — Z17.0 CARCINOMA OF CENTRAL PORTION OF LEFT BREAST IN FEMALE, ESTROGEN RECEPTOR POSITIVE: Primary | ICD-10-CM

## 2021-07-30 DIAGNOSIS — C50.112 CARCINOMA OF CENTRAL PORTION OF LEFT BREAST IN FEMALE, ESTROGEN RECEPTOR POSITIVE: Primary | ICD-10-CM

## 2021-07-30 DIAGNOSIS — M05.711 RHEUMATOID ARTHRITIS INVOLVING RIGHT SHOULDER WITH POSITIVE RHEUMATOID FACTOR: ICD-10-CM

## 2021-07-30 DIAGNOSIS — M81.0 AGE-RELATED OSTEOPOROSIS WITHOUT CURRENT PATHOLOGICAL FRACTURE: ICD-10-CM

## 2021-07-30 DIAGNOSIS — E03.9 ACQUIRED HYPOTHYROIDISM: ICD-10-CM

## 2021-07-30 PROCEDURE — 99214 PR OFFICE/OUTPT VISIT, EST, LEVL IV, 30-39 MIN: ICD-10-PCS | Mod: S$PBB,,, | Performed by: INTERNAL MEDICINE

## 2021-07-30 PROCEDURE — 99214 OFFICE O/P EST MOD 30 MIN: CPT | Mod: S$PBB,,, | Performed by: INTERNAL MEDICINE

## 2021-07-30 PROCEDURE — 99215 OFFICE O/P EST HI 40 MIN: CPT | Mod: PBBFAC,PO | Performed by: INTERNAL MEDICINE

## 2021-07-30 PROCEDURE — 99999 PR PBB SHADOW E&M-EST. PATIENT-LVL V: ICD-10-PCS | Mod: PBBFAC,,, | Performed by: INTERNAL MEDICINE

## 2021-07-30 PROCEDURE — 99999 PR PBB SHADOW E&M-EST. PATIENT-LVL V: CPT | Mod: PBBFAC,,, | Performed by: INTERNAL MEDICINE

## 2021-08-03 ENCOUNTER — OFFICE VISIT (OUTPATIENT)
Dept: RHEUMATOLOGY | Facility: CLINIC | Age: 76
End: 2021-08-03
Payer: MEDICARE

## 2021-08-03 VITALS
WEIGHT: 182.31 LBS | DIASTOLIC BLOOD PRESSURE: 81 MMHG | SYSTOLIC BLOOD PRESSURE: 139 MMHG | BODY MASS INDEX: 27.72 KG/M2

## 2021-08-03 DIAGNOSIS — M54.9 DORSALGIA, UNSPECIFIED: ICD-10-CM

## 2021-08-03 DIAGNOSIS — M05.79 RHEUMATOID ARTHRITIS INVOLVING MULTIPLE SITES WITH POSITIVE RHEUMATOID FACTOR: Primary | ICD-10-CM

## 2021-08-03 PROCEDURE — 99213 PR OFFICE/OUTPT VISIT, EST, LEVL III, 20-29 MIN: ICD-10-PCS | Mod: S$GLB,,, | Performed by: INTERNAL MEDICINE

## 2021-08-03 PROCEDURE — 99213 OFFICE O/P EST LOW 20 MIN: CPT | Mod: S$GLB,,, | Performed by: INTERNAL MEDICINE

## 2021-08-03 RX ORDER — METHOTREXATE 2.5 MG/1
12.5 TABLET ORAL
Qty: 60 TABLET | Refills: 1 | Status: SHIPPED | OUTPATIENT
Start: 2021-08-03 | End: 2021-12-14

## 2021-08-03 RX ORDER — HYDROXYCHLOROQUINE SULFATE 200 MG/1
200 TABLET, FILM COATED ORAL 2 TIMES DAILY
Qty: 180 TABLET | Refills: 1 | Status: SHIPPED | OUTPATIENT
Start: 2021-08-03 | End: 2021-08-26

## 2021-08-03 RX ORDER — HYDROXYCHLOROQUINE SULFATE 200 MG/1
200 TABLET, FILM COATED ORAL 2 TIMES DAILY
Qty: 60 TABLET | Refills: 3 | Status: SHIPPED | OUTPATIENT
Start: 2021-08-03 | End: 2021-08-03

## 2021-08-11 ENCOUNTER — HOSPITAL ENCOUNTER (OUTPATIENT)
Dept: RADIOLOGY | Facility: HOSPITAL | Age: 76
Discharge: HOME | End: 2021-08-11
Attending: INTERNAL MEDICINE
Payer: MEDICARE

## 2021-08-11 DIAGNOSIS — M54.9 DORSALGIA, UNSPECIFIED: ICD-10-CM

## 2021-08-11 PROCEDURE — 72148 MRI LUMBAR SPINE W/O DYE: CPT | Mod: TC,PO

## 2021-08-16 ENCOUNTER — TELEPHONE (OUTPATIENT)
Dept: RHEUMATOLOGY | Facility: CLINIC | Age: 76
End: 2021-08-16

## 2021-08-17 DIAGNOSIS — R93.89 ABNORMAL MRI: ICD-10-CM

## 2021-08-17 DIAGNOSIS — M54.9 DORSALGIA, UNSPECIFIED: Primary | ICD-10-CM

## 2021-08-18 ENCOUNTER — TELEPHONE (OUTPATIENT)
Dept: SPINE | Facility: CLINIC | Age: 76
End: 2021-08-18

## 2021-08-19 ENCOUNTER — OFFICE VISIT (OUTPATIENT)
Dept: SPINE | Facility: CLINIC | Age: 76
End: 2021-08-19
Payer: MEDICARE

## 2021-08-19 ENCOUNTER — TELEPHONE (OUTPATIENT)
Dept: PAIN MEDICINE | Facility: CLINIC | Age: 76
End: 2021-08-19

## 2021-08-19 VITALS — HEIGHT: 68 IN | BODY MASS INDEX: 27.63 KG/M2 | WEIGHT: 182.31 LBS

## 2021-08-19 DIAGNOSIS — M54.16 RIGHT LUMBAR RADICULITIS: Primary | ICD-10-CM

## 2021-08-19 DIAGNOSIS — M54.9 DORSALGIA, UNSPECIFIED: ICD-10-CM

## 2021-08-19 DIAGNOSIS — R93.89 ABNORMAL MRI: ICD-10-CM

## 2021-08-19 DIAGNOSIS — M54.16 LUMBAR RADICULOPATHY: Primary | ICD-10-CM

## 2021-08-19 PROCEDURE — 99204 OFFICE O/P NEW MOD 45 MIN: CPT | Mod: S$GLB,,, | Performed by: PHYSICAL MEDICINE & REHABILITATION

## 2021-08-19 PROCEDURE — 99204 PR OFFICE/OUTPT VISIT, NEW, LEVL IV, 45-59 MIN: ICD-10-PCS | Mod: S$GLB,,, | Performed by: PHYSICAL MEDICINE & REHABILITATION

## 2021-09-03 DIAGNOSIS — U07.1 COVID-19 VIRUS DETECTED: ICD-10-CM

## 2021-09-06 ENCOUNTER — NURSE TRIAGE (OUTPATIENT)
Dept: ADMINISTRATIVE | Facility: CLINIC | Age: 76
End: 2021-09-06

## 2021-09-20 ENCOUNTER — OFFICE VISIT (OUTPATIENT)
Dept: RHEUMATOLOGY | Facility: CLINIC | Age: 76
End: 2021-09-20
Payer: MEDICARE

## 2021-09-20 ENCOUNTER — TELEPHONE (OUTPATIENT)
Dept: PAIN MEDICINE | Facility: CLINIC | Age: 76
End: 2021-09-20

## 2021-09-20 VITALS
WEIGHT: 181.31 LBS | DIASTOLIC BLOOD PRESSURE: 71 MMHG | SYSTOLIC BLOOD PRESSURE: 141 MMHG | BODY MASS INDEX: 27.57 KG/M2

## 2021-09-20 DIAGNOSIS — M05.79 RHEUMATOID ARTHRITIS INVOLVING MULTIPLE SITES WITH POSITIVE RHEUMATOID FACTOR: Primary | ICD-10-CM

## 2021-09-20 DIAGNOSIS — M54.16 LUMBAR RADICULOPATHY: Primary | ICD-10-CM

## 2021-09-20 PROCEDURE — 99213 OFFICE O/P EST LOW 20 MIN: CPT | Mod: S$GLB,,, | Performed by: INTERNAL MEDICINE

## 2021-09-20 PROCEDURE — 99213 PR OFFICE/OUTPT VISIT, EST, LEVL III, 20-29 MIN: ICD-10-PCS | Mod: S$GLB,,, | Performed by: INTERNAL MEDICINE

## 2021-09-23 ENCOUNTER — HOSPITAL ENCOUNTER (OUTPATIENT)
Dept: RADIOLOGY | Facility: HOSPITAL | Age: 76
Discharge: HOME | End: 2021-09-23
Attending: INTERNAL MEDICINE
Payer: MEDICARE

## 2021-09-23 DIAGNOSIS — E03.9 ACQUIRED HYPOTHYROIDISM: ICD-10-CM

## 2021-09-23 DIAGNOSIS — C50.112 CARCINOMA OF CENTRAL PORTION OF LEFT BREAST IN FEMALE, ESTROGEN RECEPTOR POSITIVE: ICD-10-CM

## 2021-09-23 DIAGNOSIS — Z17.0 CARCINOMA OF CENTRAL PORTION OF LEFT BREAST IN FEMALE, ESTROGEN RECEPTOR POSITIVE: ICD-10-CM

## 2021-09-23 DIAGNOSIS — M05.711 RHEUMATOID ARTHRITIS INVOLVING RIGHT SHOULDER WITH POSITIVE RHEUMATOID FACTOR: ICD-10-CM

## 2021-09-23 DIAGNOSIS — M81.0 AGE-RELATED OSTEOPOROSIS WITHOUT CURRENT PATHOLOGICAL FRACTURE: ICD-10-CM

## 2021-09-23 PROCEDURE — 77065 DX MAMMO INCL CAD UNI: CPT | Mod: 26,LT,, | Performed by: RADIOLOGY

## 2021-09-23 PROCEDURE — 77061 BREAST TOMOSYNTHESIS UNI: CPT | Mod: 26,LT,, | Performed by: RADIOLOGY

## 2021-09-23 PROCEDURE — 77080 DXA BONE DENSITY AXIAL: CPT | Mod: TC

## 2021-09-23 PROCEDURE — 77080 DXA BONE DENSITY AXIAL: CPT | Mod: 26,,, | Performed by: RADIOLOGY

## 2021-09-23 PROCEDURE — 77065 MAMMO DIGITAL DIAGNOSTIC LEFT WITH TOMO: ICD-10-PCS | Mod: 26,LT,, | Performed by: RADIOLOGY

## 2021-09-23 PROCEDURE — 77061 MAMMO DIGITAL DIAGNOSTIC LEFT WITH TOMO: ICD-10-PCS | Mod: 26,LT,, | Performed by: RADIOLOGY

## 2021-09-23 PROCEDURE — 77061 BREAST TOMOSYNTHESIS UNI: CPT | Mod: TC,LT

## 2021-09-23 PROCEDURE — 77080 DEXA BONE DENSITY SPINE HIP: ICD-10-PCS | Mod: 26,,, | Performed by: RADIOLOGY

## 2021-09-27 ENCOUNTER — OFFICE VISIT (OUTPATIENT)
Dept: HEMATOLOGY/ONCOLOGY | Facility: CLINIC | Age: 76
End: 2021-09-27
Payer: MEDICARE

## 2021-09-27 VITALS
DIASTOLIC BLOOD PRESSURE: 64 MMHG | TEMPERATURE: 97 F | BODY MASS INDEX: 27.6 KG/M2 | WEIGHT: 182.13 LBS | HEIGHT: 68 IN | SYSTOLIC BLOOD PRESSURE: 134 MMHG | RESPIRATION RATE: 16 BRPM | OXYGEN SATURATION: 96 % | HEART RATE: 71 BPM

## 2021-09-27 DIAGNOSIS — E78.00 PURE HYPERCHOLESTEROLEMIA: ICD-10-CM

## 2021-09-27 DIAGNOSIS — I10 ESSENTIAL HYPERTENSION, BENIGN: ICD-10-CM

## 2021-09-27 DIAGNOSIS — I70.0 ATHEROSCLEROSIS OF AORTA: ICD-10-CM

## 2021-09-27 DIAGNOSIS — U07.1 COVID-19 VIRUS INFECTION: ICD-10-CM

## 2021-09-27 DIAGNOSIS — D75.89 MACROCYTOSIS WITHOUT ANEMIA: ICD-10-CM

## 2021-09-27 DIAGNOSIS — C50.112 CARCINOMA OF CENTRAL PORTION OF LEFT BREAST IN FEMALE, ESTROGEN RECEPTOR POSITIVE: Primary | ICD-10-CM

## 2021-09-27 DIAGNOSIS — M81.8 OTHER OSTEOPOROSIS WITHOUT CURRENT PATHOLOGICAL FRACTURE: ICD-10-CM

## 2021-09-27 DIAGNOSIS — Z17.0 CARCINOMA OF CENTRAL PORTION OF LEFT BREAST IN FEMALE, ESTROGEN RECEPTOR POSITIVE: Primary | ICD-10-CM

## 2021-09-27 PROCEDURE — 99215 OFFICE O/P EST HI 40 MIN: CPT | Mod: PBBFAC,PO | Performed by: PHYSICIAN ASSISTANT

## 2021-09-27 PROCEDURE — 99215 PR OFFICE/OUTPT VISIT, EST, LEVL V, 40-54 MIN: ICD-10-PCS | Mod: S$PBB,CR,, | Performed by: PHYSICIAN ASSISTANT

## 2021-09-27 PROCEDURE — 99999 PR PBB SHADOW E&M-EST. PATIENT-LVL V: ICD-10-PCS | Mod: PBBFAC,CR,, | Performed by: PHYSICIAN ASSISTANT

## 2021-09-27 PROCEDURE — 99215 OFFICE O/P EST HI 40 MIN: CPT | Mod: S$PBB,CR,, | Performed by: PHYSICIAN ASSISTANT

## 2021-09-27 PROCEDURE — 99999 PR PBB SHADOW E&M-EST. PATIENT-LVL V: CPT | Mod: PBBFAC,CR,, | Performed by: PHYSICIAN ASSISTANT

## 2021-09-30 ENCOUNTER — HOSPITAL ENCOUNTER (OUTPATIENT)
Facility: AMBULARY SURGERY CENTER | Age: 76
Discharge: HOME | End: 2021-09-30
Attending: ANESTHESIOLOGY | Admitting: ANESTHESIOLOGY
Payer: MEDICARE

## 2021-09-30 DIAGNOSIS — M54.16 LUMBAR RADICULITIS: Primary | ICD-10-CM

## 2021-09-30 PROCEDURE — 62323 NJX INTERLAMINAR LMBR/SAC: CPT | Mod: ,,, | Performed by: ANESTHESIOLOGY

## 2021-09-30 PROCEDURE — 62323 NJX INTERLAMINAR LMBR/SAC: CPT | Performed by: ANESTHESIOLOGY

## 2021-09-30 PROCEDURE — 62323 PR INJ LUMBAR/SACRAL, W/IMAGING GUIDANCE: ICD-10-PCS | Mod: ,,, | Performed by: ANESTHESIOLOGY

## 2021-09-30 RX ORDER — ALPRAZOLAM 1 MG/1
1 TABLET, ORALLY DISINTEGRATING ORAL
Status: COMPLETED | OUTPATIENT
Start: 2021-09-30 | End: 2021-09-30

## 2021-09-30 RX ORDER — DEXAMETHASONE SODIUM PHOSPHATE 10 MG/ML
INJECTION INTRAMUSCULAR; INTRAVENOUS
Status: DISCONTINUED | OUTPATIENT
Start: 2021-09-30 | End: 2021-09-30 | Stop reason: HOSPADM

## 2021-09-30 RX ORDER — SODIUM CHLORIDE, SODIUM LACTATE, POTASSIUM CHLORIDE, CALCIUM CHLORIDE 600; 310; 30; 20 MG/100ML; MG/100ML; MG/100ML; MG/100ML
INJECTION, SOLUTION INTRAVENOUS ONCE AS NEEDED
Status: DISCONTINUED | OUTPATIENT
Start: 2021-09-30 | End: 2021-09-30 | Stop reason: HOSPADM

## 2021-09-30 RX ORDER — LIDOCAINE HYDROCHLORIDE 10 MG/ML
INJECTION, SOLUTION EPIDURAL; INFILTRATION; INTRACAUDAL; PERINEURAL
Status: DISCONTINUED | OUTPATIENT
Start: 2021-09-30 | End: 2021-09-30 | Stop reason: HOSPADM

## 2021-09-30 RX ORDER — SODIUM CHLORIDE 9 MG/ML
INJECTION, SOLUTION INTRAMUSCULAR; INTRAVENOUS; SUBCUTANEOUS
Status: DISCONTINUED | OUTPATIENT
Start: 2021-09-30 | End: 2021-09-30 | Stop reason: HOSPADM

## 2021-09-30 RX ADMIN — ALPRAZOLAM 1 MG: 1 TABLET, ORALLY DISINTEGRATING ORAL at 12:09

## 2021-10-01 VITALS
HEART RATE: 65 BPM | RESPIRATION RATE: 18 BRPM | WEIGHT: 181.19 LBS | TEMPERATURE: 98 F | OXYGEN SATURATION: 95 % | SYSTOLIC BLOOD PRESSURE: 132 MMHG | BODY MASS INDEX: 27.55 KG/M2 | DIASTOLIC BLOOD PRESSURE: 67 MMHG

## 2021-10-11 ENCOUNTER — OFFICE VISIT (OUTPATIENT)
Dept: FAMILY MEDICINE | Facility: CLINIC | Age: 76
End: 2021-10-11
Payer: MEDICARE

## 2021-10-11 VITALS
TEMPERATURE: 98 F | SYSTOLIC BLOOD PRESSURE: 128 MMHG | WEIGHT: 182.31 LBS | HEIGHT: 68 IN | DIASTOLIC BLOOD PRESSURE: 74 MMHG | HEART RATE: 73 BPM | BODY MASS INDEX: 27.63 KG/M2 | OXYGEN SATURATION: 94 %

## 2021-10-11 DIAGNOSIS — C50.112 CARCINOMA OF CENTRAL PORTION OF LEFT BREAST IN FEMALE, ESTROGEN RECEPTOR POSITIVE: ICD-10-CM

## 2021-10-11 DIAGNOSIS — I70.0 ATHEROSCLEROSIS OF AORTA: ICD-10-CM

## 2021-10-11 DIAGNOSIS — Z17.0 CARCINOMA OF CENTRAL PORTION OF LEFT BREAST IN FEMALE, ESTROGEN RECEPTOR POSITIVE: ICD-10-CM

## 2021-10-11 DIAGNOSIS — M05.711 RHEUMATOID ARTHRITIS INVOLVING RIGHT SHOULDER WITH POSITIVE RHEUMATOID FACTOR: ICD-10-CM

## 2021-10-11 DIAGNOSIS — E78.2 MIXED HYPERLIPIDEMIA: ICD-10-CM

## 2021-10-11 DIAGNOSIS — I10 ESSENTIAL HYPERTENSION, BENIGN: ICD-10-CM

## 2021-10-11 DIAGNOSIS — Z00.00 ENCOUNTER FOR PREVENTIVE HEALTH EXAMINATION: Primary | ICD-10-CM

## 2021-10-11 PROCEDURE — G0439 PPPS, SUBSEQ VISIT: HCPCS | Mod: S$GLB,,, | Performed by: NURSE PRACTITIONER

## 2021-10-11 PROCEDURE — G0439 PR MEDICARE ANNUAL WELLNESS SUBSEQUENT VISIT: ICD-10-PCS | Mod: S$GLB,,, | Performed by: NURSE PRACTITIONER

## 2021-10-27 ENCOUNTER — PATIENT OUTREACH (OUTPATIENT)
Dept: ADMINISTRATIVE | Facility: OTHER | Age: 76
End: 2021-10-27
Payer: MEDICARE

## 2021-10-28 ENCOUNTER — OFFICE VISIT (OUTPATIENT)
Dept: SPINE | Facility: CLINIC | Age: 76
End: 2021-10-28
Payer: MEDICARE

## 2021-10-28 VITALS — RESPIRATION RATE: 16 BRPM | WEIGHT: 182 LBS | HEIGHT: 68 IN | BODY MASS INDEX: 27.58 KG/M2

## 2021-10-28 DIAGNOSIS — M54.16 RIGHT LUMBAR RADICULITIS: Primary | ICD-10-CM

## 2021-10-28 DIAGNOSIS — M54.16 LUMBAR RADICULOPATHY: Primary | ICD-10-CM

## 2021-10-28 PROCEDURE — 99213 OFFICE O/P EST LOW 20 MIN: CPT | Mod: S$GLB,,, | Performed by: PHYSICAL MEDICINE & REHABILITATION

## 2021-10-28 PROCEDURE — 99213 PR OFFICE/OUTPT VISIT, EST, LEVL III, 20-29 MIN: ICD-10-PCS | Mod: S$GLB,,, | Performed by: PHYSICAL MEDICINE & REHABILITATION

## 2021-11-16 ENCOUNTER — HOSPITAL ENCOUNTER (OUTPATIENT)
Facility: AMBULARY SURGERY CENTER | Age: 76
Discharge: HOME | End: 2021-11-16
Attending: ANESTHESIOLOGY | Admitting: ANESTHESIOLOGY
Payer: MEDICARE

## 2021-11-16 DIAGNOSIS — M54.16 LUMBAR RADICULITIS: Primary | ICD-10-CM

## 2021-11-16 PROCEDURE — 62323 NJX INTERLAMINAR LMBR/SAC: CPT | Mod: ,,, | Performed by: ANESTHESIOLOGY

## 2021-11-16 PROCEDURE — 62323 PR INJ LUMBAR/SACRAL, W/IMAGING GUIDANCE: ICD-10-PCS | Mod: ,,, | Performed by: ANESTHESIOLOGY

## 2021-11-16 PROCEDURE — 62323 NJX INTERLAMINAR LMBR/SAC: CPT | Performed by: ANESTHESIOLOGY

## 2021-11-16 RX ORDER — SODIUM CHLORIDE 9 MG/ML
INJECTION, SOLUTION INTRAMUSCULAR; INTRAVENOUS; SUBCUTANEOUS
Status: DISCONTINUED | OUTPATIENT
Start: 2021-11-16 | End: 2021-11-16 | Stop reason: HOSPADM

## 2021-11-16 RX ORDER — ALPRAZOLAM 1 MG/1
1 TABLET, ORALLY DISINTEGRATING ORAL ONCE
Status: COMPLETED | OUTPATIENT
Start: 2021-11-16 | End: 2021-11-16

## 2021-11-16 RX ORDER — LIDOCAINE HYDROCHLORIDE 10 MG/ML
INJECTION, SOLUTION EPIDURAL; INFILTRATION; INTRACAUDAL; PERINEURAL
Status: DISPENSED
Start: 2021-11-16 | End: 2021-11-17

## 2021-11-16 RX ORDER — LIDOCAINE HYDROCHLORIDE 10 MG/ML
INJECTION, SOLUTION EPIDURAL; INFILTRATION; INTRACAUDAL; PERINEURAL
Status: DISCONTINUED | OUTPATIENT
Start: 2021-11-16 | End: 2021-11-16 | Stop reason: HOSPADM

## 2021-11-16 RX ORDER — DEXAMETHASONE SODIUM PHOSPHATE 10 MG/ML
INJECTION INTRAMUSCULAR; INTRAVENOUS
Status: DISCONTINUED | OUTPATIENT
Start: 2021-11-16 | End: 2021-11-16 | Stop reason: HOSPADM

## 2021-11-16 RX ORDER — SODIUM CHLORIDE, SODIUM LACTATE, POTASSIUM CHLORIDE, CALCIUM CHLORIDE 600; 310; 30; 20 MG/100ML; MG/100ML; MG/100ML; MG/100ML
INJECTION, SOLUTION INTRAVENOUS ONCE AS NEEDED
Status: DISCONTINUED | OUTPATIENT
Start: 2021-11-16 | End: 2021-11-16 | Stop reason: HOSPADM

## 2021-11-16 RX ADMIN — ALPRAZOLAM 1 MG: 1 TABLET, ORALLY DISINTEGRATING ORAL at 07:11

## 2021-11-17 VITALS
DIASTOLIC BLOOD PRESSURE: 74 MMHG | BODY MASS INDEX: 27.58 KG/M2 | WEIGHT: 182 LBS | HEIGHT: 68 IN | TEMPERATURE: 98 F | HEART RATE: 57 BPM | SYSTOLIC BLOOD PRESSURE: 129 MMHG | OXYGEN SATURATION: 95 % | RESPIRATION RATE: 16 BRPM

## 2021-12-13 ENCOUNTER — PATIENT OUTREACH (OUTPATIENT)
Dept: ADMINISTRATIVE | Facility: OTHER | Age: 76
End: 2021-12-13
Payer: MEDICARE

## 2021-12-14 ENCOUNTER — TELEPHONE (OUTPATIENT)
Dept: PAIN MEDICINE | Facility: CLINIC | Age: 76
End: 2021-12-14
Payer: MEDICARE

## 2021-12-14 ENCOUNTER — OFFICE VISIT (OUTPATIENT)
Dept: RHEUMATOLOGY | Facility: CLINIC | Age: 76
End: 2021-12-14
Payer: MEDICARE

## 2021-12-14 ENCOUNTER — OFFICE VISIT (OUTPATIENT)
Dept: SPINE | Facility: CLINIC | Age: 76
End: 2021-12-14
Payer: MEDICARE

## 2021-12-14 ENCOUNTER — LAB VISIT (OUTPATIENT)
Dept: LAB | Facility: HOSPITAL | Age: 76
End: 2021-12-14
Attending: INTERNAL MEDICINE
Payer: MEDICARE

## 2021-12-14 VITALS — DIASTOLIC BLOOD PRESSURE: 81 MMHG | WEIGHT: 178.88 LBS | BODY MASS INDEX: 27.2 KG/M2 | SYSTOLIC BLOOD PRESSURE: 152 MMHG

## 2021-12-14 DIAGNOSIS — M05.79 RHEUMATOID ARTHRITIS INVOLVING MULTIPLE SITES WITH POSITIVE RHEUMATOID FACTOR: ICD-10-CM

## 2021-12-14 DIAGNOSIS — M54.16 LUMBAR RADICULOPATHY: Primary | ICD-10-CM

## 2021-12-14 DIAGNOSIS — M75.52 BURSITIS OF SHOULDER, LEFT: Primary | ICD-10-CM

## 2021-12-14 DIAGNOSIS — M54.16 RIGHT LUMBAR RADICULITIS: Primary | ICD-10-CM

## 2021-12-14 LAB
ALBUMIN SERPL BCP-MCNC: 4.5 G/DL (ref 3.5–5.2)
ALP SERPL-CCNC: 63 U/L (ref 55–135)
ALT SERPL W/O P-5'-P-CCNC: 25 U/L (ref 10–44)
ANION GAP SERPL CALC-SCNC: 8 MMOL/L (ref 8–16)
AST SERPL-CCNC: 30 U/L (ref 10–40)
BASOPHILS # BLD AUTO: 0.03 K/UL (ref 0–0.2)
BASOPHILS NFR BLD: 0.5 % (ref 0–1.9)
BILIRUB SERPL-MCNC: 0.7 MG/DL (ref 0.1–1)
BUN SERPL-MCNC: 13 MG/DL (ref 8–23)
CALCIUM SERPL-MCNC: 10 MG/DL (ref 8.7–10.5)
CHLORIDE SERPL-SCNC: 103 MMOL/L (ref 95–110)
CO2 SERPL-SCNC: 29 MMOL/L (ref 23–29)
CREAT SERPL-MCNC: 0.9 MG/DL (ref 0.5–1.4)
CRP SERPL-MCNC: 0.22 MG/DL
DIFFERENTIAL METHOD: ABNORMAL
EOSINOPHIL # BLD AUTO: 0.2 K/UL (ref 0–0.5)
EOSINOPHIL NFR BLD: 3.3 % (ref 0–8)
ERYTHROCYTE [DISTWIDTH] IN BLOOD BY AUTOMATED COUNT: 14.4 % (ref 11.5–14.5)
EST. GFR  (AFRICAN AMERICAN): >60 ML/MIN/1.73 M^2
EST. GFR  (NON AFRICAN AMERICAN): >60 ML/MIN/1.73 M^2
GLUCOSE SERPL-MCNC: 95 MG/DL (ref 70–110)
HCT VFR BLD AUTO: 41.1 % (ref 37–48.5)
HGB BLD-MCNC: 13.5 G/DL (ref 12–16)
IMM GRANULOCYTES # BLD AUTO: 0.02 K/UL (ref 0–0.04)
IMM GRANULOCYTES NFR BLD AUTO: 0.3 % (ref 0–0.5)
LYMPHOCYTES # BLD AUTO: 1.4 K/UL (ref 1–4.8)
LYMPHOCYTES NFR BLD: 21 % (ref 18–48)
MCH RBC QN AUTO: 32.7 PG (ref 27–31)
MCHC RBC AUTO-ENTMCNC: 32.8 G/DL (ref 32–36)
MCV RBC AUTO: 100 FL (ref 82–98)
MONOCYTES # BLD AUTO: 0.7 K/UL (ref 0.3–1)
MONOCYTES NFR BLD: 10 % (ref 4–15)
NEUTROPHILS # BLD AUTO: 4.3 K/UL (ref 1.8–7.7)
NEUTROPHILS NFR BLD: 64.9 % (ref 38–73)
NRBC BLD-RTO: 0 /100 WBC
PLATELET # BLD AUTO: 222 K/UL (ref 150–450)
PMV BLD AUTO: 10.7 FL (ref 9.2–12.9)
POTASSIUM SERPL-SCNC: 4.2 MMOL/L (ref 3.5–5.1)
PROT SERPL-MCNC: 7.7 G/DL (ref 6–8.4)
RBC # BLD AUTO: 4.13 M/UL (ref 4–5.4)
SODIUM SERPL-SCNC: 140 MMOL/L (ref 136–145)
WBC # BLD AUTO: 6.63 K/UL (ref 3.9–12.7)

## 2021-12-14 PROCEDURE — 20610 LARGE JOINT ASPIRATION/INJECTION: L SUBACROMIAL BURSA: ICD-10-PCS | Mod: LT,S$GLB,, | Performed by: INTERNAL MEDICINE

## 2021-12-14 PROCEDURE — 36415 COLL VENOUS BLD VENIPUNCTURE: CPT | Performed by: INTERNAL MEDICINE

## 2021-12-14 PROCEDURE — 86140 C-REACTIVE PROTEIN: CPT | Performed by: INTERNAL MEDICINE

## 2021-12-14 PROCEDURE — 99213 OFFICE O/P EST LOW 20 MIN: CPT | Mod: 25,S$GLB,, | Performed by: INTERNAL MEDICINE

## 2021-12-14 PROCEDURE — 99213 PR OFFICE/OUTPT VISIT, EST, LEVL III, 20-29 MIN: ICD-10-PCS | Mod: 25,S$GLB,, | Performed by: INTERNAL MEDICINE

## 2021-12-14 PROCEDURE — 85025 COMPLETE CBC W/AUTO DIFF WBC: CPT | Performed by: INTERNAL MEDICINE

## 2021-12-14 PROCEDURE — 99213 PR OFFICE/OUTPT VISIT, EST, LEVL III, 20-29 MIN: ICD-10-PCS | Mod: S$GLB,,, | Performed by: PHYSICAL MEDICINE & REHABILITATION

## 2021-12-14 PROCEDURE — 20610 DRAIN/INJ JOINT/BURSA W/O US: CPT | Mod: LT,S$GLB,, | Performed by: INTERNAL MEDICINE

## 2021-12-14 PROCEDURE — 80053 COMPREHEN METABOLIC PANEL: CPT | Performed by: INTERNAL MEDICINE

## 2021-12-14 PROCEDURE — 99213 OFFICE O/P EST LOW 20 MIN: CPT | Mod: S$GLB,,, | Performed by: PHYSICAL MEDICINE & REHABILITATION

## 2021-12-14 RX ORDER — METHOTREXATE 2.5 MG/1
12.5 TABLET ORAL
Qty: 60 TABLET | Refills: 1 | Status: SHIPPED | OUTPATIENT
Start: 2021-12-14 | End: 2022-05-02

## 2021-12-14 RX ORDER — GABAPENTIN 300 MG/1
300 CAPSULE ORAL NIGHTLY
Qty: 30 CAPSULE | Refills: 0 | Status: SHIPPED | OUTPATIENT
Start: 2021-12-14 | End: 2022-01-10

## 2021-12-14 RX ORDER — HYDROXYCHLOROQUINE SULFATE 200 MG/1
200 TABLET, FILM COATED ORAL 2 TIMES DAILY
Qty: 60 TABLET | Refills: 3 | Status: SHIPPED | OUTPATIENT
Start: 2021-12-14 | End: 2022-01-17

## 2021-12-14 RX ORDER — TRAMADOL HYDROCHLORIDE 50 MG/1
TABLET ORAL
Qty: 90 TABLET | Refills: 3 | Status: SHIPPED | OUTPATIENT
Start: 2021-12-14 | End: 2022-07-21

## 2021-12-14 RX ORDER — FOLIC ACID 1 MG/1
1000 TABLET ORAL DAILY
Qty: 30 TABLET | Refills: 10 | Status: SHIPPED | OUTPATIENT
Start: 2021-12-14 | End: 2023-02-10 | Stop reason: SDUPTHER

## 2021-12-14 NOTE — H&P (VIEW-ONLY)
SUBJECTIVE:    Patient ID: Kirstie Bowden is a 76 y.o. female.    Chief Complaint: No chief complaint on file.    She is here for follow-up status post 2nd interlaminar injection at L5-S1 on 2021.  The 1st injection helped her significantly.  This 1 did not.  Still has primary complaint of right leg pain radiating into the anterior portion of the leg below the knee which I feel is consistent with right L5 radiculitis.  Pain level is 9/10.  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    Hyperlipidemia     Hypertension     Limb alert care status     NO BP/IV LEFT ARM    Pseudocholinesterase deficiency     family history    Thyroid disease      Social History     Socioeconomic History    Marital status:    Occupational History     Employer: [employer]   Tobacco Use    Smoking status: Former Smoker     Packs/day: 0.50     Years: 47.00     Pack years: 23.50     Types: Cigarettes     Start date: 1960     Quit date: 2019     Years since quittin.3    Smokeless tobacco: Never Used   Substance and Sexual Activity    Alcohol use: Yes     Alcohol/week: 2.0 standard drinks     Types: 2 Glasses of wine per week     Comment: Social    Drug use: No    Sexual activity: Never     Social Determinants of Health     Financial Resource Strain: Low Risk     Difficulty of Paying Living Expenses: Not very hard   Food Insecurity: No Food Insecurity    Worried About Running Out of Food in the Last Year: Never true    Ran Out of Food in the Last Year: Never true   Transportation Needs: No Transportation Needs    Lack of Transportation (Medical): No    Lack of Transportation (Non-Medical): No   Physical Activity: Insufficiently Active    Days of Exercise per Week: 1 day    Minutes of Exercise per Session: 10 min   Stress: No Stress Concern Present    Feeling of Stress : Only a little    Social Connections: Unknown    Frequency of Communication with Friends and Family: More than three times a week    Frequency of Social Gatherings with Friends and Family: Twice a week    Active Member of Clubs or Organizations: No    Attends Club or Organization Meetings: Never    Marital Status:      Past Surgical History:   Procedure Laterality Date    AXILLARY NODE DISSECTION Left 3/14/2019    Procedure: LYMPHADENECTOMY, AXILLARY;  Surgeon: Mp Gonzalez MD;  Location: [facility];  Service: General;  Laterality: Left;  left lumpectomy with axillary lypmh node dissection    BALLOON DILATION OF URETER Left 6/24/2019    Procedure: DILATION, URETER, USING BALLOON;  Surgeon: Jorden Dickson MD;  Location: [facility] OR;  Service: Urology;  Laterality: Left;    BREAST BIOPSY Left 20 yrs ago    benign    BREAST LUMPECTOMY      x2    CHOLECYSTECTOMY      CYSTOSCOPY W/ URETERAL STENT PLACEMENT Left 6/24/2019    Procedure: CYSTOSCOPY, WITH URETERAL STENT INSERTION;  Surgeon: Jorden Dickson MD;  Location: [facility] OR;  Service: Urology;  Laterality: Left;    CYSTOSCOPY W/ URETERAL STENT REMOVAL Left 7/23/2019    Procedure: CYSTOSCOPY, WITH URETERAL STENT REMOVAL;  Surgeon: Jorden Dickson MD;  Location: [facility] OR;  Service: Urology;  Laterality: Left;    EPIDURAL STEROID INJECTION INTO LUMBAR SPINE N/A 9/30/2021    Procedure: Injection-steroid-epidural-lumbar;  Surgeon: Nathen Lyons MD;  Location: [facility] OR;  Service: Pain Management;  Laterality: N/A;  L5-S1      EPIDURAL STEROID INJECTION INTO LUMBAR SPINE N/A 11/16/2021    Procedure: Injection-steroid-epidural-lumbar;  Surgeon: Nathen Lyons MD;  Location: [facility] OR;  Service: Pain Management;  Laterality: N/A;  L5-S1    EXTRACORPOREAL SHOCK WAVE LITHOTRIPSY Left 7/23/2019    Procedure: LITHOTRIPSY, ESWL;  Surgeon: Jorden Dickson MD;  Location: [facility] OR;  Service: Urology;  Laterality: Left;    EYE SURGERY Bilateral     cataracts    HYSTERECTOMY       MASTECTOMY, PARTIAL Left 4/25/2019    Procedure: MASTECTOMY, PARTIAL;  Surgeon: Mp Gonzalez MD;  Location: [facility] OR;  Service: General;  Laterality: Left;    NEEDLE LOCALIZATION Left 3/14/2019    Procedure: NEEDLE LOCALIZATION;  Surgeon: Mp Gonzalez MD;  Location: [facility] OR;  Service: General;  Laterality: Left;  left lumpectomy with wire needle localization     RETROGRADE PYELOGRAPHY Bilateral 6/24/2019    Procedure: PYELOGRAM, RETROGRADE;  Surgeon: Jorden Dickson MD;  Location: [facility] OR;  Service: Urology;  Laterality: Bilateral;    SENTINEL LYMPH NODE BIOPSY Left 3/14/2019    Procedure: BIOPSY, LYMPH NODE, SENTINEL;  Surgeon: Mp Gonzalez MD;  Location: [facility] OR;  Service: General;  Laterality: Left;  left sentinel node lymph node biopsy    TONSILLECTOMY      URETEROSCOPY Left 6/24/2019    Procedure: URETEROSCOPY;  Surgeon: Jorden Dickson MD;  Location: [facility] OR;  Service: Urology;  Laterality: Left;     Family History   Problem Relation Age of Onset    Heart disease Mother     Hypertension Mother     Alzheimer's disease Mother     Cancer Father     Heart disease Sister     Diabetes Brother     Stroke Sister     Cancer Daughter      There were no vitals filed for this visit.    Review of Systems   Constitutional: Negative for chills, diaphoresis, fatigue, fever and unexpected weight change.   HENT: Negative for trouble swallowing.    Eyes: Negative for visual disturbance.   Respiratory: Negative for shortness of breath.    Cardiovascular: Negative for chest pain.   Gastrointestinal: Negative for abdominal pain, constipation, nausea and vomiting.   Genitourinary: Negative for difficulty urinating.   Musculoskeletal: Negative for arthralgias, back pain, gait problem, joint swelling, myalgias, neck pain and neck stiffness.   Neurological: Negative for dizziness, speech difficulty, weakness, light-headedness, numbness and headaches.          Objective:      Physical  Exam  Neurological:      Mental Status: She is alert and oriented to person, place, and time.             Assessment:       1. Right lumbar radiculitis           Plan:     no significant improvement status post 2nd interlaminar injection at L5-S1.  As previously discussed we will move on to transforaminal injection on the right at L4-5 and L5-S1.  Add gabapentin 300 mg q.h.s..  Consider neurosurgical evaluation.  Follow-up with me after the procedure      Right lumbar radiculitis    Other orders  -     gabapentin (NEURONTIN) 300 MG capsule; Take 1 capsule (300 mg total) by mouth every evening.  Dispense: 30 capsule; Refill: 0

## 2021-12-29 ENCOUNTER — TELEPHONE (OUTPATIENT)
Dept: PAIN MEDICINE | Facility: CLINIC | Age: 76
End: 2021-12-29
Payer: MEDICARE

## 2021-12-29 DIAGNOSIS — Z01.818 PRE-OP TESTING: Primary | ICD-10-CM

## 2022-01-01 ENCOUNTER — LAB VISIT (OUTPATIENT)
Dept: PRIMARY CARE CLINIC | Facility: CLINIC | Age: 77
End: 2022-01-01
Payer: MEDICARE

## 2022-01-01 DIAGNOSIS — Z01.818 PRE-OP TESTING: ICD-10-CM

## 2022-01-01 PROCEDURE — U0005 INFEC AGEN DETEC AMPLI PROBE: HCPCS | Performed by: ANESTHESIOLOGY

## 2022-01-01 PROCEDURE — U0003 INFECTIOUS AGENT DETECTION BY NUCLEIC ACID (DNA OR RNA); SEVERE ACUTE RESPIRATORY SYNDROME CORONAVIRUS 2 (SARS-COV-2) (CORONAVIRUS DISEASE [COVID-19]), AMPLIFIED PROBE TECHNIQUE, MAKING USE OF HIGH THROUGHPUT TECHNOLOGIES AS DESCRIBED BY CMS-2020-01-R: HCPCS | Performed by: ANESTHESIOLOGY

## 2022-01-02 LAB — SARS-COV-2 RNA RESP QL NAA+PROBE: NOT DETECTED

## 2022-01-04 ENCOUNTER — HOSPITAL ENCOUNTER (OUTPATIENT)
Facility: AMBULARY SURGERY CENTER | Age: 77
Discharge: HOME | End: 2022-01-04
Attending: ANESTHESIOLOGY | Admitting: ANESTHESIOLOGY
Payer: MEDICARE

## 2022-01-04 DIAGNOSIS — M54.16 LUMBAR RADICULITIS: ICD-10-CM

## 2022-01-04 PROCEDURE — 64483 NJX AA&/STRD TFRM EPI L/S 1: CPT | Performed by: ANESTHESIOLOGY

## 2022-01-04 PROCEDURE — 64484 NJX AA&/STRD TFRM EPI L/S EA: CPT | Performed by: ANESTHESIOLOGY

## 2022-01-04 PROCEDURE — 64484 PRA INJECT ANES/STEROID FORAMEN LUMBAR/SACRAL W IMG GUIDE ,EA ADD LEVEL: ICD-10-PCS | Mod: RT,,, | Performed by: ANESTHESIOLOGY

## 2022-01-04 PROCEDURE — 64483 PR EPIDURAL INJ, ANES/STEROID, TRANSFORAMINAL, LUMB/SACR, SNGL LEVL: ICD-10-PCS | Mod: RT,,, | Performed by: ANESTHESIOLOGY

## 2022-01-04 PROCEDURE — 64484 NJX AA&/STRD TFRM EPI L/S EA: CPT | Mod: RT,,, | Performed by: ANESTHESIOLOGY

## 2022-01-04 PROCEDURE — 64483 NJX AA&/STRD TFRM EPI L/S 1: CPT | Mod: RT,,, | Performed by: ANESTHESIOLOGY

## 2022-01-04 RX ORDER — SODIUM CHLORIDE, SODIUM LACTATE, POTASSIUM CHLORIDE, CALCIUM CHLORIDE 600; 310; 30; 20 MG/100ML; MG/100ML; MG/100ML; MG/100ML
INJECTION, SOLUTION INTRAVENOUS ONCE AS NEEDED
Status: ACTIVE | OUTPATIENT
Start: 2022-01-04 | End: 2033-06-02

## 2022-01-04 RX ORDER — ALPRAZOLAM 1 MG/1
1 TABLET, ORALLY DISINTEGRATING ORAL ONCE
Status: COMPLETED | OUTPATIENT
Start: 2022-01-04 | End: 2022-01-04

## 2022-01-04 RX ORDER — DEXAMETHASONE SODIUM PHOSPHATE 10 MG/ML
INJECTION INTRAMUSCULAR; INTRAVENOUS
Status: DISCONTINUED | OUTPATIENT
Start: 2022-01-04 | End: 2022-01-04 | Stop reason: HOSPADM

## 2022-01-04 RX ORDER — LIDOCAINE HYDROCHLORIDE 10 MG/ML
INJECTION, SOLUTION EPIDURAL; INFILTRATION; INTRACAUDAL; PERINEURAL
Status: DISCONTINUED | OUTPATIENT
Start: 2022-01-04 | End: 2022-01-04 | Stop reason: HOSPADM

## 2022-01-04 RX ORDER — BUPIVACAINE HYDROCHLORIDE 2.5 MG/ML
INJECTION, SOLUTION EPIDURAL; INFILTRATION; INTRACAUDAL
Status: DISCONTINUED | OUTPATIENT
Start: 2022-01-04 | End: 2022-01-04 | Stop reason: HOSPADM

## 2022-01-04 RX ADMIN — ALPRAZOLAM 1 MG: 1 TABLET, ORALLY DISINTEGRATING ORAL at 08:01

## 2022-01-04 NOTE — DISCHARGE INSTRUCTIONS
Pain injection instructions:     This procedure may take a couple weeks to relieve pain  You may get some pain relief from the local anesthetic initally.   Steroids can have side effects of flushed face or nervous feeling.    No driving for 24 hrs.   Activity as tolerated- gradually increase activities.  Dont lift over 10 lbs for 24 hrs   No heat at injection sites for 2 full days. No heating pads, hot tubs, saunas, or swimming in any body of water or pool for 2 full days.  Use ice pack for mild swelling and for comfort , apply for 20 minutes, remove for 20 minute intervals. No direct contact of ice itself  to skin.  May shower today.  Do not allow shower water to beat on injections site(s) for 2 full days. No tub baths for two full days.      Resume Aspirin, Plavix, or Coumadin the day after the procedure unless otherwise instructed.   If diabetic,monitor your glucose carefully as steroids can increase your glucose level    Seek immediate medical help for:   Severe increase in your usual pain or appearance of new pain.  Prolonged (more than 8 hours) or increasing weakness or numbness in the legs or arms.   .    Fever above 100.4 degrees F ,Drainage,redness,active bleeding, or increased swelling at the injection site.  Headache, shortness of breath, chest pain, or breathing problems.    After Surgery:  Always be aware that any surgery can cause these symptoms:    Pain- Medication can be prescribed for pain to decrease your pain but may not completely take your pain away. Over the Counter pain medicine my be enough and you can always use Ice and rest to help ease pain.    Bleeding- a little bleeding after a surgery is usually within normal.  If there is a lot of blood you need to notify your MD.  Emergency treatments of bleeding are cold application, elevation of the bleeding site and compression.    Infection- Infection after surgery is NOT a normal occurrence.  Signs of infection are fever, swelling, hot to touch  the incision.  If this occurs notify your MD immediately.    Nausea- this can be common after a surgery especially if you have had anesthesia medicine or are taking pain medicine.  Steroids have a side effect of nausea sometimes. Staying on clear liquids, bland foods, gingerale, or over the counter anti nausea medicines can help.  If you vomit more than once, notify your MD.  Anti Nausea medicines can be prescribed.

## 2022-01-04 NOTE — DISCHARGE SUMMARY
[facility]  Discharge Note  Short Stay    Procedure(s) (LRB):  Injection,steroid,epidural,transforaminal approach (Right)    OUTCOME: Patient tolerated treatment/procedure well without complication and is now ready for discharge.    DISPOSITION: Home or Self Care    FINAL DIAGNOSIS:  <principal problem not specified>    FOLLOWUP: In clinic    DISCHARGE INSTRUCTIONS:    Discharge Procedure Orders   Notify your health care provider if you experience any of the following:  temperature >100.4     Notify your health care provider if you experience any of the following:  severe uncontrolled pain     Notify your health care provider if you experience any of the following:  redness, tenderness, or signs of infection (pain, swelling, redness, odor or green/yellow discharge around incision site)     Activity as tolerated        TIME SPENT ON DISCHARGE: 30 minutes

## 2022-01-04 NOTE — OP NOTE
PROCEDURE DATE: 1/4/2022    PROCEDURE: Right L4-5 and L5-S1 transforaminal epidural steroid injection under fluoroscopy    DIAGNOSIS: Lumbar radiculitis    Post op diagnosis: Same    PHYSICIAN: Nathen Lyons MD    MEDICATIONS INJECTED:  Dexamethasone 5mg (0.5ml) and 1.5ml 0.25% bupivicaine at each nerve root.     LOCAL ANESTHETIC INJECTED:  Lidocaine 1%. 2 ml per site.    SEDATION MEDICATIONS: NOne    ESTIMATED BLOOD LOSS:  None    COMPLICATIONS:  None    TECHNIQUE:   A time-out was taken to identify patient and procedure side prior to starting the procedure. The patient was placed in a prone position, prepped and draped in the usual sterile fashion using ChloraPrep and sterile towels.  The area to be injected was determined under fluoroscopic guidance in AP and oblique view.  Local anesthetic was given by raising a wheal and going down to the hub of a 25-gauge 1.5 inch needle.  In oblique view, a 3.5 inch 22-gauge bent-tip spinal needle was introduced towards 6 oclock position of the pedicle of each above named nerve root level.  The needle was walked medially then hinged into the neural foramen and position was confirmed in AP and lateral views.  1ml contrast dye was injected to confirm appropriate placement and that there was no vascular uptake.  After negative aspiration for blood or CSF, the medication was then injected. This was performed at the right L4-5 and L5-S1 level(s). The patient tolerated the procedure well.    The patient was monitored after the procedure.  Patient was given post procedure and discharge instructions to follow at home. The patient was discharged in a stable condition.

## 2022-01-06 VITALS
BODY MASS INDEX: 27.19 KG/M2 | SYSTOLIC BLOOD PRESSURE: 126 MMHG | DIASTOLIC BLOOD PRESSURE: 64 MMHG | WEIGHT: 178.81 LBS | RESPIRATION RATE: 18 BRPM | TEMPERATURE: 98 F | HEART RATE: 65 BPM | OXYGEN SATURATION: 96 %

## 2022-02-01 ENCOUNTER — OFFICE VISIT (OUTPATIENT)
Dept: SPINE | Facility: CLINIC | Age: 77
End: 2022-02-01
Payer: MEDICARE

## 2022-02-01 VITALS — BODY MASS INDEX: 26.67 KG/M2 | WEIGHT: 176 LBS | HEIGHT: 68 IN

## 2022-02-01 DIAGNOSIS — M54.16 RIGHT LUMBAR RADICULITIS: Primary | ICD-10-CM

## 2022-02-01 PROCEDURE — 99213 OFFICE O/P EST LOW 20 MIN: CPT | Mod: S$GLB,,, | Performed by: PHYSICAL MEDICINE & REHABILITATION

## 2022-02-01 PROCEDURE — 99213 PR OFFICE/OUTPT VISIT, EST, LEVL III, 20-29 MIN: ICD-10-PCS | Mod: S$GLB,,, | Performed by: PHYSICAL MEDICINE & REHABILITATION

## 2022-02-01 NOTE — PROGRESS NOTES
SUBJECTIVE:    Patient ID: Kirstie Bowden is a 77 y.o. female.    Chief Complaint: Leg Pain (follow up TEST 2022)    For follow-up status post transforaminal injection on the right at L4-5 and L5-S1 on 2022 with Dr. Lyons.  Excellent response to that procedure.  On most days she has very little pain and when she does have pain it is 1 or 2/10.  She also finds that gabapentin helps at night.  Her current pain level is 2/10.  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    Hyperlipidemia     Hypertension     Limb alert care status     NO BP/IV LEFT ARM    Pseudocholinesterase deficiency     family history    Thyroid disease      Social History     Socioeconomic History    Marital status:    Occupational History     Employer: [employer]   Tobacco Use    Smoking status: Former Smoker     Packs/day: 0.50     Years: 47.00     Pack years: 23.50     Types: Cigarettes     Start date: 1960     Quit date: 2019     Years since quittin.4    Smokeless tobacco: Never Used   Substance and Sexual Activity    Alcohol use: Yes     Alcohol/week: 2.0 standard drinks     Types: 2 Glasses of wine per week     Comment: Social    Drug use: No    Sexual activity: Never     Social Determinants of Health     Financial Resource Strain: Low Risk     Difficulty of Paying Living Expenses: Not very hard   Food Insecurity: No Food Insecurity    Worried About Running Out of Food in the Last Year: Never true    Ran Out of Food in the Last Year: Never true   Transportation Needs: No Transportation Needs    Lack of Transportation (Medical): No    Lack of Transportation (Non-Medical): No   Physical Activity: Insufficiently Active    Days of Exercise per Week: 1 day    Minutes of Exercise per Session: 10 min   Stress: No Stress Concern Present    Feeling of Stress : Only a little   Social  Connections: Unknown    Frequency of Communication with Friends and Family: More than three times a week    Frequency of Social Gatherings with Friends and Family: Twice a week    Active Member of Clubs or Organizations: No    Attends Club or Organization Meetings: Never    Marital Status:      Past Surgical History:   Procedure Laterality Date    AXILLARY NODE DISSECTION Left 3/14/2019    Procedure: LYMPHADENECTOMY, AXILLARY;  Surgeon: Mp Gonzalez MD;  Location: [facility];  Service: General;  Laterality: Left;  left lumpectomy with axillary lypmh node dissection    BALLOON DILATION OF URETER Left 6/24/2019    Procedure: DILATION, URETER, USING BALLOON;  Surgeon: Jorden Dickson MD;  Location: [facility] OR;  Service: Urology;  Laterality: Left;    BREAST BIOPSY Left 20 yrs ago    benign    BREAST LUMPECTOMY      x2    CHOLECYSTECTOMY      CYSTOSCOPY W/ URETERAL STENT PLACEMENT Left 6/24/2019    Procedure: CYSTOSCOPY, WITH URETERAL STENT INSERTION;  Surgeon: Jorden Dickson MD;  Location: [facility] OR;  Service: Urology;  Laterality: Left;    CYSTOSCOPY W/ URETERAL STENT REMOVAL Left 7/23/2019    Procedure: CYSTOSCOPY, WITH URETERAL STENT REMOVAL;  Surgeon: Jorden Dickson MD;  Location: [facility] OR;  Service: Urology;  Laterality: Left;    EPIDURAL STEROID INJECTION INTO LUMBAR SPINE N/A 9/30/2021    Procedure: Injection-steroid-epidural-lumbar;  Surgeon: Nathen Lyons MD;  Location: [facility] OR;  Service: Pain Management;  Laterality: N/A;  L5-S1      EPIDURAL STEROID INJECTION INTO LUMBAR SPINE N/A 11/16/2021    Procedure: Injection-steroid-epidural-lumbar;  Surgeon: Nathen Lyons MD;  Location: [facility] OR;  Service: Pain Management;  Laterality: N/A;  L5-S1    EXTRACORPOREAL SHOCK WAVE LITHOTRIPSY Left 7/23/2019    Procedure: LITHOTRIPSY, ESWL;  Surgeon: Jorden Dickson MD;  Location: [facility] OR;  Service: Urology;  Laterality: Left;    EYE SURGERY Bilateral     cataracts    HYSTERECTOMY      MASTECTOMY,  "PARTIAL Left 4/25/2019    Procedure: MASTECTOMY, PARTIAL;  Surgeon: Mp Gonzalez MD;  Location: [facility] OR;  Service: General;  Laterality: Left;    NEEDLE LOCALIZATION Left 3/14/2019    Procedure: NEEDLE LOCALIZATION;  Surgeon: Mp Gonzalez MD;  Location: [facility] OR;  Service: General;  Laterality: Left;  left lumpectomy with wire needle localization     RETROGRADE PYELOGRAPHY Bilateral 6/24/2019    Procedure: PYELOGRAM, RETROGRADE;  Surgeon: Jorden Dickson MD;  Location: [facility] OR;  Service: Urology;  Laterality: Bilateral;    SENTINEL LYMPH NODE BIOPSY Left 3/14/2019    Procedure: BIOPSY, LYMPH NODE, SENTINEL;  Surgeon: Mp Gonzalez MD;  Location: [facility] OR;  Service: General;  Laterality: Left;  left sentinel node lymph node biopsy    TONSILLECTOMY      TRANSFORAMINAL EPIDURAL INJECTION OF STEROID Right 1/4/2022    Procedure: Injection,steroid,epidural,transforaminal approach;  Surgeon: Nathen Lyons MD;  Location: [facility] OR;  Service: Pain Management;  Laterality: Right;  L4-L5, L5-s1    URETEROSCOPY Left 6/24/2019    Procedure: URETEROSCOPY;  Surgeon: Jorden Dickson MD;  Location: [facility] OR;  Service: Urology;  Laterality: Left;     Family History   Problem Relation Age of Onset    Heart disease Mother     Hypertension Mother     Alzheimer's disease Mother     Cancer Father     Heart disease Sister     Diabetes Brother     Stroke Sister     Cancer Daughter      Vitals:    02/01/22 0823   Weight: 79.8 kg (176 lb)   Height: 5' 8" (1.727 m)       Review of Systems   Constitutional: Negative for chills, diaphoresis, fatigue, fever and unexpected weight change.   HENT: Negative for trouble swallowing.    Eyes: Negative for visual disturbance.   Respiratory: Negative for shortness of breath.    Cardiovascular: Negative for chest pain.   Gastrointestinal: Negative for abdominal pain, constipation, nausea and vomiting.   Genitourinary: Negative for difficulty urinating.   Musculoskeletal: " Negative for arthralgias, back pain, gait problem, joint swelling, myalgias, neck pain and neck stiffness.   Neurological: Negative for dizziness, speech difficulty, weakness, light-headedness, numbness and headaches.          Objective:      Physical Exam  Neurological:      Mental Status: She is alert and oriented to person, place, and time.             Assessment:       1. Right lumbar radiculitis           Plan:     improved to her satisfaction status post transforaminal injection on the right at L4-5 and L5-S1.  This has been the most effective treatment thus far.  Interlaminar injections at L5-S1 were not as beneficial.  She can repeat the transforaminal injection as needed.  Follow-up here as needed      Right lumbar radiculitis

## 2022-02-02 ENCOUNTER — PATIENT OUTREACH (OUTPATIENT)
Dept: ADMINISTRATIVE | Facility: HOSPITAL | Age: 77
End: 2022-02-02
Payer: MEDICARE

## 2022-02-02 NOTE — LETTER
AUTHORIZATION FOR RELEASE OF   CONFIDENTIAL INFORMATION    Dear Wilbert Chavez MD,      We are seeing Kirstie Bowden, date of birth 1945, in the clinic at [facility]. Elías Luis Jr, MD is the patient's PCP. Kirstie Bowden has an outstanding lab/procedure at the time we reviewed her chart. In order to help keep her health information updated, she has authorized us to request the following medical record(s):                                                 COLONOSCOPY            Please fax records to [facility], [facility] H Newcomb Jr, MD, [phone].     If you have any questions, please contact [phone].    Vivian Barbosa LPN Care Coordinator  [facility]  Phone: [phone]  FAX: [phone]        Patient Name: Kirstie Bowden  : 1945  Patient Phone #: [phone]

## 2022-02-02 NOTE — PROGRESS NOTES
[facility] chart audits-HYPERTENSION.      OV   12.14.21                      B/P 152/81    HTN diagnosis documented within time frame of measurement year.    Requested colonoscopy report.

## 2022-02-23 DIAGNOSIS — D84.9 IMMUNOSUPPRESSED STATUS: ICD-10-CM

## 2022-03-05 DIAGNOSIS — J45.20 ASTHMA, CHRONIC, MILD INTERMITTENT, UNCOMPLICATED: ICD-10-CM

## 2022-03-05 NOTE — TELEPHONE ENCOUNTER
No new care gaps identified.  Powered by [email or web address]. Reference number: [id number].   3/05/2022 11:16:39 AM CST

## 2022-03-10 RX ORDER — FLUTICASONE PROPIONATE AND SALMETEROL 250; 50 UG/1; UG/1
POWDER RESPIRATORY (INHALATION)
Qty: 60 EACH | Refills: 11 | Status: SHIPPED | OUTPATIENT
Start: 2022-03-10 | End: 2023-04-10

## 2022-03-24 ENCOUNTER — HOSPITAL ENCOUNTER (OUTPATIENT)
Dept: RADIOLOGY | Facility: HOSPITAL | Age: 77
Discharge: HOME | End: 2022-03-24
Attending: INTERNAL MEDICINE
Payer: MEDICARE

## 2022-03-24 DIAGNOSIS — R92.8 ABNORMAL MAMMOGRAM OF LEFT BREAST: ICD-10-CM

## 2022-03-24 PROCEDURE — 77066 DX MAMMO INCL CAD BI: CPT | Mod: 26,,, | Performed by: RADIOLOGY

## 2022-03-24 PROCEDURE — 77062 BREAST TOMOSYNTHESIS BI: CPT | Mod: 26,,, | Performed by: RADIOLOGY

## 2022-03-24 PROCEDURE — 77066 MAMMO DIGITAL DIAGNOSTIC BILAT WITH TOMO: ICD-10-PCS | Mod: 26,,, | Performed by: RADIOLOGY

## 2022-03-24 PROCEDURE — 77062 MAMMO DIGITAL DIAGNOSTIC BILAT WITH TOMO: ICD-10-PCS | Mod: 26,,, | Performed by: RADIOLOGY

## 2022-03-24 PROCEDURE — 77062 BREAST TOMOSYNTHESIS BI: CPT | Mod: TC

## 2022-03-28 ENCOUNTER — OFFICE VISIT (OUTPATIENT)
Dept: HEMATOLOGY/ONCOLOGY | Facility: CLINIC | Age: 77
End: 2022-03-28
Payer: MEDICARE

## 2022-03-28 VITALS
HEIGHT: 68 IN | BODY MASS INDEX: 27.46 KG/M2 | TEMPERATURE: 97 F | WEIGHT: 181.19 LBS | RESPIRATION RATE: 12 BRPM | OXYGEN SATURATION: 98 % | SYSTOLIC BLOOD PRESSURE: 142 MMHG | DIASTOLIC BLOOD PRESSURE: 67 MMHG | HEART RATE: 67 BPM

## 2022-03-28 DIAGNOSIS — Z17.0 CARCINOMA OF CENTRAL PORTION OF LEFT BREAST IN FEMALE, ESTROGEN RECEPTOR POSITIVE: Primary | ICD-10-CM

## 2022-03-28 DIAGNOSIS — C50.112 CARCINOMA OF CENTRAL PORTION OF LEFT BREAST IN FEMALE, ESTROGEN RECEPTOR POSITIVE: Primary | ICD-10-CM

## 2022-03-28 DIAGNOSIS — I10 ESSENTIAL HYPERTENSION, BENIGN: ICD-10-CM

## 2022-03-28 DIAGNOSIS — D75.89 MACROCYTOSIS WITHOUT ANEMIA: ICD-10-CM

## 2022-03-28 DIAGNOSIS — C50.012 MALIGNANT NEOPLASM OF NIPPLE OF LEFT BREAST IN FEMALE, ESTROGEN RECEPTOR POSITIVE: ICD-10-CM

## 2022-03-28 DIAGNOSIS — E78.00 PURE HYPERCHOLESTEROLEMIA: ICD-10-CM

## 2022-03-28 DIAGNOSIS — Z17.0 MALIGNANT NEOPLASM OF NIPPLE OF LEFT BREAST IN FEMALE, ESTROGEN RECEPTOR POSITIVE: ICD-10-CM

## 2022-03-28 PROCEDURE — 99215 OFFICE O/P EST HI 40 MIN: CPT | Mod: PBBFAC,PO | Performed by: INTERNAL MEDICINE

## 2022-03-28 PROCEDURE — 99214 OFFICE O/P EST MOD 30 MIN: CPT | Mod: S$PBB,,, | Performed by: INTERNAL MEDICINE

## 2022-03-28 PROCEDURE — 99999 PR PBB SHADOW E&M-EST. PATIENT-LVL V: CPT | Mod: PBBFAC,,, | Performed by: INTERNAL MEDICINE

## 2022-03-28 PROCEDURE — 99999 PR PBB SHADOW E&M-EST. PATIENT-LVL V: ICD-10-PCS | Mod: PBBFAC,,, | Performed by: INTERNAL MEDICINE

## 2022-03-28 PROCEDURE — 99214 PR OFFICE/OUTPT VISIT, EST, LEVL IV, 30-39 MIN: ICD-10-PCS | Mod: S$PBB,,, | Performed by: INTERNAL MEDICINE

## 2022-03-28 NOTE — PROGRESS NOTES
Subjective:       Patient ID: Kirstie Bowden is a 77 y.o. female.    Chief Complaint   Left breast ca dx  T2 N0 status post lumpectomy and re-resection margin adjuvant TC chemo s/p 3  cycles but discontinued due to quality of life issues and started Arimidex   stated  having s/e  To arimidex took herself off presented to clinic started on Aromasin .  Tolerating Aromasin well rec score of 29    hydronephrosis+ ,  had stent placed  Here for follow-up  Oncologic History:  Dx 1/2019  INVASIVE CARCINOMA BREAST, CANCER CASE SUMMARY:  PROCEDURE: Partial mastectomy without skin or nipple.3/2019  SPECIMEN LATERALITY: Left.  TUMOR SIZE, GREATEST DIMENSION: 2.5 cm.  HISTOLOGIC TYPE: Invasive pleomorphic lobular carcinoma.  HISTOLOGIC GRADE (LUTHER HISTOLOGIC SCORE):  Glandular (acinar)/tubular differentiation: Score 3.  Nuclear pleomorphism: Score 2.  Mitotic rate: Score 1.  Overall grade: Grade 2  DUCTAL CARCINOMA IN SITU (DCIS): Not identified.  LOBULAR CARCINOMA IN SITU (LCIS): Present, pleomorphic lobular carcinoma in situ with rare focus of  classical lobular carcinoma situ.  Estimated size (extent) of pleomorphic LCIS: At least 2.8 cm.  TUMOR EXTENSION: Not applicable.  MARGINS:  INVASIVE CARCINOMA MARGINS: Uninvolved by invasive carcinoma.  Distance from inferior (closest) margin: 5 mm.  PLEOMORPHIC LOBULAR CARCINOMA IN SITU MARGINS: Uninvolved by pleomorphic LCIS.  Distance from inferior medial (closest) margin: 0.2 mm (see above comment).  REGIONAL LYMPH NODES: Uninvolved by tumor cells.  Number of lymph nodes examined: 2.  Number of sentinel nodes examined: 2.  TREATMENT EFFECT: No known presurgical therapy.  PATHOLOGIC STAGE CLASSIFICATION (pTNM, AJCC 8TH EDITION):  pT2: Tumor size = 2.5 cm in greatest dimension.  pN0: No regional lymph node metastasis identified.  pM: Not applicable.    ER: Positive.  OK: Positive.  HER2: Negative (IHC - Equivocal (score 2) and FISH - Negative).  Ki-67: Approximately  14%.  HPI:     Social History     Socioeconomic History    Marital status:    Occupational History     Employer: [employer]   Tobacco Use    Smoking status: Former Smoker     Packs/day: 0.50     Years: 47.00     Pack years: 23.50     Types: Cigarettes     Start date: 1960     Quit date: 2019     Years since quittin.6    Smokeless tobacco: Never Used   Substance and Sexual Activity    Alcohol use: Yes     Alcohol/week: 2.0 standard drinks     Types: 2 Glasses of wine per week     Comment: Social    Drug use: No    Sexual activity: Never     Social Determinants of Health     Financial Resource Strain: Low Risk     Difficulty of Paying Living Expenses: Not very hard   Food Insecurity: No Food Insecurity    Worried About Running Out of Food in the Last Year: Never true    Ran Out of Food in the Last Year: Never true   Transportation Needs: No Transportation Needs    Lack of Transportation (Medical): No    Lack of Transportation (Non-Medical): No   Physical Activity: Insufficiently Active    Days of Exercise per Week: 3 days    Minutes of Exercise per Session: 10 min   Stress: No Stress Concern Present    Feeling of Stress : Only a little   Social Connections: Unknown    Frequency of Communication with Friends and Family: More than three times a week    Frequency of Social Gatherings with Friends and Family: More than three times a week    Active Member of Clubs or Organizations: No    Attends Club or Organization Meetings: 1 to 4 times per year    Marital Status:    Housing Stability: Low Risk     Unable to Pay for Housing in the Last Year: No    Number of Places Lived in the Last Year: 1    Unstable Housing in the Last Year: No     Family History   Problem Relation Age of Onset    Heart disease Mother     Hypertension Mother     Alzheimer's disease Mother     Cancer Father     Heart disease Sister     Diabetes Brother     Stroke Sister     Cancer Daughter       Past Surgical History:   Procedure Laterality Date    AXILLARY NODE DISSECTION Left 03/14/2019    Procedure: LYMPHADENECTOMY, AXILLARY;  Surgeon: Mp Gonzalez MD;  Location: [facility] OR;  Service: General;  Laterality: Left;  left lumpectomy with axillary lypmh node dissection    BALLOON DILATION OF URETER Left 06/24/2019    Procedure: DILATION, URETER, USING BALLOON;  Surgeon: Jorden Dickson MD;  Location: [facility] OR;  Service: Urology;  Laterality: Left;    BREAST BIOPSY Left 20 yrs ago    benign    BREAST LUMPECTOMY      x2    CHOLECYSTECTOMY      COLONOSCOPY  09/25/2018    LUC EASTON MD    CYSTOSCOPY W/ URETERAL STENT PLACEMENT Left 06/24/2019    Procedure: CYSTOSCOPY, WITH URETERAL STENT INSERTION;  Surgeon: Jorden Dickson MD;  Location: [facility] OR;  Service: Urology;  Laterality: Left;    CYSTOSCOPY W/ URETERAL STENT REMOVAL Left 07/23/2019    Procedure: CYSTOSCOPY, WITH URETERAL STENT REMOVAL;  Surgeon: Jorden Dickson MD;  Location: [facility] OR;  Service: Urology;  Laterality: Left;    EPIDURAL STEROID INJECTION INTO LUMBAR SPINE N/A 09/30/2021    Procedure: Injection-steroid-epidural-lumbar;  Surgeon: Nathen Lyons MD;  Location: [facility] OR;  Service: Pain Management;  Laterality: N/A;  L5-S1      EPIDURAL STEROID INJECTION INTO LUMBAR SPINE N/A 11/16/2021    Procedure: Injection-steroid-epidural-lumbar;  Surgeon: Nathen Lyons MD;  Location: [facility] OR;  Service: Pain Management;  Laterality: N/A;  L5-S1    EXTRACORPOREAL SHOCK WAVE LITHOTRIPSY Left 07/23/2019    Procedure: LITHOTRIPSY, ESWL;  Surgeon: Jorden Dickson MD;  Location: [facility] OR;  Service: Urology;  Laterality: Left;    EYE SURGERY Bilateral     cataracts    HYSTERECTOMY      MASTECTOMY, PARTIAL Left 04/25/2019    Procedure: MASTECTOMY, PARTIAL;  Surgeon: Mp Gonzalez MD;  Location: [facility] OR;  Service: General;  Laterality: Left;    NEEDLE LOCALIZATION Left 03/14/2019    Procedure: NEEDLE LOCALIZATION;  Surgeon: Mp MCRAE  MD Carlos;  Location: [facility] OR;  Service: General;  Laterality: Left;  left lumpectomy with wire needle localization     RETROGRADE PYELOGRAPHY Bilateral 06/24/2019    Procedure: PYELOGRAM, RETROGRADE;  Surgeon: Jorden Dickson MD;  Location: [facility] OR;  Service: Urology;  Laterality: Bilateral;    SENTINEL LYMPH NODE BIOPSY Left 03/14/2019    Procedure: BIOPSY, LYMPH NODE, SENTINEL;  Surgeon: Mp Gonzalez MD;  Location: [facility] OR;  Service: General;  Laterality: Left;  left sentinel node lymph node biopsy    TONSILLECTOMY      TRANSFORAMINAL EPIDURAL INJECTION OF STEROID Right 01/04/2022    Procedure: Injection,steroid,epidural,transforaminal approach;  Surgeon: Nathen Lyons MD;  Location: [facility] OR;  Service: Pain Management;  Laterality: Right;  L4-L5, L5-s1    URETEROSCOPY Left 06/24/2019    Procedure: URETEROSCOPY;  Surgeon: Jorden Dickson MD;  Location: [facility] OR;  Service: Urology;  Laterality: Left;     Past Medical History:   Diagnosis Date    Arthritis     rheumatoid    Asthma     Cancer     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    Hyperlipidemia     Hypertension     Limb alert care status     NO BP/IV LEFT ARM    Pseudocholinesterase deficiency     family history    Thyroid disease        Current Outpatient Medications:     amLODIPine (NORVASC) 5 MG tablet, Take 1 tablet by mouth once daily, Disp: 90 tablet, Rfl: 3    atorvastatin (LIPITOR) 20 MG tablet, Take 1 tablet by mouth once daily, Disp: 90 tablet, Rfl: 3    CALCIUM CARBONATE/VITAMIN D3 (VITAMIN D-3 ORAL), Take by mouth once daily. , Disp: , Rfl:     exemestane (AROMASIN) 25 mg tablet, Take 1 tablet by mouth once daily, Disp: 90 tablet, Rfl: 6    fluticasone propionate (FLONASE) 50 mcg/actuation nasal spray, USE 2 SPRAY(S) IN EACH NOSTRIL DAILY, Disp: , Rfl:     fluticasone-salmeterol diskus inhaler 250-50 mcg, INHALE 1 DOSE BY MOUTH TWICE DAILY, Disp: 60 each, Rfl: 11    folic acid (FOLVITE) 1 MG tablet, Take 1 tablet (1,000 mcg total) by mouth  once daily., Disp: 30 tablet, Rfl: 10    gabapentin (NEURONTIN) 300 MG capsule, TAKE 1 CAPSULE BY MOUTH IN THE EVENING, Disp: 30 capsule, Rfl: 2    hydroCHLOROthiazide (HYDRODIURIL) 25 MG tablet, Take 1 tablet by mouth once daily, Disp: 90 tablet, Rfl: 3    hydrOXYchloroQUINE (PLAQUENIL) 200 mg tablet, Take 1 tablet by mouth twice daily, Disp: 60 tablet, Rfl: 0    levothyroxine (SYNTHROID) 125 MCG tablet, Take 125 mcg by mouth before breakfast., Disp: , Rfl:     methotrexate 2.5 MG Tab, Take 5 tablets (12.5 mg total) by mouth every 7 days., Disp: 60 tablet, Rfl: 1    ondansetron (ZOFRAN-ODT) 8 MG TbDL, DISSOLVE 1 TABLET IN MOUTH EVERY 12 HOURS AS NEEDED, Disp: 30 tablet, Rfl: 0    pantoprazole (PROTONIX) 40 MG tablet, TAKE 1 TABLET BY MOUTH ONCE DAILY IN THE MORNING FOR 90 DAYS, Disp: , Rfl:     prochlorperazine (COMPAZINE) 10 MG tablet, TAKE 1 TABLET BY MOUTH EVERY 6 HOURS AS NEEDED, Disp: 60 tablet, Rfl: 0    thiamine 100 MG tablet, Take 100 mg by mouth once daily., Disp: , Rfl:     traMADoL (ULTRAM) 50 mg tablet, One po q 8hrs prn severe pain, Disp: 90 tablet, Rfl: 3    VITAMIN B COMPLEX ORAL, Every day, Disp: , Rfl:   No current facility-administered medications for this visit.    Facility-Administered Medications Ordered in Other Visits:     lactated ringers infusion, , Intravenous, Once PRN, Nathen Lyons MD    lidocaine (PF) 10 mg/ml (1%) injection 10 mg, 1 mL, Intradermal, Once, Eladia Schwartz MD  Review of patient's allergies indicates:   Allergen Reactions    Succinylcholine chloride Other (See Comments)    Sulfur dioxide Hives    Sulfamethoxazole-trimethoprim      Other reaction(s): per dr daryn Rodríguez (sulfonamide antibiotics)      NOT SURE REACTION         REVIEW OF SYSTEMS:     CONSTITUTIONAL:  Patient voices complains with significant fatigue.  Shortness of breath on ambulation.  She is not coughing up productive sputum she reports no fever chills rigors  Has some sinus  congestion  And having night sweats after starting Arimidex    SKIN: Denies rash, issues with nails, non-healing sores, bleeding, blotching    skin or abnormal bruising. Denies new moles or changes to existing moles.      BREASTS: healing well since lumpectomy    EYES: Denies eye pain, blurred vision, swelling, redness or discharge.      ENT AND MOUTH:  Has runny nose, and stuffiness, and sinus trouble no sores. Denies    nosebleeds. Denies, hoarseness, change in voice or swelling in front of the    neck.      CARDIOVASCULAR: Denies chest pain, discomfort or palpitations. Denies neck    swelling or episodes of passing out.           GI: Denies trouble swallowing, indigestion, heartburn, abdominal pain, +nausea with arimidex betty in am then  Dry heaves during day, stopped arimidex and feeks much better     no vomiting, diarrhea, altered bowel habits, blood in stool, discoloration of    stools, change in nature of stool, bloating, increased abdominal girth.      GENITOURINARY: No discharge. No pelvic pain or lumps. No rash around groin or  lesions. No urinary frequency, hesitation, painful urination or blood in    urine. Denies incontinence. No problems with intercourse.      MUSCULOSKELETAL: Denies neck or back pain. Denies weakness in arms or legs,    joint problems or distended inflamed veins in legs. Denies swelling or abnormal  glands.      NEUROLOGICAL: Denies tingling, numbness, altered mentation changes to nerve    function in the face, weakness to one or both of the body. Denies changes to    gait and denies multiple falls or accidents.        PHYSICAL EXAM:     Wt Readings from Last 3 Encounters:   03/28/22 82.2 kg (181 lb 3.5 oz)   02/01/22 79.8 kg (176 lb)   12/29/21 81.1 kg (178 lb 12.7 oz)     Temp Readings from Last 3 Encounters:   03/28/22 97.1 °F (36.2 °C) (Temporal)   01/04/22 98.4 °F (36.9 °C) (Skin)   11/16/21 98 °F (36.7 °C) (Skin)     BP Readings from Last 3 Encounters:   03/28/22 (!) 142/67    01/04/22 126/64   12/14/21 (!) 152/81     Pulse Readings from Last 3 Encounters:   03/28/22 67   01/04/22 65   11/16/21 (!) 57     GENERAL: Comfortable looking patient. Patient is in no distress.  Awake, alert and oriented to time, person and place.  No anxiety, or agitation.      HEENT: Normal conjunctivae and eyelids. WNL.  PERRLA 3 to 4 mm. No icterus, no pallor, no congestion, and no discharge noted.     NECK:  Supple. Trachea is central.  No crepitus.  No JVD or masses.    RESPIRATORY:  No intercostal retractions.  No dullness to percussion.  Chest is clear to auscultation.  No rales, rhonchi or wheezes.  No crepitus.  Good air entry bilaterally.    CARDIOVASCULAR:  S1 and S2 are normally heard without murmurs or gallops.  All peripheral pulses are present.    ABDOMEN:  Normal abdomen.  No hepatosplenomegaly.  No free fluid.  Bowel sounds are present.  No hernia noted. No masses.  No rebound or tenderness.  No guarding or rigidity.  Umbilicus is midline.    LYMPHATICS:  No axillary, cervical, supraclavicular, submental, or inguinal lymphadenopathy.    SKIN/MUSCULOSKELETAL:  There is no evidence of excoriation marks or ecchmosis.  No rashes.  No cyanosis.  No clubbing.  No joint or skeletal deformities noted.  Normal range of motion.    NEUROLOGIC:  Higher functions are appropriate.  No cranial nerve deficits.  Normal asher.  Normal strength.  Motor and sensory functions are normal.  Deep tendon reflexes are normal.    GENITAL/RECTAL:  Exams are deferred.      Laboratory:     CBC:  Lab Results   Component Value Date    WBC 7.41 03/24/2022    RBC 4.21 03/24/2022    HGB 13.7 03/24/2022    HCT 42.8 03/24/2022     (H) 03/24/2022    MCH 32.5 (H) 03/24/2022    MCHC 32.0 03/24/2022    RDW 13.6 03/24/2022     03/24/2022    MPV 11.4 03/24/2022    GRAN 4.6 03/24/2022    LYMPH 1.4 12/14/2021    LYMPH 21.0 12/14/2021    MONO 0.7 12/14/2021    MONO 10.0 12/14/2021    EOS 0.2 12/14/2021    BASO 0.03 12/14/2021     EOSINOPHIL 3.3 12/14/2021    BASOPHIL 0.5 12/14/2021       BMP: BMP  Lab Results   Component Value Date     03/24/2022    K 4.2 03/24/2022     03/24/2022    CO2 29 03/24/2022    BUN 15 03/24/2022    CREATININE 0.9 03/24/2022    CALCIUM 10.4 03/24/2022    ANIONGAP 11 03/24/2022    ESTGFRAFRICA >60 03/24/2022    EGFRNONAA >60 03/24/2022       LFT:   Lab Results   Component Value Date    ALT 24 03/24/2022    AST 25 03/24/2022    ALKPHOS 64 03/24/2022    BILITOT 0.6 03/24/2022    Bone density September 2 1019 negative for osteopenia  oncotype rec. score is 29  Impressionpet 1/2020       1.  Likely postoperative change in the superolateral left breast, decreased in size from the previous exam.    2.  Scattered multifocal ground-glass attenuation opacities in both lungs suggesting a combination of atypical infection/pneumonia and/or post treatment/post radiation change.  Consider follow-up CT of the chest in 6-8 weeks to document resolution.    3.  Indeterminate isoattenuating non-FDG avid structure in the anterior interpolar aspect of the right kidney, possibly representing a hemorrhagic/proteinaceous cyst or low grade malignancy, consider further characterization with contrast enhanced renal protocol CT/MRI.    4.  Numerous bilateral nonobstructing renal stones.  No hydronephrosis or hydroureter on today's exam.    5.  Interval resolution of the maxillary sinus disease.     Pet 7/2020  IMPRESSION:  1. Postoperative changes of prior left breast lumpectomy.  2. Near complete resolution of the previously described groundglass  attenuation opacities in the lungs, now with only a small linear  bandlike opacity in the anterior left upper lobe (most likely related  to prior radiation/treatment change.  3. Focal increased FDG activity along the right third and fourth rib  posteriorly at the posterior thoracic junction suggesting degenerative  change with no soft tissue, lytic or sclerotic lesion identified.  4.  Additional and incidental findings as noted above unchanged from  the previous exam.            has renal stones and has hydronephrosis, stented      7/21 neg pet  Assessment/Plan:     T2N0Mx left breast ca s/p lumpectomy 3/2019 recurrent score 29  pT2: Tumor size = 2.5 cm in greatest dimension.  pN0: No regional lymph node metastasis identified.  pM: Not applicable.    ER: Positive.  IA: Positive.  HER2: Negative (IHC - E  Patient completed 3 cycles but aborted therapy.  In favor a quality of life due to side effects  Above PET scan discussed with patient   last two weeks she stoped anastrozole  Due to arthralgia and nausea.   got off embrel during radiation and chemo now on mtx and hydroxychroloquine  Tolerating aromasin well to continue for 10 years till May 2029  Normal bone density 9//2019 will repeat in 9/21  She already sees Urology  Hemorrhagic cyst was reportd on prev scans , had stents removed and lithotripsy for stones     macrocytosis: related to thyroid issues will watch she is not anemic and embark on w/u if worse or cytopenia   saw Pulmonary  Naila aldridge 6/2020) consult for 2.  Finding as above on PET scan thesesd have nearly resolved now on 7/2020 pet and nothing new noted on January and july 2021 PET pulm f/u prn    She has scattered atheromatous calcifications in the aorta and coronary arteries she will discuss this and follow up with her PCP for referral to cardiology   repeat pet 7/22 and cbc, cmp, [id number]    mammo done in 3/2021 done short term recommended in 9/21 with bone density at the  Same time and rtc    Has quit smoking      completed xrt  Started arimidex, tolerating well bone density negative   cont f/u for RA, HTH. Lipids, hypothyroidism    Advance Care planning/ directives /living will/ established already    COVID social distancing, face mask use, hand washing techniques and personal hygiene routine discussed with patient pt is vaccinated  Good exercise, nutrition and weight  management discussed with patient  Health maintenance activities and follow-up with PCPs recommendations discussed with patient

## 2022-05-02 ENCOUNTER — OFFICE VISIT (OUTPATIENT)
Dept: RHEUMATOLOGY | Facility: CLINIC | Age: 77
End: 2022-05-02
Payer: MEDICARE

## 2022-05-02 VITALS — SYSTOLIC BLOOD PRESSURE: 142 MMHG | DIASTOLIC BLOOD PRESSURE: 82 MMHG | WEIGHT: 180.5 LBS | BODY MASS INDEX: 27.44 KG/M2

## 2022-05-02 DIAGNOSIS — M05.79 RHEUMATOID ARTHRITIS INVOLVING MULTIPLE SITES WITH POSITIVE RHEUMATOID FACTOR: ICD-10-CM

## 2022-05-02 DIAGNOSIS — M75.52 BURSITIS OF SHOULDER, LEFT: Primary | ICD-10-CM

## 2022-05-02 PROCEDURE — 20610 DRAIN/INJ JOINT/BURSA W/O US: CPT | Mod: LT,S$GLB,, | Performed by: INTERNAL MEDICINE

## 2022-05-02 PROCEDURE — 99213 PR OFFICE/OUTPT VISIT, EST, LEVL III, 20-29 MIN: ICD-10-PCS | Mod: 25,S$GLB,, | Performed by: INTERNAL MEDICINE

## 2022-05-02 PROCEDURE — 20610 LARGE JOINT ASPIRATION/INJECTION: L SUBACROMIAL BURSA: ICD-10-PCS | Mod: LT,S$GLB,, | Performed by: INTERNAL MEDICINE

## 2022-05-02 PROCEDURE — 99213 OFFICE O/P EST LOW 20 MIN: CPT | Mod: 25,S$GLB,, | Performed by: INTERNAL MEDICINE

## 2022-05-02 RX ORDER — METHOTREXATE 2.5 MG/1
12.5 TABLET ORAL
Qty: 60 TABLET | Refills: 1 | Status: SHIPPED | OUTPATIENT
Start: 2022-05-02 | End: 2022-10-03

## 2022-05-02 RX ORDER — HYDROXYCHLOROQUINE SULFATE 200 MG/1
200 TABLET, FILM COATED ORAL 2 TIMES DAILY
Qty: 60 TABLET | Refills: 3 | Status: SHIPPED | OUTPATIENT
Start: 2022-05-02 | End: 2022-09-28 | Stop reason: SDUPTHER

## 2022-05-02 NOTE — PROGRESS NOTES
[facility] RHEUMATOLOGY            PROGRESS NOTE      Subjective:       Patient ID:   NAME: Kirstie Bowden : 1945     77 y.o. female    Referring Doc: No ref. provider found  Other Physicians:    Chief Complaint:  Bursitis and Rheumatoid Arthritis      History of Present Illness:     Patient returns today for a regularly scheduled follow-up visit for RA      The patient is doing well except for L shoulder pain with abduction  No paresthesia.No CP,cough or SOB  No GI complaints            ROS:   GEN:  No  fever, night sweats . weight is stable   No fatigue  SKIN: no rashes, no bruising, no ulcerations, no Raynaud's  HEENT: no HA's, No visual changes, no mucosal ulcers, no sicca symptoms,  CV:   no CP, SOB, PND, MCKEON, no orthopnea, no palpitations  PULM: normal with no SOB, cough, hemoptysis, sputum or pleuritic pain  GI:  no abdominal pain, nausea, vomiting, constipation, diarrhea, melanotic stools, BRBPR, hematemesis, no dysphagia  :   no dysuria  NEURO: no paresthesias, headaches, visual disturbances, muscle weakness  MUSCULOSKELETAL:no joint swelling, prolonged AM stiffness, no back pain, no muscle pain  Allergies:  Review of patient's allergies indicates:   Allergen Reactions    Succinylcholine chloride Other (See Comments)    Sulfur dioxide Hives    Sulfamethoxazole-trimethoprim      Other reaction(s): per dr daryn Rodríguez (sulfonamide antibiotics)      NOT SURE REACTION       Medications:    Current Outpatient Medications:     amLODIPine (NORVASC) 5 MG tablet, Take 1 tablet by mouth once daily, Disp: 90 tablet, Rfl: 3    atorvastatin (LIPITOR) 20 MG tablet, Take 1 tablet by mouth once daily, Disp: 90 tablet, Rfl: 3    CALCIUM CARBONATE/VITAMIN D3 (VITAMIN D-3 ORAL), Take by mouth once daily. , Disp: , Rfl:     exemestane (AROMASIN) 25 mg tablet, Take 1 tablet by mouth once daily, Disp: 90 tablet, Rfl: 6    fluticasone propionate (FLONASE) 50 mcg/actuation nasal spray, USE 2  SPRAY(S) IN EACH NOSTRIL DAILY, Disp: , Rfl:     fluticasone-salmeterol diskus inhaler 250-50 mcg, INHALE 1 DOSE BY MOUTH TWICE DAILY, Disp: 60 each, Rfl: 11    folic acid (FOLVITE) 1 MG tablet, Take 1 tablet (1,000 mcg total) by mouth once daily., Disp: 30 tablet, Rfl: 10    gabapentin (NEURONTIN) 300 MG capsule, TAKE 1 CAPSULE BY MOUTH IN THE EVENING, Disp: 30 capsule, Rfl: 2    hydroCHLOROthiazide (HYDRODIURIL) 25 MG tablet, Take 1 tablet by mouth once daily, Disp: 90 tablet, Rfl: 3    levothyroxine (SYNTHROID) 125 MCG tablet, Take 125 mcg by mouth before breakfast., Disp: , Rfl:     ondansetron (ZOFRAN-ODT) 8 MG TbDL, DISSOLVE 1 TABLET IN MOUTH EVERY 12 HOURS AS NEEDED, Disp: 30 tablet, Rfl: 0    pantoprazole (PROTONIX) 40 MG tablet, TAKE 1 TABLET BY MOUTH ONCE DAILY IN THE MORNING FOR 90 DAYS, Disp: , Rfl:     prochlorperazine (COMPAZINE) 10 MG tablet, TAKE 1 TABLET BY MOUTH EVERY 6 HOURS AS NEEDED, Disp: 60 tablet, Rfl: 0    thiamine 100 MG tablet, Take 100 mg by mouth once daily., Disp: , Rfl:     traMADoL (ULTRAM) 50 mg tablet, One po q 8hrs prn severe pain, Disp: 90 tablet, Rfl: 3    VITAMIN B COMPLEX ORAL, Every day, Disp: , Rfl:     hydrOXYchloroQUINE (PLAQUENIL) 200 mg tablet, Take 1 tablet (200 mg total) by mouth 2 (two) times daily., Disp: 60 tablet, Rfl: 3    methotrexate 2.5 MG Tab, Take 5 tablets (12.5 mg total) by mouth every 7 days., Disp: 60 tablet, Rfl: 1  No current facility-administered medications for this visit.    Facility-Administered Medications Ordered in Other Visits:     lactated ringers infusion, , Intravenous, Once PRN, Nathen Lyons MD    lidocaine (PF) 10 mg/ml (1%) injection 10 mg, 1 mL, Intradermal, Once, Eladia Schwartz MD    PMHx/PSHx Updates:        Last Eye Exam UTD      Objective:     Vitals:  Blood pressure (!) 142/82, weight 81.9 kg (180 lb 8 oz).    Physical Examination:   GEN: no apparent distress, comfortable; AAOx3  SKIN: no rashes,no ulceration, no  Raynaud's, no petechiae, no SQ nodules,  HEAD: normal  EYES: no pallor, no icterus  NECK: no masses, thyroid normal, trachea midline, no LAD/LN's, supple  CV: RRR with no murmur; l S1 and S2 reg. ,no gallop no rubs,   CHEST: Normal respiratory effort; CTAB; normal breath sounds; no wheeze or crackles  MUSC/Skeletal: ROM normal; no crepitus; joints without synovitis,  no deformities  No joint swelling or tenderness of PIP, MCP, wrist, elbow, shoulder, or knee joints. L shoulder without swelling Abduction 90  EXTREM: no clubbing, cyanosis, no edema  NEURO: grossly intact; motor WNL; AAOx  PSYCH: normal mood, affect and behavior  LYMPH: normal cervical, supraclavicular          Labs:   Lab Results   Component Value Date    WBC 7.41 03/24/2022    HGB 13.7 03/24/2022    HCT 42.8 03/24/2022     (H) 03/24/2022     03/24/2022    CMP  @LASTLAB(NA,K,CL,CO2,GLU,BUN,Creatinine,Calcium,PROT,Albumin,Bilitot,Alkphos,AST,ALT,CRP,ESR,RF,CCP,RODNEY,SSA,CPK,uric acid) )@  I have reviewed all available lab results and radiology reports.    Radiology/Diagnostic Studies:        Assessment/Plan:L SABursitis : injected as per procedure note  RA stable on MTX and Plaquenil                    Discussion:     I have explained all of the above in detail and the patient understands all of the current recommendation(s). I have answered all questions to the best of my ability and to their complete satisfaction.       The patient is to continue with the current management plan         RTC in   3 months      Electronically signed by Jelani Stacy MD        Answers for HPI/ROS submitted by the patient on 4/29/2022  fever: No  eye redness: No  mouth sores: No  headaches: No  shortness of breath: No  chest pain: No  trouble swallowing: No  diarrhea: No  constipation: No  unexpected weight change: No  genital sore: No  dysuria: No  During the last 3 days, have you had a skin rash?: No  Bruises or bleeds easily: No  cough: No

## 2022-05-02 NOTE — PROCEDURES
Large Joint Aspiration/Injection: L subacromial bursa    Date/Time: 5/2/2022 3:15 PM  Performed by: Jelani Stacy MD  Authorized by: Jelani Stacy MD     Consent Done?:  Yes (Verbal)  Indications:  Pain  Site marked: the procedure site was marked    Timeout: prior to procedure the correct patient, procedure, and site was verified    Prep: patient was prepped and draped in usual sterile fashion    Local anesthetic:  Lidocaine 2% without epinephrine  Location:  Shoulder  Site:  L subacromial bursa  Patient tolerance:  Patient tolerated the procedure well with no immediate complications    Injected 1 cc Kenalog,1 cc Dexamethasone L SABalvinaa

## 2022-05-10 DIAGNOSIS — M25.531 RIGHT WRIST PAIN: Primary | ICD-10-CM

## 2022-06-08 ENCOUNTER — LAB VISIT (OUTPATIENT)
Dept: LAB | Facility: HOSPITAL | Age: 77
End: 2022-06-08
Attending: INTERNAL MEDICINE
Payer: MEDICARE

## 2022-06-08 DIAGNOSIS — M75.52 BURSITIS OF SHOULDER, LEFT: ICD-10-CM

## 2022-06-08 DIAGNOSIS — M05.79 RHEUMATOID ARTHRITIS INVOLVING MULTIPLE SITES WITH POSITIVE RHEUMATOID FACTOR: ICD-10-CM

## 2022-06-08 LAB
BASOPHILS # BLD AUTO: 0.04 K/UL (ref 0–0.2)
BASOPHILS NFR BLD: 0.5 % (ref 0–1.9)
CRP SERPL-MCNC: 0.21 MG/DL
DIFFERENTIAL METHOD: ABNORMAL
EOSINOPHIL # BLD AUTO: 0.2 K/UL (ref 0–0.5)
EOSINOPHIL NFR BLD: 2 % (ref 0–8)
ERYTHROCYTE [DISTWIDTH] IN BLOOD BY AUTOMATED COUNT: 14.9 % (ref 11.5–14.5)
HCT VFR BLD AUTO: 41.1 % (ref 37–48.5)
HGB BLD-MCNC: 13.2 G/DL (ref 12–16)
IMM GRANULOCYTES # BLD AUTO: 0.02 K/UL (ref 0–0.04)
IMM GRANULOCYTES NFR BLD AUTO: 0.2 % (ref 0–0.5)
LYMPHOCYTES # BLD AUTO: 2.9 K/UL (ref 1–4.8)
LYMPHOCYTES NFR BLD: 35.4 % (ref 18–48)
MCH RBC QN AUTO: 31.7 PG (ref 27–31)
MCHC RBC AUTO-ENTMCNC: 32.1 G/DL (ref 32–36)
MCV RBC AUTO: 99 FL (ref 82–98)
MONOCYTES # BLD AUTO: 0.7 K/UL (ref 0.3–1)
MONOCYTES NFR BLD: 8.2 % (ref 4–15)
NEUTROPHILS # BLD AUTO: 4.4 K/UL (ref 1.8–7.7)
NEUTROPHILS NFR BLD: 53.7 % (ref 38–73)
NRBC BLD-RTO: 0 /100 WBC
PLATELET # BLD AUTO: 203 K/UL (ref 150–450)
PMV BLD AUTO: 9.7 FL (ref 9.2–12.9)
RBC # BLD AUTO: 4.17 M/UL (ref 4–5.4)
WBC # BLD AUTO: 8.16 K/UL (ref 3.9–12.7)

## 2022-06-08 PROCEDURE — 86140 C-REACTIVE PROTEIN: CPT | Performed by: INTERNAL MEDICINE

## 2022-06-08 PROCEDURE — 85025 COMPLETE CBC W/AUTO DIFF WBC: CPT | Performed by: INTERNAL MEDICINE

## 2022-06-08 PROCEDURE — 36415 COLL VENOUS BLD VENIPUNCTURE: CPT | Performed by: INTERNAL MEDICINE

## 2022-07-03 NOTE — TELEPHONE ENCOUNTER
Care Due:                  Date            Visit Type   Department     Provider  --------------------------------------------------------------------------------                                EP -                              PRIMARY      [facility]  Last Visit: 07-      CARE ([facility])   PRACTICE       Elías Luis                              EP -                              PRIMARY      [facility]  Next Visit: 07-      CARE ([facility])   PRACTICE       Elías Luis                                                            Last  Test          Frequency    Reason                     Performed    Due Date  --------------------------------------------------------------------------------    Lipid Panel.  12 months..  atorvastatin.............  07- 07-    Health [facility] Embedded Care Gaps. Reference number: [id number]. 7/03/2022   6:30:53 AM CDT

## 2022-07-04 RX ORDER — LEVOTHYROXINE SODIUM 125 UG/1
TABLET ORAL
Qty: 90 TABLET | Refills: 3 | Status: SHIPPED | OUTPATIENT
Start: 2022-07-04 | End: 2023-07-10 | Stop reason: SDUPTHER

## 2022-07-04 NOTE — TELEPHONE ENCOUNTER
Refill Routing Note   Medication(s) are not appropriate for processing by [facility] for the following reason(s):      - Medication not active on medication list    [facility] action(s):  Defer Medication-related problems identified: Requires labs        Medication reconciliation completed: No     Appointments  past 12m or future 3m with PCP    Date Provider   Last Visit   7/12/2021 Elías Luis Jr., MD   Next Visit   7/11/2022 Elías Luis Jr., MD   ED visits in past 90 days: 0        Note composed:1:45 PM 07/04/2022

## 2022-07-11 ENCOUNTER — OFFICE VISIT (OUTPATIENT)
Dept: FAMILY MEDICINE | Facility: CLINIC | Age: 77
End: 2022-07-11
Payer: MEDICARE

## 2022-07-11 VITALS
OXYGEN SATURATION: 96 % | DIASTOLIC BLOOD PRESSURE: 70 MMHG | SYSTOLIC BLOOD PRESSURE: 130 MMHG | TEMPERATURE: 98 F | BODY MASS INDEX: 27.43 KG/M2 | WEIGHT: 181 LBS | HEART RATE: 67 BPM | HEIGHT: 68 IN

## 2022-07-11 DIAGNOSIS — I10 ESSENTIAL HYPERTENSION, BENIGN: Primary | ICD-10-CM

## 2022-07-11 DIAGNOSIS — M05.711 RHEUMATOID ARTHRITIS INVOLVING RIGHT SHOULDER WITH POSITIVE RHEUMATOID FACTOR: ICD-10-CM

## 2022-07-11 DIAGNOSIS — J45.20 MILD INTERMITTENT CHRONIC ASTHMA WITHOUT COMPLICATION: ICD-10-CM

## 2022-07-11 DIAGNOSIS — C50.112 CARCINOMA OF CENTRAL PORTION OF LEFT BREAST IN FEMALE, ESTROGEN RECEPTOR POSITIVE: ICD-10-CM

## 2022-07-11 DIAGNOSIS — E03.9 ACQUIRED HYPOTHYROIDISM: ICD-10-CM

## 2022-07-11 DIAGNOSIS — E78.00 PURE HYPERCHOLESTEROLEMIA: ICD-10-CM

## 2022-07-11 DIAGNOSIS — Z17.0 CARCINOMA OF CENTRAL PORTION OF LEFT BREAST IN FEMALE, ESTROGEN RECEPTOR POSITIVE: ICD-10-CM

## 2022-07-11 PROBLEM — Z01.818 PREOP TESTING: Status: RESOLVED | Noted: 2019-03-14 | Resolved: 2022-07-11

## 2022-07-11 PROCEDURE — 99214 PR OFFICE/OUTPT VISIT, EST, LEVL IV, 30-39 MIN: ICD-10-PCS | Mod: S$PBB,,, | Performed by: FAMILY MEDICINE

## 2022-07-11 PROCEDURE — 99213 OFFICE O/P EST LOW 20 MIN: CPT | Mod: PBBFAC,PN | Performed by: FAMILY MEDICINE

## 2022-07-11 PROCEDURE — 99999 PR PBB SHADOW E&M-EST. PATIENT-LVL III: ICD-10-PCS | Mod: PBBFAC,,, | Performed by: FAMILY MEDICINE

## 2022-07-11 PROCEDURE — 99999 PR PBB SHADOW E&M-EST. PATIENT-LVL III: CPT | Mod: PBBFAC,,, | Performed by: FAMILY MEDICINE

## 2022-07-11 PROCEDURE — 99214 OFFICE O/P EST MOD 30 MIN: CPT | Mod: S$PBB,,, | Performed by: FAMILY MEDICINE

## 2022-07-12 NOTE — PROGRESS NOTES
Subjective:       Patient ID: Kirstie Bowden is a 77 y.o. female.    Chief Complaint: Hypertension, Hyperlipidemia, Hypothyroidism, Asthma, Breast Cancer, and Rheumatoid Arthritis    Patient presents here for annual follow-up of hypertension, hyperlipidemia, hypothyroidism, asthma, breast cancer, and rheumatoid arthritis.  There have been no real changes to her medical or surgical history since her last visit.  She did have several epidural steroid injections as well as she recently had an EGD low with dilatation of mild esophageal stricture.  Her hypertension is well controlled on her present medication and she is tolerating her medication well.  Her hyperlipidemia is also well controlled with her present use of Lipitor 20 mg daily and her low-fat low-cholesterol diet.  Her weight is stable since her last visit, and she does not really do any routine exercise.  She has had no significant flare-ups of her asthma in the last year and she is compliant with her inhalers.  Her hypothyroidism is stable on her present dose of levothyroxine 125 mcg daily.  She is followed by Oncology for her breast cancer and is presently on Aromasin daily as an antiestrogen therapy.  Oncology notes were reviewed.  She also does have rheumatoid arthritis and is followed by Rheumatology.  Her symptoms are controlled with her present use of methotrexate as well as Plaquenil 200 mg b.i.d..  As far as health maintenance, she is up-to-date with all of her recommended screening exams and immunizations with exception of pneumococcal vaccine, tetanus vaccine, her 2nd COVID booster, and low-dose CT of the lung for lung nodule.  However, she has had PET/CT scans which have been negative and not shown any activity in the lung nodules.  She will get her pneumococcal vaccine at her local pharmacy.    Hypertension  This is a chronic problem. The problem is unchanged. The problem is controlled. Pertinent negatives include no chest pain,  headaches, palpitations or shortness of breath. Risk factors for coronary artery disease include dyslipidemia, post-menopausal state and smoking/tobacco exposure. Past treatments include diuretics and calcium channel blockers. The current treatment provides moderate improvement. Compliance problems include exercise.  There is no history of kidney disease, CAD/MI, CVA or heart failure.   Hyperlipidemia  This is a chronic problem. The problem is controlled. Recent lipid tests were reviewed and are normal. Exacerbating diseases include hypothyroidism. Factors aggravating her hyperlipidemia include thiazides. Pertinent negatives include no chest pain or shortness of breath. Current antihyperlipidemic treatment includes statins. The current treatment provides significant improvement of lipids. Compliance problems include adherence to exercise.  Risk factors for coronary artery disease include dyslipidemia, hypertension and post-menopausal.     Review of Systems   Constitutional: Negative for chills, fatigue, fever and unexpected weight change.   HENT: Negative for nasal congestion, postnasal drip and sore throat.    Eyes: Negative for pain and visual disturbance.   Respiratory: Negative for cough, shortness of breath and wheezing.    Cardiovascular: Negative for chest pain and palpitations.   Gastrointestinal: Negative for abdominal pain, diarrhea, nausea and vomiting.   Genitourinary: Negative for difficulty urinating, dysuria and flank pain.   Musculoskeletal: Positive for arthralgias and back pain.   Neurological: Negative for dizziness, light-headedness and headaches.   Hematological: Negative for adenopathy. Bruises/bleeds easily.   Psychiatric/Behavioral: Negative for sleep disturbance. The patient is not nervous/anxious.          Objective:      Physical Exam  Vitals reviewed.   Constitutional:       General: She is not in acute distress.     Appearance: Normal appearance. She is well-developed.   HENT:      Right  Ear: Tympanic membrane and external ear normal.      Left Ear: Tympanic membrane and external ear normal.      Mouth/Throat:      Pharynx: Oropharynx is clear. No posterior oropharyngeal erythema.   Neck:      Thyroid: No thyromegaly.   Cardiovascular:      Rate and Rhythm: Normal rate and regular rhythm.      Pulses: Normal pulses.      Heart sounds: Normal heart sounds. No murmur heard.  Pulmonary:      Effort: Pulmonary effort is normal.      Breath sounds: Normal breath sounds. No wheezing or rales.   Musculoskeletal:         General: No tenderness.      Cervical back: Normal range of motion and neck supple.      Right lower leg: No edema.      Left lower leg: No edema.      Comments: Mild decreased range of motion of lumbosacral spine   Lymphadenopathy:      Cervical: No cervical adenopathy.   Neurological:      General: No focal deficit present.      Mental Status: She is alert and oriented to person, place, and time.      Cranial Nerves: No cranial nerve deficit.      Deep Tendon Reflexes: Reflexes are normal and symmetric.   Psychiatric:         Mood and Affect: Mood normal.         Behavior: Behavior normal.         Assessment:       Problem List Items Addressed This Visit     Essential hypertension, benign - Primary    Hyperlipidemia    Acquired hypothyroidism    Relevant Orders    TSH    Rheumatoid arthritis    Asthma, chronic    Carcinoma of central portion of left breast in female, estrogen receptor positive          Plan:       1. Continue present medication as her hypertension, hyperlipidemia, hypothyroidism, asthma, rheumatoid arthritis, and breast cancer are stable  2. Continue low-sodium, low-fat low-cholesterol diet but try to get some exercise.  BMI today is 27.52  3. She will get her pneumococcal vaccine at her local pharmacy  4. TSH with her next blood draw from her oncologist which is later this month  5. Follow up with me in 1 year or p.r.n.

## 2022-07-14 DIAGNOSIS — I10 ESSENTIAL HYPERTENSION, BENIGN: ICD-10-CM

## 2022-07-14 NOTE — TELEPHONE ENCOUNTER
No new care gaps identified.  [facility] Embedded Care Gaps. Reference number: [id number]. 7/14/2022   11:42:52 AM MARIST

## 2022-07-15 RX ORDER — AMLODIPINE BESYLATE 5 MG/1
TABLET ORAL
Qty: 90 TABLET | Refills: 3 | Status: SHIPPED | OUTPATIENT
Start: 2022-07-15 | End: 2023-07-10 | Stop reason: SDUPTHER

## 2022-07-15 RX ORDER — HYDROCHLOROTHIAZIDE 25 MG/1
TABLET ORAL
Qty: 90 TABLET | Refills: 3 | Status: SHIPPED | OUTPATIENT
Start: 2022-07-15 | End: 2023-07-10 | Stop reason: SDUPTHER

## 2022-07-15 NOTE — TELEPHONE ENCOUNTER
Refill Decision Note   Kirstie Bowden  is requesting a refill authorization.  Brief Assessment and Rationale for Refill:  Approve     Medication Therapy Plan:       Medication Reconciliation Completed: No   Comments:     No Care Gaps recommended.     Note composed:1:23 PM 07/15/2022

## 2022-07-19 DIAGNOSIS — E78.5 HYPERLIPIDEMIA: ICD-10-CM

## 2022-07-19 NOTE — TELEPHONE ENCOUNTER
No new care gaps identified.  [facility] Embedded Care Gaps. Reference number: [id number]. 7/19/2022   11:49:11 AM MARIST

## 2022-07-20 ENCOUNTER — HOSPITAL ENCOUNTER (OUTPATIENT)
Dept: RADIOLOGY | Facility: HOSPITAL | Age: 77
Discharge: HOME | End: 2022-07-20
Attending: INTERNAL MEDICINE
Payer: MEDICARE

## 2022-07-20 ENCOUNTER — LAB VISIT (OUTPATIENT)
Dept: LAB | Facility: HOSPITAL | Age: 77
End: 2022-07-20
Attending: INTERNAL MEDICINE
Payer: MEDICARE

## 2022-07-20 VITALS — HEIGHT: 68 IN | WEIGHT: 181 LBS | BODY MASS INDEX: 27.43 KG/M2

## 2022-07-20 DIAGNOSIS — Z17.0 CARCINOMA OF CENTRAL PORTION OF LEFT BREAST IN FEMALE, ESTROGEN RECEPTOR POSITIVE: ICD-10-CM

## 2022-07-20 DIAGNOSIS — C50.112 CARCINOMA OF CENTRAL PORTION OF LEFT BREAST IN FEMALE, ESTROGEN RECEPTOR POSITIVE: ICD-10-CM

## 2022-07-20 DIAGNOSIS — D75.89 MACROCYTOSIS WITHOUT ANEMIA: ICD-10-CM

## 2022-07-20 DIAGNOSIS — E03.9 ACQUIRED HYPOTHYROIDISM: ICD-10-CM

## 2022-07-20 LAB
ALBUMIN SERPL BCP-MCNC: 4.4 G/DL (ref 3.5–5.2)
ALP SERPL-CCNC: 69 U/L (ref 55–135)
ALT SERPL W/O P-5'-P-CCNC: 39 U/L (ref 10–44)
ANION GAP SERPL CALC-SCNC: 12 MMOL/L (ref 8–16)
AST SERPL-CCNC: 38 U/L (ref 10–40)
BASOPHILS # BLD AUTO: 0.03 K/UL (ref 0–0.2)
BASOPHILS NFR BLD: 0.5 % (ref 0–1.9)
BILIRUB SERPL-MCNC: 0.6 MG/DL (ref 0.1–1)
BUN SERPL-MCNC: 15 MG/DL (ref 8–23)
CALCIUM SERPL-MCNC: 10 MG/DL (ref 8.7–10.5)
CHLORIDE SERPL-SCNC: 102 MMOL/L (ref 95–110)
CO2 SERPL-SCNC: 27 MMOL/L (ref 23–29)
CREAT SERPL-MCNC: 1 MG/DL (ref 0.5–1.4)
DIFFERENTIAL METHOD: ABNORMAL
EOSINOPHIL # BLD AUTO: 0.2 K/UL (ref 0–0.5)
EOSINOPHIL NFR BLD: 2.6 % (ref 0–8)
ERYTHROCYTE [DISTWIDTH] IN BLOOD BY AUTOMATED COUNT: 15.1 % (ref 11.5–14.5)
EST. GFR  (AFRICAN AMERICAN): >60 ML/MIN/1.73 M^2
EST. GFR  (NON AFRICAN AMERICAN): 55 ML/MIN/1.73 M^2
GLUCOSE SERPL-MCNC: 89 MG/DL (ref 70–110)
GLUCOSE SERPL-MCNC: 90 MG/DL (ref 70–110)
HCT VFR BLD AUTO: 40.5 % (ref 37–48.5)
HGB BLD-MCNC: 13.7 G/DL (ref 12–16)
IMM GRANULOCYTES # BLD AUTO: 0.02 K/UL (ref 0–0.04)
IMM GRANULOCYTES NFR BLD AUTO: 0.3 % (ref 0–0.5)
LYMPHOCYTES # BLD AUTO: 1.8 K/UL (ref 1–4.8)
LYMPHOCYTES NFR BLD: 29.6 % (ref 18–48)
MCH RBC QN AUTO: 33 PG (ref 27–31)
MCHC RBC AUTO-ENTMCNC: 33.8 G/DL (ref 32–36)
MCV RBC AUTO: 98 FL (ref 82–98)
MONOCYTES # BLD AUTO: 0.7 K/UL (ref 0.3–1)
MONOCYTES NFR BLD: 10.5 % (ref 4–15)
NEUTROPHILS # BLD AUTO: 3.5 K/UL (ref 1.8–7.7)
NEUTROPHILS NFR BLD: 56.5 % (ref 38–73)
NRBC BLD-RTO: 0 /100 WBC
PLATELET # BLD AUTO: 206 K/UL (ref 150–450)
PMV BLD AUTO: 10.7 FL (ref 9.2–12.9)
POTASSIUM SERPL-SCNC: 4.2 MMOL/L (ref 3.5–5.1)
PROT SERPL-MCNC: 8 G/DL (ref 6–8.4)
RBC # BLD AUTO: 4.15 M/UL (ref 4–5.4)
SODIUM SERPL-SCNC: 141 MMOL/L (ref 136–145)
TSH SERPL DL<=0.005 MIU/L-ACNC: 2.6 UIU/ML (ref 0.4–4)
WBC # BLD AUTO: 6.18 K/UL (ref 3.9–12.7)

## 2022-07-20 PROCEDURE — 78815 PET IMAGE W/CT SKULL-THIGH: CPT | Mod: TC,PO,PS

## 2022-07-20 PROCEDURE — 80053 COMPREHEN METABOLIC PANEL: CPT | Performed by: INTERNAL MEDICINE

## 2022-07-20 PROCEDURE — 85025 COMPLETE CBC W/AUTO DIFF WBC: CPT | Performed by: INTERNAL MEDICINE

## 2022-07-20 PROCEDURE — 84443 ASSAY THYROID STIM HORMONE: CPT | Performed by: FAMILY MEDICINE

## 2022-07-20 RX ORDER — ATORVASTATIN CALCIUM 20 MG/1
TABLET, FILM COATED ORAL
Qty: 90 TABLET | Refills: 3 | Status: SHIPPED | OUTPATIENT
Start: 2022-07-20 | End: 2023-07-10 | Stop reason: SDUPTHER

## 2022-07-20 NOTE — TELEPHONE ENCOUNTER
Refill Routing Note   Medication(s) are not appropriate for processing by [facility] for the following reason(s):      - Required laboratory values are outdated    [facility] action(s):  Defer          Medication reconciliation completed: No     Appointments  past 12m or future 3m with PCP    Date Provider   Last Visit   7/11/2022 Elías Luis Jr., MD   Next Visit   Visit date not found Elías Luis Jr., MD   ED visits in past 90 days: 0        Note composed:10:49 PM 07/19/2022

## 2022-07-22 ENCOUNTER — OFFICE VISIT (OUTPATIENT)
Dept: HEMATOLOGY/ONCOLOGY | Facility: CLINIC | Age: 77
End: 2022-07-22
Payer: MEDICARE

## 2022-07-22 VITALS
OXYGEN SATURATION: 96 % | BODY MASS INDEX: 27.96 KG/M2 | SYSTOLIC BLOOD PRESSURE: 147 MMHG | HEART RATE: 66 BPM | RESPIRATION RATE: 12 BRPM | DIASTOLIC BLOOD PRESSURE: 67 MMHG | TEMPERATURE: 97 F | WEIGHT: 184.5 LBS | HEIGHT: 68 IN

## 2022-07-22 DIAGNOSIS — D75.89 MACROCYTOSIS WITHOUT ANEMIA: ICD-10-CM

## 2022-07-22 DIAGNOSIS — Z17.0 CARCINOMA OF CENTRAL PORTION OF LEFT BREAST IN FEMALE, ESTROGEN RECEPTOR POSITIVE: Primary | ICD-10-CM

## 2022-07-22 DIAGNOSIS — C50.112 CARCINOMA OF CENTRAL PORTION OF LEFT BREAST IN FEMALE, ESTROGEN RECEPTOR POSITIVE: Primary | ICD-10-CM

## 2022-07-22 DIAGNOSIS — I10 ESSENTIAL HYPERTENSION, BENIGN: ICD-10-CM

## 2022-07-22 DIAGNOSIS — M05.711 RHEUMATOID ARTHRITIS INVOLVING RIGHT SHOULDER WITH POSITIVE RHEUMATOID FACTOR: ICD-10-CM

## 2022-07-22 DIAGNOSIS — E78.00 PURE HYPERCHOLESTEROLEMIA: ICD-10-CM

## 2022-07-22 LAB — CANCER AG27-29 SERPL-ACNC: 15.1 U/ML

## 2022-07-22 PROCEDURE — 99214 OFFICE O/P EST MOD 30 MIN: CPT | Mod: S$PBB,,, | Performed by: INTERNAL MEDICINE

## 2022-07-22 PROCEDURE — 99214 PR OFFICE/OUTPT VISIT, EST, LEVL IV, 30-39 MIN: ICD-10-PCS | Mod: S$PBB,,, | Performed by: INTERNAL MEDICINE

## 2022-07-22 PROCEDURE — 99999 PR PBB SHADOW E&M-EST. PATIENT-LVL IV: ICD-10-PCS | Mod: PBBFAC,,, | Performed by: INTERNAL MEDICINE

## 2022-07-22 PROCEDURE — 99999 PR PBB SHADOW E&M-EST. PATIENT-LVL IV: CPT | Mod: PBBFAC,,, | Performed by: INTERNAL MEDICINE

## 2022-07-22 PROCEDURE — 99214 OFFICE O/P EST MOD 30 MIN: CPT | Mod: PBBFAC,PO | Performed by: INTERNAL MEDICINE

## 2022-07-22 NOTE — PROGRESS NOTES
Subjective:       Patient ID: Kirstie Bowden is a 77 y.o. female.    Chief Complaint   Left breast ca dx  T2 N0 status post lumpectomy and re-resection margin adjuvant TC chemo s/p 3  cycles but discontinued due to quality of life issues and started Arimidex   stated  having s/e  To arimidex took herself off presented to clinic started on Aromasin .  Tolerating Aromasin well rec score of 29    hydronephrosis+ ,  had stent placed  Here for follow-up  Oncologic History:  Dx 1/2019  INVASIVE CARCINOMA BREAST, CANCER CASE SUMMARY:  PROCEDURE: Partial mastectomy without skin or nipple.3/2019  SPECIMEN LATERALITY: Left.  TUMOR SIZE, GREATEST DIMENSION: 2.5 cm.  HISTOLOGIC TYPE: Invasive pleomorphic lobular carcinoma.  HISTOLOGIC GRADE (LUTHER HISTOLOGIC SCORE):  Glandular (acinar)/tubular differentiation: Score 3.  Nuclear pleomorphism: Score 2.  Mitotic rate: Score 1.  Overall grade: Grade 2  DUCTAL CARCINOMA IN SITU (DCIS): Not identified.  LOBULAR CARCINOMA IN SITU (LCIS): Present, pleomorphic lobular carcinoma in situ with rare focus of  classical lobular carcinoma situ.  Estimated size (extent) of pleomorphic LCIS: At least 2.8 cm.  TUMOR EXTENSION: Not applicable.  MARGINS:  INVASIVE CARCINOMA MARGINS: Uninvolved by invasive carcinoma.  Distance from inferior (closest) margin: 5 mm.  PLEOMORPHIC LOBULAR CARCINOMA IN SITU MARGINS: Uninvolved by pleomorphic LCIS.  Distance from inferior medial (closest) margin: 0.2 mm (see above comment).  REGIONAL LYMPH NODES: Uninvolved by tumor cells.  Number of lymph nodes examined: 2.  Number of sentinel nodes examined: 2.  TREATMENT EFFECT: No known presurgical therapy.  PATHOLOGIC STAGE CLASSIFICATION (pTNM, AJCC 8TH EDITION):  pT2: Tumor size = 2.5 cm in greatest dimension.  pN0: No regional lymph node metastasis identified.  pM: Not applicable.    ER: Positive.  CA: Positive.  HER2: Negative (IHC - Equivocal (score 2) and FISH - Negative).  Ki-67: Approximately  14%.  HPI:     Social History     Socioeconomic History    Marital status:    Occupational History     Employer: [employer]   Tobacco Use    Smoking status: Former Smoker     Packs/day: 0.50     Years: 47.00     Pack years: 23.50     Types: Cigarettes     Start date: 1960     Quit date: 2019     Years since quittin.9    Smokeless tobacco: Never Used   Substance and Sexual Activity    Alcohol use: Yes     Alcohol/week: 2.0 standard drinks     Types: 2 Glasses of wine per week     Comment: Social    Drug use: No    Sexual activity: Never     Social Determinants of Health     Financial Resource Strain: Low Risk     Difficulty of Paying Living Expenses: Not very hard   Food Insecurity: No Food Insecurity    Worried About Running Out of Food in the Last Year: Never true    Ran Out of Food in the Last Year: Never true   Transportation Needs: No Transportation Needs    Lack of Transportation (Medical): No    Lack of Transportation (Non-Medical): No   Physical Activity: Insufficiently Active    Days of Exercise per Week: 3 days    Minutes of Exercise per Session: 10 min   Stress: No Stress Concern Present    Feeling of Stress : Only a little   Social Connections: Unknown    Frequency of Communication with Friends and Family: More than three times a week    Frequency of Social Gatherings with Friends and Family: Three times a week    Active Member of Clubs or Organizations: No    Attends Club or Organization Meetings: Never    Marital Status:    Housing Stability: Low Risk     Unable to Pay for Housing in the Last Year: No    Number of Places Lived in the Last Year: 1    Unstable Housing in the Last Year: No     Family History   Problem Relation Age of Onset    Heart disease Mother     Hypertension Mother     Alzheimer's disease Mother     Cancer Father     Heart disease Sister     Diabetes Brother     Stroke Sister     Cancer Daughter      Past Surgical History:    Procedure Laterality Date    AXILLARY NODE DISSECTION Left 03/14/2019    Procedure: LYMPHADENECTOMY, AXILLARY;  Surgeon: Mp Gonzalez MD;  Location: [facility] OR;  Service: General;  Laterality: Left;  left lumpectomy with axillary lypmh node dissection    BALLOON DILATION OF URETER Left 06/24/2019    Procedure: DILATION, URETER, USING BALLOON;  Surgeon: Jorden Dickson MD;  Location: [facility] OR;  Service: Urology;  Laterality: Left;    BREAST BIOPSY Left 20 yrs ago    benign    BREAST LUMPECTOMY      x2    CHOLECYSTECTOMY      COLONOSCOPY  09/25/2018    LUC EASTON MD    CYSTOSCOPY W/ URETERAL STENT PLACEMENT Left 06/24/2019    Procedure: CYSTOSCOPY, WITH URETERAL STENT INSERTION;  Surgeon: Jorden Dickson MD;  Location: [facility] OR;  Service: Urology;  Laterality: Left;    CYSTOSCOPY W/ URETERAL STENT REMOVAL Left 07/23/2019    Procedure: CYSTOSCOPY, WITH URETERAL STENT REMOVAL;  Surgeon: Jorden Dickson MD;  Location: [facility] OR;  Service: Urology;  Laterality: Left;    EPIDURAL STEROID INJECTION INTO LUMBAR SPINE N/A 09/30/2021    Procedure: Injection-steroid-epidural-lumbar;  Surgeon: Nathen Lyons MD;  [facility]  Service: [facility];  Laterality: N/A;  L5-S1      EPIDURAL STEROID INJECTION INTO LUMBAR SPINE N/A 11/16/2021    Procedure: Injection-steroid-epidural-lumbar;  Surgeon: Nathen Lyons MD;  [facility];  Service: Pain Management;  Laterality: N/A;  L5-S1    EXTRACORPOREAL SHOCK WAVE LITHOTRIPSY Left 07/23/2019    Procedure: LITHOTRIPSY, ESWL;  Surgeon: Jorden Dickson MD;  Location: [facility] OR;  Service: Urology;  Laterality: Left;    EYE SURGERY Bilateral     cataracts    HYSTERECTOMY      MASTECTOMY, PARTIAL Left 04/25/2019    Procedure: MASTECTOMY, PARTIAL;  Surgeon: Mp Gonzalez MD;  Location: [facility] OR;  Service: General;  Laterality: Left;    NEEDLE LOCALIZATION Left 03/14/2019    Procedure: NEEDLE LOCALIZATION;  Surgeon: Mp Gonzalez MD;  Location:  [facility] OR;  Service: General;  Laterality: Left;  left lumpectomy with wire needle localization     RETROGRADE PYELOGRAPHY Bilateral 06/24/2019    Procedure: PYELOGRAM, RETROGRADE;  Surgeon: Jroden Dickson MD;  Location: [facility] OR;  Service: Urology;  Laterality: Bilateral;    SENTINEL LYMPH NODE BIOPSY Left 03/14/2019    Procedure: BIOPSY, LYMPH NODE, SENTINEL;  Surgeon: Mp Gonzalez MD;  Location: [facility] OR;  Service: General;  Laterality: Left;  left sentinel node lymph node biopsy    TONSILLECTOMY      TRANSFORAMINAL EPIDURAL INJECTION OF STEROID Right 01/04/2022    Procedure: Injection,steroid,epidural,transforaminal approach;  Surgeon: Nathen Lyons MD;  Location: [facility] OR;  Service: Pain Management;  Laterality: Right;  L4-L5, L5-s1    URETEROSCOPY Left 06/24/2019    Procedure: URETEROSCOPY;  Surgeon: Jorden Dickson MD;  Location: [facility] OR;  Service: Urology;  Laterality: Left;     Past Medical History:   Diagnosis Date    Arthritis     rheumatoid    Asthma     Cancer     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    Hyperlipidemia     Hypertension     Limb alert care status     NO BP/IV LEFT ARM    Pseudocholinesterase deficiency     family history    Thyroid disease        Current Outpatient Medications:     amLODIPine (NORVASC) 5 MG tablet, Take 1 tablet by mouth once daily, Disp: 90 tablet, Rfl: 3    atorvastatin (LIPITOR) 20 MG tablet, Take 1 tablet by mouth once daily, Disp: 90 tablet, Rfl: 3    CALCIUM CARBONATE/VITAMIN D3 (VITAMIN D-3 ORAL), Take by mouth once daily. , Disp: , Rfl:     EUTHYROX 125 mcg tablet, TAKE 1 TABLET BY MOUTH BEFORE BREAKFAST, Disp: 90 tablet, Rfl: 3    exemestane (AROMASIN) 25 mg tablet, Take 1 tablet by mouth once daily, Disp: 90 tablet, Rfl: 5    fluticasone-salmeterol diskus inhaler 250-50 mcg, INHALE 1 DOSE BY MOUTH TWICE DAILY, Disp: 60 each, Rfl: 11    folic acid (FOLVITE) 1 MG tablet, Take 1 tablet (1,000 mcg total) by mouth once daily., Disp: 30 tablet, Rfl: 10     gabapentin (NEURONTIN) 300 MG capsule, TAKE 1 CAPSULE BY MOUTH IN THE EVENING, Disp: 30 capsule, Rfl: 2    hydroCHLOROthiazide (HYDRODIURIL) 25 MG tablet, Take 1 tablet by mouth once daily, Disp: 90 tablet, Rfl: 3    hydrOXYchloroQUINE (PLAQUENIL) 200 mg tablet, Take 1 tablet (200 mg total) by mouth 2 (two) times daily., Disp: 60 tablet, Rfl: 3    methotrexate 2.5 MG Tab, Take 5 tablets (12.5 mg total) by mouth every 7 days., Disp: 60 tablet, Rfl: 1    ondansetron (ZOFRAN-ODT) 8 MG TbDL, DISSOLVE 1 TABLET IN MOUTH EVERY 12 HOURS AS NEEDED, Disp: 30 tablet, Rfl: 0    pantoprazole (PROTONIX) 40 MG tablet, TAKE 1 TABLET BY MOUTH ONCE DAILY IN THE MORNING FOR 90 DAYS, Disp: , Rfl:     prochlorperazine (COMPAZINE) 10 MG tablet, TAKE 1 TABLET BY MOUTH EVERY 6 HOURS AS NEEDED, Disp: 60 tablet, Rfl: 0    thiamine 100 MG tablet, Take 100 mg by mouth once daily., Disp: , Rfl:     traMADoL (ULTRAM) 50 mg tablet, TAKE 1 TABLET BY MOUTH EVERY 8 HOURS AS NEEDED FOR SEVERE PAIN, Disp: 90 tablet, Rfl: 0    VITAMIN B COMPLEX ORAL, Every day, Disp: , Rfl:   No current facility-administered medications for this visit.    Facility-Administered Medications Ordered in Other Visits:     lactated ringers infusion, , Intravenous, Once PRN, Nathen Lyons MD    lidocaine (PF) 10 mg/ml (1%) injection 10 mg, 1 mL, Intradermal, Once, Eladia Schwartz MD  Review of patient's allergies indicates:   Allergen Reactions    Succinylcholine chloride Other (See Comments)    Sulfur dioxide Hives    Sulfamethoxazole-trimethoprim      Other reaction(s): per dr daryn Rodríguez (sulfonamide antibiotics)      NOT SURE REACTION         REVIEW OF SYSTEMS:     CONSTITUTIONAL:  Patient voices complains with significant fatigue.  Shortness of breath on ambulation.  She is not coughing up productive sputum she reports no fever chills rigors  Has some sinus congestion  And having night sweats after starting Arimidex    SKIN: Denies rash, issues  with nails, non-healing sores, bleeding, blotching    skin or abnormal bruising. Denies new moles or changes to existing moles.      BREASTS: healing well since lumpectomy    EYES: Denies eye pain, blurred vision, swelling, redness or discharge.      ENT AND MOUTH:  Has runny nose, and stuffiness, and sinus trouble no sores. Denies    nosebleeds. Denies, hoarseness, change in voice or swelling in front of the    neck.      CARDIOVASCULAR: Denies chest pain, discomfort or palpitations. Denies neck    swelling or episodes of passing out.           GI: Denies trouble swallowing, indigestion, heartburn, abdominal pain, +nausea with arimidex betty in am then  Dry heaves during day, stopped arimidex and feeks much better     no vomiting, diarrhea, altered bowel habits, blood in stool, discoloration of    stools, change in nature of stool, bloating, increased abdominal girth.      GENITOURINARY: No discharge. No pelvic pain or lumps. No rash around groin or  lesions. No urinary frequency, hesitation, painful urination or blood in    urine. Denies incontinence. No problems with intercourse.      MUSCULOSKELETAL: Denies neck or back pain. Denies weakness in arms or legs,    joint problems or distended inflamed veins in legs. Denies swelling or abnormal  glands.      NEUROLOGICAL: Denies tingling, numbness, altered mentation changes to nerve    function in the face, weakness to one or both of the body. Denies changes to    gait and denies multiple falls or accidents.        PHYSICAL EXAM:     Wt Readings from Last 3 Encounters:   07/20/22 82.1 kg (181 lb)   07/11/22 82.1 kg (181 lb)   05/02/22 81.9 kg (180 lb 8 oz)     Temp Readings from Last 3 Encounters:   07/11/22 97.9 °F (36.6 °C) (Oral)   03/28/22 97.1 °F (36.2 °C) (Temporal)   01/04/22 98.4 °F (36.9 °C) (Skin)     BP Readings from Last 3 Encounters:   07/11/22 130/70   05/02/22 (!) 142/82   03/28/22 (!) 142/67     Pulse Readings from Last 3 Encounters:   07/11/22 67    03/28/22 67   01/04/22 65     GENERAL: Comfortable looking patient. Patient is in no distress.  Awake, alert and oriented to time, person and place.  No anxiety, or agitation.      HEENT: Normal conjunctivae and eyelids. WNL.  PERRLA 3 to 4 mm. No icterus, no pallor, no congestion, and no discharge noted.     NECK:  Supple. Trachea is central.  No crepitus.  No JVD or masses.    RESPIRATORY:  No intercostal retractions.  No dullness to percussion.  Chest is clear to auscultation.  No rales, rhonchi or wheezes.  No crepitus.  Good air entry bilaterally.    CARDIOVASCULAR:  S1 and S2 are normally heard without murmurs or gallops.  All peripheral pulses are present.    ABDOMEN:  Normal abdomen.  No hepatosplenomegaly.  No free fluid.  Bowel sounds are present.  No hernia noted. No masses.  No rebound or tenderness.  No guarding or rigidity.  Umbilicus is midline.    LYMPHATICS:  No axillary, cervical, supraclavicular, submental, or inguinal lymphadenopathy.    SKIN/MUSCULOSKELETAL:  There is no evidence of excoriation marks or ecchmosis.  No rashes.  No cyanosis.  No clubbing.  No joint or skeletal deformities noted.  Normal range of motion.    NEUROLOGIC:  Higher functions are appropriate.  No cranial nerve deficits.  Normal asher.  Normal strength.  Motor and sensory functions are normal.  Deep tendon reflexes are normal.    GENITAL/RECTAL:  Exams are deferred.      Laboratory:     CBC:  Lab Results   Component Value Date    WBC 6.18 07/20/2022    RBC 4.15 07/20/2022    HGB 13.7 07/20/2022    HCT 40.5 07/20/2022    MCV 98 07/20/2022    MCH 33.0 (H) 07/20/2022    MCHC 33.8 07/20/2022    RDW 15.1 (H) 07/20/2022     07/20/2022    MPV 10.7 07/20/2022    GRAN 3.5 07/20/2022    GRAN 56.5 07/20/2022    LYMPH 1.8 07/20/2022    LYMPH 29.6 07/20/2022    MONO 0.7 07/20/2022    MONO 10.5 07/20/2022    EOS 0.2 07/20/2022    BASO 0.03 07/20/2022    EOSINOPHIL 2.6 07/20/2022    BASOPHIL 0.5 07/20/2022       BMP: BMP  Lab  Results   Component Value Date     07/20/2022    K 4.2 07/20/2022     07/20/2022    CO2 27 07/20/2022    BUN 15 07/20/2022    CREATININE 1.0 07/20/2022    CALCIUM 10.0 07/20/2022    ANIONGAP 12 07/20/2022    ESTGFRAFRICA >60 07/20/2022    EGFRNONAA 55 (A) 07/20/2022       LFT:   Lab Results   Component Value Date    ALT 39 07/20/2022    AST 38 07/20/2022    ALKPHOS 69 07/20/2022    BILITOT 0.6 07/20/2022    Bone density September 2 1019 negative for osteopenia  oncotype rec. score is 29  Impressionpet 1/2020       1.  Likely postoperative change in the superolateral left breast, decreased in size from the previous exam.    2.  Scattered multifocal ground-glass attenuation opacities in both lungs suggesting a combination of atypical infection/pneumonia and/or post treatment/post radiation change.  Consider follow-up CT of the chest in 6-8 weeks to document resolution.    3.  Indeterminate isoattenuating non-FDG avid structure in the anterior interpolar aspect of the right kidney, possibly representing a hemorrhagic/proteinaceous cyst or low grade malignancy, consider further characterization with contrast enhanced renal protocol CT/MRI.    4.  Numerous bilateral nonobstructing renal stones.  No hydronephrosis or hydroureter on today's exam.    5.  Interval resolution of the maxillary sinus disease.     Pet 7/2020  IMPRESSION:  1. Postoperative changes of prior left breast lumpectomy.  2. Near complete resolution of the previously described groundglass  attenuation opacities in the lungs, now with only a small linear  bandlike opacity in the anterior left upper lobe (most likely related  to prior radiation/treatment change.  3. Focal increased FDG activity along the right third and fourth rib  posteriorly at the posterior thoracic junction suggesting degenerative  change with no soft tissue, lytic or sclerotic lesion identified.  4. Additional and incidental findings as noted above unchanged from  the  previous exam.            has renal stones and has hydronephrosis, stented      7/21 neg pet      7/22  IMPRESSION: No evidence of recurrent or metastatic disease     Postsurgical changes in the left breast     Stable 3.5 cm round isoattenuating mass within the upper pole the right kidney suggestive of renal cyst.     Nonobstructing renal stones       Assessment/Plan:     T2N0Mx left breast ca s/p lumpectomy 3/2019 recurrent score 29  pT2: Tumor size = 2.5 cm in greatest dimension.  pN0: No regional lymph node metastasis identified.  pM: Not applicable.    ER: Positive.  [place]: Positive.  HER2: Negative (IHC - E  Patient completed 3 cycles but aborted therapy.  In favor a quality of life due to side effects  Above PET scan discussed with patient  she stoped anastrozole  Due to arthralgia and nausea.   got off embrel during radiation and chemo now on mtx and hydroxychroloquine  Tolerating aromasin well to continue for 10 years till May 2029  Normal bone density 9 9/21  She already sees Urology  Hemorrhagic cyst was reportd on prev scans , had stents removed and lithotripsy for stones     macrocytosis: related to thyroid issues will watch she is not anemic and embark on w/u if worse or cytopenia   saw Pulmonary  Naila aldridge 6/2020) consult for 2.  Finding as above on PET scan thesesd have nearly resolved now on 7/2020 pet and nothing new noted on January and july 2022 PET pulm f/u prn n ext pet 7/23    She has scattered atheromatous calcifications in the aorta and coronary arteries she will discuss this and follow up with her PCP for referral to cardiology   repeat pet 7/23 and see me  cbc, cmp, [id number] in  6months    mammo done in 3/2022 done short term recommended  10/22    Has quit smoking      completed xrt  Started arimidex, tolerating well bone density negative   cont f/u for RA, HTH. Lipids, hypothyroidism    Advance Care planning/ directives /living will/ established already    COVID social distancing, face mask  use, hand washing techniques and personal hygiene routine discussed with patient pt is vaccinated  Good exercise, nutrition and weight management discussed with patient  Health maintenance activities and follow-up with PCPs recommendations discussed with patient

## 2022-08-09 ENCOUNTER — TELEPHONE (OUTPATIENT)
Dept: FAMILY MEDICINE | Facility: CLINIC | Age: 77
End: 2022-08-09
Payer: MEDICARE

## 2022-08-09 NOTE — TELEPHONE ENCOUNTER
Called and spoke to pt about setting up AWV  Appt set up for 10/12/2022 @11:00am w/Ms Yuriy at [facility]

## 2022-09-27 ENCOUNTER — OFFICE VISIT (OUTPATIENT)
Dept: RHEUMATOLOGY | Facility: CLINIC | Age: 77
End: 2022-09-27
Payer: MEDICARE

## 2022-09-27 VITALS
WEIGHT: 187.13 LBS | DIASTOLIC BLOOD PRESSURE: 65 MMHG | BODY MASS INDEX: 28.45 KG/M2 | SYSTOLIC BLOOD PRESSURE: 137 MMHG

## 2022-09-27 DIAGNOSIS — M05.79 RHEUMATOID ARTHRITIS INVOLVING MULTIPLE SITES WITH POSITIVE RHEUMATOID FACTOR: Primary | ICD-10-CM

## 2022-09-27 PROCEDURE — 99213 PR OFFICE/OUTPT VISIT, EST, LEVL III, 20-29 MIN: ICD-10-PCS | Mod: S$GLB,,, | Performed by: INTERNAL MEDICINE

## 2022-09-27 PROCEDURE — 99213 OFFICE O/P EST LOW 20 MIN: CPT | Mod: S$GLB,,, | Performed by: INTERNAL MEDICINE

## 2022-09-27 NOTE — PROGRESS NOTES
[facility] RHEUMATOLOGY            PROGRESS NOTE      Subjective:       Patient ID:   NAME: Kirstie Bowden : 1945     77 y.o. female    Referring Doc: No ref. provider found  Other Physicians:    Chief Complaint:  Follow-up      History of Present Illness:     Patient returns today for a regularly scheduled follow-up visit for RA    The patient is doing well.  No prolonged morning stiffness, severe arthralgias or joint swelling.  No chest pains.  Has occasional dyspnea on exertion.  No cough or palpitations.  No GI complaints.      ROS:   GEN:  No  fever, night sweats . weight is stable   + sl fatigue  SKIN: no rashes, no bruising, no ulcerations, no Raynaud's  HEENT: no HA's, No visual changes, no mucosal ulcers, no sicca symptoms,  CV:   no CP, SOB, PND, MCKEON, no orthopnea, no palpitations  PULM: normal with no SOB, cough, hemoptysis, sputum or pleuritic pain  GI:  no abdominal pain, nausea, vomiting, constipation, diarrhea, melanotic stools, BRBPR, hematemesis, no dysphagia  :   no dysuria  NEURO: no paresthesias, headaches, visual disturbances, muscle weakness  MUSCULOSKELETAL:no joint swelling, prolonged AM stiffness, no back pain, no muscle pain  Allergies:  Review of patient's allergies indicates:   Allergen Reactions    Succinylcholine chloride Other (See Comments)    Sulfur dioxide Hives    Sulfamethoxazole-trimethoprim      Other reaction(s): per dr daryn Rodríguez (sulfonamide antibiotics)      NOT SURE REACTION       Medications:    Current Outpatient Medications:     amLODIPine (NORVASC) 5 MG tablet, Take 1 tablet by mouth once daily, Disp: 90 tablet, Rfl: 3    atorvastatin (LIPITOR) 20 MG tablet, Take 1 tablet by mouth once daily, Disp: 90 tablet, Rfl: 3    CALCIUM CARBONATE/VITAMIN D3 (VITAMIN D-3 ORAL), Take by mouth once daily. , Disp: , Rfl:     EUTHYROX 125 mcg tablet, TAKE 1 TABLET BY MOUTH BEFORE BREAKFAST, Disp: 90 tablet, Rfl: 3    exemestane (AROMASIN) 25 mg tablet, Take  1 tablet by mouth once daily, Disp: 90 tablet, Rfl: 5    fluticasone-salmeterol diskus inhaler 250-50 mcg, INHALE 1 DOSE BY MOUTH TWICE DAILY, Disp: 60 each, Rfl: 11    folic acid (FOLVITE) 1 MG tablet, Take 1 tablet (1,000 mcg total) by mouth once daily., Disp: 30 tablet, Rfl: 10    gabapentin (NEURONTIN) 300 MG capsule, TAKE 1 CAPSULE BY MOUTH IN THE EVENING, Disp: 30 capsule, Rfl: 2    hydroCHLOROthiazide (HYDRODIURIL) 25 MG tablet, Take 1 tablet by mouth once daily, Disp: 90 tablet, Rfl: 3    hydrOXYchloroQUINE (PLAQUENIL) 200 mg tablet, Take 1 tablet (200 mg total) by mouth 2 (two) times daily., Disp: 60 tablet, Rfl: 3    methotrexate 2.5 MG Tab, Take 5 tablets (12.5 mg total) by mouth every 7 days., Disp: 60 tablet, Rfl: 1    ondansetron (ZOFRAN-ODT) 8 MG TbDL, DISSOLVE 1 TABLET IN MOUTH EVERY 12 HOURS AS NEEDED, Disp: 30 tablet, Rfl: 0    pantoprazole (PROTONIX) 40 MG tablet, TAKE 1 TABLET BY MOUTH ONCE DAILY IN THE MORNING FOR 90 DAYS, Disp: , Rfl:     prochlorperazine (COMPAZINE) 10 MG tablet, TAKE 1 TABLET BY MOUTH EVERY 6 HOURS AS NEEDED, Disp: 60 tablet, Rfl: 0    thiamine 100 MG tablet, Take 100 mg by mouth once daily., Disp: , Rfl:     traMADoL (ULTRAM) 50 mg tablet, TAKE 1 TABLET BY MOUTH EVERY 8 HOURS AS NEEDED FOR SEVERE PAIN, Disp: 90 tablet, Rfl: 0    VITAMIN B COMPLEX ORAL, Every day, Disp: , Rfl:   No current facility-administered medications for this visit.    Facility-Administered Medications Ordered in Other Visits:     lactated ringers infusion, , Intravenous, Once PRN, Nathen Lyons MD    lidocaine (PF) 10 mg/ml (1%) injection 10 mg, 1 mL, Intradermal, Once, Eladia Schwartz MD    PMHx/PSHx Updates:        Last Eye Exam UTD      Objective:     Vitals:  Blood pressure 137/65, weight 84.9 kg (187 lb 1.6 oz).    Physical Examination:   GEN: no apparent distress, comfortable; AAOx3  SKIN: no rashes,no ulceration, no Raynaud's, no petechiae, no SQ nodules,  HEAD: normal  EYES: no pallor, no  icterus  NECK: no masses, thyroid normal, trachea midline, no LAD/LN's, supple  CV: RRR with no murmur; l S1 and S2 reg. ,no gallop no rubs,   CHEST: Normal respiratory effort; CTAB; normal breath sounds; no wheeze or crackles  MUSC/Skeletal: ROM normal; no crepitus; joints without synovitis,  no deformities  No joint swelling or tenderness of PIP, MCP, wrist, elbow, shoulder, or knee joints  EXTREM: no clubbing, cyanosis, no edema,  NEURO: grossly intact; motor WNL; AAOx3; ,  PSYCH: normal mood, affect and behavior  LYMPH: normal cervical, supraclavicular          Labs:   Lab Results   Component Value Date    WBC 6.18 07/20/2022    HGB 13.7 07/20/2022    HCT 40.5 07/20/2022    MCV 98 07/20/2022     07/20/2022    CMP  @LASTLAB(NA,K,CL,CO2,GLU,BUN,Creatinine,Calcium,PROT,Albumin,Bilitot,Alkphos,AST,ALT,CRP,ESR,RF,CCP,RODNEY,SSA,CPK,uric acid) )@  I have reviewed all available lab results and radiology reports.    Radiology/Diagnostic Studies:        Assessment/Plan:   (1) 77 y.o. female with diagnosis of rheumatoid arthritis.  She is doing well on methotrexate and Plaquenil.  Osteoarthritis stable  History of breast cancer    Labs  She will make appt with rheum in [place]        Discussion:     I have explained all of the above in detail and the patient understands all of the current recommendation(s). I have answered all questions to the best of my ability and to their complete satisfaction.       The patient is to continue with the current management plan         RTC in         Electronically signed by Jelani Stacy MD    Patient notified that this office will be closing December 22, 2022. They should begin looking for another rheumatologist as soon as possible.  A list with the names and contact details of rheumatologists in the surrounding area was provided.

## 2022-09-28 DIAGNOSIS — M05.79 RHEUMATOID ARTHRITIS INVOLVING MULTIPLE SITES WITH POSITIVE RHEUMATOID FACTOR: Primary | ICD-10-CM

## 2022-09-28 RX ORDER — HYDROXYCHLOROQUINE SULFATE 200 MG/1
200 TABLET, FILM COATED ORAL 2 TIMES DAILY
Qty: 60 TABLET | Refills: 3 | Status: SHIPPED | OUTPATIENT
Start: 2022-09-28 | End: 2023-02-10

## 2022-09-28 NOTE — TELEPHONE ENCOUNTER
----- Message from Gonzalez Currie sent at 9/28/2022  1:27 PM CDT -----  Patient would like plaquenil called into [facility]

## 2022-10-12 ENCOUNTER — OFFICE VISIT (OUTPATIENT)
Dept: FAMILY MEDICINE | Facility: CLINIC | Age: 77
End: 2022-10-12
Payer: MEDICARE

## 2022-10-12 VITALS
SYSTOLIC BLOOD PRESSURE: 128 MMHG | HEART RATE: 70 BPM | WEIGHT: 184.94 LBS | DIASTOLIC BLOOD PRESSURE: 80 MMHG | BODY MASS INDEX: 28.03 KG/M2 | OXYGEN SATURATION: 76 % | RESPIRATION RATE: 18 BRPM | HEIGHT: 68 IN

## 2022-10-12 DIAGNOSIS — E66.3 OVERWEIGHT (BMI 25.0-29.9): ICD-10-CM

## 2022-10-12 DIAGNOSIS — N18.31 STAGE 3A CHRONIC KIDNEY DISEASE: ICD-10-CM

## 2022-10-12 DIAGNOSIS — Z00.00 ENCOUNTER FOR PREVENTIVE HEALTH EXAMINATION: Primary | ICD-10-CM

## 2022-10-12 DIAGNOSIS — Z23 INFLUENZA VACCINE NEEDED: ICD-10-CM

## 2022-10-12 DIAGNOSIS — E78.00 PURE HYPERCHOLESTEROLEMIA: ICD-10-CM

## 2022-10-12 DIAGNOSIS — E03.9 ACQUIRED HYPOTHYROIDISM: ICD-10-CM

## 2022-10-12 DIAGNOSIS — Z85.3 HISTORY OF BREAST CANCER: ICD-10-CM

## 2022-10-12 DIAGNOSIS — M06.9 RHEUMATOID ARTHRITIS INVOLVING MULTIPLE JOINTS: ICD-10-CM

## 2022-10-12 DIAGNOSIS — I10 ESSENTIAL HYPERTENSION, BENIGN: ICD-10-CM

## 2022-10-12 DIAGNOSIS — I70.0 ATHEROSCLEROSIS OF AORTA: ICD-10-CM

## 2022-10-12 PROBLEM — R20.2 HAND PARESTHESIA: Status: ACTIVE | Noted: 2022-10-12

## 2022-10-12 PROBLEM — Z79.899 OTHER LONG TERM (CURRENT) DRUG THERAPY: Status: ACTIVE | Noted: 2022-10-12

## 2022-10-12 PROBLEM — U07.1 COVID-19: Status: ACTIVE | Noted: 2021-09-04

## 2022-10-12 PROBLEM — M19.90 OSTEOARTHRITIS: Status: ACTIVE | Noted: 2022-10-12

## 2022-10-12 PROBLEM — M15.9 GENERALIZED OSTEOARTHRITIS: Status: ACTIVE | Noted: 2022-10-12

## 2022-10-12 PROBLEM — F17.200 CURRENT SMOKER: Status: ACTIVE | Noted: 2022-10-12

## 2022-10-12 PROCEDURE — 99999 PR PBB SHADOW E&M-EST. PATIENT-LVL V: ICD-10-PCS | Mod: PBBFAC,,, | Performed by: FAMILY MEDICINE

## 2022-10-12 PROCEDURE — G0008 ADMIN INFLUENZA VIRUS VAC: HCPCS | Mod: PBBFAC,PN

## 2022-10-12 PROCEDURE — G0439 PR MEDICARE ANNUAL WELLNESS SUBSEQUENT VISIT: ICD-10-PCS | Mod: ,,, | Performed by: FAMILY MEDICINE

## 2022-10-12 PROCEDURE — 99999 PR PBB SHADOW E&M-EST. PATIENT-LVL V: CPT | Mod: PBBFAC,,, | Performed by: FAMILY MEDICINE

## 2022-10-12 PROCEDURE — G0439 PPPS, SUBSEQ VISIT: HCPCS | Mod: ,,, | Performed by: FAMILY MEDICINE

## 2022-10-12 PROCEDURE — 99215 OFFICE O/P EST HI 40 MIN: CPT | Mod: PBBFAC,PN,25 | Performed by: FAMILY MEDICINE

## 2022-10-12 NOTE — PATIENT INSTRUCTIONS
Counseling and Referral of Other Preventative  (Italic type indicates deductible and co-insurance are waived)    Patient Name: Kirstie Bowden  Today's Date: 10/12/2022    Health Maintenance       Date Due Completion Date    Pneumococcal Vaccines (Age 65+) (1 - PCV) Never done ---    TETANUS VACCINE Never done ---    LDCT Lung Screen 05/04/2021 5/4/2020    COVID-19 Vaccine (4 - Booster for Moderna series) 01/12/2022 11/17/2021    Lipid Panel 07/21/2022 7/21/2021    Override on 1/26/2016: Done (Dr Jelani Stacy   lab report sent in media  kb)    Influenza Vaccine (1) 09/01/2022 9/29/2020    Colonoscopy 09/25/2023 9/25/2018    Override on 9/15/2015: Done ([facility]-Dr Chavez-in media)    Override on 10/25/2012: Done    DEXA Scan 09/23/2024 9/23/2021    Override on 1/28/2017: Done (Dr Stacy   report sent to scanning kb)    Override on 6/19/2014: Done        No orders of the defined types were placed in this encounter.    The following information is provided to all patients.  This information is to help you find resources for any of the problems found today that may be affecting your health:                Living healthy guide: www.[facility].gov      Understanding Diabetes: www.diabetes.org      Eating healthy: www.cdc.gov/healthyweight      CDC home safety checklist: www.cdc.gov/steadi/patient.html      Agency on Aging: www.goea.[place].gov      Alcoholics anonymous (AA): www.aa.org      Physical Activity: www.esvin.nih.gov/pb3gygu      Tobacco use: www.quitwithusla.org

## 2022-10-12 NOTE — PROGRESS NOTES
"Kirstie Bowden presented for a  Medicare AWV and comprehensive Health Risk Assessment today. The following components were reviewed and updated:    Medical history  Family History  Social history  Allergies and Current Medications  Health Risk Assessment  Health Maintenance  Care Team         ** See Completed Assessments for Annual Wellness Visit within the encounter summary.**         The following assessments were completed:  Living Situation  CAGE  Depression Screening  Timed Get Up and Go  Whisper Test  Cognitive Function Screening  Nutrition Screening  ADL Screening  PAQ Screening          Vitals:    10/12/22 1109   BP: 128/80   BP Location: Left arm   Patient Position: Sitting   BP Method: Large (Manual)   Pulse: 70   Resp: 18   SpO2: (!) 76%   Weight: 83.9 kg (184 lb 15.5 oz)   Height: 5' 8" (1.727 m)     Body mass index is 28.12 kg/m².  Physical Exam  Vitals reviewed.   Constitutional:       Appearance: Normal appearance.   HENT:      Head: Normocephalic and atraumatic.   Eyes:      Pupils: Pupils are equal, round, and reactive to light.   Pulmonary:      Effort: Pulmonary effort is normal. No respiratory distress.   Skin:     General: Skin is warm and dry.   Neurological:      General: No focal deficit present.      Mental Status: She is alert and oriented to person, place, and time.   Psychiatric:         Mood and Affect: Mood normal.         Behavior: Behavior normal.     The 10-year ASCVD risk score (Mckay ACEVES, et al., 2019) is: 22.8%    Values used to calculate the score:      Age: 77 years      Sex: Female      Is Non- : No      Diabetic: No      Tobacco smoker: No      Systolic Blood Pressure: 128 mmHg      Is BP treated: Yes      HDL Cholesterol: 44 mg/dL      Total Cholesterol: 161 mg/dL        Diagnoses and health risks identified today and associated recommendations/orders:    1. Encounter for preventive health examination    2. Overweight (BMI 25.0-29.9)  Body mass index " is 28.12 kg/m².  Continue healthy diet and regular exercise as tolerated.  Continue medications as prescribed.  Follow up with PCP, Elías Luis Jr, MD     3. Essential hypertension, benign  Stable  Continue medications as prescribed.  Follow up with PCP     4. Atherosclerosis of aorta  Stable  Continue medications as prescribed.  Follow up with PCP     5. Rheumatoid arthritis involving multiple joints  Stable  Continue medications as prescribed.  Follow up with PCP and rheumatology, Dr Stacy    6. History of breast cancer  Stable  Continue medications as prescribed.  Follow up with PCP and hematology/oncology, Dr Li    7. Pure hypercholesterolemia  Stable  Continue medications as prescribed.  Follow up with PCP     8. Acquired hypothyroidism  Stable  Continue medications as prescribed.  Follow up with PCP     9. Stage 3a chronic kidney disease  Stable  Continue medications as prescribed.  Follow up with PCP     10. Influenza vaccine needed  Updated today    Provided Kirstie with a 5-10 year written screening schedule and personal prevention plan. Recommendations were developed using the USPSTF age appropriate recommendations. Education, counseling, and referrals were provided as needed. After Visit Summary printed and given to patient which includes a list of additional screenings\tests needed.    Follow up if symptoms worsen or fail to improve, for scheduled appt, 1 year for AWV.    Jane Yan NP        Future Appointments       Date Provider Specialty Appt Notes    10/24/2022  Lab [facility]    10/24/2022  Radiology [facility]    10/25/2022 Malina Li MD Hematology and Oncology /Breast[facility]/Labs/mammo    7/10/2023 Elías Luis Jr., MD Family Medicine annual          I offered to discuss advanced care planning, including how to pick a person who would make decisions for you if you were unable to make them for yourself, called a health care power of , and what kind of decisions you might make  such as use of life sustaining treatments such as ventilators and tube feeding when faced with a life limiting illness recorded on a living will that they will need to know. (How you want to be cared for as you near the end of your natural life)     X  Patient has advanced directives on file, which we reviewed, and they do not wish to make changes.

## 2022-10-24 ENCOUNTER — HOSPITAL ENCOUNTER (OUTPATIENT)
Dept: RADIOLOGY | Facility: HOSPITAL | Age: 77
Discharge: HOME | End: 2022-10-24
Attending: INTERNAL MEDICINE
Payer: MEDICARE

## 2022-10-24 DIAGNOSIS — R92.8 ABNORMAL MAMMOGRAM OF LEFT BREAST: ICD-10-CM

## 2022-10-24 PROCEDURE — 77065 DX MAMMO INCL CAD UNI: CPT | Mod: 26,LT,, | Performed by: RADIOLOGY

## 2022-10-24 PROCEDURE — 77065 MAMMO DIGITAL DIAGNOSTIC LEFT WITH TOMO: ICD-10-PCS | Mod: 26,LT,, | Performed by: RADIOLOGY

## 2022-10-24 PROCEDURE — 77065 DX MAMMO INCL CAD UNI: CPT | Mod: TC,LT

## 2022-10-24 PROCEDURE — 77061 MAMMO DIGITAL DIAGNOSTIC LEFT WITH TOMO: ICD-10-PCS | Mod: 26,LT,, | Performed by: RADIOLOGY

## 2022-10-24 PROCEDURE — 77061 BREAST TOMOSYNTHESIS UNI: CPT | Mod: 26,LT,, | Performed by: RADIOLOGY

## 2022-10-25 ENCOUNTER — OFFICE VISIT (OUTPATIENT)
Dept: HEMATOLOGY/ONCOLOGY | Facility: CLINIC | Age: 77
End: 2022-10-25
Payer: MEDICARE

## 2022-10-25 VITALS
BODY MASS INDEX: 28.2 KG/M2 | TEMPERATURE: 98 F | WEIGHT: 186.06 LBS | HEART RATE: 74 BPM | OXYGEN SATURATION: 95 % | DIASTOLIC BLOOD PRESSURE: 67 MMHG | HEIGHT: 68 IN | SYSTOLIC BLOOD PRESSURE: 143 MMHG | RESPIRATION RATE: 12 BRPM

## 2022-10-25 DIAGNOSIS — E78.00 PURE HYPERCHOLESTEROLEMIA: ICD-10-CM

## 2022-10-25 DIAGNOSIS — M06.9 RHEUMATOID ARTHRITIS INVOLVING MULTIPLE JOINTS: ICD-10-CM

## 2022-10-25 DIAGNOSIS — M05.79 RHEUMATOID ARTHRITIS INVOLVING MULTIPLE SITES WITH POSITIVE RHEUMATOID FACTOR: ICD-10-CM

## 2022-10-25 DIAGNOSIS — C50.012 MALIGNANT NEOPLASM OF NIPPLE OF LEFT BREAST IN FEMALE, ESTROGEN RECEPTOR POSITIVE: ICD-10-CM

## 2022-10-25 DIAGNOSIS — N18.31 STAGE 3A CHRONIC KIDNEY DISEASE: ICD-10-CM

## 2022-10-25 DIAGNOSIS — Z17.0 MALIGNANT NEOPLASM OF NIPPLE OF LEFT BREAST IN FEMALE, ESTROGEN RECEPTOR POSITIVE: ICD-10-CM

## 2022-10-25 DIAGNOSIS — Z17.0 CARCINOMA OF CENTRAL PORTION OF LEFT BREAST IN FEMALE, ESTROGEN RECEPTOR POSITIVE: Primary | ICD-10-CM

## 2022-10-25 DIAGNOSIS — I10 ESSENTIAL HYPERTENSION, BENIGN: ICD-10-CM

## 2022-10-25 DIAGNOSIS — C50.112 CARCINOMA OF CENTRAL PORTION OF LEFT BREAST IN FEMALE, ESTROGEN RECEPTOR POSITIVE: Primary | ICD-10-CM

## 2022-10-25 DIAGNOSIS — E83.52 SERUM CALCIUM ELEVATED: Primary | ICD-10-CM

## 2022-10-25 DIAGNOSIS — Z79.899 ENCOUNTER FOR LONG-TERM (CURRENT) USE OF MEDICATIONS: ICD-10-CM

## 2022-10-25 PROCEDURE — 99215 OFFICE O/P EST HI 40 MIN: CPT | Mod: PBBFAC,PO | Performed by: INTERNAL MEDICINE

## 2022-10-25 PROCEDURE — 99214 OFFICE O/P EST MOD 30 MIN: CPT | Mod: S$PBB,,, | Performed by: INTERNAL MEDICINE

## 2022-10-25 PROCEDURE — 99999 PR PBB SHADOW E&M-EST. PATIENT-LVL V: ICD-10-PCS | Mod: PBBFAC,,, | Performed by: INTERNAL MEDICINE

## 2022-10-25 PROCEDURE — 99214 PR OFFICE/OUTPT VISIT, EST, LEVL IV, 30-39 MIN: ICD-10-PCS | Mod: S$PBB,,, | Performed by: INTERNAL MEDICINE

## 2022-10-25 PROCEDURE — 99999 PR PBB SHADOW E&M-EST. PATIENT-LVL V: CPT | Mod: PBBFAC,,, | Performed by: INTERNAL MEDICINE

## 2022-10-25 NOTE — PROGRESS NOTES
Subjective:       Patient ID: Kirstie Bowden is a 77 y.o. female.    Chief Complaint   Left breast ca dx  T2 N0 status post lumpectomy and re-resection margin adjuvant TC chemo s/p 3  cycles but discontinued due to quality of life issues and started Arimidex   stated  having s/e  To arimidex took herself off presented to clinic started on Aromasin .  Tolerating Aromasin well rec score of 29    hydronephrosis+ ,  had stent placed  Here for follow-up  Oncologic History:  Dx 1/2019  INVASIVE CARCINOMA BREAST, CANCER CASE SUMMARY:  PROCEDURE: Partial mastectomy without skin or nipple.3/2019  SPECIMEN LATERALITY: Left.  TUMOR SIZE, GREATEST DIMENSION: 2.5 cm.  HISTOLOGIC TYPE: Invasive pleomorphic lobular carcinoma.  HISTOLOGIC GRADE (LUTHER HISTOLOGIC SCORE):  Glandular (acinar)/tubular differentiation: Score 3.  Nuclear pleomorphism: Score 2.  Mitotic rate: Score 1.  Overall grade: Grade 2  DUCTAL CARCINOMA IN SITU (DCIS): Not identified.  LOBULAR CARCINOMA IN SITU (LCIS): Present, pleomorphic lobular carcinoma in situ with rare focus of  classical lobular carcinoma situ.  Estimated size (extent) of pleomorphic LCIS: At least 2.8 cm.  TUMOR EXTENSION: Not applicable.  MARGINS:  INVASIVE CARCINOMA MARGINS: Uninvolved by invasive carcinoma.  Distance from inferior (closest) margin: 5 mm.  PLEOMORPHIC LOBULAR CARCINOMA IN SITU MARGINS: Uninvolved by pleomorphic LCIS.  Distance from inferior medial (closest) margin: 0.2 mm (see above comment).  REGIONAL LYMPH NODES: Uninvolved by tumor cells.  Number of lymph nodes examined: 2.  Number of sentinel nodes examined: 2.  TREATMENT EFFECT: No known presurgical therapy.  PATHOLOGIC STAGE CLASSIFICATION (pTNM, AJCC 8TH EDITION):  pT2: Tumor size = 2.5 cm in greatest dimension.  pN0: No regional lymph node metastasis identified.  pM: Not applicable.    ER: Positive.  [place]: Positive.  HER2: Negative (IHC - Equivocal (score 2) and FISH - Negative).  Ki-67: Approximately  14%.  HPI:     Social History     Socioeconomic History    Marital status:    Occupational History     Employer: [employer]   Tobacco Use    Smoking status: Former     Packs/day: 0.50     Years: 47.00     Pack years: 23.50     Types: Cigarettes     Start date: 4/25/1960     Quit date: 8/11/2019     Years since quitting: 3.2    Smokeless tobacco: Never   Substance and Sexual Activity    Alcohol use: Yes     Alcohol/week: 2.0 standard drinks     Types: 2 Glasses of wine per week     Comment: Social    Drug use: No    Sexual activity: Never     Social Determinants of Health     Financial Resource Strain: Low Risk     Difficulty of Paying Living Expenses: Not very hard   Food Insecurity: No Food Insecurity    Worried About Running Out of Food in the Last Year: Never true    Ran Out of Food in the Last Year: Never true   Transportation Needs: No Transportation Needs    Lack of Transportation (Medical): No    Lack of Transportation (Non-Medical): No   Physical Activity: Insufficiently Active    Days of Exercise per Week: 2 days    Minutes of Exercise per Session: 10 min   Stress: No Stress Concern Present    Feeling of Stress : Only a little   Social Connections: Unknown    Frequency of Communication with Friends and Family: More than three times a week    Frequency of Social Gatherings with Friends and Family: Three times a week    Active Member of Clubs or Organizations: Yes    Attends Club or Organization Meetings: 1 to 4 times per year    Marital Status:    Housing Stability: Low Risk     Unable to Pay for Housing in the Last Year: No    Number of Places Lived in the Last Year: 1    Unstable Housing in the Last Year: No     Family History   Problem Relation Age of Onset    Heart disease Mother     Hypertension Mother     Alzheimer's disease Mother     Cancer Father     Heart disease Sister     Diabetes Brother     Stroke Sister     Cancer Daughter      Past Surgical History:   Procedure Laterality Date     AXILLARY NODE DISSECTION Left 03/14/2019    Procedure: LYMPHADENECTOMY, AXILLARY;  Surgeon: Mp Gonzalez MD;  Location: [facility] OR;  Service: General;  Laterality: Left;  left lumpectomy with axillary lypmh node dissection    BALLOON DILATION OF URETER Left 06/24/2019    Procedure: DILATION, URETER, USING BALLOON;  Surgeon: Jorden Dickson MD;  Location: [facility] OR;  Service: Urology;  Laterality: Left;    BREAST BIOPSY Left 20 yrs ago    benign    BREAST LUMPECTOMY      x2    CHOLECYSTECTOMY      COLONOSCOPY  09/25/2018    LUC EASTON MD    CYSTOSCOPY W/ URETERAL STENT PLACEMENT Left 06/24/2019    Procedure: CYSTOSCOPY, WITH URETERAL STENT INSERTION;  Surgeon: Jorden Dickson MD;  Location: [facility] OR;  Service: Urology;  Laterality: Left;    CYSTOSCOPY W/ URETERAL STENT REMOVAL Left 07/23/2019    Procedure: CYSTOSCOPY, WITH URETERAL STENT REMOVAL;  Surgeon: Jorden Dickson MD;  Location: [facility] OR;  Service: Urology;  Laterality: Left;    EPIDURAL STEROID INJECTION INTO LUMBAR SPINE N/A 09/30/2021    Procedure: Injection-steroid-epidural-lumbar;  Surgeon: Nathen Lyons MD;  Location: [facility] OR;  Service: Pain Management;  Laterality: N/A;  L5-S1      EPIDURAL STEROID INJECTION INTO LUMBAR SPINE N/A 11/16/2021    Procedure: Injection-steroid-epidural-lumbar;  Surgeon: Natehn Lyons MD;  Location: [facility] OR;  Service: Pain Management;  Laterality: N/A;  L5-S1    EXTRACORPOREAL SHOCK WAVE LITHOTRIPSY Left 07/23/2019    Procedure: LITHOTRIPSY, ESWL;  Surgeon: Jorden Dickson MD;  Location: [facility] OR;  Service: Urology;  Laterality: Left;    EYE SURGERY Bilateral     cataracts    HYSTERECTOMY      MASTECTOMY, PARTIAL Left 04/25/2019    Procedure: MASTECTOMY, PARTIAL;  Surgeon: Mp Gonzalez MD;  Location: [facility] OR;  Service: General;  Laterality: Left;    NEEDLE LOCALIZATION Left 03/14/2019    Procedure: NEEDLE LOCALIZATION;  Surgeon: Mp Gonzalez MD;  Location: [facility] OR;  Service: General;  Laterality: Left;   left lumpectomy with wire needle localization     RETROGRADE PYELOGRAPHY Bilateral 06/24/2019    Procedure: PYELOGRAM, RETROGRADE;  Surgeon: Jorden Dickson MD;  Location: [facility] OR;  Service: Urology;  Laterality: Bilateral;    SENTINEL LYMPH NODE BIOPSY Left 03/14/2019    Procedure: BIOPSY, LYMPH NODE, SENTINEL;  Surgeon: Mp Gonzalez MD;  Location: [facility] OR;  Service: General;  Laterality: Left;  left sentinel node lymph node biopsy    TONSILLECTOMY      TRANSFORAMINAL EPIDURAL INJECTION OF STEROID Right 01/04/2022    Procedure: Injection,steroid,epidural,transforaminal approach;  Surgeon: Nathen Lyons MD;  Location: [facility] OR;  Service: Pain Management;  Laterality: Right;  L4-L5, L5-s1    URETEROSCOPY Left 06/24/2019    Procedure: URETEROSCOPY;  Surgeon: Jorden Dickson MD;  Location: [facility] OR;  Service: Urology;  Laterality: Left;     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    Hyperlipidemia     Hypertension     Limb alert care status     NO BP/IV LEFT ARM    Pseudocholinesterase deficiency     family history    Thyroid disease        Current Outpatient Medications:     amLODIPine (NORVASC) 5 MG tablet, Take 1 tablet by mouth once daily, Disp: 90 tablet, Rfl: 3    atorvastatin (LIPITOR) 20 MG tablet, Take 1 tablet by mouth once daily, Disp: 90 tablet, Rfl: 3    CALCIUM CARBONATE/VITAMIN D3 (VITAMIN D-3 ORAL), Take by mouth once daily. , Disp: , Rfl:     EUTHYROX 125 mcg tablet, TAKE 1 TABLET BY MOUTH BEFORE BREAKFAST, Disp: 90 tablet, Rfl: 3    exemestane (AROMASIN) 25 mg tablet, Take 1 tablet by mouth once daily, Disp: 90 tablet, Rfl: 5    fluticasone-salmeterol diskus inhaler 250-50 mcg, INHALE 1 DOSE BY MOUTH TWICE DAILY, Disp: 60 each, Rfl: 11    folic acid (FOLVITE) 1 MG tablet, Take 1 tablet (1,000 mcg total) by mouth once daily., Disp: 30 tablet, Rfl: 10    gabapentin (NEURONTIN) 300 MG capsule, TAKE 1 CAPSULE BY MOUTH IN THE  EVENING, Disp: 30 capsule, Rfl: 2    hydroCHLOROthiazide (HYDRODIURIL) 25 MG tablet, Take 1 tablet by mouth once daily, Disp: 90 tablet, Rfl: 3    hydrOXYchloroQUINE (PLAQUENIL) 200 mg tablet, Take 1 tablet by mouth twice daily, Disp: 60 tablet, Rfl: 0    methotrexate 2.5 MG Tab, TAKE 5 TABLETS BY MOUTH ONCE A WEEK, Disp: 60 tablet, Rfl: 0    ondansetron (ZOFRAN-ODT) 8 MG TbDL, DISSOLVE 1 TABLET IN MOUTH EVERY 12 HOURS AS NEEDED, Disp: 30 tablet, Rfl: 0    pantoprazole (PROTONIX) 40 MG tablet, TAKE 1 TABLET BY MOUTH ONCE DAILY IN THE MORNING FOR 90 DAYS, Disp: , Rfl:     prochlorperazine (COMPAZINE) 10 MG tablet, TAKE 1 TABLET BY MOUTH EVERY 6 HOURS AS NEEDED, Disp: 60 tablet, Rfl: 0    thiamine 100 MG tablet, Take 100 mg by mouth once daily., Disp: , Rfl:     traMADoL (ULTRAM) 50 mg tablet, Take 1 tablet (50 mg total) by mouth every 12 (twelve) hours as needed for Pain (severe pain). TAKE 1 TABLET BY MOUTH EVERY 8 HOURS AS NEEDED FOR SEVERE PAIN- DOSE DECREASE, Disp: 60 tablet, Rfl: 0    VITAMIN B COMPLEX ORAL, Every day, Disp: , Rfl:     hydrOXYchloroQUINE (PLAQUENIL) 200 mg tablet, Take 1 tablet (200 mg total) by mouth 2 (two) times daily., Disp: 60 tablet, Rfl: 3  No current facility-administered medications for this visit.    Facility-Administered Medications Ordered in Other Visits:     lactated ringers infusion, , Intravenous, Once PRN, Nathen Lyons MD    lidocaine (PF) 10 mg/ml (1%) injection 10 mg, 1 mL, Intradermal, Once, Eladia Schwartz MD  Review of patient's allergies indicates:   Allergen Reactions    Succinylcholine chloride Other (See Comments)    Sulfur dioxide Hives    Sulfamethoxazole-trimethoprim      Other reaction(s): per dr daryn Rodríguez (sulfonamide antibiotics)      NOT SURE REACTION         REVIEW OF SYSTEMS:     CONSTITUTIONAL:  Patient voices complains with significant fatigue.  Shortness of breath on ambulation.  She is not coughing up productive sputum she reports no fever chills  rigors  Has some sinus congestion  And having night sweats after starting Arimidex    SKIN: Denies rash, issues with nails, non-healing sores, bleeding, blotching    skin or abnormal bruising. Denies new moles or changes to existing moles.      BREASTS: healing well since lumpectomy    EYES: Denies eye pain, blurred vision, swelling, redness or discharge.      ENT AND MOUTH:  Has runny nose, and stuffiness, and sinus trouble no sores. Denies    nosebleeds. Denies, hoarseness, change in voice or swelling in front of the    neck.      CARDIOVASCULAR: Denies chest pain, discomfort or palpitations. Denies neck    swelling or episodes of passing out.           GI: Denies trouble swallowing, indigestion, heartburn, abdominal pain, +nausea with arimidex betty in am then  Dry heaves during day, stopped arimidex and feeks much better     no vomiting, diarrhea, altered bowel habits, blood in stool, discoloration of    stools, change in nature of stool, bloating, increased abdominal girth.      GENITOURINARY: No discharge. No pelvic pain or lumps. No rash around groin or  lesions. No urinary frequency, hesitation, painful urination or blood in    urine. Denies incontinence. No problems with intercourse.      MUSCULOSKELETAL: Denies neck or back pain. Denies weakness in arms or legs,    joint problems or distended inflamed veins in legs. Denies swelling or abnormal  glands.      NEUROLOGICAL: Denies tingling, numbness, altered mentation changes to nerve    function in the face, weakness to one or both of the body. Denies changes to    gait and denies multiple falls or accidents.        PHYSICAL EXAM:     Wt Readings from Last 3 Encounters:   10/25/22 84.4 kg (186 lb 1.1 oz)   10/12/22 83.9 kg (184 lb 15.5 oz)   09/27/22 84.9 kg (187 lb 1.6 oz)     Temp Readings from Last 3 Encounters:   10/25/22 97.7 °F (36.5 °C) (Temporal)   07/22/22 97.2 °F (36.2 °C) (Temporal)   07/11/22 97.9 °F (36.6 °C) (Oral)     BP Readings from Last 3  Encounters:   10/25/22 (!) 143/67   10/12/22 128/80   09/27/22 137/65     Pulse Readings from Last 3 Encounters:   10/25/22 74   10/12/22 70   07/22/22 66     GENERAL: Comfortable looking patient. Patient is in no distress.  Awake, alert and oriented to time, person and place.  No anxiety, or agitation.      HEENT: Normal conjunctivae and eyelids. WNL.  PERRLA 3 to 4 mm. No icterus, no pallor, no congestion, and no discharge noted.     NECK:  Supple. Trachea is central.  No crepitus.  No JVD or masses.    RESPIRATORY:  No intercostal retractions.  No dullness to percussion.  Chest is clear to auscultation.  No rales, rhonchi or wheezes.  No crepitus.  Good air entry bilaterally.    CARDIOVASCULAR:  S1 and S2 are normally heard without murmurs or gallops.  All peripheral pulses are present.    ABDOMEN:  Normal abdomen.  No hepatosplenomegaly.  No free fluid.  Bowel sounds are present.  No hernia noted. No masses.  No rebound or tenderness.  No guarding or rigidity.  Umbilicus is midline.    LYMPHATICS:  No axillary, cervical, supraclavicular, submental, or inguinal lymphadenopathy.    SKIN/MUSCULOSKELETAL:  There is no evidence of excoriation marks or ecchmosis.  No rashes.  No cyanosis.  No clubbing.  No joint or skeletal deformities noted.  Normal range of motion.    NEUROLOGIC:  Higher functions are appropriate.  No cranial nerve deficits.  Normal asher.  Normal strength.  Motor and sensory functions are normal.  Deep tendon reflexes are normal.    GENITAL/RECTAL:  Exams are deferred.      Laboratory:     CBC:  Lab Results   Component Value Date    WBC 8.30 10/24/2022    WBC 8.18 10/24/2022    RBC 4.15 10/24/2022    RBC 4.20 10/24/2022    HGB 13.5 10/24/2022    HGB 13.7 10/24/2022    HCT 42.1 10/24/2022    HCT 42.2 10/24/2022     (H) 10/24/2022     (H) 10/24/2022    MCH 32.5 (H) 10/24/2022    MCH 32.6 (H) 10/24/2022    MCHC 32.1 10/24/2022    MCHC 32.5 10/24/2022    RDW 14.1 10/24/2022    RDW 14.4  10/24/2022     10/24/2022     10/24/2022    MPV 10.6 10/24/2022    MPV 10.8 10/24/2022    GRAN 4.3 10/24/2022    GRAN 51.8 10/24/2022    GRAN 4.2 10/24/2022    GRAN 51.8 10/24/2022    LYMPH 2.8 10/24/2022    LYMPH 33.6 10/24/2022    LYMPH 2.8 10/24/2022    LYMPH 34.6 10/24/2022    MONO 0.9 10/24/2022    MONO 11.0 10/24/2022    MONO 0.8 10/24/2022    MONO 10.0 10/24/2022    EOS 0.2 10/24/2022    EOS 0.2 10/24/2022    BASO 0.04 10/24/2022    BASO 0.04 10/24/2022    EOSINOPHIL 2.7 10/24/2022    EOSINOPHIL 2.7 10/24/2022    BASOPHIL 0.5 10/24/2022    BASOPHIL 0.5 10/24/2022       BMP: BMP  Lab Results   Component Value Date     10/24/2022     10/24/2022    K 4.1 10/24/2022    K 4.1 10/24/2022     10/24/2022     10/24/2022    CO2 27 10/24/2022    CO2 28 10/24/2022    BUN 9 10/24/2022    BUN 10 10/24/2022    CREATININE 1.1 10/24/2022    CREATININE 1.1 10/24/2022    CALCIUM 10.7 (H) 10/24/2022    CALCIUM 10.8 (H) 10/24/2022    ANIONGAP 14 10/24/2022    ANIONGAP 13 10/24/2022    ESTGFRAFRICA >60 07/20/2022    EGFRNONAA 55 (A) 07/20/2022       LFT:   Lab Results   Component Value Date    ALT 29 10/24/2022    ALT 29 10/24/2022    AST 31 10/24/2022    AST 31 10/24/2022    ALKPHOS 65 10/24/2022    ALKPHOS 66 10/24/2022    BILITOT 0.5 10/24/2022    BILITOT 0.5 10/24/2022    Bone density September 2 1019 negative for osteopenia  oncotype rec. score is 29  Impressionpet 1/2020       1.  Likely postoperative change in the superolateral left breast, decreased in size from the previous exam.    2.  Scattered multifocal ground-glass attenuation opacities in both lungs suggesting a combination of atypical infection/pneumonia and/or post treatment/post radiation change.  Consider follow-up CT of the chest in 6-8 weeks to document resolution.    3.  Indeterminate isoattenuating non-FDG avid structure in the anterior interpolar aspect of the right kidney, possibly representing a  hemorrhagic/proteinaceous cyst or low grade malignancy, consider further characterization with contrast enhanced renal protocol CT/MRI.    4.  Numerous bilateral nonobstructing renal stones.  No hydronephrosis or hydroureter on today's exam.    5.  Interval resolution of the maxillary sinus disease.     Pet 7/2020  IMPRESSION:  1. Postoperative changes of prior left breast lumpectomy.  2. Near complete resolution of the previously described groundglass  attenuation opacities in the lungs, now with only a small linear  bandlike opacity in the anterior left upper lobe (most likely related  to prior radiation/treatment change.  3. Focal increased FDG activity along the right third and fourth rib  posteriorly at the posterior thoracic junction suggesting degenerative  change with no soft tissue, lytic or sclerotic lesion identified.  4. Additional and incidental findings as noted above unchanged from  the previous exam.            has renal stones and has hydronephrosis, stented      7/21 neg pet      7/22  IMPRESSION: No evidence of recurrent or metastatic disease     Postsurgical changes in the left breast     Stable 3.5 cm round isoattenuating mass within the upper pole the right kidney suggestive of renal cyst.     Nonobstructing renal stones       Assessment/Plan:     T2N0Mx left breast ca s/p lumpectomy 3/2019 recurrent score 29  pT2: Tumor size = 2.5 cm in greatest dimension.  pN0: No regional lymph node metastasis identified.  pM: Not applicable.    ER: Positive.  TX: Positive.  HER2: Negative ([facility] - E  Patient completed 3 cycles but aborted therapy.  In favor a quality of life due to side effects  Above PET scan discussed with patient  she stoped anastrozole  Due to arthralgia and nausea.   got off embrel during radiation and chemo now on mtx and hydroxychroloquine  Tolerating aromasin well to continue for 10 years till May 2029  Normal bone density 9 9/21  She already sees Urology  Hemorrhagic cyst was reportd  on prev scans , had stents removed and lithotripsy for stones  Hypercalcemia: eating a lot of icecream, she is being rechecked soon by pcp   macrocytosis: related to thyroid issues will watch she is not anemic and embark on w/u if worse or cytopenia   saw Pulmonary  Naila aldridge 6/2020) consult for 2.  Finding as above on PET scan thesesd have nearly resolved now on 7/2020 pet and nothing new noted on January and july 2022 PET pulm f/u prn n ext pet 7/23    She has scattered atheromatous calcifications in the aorta and coronary arteries she will discuss this and follow up with her PCP for referral to cardiology   repeat pet 7/23 and see me  cbc, cmp, [id number] in  6months    mammo done in 3/2022 done short term recommended  10/22 short term again in 4/23 will get pet then as well    Has quit smoking      completed xrt  Started arimidex, tolerating well bone density negative   cont f/u for RA, HTH. Lipids, hypothyroidism

## 2022-10-31 ENCOUNTER — OFFICE VISIT (OUTPATIENT)
Dept: URGENT CARE | Facility: CLINIC | Age: 77
End: 2022-10-31
Payer: MEDICARE

## 2022-10-31 VITALS
SYSTOLIC BLOOD PRESSURE: 155 MMHG | BODY MASS INDEX: 28.28 KG/M2 | WEIGHT: 186 LBS | TEMPERATURE: 98 F | OXYGEN SATURATION: 96 % | RESPIRATION RATE: 16 BRPM | HEART RATE: 72 BPM | DIASTOLIC BLOOD PRESSURE: 88 MMHG

## 2022-10-31 DIAGNOSIS — R50.9 FEVER, UNSPECIFIED FEVER CAUSE: ICD-10-CM

## 2022-10-31 DIAGNOSIS — J40 BRONCHITIS: Primary | ICD-10-CM

## 2022-10-31 DIAGNOSIS — R05.9 COUGH, UNSPECIFIED TYPE: ICD-10-CM

## 2022-10-31 DIAGNOSIS — R11.2 NAUSEA AND VOMITING, UNSPECIFIED VOMITING TYPE: ICD-10-CM

## 2022-10-31 DIAGNOSIS — R09.81 NASAL CONGESTION: ICD-10-CM

## 2022-10-31 PROCEDURE — 96372 PR INJECTION,THERAP/PROPH/DIAG2ST, IM OR SUBCUT: ICD-10-PCS | Mod: S$GLB,,, | Performed by: EMERGENCY MEDICINE

## 2022-10-31 PROCEDURE — 96372 THER/PROPH/DIAG INJ SC/IM: CPT | Mod: S$GLB,,, | Performed by: EMERGENCY MEDICINE

## 2022-10-31 PROCEDURE — 99214 PR OFFICE/OUTPT VISIT, EST, LEVL IV, 30-39 MIN: ICD-10-PCS | Mod: 25,S$GLB,, | Performed by: NURSE PRACTITIONER

## 2022-10-31 PROCEDURE — 99214 OFFICE O/P EST MOD 30 MIN: CPT | Mod: 25,S$GLB,, | Performed by: NURSE PRACTITIONER

## 2022-10-31 RX ORDER — PROMETHAZINE HYDROCHLORIDE AND DEXTROMETHORPHAN HYDROBROMIDE 6.25; 15 MG/5ML; MG/5ML
5 SYRUP ORAL EVERY 6 HOURS PRN
Qty: 100 ML | Refills: 0 | Status: SHIPPED | OUTPATIENT
Start: 2022-10-31 | End: 2022-11-10

## 2022-10-31 RX ORDER — DEXAMETHASONE SODIUM PHOSPHATE 4 MG/ML
6 INJECTION, SOLUTION INTRA-ARTICULAR; INTRALESIONAL; INTRAMUSCULAR; INTRAVENOUS; SOFT TISSUE ONCE
Status: COMPLETED | OUTPATIENT
Start: 2022-10-31 | End: 2022-10-31

## 2022-10-31 RX ORDER — DOXYCYCLINE 100 MG/1
100 CAPSULE ORAL EVERY 12 HOURS
Qty: 20 CAPSULE | Refills: 0 | Status: SHIPPED | OUTPATIENT
Start: 2022-10-31 | End: 2022-11-10

## 2022-10-31 RX ADMIN — DEXAMETHASONE SODIUM PHOSPHATE 6 MG: 4 INJECTION, SOLUTION INTRA-ARTICULAR; INTRALESIONAL; INTRAMUSCULAR; INTRAVENOUS; SOFT TISSUE at 01:10

## 2022-10-31 NOTE — PROGRESS NOTES
Subjective:       Patient ID: Kirstie Bowden is a 77 y.o. female.    Vitals:  weight is 84.4 kg (186 lb). Her oral temperature is 98.4 °F (36.9 °C). Her blood pressure is 155/88 (abnormal) and her pulse is 72. Her respiration is 16 and oxygen saturation is 96%.     Chief Complaint: Cough    Kirstie Bowden presents to clinic with cough, fever, headache, nasal congestion, postnasal drip, mild difficulty taking a deep breath and a runny nose that has been present for the last several days.    Cough  This is a new problem. The current episode started in the past 7 days. The problem has been unchanged. The problem occurs constantly. The cough is Productive of sputum. Associated symptoms include a fever, headaches, nasal congestion, postnasal drip, rhinorrhea and shortness of breath.     Constitution: Positive for fever.   HENT:  Positive for postnasal drip.    Neck: neck negative.   Cardiovascular: Negative.    Eyes: Negative.    Respiratory:  Positive for cough and shortness of breath.    Gastrointestinal: Negative.    Endocrine: negative.   Genitourinary: Negative.    Musculoskeletal: Negative.    Skin: Negative.    Neurological:  Positive for headaches.     Objective:      Physical Exam   Constitutional: She is oriented to person, place, and time. She appears well-developed. She is cooperative.  Non-toxic appearance. She appears ill. No distress.   HENT:   Head: Normocephalic and atraumatic.   Ears:   Right Ear: Hearing, external ear and ear canal normal. A middle ear effusion is present.   Left Ear: Hearing, external ear and ear canal normal. A middle ear effusion is present.   Nose: Nose normal. No mucosal edema, rhinorrhea or nasal deformity. No epistaxis. Right sinus exhibits no maxillary sinus tenderness and no frontal sinus tenderness. Left sinus exhibits no maxillary sinus tenderness and no frontal sinus tenderness.   Mouth/Throat: Uvula is midline and mucous membranes are normal. No trismus in  the jaw. Normal dentition. No uvula swelling. Posterior oropharyngeal erythema present. No oropharyngeal exudate or posterior oropharyngeal edema.   Eyes: Conjunctivae and lids are normal. No scleral icterus.   Neck: Trachea normal and phonation normal. Neck supple. No edema present. No erythema present. No neck rigidity present.   Cardiovascular: Normal rate, regular rhythm, normal heart sounds and normal pulses.   Pulmonary/Chest: Effort normal. No respiratory distress. She has no decreased breath sounds. She has rhonchi in the left upper field and the left lower field.   Abdominal: Normal appearance.   Musculoskeletal: Normal range of motion.         General: No deformity. Normal range of motion.   Lymphadenopathy:     She has cervical adenopathy.        Right cervical: Superficial cervical adenopathy present.        Left cervical: Superficial cervical adenopathy present.   Neurological: She is alert and oriented to person, place, and time. She exhibits normal muscle tone. Coordination normal.   Skin: Skin is warm, dry, intact, not diaphoretic and not pale.   Psychiatric: Her speech is normal and behavior is normal. Judgment and thought content normal.   Nursing note and vitals reviewed.      Assessment:       1. Bronchitis    2. Cough, unspecified type    3. Fever, unspecified fever cause    4. Nasal congestion    5. Nausea and vomiting, unspecified vomiting type          Plan:         Bronchitis  -     SARS Coronavirus 2 Antigen, POCT Manual Read  -     promethazine-dextromethorphan (PROMETHAZINE-DM) 6.25-15 mg/5 mL Syrp; Take 5 mLs by mouth every 6 (six) hours as needed (cough).  Dispense: 100 mL; Refill: 0  -     doxycycline (VIBRAMYCIN) 100 MG Cap; Take 1 capsule (100 mg total) by mouth every 12 (twelve) hours. for 10 days  Dispense: 20 capsule; Refill: 0  -     dexAMETHasone injection 6 mg    Cough, unspecified type  -     SARS Coronavirus 2 Antigen, POCT Manual Read  -     promethazine-dextromethorphan  (PROMETHAZINE-DM) 6.25-15 mg/5 mL Syrp; Take 5 mLs by mouth every 6 (six) hours as needed (cough).  Dispense: 100 mL; Refill: 0  -     doxycycline (VIBRAMYCIN) 100 MG Cap; Take 1 capsule (100 mg total) by mouth every 12 (twelve) hours. for 10 days  Dispense: 20 capsule; Refill: 0    Fever, unspecified fever cause  -     SARS Coronavirus 2 Antigen, POCT Manual Read  -     promethazine-dextromethorphan (PROMETHAZINE-DM) 6.25-15 mg/5 mL Syrp; Take 5 mLs by mouth every 6 (six) hours as needed (cough).  Dispense: 100 mL; Refill: 0  -     doxycycline (VIBRAMYCIN) 100 MG Cap; Take 1 capsule (100 mg total) by mouth every 12 (twelve) hours. for 10 days  Dispense: 20 capsule; Refill: 0    Nasal congestion  -     SARS Coronavirus 2 Antigen, POCT Manual Read  -     promethazine-dextromethorphan (PROMETHAZINE-DM) 6.25-15 mg/5 mL Syrp; Take 5 mLs by mouth every 6 (six) hours as needed (cough).  Dispense: 100 mL; Refill: 0  -     doxycycline (VIBRAMYCIN) 100 MG Cap; Take 1 capsule (100 mg total) by mouth every 12 (twelve) hours. for 10 days  Dispense: 20 capsule; Refill: 0  -     dexAMETHasone injection 6 mg    Nausea and vomiting, unspecified vomiting type

## 2022-11-17 NOTE — TELEPHONE ENCOUNTER
Impression: Ocular hypertension, bilateral: H40.053 Bilateral. Condition: established, stable. Plan: Pt has Ocular HTN        Pachs:  [phone]    Today's IOP :   16/12      Tmax  : 18/17 Target IOP low to mid teens Pt denies Fhx of Glaucoma Left eye is the better seeing eye VF: 05/17/2022, unreliable results C/D: .2 round OU
OCT: 04/13/2022, 78 OD / 75 OS
DE: 04/13/2022 (Diabetic) Pt denies Sulfa Allergy Pt recent dx of pneumonia Pt has tachycardia and atrial stenosis Plan :
1. No drops at this time. 2. IOP and ONH stable, no signs of glaucomatous damage noted. Return to annual exams, call with any sooner problems. Patient has been notified of results. Verbalized understanding

## 2022-12-06 ENCOUNTER — OFFICE VISIT (OUTPATIENT)
Dept: URGENT CARE | Facility: CLINIC | Age: 77
End: 2022-12-06
Payer: MEDICARE

## 2022-12-06 VITALS
TEMPERATURE: 99 F | DIASTOLIC BLOOD PRESSURE: 77 MMHG | HEART RATE: 75 BPM | HEIGHT: 68 IN | BODY MASS INDEX: 27.43 KG/M2 | RESPIRATION RATE: 16 BRPM | SYSTOLIC BLOOD PRESSURE: 128 MMHG | OXYGEN SATURATION: 95 % | WEIGHT: 181 LBS

## 2022-12-06 DIAGNOSIS — Z87.09 HISTORY OF ASTHMA: ICD-10-CM

## 2022-12-06 DIAGNOSIS — R05.9 COUGH, UNSPECIFIED TYPE: Primary | ICD-10-CM

## 2022-12-06 DIAGNOSIS — J40 BRONCHITIS: ICD-10-CM

## 2022-12-06 LAB
CTP QC/QA: YES
FLUAV AG NPH QL: NEGATIVE
FLUBV AG NPH QL: NEGATIVE

## 2022-12-06 PROCEDURE — 99214 OFFICE O/P EST MOD 30 MIN: CPT | Mod: S$GLB,,, | Performed by: STUDENT IN AN ORGANIZED HEALTH CARE EDUCATION/TRAINING PROGRAM

## 2022-12-06 PROCEDURE — 99214 PR OFFICE/OUTPT VISIT, EST, LEVL IV, 30-39 MIN: ICD-10-PCS | Mod: S$GLB,,, | Performed by: STUDENT IN AN ORGANIZED HEALTH CARE EDUCATION/TRAINING PROGRAM

## 2022-12-06 PROCEDURE — 87804 INFLUENZA ASSAY W/OPTIC: CPT | Mod: QW,,, | Performed by: STUDENT IN AN ORGANIZED HEALTH CARE EDUCATION/TRAINING PROGRAM

## 2022-12-06 PROCEDURE — 87804 POCT INFLUENZA A/B: ICD-10-PCS | Mod: QW,,, | Performed by: STUDENT IN AN ORGANIZED HEALTH CARE EDUCATION/TRAINING PROGRAM

## 2022-12-06 RX ORDER — METHYLPREDNISOLONE 4 MG/1
TABLET ORAL
Qty: 21 EACH | Refills: 0 | Status: SHIPPED | OUTPATIENT
Start: 2022-12-06 | End: 2022-12-27

## 2022-12-06 RX ORDER — ALBUTEROL SULFATE 90 UG/1
1-2 AEROSOL, METERED RESPIRATORY (INHALATION) EVERY 6 HOURS PRN
Qty: 18 G | Refills: 0 | Status: SHIPPED | OUTPATIENT
Start: 2022-12-06 | End: 2023-06-21

## 2022-12-06 RX ORDER — DEXCHLORPHENIRAMINE MALEATE, DEXTROMETHORPHAN HBR, PHENYLEPHRINE HCL 1; 10; 5 MG/5ML; MG/5ML; MG/5ML
10 SYRUP ORAL EVERY 4 HOURS PRN
Qty: 180 ML | Refills: 0 | Status: SHIPPED | OUTPATIENT
Start: 2022-12-06 | End: 2023-02-23

## 2022-12-06 NOTE — PROGRESS NOTES
"Subjective:       Patient ID: Kirstie Bowden is a 77 y.o. female.    Vitals:  height is 5' 8" (1.727 m) and weight is 82.1 kg (181 lb). Her temperature is 98.6 °F (37 °C). Her blood pressure is 128/77 and her pulse is 75. Her respiration is 16 and oxygen saturation is 95%.     Chief Complaint: Cough    Patient is a 77-year-old female with a past medical history of asthma, hypertension, hyperlipidemia, chronic kidney disease, rheumatoid arthritis, breast cancer, osteoarthritis, and hypothyroidism who presents to clinic for evaluation of cough.  Patient reports he is vaccinated.  Patient denies any recent or known sick exposures.  Patient reports symptoms times 3-4 weeks.  Patient previously seen in the clinic on October 31, 2022 and diagnosed with bronchitis.  Patient was prescribed doxycycline and Phenergan DM.  Patient receives 6 mg Decadron IM in clinic at that visit.  Patient states that she has experienced intermittent episodes similar to this in the past on multiple occasions due to bronchitis and asthma.  Patient states she was provided with a Breo inhaler however does not have any albuterol or rescue inhalers.  Patient states that she has experienced nasal congestion with postnasal drainage, occasionally productive cough, intermittent episodes of shortness breath and wheezing which is described to be related to frequent coughing periods, and sneezing.  Patient states she is not had any headaches or dizziness, generalized body aches, fever or chills, ear pain or sore throat, sinus pressure, chest pain or palpitations, abdominal pain, nausea or vomiting, diarrhea, urinary symptoms, rash, or change in mentation.  Patient states that she has also taken over-the-counter medications with some relief of symptoms.    Cough  This is a new problem. The current episode started 1 to 4 weeks ago (a little over a week.). The cough is Productive of sputum. Associated symptoms include nasal congestion, postnasal " drip, shortness of breath (Reports sometimes after coughing frequent) and wheezing (Reports intermittent and primarily after frequent coughing periods). Pertinent negatives include no chest pain, chills, ear pain, fever, headaches, myalgias, rash or sore throat. Associated symptoms comments: Lost her voice, sneezing, ears and eyes itch.. Treatments tried: robautssin dm, cordecedin , claritin.     Constitution: Negative for chills, sweating, fatigue and fever.   HENT:  Positive for congestion, postnasal drip and voice change (States intermittent hoarseness periods with coughing and postnasal drainage). Negative for ear pain, sinus pressure and sore throat.    Neck: neck negative.   Cardiovascular: Negative.  Negative for chest pain and palpitations.   Eyes: Negative.    Respiratory:  Positive for cough, sputum production (Reports sometimes), shortness of breath (Reports sometimes after coughing frequent) and wheezing (Reports intermittent and primarily after frequent coughing periods). Negative for chest tightness.    Gastrointestinal: Negative.  Negative for abdominal pain, nausea, vomiting and diarrhea.   Endocrine: negative.   Genitourinary: Negative.  Negative for dysuria, frequency and urgency.   Musculoskeletal: Negative.  Negative for back pain and muscle ache.   Skin: Negative.  Negative for color change, pale, rash and erythema.   Allergic/Immunologic: Positive for sneezing.   Neurological:  Negative for dizziness, light-headedness, passing out, headaches, disorientation and altered mental status.   Hematologic/Lymphatic: Negative.    Psychiatric/Behavioral: Negative.  Negative for altered mental status, disorientation and confusion.      Objective:      Physical Exam   Constitutional: She is oriented to person, place, and time. She appears well-developed. She is cooperative.  Non-toxic appearance. She does not appear ill. No distress.   HENT:   Head: Normocephalic and atraumatic.   Ears:   Right Ear:  Hearing, tympanic membrane, external ear and ear canal normal.   Left Ear: Hearing, tympanic membrane, external ear and ear canal normal.   Nose: Congestion present. No mucosal edema, rhinorrhea or nasal deformity. No epistaxis. Right sinus exhibits no maxillary sinus tenderness and no frontal sinus tenderness. Left sinus exhibits no maxillary sinus tenderness and no frontal sinus tenderness.   Mouth/Throat: Uvula is midline, oropharynx is clear and moist and mucous membranes are normal. Mucous membranes are moist. No trismus in the jaw. Normal dentition. No uvula swelling. No oropharyngeal exudate or posterior oropharyngeal erythema (Postnasal drainage oropharynx). Oropharynx is clear.   Eyes: Conjunctivae and lids are normal. Pupils are equal, round, and reactive to light. Right eye exhibits no discharge. Left eye exhibits no discharge. No scleral icterus.   Neck: Trachea normal and phonation normal. Neck supple. No neck rigidity present.   Cardiovascular: Normal rate, regular rhythm, normal heart sounds and normal pulses.   Pulmonary/Chest: Effort normal and breath sounds normal. No respiratory distress (Unlabored.  Equal rise and fall of chest.  Able speak in full complete sentences.  No adventitious breath sounds noted.). She has no wheezes. She has no rhonchi. She has no rales.   Abdominal: Normal appearance and bowel sounds are normal. She exhibits no distension. Soft. There is no abdominal tenderness.   Musculoskeletal: Normal range of motion.         General: No deformity. Normal range of motion.      Cervical back: She exhibits no tenderness.   Lymphadenopathy:     She has no cervical adenopathy.   Neurological: She is alert and oriented to person, place, and time. She exhibits normal muscle tone. Coordination normal.   Skin: Skin is warm, dry, intact, not diaphoretic, not pale and no rash. Capillary refill takes 2 to 3 seconds. No erythema   Psychiatric: Her speech is normal and behavior is normal.  Judgment and thought content normal.   Nursing note and vitals reviewed.      Assessment:       1. Cough, unspecified type    2. Bronchitis    3. History of asthma          Plan:         Cough, unspecified type  -     POCT Influenza A/B  -     XR CHEST PA AND LATERAL; Future; Expected date: 12/06/2022  -     Ambulatory referral/consult to Pulmonology    Bronchitis    History of asthma    Other orders  -     methylPREDNISolone (MEDROL DOSEPACK) 4 mg tablet; use as directed  Dispense: 21 each; Refill: 0  -     albuterol (VENTOLIN HFA) 90 mcg/actuation inhaler; Inhale 1-2 puffs into the lungs every 6 (six) hours as needed for Wheezing or Shortness of Breath. Rescue  Dispense: 18 g; Refill: 0  -     dexchlorphen-phenylephrine-DM (POLYTUSSIN DM) 1-5-10 mg/5 mL Syrp; Take 10 mLs by mouth every 4 (four) hours as needed (Cough).  Dispense: 180 mL; Refill: 0               Labs:  Influenza A and B negative.  Unable to perform COVID testing in clinic due to availability of supply.  Chest x-ray: Stable cardiomediastinal silhouette.  The lungs are clear of infiltrate.  There is no pleural effusion.  Take medications as prescribed.    Tylenol/Motrin per package instructions for any pain or fever hydration.    Follow-up with PCP in 1-2 days.    Referral placed for pulmonology; follow-up once scheduled.    Return to clinic as needed.    To ED for any continued, new, or acutely worsening symptoms.    Patient in agreement plan of care.    DISCLAIMER: Please note that my documentation in this Electronic Healthcare Record was produced using speech recognition software and therefore may contain errors related to that software system.These could include grammar, punctuation and spelling errors or the inclusion/exclusion of phrases that were not intended. Garbled syntax, mangled pronouns, and other bizarre constructions may be attributed to that software system.

## 2023-01-04 ENCOUNTER — TELEPHONE (OUTPATIENT)
Dept: RHEUMATOLOGY | Facility: CLINIC | Age: 78
End: 2023-01-04
Payer: MEDICARE

## 2023-01-04 NOTE — TELEPHONE ENCOUNTER
Called patient scheduled new patient appointment  ----- Message from Jesus Trejo sent at 1/3/2023  9:50 AM CST -----  Contact: self  Type: Needs Medical Advice  Who Called: Patient   Best Call Back Number: [phone]  Additional Information: Patient wants to get established with new rheumatology Former pt of  [facility] plz call opt today to get scheduled. Thanks

## 2023-02-09 NOTE — PROGRESS NOTES
"Subjective:       Patient ID: Kirstie Bowden is a 78 y.o. female.    Chief Complaint: Rheumatoid Arthritis    HPI    This is a 78-year-old woman with history of HLD, HTN, CKD, nephrolithiasis s/p lithotripsy, sulfa allergy, breast cancer diagnosed in 2020 in remission s/p lumpectomy, chemotherapy and radiation, hypothyroidism, osteoarthritis, and rheumatoid arthritis diagnosed in 2015 and on MTX 12.5mg weekly (2015- ) plus folic acid daily,HCQ 200mg BID (2020- ), tramadol 50mg BID PRN for pain who was seen by Dr Stacy in 9/2022. She states that she intermittently has flares, including one last week involving her left wrist and right hand. She also gets numbness in her finger tips. She occasionally gets pain in her left shoulder as well.     Treatment History:  Enbrel: Discontinued due to breast cancer  HCQ  MTX     Objective:   BP (!) 143/81   Pulse 87   Ht 5' 8" (1.727 m)   Wt 84.4 kg (185 lb 15.3 oz)   BMI 28.27 kg/m²      Physical Exam   Constitutional: normal appearance.   HENT:   Head: Normocephalic and atraumatic.   Cardiovascular: Normal rate, regular rhythm, normal heart sounds and normal pulses.   Musculoskeletal:      Comments: Bony enlargement, chronic synovial hypertrophy in bilateral MCPs  No active synovitis  Able to make a full fist with both hands   Neurological: She is alert.   Skin: Skin is warm and dry. No rash noted.   No skin thickening, telangiectasias, calcinosis, psoriasiform lesions, lupoid lesions        No data to display     Assessment:       1. Rheumatoid arthritis involving multiple sites with positive rheumatoid factor    2. Osteoarthritis, generalized    3. Bilateral carpal tunnel syndrome    4. High risk medications (not anticoagulants) long-term use    5. Immunosuppression    6. Immunization counseling        This is a 78-year-old woman with history of HLD, HTN, CKD, nephrolithiasis s/p lithotripsy, sulfa allergy, breast cancer diagnosed in 2020 in remission s/p " lumpectomy, chemotherapy and radiation, hypothyroidism, osteoarthritis, and rheumatoid arthritis diagnosed in 2015 and on MTX 12.5mg weekly (2015- ) plus folic acid daily,HCQ 200mg BID (2020- ), tramadol 50mg BID PRN for pain who was seen by Dr Stacy in 9/2022. She states that she intermittently has flares, including one last week involving her left wrist and right hand. She also gets numbness in her finger tips. She occasionally gets pain in her left shoulder as well.      Physical examination does not show evidence of active inflammatory arthritis, but she does report intermittent flares in her hands and wrists.  Will have her increase methotrexate to 17.5 mg weekly and continue Plaquenil for now.    Plan:       Problem List Items Addressed This Visit    None  Visit Diagnoses       Rheumatoid arthritis involving multiple sites with positive rheumatoid factor    -  Primary    Osteoarthritis, generalized        Bilateral carpal tunnel syndrome        High risk medications (not anticoagulants) long-term use        Immunosuppression        Immunization counseling              - increase methotrexate to 17.5 mg weekly and continue folic acid daily   - Continue HCQ  - CBC, CMP, ESR, CRP in 4 weeks, then every 12 weeks  - Pre-DMARD labs   - Xray arthritis survey   - Plaquenil eye exam   - Immunizations: COVID x 3, flu (10/2022), Shingrix x 2, patient will check if pneumonia vaccine is up to date   - DEXA (9/2021): normal BMD  - EMG/NCV to evaluate for carpal tunnel. Wrist braces for now    Follow up in 3 months    60 minutes of total time spent on the encounter, which includes face to face time and non-face to face time preparing to see the patient (eg, review of tests), Obtaining and/or reviewing separately obtained history, Documenting clinical information in the electronic or other health record, Independently interpreting results (not separately reported) and communicating results to the patient/family/caregiver,  or Care coordination (not separately reported).       Khushboo Aguirre M.D.  Rheumatology Dept  [place], [place]

## 2023-02-10 ENCOUNTER — TELEPHONE (OUTPATIENT)
Dept: PHYSICAL MEDICINE AND REHAB | Facility: CLINIC | Age: 78
End: 2023-02-10
Payer: MEDICARE

## 2023-02-10 ENCOUNTER — OFFICE VISIT (OUTPATIENT)
Dept: RHEUMATOLOGY | Facility: CLINIC | Age: 78
End: 2023-02-10
Payer: MEDICARE

## 2023-02-10 ENCOUNTER — HOSPITAL ENCOUNTER (OUTPATIENT)
Dept: RADIOLOGY | Facility: HOSPITAL | Age: 78
Discharge: HOME | End: 2023-02-10
Attending: STUDENT IN AN ORGANIZED HEALTH CARE EDUCATION/TRAINING PROGRAM
Payer: MEDICARE

## 2023-02-10 VITALS
WEIGHT: 185.94 LBS | DIASTOLIC BLOOD PRESSURE: 81 MMHG | HEART RATE: 87 BPM | BODY MASS INDEX: 28.18 KG/M2 | HEIGHT: 68 IN | SYSTOLIC BLOOD PRESSURE: 143 MMHG

## 2023-02-10 DIAGNOSIS — M05.79 RHEUMATOID ARTHRITIS INVOLVING MULTIPLE SITES WITH POSITIVE RHEUMATOID FACTOR: ICD-10-CM

## 2023-02-10 DIAGNOSIS — D84.9 IMMUNOSUPPRESSION: ICD-10-CM

## 2023-02-10 DIAGNOSIS — M15.9 OSTEOARTHRITIS, GENERALIZED: ICD-10-CM

## 2023-02-10 DIAGNOSIS — Z79.899 HIGH RISK MEDICATIONS (NOT ANTICOAGULANTS) LONG-TERM USE: ICD-10-CM

## 2023-02-10 DIAGNOSIS — Z71.85 IMMUNIZATION COUNSELING: ICD-10-CM

## 2023-02-10 DIAGNOSIS — M05.79 RHEUMATOID ARTHRITIS INVOLVING MULTIPLE SITES WITH POSITIVE RHEUMATOID FACTOR: Primary | ICD-10-CM

## 2023-02-10 DIAGNOSIS — G56.03 BILATERAL CARPAL TUNNEL SYNDROME: ICD-10-CM

## 2023-02-10 PROCEDURE — 99999 PR PBB SHADOW E&M-EST. PATIENT-LVL IV: ICD-10-PCS | Mod: PBBFAC,,, | Performed by: STUDENT IN AN ORGANIZED HEALTH CARE EDUCATION/TRAINING PROGRAM

## 2023-02-10 PROCEDURE — 99214 OFFICE O/P EST MOD 30 MIN: CPT | Mod: PBBFAC,PN | Performed by: STUDENT IN AN ORGANIZED HEALTH CARE EDUCATION/TRAINING PROGRAM

## 2023-02-10 PROCEDURE — 77077 XR ARTHRITIS SURVEY: ICD-10-PCS | Mod: 26,,, | Performed by: RADIOLOGY

## 2023-02-10 PROCEDURE — 99999 PR PBB SHADOW E&M-EST. PATIENT-LVL IV: CPT | Mod: PBBFAC,,, | Performed by: STUDENT IN AN ORGANIZED HEALTH CARE EDUCATION/TRAINING PROGRAM

## 2023-02-10 PROCEDURE — 77077 JOINT SURVEY SINGLE VIEW: CPT | Mod: 26,,, | Performed by: RADIOLOGY

## 2023-02-10 PROCEDURE — 99205 OFFICE O/P NEW HI 60 MIN: CPT | Mod: S$PBB,,, | Performed by: STUDENT IN AN ORGANIZED HEALTH CARE EDUCATION/TRAINING PROGRAM

## 2023-02-10 PROCEDURE — 77077 JOINT SURVEY SINGLE VIEW: CPT | Mod: TC,PO

## 2023-02-10 PROCEDURE — 99205 PR OFFICE/OUTPT VISIT, NEW, LEVL V, 60-74 MIN: ICD-10-PCS | Mod: S$PBB,,, | Performed by: STUDENT IN AN ORGANIZED HEALTH CARE EDUCATION/TRAINING PROGRAM

## 2023-02-10 RX ORDER — HYDROXYCHLOROQUINE SULFATE 200 MG/1
200 TABLET, FILM COATED ORAL 2 TIMES DAILY
Qty: 180 TABLET | Refills: 1 | Status: SHIPPED | OUTPATIENT
Start: 2023-02-10 | End: 2023-05-11

## 2023-02-10 RX ORDER — FOLIC ACID 1 MG/1
1000 TABLET ORAL DAILY
Qty: 90 TABLET | Refills: 1 | Status: SHIPPED | OUTPATIENT
Start: 2023-02-10 | End: 2023-06-21 | Stop reason: SDUPTHER

## 2023-02-10 RX ORDER — METHOTREXATE 2.5 MG/1
17.5 TABLET ORAL
Qty: 84 TABLET | Refills: 1 | Status: SHIPPED | OUTPATIENT
Start: 2023-02-10 | End: 2023-06-21 | Stop reason: SDUPTHER

## 2023-02-10 RX ORDER — TRAMADOL HYDROCHLORIDE 50 MG/1
50 TABLET ORAL EVERY 12 HOURS PRN
Qty: 60 TABLET | Refills: 1 | Status: SHIPPED | OUTPATIENT
Start: 2023-02-10 | End: 2023-06-21 | Stop reason: SDUPTHER

## 2023-02-10 NOTE — TELEPHONE ENCOUNTER
Left voicemail for pt in regards to see if patient wanted to schedule EMG/ Nerve conduction test with Dr. Bowser in [place].

## 2023-02-10 NOTE — TELEPHONE ENCOUNTER
----- Message from Denae Vale LPN sent at 2/10/2023  3:39 PM CST -----  Regarding: referral  Good afternoon, Dr Romero has ordered an EMG. Can someone assist with scheduling?  Thanks,  Denae THOMSON LPN

## 2023-02-10 NOTE — TELEPHONE ENCOUNTER
Asked if patient wanted to schedule EMG/ Nerve conduction test with Dr. Bowser in [place]. Pt stated she did and appointment was made. All questions answered.

## 2023-02-13 ENCOUNTER — TELEPHONE (OUTPATIENT)
Dept: RHEUMATOLOGY | Facility: CLINIC | Age: 78
End: 2023-02-13
Payer: MEDICARE

## 2023-02-13 DIAGNOSIS — E83.52 HYPERCALCEMIA: Primary | ICD-10-CM

## 2023-02-13 NOTE — TELEPHONE ENCOUNTER
----- Message from Khushboo Aguirre MD sent at 2/13/2023 12:17 PM CST -----  Patient's calcium level is persistently elevated from 3 months ago.  Please see if the lab can add SPEP, vitamin-D, PTH, and ionized calcium.  If not please have her scheduled for repeat labs.  Thank you

## 2023-02-22 ENCOUNTER — TELEPHONE (OUTPATIENT)
Dept: RHEUMATOLOGY | Facility: CLINIC | Age: 78
End: 2023-02-22
Payer: MEDICARE

## 2023-02-22 NOTE — TELEPHONE ENCOUNTER
Patient states she is not at home and doesn't remember which medication needs a prior auth. She stated she has picked up her last requested medication from Dr Romero. Will call back once she reads letter again if necessary.

## 2023-02-22 NOTE — TELEPHONE ENCOUNTER
----- Message from Nia Brown sent at 2/22/2023 12:16 PM CST -----  Regarding: pt call back  Name of Who is Calling:           What is the request in detail: pt is requesting that the doctors get in contact with her insurance company because they brought it to her attention authorization paper work needs to be submitted again            Can the clinic reply by [facility]:           What Number to Call Back if not in [facility]: [phone]

## 2023-02-23 ENCOUNTER — OFFICE VISIT (OUTPATIENT)
Dept: PULMONOLOGY | Facility: CLINIC | Age: 78
End: 2023-02-23
Payer: MEDICARE

## 2023-02-23 VITALS
WEIGHT: 184.38 LBS | TEMPERATURE: 99 F | HEART RATE: 68 BPM | BODY MASS INDEX: 28.04 KG/M2 | DIASTOLIC BLOOD PRESSURE: 64 MMHG | OXYGEN SATURATION: 95 % | SYSTOLIC BLOOD PRESSURE: 138 MMHG

## 2023-02-23 DIAGNOSIS — R06.02 SHORTNESS OF BREATH: Primary | ICD-10-CM

## 2023-02-23 DIAGNOSIS — J45.20 MILD INTERMITTENT ASTHMA, UNSPECIFIED WHETHER COMPLICATED: ICD-10-CM

## 2023-02-23 PROCEDURE — 99214 PR OFFICE/OUTPT VISIT, EST, LEVL IV, 30-39 MIN: ICD-10-PCS | Mod: S$GLB,,, | Performed by: INTERNAL MEDICINE

## 2023-02-23 PROCEDURE — 99214 OFFICE O/P EST MOD 30 MIN: CPT | Mod: S$GLB,,, | Performed by: INTERNAL MEDICINE

## 2023-02-23 NOTE — PROGRESS NOTES
SUBJECTIVE:    Patient ID: Kirstie Bowden is a 78 y.o. female.    Chief Complaint: New Patient (New Patient/Referred by Dr. Jamal Suarez for dry cough)    HPI The patient was last seen 3 years ago.  She had breast cancer and a small nodule.  She knows she has bronchitis.  She is short of breath with exertion.   She has Advair and albuterol which she uses intermittently. This has been present for the last 6-7 months.  Has stayed about the same.  She has reflux and esophageal stricture.  She feels she has to clear her throat because of the reflux.She  feels like she needs to take a deep breath.  She was diagnosed years ago with asthma. She wheezes with her asthma.  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    Hyperlipidemia     Hypertension     Limb alert care status     NO BP/IV LEFT ARM    Pseudocholinesterase deficiency     family history    Thyroid disease      Past Surgical History:   Procedure Laterality Date    AXILLARY NODE DISSECTION Left 03/14/2019    Procedure: LYMPHADENECTOMY, AXILLARY;  Surgeon: Mp Gonzalez MD;  Location: [facility];  Service: General;  Laterality: Left;  left lumpectomy with axillary lypmh node dissection    BALLOON DILATION OF URETER Left 06/24/2019    Procedure: DILATION, URETER, USING BALLOON;  Surgeon: Jorden Dickson MD;  Location: [facility] OR;  Service: Urology;  Laterality: Left;    BREAST BIOPSY Left 20 yrs ago    benign    BREAST LUMPECTOMY      x2    CHOLECYSTECTOMY      COLONOSCOPY  09/25/2018    LUC EASTON MD    CYSTOSCOPY W/ URETERAL STENT PLACEMENT Left 06/24/2019    Procedure: CYSTOSCOPY, WITH URETERAL STENT INSERTION;  Surgeon: Jorden Dickson MD;  Location: [facility] OR;  Service: Urology;  Laterality: Left;    CYSTOSCOPY W/ URETERAL STENT REMOVAL Left 07/23/2019    Procedure: CYSTOSCOPY, WITH URETERAL STENT REMOVAL;  Surgeon: Jorden Dickson MD;  Location: [facility] OR;  Service: Urology;   Laterality: Left;    EPIDURAL STEROID INJECTION INTO LUMBAR SPINE N/A 09/30/2021    Procedure: Injection-steroid-epidural-lumbar;  Surgeon: Nathen Lyons MD;  Location: [facility] OR;  Service: Pain Management;  Laterality: N/A;  L5-S1      EPIDURAL STEROID INJECTION INTO LUMBAR SPINE N/A 11/16/2021    Procedure: Injection-steroid-epidural-lumbar;  Surgeon: Nathen Lyons MD;  Location: [facility] OR;  Service: Pain Management;  Laterality: N/A;  L5-S1    EXTRACORPOREAL SHOCK WAVE LITHOTRIPSY Left 07/23/2019    Procedure: LITHOTRIPSY, ESWL;  Surgeon: Jorden Dickson MD;  Location: [facility] OR;  Service: Urology;  Laterality: Left;    EYE SURGERY Bilateral     cataracts    HYSTERECTOMY      MASTECTOMY, PARTIAL Left 04/25/2019    Procedure: MASTECTOMY, PARTIAL;  Surgeon: Mp Gonzalez MD;  Location: [facility] OR;  Service: General;  Laterality: Left;    NEEDLE LOCALIZATION Left 03/14/2019    Procedure: NEEDLE LOCALIZATION;  Surgeon: Mp Gonzalez MD;  Location: [facility] OR;  Service: General;  Laterality: Left;  left lumpectomy with wire needle localization     RETROGRADE PYELOGRAPHY Bilateral 06/24/2019    Procedure: PYELOGRAM, RETROGRADE;  Surgeon: Jorden Dickson MD;  Location: [facility] OR;  Service: Urology;  Laterality: Bilateral;    SENTINEL LYMPH NODE BIOPSY Left 03/14/2019    Procedure: BIOPSY, LYMPH NODE, SENTINEL;  Surgeon: Mp Gonzalez MD;  Location: [facility] OR;  Service: General;  Laterality: Left;  left sentinel node lymph node biopsy    TONSILLECTOMY      TRANSFORAMINAL EPIDURAL INJECTION OF STEROID Right 01/04/2022    Procedure: Injection,steroid,epidural,transforaminal approach;  Surgeon: Nathen Lyons MD;  Location: [facility] OR;  Service: Pain Management;  Laterality: Right;  L4-L5, L5-s1    URETEROSCOPY Left 06/24/2019    Procedure: URETEROSCOPY;  Surgeon: Jorden Dickson MD;  Location: [facility] OR;  Service: Urology;  Laterality: Left;     Family History   Problem Relation Age of Onset    Heart disease Mother      Hypertension Mother     Alzheimer's disease Mother     Cancer Father     Heart disease Sister     Diabetes Brother     Stroke Sister     Cancer Daughter         Social History:   Marital Status:   Occupation: Data Unavailable  Alcohol History:  reports current alcohol use of about 2.0 standard drinks per week.  Tobacco History:  reports that she quit smoking about 3 years ago. Her smoking use included cigarettes. She started smoking about 62 years ago. She has a 23.50 pack-year smoking history. She has never used smokeless tobacco.  Drug History:  reports no history of drug use.    Review of patient's allergies indicates:   Allergen Reactions    Succinylcholine chloride Other (See Comments)    Sulfur dioxide Hives    Sulfamethoxazole-trimethoprim      Other reaction(s): per dr daryn Rodríguez (sulfonamide antibiotics)      NOT SURE REACTION       Current Outpatient Medications   Medication Sig Dispense Refill    albuterol (VENTOLIN HFA) 90 mcg/actuation inhaler Inhale 1-2 puffs into the lungs every 6 (six) hours as needed for Wheezing or Shortness of Breath. Rescue 18 g 0    amLODIPine (NORVASC) 5 MG tablet Take 1 tablet by mouth once daily 90 tablet 3    atorvastatin (LIPITOR) 20 MG tablet Take 1 tablet by mouth once daily 90 tablet 3    CALCIUM CARBONATE/VITAMIN D3 (VITAMIN D-3 ORAL) Take by mouth once daily.       EUTHYROX 125 mcg tablet TAKE 1 TABLET BY MOUTH BEFORE BREAKFAST 90 tablet 3    exemestane (AROMASIN) 25 mg tablet Take 1 tablet by mouth once daily 90 tablet 5    folic acid (FOLVITE) 1 MG tablet Take 1 tablet (1,000 mcg total) by mouth once daily. 90 tablet 1    gabapentin (NEURONTIN) 300 MG capsule TAKE 1 CAPSULE BY MOUTH IN THE EVENING 30 capsule 2    hydroCHLOROthiazide (HYDRODIURIL) 25 MG tablet Take 1 tablet by mouth once daily 90 tablet 3    hydrOXYchloroQUINE (PLAQUENIL) 200 mg tablet Take 1 tablet (200 mg total) by mouth 2 (two) times daily. 180 tablet 1    methotrexate 2.5 MG Tab Take  7 tablets (17.5 mg total) by mouth every 7 days. 84 tablet 1    ondansetron (ZOFRAN-ODT) 8 MG TbDL DISSOLVE 1 TABLET IN MOUTH EVERY 12 HOURS AS NEEDED 30 tablet 0    pantoprazole (PROTONIX) 40 MG tablet TAKE 1 TABLET BY MOUTH ONCE DAILY IN THE MORNING FOR 90 DAYS      prochlorperazine (COMPAZINE) 10 MG tablet TAKE 1 TABLET BY MOUTH EVERY 6 HOURS AS NEEDED 60 tablet 0    thiamine 100 MG tablet Take 100 mg by mouth once daily.      traMADoL (ULTRAM) 50 mg tablet Take 1 tablet (50 mg total) by mouth every 12 (twelve) hours as needed for Pain (severe pain). TAKE 1 TABLET BY MOUTH EVERY 8 HOURS AS NEEDED FOR SEVERE PAIN- DOSE DECREASE 60 tablet 1    VITAMIN B COMPLEX ORAL Every day      dexchlorphen-phenylephrine-DM (POLYTUSSIN DM) 1-5-10 mg/5 mL Syrp Take 10 mLs by mouth every 4 (four) hours as needed (Cough). 180 mL 0    fluticasone-salmeterol diskus inhaler 250-50 mcg INHALE 1 DOSE BY MOUTH TWICE DAILY (Patient not taking: Reported on 2/23/2023) 60 each 11     No current facility-administered medications for this visit.     Facility-Administered Medications Ordered in Other Visits   Medication Dose Route Frequency Provider Last Rate Last Admin    lactated ringers infusion   Intravenous Once PRN Nathen Lyons MD        lidocaine (PF) 10 mg/ml (1%) injection 10 mg  1 mL Intradermal Once Eladia Schwartz MD           Alpha-1 Antitrypsin:  Last PFT:   Last PET/CT:3/28/22  No evidence of recurrent or metastatic disease   Postsurgical changes in the left breast   Stable 3.5 cm round isoattenuating mass within the upper pole the right kidney suggestive of renal cyst.   Nonobstructing renal stones     Review of Systems  General: Feeling Well.  Eyes: Vision is good.  ENT: Acid reflux  Heart:: No chest pain or palpitations.  Lungs: Short of breath, she'll cough with her reflux  GI: reflux.  : No dysuria, hesitancy, or nocturia.  Musculoskeletal: arthritis in upper ext and neck  Skin: No lesions or rashes.Bruises  easily  Neuro: Neuropathy in fingers  Lymph: wrists and ankles swell  Psych: No anxiety or depression.  Endo: No weight change.    OBJECTIVE:      /64 (BP Location: Right arm, Patient Position: Sitting, BP Method: Medium (Manual))   Pulse 68   Temp 98.8 °F (37.1 °C)   Wt 83.6 kg (184 lb 6.4 oz)   SpO2 95%   BMI 28.04 kg/m²     Physical Exam  GENERAL: Older patient in no distress.  HEENT: Pupils equal and reactive. Extraocular movements intact. Nose intact.      Pharynx moist.  NECK: Supple.   HEART: Regular rate and rhythm. No murmur or gallop auscultated.  LUNGS: Clear to auscultation and percussion. Lung excursion symmetrical. No change in fremitus. No adventitial noises.  ABDOMEN: Bowel sounds present. Non-tender, no masses palpated.  EXTREMITIES: Normal muscle tone and joint movement, no cyanosis or clubbing.   LYMPHATICS: No adenopathy palpated, no edema.  SKIN: Dry, intact, no lesions.   NEURO: Cranial nerves II-XII intact. Motor strength 5/5 bilaterally, upper and lower extremities.  PSYCH: Appropriate affect.    Assessment:       1. Shortness of breath    2. Mild intermittent asthma, unspecified whether complicated        6-7 months of dyspnea on exertion and the need to take a deep breath and feeling like she cannot.  She does not perceive this is her asthma.  She has not followed up with Dr. Agosto in some time.  Plan:       Shortness of breath  -     Complete PFT with bronchodilator; Future  -     Stress test, pulmonary; Future; Expected date: 02/23/2023    Mild intermittent asthma, unspecified whether complicated         Follow up in about 1 month (around 3/23/2023).  If her pulmonary function test and 6 minute walk are normal, will refer her to Dr. Agosto at that time.

## 2023-02-28 ENCOUNTER — HOSPITAL ENCOUNTER (OUTPATIENT)
Dept: PULMONOLOGY | Facility: HOSPITAL | Age: 78
Discharge: HOME | End: 2023-02-28
Attending: INTERNAL MEDICINE
Payer: MEDICARE

## 2023-02-28 ENCOUNTER — TELEPHONE (OUTPATIENT)
Dept: PULMONOLOGY | Facility: CLINIC | Age: 78
End: 2023-02-28

## 2023-02-28 VITALS — HEIGHT: 68 IN | WEIGHT: 184 LBS | BODY MASS INDEX: 27.89 KG/M2

## 2023-02-28 DIAGNOSIS — R06.02 SHORTNESS OF BREATH: ICD-10-CM

## 2023-02-28 PROCEDURE — 94618 PULMONARY STRESS TESTING: CPT

## 2023-02-28 PROCEDURE — 94060 EVALUATION OF WHEEZING: CPT

## 2023-02-28 PROCEDURE — 94727 GAS DIL/WSHOT DETER LNG VOL: CPT

## 2023-02-28 PROCEDURE — 94010 BREATHING CAPACITY TEST: CPT | Mod: XB

## 2023-02-28 PROCEDURE — 94729 DIFFUSING CAPACITY: CPT

## 2023-02-28 NOTE — CARE UPDATE
"   02/28/23 1043   6MW Test   Ordering Provider Dr. Naila Burk MD   Performing nurse/tech/RT Melanie Jebshelbi RRT   Diagnosis Shortness of Breath   Height 5' 8" (1.727 m)   Weight 83.5 kg (184 lb)   BMI (Calculated) 28   Predicted Distance Meters (Calculated) 395.21 meters   Patient Race    6MWT Status completed without stopping   Was O2 used? No   6MW Distance walked (feet) 1400 feet   Distance walked (meters) 426.72 meters   Did patient stop? No   Type of assistive device(s) used? no assistive devices   Is extra documentation required for this patient? Yes   Pre-Exercise   Oxygen Saturation 97 %   Supplemental Oxygen Room Air   Heart Rate 79 bpm   Post Exercise   Oxygen Saturation 95 %   Supplemental Oxygen Room Air   Heart Rate 82 bpm   Frank Dyspnea Rating  moderate   Recovery 1 Minute   Oxygen Saturation 96 %   Supplemental Oxygen Room Air   Heart Rate 77 bpm   Interpretation   Is procedure ready for interpretation? Yes   Did the patient stop or pause? No   Total Laps Walked 7   Final Partial Lap Distance (feet) 0 feet   Total Distance Feet (Calculated) 1400 feet   Total Distance Meters (Calculated) 426.72 meters   Percentage of Predicted (Calculated) 107.97   Peak VO2 (Calculated) 16.78   Mets 4.79   Has The Patient Had a Previous Six Minute Walk Test? No   Comments This a Non-Hypoxemic 6 min. walk. Patient did not qualify for Home Oxygen.   Oxygen Qualification   Oxygen Qualification? No       "

## 2023-02-28 NOTE — TELEPHONE ENCOUNTER
PFT shows moderate obstruction with no change to bronchodilator, no restriction, moderate diffusion defect.   6 minute walk is non hypoxemic

## 2023-03-01 ENCOUNTER — TELEPHONE (OUTPATIENT)
Dept: PULMONOLOGY | Facility: CLINIC | Age: 78
End: 2023-03-01

## 2023-03-01 NOTE — TELEPHONE ENCOUNTER
Told patient of her obstructed PFTs.  Asked her to resume using her Advair faithfully twice a day.

## 2023-03-10 ENCOUNTER — PATIENT MESSAGE (OUTPATIENT)
Dept: RHEUMATOLOGY | Facility: CLINIC | Age: 78
End: 2023-03-10
Payer: MEDICARE

## 2023-03-10 ENCOUNTER — LAB VISIT (OUTPATIENT)
Dept: LAB | Facility: HOSPITAL | Age: 78
End: 2023-03-10
Attending: STUDENT IN AN ORGANIZED HEALTH CARE EDUCATION/TRAINING PROGRAM
Payer: MEDICARE

## 2023-03-10 DIAGNOSIS — Z79.899 HIGH RISK MEDICATIONS (NOT ANTICOAGULANTS) LONG-TERM USE: ICD-10-CM

## 2023-03-10 DIAGNOSIS — M05.79 RHEUMATOID ARTHRITIS INVOLVING MULTIPLE SITES WITH POSITIVE RHEUMATOID FACTOR: ICD-10-CM

## 2023-03-10 DIAGNOSIS — E83.52 HYPERCALCEMIA: ICD-10-CM

## 2023-03-10 DIAGNOSIS — D84.9 IMMUNOSUPPRESSION: ICD-10-CM

## 2023-03-10 LAB
ALBUMIN SERPL BCP-MCNC: 4.2 G/DL (ref 3.5–5.2)
ALP SERPL-CCNC: 68 U/L (ref 55–135)
ALT SERPL W/O P-5'-P-CCNC: 21 U/L (ref 10–44)
ANION GAP SERPL CALC-SCNC: 8 MMOL/L (ref 8–16)
AST SERPL-CCNC: 24 U/L (ref 10–40)
BASOPHILS # BLD AUTO: 0.03 K/UL (ref 0–0.2)
BASOPHILS NFR BLD: 0.4 % (ref 0–1.9)
BILIRUB SERPL-MCNC: 0.5 MG/DL (ref 0.1–1)
BUN SERPL-MCNC: 19 MG/DL (ref 8–23)
CA-I BLDV-SCNC: 1.34 MMOL/L (ref 1.06–1.42)
CALCIUM SERPL-MCNC: 10.6 MG/DL (ref 8.7–10.5)
CHLORIDE SERPL-SCNC: 102 MMOL/L (ref 95–110)
CO2 SERPL-SCNC: 30 MMOL/L (ref 23–29)
CREAT SERPL-MCNC: 1.1 MG/DL (ref 0.5–1.4)
CRP SERPL-MCNC: 14.7 MG/L (ref 0–8.2)
DIFFERENTIAL METHOD: ABNORMAL
EOSINOPHIL # BLD AUTO: 0.2 K/UL (ref 0–0.5)
EOSINOPHIL NFR BLD: 2.5 % (ref 0–8)
ERYTHROCYTE [DISTWIDTH] IN BLOOD BY AUTOMATED COUNT: 13.6 % (ref 11.5–14.5)
ERYTHROCYTE [SEDIMENTATION RATE] IN BLOOD BY WESTERGREN METHOD: 77 MM/HR (ref 0–20)
EST. GFR  (NO RACE VARIABLE): 52 ML/MIN/1.73 M^2
GLUCOSE SERPL-MCNC: 115 MG/DL (ref 70–110)
HCT VFR BLD AUTO: 39.7 % (ref 37–48.5)
HGB BLD-MCNC: 12.9 G/DL (ref 12–16)
IMM GRANULOCYTES # BLD AUTO: 0.02 K/UL (ref 0–0.04)
IMM GRANULOCYTES NFR BLD AUTO: 0.3 % (ref 0–0.5)
LYMPHOCYTES # BLD AUTO: 1.8 K/UL (ref 1–4.8)
LYMPHOCYTES NFR BLD: 23.5 % (ref 18–48)
MCH RBC QN AUTO: 32.3 PG (ref 27–31)
MCHC RBC AUTO-ENTMCNC: 32.5 G/DL (ref 32–36)
MCV RBC AUTO: 99 FL (ref 82–98)
MONOCYTES # BLD AUTO: 0.6 K/UL (ref 0.3–1)
MONOCYTES NFR BLD: 8 % (ref 4–15)
NEUTROPHILS # BLD AUTO: 5 K/UL (ref 1.8–7.7)
NEUTROPHILS NFR BLD: 65.3 % (ref 38–73)
NRBC BLD-RTO: 0 /100 WBC
PLATELET # BLD AUTO: 297 K/UL (ref 150–450)
PMV BLD AUTO: 10.5 FL (ref 9.2–12.9)
POTASSIUM SERPL-SCNC: 4.2 MMOL/L (ref 3.5–5.1)
PROT SERPL-MCNC: 8.2 G/DL (ref 6–8.4)
PTH-INTACT SERPL-MCNC: 52.8 PG/ML (ref 9–77)
RBC # BLD AUTO: 4 M/UL (ref 4–5.4)
SODIUM SERPL-SCNC: 140 MMOL/L (ref 136–145)
WBC # BLD AUTO: 7.61 K/UL (ref 3.9–12.7)

## 2023-03-10 PROCEDURE — 86140 C-REACTIVE PROTEIN: CPT | Performed by: STUDENT IN AN ORGANIZED HEALTH CARE EDUCATION/TRAINING PROGRAM

## 2023-03-10 PROCEDURE — 85651 RBC SED RATE NONAUTOMATED: CPT | Performed by: STUDENT IN AN ORGANIZED HEALTH CARE EDUCATION/TRAINING PROGRAM

## 2023-03-10 PROCEDURE — 83970 ASSAY OF PARATHORMONE: CPT | Performed by: STUDENT IN AN ORGANIZED HEALTH CARE EDUCATION/TRAINING PROGRAM

## 2023-03-10 PROCEDURE — 82330 ASSAY OF CALCIUM: CPT | Performed by: STUDENT IN AN ORGANIZED HEALTH CARE EDUCATION/TRAINING PROGRAM

## 2023-03-10 PROCEDURE — 82306 VITAMIN D 25 HYDROXY: CPT | Performed by: STUDENT IN AN ORGANIZED HEALTH CARE EDUCATION/TRAINING PROGRAM

## 2023-03-10 PROCEDURE — 80053 COMPREHEN METABOLIC PANEL: CPT | Performed by: STUDENT IN AN ORGANIZED HEALTH CARE EDUCATION/TRAINING PROGRAM

## 2023-03-10 PROCEDURE — 84165 PATHOLOGIST INTERPRETATION SPE: ICD-10-PCS | Mod: 26,,, | Performed by: PATHOLOGY

## 2023-03-10 PROCEDURE — 84165 PROTEIN E-PHORESIS SERUM: CPT | Mod: 26,,, | Performed by: PATHOLOGY

## 2023-03-10 PROCEDURE — 84165 PROTEIN E-PHORESIS SERUM: CPT | Performed by: STUDENT IN AN ORGANIZED HEALTH CARE EDUCATION/TRAINING PROGRAM

## 2023-03-10 PROCEDURE — 36415 COLL VENOUS BLD VENIPUNCTURE: CPT | Performed by: STUDENT IN AN ORGANIZED HEALTH CARE EDUCATION/TRAINING PROGRAM

## 2023-03-10 PROCEDURE — 85025 COMPLETE CBC W/AUTO DIFF WBC: CPT | Performed by: STUDENT IN AN ORGANIZED HEALTH CARE EDUCATION/TRAINING PROGRAM

## 2023-03-11 LAB — 25(OH)D3+25(OH)D2 SERPL-MCNC: 67 NG/ML (ref 30–96)

## 2023-03-13 ENCOUNTER — TELEPHONE (OUTPATIENT)
Dept: RHEUMATOLOGY | Facility: CLINIC | Age: 78
End: 2023-03-13
Payer: MEDICARE

## 2023-03-13 DIAGNOSIS — M05.79 RHEUMATOID ARTHRITIS INVOLVING MULTIPLE SITES WITH POSITIVE RHEUMATOID FACTOR: Primary | ICD-10-CM

## 2023-03-13 DIAGNOSIS — Z79.899 HIGH RISK MEDICATIONS (NOT ANTICOAGULANTS) LONG-TERM USE: ICD-10-CM

## 2023-03-13 LAB
ALBUMIN SERPL ELPH-MCNC: 4.16 G/DL (ref 3.35–5.55)
ALPHA1 GLOB SERPL ELPH-MCNC: 0.34 G/DL (ref 0.17–0.41)
ALPHA2 GLOB SERPL ELPH-MCNC: 1.11 G/DL (ref 0.43–0.99)
B-GLOBULIN SERPL ELPH-MCNC: 0.89 G/DL (ref 0.5–1.1)
GAMMA GLOB SERPL ELPH-MCNC: 1.1 G/DL (ref 0.67–1.58)
PROT SERPL-MCNC: 7.6 G/DL (ref 6–8.4)

## 2023-03-13 NOTE — TELEPHONE ENCOUNTER
----- Message from Khushboo Aguirre MD sent at 3/13/2023 10:31 AM CDT -----  Please schedule repeat CBC and CMP in 4 weeks. Thank you    Spoke to the patient. Labs booked 4-13-23 at 10:30 am. CG

## 2023-03-14 LAB — PATHOLOGIST INTERPRETATION SPE: NORMAL

## 2023-03-16 ENCOUNTER — OFFICE VISIT (OUTPATIENT)
Dept: PHYSICAL MEDICINE AND REHAB | Facility: CLINIC | Age: 78
End: 2023-03-16
Payer: MEDICARE

## 2023-03-16 DIAGNOSIS — G56.03 BILATERAL CARPAL TUNNEL SYNDROME: Primary | ICD-10-CM

## 2023-03-16 PROCEDURE — 99211 OFF/OP EST MAY X REQ PHY/QHP: CPT | Mod: PBBFAC,PN,25 | Performed by: PHYSICAL MEDICINE & REHABILITATION

## 2023-03-16 PROCEDURE — 95886 PR EMG COMPLETE, W/ NERVE CONDUCTION STUDIES, 5+ MUSCLES: ICD-10-PCS | Mod: 26,S$PBB,, | Performed by: PHYSICAL MEDICINE & REHABILITATION

## 2023-03-16 PROCEDURE — 95886 MUSC TEST DONE W/N TEST COMP: CPT | Mod: PBBFAC,PN | Performed by: PHYSICAL MEDICINE & REHABILITATION

## 2023-03-16 PROCEDURE — 99499 UNLISTED E&M SERVICE: CPT | Mod: S$PBB,,, | Performed by: PHYSICAL MEDICINE & REHABILITATION

## 2023-03-16 PROCEDURE — 95911 PR NERVE CONDUCTION STUDY; 9-10 STUDIES: ICD-10-PCS | Mod: 26,S$PBB,, | Performed by: PHYSICAL MEDICINE & REHABILITATION

## 2023-03-16 PROCEDURE — 95911 NRV CNDJ TEST 9-10 STUDIES: CPT | Mod: 26,S$PBB,, | Performed by: PHYSICAL MEDICINE & REHABILITATION

## 2023-03-16 PROCEDURE — 95911 NRV CNDJ TEST 9-10 STUDIES: CPT | Mod: PBBFAC,PN | Performed by: PHYSICAL MEDICINE & REHABILITATION

## 2023-03-16 PROCEDURE — 99999 PR PBB SHADOW E&M-EST. PATIENT-LVL I: CPT | Mod: PBBFAC,,, | Performed by: PHYSICAL MEDICINE & REHABILITATION

## 2023-03-16 PROCEDURE — 99999 PR PBB SHADOW E&M-EST. PATIENT-LVL I: ICD-10-PCS | Mod: PBBFAC,,, | Performed by: PHYSICAL MEDICINE & REHABILITATION

## 2023-03-16 PROCEDURE — 95886 MUSC TEST DONE W/N TEST COMP: CPT | Mod: 26,S$PBB,, | Performed by: PHYSICAL MEDICINE & REHABILITATION

## 2023-03-16 PROCEDURE — 99499 NO LOS: ICD-10-PCS | Mod: S$PBB,,, | Performed by: PHYSICAL MEDICINE & REHABILITATION

## 2023-03-16 NOTE — PROGRESS NOTES
[facility]   [facility]  [address] [place] [address]  [phone]             Full Name: Kirstie Bowden Gender: Female  Patient ID: [id number] YOB: 1945      Visit Date: 3/16/2023 8:53 AM  Age: 78 Years  Examining Physician: Jenn Bowser D.O.   Referring Physician: Khushboo   Visit User 1: ERMIAS Aguirre MD  Technologist: POP Lim   Height: 5 feet 8 inch  History: Numbess, tingling and pain of bilateral hands, R>L.  She also complains of neck pain.  Patient evaluated for CTS.  Hx of left breast cancer treated with chemo and radiation, 2021      Sensory NCS      Nerve / Sites Rec. Site Onset Lat Peak Lat NP Amp Segments Distance Velocity     ms ms µV  cm m/s   L Ulnar - Digit V (Antidromic)      Wrist Dig V 2.46 2.98 12.1 Wrist - Dig V 11 45      Ref.   ?3.20 ?10.0 Ref.  ?50   R Ulnar - Digit V (Antidromic)      Wrist Dig V 2.54 3.17 13.6 Wrist - Dig V 11 43      Ref.   ?3.20 ?10.0 Ref.  ?50   L Radial - Anatomical snuff box (Forearm)      Forearm Wrist 1.96 2.69 25.8 Forearm - Wrist 10 51      Ref.   ?2.80 ?10.0 Ref.     R Radial - Anatomical snuff box (Forearm)      Forearm Wrist 1.92 2.58 26.6 Forearm - Wrist 10 52      Ref.   ?2.80 ?10.0 Ref.     L Median - Digit III (Antidromic)      Wrist Dig III 3.27 4.23 10.2 Wrist - Dig III 14 43      Ref.   ?3.60 ?15.0 Ref.        Mid palm Dig III 1.31 2.04 8.4 Mid palm - Dig III 7 53   R Median - Digit III (Antidromic)      Wrist Dig III NR NR NR Wrist - Dig III 14 NR      Ref.   ?3.60 ?15.0 Ref.        Mid palm Dig III 1.29 1.75 3.5 Mid palm - Dig III 7 54       Motor NCS      Nerve / Sites Muscle Latency Ref. Amplitude Ref. Amp % Duration Segments Distance Lat Diff Velocity Ref.     ms ms mV mV % ms  cm ms m/s m/s   L Median - APB      Wrist APB 4.10 ?4.00 6.6 ?5.0 100 6.67 Wrist - APB          Elbow APB 8.48  6.5  98.7 7.08 Elbow - Wrist 22 4.38 50 ?50   R Median - APB      Wrist  APB 5.40 ?4.00 3.9 ?5.0 100 7.15 Wrist - APB          Elbow APB 10.04  3.8  99.5 8.46 Elbow - Wrist 24 4.65 52 ?50   L Ulnar - ADM      Wrist ADM 3.02 ?3.10 10.0 ?7.0 100 7.23 Wrist - ADM          B.Elbow ADM 6.67  9.6  95.3 7.25 B.Elbow - Wrist 20 3.65 55 ?50      A.Elbow ADM 8.67  9.2  91.1 7.50 A.Elbow - B.Elbow 10 2.00 50    R Ulnar - ADM      Wrist ADM 3.00 ?3.10 8.6 ?7.0 100 7.94 Wrist - ADM          B.Elbow ADM 6.54  8.1  94 8.17 B.Elbow - Wrist 19 3.54 54 ?50      A.Elbow ADM 8.85  7.8  90.3 7.90 A.Elbow - B.Elbow 10 2.31 43        EMG Summary Table     Spontaneous MUAP Recruitment   Muscle IA Fib PSW Fasc CRD Amp Dur. PPP Pattern   R. Deltoid N None None None None N N N N   R. Biceps brachii N None None None None N N N N   R. Triceps brachii N None None None None N N N N   R. Extensor digitorum communis N None None None None N N N N   R. Pronator teres N None None None None N N N N   R. Abductor pollicis brevis N None None None None 2+ 2+ 2+ Reduced       Summary    The motor conduction test was performed on 4 nerve(s). The results were normal in 2 nerve(s): L Ulnar - ADM, R Ulnar - ADM. Results outside the specified normal range were found in 2 nerve(s), as follows:  In the L Median - APB study  the take off latency result was increased for Wrist stimulation  In the R Median - APB study  the take off latency result was increased for Wrist stimulation  the peak amplitude result was reduced for Wrist stimulation    The sensory conduction test was performed on 6 nerve(s). The results were normal in 2 nerve(s): L Radial - Anatomical snuff box (Forearm), R Radial - Anatomical snuff box (Forearm). Results outside the specified normal range were found in 4 nerve(s), as follows:  In the L Ulnar - Digit V (Antidromic) study  the take off velocity result was reduced for Wrist - Dig V segment  In the R Ulnar - Digit V (Antidromic) study  the take off velocity result was reduced for Wrist - Dig V segment  In the L  Median - Digit III (Antidromic) study  the peak latency result was increased for Wrist stimulation  the peak amplitude result was reduced for Wrist stimulation  In the R Median - Digit III (Antidromic) study  the response was considered absent for Wrist stimulation    The needle EMG examination was performed in 6 muscles. It was normal in 5 muscle(s): R. Deltoid, R. Biceps brachii, R. Triceps brachii, R. Extensor digitorum communis, R. Pronator teres. The study was abnormal in 1 muscle(s), with the following distribution:  The R. Abductor pollicis brevis had abnormal MUP waveforms, abnormal interference pattern.     Impression:  Abnormal study. Study incomplete as electromyography study was avoided to the left upper extremity with patients history of left breast cancer with precautions to avoid needle stick to this extremity.   Moderate/severe right carpal tunnel sydnrome, without active denervation however there are chronic neuropathic motor units found on electromygraphy.   Moderate left carpal tunnel syndrome. Unable to determine presence of denervation of motor unit quality as the EMG was avoided for reasons above.   No electrophysiologic evidence of right cervical radiculopathy/plexopathy, bilateral ulnar mononeuropathy, or periphearl neuropathy in the bilateral upper extremities.      Plan:   We discussed the above findings at length.   Based on severity of today's electrodioagnostic findings recommend further evaluation by Hand surgeron to consider options for management of bilateral carpal tunnel syndrome.           ____________________________  Jenn Bowser D.O.

## 2023-03-28 ENCOUNTER — OFFICE VISIT (OUTPATIENT)
Dept: PULMONOLOGY | Facility: CLINIC | Age: 78
End: 2023-03-28
Payer: MEDICARE

## 2023-03-28 VITALS
BODY MASS INDEX: 27.89 KG/M2 | HEIGHT: 68 IN | SYSTOLIC BLOOD PRESSURE: 130 MMHG | HEART RATE: 80 BPM | DIASTOLIC BLOOD PRESSURE: 80 MMHG | WEIGHT: 184 LBS | OXYGEN SATURATION: 97 %

## 2023-03-28 DIAGNOSIS — J44.9 CHRONIC OBSTRUCTIVE PULMONARY DISEASE, UNSPECIFIED COPD TYPE: ICD-10-CM

## 2023-03-28 DIAGNOSIS — J45.20 MILD INTERMITTENT ASTHMA, UNSPECIFIED WHETHER COMPLICATED: Primary | ICD-10-CM

## 2023-03-28 PROCEDURE — 99214 OFFICE O/P EST MOD 30 MIN: CPT | Mod: S$GLB,,, | Performed by: INTERNAL MEDICINE

## 2023-03-28 PROCEDURE — 99214 PR OFFICE/OUTPT VISIT, EST, LEVL IV, 30-39 MIN: ICD-10-PCS | Mod: S$GLB,,, | Performed by: INTERNAL MEDICINE

## 2023-03-28 RX ORDER — ALBUTEROL SULFATE 90 UG/1
2 AEROSOL, METERED RESPIRATORY (INHALATION) EVERY 6 HOURS PRN
Qty: 18 G | Refills: 11 | Status: SHIPPED | OUTPATIENT
Start: 2023-03-28 | End: 2023-07-10 | Stop reason: SDUPTHER

## 2023-03-28 NOTE — PROGRESS NOTES
SUBJECTIVE:    Patient ID: Kirstie Bowden is a 78 y.o. female.    Chief Complaint: Follow-up    HPI The patient's PFT's showed moderate obstruction.  She is using her Advair faithfully bid and is breathing much better.She has not needed her albuterol.  She does not perceive much benefit from albuterol.  Discussed the proper technique for using albuterol.  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    Hyperlipidemia     Hypertension     Limb alert care status     NO BP/IV LEFT ARM    Pseudocholinesterase deficiency     family history    Thyroid disease      Past Surgical History:   Procedure Laterality Date    AXILLARY NODE DISSECTION Left 03/14/2019    Procedure: LYMPHADENECTOMY, AXILLARY;  Surgeon: Mp Gonzalez MD;  Location: [facility] OR;  Service: General;  Laterality: Left;  left lumpectomy with axillary lypmh node dissection    BALLOON DILATION OF URETER Left 06/24/2019    Procedure: DILATION, URETER, USING BALLOON;  Surgeon: Jorden Dickson MD;  Location: [facility] OR;  Service: Urology;  Laterality: Left;    BREAST BIOPSY Left 20 yrs ago    benign    BREAST LUMPECTOMY      x2    CHOLECYSTECTOMY      COLONOSCOPY  09/25/2018    LUC EASTON MD    CYSTOSCOPY W/ URETERAL STENT PLACEMENT Left 06/24/2019    Procedure: CYSTOSCOPY, WITH URETERAL STENT INSERTION;  Surgeon: Jorden Dickson MD;  Location: [facility] OR;  Service: Urology;  Laterality: Left;    CYSTOSCOPY W/ URETERAL STENT REMOVAL Left 07/23/2019    Procedure: CYSTOSCOPY, WITH URETERAL STENT REMOVAL;  Surgeon: Jorden Dickson MD;  Location: [facility] OR;  Service: Urology;  Laterality: Left;    EPIDURAL STEROID INJECTION INTO LUMBAR SPINE N/A 09/30/2021    Procedure: Injection-steroid-epidural-lumbar;  Surgeon: Nathen Lyons MD;  Location: [facility] OR;  Service: Pain Management;  Laterality: N/A;  L5-S1      EPIDURAL STEROID INJECTION INTO LUMBAR SPINE N/A 11/16/2021    Procedure:  Injection-steroid-epidural-lumbar;  Surgeon: Nathen Lyons MD;  Location: [facility] OR;  Service: Pain Management;  Laterality: N/A;  L5-S1    EXTRACORPOREAL SHOCK WAVE LITHOTRIPSY Left 07/23/2019    Procedure: LITHOTRIPSY, ESWL;  Surgeon: Jorden Dickson MD;  Location: [facility] OR;  Service: Urology;  Laterality: Left;    EYE SURGERY Bilateral     cataracts    HYSTERECTOMY      MASTECTOMY, PARTIAL Left 04/25/2019    Procedure: MASTECTOMY, PARTIAL;  Surgeon: Mp Gonzalez MD;  Location: [facility] OR;  Service: General;  Laterality: Left;    NEEDLE LOCALIZATION Left 03/14/2019    Procedure: NEEDLE LOCALIZATION;  Surgeon: Mp Gonzalez MD;  Location: [facility] OR;  Service: General;  Laterality: Left;  left lumpectomy with wire needle localization     RETROGRADE PYELOGRAPHY Bilateral 06/24/2019    Procedure: PYELOGRAM, RETROGRADE;  Surgeon: Jorden Dickson MD;  Location: [facility] OR;  Service: Urology;  Laterality: Bilateral;    SENTINEL LYMPH NODE BIOPSY Left 03/14/2019    Procedure: BIOPSY, LYMPH NODE, SENTINEL;  Surgeon: Mp Gonzalez MD;  Location: [facility] OR;  Service: General;  Laterality: Left;  left sentinel node lymph node biopsy    TONSILLECTOMY      TRANSFORAMINAL EPIDURAL INJECTION OF STEROID Right 01/04/2022    Procedure: Injection,steroid,epidural,transforaminal approach;  Surgeon: Nathen Lyons MD;  Location: [facility];  Service: Pain Management;  Laterality: Right;  L4-L5, L5-s1    URETEROSCOPY Left 06/24/2019    Procedure: URETEROSCOPY;  Surgeon: Jorden Dickson MD;  Location: [facility];  Service: Urology;  Laterality: Left;     Family History   Problem Relation Age of Onset    Heart disease Mother     Hypertension Mother     Alzheimer's disease Mother     Cancer Father     Heart disease Sister     Diabetes Brother     Stroke Sister     Cancer Daughter         Social History:   Marital Status:   Occupation: Data Unavailable  Alcohol History:  reports current alcohol use of about 2.0 standard drinks per  week.  Tobacco History:  reports that she quit smoking about 3 years ago. Her smoking use included cigarettes. She started smoking about 62 years ago. She has a 23.50 pack-year smoking history. She has never used smokeless tobacco.  Drug History:  reports no history of drug use.    Review of patient's allergies indicates:   Allergen Reactions    Succinylcholine chloride Other (See Comments)    Sulfur dioxide Hives    Sulfamethoxazole-trimethoprim      Other reaction(s): per dr daryn Rodríguez (sulfonamide antibiotics)      NOT SURE REACTION       Current Outpatient Medications   Medication Sig Dispense Refill    albuterol (VENTOLIN HFA) 90 mcg/actuation inhaler Inhale 1-2 puffs into the lungs every 6 (six) hours as needed for Wheezing or Shortness of Breath. Rescue 18 g 0    amLODIPine (NORVASC) 5 MG tablet Take 1 tablet by mouth once daily 90 tablet 3    atorvastatin (LIPITOR) 20 MG tablet Take 1 tablet by mouth once daily 90 tablet 3    CALCIUM CARBONATE/VITAMIN D3 (VITAMIN D-3 ORAL) Take by mouth once daily.       EUTHYROX 125 mcg tablet TAKE 1 TABLET BY MOUTH BEFORE BREAKFAST 90 tablet 3    exemestane (AROMASIN) 25 mg tablet Take 1 tablet by mouth once daily 90 tablet 5    folic acid (FOLVITE) 1 MG tablet Take 1 tablet (1,000 mcg total) by mouth once daily. 90 tablet 1    gabapentin (NEURONTIN) 300 MG capsule TAKE 1 CAPSULE BY MOUTH IN THE EVENING 30 capsule 2    hydroCHLOROthiazide (HYDRODIURIL) 25 MG tablet Take 1 tablet by mouth once daily 90 tablet 3    hydrOXYchloroQUINE (PLAQUENIL) 200 mg tablet Take 1 tablet (200 mg total) by mouth 2 (two) times daily. 180 tablet 1    methotrexate 2.5 MG Tab Take 7 tablets (17.5 mg total) by mouth every 7 days. 84 tablet 1    ondansetron (ZOFRAN-ODT) 8 MG TbDL DISSOLVE 1 TABLET IN MOUTH EVERY 12 HOURS AS NEEDED 30 tablet 0    pantoprazole (PROTONIX) 40 MG tablet TAKE 1 TABLET BY MOUTH ONCE DAILY IN THE MORNING FOR 90 DAYS      prochlorperazine (COMPAZINE) 10 MG tablet  TAKE 1 TABLET BY MOUTH EVERY 6 HOURS AS NEEDED 60 tablet 0    thiamine 100 MG tablet Take 100 mg by mouth once daily.      traMADoL (ULTRAM) 50 mg tablet Take 1 tablet (50 mg total) by mouth every 12 (twelve) hours as needed for Pain (severe pain). TAKE 1 TABLET BY MOUTH EVERY 8 HOURS AS NEEDED FOR SEVERE PAIN- DOSE DECREASE 60 tablet 1    VITAMIN B COMPLEX ORAL Every day      albuterol (PROAIR HFA) 90 mcg/actuation inhaler Inhale 2 puffs into the lungs every 6 (six) hours as needed for Wheezing. Rescue 18 g 11    fluticasone-salmeterol diskus inhaler 250-50 mcg INHALE 1 DOSE BY MOUTH TWICE DAILY (Patient not taking: Reported on 2/23/2023) 60 each 11     No current facility-administered medications for this visit.     Facility-Administered Medications Ordered in Other Visits   Medication Dose Route Frequency Provider Last Rate Last Admin    lactated ringers infusion   Intravenous Once PRN Nathen Lyons MD        lidocaine (PF) 10 mg/ml (1%) injection 10 mg  1 mL Intradermal Once Eladia Schwartz MD           Alpha-1 Antitrypsin:  Last PFT:  02/28/2023  Moderate obstruction with no change with bronchodilators, no restriction, moderate diffusion defect.  6 minute walk:  02/28/2023  Non hypoxemic  Last PET/CT:3/28/22  No evidence of recurrent or metastatic disease   Postsurgical changes in the left breast   Stable 3.5 cm round isoattenuating mass within the upper pole the right kidney suggestive of renal cyst.   Nonobstructing renal stones     Review of Systems  General: Feeling Well.  Eyes: Vision is good.  ENT: Acid reflux  Heart:: No chest pain or palpitations.  Lungs:  Her breathing is much improved.  GI: reflux.  Which makes her cough  : No dysuria, hesitancy, or nocturia.  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  Endo: No weight change.    OBJECTIVE:      BP  "130/80 (BP Location: Right arm, Patient Position: Sitting, BP Method: Medium (Manual))   Pulse 80   Ht 5' 8" (1.727 m)   Wt 83.5 kg (184 lb)   SpO2 97%   BMI 27.98 kg/m²     Physical Exam  GENERAL: Older patient in no distress.  HEENT: Pupils equal and reactive. Extraocular movements intact. Nose intact.      Pharynx moist.  NECK: Supple.   HEART: Regular rate and rhythm. No murmur or gallop auscultated.  LUNGS: Clear to auscultation and percussion. Lung excursion symmetrical. No change in fremitus. No adventitial noises.  ABDOMEN: Bowel sounds present. Non-tender, no masses palpated.  EXTREMITIES: Normal muscle tone and joint movement, no cyanosis or clubbing.   LYMPHATICS: No adenopathy palpated, no edema.  SKIN: Dry, intact, no lesions.   NEURO: Cranial nerves II-XII intact. Motor strength 5/5 bilaterally, upper and lower extremities.  PSYCH: Appropriate affect.    Assessment:       1. Mild intermittent asthma, unspecified whether complicated    2. Chronic obstructive pulmonary disease, unspecified COPD type            Plan:       Mild intermittent asthma, unspecified whether complicated  -     albuterol (PROAIR HFA) 90 mcg/actuation inhaler; Inhale 2 puffs into the lungs every 6 (six) hours as needed for Wheezing. Rescue  Dispense: 18 g; Refill: 11    Chronic obstructive pulmonary disease, unspecified COPD type           Follow up in about 6 months (around 9/28/2023).  Continue Advair, rinse mouth after  Refill albuterol, proper inhalation technique reviewed with patient           "

## 2023-03-30 ENCOUNTER — PATIENT MESSAGE (OUTPATIENT)
Dept: RHEUMATOLOGY | Facility: CLINIC | Age: 78
End: 2023-03-30
Payer: MEDICARE

## 2023-04-08 DIAGNOSIS — J45.20 ASTHMA, CHRONIC, MILD INTERMITTENT, UNCOMPLICATED: ICD-10-CM

## 2023-04-08 NOTE — TELEPHONE ENCOUNTER
Care Due:                  Date            Visit Type   Department     Provider  --------------------------------------------------------------------------------                                EP -                              PRIMARY      [facility]  Last Visit: 07-      CARE ([facility])   PRACTICE       Elías Luis                              EP -                              PRIMARY      [facility]  Next Visit: 07-      CARE ([facility])   PRACTICE       Elías Luis                                                            Last  Test          Frequency    Reason                     Performed    Due Date  --------------------------------------------------------------------------------    Lipid Panel.  12 months..  atorvastatin.............  07- 07-    [facility] Embedded Care Gaps. Reference number: [id number]. 4/08/2023   6:03:40 PM CDT

## 2023-04-09 NOTE — TELEPHONE ENCOUNTER
Refill Routing Note   Medication(s) are not appropriate for processing by [facility] for the following reason(s):      Patient reported no longer taking 2/23/23    [facility] action(s):  Defer Labs due     Medication Therapy Plan: Patient reported no longer taking 2/23/23      Appointments  past 12m or future 3m with PCP    Date Provider   Last Visit   7/11/2022 Elías Luis Jr., MD   Next Visit   7/10/2023 Elías Luis Jr., MD   ED visits in past 90 days: 0        Note composed:2:37 PM 04/09/2023

## 2023-04-10 RX ORDER — FLUTICASONE PROPIONATE AND SALMETEROL 250; 50 UG/1; UG/1
POWDER RESPIRATORY (INHALATION)
Qty: 60 EACH | Refills: 11 | Status: SHIPPED | OUTPATIENT
Start: 2023-04-10 | End: 2023-07-10 | Stop reason: SDUPTHER

## 2023-04-13 ENCOUNTER — LAB VISIT (OUTPATIENT)
Dept: LAB | Facility: HOSPITAL | Age: 78
End: 2023-04-13
Attending: STUDENT IN AN ORGANIZED HEALTH CARE EDUCATION/TRAINING PROGRAM
Payer: MEDICARE

## 2023-04-13 DIAGNOSIS — M05.79 RHEUMATOID ARTHRITIS INVOLVING MULTIPLE SITES WITH POSITIVE RHEUMATOID FACTOR: ICD-10-CM

## 2023-04-13 DIAGNOSIS — Z79.899 HIGH RISK MEDICATIONS (NOT ANTICOAGULANTS) LONG-TERM USE: ICD-10-CM

## 2023-04-13 LAB
ALBUMIN SERPL BCP-MCNC: 4.1 G/DL (ref 3.5–5.2)
ALP SERPL-CCNC: 64 U/L (ref 55–135)
ALT SERPL W/O P-5'-P-CCNC: 32 U/L (ref 10–44)
ANION GAP SERPL CALC-SCNC: 9 MMOL/L (ref 8–16)
AST SERPL-CCNC: 28 U/L (ref 10–40)
BASOPHILS # BLD AUTO: 0.03 K/UL (ref 0–0.2)
BASOPHILS NFR BLD: 0.4 % (ref 0–1.9)
BILIRUB SERPL-MCNC: 0.4 MG/DL (ref 0.1–1)
BUN SERPL-MCNC: 20 MG/DL (ref 8–23)
CALCIUM SERPL-MCNC: 10.1 MG/DL (ref 8.7–10.5)
CHLORIDE SERPL-SCNC: 103 MMOL/L (ref 95–110)
CO2 SERPL-SCNC: 28 MMOL/L (ref 23–29)
CREAT SERPL-MCNC: 1 MG/DL (ref 0.5–1.4)
DIFFERENTIAL METHOD: ABNORMAL
EOSINOPHIL # BLD AUTO: 0.2 K/UL (ref 0–0.5)
EOSINOPHIL NFR BLD: 2.2 % (ref 0–8)
ERYTHROCYTE [DISTWIDTH] IN BLOOD BY AUTOMATED COUNT: 14.1 % (ref 11.5–14.5)
EST. GFR  (NO RACE VARIABLE): 58 ML/MIN/1.73 M^2
GLUCOSE SERPL-MCNC: 99 MG/DL (ref 70–110)
HCT VFR BLD AUTO: 39.1 % (ref 37–48.5)
HGB BLD-MCNC: 12.2 G/DL (ref 12–16)
IMM GRANULOCYTES # BLD AUTO: 0.01 K/UL (ref 0–0.04)
IMM GRANULOCYTES NFR BLD AUTO: 0.1 % (ref 0–0.5)
LYMPHOCYTES # BLD AUTO: 1.7 K/UL (ref 1–4.8)
LYMPHOCYTES NFR BLD: 25.1 % (ref 18–48)
MCH RBC QN AUTO: 31.7 PG (ref 27–31)
MCHC RBC AUTO-ENTMCNC: 31.2 G/DL (ref 32–36)
MCV RBC AUTO: 102 FL (ref 82–98)
MONOCYTES # BLD AUTO: 0.5 K/UL (ref 0.3–1)
MONOCYTES NFR BLD: 6.7 % (ref 4–15)
NEUTROPHILS # BLD AUTO: 4.4 K/UL (ref 1.8–7.7)
NEUTROPHILS NFR BLD: 65.5 % (ref 38–73)
NRBC BLD-RTO: 0 /100 WBC
PLATELET # BLD AUTO: 213 K/UL (ref 150–450)
PMV BLD AUTO: 10.4 FL (ref 9.2–12.9)
POTASSIUM SERPL-SCNC: 4.3 MMOL/L (ref 3.5–5.1)
PROT SERPL-MCNC: 7.6 G/DL (ref 6–8.4)
RBC # BLD AUTO: 3.85 M/UL (ref 4–5.4)
SODIUM SERPL-SCNC: 140 MMOL/L (ref 136–145)
WBC # BLD AUTO: 6.72 K/UL (ref 3.9–12.7)

## 2023-04-13 PROCEDURE — 85025 COMPLETE CBC W/AUTO DIFF WBC: CPT | Performed by: STUDENT IN AN ORGANIZED HEALTH CARE EDUCATION/TRAINING PROGRAM

## 2023-04-13 PROCEDURE — 36415 COLL VENOUS BLD VENIPUNCTURE: CPT | Performed by: STUDENT IN AN ORGANIZED HEALTH CARE EDUCATION/TRAINING PROGRAM

## 2023-04-13 PROCEDURE — 80053 COMPREHEN METABOLIC PANEL: CPT | Performed by: STUDENT IN AN ORGANIZED HEALTH CARE EDUCATION/TRAINING PROGRAM

## 2023-04-24 ENCOUNTER — HOSPITAL ENCOUNTER (OUTPATIENT)
Dept: RADIOLOGY | Facility: HOSPITAL | Age: 78
Discharge: HOME | End: 2023-04-24
Attending: INTERNAL MEDICINE
Payer: MEDICARE

## 2023-04-24 VITALS — WEIGHT: 182 LBS | BODY MASS INDEX: 27.58 KG/M2 | HEIGHT: 68 IN

## 2023-04-24 DIAGNOSIS — Z17.0 MALIGNANT NEOPLASM OF NIPPLE OF LEFT BREAST IN FEMALE, ESTROGEN RECEPTOR POSITIVE: ICD-10-CM

## 2023-04-24 DIAGNOSIS — E78.00 PURE HYPERCHOLESTEROLEMIA: ICD-10-CM

## 2023-04-24 DIAGNOSIS — I10 ESSENTIAL HYPERTENSION, BENIGN: ICD-10-CM

## 2023-04-24 DIAGNOSIS — C50.012 MALIGNANT NEOPLASM OF NIPPLE OF LEFT BREAST IN FEMALE, ESTROGEN RECEPTOR POSITIVE: ICD-10-CM

## 2023-04-24 LAB — GLUCOSE SERPL-MCNC: 99 MG/DL (ref 70–110)

## 2023-04-24 PROCEDURE — 77062 MAMMO DIGITAL DIAGNOSTIC BILAT WITH TOMO: ICD-10-PCS | Mod: 26,,, | Performed by: RADIOLOGY

## 2023-04-24 PROCEDURE — 77066 DX MAMMO INCL CAD BI: CPT | Mod: TC

## 2023-04-24 PROCEDURE — 78815 PET IMAGE W/CT SKULL-THIGH: CPT | Mod: TC,PO

## 2023-04-24 PROCEDURE — 77062 BREAST TOMOSYNTHESIS BI: CPT | Mod: 26,,, | Performed by: RADIOLOGY

## 2023-04-24 PROCEDURE — 77066 MAMMO DIGITAL DIAGNOSTIC BILAT WITH TOMO: ICD-10-PCS | Mod: 26,,, | Performed by: RADIOLOGY

## 2023-04-24 PROCEDURE — 77066 DX MAMMO INCL CAD BI: CPT | Mod: 26,,, | Performed by: RADIOLOGY

## 2023-04-24 PROCEDURE — A9552 F18 FDG: HCPCS | Mod: PO

## 2023-04-25 ENCOUNTER — OFFICE VISIT (OUTPATIENT)
Dept: HEMATOLOGY/ONCOLOGY | Facility: CLINIC | Age: 78
End: 2023-04-25
Payer: MEDICARE

## 2023-04-25 VITALS
HEART RATE: 66 BPM | HEIGHT: 68 IN | OXYGEN SATURATION: 99 % | SYSTOLIC BLOOD PRESSURE: 123 MMHG | DIASTOLIC BLOOD PRESSURE: 64 MMHG | WEIGHT: 184.06 LBS | TEMPERATURE: 97 F | RESPIRATION RATE: 12 BRPM | BODY MASS INDEX: 27.9 KG/M2

## 2023-04-25 DIAGNOSIS — C50.012 MALIGNANT NEOPLASM OF NIPPLE OF LEFT BREAST IN FEMALE, ESTROGEN RECEPTOR POSITIVE: ICD-10-CM

## 2023-04-25 DIAGNOSIS — E78.00 PURE HYPERCHOLESTEROLEMIA: ICD-10-CM

## 2023-04-25 DIAGNOSIS — I10 ESSENTIAL HYPERTENSION, BENIGN: ICD-10-CM

## 2023-04-25 DIAGNOSIS — Z17.0 CARCINOMA OF CENTRAL PORTION OF LEFT BREAST IN FEMALE, ESTROGEN RECEPTOR POSITIVE: Primary | ICD-10-CM

## 2023-04-25 DIAGNOSIS — M06.9 RHEUMATOID ARTHRITIS INVOLVING MULTIPLE JOINTS: ICD-10-CM

## 2023-04-25 DIAGNOSIS — N18.31 STAGE 3A CHRONIC KIDNEY DISEASE: ICD-10-CM

## 2023-04-25 DIAGNOSIS — C50.112 CARCINOMA OF CENTRAL PORTION OF LEFT BREAST IN FEMALE, ESTROGEN RECEPTOR POSITIVE: Primary | ICD-10-CM

## 2023-04-25 DIAGNOSIS — N28.89 RENAL MASS: ICD-10-CM

## 2023-04-25 DIAGNOSIS — Z17.0 MALIGNANT NEOPLASM OF NIPPLE OF LEFT BREAST IN FEMALE, ESTROGEN RECEPTOR POSITIVE: ICD-10-CM

## 2023-04-25 PROCEDURE — 99999 PR PBB SHADOW E&M-EST. PATIENT-LVL IV: ICD-10-PCS | Mod: PBBFAC,,, | Performed by: INTERNAL MEDICINE

## 2023-04-25 PROCEDURE — 99214 OFFICE O/P EST MOD 30 MIN: CPT | Mod: PBBFAC,PO | Performed by: INTERNAL MEDICINE

## 2023-04-25 PROCEDURE — 99999 PR PBB SHADOW E&M-EST. PATIENT-LVL IV: CPT | Mod: PBBFAC,,, | Performed by: INTERNAL MEDICINE

## 2023-04-25 PROCEDURE — 99214 PR OFFICE/OUTPT VISIT, EST, LEVL IV, 30-39 MIN: ICD-10-PCS | Mod: S$PBB,,, | Performed by: INTERNAL MEDICINE

## 2023-04-25 PROCEDURE — 99214 OFFICE O/P EST MOD 30 MIN: CPT | Mod: S$PBB,,, | Performed by: INTERNAL MEDICINE

## 2023-04-25 NOTE — PROGRESS NOTES
Subjective:       Patient ID: Kirstie Bowden is a 78 y.o. female.    Chief Complaint   Left breast ca dx  T2 N0 status post lumpectomy and re-resection margin adjuvant TC chemo s/p 3  cycles but discontinued due to quality of life issues and started Arimidex   stated  having s/e  To arimidex took herself off presented to clinic started on Aromasin .  Tolerating Aromasin well rec score of 29    hydronephrosis+ ,  had stent placed  Here for follow-up  Oncologic History:  Dx 1/2019  INVASIVE CARCINOMA BREAST, CANCER CASE SUMMARY:  PROCEDURE: Partial mastectomy without skin or nipple.3/2019  SPECIMEN LATERALITY: Left.  TUMOR SIZE, GREATEST DIMENSION: 2.5 cm.  HISTOLOGIC TYPE: Invasive pleomorphic lobular carcinoma.  HISTOLOGIC GRADE (LUTHER HISTOLOGIC SCORE):  Glandular (acinar)/tubular differentiation: Score 3.  Nuclear pleomorphism: Score 2.  Mitotic rate: Score 1.  Overall grade: Grade 2  DUCTAL CARCINOMA IN SITU (DCIS): Not identified.  LOBULAR CARCINOMA IN SITU (LCIS): Present, pleomorphic lobular carcinoma in situ with rare focus of  classical lobular carcinoma situ.  Estimated size (extent) of pleomorphic LCIS: At least 2.8 cm.  TUMOR EXTENSION: Not applicable.  MARGINS:  INVASIVE CARCINOMA MARGINS: Uninvolved by invasive carcinoma.  Distance from inferior (closest) margin: 5 mm.  PLEOMORPHIC LOBULAR CARCINOMA IN SITU MARGINS: Uninvolved by pleomorphic LCIS.  Distance from inferior medial (closest) margin: 0.2 mm (see above comment).  REGIONAL LYMPH NODES: Uninvolved by tumor cells.  Number of lymph nodes examined: 2.  Number of sentinel nodes examined: 2.  TREATMENT EFFECT: No known presurgical therapy.  PATHOLOGIC STAGE CLASSIFICATION (pTNM, AJCC 8TH EDITION):  pT2: Tumor size = 2.5 cm in greatest dimension.  pN0: No regional lymph node metastasis identified.  pM: Not applicable.    ER: Positive.  NC: Positive.  HER2: Negative (IHC - Equivocal (score 2) and FISH - Negative).  Ki-67: Approximately  14%.  HPI:     Social History     Socioeconomic History    Marital status:    Occupational History     Employer: [employer]   Tobacco Use    Smoking status: Former     Packs/day: 0.50     Years: 47.00     Pack years: 23.50     Types: Cigarettes     Start date: 4/25/1960     Quit date: 8/11/2019     Years since quitting: 3.7    Smokeless tobacco: Never   Substance and Sexual Activity    Alcohol use: Yes     Alcohol/week: 2.0 standard drinks     Types: 2 Glasses of wine per week     Comment: Social    Drug use: No    Sexual activity: Never     Social Determinants of Health     Financial Resource Strain: Medium Risk    Difficulty of Paying Living Expenses: Somewhat hard   Food Insecurity: No Food Insecurity    Worried About Running Out of Food in the Last Year: Never true    Ran Out of Food in the Last Year: Never true   Transportation Needs: No Transportation Needs    Lack of Transportation (Medical): No    Lack of Transportation (Non-Medical): No   Physical Activity: Insufficiently Active    Days of Exercise per Week: 2 days    Minutes of Exercise per Session: 10 min   Stress: No Stress Concern Present    Feeling of Stress : Only a little   Social Connections: Unknown    Frequency of Communication with Friends and Family: More than three times a week    Frequency of Social Gatherings with Friends and Family: More than three times a week    Active Member of Clubs or Organizations: No    Attends Club or Organization Meetings: Never    Marital Status:    Housing Stability: High Risk    Unable to Pay for Housing in the Last Year: Yes    Number of Places Lived in the Last Year: 1    Unstable Housing in the Last Year: No     Family History   Problem Relation Age of Onset    Heart disease Mother     Hypertension Mother     Alzheimer's disease Mother     Cancer Father     Heart disease Sister     Diabetes Brother     Stroke Sister     Cancer Daughter      Past Surgical History:   Procedure Laterality Date     AXILLARY NODE DISSECTION Left 03/14/2019    Procedure: LYMPHADENECTOMY, AXILLARY;  Surgeon: Mp Gonzalez MD;  Location: [facility] OR;  Service: General;  Laterality: Left;  left lumpectomy with axillary lypmh node dissection    BALLOON DILATION OF URETER Left 06/24/2019    Procedure: DILATION, URETER, USING BALLOON;  Surgeon: Jorden Dickson MD;  Location: [facility] OR;  Service: Urology;  Laterality: Left;    BREAST BIOPSY Left 20 yrs ago    benign    BREAST LUMPECTOMY      x2    CHOLECYSTECTOMY      COLONOSCOPY  09/25/2018    LUC EASTON MD    CYSTOSCOPY W/ URETERAL STENT PLACEMENT Left 06/24/2019    Procedure: CYSTOSCOPY, WITH URETERAL STENT INSERTION;  Surgeon: Jorden Dickson MD;  Location: [facility] OR;  Service: Urology;  Laterality: Left;    CYSTOSCOPY W/ URETERAL STENT REMOVAL Left 07/23/2019    Procedure: CYSTOSCOPY, WITH URETERAL STENT REMOVAL;  Surgeon: Jorden Dickson MD;  Location: [facility] OR;  Service: Urology;  Laterality: Left;    EPIDURAL STEROID INJECTION INTO LUMBAR SPINE N/A 09/30/2021    Procedure: Injection-steroid-epidural-lumbar;  Surgeon: Nathen Lyons MD;  Location: [facility] OR;  Service: Pain Management;  Laterality: N/A;  L5-S1      EPIDURAL STEROID INJECTION INTO LUMBAR SPINE N/A 11/16/2021    Procedure: Injection-steroid-epidural-lumbar;  Surgeon: Nathen Lyons MD;  Location: [facility] OR;  Service: Pain Management;  Laterality: N/A;  L5-S1    EXTRACORPOREAL SHOCK WAVE LITHOTRIPSY Left 07/23/2019    Procedure: LITHOTRIPSY, ESWL;  Surgeon: Jorden Dickson MD;  Location: [facility] OR;  Service: Urology;  Laterality: Left;    EYE SURGERY Bilateral     cataracts    HYSTERECTOMY      MASTECTOMY, PARTIAL Left 04/25/2019    Procedure: MASTECTOMY, PARTIAL;  Surgeon: Mp Gonzalez MD;  Location: [facility] OR;  Service: General;  Laterality: Left;    NEEDLE LOCALIZATION Left 03/14/2019    Procedure: NEEDLE LOCALIZATION;  Surgeon: Mp Gonzalez MD;  Location: [facility] OR;  Service: General;  Laterality: Left;   left lumpectomy with wire needle localization     RETROGRADE PYELOGRAPHY Bilateral 06/24/2019    Procedure: PYELOGRAM, RETROGRADE;  Surgeon: Jorden Dickson MD;  Location: [facility] OR;  Service: Urology;  Laterality: Bilateral;    SENTINEL LYMPH NODE BIOPSY Left 03/14/2019    Procedure: BIOPSY, LYMPH NODE, SENTINEL;  Surgeon: Mp Gonzalez MD;  Location: [facility];  Service: General;  Laterality: Left;  left sentinel node lymph node biopsy    TONSILLECTOMY      TRANSFORAMINAL EPIDURAL INJECTION OF STEROID Right 01/04/2022    Procedure: Injection,steroid,epidural,transforaminal approach;  Surgeon: Nathen Lyons MD;  Location: [facility] OR;  Service: Pain Management;  Laterality: Right;  L4-L5, L5-s1    URETEROSCOPY Left 06/24/2019    Procedure: URETEROSCOPY;  Surgeon: Jorden Dickson MD;  Location: [facility] OR;  Service: Urology;  Laterality: Left;     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    Hyperlipidemia     Hypertension     Limb alert care status     NO BP/IV LEFT ARM    Pseudocholinesterase deficiency     family history    Thyroid disease        Current Outpatient Medications:     albuterol (PROAIR HFA) 90 mcg/actuation inhaler, Inhale 2 puffs into the lungs every 6 (six) hours as needed for Wheezing. Rescue, Disp: 18 g, Rfl: 11    amLODIPine (NORVASC) 5 MG tablet, Take 1 tablet by mouth once daily, Disp: 90 tablet, Rfl: 3    atorvastatin (LIPITOR) 20 MG tablet, Take 1 tablet by mouth once daily, Disp: 90 tablet, Rfl: 3    CALCIUM CARBONATE/VITAMIN D3 (VITAMIN D-3 ORAL), Take by mouth once daily. , Disp: , Rfl:     EUTHYROX 125 mcg tablet, TAKE 1 TABLET BY MOUTH BEFORE BREAKFAST, Disp: 90 tablet, Rfl: 3    exemestane (AROMASIN) 25 mg tablet, Take 1 tablet by mouth once daily, Disp: 90 tablet, Rfl: 5    fluticasone-salmeterol diskus inhaler 250-50 mcg, INHALE 1 DOSE BY MOUTH TWICE DAILY, Disp: 60 each, Rfl: 11    folic acid (FOLVITE) 1 MG tablet, Take  1 tablet (1,000 mcg total) by mouth once daily., Disp: 90 tablet, Rfl: 1    gabapentin (NEURONTIN) 300 MG capsule, TAKE 1 CAPSULE BY MOUTH IN THE EVENING, Disp: 30 capsule, Rfl: 2    hydroCHLOROthiazide (HYDRODIURIL) 25 MG tablet, Take 1 tablet by mouth once daily, Disp: 90 tablet, Rfl: 3    hydrOXYchloroQUINE (PLAQUENIL) 200 mg tablet, Take 1 tablet (200 mg total) by mouth 2 (two) times daily., Disp: 180 tablet, Rfl: 1    methotrexate 2.5 MG Tab, Take 7 tablets (17.5 mg total) by mouth every 7 days., Disp: 84 tablet, Rfl: 1    ondansetron (ZOFRAN-ODT) 8 MG TbDL, DISSOLVE 1 TABLET IN MOUTH EVERY 12 HOURS AS NEEDED, Disp: 30 tablet, Rfl: 0    pantoprazole (PROTONIX) 40 MG tablet, TAKE 1 TABLET BY MOUTH ONCE DAILY IN THE MORNING FOR 90 DAYS, Disp: , Rfl:     prochlorperazine (COMPAZINE) 10 MG tablet, TAKE 1 TABLET BY MOUTH EVERY 6 HOURS AS NEEDED, Disp: 60 tablet, Rfl: 0    thiamine 100 MG tablet, Take 100 mg by mouth once daily., Disp: , Rfl:     traMADoL (ULTRAM) 50 mg tablet, Take 1 tablet (50 mg total) by mouth every 12 (twelve) hours as needed for Pain (severe pain). TAKE 1 TABLET BY MOUTH EVERY 8 HOURS AS NEEDED FOR SEVERE PAIN- DOSE DECREASE, Disp: 60 tablet, Rfl: 1    VITAMIN B COMPLEX ORAL, Every day, Disp: , Rfl:     albuterol (VENTOLIN HFA) 90 mcg/actuation inhaler, Inhale 1-2 puffs into the lungs every 6 (six) hours as needed for Wheezing or Shortness of Breath. Rescue, Disp: 18 g, Rfl: 0  No current facility-administered medications for this visit.    Facility-Administered Medications Ordered in Other Visits:     lactated ringers infusion, , Intravenous, Once PRN, Nathen Lyons MD    lidocaine (PF) 10 mg/ml (1%) injection 10 mg, 1 mL, Intradermal, Once, Eladia Schwartz MD  Review of patient's allergies indicates:   Allergen Reactions    Succinylcholine chloride Other (See Comments)    Sulfur dioxide Hives    Sulfamethoxazole-trimethoprim      Other reaction(s): per dr daryn Rodríguez (sulfonamide  antibiotics)      NOT SURE REACTION         REVIEW OF SYSTEMS:     CONSTITUTIONAL:  Patient voices complains with significant fatigue.  Shortness of breath on ambulation.  She is not coughing up productive sputum she reports no fever chills rigors  Has some sinus congestion  And having night sweats after starting Arimidex    SKIN: Denies rash, issues with nails, non-healing sores, bleeding, blotching    skin or abnormal bruising. Denies new moles or changes to existing moles.      BREASTS: healing well since lumpectomy    EYES: Denies eye pain, blurred vision, swelling, redness or discharge.      ENT AND MOUTH:  Has runny nose, and stuffiness, and sinus trouble no sores. Denies    nosebleeds. Denies, hoarseness, change in voice or swelling in front of the    neck.      CARDIOVASCULAR: Denies chest pain, discomfort or palpitations. Denies neck    swelling or episodes of passing out.           GI: Denies trouble swallowing, indigestion, heartburn, abdominal pain, +nausea with arimidex betty in am then  Dry heaves during day, stopped arimidex and feeks much better     no vomiting, diarrhea, altered bowel habits, blood in stool, discoloration of    stools, change in nature of stool, bloating, increased abdominal girth.      GENITOURINARY: No discharge. No pelvic pain or lumps. No rash around groin or  lesions. No urinary frequency, hesitation, painful urination or blood in    urine. Denies incontinence. No problems with intercourse.      MUSCULOSKELETAL: Denies neck or back pain. Denies weakness in arms or legs,    joint problems or distended inflamed veins in legs. Denies swelling or abnormal  glands.      NEUROLOGICAL: Denies tingling, numbness, altered mentation changes to nerve    function in the face, weakness to one or both of the body. Denies changes to    gait and denies multiple falls or accidents.        PHYSICAL EXAM:     Wt Readings from Last 3 Encounters:   04/25/23 83.5 kg (184 lb 1.4 oz)   04/24/23 82.6 kg  (182 lb)   03/28/23 83.5 kg (184 lb)     Temp Readings from Last 3 Encounters:   04/25/23 97.1 °F (36.2 °C) (Temporal)   02/23/23 98.8 °F (37.1 °C)   12/06/22 98.6 °F (37 °C)     BP Readings from Last 3 Encounters:   04/25/23 123/64   03/28/23 130/80   02/23/23 138/64     Pulse Readings from Last 3 Encounters:   04/25/23 66   03/28/23 80   02/23/23 68     GENERAL: Comfortable looking patient. Patient is in no distress.  Awake, alert and oriented to time, person and place.  No anxiety, or agitation.      HEENT: Normal conjunctivae and eyelids. WNL.  PERRLA 3 to 4 mm. No icterus, no pallor, no congestion, and no discharge noted.     NECK:  Supple. Trachea is central.  No crepitus.  No JVD or masses.    RESPIRATORY:  No intercostal retractions.  No dullness to percussion.  Chest is clear to auscultation.  No rales, rhonchi or wheezes.  No crepitus.  Good air entry bilaterally.    CARDIOVASCULAR:  S1 and S2 are normally heard without murmurs or gallops.  All peripheral pulses are present.    ABDOMEN:  Normal abdomen.  No hepatosplenomegaly.  No free fluid.  Bowel sounds are present.  No hernia noted. No masses.  No rebound or tenderness.  No guarding or rigidity.  Umbilicus is midline.    LYMPHATICS:  No axillary, cervical, supraclavicular, submental, or inguinal lymphadenopathy.    SKIN/MUSCULOSKELETAL:  There is no evidence of excoriation marks or ecchmosis.  No rashes.  No cyanosis.  No clubbing.  No joint or skeletal deformities noted.  Normal range of motion.    NEUROLOGIC:  Higher functions are appropriate.  No cranial nerve deficits.  Normal asher.  Normal strength.  Motor and sensory functions are normal.  Deep tendon reflexes are normal.    GENITAL/RECTAL:  Exams are deferred.      Laboratory:     CBC:  Lab Results   Component Value Date    WBC 7.52 04/24/2023    RBC 4.08 04/24/2023    HGB 13.0 04/24/2023    HCT 40.5 04/24/2023    MCV 99 (H) 04/24/2023    MCH 31.9 (H) 04/24/2023    MCH 32.1 04/24/2023    RDW  14.5 04/24/2023     04/24/2023    MPV 10.5 04/24/2023    GRAN 4.2 04/24/2023    GRAN 55.1 04/24/2023    LYMPH 2.5 04/24/2023    LYMPH 32.6 04/24/2023    MONO 0.7 04/24/2023    MONO 9.2 04/24/2023    EOS 0.2 04/24/2023    BASO 0.03 04/24/2023    EOSINOPHIL 2.4 04/24/2023    BASOPHIL 0.4 04/24/2023       BMP: BMP  Lab Results   Component Value Date     04/24/2023    K 4.2 04/24/2023     04/24/2023    CO2 27 04/24/2023    BUN 19 04/24/2023    CREATININE 0.9 04/24/2023    CALCIUM 10.6 (H) 04/24/2023    ANIONGAP 9 04/24/2023    ESTGFRAFRICA >60 07/20/2022    EGFRNONAA 55 (A) 07/20/2022       LFT:   Lab Results   Component Value Date    ALT 32 04/24/2023    AST 28 04/24/2023    ALKPHOS 61 04/24/2023    BILITOT 0.4 04/24/2023    Bone density September 2 1019 negative for osteopenia  oncotype rec. score is 29  Impressionpet 1/2020       1.  Likely postoperative change in the superolateral left breast, decreased in size from the previous exam.    2.  Scattered multifocal ground-glass attenuation opacities in both lungs suggesting a combination of atypical infection/pneumonia and/or post treatment/post radiation change.  Consider follow-up CT of the chest in 6-8 weeks to document resolution.    3.  Indeterminate isoattenuating non-FDG avid structure in the anterior interpolar aspect of the right kidney, possibly representing a hemorrhagic/proteinaceous cyst or low grade malignancy, consider further characterization with contrast enhanced renal protocol CT/MRI.    4.  Numerous bilateral nonobstructing renal stones.  No hydronephrosis or hydroureter on today's exam.    5.  Interval resolution of the maxillary sinus disease.     Pet 7/2020  IMPRESSION:  1. Postoperative changes of prior left breast lumpectomy.  2. Near complete resolution of the previously described groundglass  attenuation opacities in the lungs, now with only a small linear  bandlike opacity in the anterior left upper lobe (most likely  related  to prior radiation/treatment change.  3. Focal increased FDG activity along the right third and fourth rib  posteriorly at the posterior thoracic junction suggesting degenerative  change with no soft tissue, lytic or sclerotic lesion identified.  4. Additional and incidental findings as noted above unchanged from  the previous exam.            has renal stones and has hydronephrosis, stented      7/21 neg pet      7/22  IMPRESSION: No evidence of recurrent or metastatic disease     Postsurgical changes in the left breast     Stable 3.5 cm round isoattenuating mass within the upper pole the right kidney suggestive of renal cyst.     Nonobstructing renal stones        IMPRESSION:pet 4/23     No evidence of FDG avid metastatic breast cancer.     4.7 cm indeterminate right renal lesion. Although this lesion has been present on prior exams and is not hypermetabolic, it has not been definitively characterized; further evaluation with retroperitoneal sonography or contrast-enhanced renal protocol CT/MRI is recommended.     Postoperative changes of the left breast. Please correlate with dedicated diagnostic mammography.     Electronically signed by:  Erlin Li MD  4/24/2023 2:17 PM CDT Workstation: [id number]  Assessment/Plan:     Breast ca:T2N0Mx left breast ca s/p lumpectomy 3/2019 recurrent score 29  pT2: Tumor size = 2.5 cm in greatest dimension.  pN0: No regional lymph node metastasis identified.  pM: Not applicable.    ER: Positive.  IA: Positive.  HER2: Negative ([facility] - E  Patient completed 3 cycles but aborted therapy.  In favor a quality of life due to side effects   Compeleted xrt  she stoped anastrozole  Due to arthralgia and nausea.Tolerating aromasin well to continue for 10 years till May 2029  Normal bone density 9 9/21     RA:  now on mtx and hydroxychroloquine sees dr Romero    Hemorrhagic cyst :Needs a f/u as Dr Dickson has retiredUrology  was reportd on prev scans , had stents removed and  lithotripsy for stones. Will get MRI    Hypercalcemia: eating a lot of icecream, she is being rechecked soon by pcp     macrocytosis: related to thyroid issues will watch she is not anemic and embark on w/u if worse or cytopenia     Lung findindgs :saw Pulmonary  Naila aldridge 6/2020) consult for 2.  Finding as above on PET scan thesesd have nearly resolved now on 7/2020 pet and nothing new noted on January and july 2022 PET pulm f/u prn n ext pet 7/23    She has scattered atheromatous calcifications in the aorta and coronary arteries she will discuss this and follow up with her PCP for referral to cardiology   repeat pet 7/23 and see me  cbc, cmp, [id number] in  6months    mammo done in 3/2022 done short term recommended  10/22 short term again in 4/23  need MRI of abd to define renal lesion and see me with  results    Has quit smoking       HTH. Lipids, hypothyroidism: cont DR mark      Advance Care Planning     Date: 04/25/2023    Power of   I initiated the process of advance care planning today and explained the importance of this process to the patient.  I introduced the concept of advance directives to the patient, as well. Then the patient received detailed information about the importance of designating a Health Care Power of  (HCPOA). She was also instructed to communicate with this person about their wishes for future healthcare, should she become sick and lose decision-making capacity. Pt has medical POA at home and will bring it for filing

## 2023-05-02 ENCOUNTER — HOSPITAL ENCOUNTER (OUTPATIENT)
Dept: RADIOLOGY | Facility: HOSPITAL | Age: 78
Discharge: HOME | End: 2023-05-02
Attending: INTERNAL MEDICINE
Payer: MEDICARE

## 2023-05-02 DIAGNOSIS — N18.31 STAGE 3A CHRONIC KIDNEY DISEASE: ICD-10-CM

## 2023-05-02 DIAGNOSIS — E78.00 PURE HYPERCHOLESTEROLEMIA: ICD-10-CM

## 2023-05-02 DIAGNOSIS — C50.112 CARCINOMA OF CENTRAL PORTION OF LEFT BREAST IN FEMALE, ESTROGEN RECEPTOR POSITIVE: ICD-10-CM

## 2023-05-02 DIAGNOSIS — Z17.0 MALIGNANT NEOPLASM OF NIPPLE OF LEFT BREAST IN FEMALE, ESTROGEN RECEPTOR POSITIVE: ICD-10-CM

## 2023-05-02 DIAGNOSIS — N28.89 RENAL MASS: ICD-10-CM

## 2023-05-02 DIAGNOSIS — C50.012 MALIGNANT NEOPLASM OF NIPPLE OF LEFT BREAST IN FEMALE, ESTROGEN RECEPTOR POSITIVE: ICD-10-CM

## 2023-05-02 DIAGNOSIS — Z17.0 CARCINOMA OF CENTRAL PORTION OF LEFT BREAST IN FEMALE, ESTROGEN RECEPTOR POSITIVE: ICD-10-CM

## 2023-05-02 DIAGNOSIS — M06.9 RHEUMATOID ARTHRITIS INVOLVING MULTIPLE JOINTS: ICD-10-CM

## 2023-05-02 DIAGNOSIS — I10 ESSENTIAL HYPERTENSION, BENIGN: ICD-10-CM

## 2023-05-02 PROCEDURE — 74183 MRI ABDOMEN W WO CONTRAST: ICD-10-PCS | Mod: 26,,, | Performed by: RADIOLOGY

## 2023-05-02 PROCEDURE — 74183 MRI ABD W/O CNTR FLWD CNTR: CPT | Mod: 26,,, | Performed by: RADIOLOGY

## 2023-05-02 PROCEDURE — A9585 GADOBUTROL INJECTION: HCPCS | Performed by: INTERNAL MEDICINE

## 2023-05-02 PROCEDURE — 74183 MRI ABD W/O CNTR FLWD CNTR: CPT | Mod: TC

## 2023-05-02 PROCEDURE — 25500020 PHARM REV CODE 255: Performed by: INTERNAL MEDICINE

## 2023-05-02 RX ORDER — GADOBUTROL 604.72 MG/ML
8 INJECTION INTRAVENOUS
Status: COMPLETED | OUTPATIENT
Start: 2023-05-02 | End: 2023-05-02

## 2023-05-02 RX ADMIN — GADOBUTROL 8 ML: 604.72 INJECTION INTRAVENOUS at 07:05

## 2023-05-03 ENCOUNTER — OFFICE VISIT (OUTPATIENT)
Dept: HEMATOLOGY/ONCOLOGY | Facility: CLINIC | Age: 78
End: 2023-05-03
Payer: MEDICARE

## 2023-05-03 VITALS
RESPIRATION RATE: 10 BRPM | SYSTOLIC BLOOD PRESSURE: 143 MMHG | HEART RATE: 71 BPM | DIASTOLIC BLOOD PRESSURE: 64 MMHG | BODY MASS INDEX: 27.8 KG/M2 | HEIGHT: 68 IN | TEMPERATURE: 98 F | OXYGEN SATURATION: 97 % | WEIGHT: 183.44 LBS

## 2023-05-03 DIAGNOSIS — E03.9 ACQUIRED HYPOTHYROIDISM: ICD-10-CM

## 2023-05-03 DIAGNOSIS — Z17.0 CARCINOMA OF CENTRAL PORTION OF LEFT BREAST IN FEMALE, ESTROGEN RECEPTOR POSITIVE: Primary | ICD-10-CM

## 2023-05-03 DIAGNOSIS — D75.89 MACROCYTOSIS WITHOUT ANEMIA: ICD-10-CM

## 2023-05-03 DIAGNOSIS — I10 ESSENTIAL HYPERTENSION, BENIGN: ICD-10-CM

## 2023-05-03 DIAGNOSIS — C50.112 CARCINOMA OF CENTRAL PORTION OF LEFT BREAST IN FEMALE, ESTROGEN RECEPTOR POSITIVE: Primary | ICD-10-CM

## 2023-05-03 DIAGNOSIS — Z17.0 MALIGNANT NEOPLASM OF NIPPLE OF LEFT BREAST IN FEMALE, ESTROGEN RECEPTOR POSITIVE: ICD-10-CM

## 2023-05-03 DIAGNOSIS — C50.012 MALIGNANT NEOPLASM OF NIPPLE OF LEFT BREAST IN FEMALE, ESTROGEN RECEPTOR POSITIVE: ICD-10-CM

## 2023-05-03 PROCEDURE — 99214 OFFICE O/P EST MOD 30 MIN: CPT | Mod: S$PBB,,, | Performed by: INTERNAL MEDICINE

## 2023-05-03 PROCEDURE — 99999 PR PBB SHADOW E&M-EST. PATIENT-LVL IV: ICD-10-PCS | Mod: PBBFAC,,, | Performed by: INTERNAL MEDICINE

## 2023-05-03 PROCEDURE — 99214 OFFICE O/P EST MOD 30 MIN: CPT | Mod: PBBFAC,PO | Performed by: INTERNAL MEDICINE

## 2023-05-03 PROCEDURE — 99999 PR PBB SHADOW E&M-EST. PATIENT-LVL IV: CPT | Mod: PBBFAC,,, | Performed by: INTERNAL MEDICINE

## 2023-05-03 PROCEDURE — 99214 PR OFFICE/OUTPT VISIT, EST, LEVL IV, 30-39 MIN: ICD-10-PCS | Mod: S$PBB,,, | Performed by: INTERNAL MEDICINE

## 2023-05-03 NOTE — PROGRESS NOTES
Subjective:       Patient ID: Kirstie Bowden is a 78 y.o. female.    Chief Complaint   Left breast ca dx  T2 N0 status post lumpectomy and re-resection margin adjuvant TC chemo s/p 3  cycles but discontinued due to quality of life issues and started Arimidex   stated  having s/e  To arimidex took herself off presented to clinic started on Aromasin .  Tolerating Aromasin well rec score of 29    hydronephrosis+ ,  had stent placed  Here for follow-up  Oncologic History:  Dx 1/2019  INVASIVE CARCINOMA BREAST, CANCER CASE SUMMARY:  PROCEDURE: Partial mastectomy without skin or nipple.3/2019  SPECIMEN LATERALITY: Left.  TUMOR SIZE, GREATEST DIMENSION: 2.5 cm.  HISTOLOGIC TYPE: Invasive pleomorphic lobular carcinoma.  HISTOLOGIC GRADE (LUTHER HISTOLOGIC SCORE):  Glandular (acinar)/tubular differentiation: Score 3.  Nuclear pleomorphism: Score 2.  Mitotic rate: Score 1.  Overall grade: Grade 2  DUCTAL CARCINOMA IN SITU (DCIS): Not identified.  LOBULAR CARCINOMA IN SITU (LCIS): Present, pleomorphic lobular carcinoma in situ with rare focus of  classical lobular carcinoma situ.  Estimated size (extent) of pleomorphic LCIS: At least 2.8 cm.  TUMOR EXTENSION: Not applicable.  MARGINS:  INVASIVE CARCINOMA MARGINS: Uninvolved by invasive carcinoma.  Distance from inferior (closest) margin: 5 mm.  PLEOMORPHIC LOBULAR CARCINOMA IN SITU MARGINS: Uninvolved by pleomorphic LCIS.  Distance from inferior medial (closest) margin: 0.2 mm (see above comment).  REGIONAL LYMPH NODES: Uninvolved by tumor cells.  Number of lymph nodes examined: 2.  Number of sentinel nodes examined: 2.  TREATMENT EFFECT: No known presurgical therapy.  PATHOLOGIC STAGE CLASSIFICATION (pTNM, AJCC 8TH EDITION):  pT2: Tumor size = 2.5 cm in greatest dimension.  pN0: No regional lymph node metastasis identified.  pM: Not applicable.    ER: Positive.  MD: Positive.  HER2: Negative (IHC - Equivocal (score 2) and FISH - Negative).  Ki-67: Approximately  14%.  HPI:     Social History     Socioeconomic History    Marital status:    Occupational History     Employer: [employer]   Tobacco Use    Smoking status: Former     Packs/day: 0.50     Years: 47.00     Pack years: 23.50     Types: Cigarettes     Start date: 4/25/1960     Quit date: 8/11/2019     Years since quitting: 3.7    Smokeless tobacco: Never   Substance and Sexual Activity    Alcohol use: Yes     Alcohol/week: 2.0 standard drinks     Types: 2 Glasses of wine per week     Comment: Social    Drug use: No    Sexual activity: Never     Social Determinants of Health     Financial Resource Strain: Low Risk     Difficulty of Paying Living Expenses: Not very hard   Food Insecurity: No Food Insecurity    Worried About Running Out of Food in the Last Year: Never true    Ran Out of Food in the Last Year: Never true   Transportation Needs: No Transportation Needs    Lack of Transportation (Medical): No    Lack of Transportation (Non-Medical): No   Physical Activity: Insufficiently Active    Days of Exercise per Week: 1 day    Minutes of Exercise per Session: 10 min   Stress: No Stress Concern Present    Feeling of Stress : Only a little   Social Connections: Unknown    Frequency of Communication with Friends and Family: More than three times a week    Frequency of Social Gatherings with Friends and Family: Three times a week    Active Member of Clubs or Organizations: No    Attends Club or Organization Meetings: Never    Marital Status:    Housing Stability: Low Risk     Unable to Pay for Housing in the Last Year: No    Number of Places Lived in the Last Year: 1    Unstable Housing in the Last Year: No     Family History   Problem Relation Age of Onset    Heart disease Mother     Hypertension Mother     Alzheimer's disease Mother     Cancer Father     Heart disease Sister     Diabetes Brother     Stroke Sister     Cancer Daughter      Past Surgical History:   Procedure Laterality Date    AXILLARY NODE  DISSECTION Left 03/14/2019    Procedure: LYMPHADENECTOMY, AXILLARY;  Surgeon: Mp Gonzalez MD;  Location: [facility] OR;  Service: General;  Laterality: Left;  left lumpectomy with axillary lypmh node dissection    BALLOON DILATION OF URETER Left 06/24/2019    Procedure: DILATION, URETER, USING BALLOON;  Surgeon: Jorden Dickson MD;  Location: [facility] OR;  Service: Urology;  Laterality: Left;    BREAST BIOPSY Left 20 yrs ago    benign    BREAST LUMPECTOMY      x2    CHOLECYSTECTOMY      COLONOSCOPY  09/25/2018    LUC EASTON MD    CYSTOSCOPY W/ URETERAL STENT PLACEMENT Left 06/24/2019    Procedure: CYSTOSCOPY, WITH URETERAL STENT INSERTION;  Surgeon: Jorden Dickson MD;  Location: [facility] OR;  Service: Urology;  Laterality: Left;    CYSTOSCOPY W/ URETERAL STENT REMOVAL Left 07/23/2019    Procedure: CYSTOSCOPY, WITH URETERAL STENT REMOVAL;  Surgeon: Jorden Dickson MD;  Location: [facility] OR;  Service: Urology;  Laterality: Left;    EPIDURAL STEROID INJECTION INTO LUMBAR SPINE N/A 09/30/2021    Procedure: Injection-steroid-epidural-lumbar;  Surgeon: Nathen Lyons MD;  Location: [facility] OR;  Service: Pain Management;  Laterality: N/A;  L5-S1      EPIDURAL STEROID INJECTION INTO LUMBAR SPINE N/A 11/16/2021    Procedure: Injection-steroid-epidural-lumbar;  Surgeon: Nathen Lyons MD;  Location: [facility] OR;  Service: Pain Management;  Laterality: N/A;  L5-S1    EXTRACORPOREAL SHOCK WAVE LITHOTRIPSY Left 07/23/2019    Procedure: LITHOTRIPSY, ESWL;  Surgeon: Jorden Dickson MD;  Location: [facility] OR;  Service: Urology;  Laterality: Left;    EYE SURGERY Bilateral     cataracts    HYSTERECTOMY      MASTECTOMY, PARTIAL Left 04/25/2019    Procedure: MASTECTOMY, PARTIAL;  Surgeon: Mp Gonzalez MD;  Location: [facility] OR;  Service: General;  Laterality: Left;    NEEDLE LOCALIZATION Left 03/14/2019    Procedure: NEEDLE LOCALIZATION;  Surgeon: Mp Gonzalez MD;  Location: [facility] OR;  Service: General;  Laterality: Left;  left lumpectomy  with wire needle localization     RETROGRADE PYELOGRAPHY Bilateral 06/24/2019    Procedure: PYELOGRAM, RETROGRADE;  Surgeon: Jorden Dickson MD;  Location: [facility] OR;  Service: Urology;  Laterality: Bilateral;    SENTINEL LYMPH NODE BIOPSY Left 03/14/2019    Procedure: BIOPSY, LYMPH NODE, SENTINEL;  Surgeon: Mp Gonzalez MD;  Location: [facility] OR;  Service: General;  Laterality: Left;  left sentinel node lymph node biopsy    TONSILLECTOMY      TRANSFORAMINAL EPIDURAL INJECTION OF STEROID Right 01/04/2022    Procedure: Injection,steroid,epidural,transforaminal approach;  Surgeon: Nathen Lyons MD;  Location: [facility] OR;  Service: Pain Management;  Laterality: Right;  L4-L5, L5-s1    URETEROSCOPY Left 06/24/2019    Procedure: URETEROSCOPY;  Surgeon: Jorden Dickson MD;  Location: [facility] OR;  Service: Urology;  Laterality: Left;     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    Hyperlipidemia     Hypertension     Limb alert care status     NO BP/IV LEFT ARM    Pseudocholinesterase deficiency     family history    Thyroid disease        Current Outpatient Medications:     albuterol (PROAIR HFA) 90 mcg/actuation inhaler, Inhale 2 puffs into the lungs every 6 (six) hours as needed for Wheezing. Rescue, Disp: 18 g, Rfl: 11    amLODIPine (NORVASC) 5 MG tablet, Take 1 tablet by mouth once daily, Disp: 90 tablet, Rfl: 3    atorvastatin (LIPITOR) 20 MG tablet, Take 1 tablet by mouth once daily, Disp: 90 tablet, Rfl: 3    CALCIUM CARBONATE/VITAMIN D3 (VITAMIN D-3 ORAL), Take by mouth once daily. , Disp: , Rfl:     EUTHYROX 125 mcg tablet, TAKE 1 TABLET BY MOUTH BEFORE BREAKFAST, Disp: 90 tablet, Rfl: 3    exemestane (AROMASIN) 25 mg tablet, Take 1 tablet by mouth once daily, Disp: 90 tablet, Rfl: 5    fluticasone-salmeterol diskus inhaler 250-50 mcg, INHALE 1 DOSE BY MOUTH TWICE DAILY, Disp: 60 each, Rfl: 11    folic acid (FOLVITE) 1 MG tablet, Take 1 tablet (1,000  mcg total) by mouth once daily., Disp: 90 tablet, Rfl: 1    gabapentin (NEURONTIN) 300 MG capsule, TAKE 1 CAPSULE BY MOUTH IN THE EVENING, Disp: 30 capsule, Rfl: 2    hydroCHLOROthiazide (HYDRODIURIL) 25 MG tablet, Take 1 tablet by mouth once daily, Disp: 90 tablet, Rfl: 3    hydrOXYchloroQUINE (PLAQUENIL) 200 mg tablet, Take 1 tablet (200 mg total) by mouth 2 (two) times daily., Disp: 180 tablet, Rfl: 1    methotrexate 2.5 MG Tab, Take 7 tablets (17.5 mg total) by mouth every 7 days. (Patient taking differently: Take 17.5 mg by mouth every 7 days. 9 tablets), Disp: 84 tablet, Rfl: 1    ondansetron (ZOFRAN-ODT) 8 MG TbDL, DISSOLVE 1 TABLET IN MOUTH EVERY 12 HOURS AS NEEDED, Disp: 30 tablet, Rfl: 0    pantoprazole (PROTONIX) 40 MG tablet, TAKE 1 TABLET BY MOUTH ONCE DAILY IN THE MORNING FOR 90 DAYS, Disp: , Rfl:     prochlorperazine (COMPAZINE) 10 MG tablet, TAKE 1 TABLET BY MOUTH EVERY 6 HOURS AS NEEDED, Disp: 60 tablet, Rfl: 0    thiamine 100 MG tablet, Take 100 mg by mouth once daily., Disp: , Rfl:     traMADoL (ULTRAM) 50 mg tablet, Take 1 tablet (50 mg total) by mouth every 12 (twelve) hours as needed for Pain (severe pain). TAKE 1 TABLET BY MOUTH EVERY 8 HOURS AS NEEDED FOR SEVERE PAIN- DOSE DECREASE, Disp: 60 tablet, Rfl: 1    VITAMIN B COMPLEX ORAL, Every day, Disp: , Rfl:     albuterol (VENTOLIN HFA) 90 mcg/actuation inhaler, Inhale 1-2 puffs into the lungs every 6 (six) hours as needed for Wheezing or Shortness of Breath. Rescue, Disp: 18 g, Rfl: 0  No current facility-administered medications for this visit.    Facility-Administered Medications Ordered in Other Visits:     lactated ringers infusion, , Intravenous, Once PRN, Nathen Lyons MD    lidocaine (PF) 10 mg/ml (1%) injection 10 mg, 1 mL, Intradermal, Once, Eladia Schwartz MD  Review of patient's allergies indicates:   Allergen Reactions    Succinylcholine chloride Other (See Comments)    Sulfur dioxide Hives    Sulfamethoxazole-trimethoprim       Other reaction(s): per dr daryn Rodríguez (sulfonamide antibiotics)      NOT SURE REACTION         REVIEW OF SYSTEMS:     CONSTITUTIONAL:  Patient voices complains with significant fatigue.  Shortness of breath on ambulation.  She is not coughing up productive sputum she reports no fever chills rigors  Has some sinus congestion  And having night sweats after starting Arimidex    SKIN: Denies rash, issues with nails, non-healing sores, bleeding, blotching    skin or abnormal bruising. Denies new moles or changes to existing moles.      BREASTS: healing well since lumpectomy    EYES: Denies eye pain, blurred vision, swelling, redness or discharge.      ENT AND MOUTH:  Has runny nose, and stuffiness, and sinus trouble no sores. Denies    nosebleeds. Denies, hoarseness, change in voice or swelling in front of the    neck.      CARDIOVASCULAR: Denies chest pain, discomfort or palpitations. Denies neck    swelling or episodes of passing out.           GI: Denies trouble swallowing, indigestion, heartburn, abdominal pain, +nausea with arimidex betty in am then  Dry heaves during day, stopped arimidex and feeks much better     no vomiting, diarrhea, altered bowel habits, blood in stool, discoloration of    stools, change in nature of stool, bloating, increased abdominal girth.      GENITOURINARY: No discharge. No pelvic pain or lumps. No rash around groin or  lesions. No urinary frequency, hesitation, painful urination or blood in    urine. Denies incontinence. No problems with intercourse.      MUSCULOSKELETAL: Denies neck or back pain. Denies weakness in arms or legs,    joint problems or distended inflamed veins in legs. Denies swelling or abnormal  glands.      NEUROLOGICAL: Denies tingling, numbness, altered mentation changes to nerve    function in the face, weakness to one or both of the body. Denies changes to    gait and denies multiple falls or accidents.        PHYSICAL EXAM:     Wt Readings from Last 3  Encounters:   05/03/23 83.2 kg (183 lb 6.8 oz)   04/25/23 83.5 kg (184 lb 1.4 oz)   04/24/23 82.6 kg (182 lb)     Temp Readings from Last 3 Encounters:   05/03/23 98.1 °F (36.7 °C) (Temporal)   04/25/23 97.1 °F (36.2 °C) (Temporal)   02/23/23 98.8 °F (37.1 °C)     BP Readings from Last 3 Encounters:   05/03/23 (!) 143/64   04/25/23 123/64   03/28/23 130/80     Pulse Readings from Last 3 Encounters:   05/03/23 71   04/25/23 66   03/28/23 80     GENERAL: Comfortable looking patient. Patient is in no distress.  Awake, alert and oriented to time, person and place.  No anxiety, or agitation.      HEENT: Normal conjunctivae and eyelids. WNL.  PERRLA 3 to 4 mm. No icterus, no pallor, no congestion, and no discharge noted.     NECK:  Supple. Trachea is central.  No crepitus.  No JVD or masses.    RESPIRATORY:  No intercostal retractions.  No dullness to percussion.  Chest is clear to auscultation.  No rales, rhonchi or wheezes.  No crepitus.  Good air entry bilaterally.    CARDIOVASCULAR:  S1 and S2 are normally heard without murmurs or gallops.  All peripheral pulses are present.    ABDOMEN:  Normal abdomen.  No hepatosplenomegaly.  No free fluid.  Bowel sounds are present.  No hernia noted. No masses.  No rebound or tenderness.  No guarding or rigidity.  Umbilicus is midline.    LYMPHATICS:  No axillary, cervical, supraclavicular, submental, or inguinal lymphadenopathy.    SKIN/MUSCULOSKELETAL:  There is no evidence of excoriation marks or ecchmosis.  No rashes.  No cyanosis.  No clubbing.  No joint or skeletal deformities noted.  Normal range of motion.    NEUROLOGIC:  Higher functions are appropriate.  No cranial nerve deficits.  Normal asher.  Normal strength.  Motor and sensory functions are normal.  Deep tendon reflexes are normal.    GENITAL/RECTAL:  Exams are deferred.      Laboratory:     CBC:  Lab Results   Component Value Date    WBC 7.52 04/24/2023    RBC 4.08 04/24/2023    HGB 13.0 04/24/2023    HCT 40.5  04/24/2023    MCV 99 (H) 04/24/2023    MCH 31.9 (H) 04/24/2023    MCHC 32.1 04/24/2023    RDW 14.5 04/24/2023     04/24/2023    MPV 10.5 04/24/2023    GRAN 4.2 04/24/2023    GRAN 55.1 04/24/2023    LYMPH 2.5 04/24/2023    LYMPH 32.6 04/24/2023    MONO 0.7 04/24/2023    MONO 9.2 04/24/2023    EOS 0.2 04/24/2023    BASO 0.03 04/24/2023    EOSINOPHIL 2.4 04/24/2023    BASOPHIL 0.4 04/24/2023       BMP: BMP  Lab Results   Component Value Date     04/24/2023    K 4.2 04/24/2023     04/24/2023    CO2 27 04/24/2023    BUN 19 04/24/2023    CREATININE 0.9 04/24/2023    CALCIUM 10.6 (H) 04/24/2023    ANIONGAP 9 04/24/2023    ESTGFRAFRICA >60 07/20/2022    EGFRNONAA 55 (A) 07/20/2022       LFT:   Lab Results   Component Value Date    ALT 32 04/24/2023    AST 28 04/24/2023    ALKPHOS 61 04/24/2023    BILITOT 0.4 04/24/2023    Bone density September 2 1019 negative for osteopenia  oncotype rec. score is 29  Impressionpet 1/2020       1.  Likely postoperative change in the superolateral left breast, decreased in size from the previous exam.    2.  Scattered multifocal ground-glass attenuation opacities in both lungs suggesting a combination of atypical infection/pneumonia and/or post treatment/post radiation change.  Consider follow-up CT of the chest in 6-8 weeks to document resolution.    3.  Indeterminate isoattenuating non-FDG avid structure in the anterior interpolar aspect of the right kidney, possibly representing a hemorrhagic/proteinaceous cyst or low grade malignancy, consider further characterization with contrast enhanced renal protocol CT/MRI.    4.  Numerous bilateral nonobstructing renal stones.  No hydronephrosis or hydroureter on today's exam.    5.  Interval resolution of the maxillary sinus disease.     Pet 7/2020  IMPRESSION:  1. Postoperative changes of prior left breast lumpectomy.  2. Near complete resolution of the previously described groundglass  attenuation opacities in the lungs, now  with only a small linear  bandlike opacity in the anterior left upper lobe (most likely related  to prior radiation/treatment change.  3. Focal increased FDG activity along the right third and fourth rib  posteriorly at the posterior thoracic junction suggesting degenerative  change with no soft tissue, lytic or sclerotic lesion identified.  4. Additional and incidental findings as noted above unchanged from  the previous exam.            has renal stones and has hydronephrosis, stented      7/21 neg pet      7/22  IMPRESSION: No evidence of recurrent or metastatic disease     Postsurgical changes in the left breast     Stable 3.5 cm round isoattenuating mass within the upper pole the right kidney suggestive of renal cyst.     Nonobstructing renal stones        IMPRESSION:pet 4/23     No evidence of FDG avid metastatic breast cancer.     4.7 cm indeterminate right renal lesion. Although this lesion has been present on prior exams and is not hypermetabolic, it has not been definitively characterized; further evaluation with retroperitoneal sonography or contrast-enhanced renal protocol CT/MRI is recommended.     Postoperative changes of the left breast. Please correlate with dedicated diagnostic mammography.     Electronically signed by:  Erlin Li MD  4/24/2023 2:17 PM CDT Workstation: [id number]       Impression: mri 5/2/23     Enhancing mass of the right kidney compatible with neoplasm.  No evidence for abdominal metastatic disease.     This report was flagged in Epic as abnormal.    Assessment/Plan:     Breast ca:T2N0Mx left breast ca s/p lumpectomy 3/2019 recurrent score 29  pT2: Tumor size = 2.5 cm in greatest dimension.  pN0: No regional lymph node metastasis identified.  pM: Not applicable.    ER: Positive.  AK: Positive.  HER2: Negative (I[facility] - E  Patient completed 3 cycles but aborted therapy.  In favor a quality of life due to side effects   Compeleted xrt  she stoped anastrozole  Due to arthralgia and  nausea.Tolerating aromasin well to continue for 10 years till May 2029  Normal bone density 9 9/21     RA:  now on mtx and hydroxychroloquine sees dr Romero    Hemorrhagic cyst :Needs a f/u as Dr Dickson has retiredUrology  was reportd on prev scans , had stents removed and lithotripsy for stones. Will get MRI    Hypercalcemia: eating a lot of icecream, she is being rechecked soon by pcp     macrocytosis: related to thyroid issues will watch she is not anemic and embark on w/u if worse or cytopenia     Lung findindgs :saw Pulmonary  Naila aldridge 6/2020) consult for 2.  Finding as above on PET scan thesesd have nearly resolved now on 7/2020 pet and nothing new noted on January and july 2022 PET pulm f/u prn n ext pet 7/23    She has scattered atheromatous calcifications in the aorta and coronary arteries she will discuss this and follow up with her PCP for referral to cardiology   repeat pet 7/23 and see me  cbc, cmp, [id number] in  6months    mammo done in 3/2022 done short term recommended  10/22 short term again in 4/23     MRI of abd c/w renal mass she sees DR Ludwig next week this is suspicious for renal ca    Has quit smoking       HTH. Lipids, hypothyroidism: cont DR mark      Advance Care Planning     Date: 04/25/2023    Power of   I initiated the process of advance care planning today and explained the importance of this process to the patient.  I introduced the concept of advance directives to the patient, as well. Then the patient received detailed information about the importance of designating a Health Care Power of  (HCPOA). She was also instructed to communicate with this person about their wishes for future healthcare, should she become sick and lose decision-making capacity. Pt has medical POA at home and will bring it for filing    Update: 5/3/23: pt has dropped off her papers to file

## 2023-05-10 ENCOUNTER — PATIENT MESSAGE (OUTPATIENT)
Dept: HEMATOLOGY/ONCOLOGY | Facility: CLINIC | Age: 78
End: 2023-05-10
Payer: MEDICARE

## 2023-05-31 DIAGNOSIS — N20.0 URIC ACID NEPHROLITHIASIS: Primary | ICD-10-CM

## 2023-05-31 DIAGNOSIS — N20.1 CALCULUS OF URETER: ICD-10-CM

## 2023-06-06 ENCOUNTER — HOSPITAL ENCOUNTER (OUTPATIENT)
Dept: RADIOLOGY | Facility: HOSPITAL | Age: 78
Discharge: HOME | End: 2023-06-06
Attending: SPECIALIST
Payer: MEDICARE

## 2023-06-06 DIAGNOSIS — N20.0 URIC ACID NEPHROLITHIASIS: ICD-10-CM

## 2023-06-06 DIAGNOSIS — N20.1 CALCULUS OF URETER: ICD-10-CM

## 2023-06-06 PROCEDURE — 74018 RADEX ABDOMEN 1 VIEW: CPT | Mod: TC,PO

## 2023-06-13 ENCOUNTER — OFFICE VISIT (OUTPATIENT)
Dept: HEMATOLOGY/ONCOLOGY | Facility: CLINIC | Age: 78
End: 2023-06-13
Payer: MEDICARE

## 2023-06-13 VITALS
BODY MASS INDEX: 25.76 KG/M2 | RESPIRATION RATE: 14 BRPM | TEMPERATURE: 98 F | HEART RATE: 78 BPM | SYSTOLIC BLOOD PRESSURE: 123 MMHG | HEIGHT: 68 IN | WEIGHT: 170 LBS | DIASTOLIC BLOOD PRESSURE: 55 MMHG | OXYGEN SATURATION: 98 %

## 2023-06-13 DIAGNOSIS — E78.00 PURE HYPERCHOLESTEROLEMIA: ICD-10-CM

## 2023-06-13 DIAGNOSIS — C50.112 CARCINOMA OF CENTRAL PORTION OF LEFT BREAST IN FEMALE, ESTROGEN RECEPTOR POSITIVE: Primary | ICD-10-CM

## 2023-06-13 DIAGNOSIS — Z17.0 CARCINOMA OF CENTRAL PORTION OF LEFT BREAST IN FEMALE, ESTROGEN RECEPTOR POSITIVE: Primary | ICD-10-CM

## 2023-06-13 DIAGNOSIS — C50.012 MALIGNANT NEOPLASM OF NIPPLE OF LEFT BREAST IN FEMALE, ESTROGEN RECEPTOR POSITIVE: ICD-10-CM

## 2023-06-13 DIAGNOSIS — M06.9 RHEUMATOID ARTHRITIS INVOLVING MULTIPLE JOINTS: ICD-10-CM

## 2023-06-13 DIAGNOSIS — I10 ESSENTIAL HYPERTENSION, BENIGN: ICD-10-CM

## 2023-06-13 DIAGNOSIS — Z17.0 MALIGNANT NEOPLASM OF NIPPLE OF LEFT BREAST IN FEMALE, ESTROGEN RECEPTOR POSITIVE: ICD-10-CM

## 2023-06-13 PROCEDURE — 99214 OFFICE O/P EST MOD 30 MIN: CPT | Mod: S$PBB,,, | Performed by: INTERNAL MEDICINE

## 2023-06-13 PROCEDURE — 99214 OFFICE O/P EST MOD 30 MIN: CPT | Mod: PBBFAC,PN | Performed by: INTERNAL MEDICINE

## 2023-06-13 PROCEDURE — 99999 PR PBB SHADOW E&M-EST. PATIENT-LVL IV: ICD-10-PCS | Mod: PBBFAC,,, | Performed by: INTERNAL MEDICINE

## 2023-06-13 PROCEDURE — 99214 PR OFFICE/OUTPT VISIT, EST, LEVL IV, 30-39 MIN: ICD-10-PCS | Mod: S$PBB,,, | Performed by: INTERNAL MEDICINE

## 2023-06-13 PROCEDURE — 99999 PR PBB SHADOW E&M-EST. PATIENT-LVL IV: CPT | Mod: PBBFAC,,, | Performed by: INTERNAL MEDICINE

## 2023-06-13 NOTE — PROGRESS NOTES
Subjective:       Patient ID: Kirstie Bowden is a 78 y.o. female.    Chief Complaint   Left breast ca dx  T2 N0 status post lumpectomy and re-resection margin adjuvant TC chemo s/p 3  cycles but discontinued due to quality of life issues and started Arimidex   stated  having s/e  To arimidex took herself off presented to clinic started on Aromasin .  Tolerating Aromasin well rec score of 29    hydronephrosis+ ,  had stent placed  Here for follow-up  Oncologic History:  Dx 1/2019  INVASIVE CARCINOMA BREAST, CANCER CASE SUMMARY:  PROCEDURE: Partial mastectomy without skin or nipple.3/2019  SPECIMEN LATERALITY: Left.  TUMOR SIZE, GREATEST DIMENSION: 2.5 cm.  HISTOLOGIC TYPE: Invasive pleomorphic lobular carcinoma.  HISTOLOGIC GRADE (LUTHER HISTOLOGIC SCORE):  Glandular (acinar)/tubular differentiation: Score 3.  Nuclear pleomorphism: Score 2.  Mitotic rate: Score 1.  Overall grade: Grade 2  DUCTAL CARCINOMA IN SITU (DCIS): Not identified.  LOBULAR CARCINOMA IN SITU (LCIS): Present, pleomorphic lobular carcinoma in situ with rare focus of  classical lobular carcinoma situ.  Estimated size (extent) of pleomorphic LCIS: At least 2.8 cm.  TUMOR EXTENSION: Not applicable.  MARGINS:  INVASIVE CARCINOMA MARGINS: Uninvolved by invasive carcinoma.  Distance from inferior (closest) margin: 5 mm.  PLEOMORPHIC LOBULAR CARCINOMA IN SITU MARGINS: Uninvolved by pleomorphic LCIS.  Distance from inferior medial (closest) margin: 0.2 mm (see above comment).  REGIONAL LYMPH NODES: Uninvolved by tumor cells.  Number of lymph nodes examined: 2.  Number of sentinel nodes examined: 2.  TREATMENT EFFECT: No known presurgical therapy.  PATHOLOGIC STAGE CLASSIFICATION (pTNM, AJCC 8TH EDITION):  pT2: Tumor size = 2.5 cm in greatest dimension.  pN0: No regional lymph node metastasis identified.  pM: Not applicable.    ER: Positive.  [place]: Positive.  HER2: Negative (IHC - Equivocal (score 2) and FISH - Negative).  Ki-67: Approximately  14%.  HPI:     Social History     Socioeconomic History    Marital status:    Occupational History     Employer: [employer]   Tobacco Use    Smoking status: Former     Packs/day: 0.50     Years: 47.00     Pack years: 23.50     Types: Cigarettes     Start date: 4/25/1960     Quit date: 8/11/2019     Years since quitting: 3.8    Smokeless tobacco: Never   Substance and Sexual Activity    Alcohol use: Yes     Alcohol/week: 2.0 standard drinks     Types: 2 Glasses of wine per week     Comment: Social    Drug use: No    Sexual activity: Never     Social Determinants of Health     Financial Resource Strain: Low Risk     Difficulty of Paying Living Expenses: Not very hard   Food Insecurity: No Food Insecurity    Worried About Running Out of Food in the Last Year: Never true    Ran Out of Food in the Last Year: Never true   Transportation Needs: No Transportation Needs    Lack of Transportation (Medical): No    Lack of Transportation (Non-Medical): No   Physical Activity: Insufficiently Active    Days of Exercise per Week: 2 days    Minutes of Exercise per Session: 10 min   Stress: No Stress Concern Present    Feeling of Stress : Only a little   Social Connections: Unknown    Frequency of Communication with Friends and Family: More than three times a week    Frequency of Social Gatherings with Friends and Family: Three times a week    Active Member of Clubs or Organizations: No    Attends Club or Organization Meetings: Never    Marital Status:    Housing Stability: Low Risk     Unable to Pay for Housing in the Last Year: No    Number of Places Lived in the Last Year: 1    Unstable Housing in the Last Year: No     Family History   Problem Relation Age of Onset    Heart disease Mother     Hypertension Mother     Alzheimer's disease Mother     Cancer Father     Heart disease Sister     Diabetes Brother     Stroke Sister     Cancer Daughter      Past Surgical History:   Procedure Laterality Date    AXILLARY NODE  DISSECTION Left 03/14/2019    Procedure: LYMPHADENECTOMY, AXILLARY;  Surgeon: Mp Gonzalez MD;  Location: [facility] OR;  Service: General;  Laterality: Left;  left lumpectomy with axillary lypmh node dissection    BALLOON DILATION OF URETER Left 06/24/2019    Procedure: DILATION, URETER, USING BALLOON;  Surgeon: Jorden Dickson MD;  Location: [facility] OR;  Service: Urology;  Laterality: Left;    BREAST BIOPSY Left 20 yrs ago    benign    BREAST LUMPECTOMY      x2    CHOLECYSTECTOMY      COLONOSCOPY  09/25/2018    LUC EASTON MD    CYSTOSCOPY W/ URETERAL STENT PLACEMENT Left 06/24/2019    Procedure: CYSTOSCOPY, WITH URETERAL STENT INSERTION;  Surgeon: Jorden Dickson MD;  Location: [facility] OR;  Service: Urology;  Laterality: Left;    CYSTOSCOPY W/ URETERAL STENT REMOVAL Left 07/23/2019    Procedure: CYSTOSCOPY, WITH URETERAL STENT REMOVAL;  Surgeon: Jorden Dickson MD;  Location: [facility] OR;  Service: Urology;  Laterality: Left;    EPIDURAL STEROID INJECTION INTO LUMBAR SPINE N/A 09/30/2021    Procedure: Injection-steroid-epidural-lumbar;  Surgeon: Nathen Lyons MD;  Location: [facility] OR;  Service: Pain Management;  Laterality: N/A;  L5-S1      EPIDURAL STEROID INJECTION INTO LUMBAR SPINE N/A 11/16/2021    Procedure: Injection-steroid-epidural-lumbar;  Surgeon: Nathen Lyons MD;  Location: [facility] OR;  Service: Pain Management;  Laterality: N/A;  L5-S1    EXTRACORPOREAL SHOCK WAVE LITHOTRIPSY Left 07/23/2019    Procedure: LITHOTRIPSY, ESWL;  Surgeon: Jorden Dickson MD;  Location: [facility] OR;  Service: Urology;  Laterality: Left;    EYE SURGERY Bilateral     cataracts    HYSTERECTOMY      MASTECTOMY, PARTIAL Left 04/25/2019    Procedure: MASTECTOMY, PARTIAL;  Surgeon: Mp Gonzalez MD;  Location: [facility] OR;  Service: General;  Laterality: Left;    NEEDLE LOCALIZATION Left 03/14/2019    Procedure: NEEDLE LOCALIZATION;  Surgeon: Mp Gonzalez MD;  Location: [facility] OR;  Service: General;  Laterality: Left;  left lumpectomy  with wire needle localization     RETROGRADE PYELOGRAPHY Bilateral 06/24/2019    Procedure: PYELOGRAM, RETROGRADE;  Surgeon: Jorden Dickson MD;  Location: [facility] OR;  Service: Urology;  Laterality: Bilateral;    SENTINEL LYMPH NODE BIOPSY Left 03/14/2019    Procedure: BIOPSY, LYMPH NODE, SENTINEL;  Surgeon: Mp Gonzalez MD;  Location: [facility] OR;  Service: General;  Laterality: Left;  left sentinel node lymph node biopsy    TONSILLECTOMY      TRANSFORAMINAL EPIDURAL INJECTION OF STEROID Right 01/04/2022    Procedure: Injection,steroid,epidural,transforaminal approach;  Surgeon: Nathen Lyons MD;  Location: [facility] OR;  Service: Pain Management;  Laterality: Right;  L4-L5, L5-s1    URETEROSCOPY Left 06/24/2019    Procedure: URETEROSCOPY;  Surgeon: Jorden Dickson MD;  Location: [facility] OR;  Service: Urology;  Laterality: Left;     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    Hyperlipidemia     Hypertension     Limb alert care status     NO BP/IV LEFT ARM    Pseudocholinesterase deficiency     family history    Thyroid disease        Current Outpatient Medications:     albuterol (PROAIR HFA) 90 mcg/actuation inhaler, Inhale 2 puffs into the lungs every 6 (six) hours as needed for Wheezing. Rescue, Disp: 18 g, Rfl: 11    amLODIPine (NORVASC) 5 MG tablet, Take 1 tablet by mouth once daily, Disp: 90 tablet, Rfl: 3    atorvastatin (LIPITOR) 20 MG tablet, Take 1 tablet by mouth once daily, Disp: 90 tablet, Rfl: 3    CALCIUM CARBONATE/VITAMIN D3 (VITAMIN D-3 ORAL), Take by mouth once daily. , Disp: , Rfl:     EUTHYROX 125 mcg tablet, TAKE 1 TABLET BY MOUTH BEFORE BREAKFAST, Disp: 90 tablet, Rfl: 3    exemestane (AROMASIN) 25 mg tablet, Take 1 tablet by mouth once daily, Disp: 90 tablet, Rfl: 5    fluticasone-salmeterol diskus inhaler 250-50 mcg, INHALE 1 DOSE BY MOUTH TWICE DAILY, Disp: 60 each, Rfl: 11    folic acid (FOLVITE) 1 MG tablet, Take 1 tablet (1,000  mcg total) by mouth once daily., Disp: 90 tablet, Rfl: 1    gabapentin (NEURONTIN) 300 MG capsule, TAKE 1 CAPSULE BY MOUTH IN THE EVENING, Disp: 30 capsule, Rfl: 2    hydroCHLOROthiazide (HYDRODIURIL) 25 MG tablet, Take 1 tablet by mouth once daily, Disp: 90 tablet, Rfl: 3    methotrexate 2.5 MG Tab, Take 7 tablets (17.5 mg total) by mouth every 7 days. (Patient taking differently: Take 17.5 mg by mouth every 7 days. 9 tablets), Disp: 84 tablet, Rfl: 1    ondansetron (ZOFRAN-ODT) 8 MG TbDL, DISSOLVE 1 TABLET IN MOUTH EVERY 12 HOURS AS NEEDED, Disp: 30 tablet, Rfl: 0    pantoprazole (PROTONIX) 40 MG tablet, TAKE 1 TABLET BY MOUTH ONCE DAILY IN THE MORNING FOR 90 DAYS, Disp: , Rfl:     prochlorperazine (COMPAZINE) 10 MG tablet, TAKE 1 TABLET BY MOUTH EVERY 6 HOURS AS NEEDED, Disp: 60 tablet, Rfl: 0    thiamine 100 MG tablet, Take 100 mg by mouth once daily., Disp: , Rfl:     traMADoL (ULTRAM) 50 mg tablet, Take 1 tablet (50 mg total) by mouth every 12 (twelve) hours as needed for Pain (severe pain). TAKE 1 TABLET BY MOUTH EVERY 8 HOURS AS NEEDED FOR SEVERE PAIN- DOSE DECREASE, Disp: 60 tablet, Rfl: 1    VITAMIN B COMPLEX ORAL, Every day, Disp: , Rfl:     albuterol (VENTOLIN HFA) 90 mcg/actuation inhaler, Inhale 1-2 puffs into the lungs every 6 (six) hours as needed for Wheezing or Shortness of Breath. Rescue, Disp: 18 g, Rfl: 0  No current facility-administered medications for this visit.    Facility-Administered Medications Ordered in Other Visits:     lactated ringers infusion, , Intravenous, Once PRN, Nathen Lyons MD    lidocaine (PF) 10 mg/ml (1%) injection 10 mg, 1 mL, Intradermal, Once, Eladia Schwartz MD  Review of patient's allergies indicates:   Allergen Reactions    Succinylcholine chloride Other (See Comments)    Sulfur dioxide Hives    Sulfamethoxazole-trimethoprim      Other reaction(s): per dr daryn Rodríguez (sulfonamide antibiotics)      NOT SURE REACTION         REVIEW OF SYSTEMS:      CONSTITUTIONAL:  Patient voices complains with significant fatigue.  Shortness of breath on ambulation.  She is not coughing up productive sputum she reports no fever chills rigors  Has some sinus congestion  And having night sweats after starting Arimidex    SKIN: Denies rash, issues with nails, non-healing sores, bleeding, blotching    skin or abnormal bruising. Denies new moles or changes to existing moles.      BREASTS: healing well since lumpectomy    EYES: Denies eye pain, blurred vision, swelling, redness or discharge.      ENT AND MOUTH:  Has runny nose, and stuffiness, and sinus trouble no sores. Denies    nosebleeds. Denies, hoarseness, change in voice or swelling in front of the    neck.      CARDIOVASCULAR: Denies chest pain, discomfort or palpitations. Denies neck    swelling or episodes of passing out.           GI: Denies trouble swallowing, indigestion, heartburn, abdominal pain, +nausea with arimidex betty in am then  Dry heaves during day, stopped arimidex and feeks much better     no vomiting, diarrhea, altered bowel habits, blood in stool, discoloration of    stools, change in nature of stool, bloating, increased abdominal girth.      GENITOURINARY: No discharge. No pelvic pain or lumps. No rash around groin or  lesions. No urinary frequency, hesitation, painful urination or blood in    urine. Denies incontinence. No problems with intercourse.      MUSCULOSKELETAL: Denies neck or back pain. Denies weakness in arms or legs,    joint problems or distended inflamed veins in legs. Denies swelling or abnormal  glands.      NEUROLOGICAL: Denies tingling, numbness, altered mentation changes to nerve    function in the face, weakness to one or both of the body. Denies changes to    gait and denies multiple falls or accidents.        PHYSICAL EXAM:     Wt Readings from Last 3 Encounters:   06/13/23 77.1 kg (169 lb 15.6 oz)   05/03/23 83.2 kg (183 lb 6.8 oz)   04/25/23 83.5 kg (184 lb 1.4 oz)     Temp  Readings from Last 3 Encounters:   06/13/23 97.5 °F (36.4 °C) (Temporal)   05/03/23 98.1 °F (36.7 °C) (Temporal)   04/25/23 97.1 °F (36.2 °C) (Temporal)     BP Readings from Last 3 Encounters:   06/13/23 (!) 123/55   05/03/23 (!) 143/64   04/25/23 123/64     Pulse Readings from Last 3 Encounters:   06/13/23 78   05/03/23 71   04/25/23 66     GENERAL: Comfortable looking patient. Patient is in no distress.  Awake, alert and oriented to time, person and place.  No anxiety, or agitation.      HEENT: Normal conjunctivae and eyelids. WNL.  PERRLA 3 to 4 mm. No icterus, no pallor, no congestion, and no discharge noted.     NECK:  Supple. Trachea is central.  No crepitus.  No JVD or masses.    RESPIRATORY:  No intercostal retractions.  No dullness to percussion.  Chest is clear to auscultation.  No rales, rhonchi or wheezes.  No crepitus.  Good air entry bilaterally.    CARDIOVASCULAR:  S1 and S2 are normally heard without murmurs or gallops.  All peripheral pulses are present.    ABDOMEN:  Normal abdomen.  No hepatosplenomegaly.  No free fluid.  Bowel sounds are present.  No hernia noted. No masses.  No rebound or tenderness.  No guarding or rigidity.  Umbilicus is midline.    LYMPHATICS:  No axillary, cervical, supraclavicular, submental, or inguinal lymphadenopathy.    SKIN/MUSCULOSKELETAL:  There is no evidence of excoriation marks or ecchmosis.  No rashes.  No cyanosis.  No clubbing.  No joint or skeletal deformities noted.  Normal range of motion.    NEUROLOGIC:  Higher functions are appropriate.  No cranial nerve deficits.  Normal asher.  Normal strength.  Motor and sensory functions are normal.  Deep tendon reflexes are normal.    GENITAL/RECTAL:  Exams are deferred.      Laboratory:     CBC:  Lab Results   Component Value Date    WBC 7.52 04/24/2023    RBC 4.08 04/24/2023    HGB 13.0 04/24/2023    HCT 40.5 04/24/2023    MCV 99 (H) 04/24/2023    MCH 31.9 (H) 04/24/2023    MCHC 32.1 04/24/2023    RDW 14.5 04/24/2023      04/24/2023    MPV 10.5 04/24/2023    GRAN 4.2 04/24/2023    GRAN 55.1 04/24/2023    LYMPH 2.5 04/24/2023    LYMPH 32.6 04/24/2023    MONO 0.7 04/24/2023    MONO 9.2 04/24/2023    EOS 0.2 04/24/2023    BASO 0.03 04/24/2023    EOSINOPHIL 2.4 04/24/2023    BASOPHIL 0.4 04/24/2023       BMP: BMP  Lab Results   Component Value Date     04/24/2023    K 4.2 04/24/2023     04/24/2023    CO2 27 04/24/2023    BUN 19 04/24/2023    CREATININE 0.9 04/24/2023    CALCIUM 10.6 (H) 04/24/2023    ANIONGAP 9 04/24/2023    ESTGFRAFRICA >60 07/20/2022    EGFRNONAA 55 (A) 07/20/2022       LFT:   Lab Results   Component Value Date    ALT 32 04/24/2023    AST 28 04/24/2023    ALKPHOS 61 04/24/2023    BILITOT 0.4 04/24/2023    Bone density September 2 1019 negative for osteopenia  oncotype rec. score is 29  Impressionpet 1/2020       1.  Likely postoperative change in the superolateral left breast, decreased in size from the previous exam.    2.  Scattered multifocal ground-glass attenuation opacities in both lungs suggesting a combination of atypical infection/pneumonia and/or post treatment/post radiation change.  Consider follow-up CT of the chest in 6-8 weeks to document resolution.    3.  Indeterminate isoattenuating non-FDG avid structure in the anterior interpolar aspect of the right kidney, possibly representing a hemorrhagic/proteinaceous cyst or low grade malignancy, consider further characterization with contrast enhanced renal protocol CT/MRI.    4.  Numerous bilateral nonobstructing renal stones.  No hydronephrosis or hydroureter on today's exam.    5.  Interval resolution of the maxillary sinus disease.     Pet 7/2020  IMPRESSION:  1. Postoperative changes of prior left breast lumpectomy.  2. Near complete resolution of the previously described groundglass  attenuation opacities in the lungs, now with only a small linear  bandlike opacity in the anterior left upper lobe (most likely related  to prior  radiation/treatment change.  3. Focal increased FDG activity along the right third and fourth rib  posteriorly at the posterior thoracic junction suggesting degenerative  change with no soft tissue, lytic or sclerotic lesion identified.  4. Additional and incidental findings as noted above unchanged from  the previous exam.            has renal stones and has hydronephrosis, stented      7/21 neg pet      7/22  IMPRESSION: No evidence of recurrent or metastatic disease     Postsurgical changes in the left breast     Stable 3.5 cm round isoattenuating mass within the upper pole the right kidney suggestive of renal cyst.     Nonobstructing renal stones        IMPRESSION:pet 4/23     No evidence of FDG avid metastatic breast cancer.     4.7 cm indeterminate right renal lesion. Although this lesion has been present on prior exams and is not hypermetabolic, it has not been definitively characterized; further evaluation with retroperitoneal sonography or contrast-enhanced renal protocol CT/MRI is recommended.     Postoperative changes of the left breast. Please correlate with dedicated diagnostic mammography.     Electronically signed by:  Erlin Li MD  4/24/2023 2:17 PM CDT Workstation: [id number]       Impression: mri 5/2/23     Enhancing mass of the right kidney compatible with neoplasm.  No evidence for abdominal metastatic disease.     This report was flagged in Epic as abnormal.    Assessment/Plan:     Breast ca:T2N0Mx left breast ca s/p lumpectomy 3/2019 recurrent score 29  pT2: Tumor size = 2.5 cm in greatest dimension.  pN0: No regional lymph node metastasis identified.  pM: Not applicable.    ER: Positive.  MT: Positive.  HER2: Negative (IHC - E  Patient completed 3 cycles but aborted therapy.  In favor a quality of life due to side effects   Compeleted xrt  she stoped anastrozole  Due to arthralgia and nausea.Tolerating aromasin well to continue for 10 years till May 2029  Normal bone density 9 9/21      RA:  now on mtx and hydroxychroloquine sees dr Romero    Hemorrhagic cyst :Needs a f/u as Dr Dickson has retiredUrology  was reportd on prev scans , had stents removed and lithotripsy for stones. Will get MRI    Hypercalcemia: eating a lot of icecream, she is being rechecked soon by pcp     macrocytosis: related to thyroid issues will watch she is not anemic and embark on w/u if worse or cytopenia     Lung findindgs :saw Pulmonary  Naila aldridge 6/2020) consult for 2.  Finding as above on PET scan thesesd have nearly resolved now on 7/2020 pet and nothing new noted on January and july 2022 PET pulm f/u prn n ext pet 7/23    She has scattered atheromatous calcifications in the aorta and coronary arteries she will discuss this and follow up with her PCP for referral to cardiology   repeat pet 7/23 and see me  cbc, cmp, [id number] in   7/23 with renal path r eport    mammo done in 3/2022 done short term recommended  10/22 short term again in 4/23  next due 4/24    MRI of abd c/w renal mass she saw DR Ludwig  had nephrectomy  5/23. We need to get path report, was told she did not need anything else    Has quit smoking       HTH. Lipids, hypothyroidism: cont DR mark      Advance Care Planning     Date: 04/25/2023    Power of   I initiated the process of advance care planning today and explained the importance of this process to the patient.  I introduced the concept of advance directives to the patient, as well. Then the patient received detailed information about the importance of designating a Health Care Power of  (HCPOA). She was also instructed to communicate with this person about their wishes for future healthcare, should she become sick and lose decision-making capacity. Pt has medical POA at home and will bring it for filing    Update: 5/3/23: pt has dropped off her papers to file

## 2023-06-20 NOTE — PROGRESS NOTES
"Subjective:      Patient ID: Kisrtie Bowden is a 78 y.o. female.    Chief Complaint: Disease Management    HPI    Rheumatologic History:   - Diagnosis/es:   - osteoarthritis  - rheumatoid arthritis diagnosed in 2015  - Positive serologies: + RF (170), +CCP (87.9)  - Negative serologies: -  - Infectious screening labs:  Negative hepatitis-B, C, and QuantiFERON (02/2023)  - Imaging:   - x-ray arthritis survey (02/2023): Chondrocalcinosis in the knees and degenerative changes   - DEXA (9/2021): normal BMD  - Previous Treatments:   - Enbrel: Discontinued due to breast cancer  - Current Treatments:    - MTX 17.5 mg weekly plus folic acid daily   -  mg daily  - Plaquenil eye exam: No HCQ toxicity 2022, Due for repeat  Interval History:   She was last seen in February 2023, and at that time reported intermittent flares in her hands and wrists.  Methotrexate was increased to 17.5 mg weekly.  Since she was last seen, she was found to have a renal mass and underwent nephrectomy 4 weeks ago. She states that it was malignant but I don't see the pathology report. She is doing better, recovering from surgery and has had only 1 flare involving her hand. She requests a handicap form.     Objective:   /69   Pulse 76   Ht 5' 7.99" (1.727 m)   Wt 77.8 kg (171 lb 8.3 oz)   BMI 26.09 kg/m²   Physical Exam   Constitutional: normal appearance.   HENT:   Head: Normocephalic and atraumatic.   Pulmonary/Chest: Effort normal.   Musculoskeletal:      Comments: No synovitis, dactylitis, enthesitis, effusions     Neurological: She is alert.   Skin: Skin is warm and dry. No rash noted.      6/21/2023   Tender (EMERSON-28) 0 / 28    Swollen (EMERSON-28) 0 / 28    Provider Global 10 mm   Patient Global 10 mm   ESR --   CRP --   EMERSON-28 (ESR) --   EMERSON-28 (CRP) --   CDAI Score 2         Assessment:     1. Rheumatoid arthritis involving multiple sites with positive rheumatoid factor    2. High risk medications (not anticoagulants) " long-term use    3. Immunosuppression    4. Osteoarthritis, generalized    5. Bilateral carpal tunnel syndrome    6. Encounter for long-term (current) use of medications    7. Asymptomatic menopause      This is a 78-year-old woman with history of HLD, HTN, CKD, nephrolithiasis s/p lithotripsy, sulfa allergy, breast cancer diagnosed in 2020 in remission s/p lumpectomy, chemotherapy and radiation, hypothyroidism, osteoarthritis, and rheumatoid arthritis diagnosed in 2015 and on MTX 17.5mg weekly (2015- ) plus folic acid daily,HCQ 200mg BID (2020- ), tramadol 50mg BID PRN for pain. CDAI is consistent with remission. Continue current medications.     Plan:     Problem List Items Addressed This Visit    None  Visit Diagnoses       Rheumatoid arthritis involving multiple sites with positive rheumatoid factor    -  Primary    Relevant Medications    methotrexate 2.5 MG Tab    folic acid (FOLVITE) 1 MG tablet    traMADoL (ULTRAM) 50 mg tablet    hydrOXYchloroQUINE (PLAQUENIL) 200 mg tablet    Other Relevant Orders    CBC Auto Differential    Comprehensive Metabolic Panel    Sedimentation rate    C-Reactive Protein    High risk medications (not anticoagulants) long-term use        Relevant Medications    methotrexate 2.5 MG Tab    folic acid (FOLVITE) 1 MG tablet    traMADoL (ULTRAM) 50 mg tablet    hydrOXYchloroQUINE (PLAQUENIL) 200 mg tablet    Other Relevant Orders    CBC Auto Differential    Comprehensive Metabolic Panel    Sedimentation rate    C-Reactive Protein    Immunosuppression        Relevant Medications    methotrexate 2.5 MG Tab    folic acid (FOLVITE) 1 MG tablet    traMADoL (ULTRAM) 50 mg tablet    hydrOXYchloroQUINE (PLAQUENIL) 200 mg tablet    Other Relevant Orders    CBC Auto Differential    Comprehensive Metabolic Panel    Sedimentation rate    C-Reactive Protein    Osteoarthritis, generalized        Bilateral carpal tunnel syndrome        Encounter for long-term (current) use of medications         Asymptomatic menopause        Relevant Orders    DXA Bone Density Axial Skeleton 1 or more sites          1.) RA  - MTX 17.5 mg weekly and continue folic acid daily   - HCQ 400mg daily  - CBC, CMP, ESR, CRP every 12 weeks  - Pre-DMARD labs due for repeat 2/2024  - Immunizations: COVID x 3, flu (10/2022), Shingrix x 2, patient will check if pneumonia vaccine is up to date   - Due for Plaquenil eye exam   - DEXA due for repeat 9/2023    2.) Bilateral carpal tunnel syndrome: patient would like to hold off on hand surgery referral as she just had surgery  - Wrist braces for now    Follow up in 4 months    30 minutes of total time spent on the encounter, which includes face to face time and non-face to face time preparing to see the patient (eg, review of tests), Obtaining and/or reviewing separately obtained history, Documenting clinical information in the electronic or other health record, Independently interpreting results (not separately reported) and communicating results to the patient/family/caregiver, or Care coordination (not separately reported).       Khushboo Aguirre M.D.  Rheumatology Dept  [place]

## 2023-06-21 ENCOUNTER — LAB VISIT (OUTPATIENT)
Dept: LAB | Facility: HOSPITAL | Age: 78
End: 2023-06-21
Attending: STUDENT IN AN ORGANIZED HEALTH CARE EDUCATION/TRAINING PROGRAM
Payer: MEDICARE

## 2023-06-21 ENCOUNTER — OFFICE VISIT (OUTPATIENT)
Dept: RHEUMATOLOGY | Facility: CLINIC | Age: 78
End: 2023-06-21
Payer: MEDICARE

## 2023-06-21 VITALS
WEIGHT: 171.5 LBS | HEART RATE: 76 BPM | HEIGHT: 68 IN | BODY MASS INDEX: 25.99 KG/M2 | DIASTOLIC BLOOD PRESSURE: 69 MMHG | SYSTOLIC BLOOD PRESSURE: 113 MMHG

## 2023-06-21 DIAGNOSIS — Z78.0 ASYMPTOMATIC MENOPAUSE: ICD-10-CM

## 2023-06-21 DIAGNOSIS — M15.9 OSTEOARTHRITIS, GENERALIZED: ICD-10-CM

## 2023-06-21 DIAGNOSIS — G56.03 BILATERAL CARPAL TUNNEL SYNDROME: ICD-10-CM

## 2023-06-21 DIAGNOSIS — Z79.899 HIGH RISK MEDICATIONS (NOT ANTICOAGULANTS) LONG-TERM USE: ICD-10-CM

## 2023-06-21 DIAGNOSIS — M05.79 RHEUMATOID ARTHRITIS INVOLVING MULTIPLE SITES WITH POSITIVE RHEUMATOID FACTOR: Primary | ICD-10-CM

## 2023-06-21 DIAGNOSIS — Z79.899 ENCOUNTER FOR LONG-TERM (CURRENT) USE OF MEDICATIONS: ICD-10-CM

## 2023-06-21 DIAGNOSIS — D84.9 IMMUNOSUPPRESSION: ICD-10-CM

## 2023-06-21 DIAGNOSIS — M05.79 RHEUMATOID ARTHRITIS INVOLVING MULTIPLE SITES WITH POSITIVE RHEUMATOID FACTOR: ICD-10-CM

## 2023-06-21 LAB
ALBUMIN SERPL BCP-MCNC: 3.8 G/DL (ref 3.5–5.2)
ALP SERPL-CCNC: 77 U/L (ref 55–135)
ALT SERPL W/O P-5'-P-CCNC: 18 U/L (ref 10–44)
ANION GAP SERPL CALC-SCNC: 12 MMOL/L (ref 8–16)
AST SERPL-CCNC: 19 U/L (ref 10–40)
BASOPHILS # BLD AUTO: 0.04 K/UL (ref 0–0.2)
BASOPHILS NFR BLD: 0.4 % (ref 0–1.9)
BILIRUB SERPL-MCNC: 0.4 MG/DL (ref 0.1–1)
BUN SERPL-MCNC: 19 MG/DL (ref 8–23)
CALCIUM SERPL-MCNC: 10.6 MG/DL (ref 8.7–10.5)
CHLORIDE SERPL-SCNC: 100 MMOL/L (ref 95–110)
CO2 SERPL-SCNC: 29 MMOL/L (ref 23–29)
CREAT SERPL-MCNC: 2.1 MG/DL (ref 0.5–1.4)
CRP SERPL-MCNC: 19.2 MG/L (ref 0–8.2)
DIFFERENTIAL METHOD: ABNORMAL
EOSINOPHIL # BLD AUTO: 0.2 K/UL (ref 0–0.5)
EOSINOPHIL NFR BLD: 2.4 % (ref 0–8)
ERYTHROCYTE [DISTWIDTH] IN BLOOD BY AUTOMATED COUNT: 15.5 % (ref 11.5–14.5)
ERYTHROCYTE [SEDIMENTATION RATE] IN BLOOD BY PHOTOMETRIC METHOD: >120 MM/HR (ref 0–36)
EST. GFR  (NO RACE VARIABLE): 23.8 ML/MIN/1.73 M^2
GLUCOSE SERPL-MCNC: 96 MG/DL (ref 70–110)
HCT VFR BLD AUTO: 35.1 % (ref 37–48.5)
HGB BLD-MCNC: 11.1 G/DL (ref 12–16)
IMM GRANULOCYTES # BLD AUTO: 0.02 K/UL (ref 0–0.04)
IMM GRANULOCYTES NFR BLD AUTO: 0.2 % (ref 0–0.5)
LYMPHOCYTES # BLD AUTO: 2.1 K/UL (ref 1–4.8)
LYMPHOCYTES NFR BLD: 22.7 % (ref 18–48)
MCH RBC QN AUTO: 31.3 PG (ref 27–31)
MCHC RBC AUTO-ENTMCNC: 31.6 G/DL (ref 32–36)
MCV RBC AUTO: 99 FL (ref 82–98)
MONOCYTES # BLD AUTO: 1.1 K/UL (ref 0.3–1)
MONOCYTES NFR BLD: 11.3 % (ref 4–15)
NEUTROPHILS # BLD AUTO: 5.9 K/UL (ref 1.8–7.7)
NEUTROPHILS NFR BLD: 63 % (ref 38–73)
NRBC BLD-RTO: 0 /100 WBC
PLATELET # BLD AUTO: 358 K/UL (ref 150–450)
PMV BLD AUTO: 11.5 FL (ref 9.2–12.9)
POTASSIUM SERPL-SCNC: 4.5 MMOL/L (ref 3.5–5.1)
PROT SERPL-MCNC: 8.1 G/DL (ref 6–8.4)
RBC # BLD AUTO: 3.55 M/UL (ref 4–5.4)
SODIUM SERPL-SCNC: 141 MMOL/L (ref 136–145)
WBC # BLD AUTO: 9.44 K/UL (ref 3.9–12.7)

## 2023-06-21 PROCEDURE — 80053 COMPREHEN METABOLIC PANEL: CPT | Performed by: STUDENT IN AN ORGANIZED HEALTH CARE EDUCATION/TRAINING PROGRAM

## 2023-06-21 PROCEDURE — 99215 PR OFFICE/OUTPT VISIT, EST, LEVL V, 40-54 MIN: ICD-10-PCS | Mod: S$PBB,,, | Performed by: STUDENT IN AN ORGANIZED HEALTH CARE EDUCATION/TRAINING PROGRAM

## 2023-06-21 PROCEDURE — 36415 COLL VENOUS BLD VENIPUNCTURE: CPT | Mod: PO | Performed by: STUDENT IN AN ORGANIZED HEALTH CARE EDUCATION/TRAINING PROGRAM

## 2023-06-21 PROCEDURE — 99999 PR PBB SHADOW E&M-EST. PATIENT-LVL IV: ICD-10-PCS | Mod: PBBFAC,,, | Performed by: STUDENT IN AN ORGANIZED HEALTH CARE EDUCATION/TRAINING PROGRAM

## 2023-06-21 PROCEDURE — 99999 PR PBB SHADOW E&M-EST. PATIENT-LVL IV: CPT | Mod: PBBFAC,,, | Performed by: STUDENT IN AN ORGANIZED HEALTH CARE EDUCATION/TRAINING PROGRAM

## 2023-06-21 PROCEDURE — 86140 C-REACTIVE PROTEIN: CPT | Performed by: STUDENT IN AN ORGANIZED HEALTH CARE EDUCATION/TRAINING PROGRAM

## 2023-06-21 PROCEDURE — 99214 OFFICE O/P EST MOD 30 MIN: CPT | Mod: PBBFAC,PN | Performed by: STUDENT IN AN ORGANIZED HEALTH CARE EDUCATION/TRAINING PROGRAM

## 2023-06-21 PROCEDURE — 99215 OFFICE O/P EST HI 40 MIN: CPT | Mod: S$PBB,,, | Performed by: STUDENT IN AN ORGANIZED HEALTH CARE EDUCATION/TRAINING PROGRAM

## 2023-06-21 PROCEDURE — 85025 COMPLETE CBC W/AUTO DIFF WBC: CPT | Performed by: STUDENT IN AN ORGANIZED HEALTH CARE EDUCATION/TRAINING PROGRAM

## 2023-06-21 PROCEDURE — 85652 RBC SED RATE AUTOMATED: CPT | Performed by: STUDENT IN AN ORGANIZED HEALTH CARE EDUCATION/TRAINING PROGRAM

## 2023-06-21 RX ORDER — HYDROXYCHLOROQUINE SULFATE 200 MG/1
200 TABLET, FILM COATED ORAL 2 TIMES DAILY
COMMUNITY
Start: 2023-05-11 | End: 2023-06-21 | Stop reason: SDUPTHER

## 2023-06-21 RX ORDER — ZOLPIDEM TARTRATE 5 MG/1
5 TABLET ORAL NIGHTLY PRN
COMMUNITY
Start: 2023-06-20 | End: 2023-07-10 | Stop reason: SDUPTHER

## 2023-06-21 RX ORDER — HYDROXYCHLOROQUINE SULFATE 200 MG/1
200 TABLET, FILM COATED ORAL 2 TIMES DAILY
Qty: 180 TABLET | Refills: 3 | Status: SHIPPED | OUTPATIENT
Start: 2023-06-21 | End: 2024-06-15

## 2023-06-21 RX ORDER — METHOTREXATE 2.5 MG/1
17.5 TABLET ORAL
Qty: 84 TABLET | Refills: 1 | Status: SHIPPED | OUTPATIENT
Start: 2023-06-21 | End: 2023-09-07 | Stop reason: SDUPTHER

## 2023-06-21 RX ORDER — FOLIC ACID 1 MG/1
1000 TABLET ORAL DAILY
Qty: 90 TABLET | Refills: 3 | Status: SHIPPED | OUTPATIENT
Start: 2023-06-21 | End: 2024-06-15

## 2023-06-21 RX ORDER — TRAMADOL HYDROCHLORIDE 50 MG/1
50 TABLET ORAL EVERY 12 HOURS PRN
Qty: 60 TABLET | Refills: 1 | Status: SHIPPED | OUTPATIENT
Start: 2023-06-21

## 2023-06-22 ENCOUNTER — PATIENT MESSAGE (OUTPATIENT)
Dept: RHEUMATOLOGY | Facility: CLINIC | Age: 78
End: 2023-06-22
Payer: MEDICARE

## 2023-06-22 ENCOUNTER — TELEPHONE (OUTPATIENT)
Dept: RHEUMATOLOGY | Facility: CLINIC | Age: 78
End: 2023-06-22
Payer: MEDICARE

## 2023-06-22 NOTE — TELEPHONE ENCOUNTER
----- Message from Alize Shelton sent at 6/22/2023  2:14 PM CDT -----  Contact: Patient  Type:  Test Results    Who Called:   Patient  Name of Test (Lab/Mammo/Etc):   Blood work  Date of Test:   6/21  Ordering Provider:   Dr Romero  Where the test was performed:   [facility]    Would the patient rather a call back or a response via [facility]?   Patient  Best Call Back Number:   [phone]    Additional Information:  States the results of her blood work showed her kidney function is not good - states she had a kidney removed about 4 weeks ago - states it was suggested she see Dr. Ramos in [place] - states she needs results faxed to Dr Edwards's office - fax # is [phone] - states she would appreciate if this is done as soon as possible because Dr Ramos will be in his office tomorrow - Dr Ramos's phone # is [phone] - please call to advise/discuss - thank you

## 2023-06-22 NOTE — TELEPHONE ENCOUNTER
As per pt request labs were emailed to  Dr Edwards's office - fax # is [phone] - states she would appreciate if this is done    ADMIT

## 2023-06-22 NOTE — TELEPHONE ENCOUNTER
Per Dr Romero:    Please call her. kidney function has worsened significantly and her inflammatory markers have jumped. It is not safe to use methotrexate while kidney function is like this. Please hold the methotrexate. Does she have a nephrologist? this is likely due to recent nephrectomy and she needs to reach out to her urologist and establish care with a nephrologist     Pt states she will hold Methotrexate  She will contact her urologist to be advised on next step.

## 2023-06-22 NOTE — TELEPHONE ENCOUNTER
----- Message from Khushboo Aguirre MD sent at 6/22/2023 10:17 AM CDT -----  Please call her. kidney function has worsened significantly and her inflammatory markers have jumped. It is not safe to use methotrexate while kidney function is like this. Please hold the methotrexate. Does she have a nephrologist? this is likely due to recent nephrectomy and she needs to reach out to her urologist and establish care with a nephrologist

## 2023-06-26 ENCOUNTER — PATIENT MESSAGE (OUTPATIENT)
Dept: ADMINISTRATIVE | Facility: OTHER | Age: 78
End: 2023-06-26
Payer: MEDICARE

## 2023-07-10 ENCOUNTER — OFFICE VISIT (OUTPATIENT)
Dept: FAMILY MEDICINE | Facility: CLINIC | Age: 78
End: 2023-07-10
Payer: MEDICARE

## 2023-07-10 VITALS
SYSTOLIC BLOOD PRESSURE: 127 MMHG | HEIGHT: 68 IN | HEART RATE: 67 BPM | OXYGEN SATURATION: 97 % | TEMPERATURE: 99 F | BODY MASS INDEX: 26.05 KG/M2 | WEIGHT: 171.88 LBS | DIASTOLIC BLOOD PRESSURE: 67 MMHG | RESPIRATION RATE: 18 BRPM

## 2023-07-10 DIAGNOSIS — J44.9 CHRONIC OBSTRUCTIVE PULMONARY DISEASE, UNSPECIFIED COPD TYPE: ICD-10-CM

## 2023-07-10 DIAGNOSIS — N18.31 STAGE 3A CHRONIC KIDNEY DISEASE: ICD-10-CM

## 2023-07-10 DIAGNOSIS — J45.20 ASTHMA, CHRONIC, MILD INTERMITTENT, UNCOMPLICATED: ICD-10-CM

## 2023-07-10 DIAGNOSIS — M06.9 RHEUMATOID ARTHRITIS INVOLVING MULTIPLE JOINTS: ICD-10-CM

## 2023-07-10 DIAGNOSIS — I10 ESSENTIAL HYPERTENSION, BENIGN: Primary | ICD-10-CM

## 2023-07-10 DIAGNOSIS — C64.1 RENAL CELL CARCINOMA OF RIGHT KIDNEY: ICD-10-CM

## 2023-07-10 DIAGNOSIS — C50.012 MALIGNANT NEOPLASM OF NIPPLE OF LEFT BREAST IN FEMALE, ESTROGEN RECEPTOR POSITIVE: ICD-10-CM

## 2023-07-10 DIAGNOSIS — E03.9 ACQUIRED HYPOTHYROIDISM: ICD-10-CM

## 2023-07-10 DIAGNOSIS — E78.5 HYPERLIPIDEMIA: ICD-10-CM

## 2023-07-10 DIAGNOSIS — Z17.0 MALIGNANT NEOPLASM OF NIPPLE OF LEFT BREAST IN FEMALE, ESTROGEN RECEPTOR POSITIVE: ICD-10-CM

## 2023-07-10 DIAGNOSIS — E78.00 PURE HYPERCHOLESTEROLEMIA: ICD-10-CM

## 2023-07-10 DIAGNOSIS — J45.20 MILD INTERMITTENT ASTHMA, UNSPECIFIED WHETHER COMPLICATED: ICD-10-CM

## 2023-07-10 PROCEDURE — 99214 OFFICE O/P EST MOD 30 MIN: CPT | Mod: PBBFAC,PN,25 | Performed by: FAMILY MEDICINE

## 2023-07-10 PROCEDURE — 99214 OFFICE O/P EST MOD 30 MIN: CPT | Mod: S$PBB,,, | Performed by: FAMILY MEDICINE

## 2023-07-10 PROCEDURE — 99999 PR PBB SHADOW E&M-EST. PATIENT-LVL IV: ICD-10-PCS | Mod: PBBFAC,,, | Performed by: FAMILY MEDICINE

## 2023-07-10 PROCEDURE — 99999 PR PBB SHADOW E&M-EST. PATIENT-LVL IV: CPT | Mod: PBBFAC,,, | Performed by: FAMILY MEDICINE

## 2023-07-10 PROCEDURE — 99214 PR OFFICE/OUTPT VISIT, EST, LEVL IV, 30-39 MIN: ICD-10-PCS | Mod: S$PBB,,, | Performed by: FAMILY MEDICINE

## 2023-07-10 PROCEDURE — G0009 ADMIN PNEUMOCOCCAL VACCINE: HCPCS | Mod: PBBFAC,PN

## 2023-07-10 PROCEDURE — 90670 PCV13 VACCINE IM: CPT | Mod: PBBFAC,PN

## 2023-07-10 RX ORDER — FLUTICASONE PROPIONATE AND SALMETEROL 250; 50 UG/1; UG/1
1 POWDER RESPIRATORY (INHALATION) 2 TIMES DAILY
Qty: 180 EACH | Refills: 3 | Status: SHIPPED | OUTPATIENT
Start: 2023-07-10

## 2023-07-10 RX ORDER — LEVOTHYROXINE SODIUM 125 UG/1
125 TABLET ORAL
Qty: 90 TABLET | Refills: 3 | Status: SHIPPED | OUTPATIENT
Start: 2023-07-10 | End: 2023-07-24 | Stop reason: DRUGHIGH

## 2023-07-10 RX ORDER — ATORVASTATIN CALCIUM 20 MG/1
20 TABLET, FILM COATED ORAL DAILY
Qty: 90 TABLET | Refills: 3 | Status: SHIPPED | OUTPATIENT
Start: 2023-07-10

## 2023-07-10 RX ORDER — ZOLPIDEM TARTRATE 5 MG/1
5 TABLET ORAL NIGHTLY PRN
Qty: 30 TABLET | Refills: 3 | Status: SHIPPED | OUTPATIENT
Start: 2023-07-10 | End: 2023-10-19 | Stop reason: SDUPTHER

## 2023-07-10 RX ORDER — AMLODIPINE BESYLATE 5 MG/1
5 TABLET ORAL DAILY
Qty: 90 TABLET | Refills: 3 | Status: SHIPPED | OUTPATIENT
Start: 2023-07-10

## 2023-07-10 RX ORDER — HYDROCHLOROTHIAZIDE 25 MG/1
25 TABLET ORAL DAILY
Qty: 90 TABLET | Refills: 3 | Status: SHIPPED | OUTPATIENT
Start: 2023-07-10

## 2023-07-10 RX ORDER — ALBUTEROL SULFATE 90 UG/1
2 AEROSOL, METERED RESPIRATORY (INHALATION) EVERY 6 HOURS PRN
Qty: 18 G | Refills: 11 | Status: SHIPPED | OUTPATIENT
Start: 2023-07-10

## 2023-07-13 NOTE — PROGRESS NOTES
Subjective     Patient ID: Kirstie Bowden is a 78 y.o. female.    Chief Complaint: Hypertension, Hyperlipidemia, Hypothyroidism, Asthma, Rheumatoid Arthritis, and Breast Cancer    Patient presents here for annual follow-up of hypertension, hyperlipidemia, hypothyroidism, asthma, rheumatoid arthritis, and breast cancer.  Her weight has decreased 10 lb over the last year and her BMI is down to 26.14.  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  She has done well since this time without any significant complications.  She has had an obvious decrease in her renal function with a creatinine of 0.9 and GFR greater than 60 prior to her surgery and post surgery her creatinine is 2.1 and GFR is 24.  This will need to be monitored.  Her hypertension is well controlled with her present medication and she is tolerating her medication well.  Blood pressure today is 127/67.  Her hyperlipidemia is also well controlled with her present use of Lipitor 20 mg daily.  Her hypothyroidism is stable with her present use of levothyroxine 125 mcg daily.  She has not had any significant flares in her asthma and she is compliant with her use of Advair Diskus inhaler.  She does see Rheumatology for her rheumatoid arthritis and is presently taking methotrexate and Plaquenil.  This seems to be controlling her symptoms fairly well.  She does have a history of breast cancer and is followed by Oncology and remains on hormonal treatment in the form of Aromasin 25 mg daily.  As far as health maintenance, she is up-to-date with all of her recommended screening exams and immunizations with the exception of tetanus vaccine, low-dose lung screening, and 4th COVID vaccine.  It also states that she is coming due on her colonoscopy.  She does state that she did have a colonoscopy by Dr. Chavez and we will check with his office to get the results of this.   She also states that she had a Tdap 2-3 years ago when her great grandchild was born and we will also get the results of this and get it charted.    Review of Systems   Constitutional:  Negative for chills, fatigue, fever and unexpected weight change.   HENT:  Negative for nasal congestion, ear pain, postnasal drip and sore throat.    Eyes:  Negative for pain and visual disturbance.        Up-to-date with eye exams since she is taking Plaquenil   Respiratory:  Positive for wheezing (occasional). Negative for cough and shortness of breath.    Cardiovascular:  Negative for chest pain and palpitations.   Gastrointestinal:  Negative for abdominal pain, diarrhea and nausea.   Musculoskeletal:  Positive for arthralgias. Negative for back pain.   Neurological:  Negative for dizziness, light-headedness and headaches.   Psychiatric/Behavioral:  Negative for sleep disturbance. The patient is not nervous/anxious.         Objective     Physical Exam  Vitals reviewed.   Constitutional:       General: She is not in acute distress.     Appearance: Normal appearance. She is well-developed.   HENT:      Right Ear: Tympanic membrane and external ear normal.      Left Ear: Tympanic membrane and external ear normal.      Mouth/Throat:      Pharynx: Oropharynx is clear. No posterior oropharyngeal erythema.   Neck:      Thyroid: No thyromegaly.      Vascular: No carotid bruit.   Cardiovascular:      Rate and Rhythm: Normal rate and regular rhythm.      Pulses: Normal pulses.      Heart sounds: Normal heart sounds. No murmur heard.  Pulmonary:      Effort: Pulmonary effort is normal.      Breath sounds: Normal breath sounds. No wheezing or rales.   Musculoskeletal:         General: No tenderness. Normal range of motion.      Cervical back: Normal range of motion and neck supple.      Right lower leg: No edema.      Left lower leg: No edema.   Lymphadenopathy:      Cervical: No cervical adenopathy.   Neurological:      General: No focal  deficit present.      Mental Status: She is alert and oriented to person, place, and time.      Cranial Nerves: No cranial nerve deficit.      Deep Tendon Reflexes: Reflexes are normal and symmetric.          Assessment and Plan     1. Essential hypertension, benign  -     amLODIPine (NORVASC) 5 MG tablet; Take 1 tablet (5 mg total) by mouth once daily.  Dispense: 90 tablet; Refill: 3  -     hydroCHLOROthiazide (HYDRODIURIL) 25 MG tablet; Take 1 tablet (25 mg total) by mouth once daily.  Dispense: 90 tablet; Refill: 3    2. Pure hypercholesterolemia  -     Lipid Panel; Future; Expected date: 07/10/2023    3. Acquired hypothyroidism  -     levothyroxine (EUTHYROX) 125 MCG tablet; Take 1 tablet (125 mcg total) by mouth before breakfast.  Dispense: 90 tablet; Refill: 3  -     TSH; Future; Expected date: 07/10/2023    4. Asthma, chronic, mild intermittent, uncomplicated  -     fluticasone-salmeterol diskus inhaler 250-50 mcg; Inhale 1 puff into the lungs 2 (two) times daily. Controller  Dispense: 180 each; Refill: 3    5. Rheumatoid arthritis involving multiple joints    6. Malignant neoplasm of nipple of left breast in female, estrogen receptor positive    7. Chronic obstructive pulmonary disease, unspecified COPD type    8. Stage 3a chronic kidney disease    9. Renal cell carcinoma of right kidney    10. Mixed hyperlipidemia  -     atorvastatin (LIPITOR) 20 MG tablet; Take 1 tablet (20 mg total) by mouth once daily.  Dispense: 90 tablet; Refill: 3    11. Mild intermittent asthma, unspecified whether complicated  -     albuterol (PROAIR HFA) 90 mcg/actuation inhaler; Inhale 2 puffs into the lungs every 6 (six) hours as needed for Wheezing. Rescue  Dispense: 18 g; Refill: 11    Other orders  -     zolpidem (AMBIEN) 5 MG Tab; Take 1 tablet (5 mg total) by mouth nightly as needed (sleep).  Dispense: 30 tablet; Refill: 3  -     Pneumococcal Conjugate Vaccine (13 Valent) (IM)        1. Continue present medication as all of  her chronic medical problems noted above are stable and controlled  2. Continue low-sodium, low-fat low-cholesterol diet and exercise.  BMI today is 26.14   3. Follow-up with Urology, Oncology, and Rheumatology as scheduled  4. Lipid profile and TSH  5.  Prevnar 13 today.  She will need a Prevnar 20 next year to complete her pneumococcal prophylaxis  6. Follow up with me in 1 year or p.r.n.          Follow up in about 1 year (around 7/10/2024).

## 2023-07-14 RX ORDER — GABAPENTIN 300 MG/1
CAPSULE ORAL
Qty: 30 CAPSULE | Refills: 2 | Status: SHIPPED | OUTPATIENT
Start: 2023-07-14 | End: 2023-10-17 | Stop reason: SDUPTHER

## 2023-07-21 ENCOUNTER — HOSPITAL ENCOUNTER (OUTPATIENT)
Dept: RADIOLOGY | Facility: HOSPITAL | Age: 78
Discharge: HOME | End: 2023-07-21
Attending: INTERNAL MEDICINE
Payer: MEDICARE

## 2023-07-21 VITALS — WEIGHT: 174 LBS | HEIGHT: 68 IN | BODY MASS INDEX: 26.37 KG/M2

## 2023-07-21 DIAGNOSIS — C50.112 CARCINOMA OF CENTRAL PORTION OF LEFT BREAST IN FEMALE, ESTROGEN RECEPTOR POSITIVE: ICD-10-CM

## 2023-07-21 DIAGNOSIS — Z17.0 CARCINOMA OF CENTRAL PORTION OF LEFT BREAST IN FEMALE, ESTROGEN RECEPTOR POSITIVE: ICD-10-CM

## 2023-07-21 LAB — GLUCOSE SERPL-MCNC: 74 MG/DL (ref 70–110)

## 2023-07-21 PROCEDURE — A9552 F18 FDG: HCPCS | Mod: PO

## 2023-07-24 ENCOUNTER — TELEPHONE (OUTPATIENT)
Dept: HEMATOLOGY/ONCOLOGY | Facility: CLINIC | Age: 78
End: 2023-07-24
Payer: MEDICARE

## 2023-07-24 DIAGNOSIS — E03.9 ACQUIRED HYPOTHYROIDISM: Primary | ICD-10-CM

## 2023-07-24 RX ORDER — LEVOTHYROXINE SODIUM 112 UG/1
112 TABLET ORAL
Qty: 90 TABLET | Refills: 3 | Status: SHIPPED | OUTPATIENT
Start: 2023-07-24 | End: 2023-12-07 | Stop reason: DRUGHIGH

## 2023-07-27 ENCOUNTER — TELEPHONE (OUTPATIENT)
Dept: HEMATOLOGY/ONCOLOGY | Facility: CLINIC | Age: 78
End: 2023-07-27
Payer: MEDICARE

## 2023-07-27 NOTE — TELEPHONE ENCOUNTER
Spoke to pt and notified dr out appt 08/01/23 and will need to reschedule to another provider, appt 08/03/23 w/[facility].

## 2023-07-27 NOTE — TELEPHONE ENCOUNTER
----- Message from Clari Monsalve sent at 7/27/2023  4:42 PM CDT -----  Regarding: return call  Contact: patient  Type:  Patient Returning Call    Who Called:  patient  Who Left Message for Patient:  Jewell  Does the patient know what this is regarding?:  schedule  Best Call Back Number:  [phone] (home)     Additional Information:  Please call patient to advise.  Thanks!

## 2023-08-03 ENCOUNTER — OFFICE VISIT (OUTPATIENT)
Dept: HEMATOLOGY/ONCOLOGY | Facility: CLINIC | Age: 78
End: 2023-08-03
Payer: MEDICARE

## 2023-08-03 VITALS
SYSTOLIC BLOOD PRESSURE: 118 MMHG | TEMPERATURE: 98 F | HEIGHT: 68 IN | OXYGEN SATURATION: 95 % | BODY MASS INDEX: 25.9 KG/M2 | RESPIRATION RATE: 12 BRPM | DIASTOLIC BLOOD PRESSURE: 56 MMHG | WEIGHT: 170.88 LBS | HEART RATE: 63 BPM

## 2023-08-03 DIAGNOSIS — Z17.0 CARCINOMA OF CENTRAL PORTION OF LEFT BREAST IN FEMALE, ESTROGEN RECEPTOR POSITIVE: Primary | ICD-10-CM

## 2023-08-03 DIAGNOSIS — M06.9 RHEUMATOID ARTHRITIS INVOLVING MULTIPLE JOINTS: ICD-10-CM

## 2023-08-03 DIAGNOSIS — C64.1 RENAL CELL CARCINOMA OF RIGHT KIDNEY: ICD-10-CM

## 2023-08-03 DIAGNOSIS — D53.9 NUTRITIONAL ANEMIA: ICD-10-CM

## 2023-08-03 DIAGNOSIS — C50.112 CARCINOMA OF CENTRAL PORTION OF LEFT BREAST IN FEMALE, ESTROGEN RECEPTOR POSITIVE: Primary | ICD-10-CM

## 2023-08-03 DIAGNOSIS — I10 ESSENTIAL HYPERTENSION, BENIGN: ICD-10-CM

## 2023-08-03 DIAGNOSIS — N18.31 STAGE 3A CHRONIC KIDNEY DISEASE: ICD-10-CM

## 2023-08-03 DIAGNOSIS — E78.00 PURE HYPERCHOLESTEROLEMIA: ICD-10-CM

## 2023-08-03 PROCEDURE — 99999 PR PBB SHADOW E&M-EST. PATIENT-LVL IV: ICD-10-PCS | Mod: PBBFAC,,, | Performed by: INTERNAL MEDICINE

## 2023-08-03 PROCEDURE — 99214 OFFICE O/P EST MOD 30 MIN: CPT | Mod: PBBFAC,PN | Performed by: INTERNAL MEDICINE

## 2023-08-03 PROCEDURE — 99214 OFFICE O/P EST MOD 30 MIN: CPT | Mod: S$PBB,,, | Performed by: INTERNAL MEDICINE

## 2023-08-03 PROCEDURE — 99214 PR OFFICE/OUTPT VISIT, EST, LEVL IV, 30-39 MIN: ICD-10-PCS | Mod: S$PBB,,, | Performed by: INTERNAL MEDICINE

## 2023-08-03 PROCEDURE — 99999 PR PBB SHADOW E&M-EST. PATIENT-LVL IV: CPT | Mod: PBBFAC,,, | Performed by: INTERNAL MEDICINE

## 2023-08-03 NOTE — PROGRESS NOTES
Service Date:  8/3/23    Chief Complaint: Breast Cancer (PET)    Kirstie Bowden is a 78 y.o. female with history of breast cancer.  Here for follow-up.  Doing well.  Also recently diagnosed with renal cell carcinoma s/p right nephrectomy with pathology showing pT1b N0.  Patient doing well.  No complaints to me.  Recently had a PET scan which was negative.    Review of Systems   Constitutional: Negative.    HENT: Negative.     Eyes: Negative.    Respiratory: Negative.     Cardiovascular: Negative.    Gastrointestinal: Negative.    Endocrine: Negative.    Genitourinary: Negative.    Musculoskeletal: Negative.    Integumentary:  Negative.   Neurological: Negative.    Hematological: Negative.    Psychiatric/Behavioral: Negative.          Current Outpatient Medications   Medication Instructions    albuterol (PROAIR HFA) 90 mcg/actuation inhaler 2 puffs, Inhalation, Every 6 hours PRN, Rescue    amLODIPine (NORVASC) 5 mg, Oral, Daily    atorvastatin (LIPITOR) 20 mg, Oral, Daily    exemestane (AROMASIN) 25 mg tablet Take 1 tablet by mouth once daily    fluticasone-salmeterol diskus inhaler 250-50 mcg 1 puff, Inhalation, 2 times daily, Controller    folic acid (FOLVITE) 1,000 mcg, Oral, Daily    gabapentin (NEURONTIN) 300 MG capsule TAKE 1 CAPSULE BY MOUTH IN THE EVENING    hydroCHLOROthiazide (HYDRODIURIL) 25 mg, Oral, Daily    hydrOXYchloroQUINE (PLAQUENIL) 200 mg, Oral, 2 times daily    levothyroxine (SYNTHROID) 112 mcg, Oral, Before breakfast    methotrexate 17.5 mg, Oral, Every 7 days    ondansetron (ZOFRAN-ODT) 8 MG TbDL DISSOLVE 1 TABLET IN MOUTH EVERY 12 HOURS AS NEEDED    pantoprazole (PROTONIX) 40 MG tablet TAKE 1 TABLET BY MOUTH ONCE DAILY IN THE MORNING FOR 90 DAYS    prochlorperazine (COMPAZINE) 10 MG tablet TAKE 1 TABLET BY MOUTH EVERY 6 HOURS AS NEEDED    thiamine 100 mg, Oral, Daily    traMADoL (ULTRAM) 50 mg, Oral, Every 12 hours PRN, TAKE 1 TABLET BY MOUTH EVERY 8 HOURS AS NEEDED FOR SEVERE PAIN-  DOSE DECREASE    VITAMIN B COMPLEX ORAL Every day    zolpidem (AMBIEN) 5 mg, Oral, Nightly PRN        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    Hyperlipidemia     Hypertension     Limb alert care status     NO BP/IV LEFT ARM    Pseudocholinesterase deficiency     family history    Thyroid disease         Past Surgical History:   Procedure Laterality Date    AXILLARY NODE DISSECTION Left 03/14/2019    Procedure: LYMPHADENECTOMY, AXILLARY;  Surgeon: Mp Gonzalez MD;  Location: [facility] OR;  Service: General;  Laterality: Left;  left lumpectomy with axillary lypmh node dissection    BALLOON DILATION OF URETER Left 06/24/2019    Procedure: DILATION, URETER, USING BALLOON;  Surgeon: Jorden Dickson MD;  Location: [facility] OR;  Service: Urology;  Laterality: Left;    BREAST BIOPSY Left 20 yrs ago    benign    BREAST LUMPECTOMY      x2    CHOLECYSTECTOMY      COLONOSCOPY  09/25/2018    LUC EASTON MD    CYSTOSCOPY W/ URETERAL STENT PLACEMENT Left 06/24/2019    Procedure: CYSTOSCOPY, WITH URETERAL STENT INSERTION;  Surgeon: Jorden Dickson MD;  Location: [facility] OR;  Service: Urology;  Laterality: Left;    CYSTOSCOPY W/ URETERAL STENT REMOVAL Left 07/23/2019    Procedure: CYSTOSCOPY, WITH URETERAL STENT REMOVAL;  Surgeon: Jorden Dickson MD;  Location: [facility] OR;  Service: Urology;  Laterality: Left;    EPIDURAL STEROID INJECTION INTO LUMBAR SPINE N/A 09/30/2021    Procedure: Injection-steroid-epidural-lumbar;  Surgeon: Nathen Lyons MD;  Location: [facility] OR;  Service: Pain Management;  Laterality: N/A;  L5-S1      EPIDURAL STEROID INJECTION INTO LUMBAR SPINE N/A 11/16/2021    Procedure: Injection-steroid-epidural-lumbar;  Surgeon: Nathen Lyons MD;  Location: [facility] OR;  Service: Pain Management;  Laterality: N/A;  L5-S1    EXTRACORPOREAL SHOCK WAVE LITHOTRIPSY Left 07/23/2019    Procedure: LITHOTRIPSY, ESWL;  Surgeon: Jorden Dickson MD;  Location: [facility];   Service: Urology;  Laterality: Left;    EYE SURGERY Bilateral     cataracts    HYSTERECTOMY      MASTECTOMY, PARTIAL Left 04/25/2019    Procedure: MASTECTOMY, PARTIAL;  Surgeon: Mp Gonzalez MD;  Location: [facility] OR;  Service: General;  Laterality: Left;    NEEDLE LOCALIZATION Left 03/14/2019    Procedure: NEEDLE LOCALIZATION;  Surgeon: Mp Gonzalez MD;  Location: [facility] OR;  Service: General;  Laterality: Left;  left lumpectomy with wire needle localization     PARTIAL NEPHRECTOMY Right 05/22/2023    RETROGRADE PYELOGRAPHY Bilateral 06/24/2019    Procedure: PYELOGRAM, RETROGRADE;  Surgeon: Jorden Dickson MD;  Location: [facility] OR;  Service: Urology;  Laterality: Bilateral;    SENTINEL LYMPH NODE BIOPSY Left 03/14/2019    Procedure: BIOPSY, LYMPH NODE, SENTINEL;  Surgeon: Mp Gonzalez MD;  Location: [facility] OR;  Service: General;  Laterality: Left;  left sentinel node lymph node biopsy    TONSILLECTOMY      TRANSFORAMINAL EPIDURAL INJECTION OF STEROID Right 01/04/2022    Procedure: Injection,steroid,epidural,transforaminal approach;  Surgeon: Nathen Lyons MD;  Location: [facility] OR;  Service: Pain Management;  Laterality: Right;  L4-L5, L5-s1    URETEROSCOPY Left 06/24/2019    Procedure: URETEROSCOPY;  Surgeon: oJrden Dickson MD;  Location: [facility] OR;  Service: Urology;  Laterality: Left;        Family History   Problem Relation Age of Onset    Heart disease Mother     Hypertension Mother     Alzheimer's disease Mother     Cancer Father     Heart disease Sister     Diabetes Brother     Stroke Sister     Cancer Daughter        Social History     Tobacco Use    Smoking status: Former     Current packs/day: 0.00     Average packs/day: 0.5 packs/day for 59.3 years (29.6 ttl pk-yrs)     Types: Cigarettes     Start date: 4/25/1960     Quit date: 8/11/2019     Years since quitting: 3.9    Smokeless tobacco: Never   Substance Use Topics    Alcohol use: Yes     Alcohol/week: 2.0 standard drinks of alcohol     Types:  "2 Glasses of wine per week     Comment: Social    Drug use: No         Vitals:    08/03/23 0926   BP: (!) 118/56   Pulse: 63   Resp: 12   Temp: 97.7 °F (36.5 °C)        Physical Exam:  BP (!) 118/56 (BP Location: Right arm, Patient Position: Sitting, BP Method: Large (Automatic))   Pulse 63   Temp 97.7 °F (36.5 °C) (Temporal)   Resp 12   Ht 5' 8" (1.727 m)   Wt 77.5 kg (170 lb 13.7 oz)   SpO2 95%   BMI 25.98 kg/m²     Physical Exam  Constitutional:       Appearance: Normal appearance.   HENT:      Head: Normocephalic and atraumatic.      Nose: Nose normal.      Mouth/Throat:      Mouth: Mucous membranes are moist.      Pharynx: Oropharynx is clear.   Eyes:      Conjunctiva/sclera: Conjunctivae normal.   Cardiovascular:      Rate and Rhythm: Normal rate and regular rhythm.      Heart sounds: Normal heart sounds.   Pulmonary:      Effort: Pulmonary effort is normal.      Breath sounds: Normal breath sounds.   Abdominal:      General: Abdomen is flat. Bowel sounds are normal.      Palpations: Abdomen is soft.   Musculoskeletal:         General: Normal range of motion.      Cervical back: Normal range of motion and neck supple.   Skin:     General: Skin is warm and dry.   Neurological:      General: No focal deficit present.      Mental Status: She is alert and oriented to person, place, and time. Mental status is at baseline.   Psychiatric:         Mood and Affect: Mood normal.          Labs:  Lab Results   Component Value Date    WBC 8.66 07/21/2023    RBC 3.76 (L) 07/21/2023    HGB 11.5 (L) 07/21/2023    HCT 37.6 07/21/2023     (H) 07/21/2023    MCH 30.6 07/21/2023    MCHC 30.6 (L) 07/21/2023    RDW 15.6 (H) 07/21/2023     07/21/2023    MPV 12.2 07/21/2023    GRAN 5.1 07/21/2023    GRAN 59.0 07/21/2023    LYMPH 2.4 07/21/2023    LYMPH 27.8 07/21/2023    MONO 0.9 07/21/2023    MONO 10.0 07/21/2023    EOS 0.2 07/21/2023    BASO 0.06 07/21/2023    EOSINOPHIL 2.3 07/21/2023    BASOPHIL 0.7 07/21/2023 "     Sodium   Date Value Ref Range Status   07/21/2023 141 136 - 145 mmol/L Final   11/28/2018 141 134 - 144 mmol/L      Potassium   Date Value Ref Range Status   07/21/2023 4.5 3.5 - 5.1 mmol/L Final     Chloride   Date Value Ref Range Status   07/21/2023 104 95 - 110 mmol/L Final   11/28/2018 103 98 - 110 mmol/L      CO2   Date Value Ref Range Status   07/21/2023 28 23 - 29 mmol/L Final     Glucose   Date Value Ref Range Status   07/21/2023 80 70 - 110 mg/dL Final   11/28/2018 118 (H) 70 - 99 mg/dL      BUN   Date Value Ref Range Status   07/21/2023 26 (H) 8 - 23 mg/dL Final     Creatinine   Date Value Ref Range Status   07/21/2023 1.7 (H) 0.5 - 1.4 mg/dL Final   11/28/2018 0.86 0.60 - 1.40 mg/dL      Calcium   Date Value Ref Range Status   07/21/2023 10.2 8.7 - 10.5 mg/dL Final     Total Protein   Date Value Ref Range Status   07/21/2023 8.0 6.0 - 8.4 g/dL Final     Albumin   Date Value Ref Range Status   07/21/2023 4.2 3.5 - 5.2 g/dL Final   11/28/2018 4.0 3.1 - 4.7 g/dL      Total Bilirubin   Date Value Ref Range Status   07/21/2023 0.6 0.1 - 1.0 mg/dL Final     Comment:     For infants and newborns, interpretation of results should be based  on gestational age, weight and in agreement with clinical  observations.    Premature Infant recommended reference ranges:  Up to 24 hours.............<8.0 mg/dL  Up to 48 hours............<12.0 mg/dL  3-5 days..................<15.0 mg/dL  6-29 days.................<15.0 mg/dL       Alkaline Phosphatase   Date Value Ref Range Status   07/21/2023 66 55 - 135 U/L Final     AST   Date Value Ref Range Status   07/21/2023 19 10 - 40 U/L Final     ALT   Date Value Ref Range Status   07/21/2023 18 10 - 44 U/L Final     Anion Gap   Date Value Ref Range Status   07/21/2023 9 8 - 16 mmol/L Final     eGFR if    Date Value Ref Range Status   07/20/2022 >60 >60 mL/min/1.73 m^2 Final     eGFR if non    Date Value Ref Range Status   07/20/2022 55 (A) >60  mL/min/1.73 m^2 Final     Comment:     Calculation used to obtain the estimated glomerular filtration  rate (eGFR) is the CKD-EPI equation.          A/P:    Malignant neoplasm of the left breast  -s/p lumpectomy radiation but did not complete adjuvant chemotherapy   -currently on Aromasin, to continue until May 2029   -mammogram due in April 2024   -PET scan negative   -return to clinic in 6 months     Renal cell carcinoma   -s/p right nephrectomy   -pT1b N0  -no adjuvant treatment required     Hypertension   -currently under good control  -follow-up with PCP     Hyperlipidemia  -follow-up with PCP    Rheumatoid arthritis   -I instructed her not to take any NSAIDs given that she has undergone a nephrectomy   -follow up with Rheumatology    Nutritional anemia  -check ferritin, B12, and folate at next visit      Aurash Khoobehi, MD  Hematology and Oncology

## 2023-08-04 DIAGNOSIS — C50.012 MALIGNANT NEOPLASM OF NIPPLE OF LEFT BREAST IN FEMALE, ESTROGEN RECEPTOR POSITIVE: ICD-10-CM

## 2023-08-04 DIAGNOSIS — I10 ESSENTIAL HYPERTENSION, BENIGN: ICD-10-CM

## 2023-08-04 DIAGNOSIS — M05.711 RHEUMATOID ARTHRITIS INVOLVING RIGHT SHOULDER WITH POSITIVE RHEUMATOID FACTOR: ICD-10-CM

## 2023-08-04 DIAGNOSIS — Z17.0 MALIGNANT NEOPLASM OF NIPPLE OF LEFT BREAST IN FEMALE, ESTROGEN RECEPTOR POSITIVE: ICD-10-CM

## 2023-08-04 RX ORDER — EXEMESTANE 25 MG/1
25 TABLET ORAL DAILY
Qty: 90 TABLET | Refills: 5 | Status: SHIPPED | OUTPATIENT
Start: 2023-08-04

## 2023-08-04 RX ORDER — ONDANSETRON 8 MG/1
TABLET, ORALLY DISINTEGRATING ORAL
Qty: 30 TABLET | Refills: 0 | Status: SHIPPED | OUTPATIENT
Start: 2023-08-04 | End: 2023-08-15

## 2023-08-04 NOTE — TELEPHONE ENCOUNTER
----- Message from Kaitlin Coombs sent at 8/4/2023 10:35 AM CDT -----  Contact: pt  Type:  RX Refill Request    Who Called: pt  Refill or New Rx:refill  RX Name and Strength:  exemestane (AROMASIN) 25 mg tablet &   ondansetron (ZOFRAN-ODT) 8 MG TbDL    How is the patient currently taking it? (ex. 1XDay):as directed  Is this a 30 day or 90 day RX:30  Preferred Pharmacy with phone number:  [facility]/pharmacy #[id number] - [place], [place] - [address] AT [facility]  [address]  [place] [address]  Phone: [phone] Fax: [phone]  Local or Mail Order:local  Ordering Provider:Guillermo (out on leave)  Would the patient rather a call back or a response via [facility]? Call back  Best Call Back Number:[phone]  Additional Information: Please refill the above 2 medications.  Please advise  Thanks

## 2023-08-15 DIAGNOSIS — I10 ESSENTIAL HYPERTENSION, BENIGN: ICD-10-CM

## 2023-08-15 DIAGNOSIS — M05.711 RHEUMATOID ARTHRITIS INVOLVING RIGHT SHOULDER WITH POSITIVE RHEUMATOID FACTOR: ICD-10-CM

## 2023-08-15 DIAGNOSIS — Z17.0 MALIGNANT NEOPLASM OF NIPPLE OF LEFT BREAST IN FEMALE, ESTROGEN RECEPTOR POSITIVE: ICD-10-CM

## 2023-08-15 DIAGNOSIS — C50.012 MALIGNANT NEOPLASM OF NIPPLE OF LEFT BREAST IN FEMALE, ESTROGEN RECEPTOR POSITIVE: ICD-10-CM

## 2023-08-15 RX ORDER — ONDANSETRON 8 MG/1
TABLET, ORALLY DISINTEGRATING ORAL
Qty: 30 TABLET | Refills: 0 | Status: SHIPPED | OUTPATIENT
Start: 2023-08-15 | End: 2023-08-28

## 2023-08-22 ENCOUNTER — PATIENT MESSAGE (OUTPATIENT)
Dept: ADMINISTRATIVE | Facility: OTHER | Age: 78
End: 2023-08-22
Payer: MEDICARE

## 2023-08-28 DIAGNOSIS — Z17.0 MALIGNANT NEOPLASM OF NIPPLE OF LEFT BREAST IN FEMALE, ESTROGEN RECEPTOR POSITIVE: ICD-10-CM

## 2023-08-28 DIAGNOSIS — C50.012 MALIGNANT NEOPLASM OF NIPPLE OF LEFT BREAST IN FEMALE, ESTROGEN RECEPTOR POSITIVE: ICD-10-CM

## 2023-08-28 DIAGNOSIS — M05.711 RHEUMATOID ARTHRITIS INVOLVING RIGHT SHOULDER WITH POSITIVE RHEUMATOID FACTOR: ICD-10-CM

## 2023-08-28 DIAGNOSIS — I10 ESSENTIAL HYPERTENSION, BENIGN: ICD-10-CM

## 2023-08-28 RX ORDER — ONDANSETRON 8 MG/1
TABLET, ORALLY DISINTEGRATING ORAL
Qty: 30 TABLET | Refills: 0 | Status: SHIPPED | OUTPATIENT
Start: 2023-08-28 | End: 2023-09-01 | Stop reason: SDUPTHER

## 2023-08-31 DIAGNOSIS — Z17.0 MALIGNANT NEOPLASM OF NIPPLE OF LEFT BREAST IN FEMALE, ESTROGEN RECEPTOR POSITIVE: ICD-10-CM

## 2023-08-31 DIAGNOSIS — M05.711 RHEUMATOID ARTHRITIS INVOLVING RIGHT SHOULDER WITH POSITIVE RHEUMATOID FACTOR: ICD-10-CM

## 2023-08-31 DIAGNOSIS — I10 ESSENTIAL HYPERTENSION, BENIGN: ICD-10-CM

## 2023-08-31 DIAGNOSIS — C50.012 MALIGNANT NEOPLASM OF NIPPLE OF LEFT BREAST IN FEMALE, ESTROGEN RECEPTOR POSITIVE: ICD-10-CM

## 2023-09-01 RX ORDER — ONDANSETRON 8 MG/1
TABLET, ORALLY DISINTEGRATING ORAL
Qty: 30 TABLET | Refills: 0 | Status: SHIPPED | OUTPATIENT
Start: 2023-09-01 | End: 2023-09-18

## 2023-09-06 NOTE — PROGRESS NOTES
"Subjective:      Patient ID: Kirstie Bowden is a 78 y.o. female.    Chief Complaint: Disease Management    HPI    Rheumatologic History:   - Diagnosis/es:              - osteoarthritis  - rheumatoid arthritis diagnosed in 2015  - Positive serologies: + RF (170), +CCP (87.9)  - Negative serologies: -  - Infectious screening labs:  Negative hepatitis-B, C, and QuantiFERON (02/2023)  - Imaging:              - x-ray arthritis survey (02/2023): Chondrocalcinosis in the knees and degenerative changes              - DEXA (9/2021): normal BMD  - Previous Treatments:              - Enbrel: Discontinued due to breast cancer  - Current Treatments:               - MTX 17.5 mg weekly plus folic acid daily: off x 4 months  -  mg daily  - Plaquenil eye exam: No HCQ toxicity 2022, Due for repeat  Interval History:   She has been off methotrexate for the past 4 months as she states she had a kidney removed due to a cyst that was malignant.  She has had more pain in her hands and right shoulder.    Objective:   /77   Pulse 82   Ht 5' 8" (1.727 m)   Wt 78 kg (171 lb 15.3 oz)   BMI 26.15 kg/m²   Physical Exam   Constitutional: normal appearance.   HENT:   Head: Normocephalic and atraumatic.   Pulmonary/Chest: Effort normal.   Musculoskeletal:      Comments: No synovitis, dactylitis, enthesitis, effusions     Neurological: She is alert.   Skin: Skin is warm and dry. No rash noted.   No skin thickening, telangiectasias, calcinosis, psoriasiform lesions, lupoid lesions          6/21/2023   Tender (EMERSON-28) 0 / 28    Swollen (EMERSON-28) 0 / 28    Provider Global 10 mm   Patient Global 10 mm   ESR --   CRP 19.2 mg/L   EMERSON-28 (ESR) --   EMERSON-28 (CRP) 2.18 (Remission)   CDAI Score 2         Assessment:     1. Rheumatoid arthritis involving multiple sites with positive rheumatoid factor    2. High risk medications (not anticoagulants) long-term use    3. Immunosuppression    4. Osteoarthritis, generalized    5. Asymptomatic " menopause        This is a 78-year-old woman with history of HLD, HTN, CKD, nephrolithiasis s/p lithotripsy, sulfa allergy, breast cancer diagnosed in 2020 in remission s/p lumpectomy, chemotherapy and radiation, clear cell renal cell carcinoma s/p nephrectomy (5/23/2023), hypothyroidism, osteoarthritis, and rheumatoid arthritis diagnosed in 2015 and on HCQ 200mg BID (2020- ), tramadol 50mg BID PRN for pain. Restart MTX. Right shoulder CSI completed today.    Plan:     Problem List Items Addressed This Visit    None  Visit Diagnoses       Rheumatoid arthritis involving multiple sites with positive rheumatoid factor    -  Primary    Relevant Medications    methotrexate 2.5 MG Tab    Other Relevant Orders    CBC Auto Differential    Comprehensive Metabolic Panel    Sedimentation rate    C-Reactive Protein    DXA Bone Density Axial Skeleton 1 or more sites    Large Joint Aspiration/Injection: R glenohumeral    High risk medications (not anticoagulants) long-term use        Relevant Medications    methotrexate 2.5 MG Tab    Other Relevant Orders    CBC Auto Differential    Comprehensive Metabolic Panel    Sedimentation rate    C-Reactive Protein    Immunosuppression        Relevant Medications    methotrexate 2.5 MG Tab    Other Relevant Orders    CBC Auto Differential    Comprehensive Metabolic Panel    Sedimentation rate    C-Reactive Protein    Osteoarthritis, generalized        Relevant Orders    Large Joint Aspiration/Injection: R glenohumeral    Asymptomatic menopause        Relevant Orders    DXA Bone Density Axial Skeleton 1 or more sites          1.) RA  - Restart MTX 17.5 mg weekly and continue folic acid daily   - HCQ 400mg daily  - Right shoulder CSI completed today  - Avoid Yuriy and TNFi due to strong personal history of cancer  - CBC, CMP, ESR, CRP in 4 weeks, then every 12 weeks  - Pre-DMARD labs due for repeat 2/2024  - Immunizations: COVID x 3, flu (10/2022), Shingrix x 2, patient will check if  pneumonia vaccine is up to date   - Due for Plaquenil eye exam   - DEXA due for repeat 9/2023     2.) Bilateral carpal tunnel syndrome: patient would like to hold off on hand surgery referral as she just had surgery  - Wrist braces for now    Follow up in 3 months    30 minutes of total time spent on the encounter, which includes face to face time and non-face to face time preparing to see the patient (eg, review of tests), Obtaining and/or reviewing separately obtained history, Documenting clinical information in the electronic or other health record, Independently interpreting results (not separately reported) and communicating results to the patient/family/caregiver, or Care coordination (not separately reported).       Khushboo Aguirre M.D.  Rheumatology Dept  [place]

## 2023-09-07 ENCOUNTER — OFFICE VISIT (OUTPATIENT)
Dept: RHEUMATOLOGY | Facility: CLINIC | Age: 78
End: 2023-09-07
Payer: MEDICARE

## 2023-09-07 VITALS
WEIGHT: 171.94 LBS | HEIGHT: 68 IN | DIASTOLIC BLOOD PRESSURE: 77 MMHG | HEART RATE: 82 BPM | SYSTOLIC BLOOD PRESSURE: 138 MMHG | BODY MASS INDEX: 26.06 KG/M2

## 2023-09-07 DIAGNOSIS — D84.9 IMMUNOSUPPRESSION: ICD-10-CM

## 2023-09-07 DIAGNOSIS — M05.79 RHEUMATOID ARTHRITIS INVOLVING MULTIPLE SITES WITH POSITIVE RHEUMATOID FACTOR: Primary | ICD-10-CM

## 2023-09-07 DIAGNOSIS — Z78.0 ASYMPTOMATIC MENOPAUSE: ICD-10-CM

## 2023-09-07 DIAGNOSIS — M15.9 OSTEOARTHRITIS, GENERALIZED: ICD-10-CM

## 2023-09-07 DIAGNOSIS — Z79.899 HIGH RISK MEDICATIONS (NOT ANTICOAGULANTS) LONG-TERM USE: ICD-10-CM

## 2023-09-07 PROCEDURE — 99999PBSHW PR PBB SHADOW TECHNICAL ONLY FILED TO HB: Mod: PBBFAC,,,

## 2023-09-07 PROCEDURE — 20610 DRAIN/INJ JOINT/BURSA W/O US: CPT | Mod: PBBFAC,PN | Performed by: STUDENT IN AN ORGANIZED HEALTH CARE EDUCATION/TRAINING PROGRAM

## 2023-09-07 PROCEDURE — 99999PBSHW PR PBB SHADOW TECHNICAL ONLY FILED TO HB: ICD-10-PCS | Mod: PBBFAC,,,

## 2023-09-07 PROCEDURE — 99215 PR OFFICE/OUTPT VISIT, EST, LEVL V, 40-54 MIN: ICD-10-PCS | Mod: S$PBB,25,, | Performed by: STUDENT IN AN ORGANIZED HEALTH CARE EDUCATION/TRAINING PROGRAM

## 2023-09-07 PROCEDURE — 99215 OFFICE O/P EST HI 40 MIN: CPT | Mod: S$PBB,25,, | Performed by: STUDENT IN AN ORGANIZED HEALTH CARE EDUCATION/TRAINING PROGRAM

## 2023-09-07 PROCEDURE — 99999 PR PBB SHADOW E&M-EST. PATIENT-LVL IV: CPT | Mod: PBBFAC,,, | Performed by: STUDENT IN AN ORGANIZED HEALTH CARE EDUCATION/TRAINING PROGRAM

## 2023-09-07 PROCEDURE — 20610 LARGE JOINT ASPIRATION/INJECTION: R GLENOHUMERAL: ICD-10-PCS | Mod: S$PBB,RT,, | Performed by: STUDENT IN AN ORGANIZED HEALTH CARE EDUCATION/TRAINING PROGRAM

## 2023-09-07 PROCEDURE — 99214 OFFICE O/P EST MOD 30 MIN: CPT | Mod: PBBFAC,PN,25 | Performed by: STUDENT IN AN ORGANIZED HEALTH CARE EDUCATION/TRAINING PROGRAM

## 2023-09-07 PROCEDURE — 99999 PR PBB SHADOW E&M-EST. PATIENT-LVL IV: ICD-10-PCS | Mod: PBBFAC,,, | Performed by: STUDENT IN AN ORGANIZED HEALTH CARE EDUCATION/TRAINING PROGRAM

## 2023-09-07 PROCEDURE — 20610 DRAIN/INJ JOINT/BURSA W/O US: CPT | Mod: PBBFAC,RT | Performed by: STUDENT IN AN ORGANIZED HEALTH CARE EDUCATION/TRAINING PROGRAM

## 2023-09-07 RX ORDER — LIDOCAINE HYDROCHLORIDE 10 MG/ML
4 INJECTION INFILTRATION; PERINEURAL
Status: DISCONTINUED | OUTPATIENT
Start: 2023-09-07 | End: 2023-09-07 | Stop reason: HOSPADM

## 2023-09-07 RX ORDER — TRIAMCINOLONE ACETONIDE 40 MG/ML
40 INJECTION, SUSPENSION INTRA-ARTICULAR; INTRAMUSCULAR
Status: DISCONTINUED | OUTPATIENT
Start: 2023-09-07 | End: 2023-09-07 | Stop reason: HOSPADM

## 2023-09-07 RX ORDER — METHOTREXATE 2.5 MG/1
17.5 TABLET ORAL
Qty: 84 TABLET | Refills: 1 | Status: SHIPPED | OUTPATIENT
Start: 2023-09-07 | End: 2024-03-05

## 2023-09-07 RX ADMIN — TRIAMCINOLONE ACETONIDE 40 MG: 40 INJECTION, SUSPENSION INTRA-ARTICULAR; INTRAMUSCULAR at 04:09

## 2023-09-07 RX ADMIN — LIDOCAINE HYDROCHLORIDE 4 ML: 10 INJECTION, SOLUTION INFILTRATION; PERINEURAL at 04:09

## 2023-09-07 NOTE — PROCEDURES
Large Joint Aspiration/Injection: R glenohumeral    Date/Time: 9/7/2023 4:00 PM    Performed by: Khushboo Aguirre MD  Authorized by: Khushboo Aguirre MD    Consent Done?:  Yes (Verbal)  Indications:  Pain  Site marked: the procedure site was marked    Timeout: prior to procedure the correct patient, procedure, and site was verified      Details:  Needle Size:  25 G  Ultrasonic Guidance for needle placement?: No    Approach:  Posterior  Location:  Shoulder  Site:  R glenohumeral  Medications:  4 mL LIDOcaine HCL 10 mg/ml (1%) 10 mg/mL (1 %); 40 mg triamcinolone acetonide 40 mg/mL  Patient tolerance:  Patient tolerated the procedure well with no immediate complications

## 2023-09-12 ENCOUNTER — LAB VISIT (OUTPATIENT)
Dept: LAB | Facility: HOSPITAL | Age: 78
End: 2023-09-12
Attending: SPECIALIST
Payer: MEDICARE

## 2023-09-12 DIAGNOSIS — C64.1 MALIGNANT NEOPLASM OF RIGHT KIDNEY, EXCEPT RENAL PELVIS: Primary | ICD-10-CM

## 2023-09-12 LAB
ANION GAP SERPL CALC-SCNC: 8 MMOL/L (ref 8–16)
BASOPHILS # BLD AUTO: 0.03 K/UL (ref 0–0.2)
BASOPHILS NFR BLD: 0.3 % (ref 0–1.9)
BUN SERPL-MCNC: 34 MG/DL (ref 8–23)
CALCIUM SERPL-MCNC: 10 MG/DL (ref 8.7–10.5)
CHLORIDE SERPL-SCNC: 105 MMOL/L (ref 95–110)
CO2 SERPL-SCNC: 27 MMOL/L (ref 23–29)
CREAT SERPL-MCNC: 1.5 MG/DL (ref 0.5–1.4)
DIFFERENTIAL METHOD: ABNORMAL
EOSINOPHIL # BLD AUTO: 0.1 K/UL (ref 0–0.5)
EOSINOPHIL NFR BLD: 1 % (ref 0–8)
ERYTHROCYTE [DISTWIDTH] IN BLOOD BY AUTOMATED COUNT: 14.1 % (ref 11.5–14.5)
EST. GFR  (NO RACE VARIABLE): 35.7 ML/MIN/1.73 M^2
GLUCOSE SERPL-MCNC: 92 MG/DL (ref 70–110)
HCT VFR BLD AUTO: 39.1 % (ref 37–48.5)
HGB BLD-MCNC: 12.2 G/DL (ref 12–16)
IMM GRANULOCYTES # BLD AUTO: 0.08 K/UL (ref 0–0.04)
IMM GRANULOCYTES NFR BLD AUTO: 0.7 % (ref 0–0.5)
LYMPHOCYTES # BLD AUTO: 2.7 K/UL (ref 1–4.8)
LYMPHOCYTES NFR BLD: 23.5 % (ref 18–48)
MCH RBC QN AUTO: 30.1 PG (ref 27–31)
MCHC RBC AUTO-ENTMCNC: 31.2 G/DL (ref 32–36)
MCV RBC AUTO: 97 FL (ref 82–98)
MONOCYTES # BLD AUTO: 0.7 K/UL (ref 0.3–1)
MONOCYTES NFR BLD: 5.8 % (ref 4–15)
NEUTROPHILS # BLD AUTO: 8 K/UL (ref 1.8–7.7)
NEUTROPHILS NFR BLD: 68.7 % (ref 38–73)
NRBC BLD-RTO: 0 /100 WBC
PLATELET # BLD AUTO: 278 K/UL (ref 150–450)
PMV BLD AUTO: 10.9 FL (ref 9.2–12.9)
POTASSIUM SERPL-SCNC: 4.3 MMOL/L (ref 3.5–5.1)
RBC # BLD AUTO: 4.05 M/UL (ref 4–5.4)
SODIUM SERPL-SCNC: 140 MMOL/L (ref 136–145)
WBC # BLD AUTO: 11.66 K/UL (ref 3.9–12.7)

## 2023-09-12 PROCEDURE — 36415 COLL VENOUS BLD VENIPUNCTURE: CPT | Performed by: SPECIALIST

## 2023-09-12 PROCEDURE — 85025 COMPLETE CBC W/AUTO DIFF WBC: CPT | Performed by: SPECIALIST

## 2023-09-12 PROCEDURE — 80048 BASIC METABOLIC PNL TOTAL CA: CPT | Performed by: SPECIALIST

## 2023-09-15 DIAGNOSIS — M05.711 RHEUMATOID ARTHRITIS INVOLVING RIGHT SHOULDER WITH POSITIVE RHEUMATOID FACTOR: ICD-10-CM

## 2023-09-15 DIAGNOSIS — I10 ESSENTIAL HYPERTENSION, BENIGN: ICD-10-CM

## 2023-09-15 DIAGNOSIS — Z17.0 MALIGNANT NEOPLASM OF NIPPLE OF LEFT BREAST IN FEMALE, ESTROGEN RECEPTOR POSITIVE: ICD-10-CM

## 2023-09-15 DIAGNOSIS — C50.012 MALIGNANT NEOPLASM OF NIPPLE OF LEFT BREAST IN FEMALE, ESTROGEN RECEPTOR POSITIVE: ICD-10-CM

## 2023-09-18 RX ORDER — ONDANSETRON 8 MG/1
TABLET, ORALLY DISINTEGRATING ORAL
Qty: 30 TABLET | Refills: 0 | Status: SHIPPED | OUTPATIENT
Start: 2023-09-18 | End: 2023-10-17

## 2023-09-19 DIAGNOSIS — C64.1 RENAL CANCER, RIGHT: Primary | ICD-10-CM

## 2023-10-10 ENCOUNTER — OFFICE VISIT (OUTPATIENT)
Dept: PULMONOLOGY | Facility: CLINIC | Age: 78
End: 2023-10-10
Payer: MEDICARE

## 2023-10-10 ENCOUNTER — LAB VISIT (OUTPATIENT)
Dept: LAB | Facility: HOSPITAL | Age: 78
End: 2023-10-10
Attending: STUDENT IN AN ORGANIZED HEALTH CARE EDUCATION/TRAINING PROGRAM
Payer: MEDICARE

## 2023-10-10 VITALS
WEIGHT: 168 LBS | SYSTOLIC BLOOD PRESSURE: 120 MMHG | OXYGEN SATURATION: 96 % | BODY MASS INDEX: 25.46 KG/M2 | DIASTOLIC BLOOD PRESSURE: 70 MMHG | HEIGHT: 68 IN | HEART RATE: 74 BPM

## 2023-10-10 DIAGNOSIS — M05.79 RHEUMATOID ARTHRITIS INVOLVING MULTIPLE SITES WITH POSITIVE RHEUMATOID FACTOR: ICD-10-CM

## 2023-10-10 DIAGNOSIS — D84.9 IMMUNOSUPPRESSION: ICD-10-CM

## 2023-10-10 DIAGNOSIS — J44.9 CHRONIC OBSTRUCTIVE PULMONARY DISEASE, UNSPECIFIED COPD TYPE: Primary | ICD-10-CM

## 2023-10-10 DIAGNOSIS — Z79.899 HIGH RISK MEDICATIONS (NOT ANTICOAGULANTS) LONG-TERM USE: ICD-10-CM

## 2023-10-10 LAB — ERYTHROCYTE [SEDIMENTATION RATE] IN BLOOD BY WESTERGREN METHOD: 53 MM/HR (ref 0–20)

## 2023-10-10 PROCEDURE — 99213 OFFICE O/P EST LOW 20 MIN: CPT | Mod: 25,S$GLB,, | Performed by: INTERNAL MEDICINE

## 2023-10-10 PROCEDURE — 85651 RBC SED RATE NONAUTOMATED: CPT | Mod: PO | Performed by: STUDENT IN AN ORGANIZED HEALTH CARE EDUCATION/TRAINING PROGRAM

## 2023-10-10 PROCEDURE — 85025 COMPLETE CBC W/AUTO DIFF WBC: CPT | Performed by: STUDENT IN AN ORGANIZED HEALTH CARE EDUCATION/TRAINING PROGRAM

## 2023-10-10 PROCEDURE — 80053 COMPREHEN METABOLIC PANEL: CPT | Performed by: STUDENT IN AN ORGANIZED HEALTH CARE EDUCATION/TRAINING PROGRAM

## 2023-10-10 PROCEDURE — 90694 VACC AIIV4 NO PRSRV 0.5ML IM: CPT | Mod: S$GLB,,, | Performed by: INTERNAL MEDICINE

## 2023-10-10 PROCEDURE — 90694 FLU VACCINE - QUADRIVALENT - ADJUVANTED: ICD-10-PCS | Mod: S$GLB,,, | Performed by: INTERNAL MEDICINE

## 2023-10-10 PROCEDURE — 99213 PR OFFICE/OUTPT VISIT, EST, LEVL III, 20-29 MIN: ICD-10-PCS | Mod: 25,S$GLB,, | Performed by: INTERNAL MEDICINE

## 2023-10-10 PROCEDURE — G0008 FLU VACCINE - QUADRIVALENT - ADJUVANTED: ICD-10-PCS | Mod: S$GLB,,, | Performed by: INTERNAL MEDICINE

## 2023-10-10 PROCEDURE — 86140 C-REACTIVE PROTEIN: CPT | Performed by: STUDENT IN AN ORGANIZED HEALTH CARE EDUCATION/TRAINING PROGRAM

## 2023-10-10 PROCEDURE — 36415 COLL VENOUS BLD VENIPUNCTURE: CPT | Mod: PO | Performed by: STUDENT IN AN ORGANIZED HEALTH CARE EDUCATION/TRAINING PROGRAM

## 2023-10-10 PROCEDURE — G0008 ADMIN INFLUENZA VIRUS VAC: HCPCS | Mod: S$GLB,,, | Performed by: INTERNAL MEDICINE

## 2023-10-10 RX ORDER — LEVOTHYROXINE SODIUM 125 UG/1
125 TABLET ORAL
COMMUNITY
Start: 2023-10-09

## 2023-10-10 NOTE — PROGRESS NOTES
SUBJECTIVE:    Patient ID: Kirstie Bowden is a 78 y.o. female.    Chief Complaint: Asthma, Shortness of Breath, and Follow-up    HPI She is using her Advair faithfully bid and is breathing much better.She has used her albuterol occasionally.  The patient had a nephrectomy 4 months ago.  The patient is actually COPD not asthma.  She is moderately obstructed with no response to bronchodilators on her last PFTs  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    Hyperlipidemia     Hypertension     Limb alert care status     NO BP/IV LEFT ARM    Pseudocholinesterase deficiency     family history    Thyroid disease      Past Surgical History:   Procedure Laterality Date    AXILLARY NODE DISSECTION Left 03/14/2019    Procedure: LYMPHADENECTOMY, AXILLARY;  Surgeon: Mp Gonzalez MD;  Location: [facility] OR;  Service: General;  Laterality: Left;  left lumpectomy with axillary lypmh node dissection    BALLOON DILATION OF URETER Left 06/24/2019    Procedure: DILATION, URETER, USING BALLOON;  Surgeon: Jorden Dickson MD;  Location: [facility] OR;  Service: Urology;  Laterality: Left;    BREAST BIOPSY Left 20 yrs ago    benign    BREAST LUMPECTOMY      x2    CHOLECYSTECTOMY      COLONOSCOPY  09/25/2018    LUC EASTON MD    CYSTOSCOPY W/ URETERAL STENT PLACEMENT Left 06/24/2019    Procedure: CYSTOSCOPY, WITH URETERAL STENT INSERTION;  Surgeon: Jorden Dickson MD;  Location: [facility] OR;  Service: Urology;  Laterality: Left;    CYSTOSCOPY W/ URETERAL STENT REMOVAL Left 07/23/2019    Procedure: CYSTOSCOPY, WITH URETERAL STENT REMOVAL;  Surgeon: Jorden Dickson MD;  Location: [facility] OR;  Service: Urology;  Laterality: Left;    EPIDURAL STEROID INJECTION INTO LUMBAR SPINE N/A 09/30/2021    Procedure: Injection-steroid-epidural-lumbar;  Surgeon: Nathen Lyons MD;  Location: [facility] OR;  Service: Pain Management;  Laterality: N/A;  L5-S1      EPIDURAL STEROID INJECTION  INTO LUMBAR SPINE N/A 11/16/2021    Procedure: Injection-steroid-epidural-lumbar;  Surgeon: Nathen Lyons MD;  Location: [facility] OR;  Service: Pain Management;  Laterality: N/A;  L5-S1    EXTRACORPOREAL SHOCK WAVE LITHOTRIPSY Left 07/23/2019    Procedure: LITHOTRIPSY, ESWL;  Surgeon: Jorden Dickson MD;  Location: [facility] OR;  Service: Urology;  Laterality: Left;    EYE SURGERY Bilateral     cataracts    HYSTERECTOMY      MASTECTOMY, PARTIAL Left 04/25/2019    Procedure: MASTECTOMY, PARTIAL;  Surgeon: Mp Gonzalez MD;  Location: [facility] OR;  Service: General;  Laterality: Left;    NEEDLE LOCALIZATION Left 03/14/2019    Procedure: NEEDLE LOCALIZATION;  Surgeon: Mp Gonzalez MD;  Location: [facility] OR;  Service: General;  Laterality: Left;  left lumpectomy with wire needle localization     PARTIAL NEPHRECTOMY Right 05/22/2023    RETROGRADE PYELOGRAPHY Bilateral 06/24/2019    Procedure: PYELOGRAM, RETROGRADE;  Surgeon: Jorden Dickson MD;  Location: [facility] OR;  Service: Urology;  Laterality: Bilateral;    SENTINEL LYMPH NODE BIOPSY Left 03/14/2019    Procedure: BIOPSY, LYMPH NODE, SENTINEL;  Surgeon: Mp Gonzalez MD;  Location: [facility] OR;  Service: General;  Laterality: Left;  left sentinel node lymph node biopsy    TONSILLECTOMY      TRANSFORAMINAL EPIDURAL INJECTION OF STEROID Right 01/04/2022    Procedure: Injection,steroid,epidural,transforaminal approach;  Surgeon: Nathen Lyons MD;  Location: [facility] OR;  Service: Pain Management;  Laterality: Right;  L4-L5, L5-s1    URETEROSCOPY Left 06/24/2019    Procedure: URETEROSCOPY;  Surgeon: Jorden Dickson MD;  Location: [facility] OR;  Service: Urology;  Laterality: Left;     Family History   Problem Relation Age of Onset    Heart disease Mother     Hypertension Mother     Alzheimer's disease Mother     Cancer Father     Heart disease Sister     Diabetes Brother     Stroke Sister     Cancer Daughter         Social History:   Marital Status:   Occupation: Data  Unavailable  Alcohol History:  reports current alcohol use of about 2.0 standard drinks of alcohol per week.  Tobacco History:  reports that she quit smoking about 4 years ago. Her smoking use included cigarettes. She started smoking about 63 years ago. She has a 29.6 pack-year smoking history. She has never used smokeless tobacco.  Drug History:  reports no history of drug use.    Review of patient's allergies indicates:   Allergen Reactions    Succinylcholine chloride Other (See Comments)    Sulfur dioxide Hives    Sulfamethoxazole-trimethoprim      Other reaction(s): per dr daryn Rodríguez (sulfonamide antibiotics)      NOT SURE REACTION       Current Outpatient Medications   Medication Sig Dispense Refill    albuterol (PROAIR HFA) 90 mcg/actuation inhaler Inhale 2 puffs into the lungs every 6 (six) hours as needed for Wheezing. Rescue 18 g 11    amLODIPine (NORVASC) 5 MG tablet Take 1 tablet (5 mg total) by mouth once daily. 90 tablet 3    atorvastatin (LIPITOR) 20 MG tablet Take 1 tablet (20 mg total) by mouth once daily. 90 tablet 3    exemestane (AROMASIN) 25 mg tablet Take 1 tablet (25 mg total) by mouth once daily. 90 tablet 5    fluticasone-salmeterol diskus inhaler 250-50 mcg Inhale 1 puff into the lungs 2 (two) times daily. Controller 180 each 3    folic acid (FOLVITE) 1 MG tablet Take 1 tablet (1,000 mcg total) by mouth once daily. 90 tablet 3    gabapentin (NEURONTIN) 300 MG capsule TAKE 1 CAPSULE BY MOUTH IN THE EVENING 30 capsule 2    hydroCHLOROthiazide (HYDRODIURIL) 25 MG tablet Take 1 tablet (25 mg total) by mouth once daily. 90 tablet 3    hydrOXYchloroQUINE (PLAQUENIL) 200 mg tablet Take 1 tablet (200 mg total) by mouth 2 (two) times daily. 180 tablet 3    levothyroxine (SYNTHROID) 125 MCG tablet Take 125 mcg by mouth.      methotrexate 2.5 MG Tab Take 7 tablets (17.5 mg total) by mouth every 7 days. 84 tablet 1    ondansetron (ZOFRAN-ODT) 8 MG TbDL DISSOLVE 1 TABLET IN MOUTH EVERY 12 HOURS AS  NEEDED 30 tablet 0    pantoprazole (PROTONIX) 40 MG tablet TAKE 1 TABLET BY MOUTH ONCE DAILY IN THE MORNING FOR 90 DAYS      thiamine 100 MG tablet Take 100 mg by mouth once daily.      traMADoL (ULTRAM) 50 mg tablet Take 1 tablet (50 mg total) by mouth every 12 (twelve) hours as needed for Pain (severe pain). TAKE 1 TABLET BY MOUTH EVERY 8 HOURS AS NEEDED FOR SEVERE PAIN- DOSE DECREASE 60 tablet 1    VITAMIN B COMPLEX ORAL Every day      zolpidem (AMBIEN) 5 MG Tab Take 1 tablet (5 mg total) by mouth nightly as needed (sleep). 30 tablet 3    levothyroxine (SYNTHROID) 112 MCG tablet Take 1 tablet (112 mcg total) by mouth before breakfast. 90 tablet 3    prochlorperazine (COMPAZINE) 10 MG tablet TAKE 1 TABLET BY MOUTH EVERY 6 HOURS AS NEEDED (Patient not taking: Reported on 7/10/2023) 60 tablet 0     No current facility-administered medications for this visit.     Facility-Administered Medications Ordered in Other Visits   Medication Dose Route Frequency Provider Last Rate Last Admin    lactated ringers infusion   Intravenous Once PRN Nathen Lyons MD        lidocaine (PF) 10 mg/ml (1%) injection 10 mg  1 mL Intradermal Once Eladia Schwartz MD           Alpha-1 Antitrypsin:  Last PFT:  02/28/2023  Moderate obstruction with no change with bronchodilators, no restriction, moderate diffusion defect.  6 minute walk:  02/28/2023  Non hypoxemic  Last PET/CT:7/21/23    1. No findings of recurrent FDG avid neoplasm or metastatic disease.  2. Additional observations as described.      Review of Systems  General: Feeling Well.  Eyes: Vision is good.  ENT: Acid reflux  Heart:: No chest pain or palpitations.  Lungs:  Her breathing is good.  GI: reflux.  Which makes her cough  : Nocturia at 0600.  Musculoskeletal: arthritis in upper ext and neck  Skin: No lesions or rashes.Bruises easily  Neuro: Neuropathy in fingers  Lymph: ankles swell sometimes  Psych: No anxiety or depression.  Endo: No weight change.    OBJECTIVE:     "  /70 (BP Location: Right arm, Patient Position: Sitting, BP Method: Medium (Manual))   Pulse 74   Ht 5' 8" (1.727 m)   Wt 76.2 kg (168 lb)   SpO2 96%   BMI 25.54 kg/m²     Physical Exam  GENERAL: Older patient in no distress.  HEENT: Pupils equal and reactive. Extraocular movements intact. Nose intact.      Pharynx moist.  NECK: Supple.   HEART: Regular rate and rhythm. No murmur or gallop auscultated.  LUNGS: Clear to auscultation and percussion. Lung excursion symmetrical. No change in fremitus. No adventitial noises.  ABDOMEN: Bowel sounds present. Non-tender, no masses palpated.  EXTREMITIES: Normal muscle tone and joint movement, no cyanosis or clubbing.   LYMPHATICS: No adenopathy palpated, no edema.  SKIN: Dry, intact, no lesions.   NEURO: Cranial nerves II-XII intact. Motor strength 5/5 bilaterally, upper and lower extremities.  PSYCH: Appropriate affect.    Assessment:       1. Chronic obstructive pulmonary disease, unspecified COPD type            S/p Shingrix, Prevnar  Plan:       Chronic obstructive pulmonary disease, unspecified COPD type  -     Influenza (FLUAD) - Quadrivalent (Adjuvanted) *Preferred* (65+) (PF)             No follow-ups on file.  Continue Advair, rinse mouth after  Flu shot today           "

## 2023-10-11 LAB
ALBUMIN SERPL BCP-MCNC: 3.9 G/DL (ref 3.5–5.2)
ALP SERPL-CCNC: 80 U/L (ref 55–135)
ALT SERPL W/O P-5'-P-CCNC: 21 U/L (ref 10–44)
ANION GAP SERPL CALC-SCNC: 9 MMOL/L (ref 8–16)
AST SERPL-CCNC: 21 U/L (ref 10–40)
BASOPHILS # BLD AUTO: 0.02 K/UL (ref 0–0.2)
BASOPHILS NFR BLD: 0.3 % (ref 0–1.9)
BILIRUB SERPL-MCNC: 0.4 MG/DL (ref 0.1–1)
BUN SERPL-MCNC: 21 MG/DL (ref 8–23)
CALCIUM SERPL-MCNC: 10 MG/DL (ref 8.7–10.5)
CHLORIDE SERPL-SCNC: 104 MMOL/L (ref 95–110)
CO2 SERPL-SCNC: 27 MMOL/L (ref 23–29)
CREAT SERPL-MCNC: 1.5 MG/DL (ref 0.5–1.4)
CRP SERPL-MCNC: 5.5 MG/L (ref 0–8.2)
DIFFERENTIAL METHOD: ABNORMAL
EOSINOPHIL # BLD AUTO: 0.1 K/UL (ref 0–0.5)
EOSINOPHIL NFR BLD: 1.5 % (ref 0–8)
ERYTHROCYTE [DISTWIDTH] IN BLOOD BY AUTOMATED COUNT: 15.5 % (ref 11.5–14.5)
EST. GFR  (NO RACE VARIABLE): 35.7 ML/MIN/1.73 M^2
GLUCOSE SERPL-MCNC: 88 MG/DL (ref 70–110)
HCT VFR BLD AUTO: 40.3 % (ref 37–48.5)
HGB BLD-MCNC: 12.2 G/DL (ref 12–16)
IMM GRANULOCYTES # BLD AUTO: 0.02 K/UL (ref 0–0.04)
IMM GRANULOCYTES NFR BLD AUTO: 0.3 % (ref 0–0.5)
LYMPHOCYTES # BLD AUTO: 1.9 K/UL (ref 1–4.8)
LYMPHOCYTES NFR BLD: 26.1 % (ref 18–48)
MCH RBC QN AUTO: 30 PG (ref 27–31)
MCHC RBC AUTO-ENTMCNC: 30.3 G/DL (ref 32–36)
MCV RBC AUTO: 99 FL (ref 82–98)
MONOCYTES # BLD AUTO: 0.4 K/UL (ref 0.3–1)
MONOCYTES NFR BLD: 5.2 % (ref 4–15)
NEUTROPHILS # BLD AUTO: 4.9 K/UL (ref 1.8–7.7)
NEUTROPHILS NFR BLD: 66.6 % (ref 38–73)
NRBC BLD-RTO: 0 /100 WBC
PLATELET # BLD AUTO: 251 K/UL (ref 150–450)
PMV BLD AUTO: 12 FL (ref 9.2–12.9)
POTASSIUM SERPL-SCNC: 4.5 MMOL/L (ref 3.5–5.1)
PROT SERPL-MCNC: 7.6 G/DL (ref 6–8.4)
RBC # BLD AUTO: 4.07 M/UL (ref 4–5.4)
SODIUM SERPL-SCNC: 140 MMOL/L (ref 136–145)
WBC # BLD AUTO: 7.31 K/UL (ref 3.9–12.7)

## 2023-10-12 ENCOUNTER — HOSPITAL ENCOUNTER (OUTPATIENT)
Dept: RADIOLOGY | Facility: HOSPITAL | Age: 78
Discharge: HOME | End: 2023-10-12
Attending: STUDENT IN AN ORGANIZED HEALTH CARE EDUCATION/TRAINING PROGRAM
Payer: MEDICARE

## 2023-10-12 DIAGNOSIS — Z78.0 ASYMPTOMATIC MENOPAUSE: ICD-10-CM

## 2023-10-12 PROCEDURE — 77080 DXA BONE DENSITY AXIAL: CPT | Mod: TC,PO

## 2023-10-17 DIAGNOSIS — C50.012 MALIGNANT NEOPLASM OF NIPPLE OF LEFT BREAST IN FEMALE, ESTROGEN RECEPTOR POSITIVE: ICD-10-CM

## 2023-10-17 DIAGNOSIS — I10 ESSENTIAL HYPERTENSION, BENIGN: ICD-10-CM

## 2023-10-17 DIAGNOSIS — M05.711 RHEUMATOID ARTHRITIS INVOLVING RIGHT SHOULDER WITH POSITIVE RHEUMATOID FACTOR: ICD-10-CM

## 2023-10-17 DIAGNOSIS — Z17.0 MALIGNANT NEOPLASM OF NIPPLE OF LEFT BREAST IN FEMALE, ESTROGEN RECEPTOR POSITIVE: ICD-10-CM

## 2023-10-17 RX ORDER — ONDANSETRON 8 MG/1
TABLET, ORALLY DISINTEGRATING ORAL
Qty: 30 TABLET | Refills: 0 | Status: SHIPPED | OUTPATIENT
Start: 2023-10-17 | End: 2023-11-06

## 2023-10-19 ENCOUNTER — TELEPHONE (OUTPATIENT)
Dept: SPINE | Facility: CLINIC | Age: 78
End: 2023-10-19
Payer: MEDICARE

## 2023-10-19 DIAGNOSIS — F51.01 PRIMARY INSOMNIA: Primary | ICD-10-CM

## 2023-10-19 RX ORDER — ZOLPIDEM TARTRATE 5 MG/1
5 TABLET ORAL NIGHTLY PRN
Qty: 30 TABLET | Refills: 3 | Status: SHIPPED | OUTPATIENT
Start: 2023-10-19 | End: 2024-03-21

## 2023-10-19 NOTE — TELEPHONE ENCOUNTER
No care due was identified.  [facility] Embedded Care Due Messages. Reference number: [id number].   10/19/2023 11:31:21 AM CDT

## 2023-10-19 NOTE — TELEPHONE ENCOUNTER
----- Message from Maria De Jesus Thibodeaux MA sent at 10/19/2023 10:09 AM CDT -----  Type:  RX Refill Request    Who Called:  pt  Refill or New Rx:  refill  RX Name and Strength:  zolpidem (AMBIEN) 5 MG Tab  How is the patient currently taking it? (ex. 1XDay):  as as directed   Is this a 30 day or 90 day RX:  90 day  Preferred Pharmacy with phone number:    [facility]/pharmacy #[id number] - [place], [place] - [address] [facility]  [address]  [place] [address]  Phone: [phone] Fax: [phone]  Local or Mail Order:  local  Ordering Provider:  Janelle  Best Call Back Number:  [phone]    Additional Information:  please advise

## 2023-10-19 NOTE — TELEPHONE ENCOUNTER
----- Message from Stacey M Lefort sent at 10/19/2023 10:07 AM CDT -----  Pt is needs a nurse callback at [phone]. Thank you.

## 2023-10-25 NOTE — TELEPHONE ENCOUNTER
No care due was identified.  [facility] Embedded Care Due Messages. Reference number: [id number].   10/25/2023 4:23:10 PM CDT

## 2023-10-25 NOTE — TELEPHONE ENCOUNTER
Patient called requesting prescription for Scopolamine patch going on cruise   Medication pended       ----- Message from Tabby Angeles sent at 10/25/2023  4:07 PM CDT -----  Type:  RX Refill Request    Who Called:  pt  Refill or New Rx:  new rx   RX Name and Strength:  nausea patch   How is the patient currently taking it? (ex. 1XDay):  as directed   Preferred Pharmacy with phone number:    [facility]/pharmacy #[id number] - [place], [place] - [address] AT [facility]  [address]  [place] [address]  Phone: [phone] Fax: [phone]      Local or Mail Order:  local   Ordering Provider:  Janelle Dotson Call Back Number:  [phone]    Additional Information:  pt stated she would like to get rx before leaving for cruise please call back to advise asap thanks!

## 2023-10-26 RX ORDER — SCOLOPAMINE TRANSDERMAL SYSTEM 1 MG/1
1 PATCH, EXTENDED RELEASE TRANSDERMAL
Qty: 4 PATCH | Refills: 0 | Status: SHIPPED | OUTPATIENT
Start: 2023-10-26

## 2023-10-26 NOTE — TELEPHONE ENCOUNTER
When is she going on the cruise?  Dr. Luis is on vacation and will not return until November 1st.  She is already on Compazine and Zofran which interact with Transderm-Scop and should not be used together.

## 2023-10-26 NOTE — TELEPHONE ENCOUNTER
Spoke with patient    She is leaving on her cruise on 11/11/2023.   She will wait until Dr Luis returns

## 2023-10-27 ENCOUNTER — OFFICE VISIT (OUTPATIENT)
Dept: URGENT CARE | Facility: CLINIC | Age: 78
End: 2023-10-27
Payer: MEDICARE

## 2023-10-27 VITALS
DIASTOLIC BLOOD PRESSURE: 71 MMHG | SYSTOLIC BLOOD PRESSURE: 113 MMHG | WEIGHT: 168 LBS | OXYGEN SATURATION: 96 % | RESPIRATION RATE: 16 BRPM | BODY MASS INDEX: 25.54 KG/M2 | HEART RATE: 103 BPM | TEMPERATURE: 98 F

## 2023-10-27 DIAGNOSIS — J06.9 UPPER RESPIRATORY TRACT INFECTION, UNSPECIFIED TYPE: ICD-10-CM

## 2023-10-27 DIAGNOSIS — R05.9 COUGH, UNSPECIFIED TYPE: ICD-10-CM

## 2023-10-27 DIAGNOSIS — H66.93 ACUTE OTITIS MEDIA, BILATERAL: Primary | ICD-10-CM

## 2023-10-27 LAB
CTP QC/QA: YES
CTP QC/QA: YES
FLUAV AG NPH QL: NEGATIVE
FLUBV AG NPH QL: NEGATIVE
SARS-COV-2 AG RESP QL IA.RAPID: NEGATIVE

## 2023-10-27 PROCEDURE — 99214 OFFICE O/P EST MOD 30 MIN: CPT | Mod: S$GLB,,, | Performed by: STUDENT IN AN ORGANIZED HEALTH CARE EDUCATION/TRAINING PROGRAM

## 2023-10-27 PROCEDURE — 87804 POCT INFLUENZA A/B: ICD-10-PCS | Mod: QW,,, | Performed by: STUDENT IN AN ORGANIZED HEALTH CARE EDUCATION/TRAINING PROGRAM

## 2023-10-27 PROCEDURE — 87811 SARS CORONAVIRUS 2 ANTIGEN POCT, MANUAL READ: ICD-10-PCS | Mod: QW,S$GLB,, | Performed by: STUDENT IN AN ORGANIZED HEALTH CARE EDUCATION/TRAINING PROGRAM

## 2023-10-27 PROCEDURE — 99214 PR OFFICE/OUTPT VISIT, EST, LEVL IV, 30-39 MIN: ICD-10-PCS | Mod: S$GLB,,, | Performed by: STUDENT IN AN ORGANIZED HEALTH CARE EDUCATION/TRAINING PROGRAM

## 2023-10-27 PROCEDURE — 87804 INFLUENZA ASSAY W/OPTIC: CPT | Mod: QW,,, | Performed by: STUDENT IN AN ORGANIZED HEALTH CARE EDUCATION/TRAINING PROGRAM

## 2023-10-27 PROCEDURE — 87811 SARS-COV-2 COVID19 W/OPTIC: CPT | Mod: QW,S$GLB,, | Performed by: STUDENT IN AN ORGANIZED HEALTH CARE EDUCATION/TRAINING PROGRAM

## 2023-10-27 RX ORDER — CHLOPHEDIANOL HCL AND PYRILAMINE MALEATE 12.5; 12.5 MG/5ML; MG/5ML
5 SOLUTION ORAL
Qty: 118 ML | Refills: 0 | Status: SHIPPED | OUTPATIENT
Start: 2023-10-27

## 2023-10-27 RX ORDER — AMOXICILLIN AND CLAVULANATE POTASSIUM 875; 125 MG/1; MG/1
1 TABLET, FILM COATED ORAL EVERY 12 HOURS
Qty: 20 TABLET | Refills: 0 | Status: SHIPPED | OUTPATIENT
Start: 2023-10-27 | End: 2023-11-06

## 2023-10-27 RX ORDER — METHYLPREDNISOLONE 4 MG/1
TABLET ORAL
Qty: 21 EACH | Refills: 0 | Status: SHIPPED | OUTPATIENT
Start: 2023-10-27 | End: 2023-11-17

## 2023-10-27 NOTE — PROGRESS NOTES
Subjective:      Patient ID: Kirstie Bowden is a 78 y.o. female.    Vitals:  weight is 76.2 kg (168 lb). Her oral temperature is 98 °F (36.7 °C). Her blood pressure is 113/71 and her pulse is 103. Her respiration is 16 and oxygen saturation is 96%.     Chief Complaint: Sinus Problem    Patient is a 78-year-old female with a past medical history of asthma, hypertension, hyperlipidemia, chronic kidney disease, rheumatoid arthritis, breast cancer, renal cancer, osteoarthritis, and hypothyroidism who presents to clinic for evaluation of cough.  Patient reports she is vaccinated.  Patient reports positive sick exposure as multiple family members sick with similar URI and sinus infections.  Patient reports symptoms x3 days.  Patient reports that she is taken over-the-counter Robitussin with no significant relief to symptoms.    Sinus Problem  This is a new problem. The current episode started in the past 7 days (3 days). The problem is unchanged. There has been no fever. Associated symptoms include chills, congestion, coughing, ear pain, headaches, sinus pressure and sneezing. Pertinent negatives include no shortness of breath or sore throat.       Constitution: Positive for chills and fatigue.   HENT:  Positive for ear pain, congestion, postnasal drip and sinus pressure. Negative for ear discharge, tinnitus, hearing loss and sore throat.    Neck: neck negative.   Cardiovascular: Negative.  Negative for chest pain and palpitations.   Eyes: Negative.    Respiratory:  Positive for cough. Negative for chest tightness, sputum production, shortness of breath and wheezing.    Gastrointestinal: Negative.  Negative for abdominal pain, nausea, vomiting and diarrhea.   Endocrine: negative.   Musculoskeletal: Negative.  Negative for muscle ache.   Skin: Negative.  Negative for color change, pale, rash and erythema.   Allergic/Immunologic: Positive for sneezing.   Neurological:  Positive for headaches. Negative for dizziness,  light-headedness, passing out, disorientation and altered mental status.   Hematologic/Lymphatic: Negative.    Psychiatric/Behavioral: Negative.  Negative for altered mental status, disorientation and confusion.       Objective:     Physical Exam   Constitutional: She is oriented to person, place, and time. She appears well-developed. She is cooperative.  Non-toxic appearance. She does not appear ill. No distress.   HENT:   Head: Normocephalic and atraumatic.   Ears:   Right Ear: Hearing, external ear and ear canal normal. No no drainage, swelling or tenderness. Tympanic membrane is erythematous and bulging. Tympanic membrane is not perforated. No decreased hearing is noted.   Left Ear: Hearing, external ear and ear canal normal. No no drainage, swelling or tenderness. Tympanic membrane is erythematous and bulging. Tympanic membrane is not perforated. No decreased hearing is noted.   Nose: Congestion present. No mucosal edema, rhinorrhea or nasal deformity. No epistaxis. Right sinus exhibits no maxillary sinus tenderness and no frontal sinus tenderness. Left sinus exhibits no maxillary sinus tenderness and no frontal sinus tenderness.   Mouth/Throat: Uvula is midline, oropharynx is clear and moist and mucous membranes are normal. Mucous membranes are moist. No trismus in the jaw. Normal dentition. No uvula swelling. No oropharyngeal exudate or posterior oropharyngeal erythema. Oropharynx is clear.   Eyes: Conjunctivae and lids are normal. Pupils are equal, round, and reactive to light. Right eye exhibits no discharge. Left eye exhibits no discharge. No scleral icterus.   Neck: Trachea normal and phonation normal. Neck supple. No neck rigidity present.   Cardiovascular: Regular rhythm, normal heart sounds and normal pulses. Tachycardia present.   Pulmonary/Chest: Effort normal. No respiratory distress. She has decreased breath sounds (Mildly diminished). She has no wheezes. She has no rhonchi. She has no rales.    Abdominal: Normal appearance and bowel sounds are normal. She exhibits no distension. Soft. There is no abdominal tenderness.   Musculoskeletal: Normal range of motion.         General: Normal range of motion.      Cervical back: She exhibits no tenderness.   Lymphadenopathy:     She has no cervical adenopathy.   Neurological: She is alert and oriented to person, place, and time. She exhibits normal muscle tone.   Skin: Skin is warm, dry, intact, not diaphoretic, not pale and no rash. Capillary refill takes 2 to 3 seconds. No erythema   Psychiatric: Her speech is normal and behavior is normal. Judgment and thought content normal.   Nursing note and vitals reviewed.      Assessment:     1. Acute otitis media, bilateral    2. Cough, unspecified type    3. Upper respiratory tract infection, unspecified type        Plan:       Acute otitis media, bilateral    Cough, unspecified type  -     SARS Coronavirus 2 Antigen, POCT Manual Read  -     POCT Influenza A/B Rapid Antigen  -     XR CHEST PA AND LATERAL; Future; Expected date: 10/27/2023    Upper respiratory tract infection, unspecified type    Other orders  -     amoxicillin-clavulanate 875-125mg (AUGMENTIN) 875-125 mg per tablet; Take 1 tablet by mouth every 12 (twelve) hours. for 10 days  Dispense: 20 tablet; Refill: 0  -     methylPREDNISolone (MEDROL DOSEPACK) 4 mg tablet; use as directed  Dispense: 21 each; Refill: 0  -     pyrilamine-chlophedianoL (NINJACOF) 12.5-12.5 mg/5 mL Liqd; Take 5 mLs by mouth every 6 to 8 hours as needed (cough).  Dispense: 118 mL; Refill: 0                Labs:  Influenza a and B negative.  COVID negative.    Chest x-ray: The cardiomediastinal silhouette is within normal limits.  The lungs are well expanded without consolidation or pleural effusion. Surgical clips are present in the right upper quadrant.  Creatinine clearance of 38.    Take medications as prescribed.    Continue previously prescribed inhalers as needed.     Tylenol/Motrin per package instructions for any pain or fever.    Assure adequate hydration.    Follow-up with PCP in 1-2 days.    Return to clinic as needed.    To ED for any new or acutely worsening symptoms.    Patient in agreement with plan of care.    DISCLAIMER: Please note that my documentation in this Electronic Healthcare Record was produced using speech recognition software and therefore may contain errors related to that software system.These could include grammar, punctuation and spelling errors or the inclusion/exclusion of phrases that were not intended. Garbled syntax, mangled pronouns, and other bizarre constructions may be attributed to that software system.

## 2023-11-05 DIAGNOSIS — M05.711 RHEUMATOID ARTHRITIS INVOLVING RIGHT SHOULDER WITH POSITIVE RHEUMATOID FACTOR: ICD-10-CM

## 2023-11-05 DIAGNOSIS — I10 ESSENTIAL HYPERTENSION, BENIGN: ICD-10-CM

## 2023-11-05 DIAGNOSIS — Z17.0 MALIGNANT NEOPLASM OF NIPPLE OF LEFT BREAST IN FEMALE, ESTROGEN RECEPTOR POSITIVE: ICD-10-CM

## 2023-11-05 DIAGNOSIS — C50.012 MALIGNANT NEOPLASM OF NIPPLE OF LEFT BREAST IN FEMALE, ESTROGEN RECEPTOR POSITIVE: ICD-10-CM

## 2023-11-06 RX ORDER — ONDANSETRON 8 MG/1
TABLET, ORALLY DISINTEGRATING ORAL
Qty: 30 TABLET | Refills: 0 | Status: SHIPPED | OUTPATIENT
Start: 2023-11-06 | End: 2023-11-20

## 2023-11-07 ENCOUNTER — OFFICE VISIT (OUTPATIENT)
Dept: URGENT CARE | Facility: CLINIC | Age: 78
End: 2023-11-07
Payer: MEDICARE

## 2023-11-07 VITALS
SYSTOLIC BLOOD PRESSURE: 120 MMHG | TEMPERATURE: 98 F | WEIGHT: 168 LBS | HEIGHT: 68 IN | BODY MASS INDEX: 25.46 KG/M2 | RESPIRATION RATE: 20 BRPM | HEART RATE: 76 BPM | DIASTOLIC BLOOD PRESSURE: 78 MMHG

## 2023-11-07 DIAGNOSIS — R06.2 WHEEZING: Primary | ICD-10-CM

## 2023-11-07 DIAGNOSIS — R05.9 COUGH, UNSPECIFIED TYPE: ICD-10-CM

## 2023-11-07 DIAGNOSIS — Z87.09 HISTORY OF COPD: ICD-10-CM

## 2023-11-07 DIAGNOSIS — J06.9 UPPER RESPIRATORY TRACT INFECTION, UNSPECIFIED TYPE: ICD-10-CM

## 2023-11-07 PROCEDURE — 96372 PR INJECTION,THERAP/PROPH/DIAG2ST, IM OR SUBCUT: ICD-10-PCS | Mod: S$GLB,,, | Performed by: EMERGENCY MEDICINE

## 2023-11-07 PROCEDURE — 94640 PR INHAL RX, AIRWAY OBST/DX SPUTUM INDUCT: ICD-10-PCS | Mod: S$GLB,,, | Performed by: NURSE PRACTITIONER

## 2023-11-07 PROCEDURE — 99214 OFFICE O/P EST MOD 30 MIN: CPT | Mod: 25,S$GLB,, | Performed by: NURSE PRACTITIONER

## 2023-11-07 PROCEDURE — 99214 PR OFFICE/OUTPT VISIT, EST, LEVL IV, 30-39 MIN: ICD-10-PCS | Mod: 25,S$GLB,, | Performed by: NURSE PRACTITIONER

## 2023-11-07 PROCEDURE — 96372 THER/PROPH/DIAG INJ SC/IM: CPT | Mod: S$GLB,,, | Performed by: EMERGENCY MEDICINE

## 2023-11-07 PROCEDURE — 94640 AIRWAY INHALATION TREATMENT: CPT | Mod: S$GLB,,, | Performed by: NURSE PRACTITIONER

## 2023-11-07 RX ORDER — CEFTRIAXONE 1 G/1
1 INJECTION, POWDER, FOR SOLUTION INTRAMUSCULAR; INTRAVENOUS
Status: COMPLETED | OUTPATIENT
Start: 2023-11-07 | End: 2023-11-07

## 2023-11-07 RX ORDER — BENZONATATE 200 MG/1
200 CAPSULE ORAL 3 TIMES DAILY PRN
Qty: 10 CAPSULE | Refills: 0 | Status: SHIPPED | OUTPATIENT
Start: 2023-11-07 | End: 2023-11-17

## 2023-11-07 RX ORDER — DOXYCYCLINE 100 MG/1
100 CAPSULE ORAL EVERY 12 HOURS
Qty: 14 CAPSULE | Refills: 0 | Status: SHIPPED | OUTPATIENT
Start: 2023-11-07 | End: 2023-11-14

## 2023-11-07 RX ORDER — IPRATROPIUM BROMIDE 0.5 MG/2.5ML
0.5 SOLUTION RESPIRATORY (INHALATION)
Status: COMPLETED | OUTPATIENT
Start: 2023-11-07 | End: 2023-11-07

## 2023-11-07 RX ORDER — ALBUTEROL SULFATE 0.83 MG/ML
2.5 SOLUTION RESPIRATORY (INHALATION)
Status: COMPLETED | OUTPATIENT
Start: 2023-11-07 | End: 2023-11-07

## 2023-11-07 RX ADMIN — CEFTRIAXONE 1 G: 1 INJECTION, POWDER, FOR SOLUTION INTRAMUSCULAR; INTRAVENOUS at 02:11

## 2023-11-07 RX ADMIN — ALBUTEROL SULFATE 2.5 MG: 0.83 SOLUTION RESPIRATORY (INHALATION) at 05:11

## 2023-11-07 RX ADMIN — IPRATROPIUM BROMIDE 0.5 MG: 0.5 SOLUTION RESPIRATORY (INHALATION) at 05:11

## 2023-11-07 NOTE — PROGRESS NOTES
CHIEF COMPLAINT  Chief Complaint   Patient presents with    Cough       HPI  Kirstie Lebron a 78 y.o. female who presents with complaints of productive cough, hoarseness, body aches.  Patient reports she was seen here on October 27th for the same symptoms and diagnosed with ear infection treated with a Medrol Dosepak, Augmentin and cough meds at that time.  Patient reports she is up every night coughing,  no relief of symptoms for medications.  Patient reports a history of COPD and uses inhalers several times during the day.  Denies fever, abdominal pain, nausea, vomiting, diarrhea or sore throat      CURRENT MEDICATIONS  Current Outpatient Medications on File Prior to Visit   Medication Sig Dispense Refill    albuterol (PROAIR HFA) 90 mcg/actuation inhaler Inhale 2 puffs into the lungs every 6 (six) hours as needed for Wheezing. Rescue 18 g 11    amLODIPine (NORVASC) 5 MG tablet Take 1 tablet (5 mg total) by mouth once daily. 90 tablet 3    atorvastatin (LIPITOR) 20 MG tablet Take 1 tablet (20 mg total) by mouth once daily. 90 tablet 3    exemestane (AROMASIN) 25 mg tablet Take 1 tablet (25 mg total) by mouth once daily. 90 tablet 5    fluticasone-salmeterol diskus inhaler 250-50 mcg Inhale 1 puff into the lungs 2 (two) times daily. Controller 180 each 3    folic acid (FOLVITE) 1 MG tablet Take 1 tablet (1,000 mcg total) by mouth once daily. 90 tablet 3    gabapentin (NEURONTIN) 300 MG capsule TAKE 1 CAPSULE BY MOUTH IN THE EVENING 30 capsule 2    hydroCHLOROthiazide (HYDRODIURIL) 25 MG tablet Take 1 tablet (25 mg total) by mouth once daily. 90 tablet 3    hydrOXYchloroQUINE (PLAQUENIL) 200 mg tablet Take 1 tablet (200 mg total) by mouth 2 (two) times daily. 180 tablet 3    levothyroxine (SYNTHROID) 112 MCG tablet Take 1 tablet (112 mcg total) by mouth before breakfast. 90 tablet 3    levothyroxine (SYNTHROID) 125 MCG tablet Take 125 mcg by mouth.      methotrexate 2.5 MG Tab Take 7 tablets (17.5 mg  total) by mouth every 7 days. 84 tablet 1    methylPREDNISolone (MEDROL DOSEPACK) 4 mg tablet use as directed 21 each 0    ondansetron (ZOFRAN-ODT) 8 MG TbDL DISSOLVE 1 TABLET IN MOUTH EVERY 12 HOURS AS NEEDED 30 tablet 0    pantoprazole (PROTONIX) 40 MG tablet TAKE 1 TABLET BY MOUTH ONCE DAILY IN THE MORNING FOR 90 DAYS      prochlorperazine (COMPAZINE) 10 MG tablet TAKE 1 TABLET BY MOUTH EVERY 6 HOURS AS NEEDED 60 tablet 0    pyrilamine-chlophedianoL (NINJACOF) 12.5-12.5 mg/5 mL Liqd Take 5 mLs by mouth every 6 to 8 hours as needed (cough). 118 mL 0    scopolamine (TRANSDERM-SCOP) 1.3-1.5 mg (1 mg over 3 days) Place 1 patch onto the skin every 72 hours. 4 patch 0    thiamine 100 MG tablet Take 100 mg by mouth once daily.      traMADoL (ULTRAM) 50 mg tablet Take 1 tablet (50 mg total) by mouth every 12 (twelve) hours as needed for Pain (severe pain). TAKE 1 TABLET BY MOUTH EVERY 8 HOURS AS NEEDED FOR SEVERE PAIN- DOSE DECREASE 60 tablet 1    VITAMIN B COMPLEX ORAL Every day      zolpidem (AMBIEN) 5 MG Tab Take 1 tablet (5 mg total) by mouth nightly as needed (sleep). 30 tablet 3    [] amoxicillin-clavulanate 875-125mg (AUGMENTIN) 875-125 mg per tablet Take 1 tablet by mouth every 12 (twelve) hours. for 10 days 20 tablet 0     Current Facility-Administered Medications on File Prior to Visit   Medication Dose Route Frequency Provider Last Rate Last Admin    lactated ringers infusion   Intravenous Once PRN Nathen Lyons MD        lidocaine (PF) 10 mg/ml (1%) injection 10 mg  1 mL Intradermal Once Ealdia Schwartz MD           ALLERGIES  Review of patient's allergies indicates:   Allergen Reactions    Succinylcholine chloride Other (See Comments)    Sulfur dioxide Hives    Sulfamethoxazole-trimethoprim      Other reaction(s): per dr daryn Rodríguez (sulfonamide antibiotics)      NOT SURE REACTION       Immunization History   Administered Date(s) Administered    COVID-19 Vaccine 2021, 2021,  11/17/2021    COVID-19, MRNA, LN-S, PF (MODERNA FULL 0.5 ML DOSE) 02/04/2021, 03/04/2021, 11/17/2021    Influenza 10/08/2008, 10/08/2008, 12/08/2009, 10/12/2015, 10/01/2016, 10/01/2016    Influenza (FLUAD) - Quadrivalent - Adjuvanted - PF *Preferred* (65+) 10/12/2022, 10/10/2023    Influenza - High Dose - PF (65 years and older) 10/12/2015, 11/14/2016, 11/14/2016, 11/08/2017, 11/08/2017, 10/24/2018, 10/24/2018, 09/29/2020    Influenza - Quadrivalent 11/05/2014    Influenza A (H1N1) 2009 Monovalent - IM 12/08/2009    Influenza Whole 10/12/2015    Pneumococcal Conjugate - 13 Valent 07/10/2023       PAST MEDICAL HISTORY  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    Hyperlipidemia     Hypertension     Limb alert care status     NO BP/IV LEFT ARM    Pseudocholinesterase deficiency     family history    Thyroid disease        SURGICAL HISTORY  Past Surgical History:   Procedure Laterality Date    AXILLARY NODE DISSECTION Left 03/14/2019    Procedure: LYMPHADENECTOMY, AXILLARY;  Surgeon: Mp Gonzalez MD;  Location: [facility] OR;  Service: General;  Laterality: Left;  left lumpectomy with axillary lypmh node dissection    BALLOON DILATION OF URETER Left 06/24/2019    Procedure: DILATION, URETER, USING BALLOON;  Surgeon: Jorden Dickson MD;  Location: [facility] OR;  Service: Urology;  Laterality: Left;    BREAST BIOPSY Left 20 yrs ago    benign    BREAST LUMPECTOMY      x2    CHOLECYSTECTOMY      COLONOSCOPY  09/25/2018    LUC EASTON MD    CYSTOSCOPY W/ URETERAL STENT PLACEMENT Left 06/24/2019    Procedure: CYSTOSCOPY, WITH URETERAL STENT INSERTION;  Surgeon: Jorden Dickson MD;  Location: [facility] OR;  Service: Urology;  Laterality: Left;    CYSTOSCOPY W/ URETERAL STENT REMOVAL Left 07/23/2019    Procedure: CYSTOSCOPY, WITH URETERAL STENT REMOVAL;  Surgeon: Jorden Dickson MD;  Location: [facility] OR;  Service: Urology;  Laterality: Left;    EPIDURAL STEROID  INJECTION INTO LUMBAR SPINE N/A 09/30/2021    Procedure: Injection-steroid-epidural-lumbar;  Surgeon: Nathen Lyons MD;  Location: [facility] OR;  Service: Pain Management;  Laterality: N/A;  L5-S1      EPIDURAL STEROID INJECTION INTO LUMBAR SPINE N/A 11/16/2021    Procedure: Injection-steroid-epidural-lumbar;  Surgeon: Nathen Lyons MD;  Location: [facility] OR;  Service: Pain Management;  Laterality: N/A;  L5-S1    EXTRACORPOREAL SHOCK WAVE LITHOTRIPSY Left 07/23/2019    Procedure: LITHOTRIPSY, ESWL;  Surgeon: Jorden Dickson MD;  Location: [facility] OR;  Service: Urology;  Laterality: Left;    EYE SURGERY Bilateral     cataracts    HYSTERECTOMY      MASTECTOMY, PARTIAL Left 04/25/2019    Procedure: MASTECTOMY, PARTIAL;  Surgeon: Mp Gonzalez MD;  Location: [facility] OR;  Service: General;  Laterality: Left;    NEEDLE LOCALIZATION Left 03/14/2019    Procedure: NEEDLE LOCALIZATION;  Surgeon: Mp Gonzalez MD;  Location: [facility] OR;  Service: General;  Laterality: Left;  left lumpectomy with wire needle localization     PARTIAL NEPHRECTOMY Right 05/22/2023    RETROGRADE PYELOGRAPHY Bilateral 06/24/2019    Procedure: PYELOGRAM, RETROGRADE;  Surgeon: Jorden Dickson MD;  Location: [facility] OR;  Service: Urology;  Laterality: Bilateral;    SENTINEL LYMPH NODE BIOPSY Left 03/14/2019    Procedure: BIOPSY, LYMPH NODE, SENTINEL;  Surgeon: Mp Gonzalez MD;  Location: [facility] OR;  Service: General;  Laterality: Left;  left sentinel node lymph node biopsy    TONSILLECTOMY      TRANSFORAMINAL EPIDURAL INJECTION OF STEROID Right 01/04/2022    Procedure: Injection,steroid,epidural,transforaminal approach;  Surgeon: Nathen Lyons MD;  Location: [facility] OR;  Service: Pain Management;  Laterality: Right;  L4-L5, L5-s1    URETEROSCOPY Left 06/24/2019    Procedure: URETEROSCOPY;  Surgeon: Jorden Dickson MD;  Location: [facility];  Service: Urology;  Laterality: Left;       SOCIAL HISTORY  Social History     Socioeconomic History    Marital status:     Occupational History     Employer: [employer]   Tobacco Use    Smoking status: Former     Current packs/day: 0.00     Average packs/day: 0.5 packs/day for 59.3 years (29.6 ttl pk-yrs)     Types: Cigarettes     Start date: 1960     Quit date: 2019     Years since quittin.2    Smokeless tobacco: Never   Substance and Sexual Activity    Alcohol use: Yes     Alcohol/week: 2.0 standard drinks of alcohol     Types: 2 Glasses of wine per week     Comment: Social    Drug use: No    Sexual activity: Never     Social Determinants of Health     Financial Resource Strain: Low Risk  (2023)    Overall Financial Resource Strain (CARDIA)     Difficulty of Paying Living Expenses: Not very hard   Food Insecurity: No Food Insecurity (2023)    Hunger Vital Sign     Worried About Running Out of Food in the Last Year: Never true     Ran Out of Food in the Last Year: Never true   Transportation Needs: No Transportation Needs (2023)    PRAPARE - Transportation     Lack of Transportation (Medical): No     Lack of Transportation (Non-Medical): No   Physical Activity: Insufficiently Active (2023)    Exercise Vital Sign     Days of Exercise per Week: 2 days     Minutes of Exercise per Session: 10 min   Stress: No Stress Concern Present (2023)    [place] [employer] - Occupational Stress Questionnaire     Feeling of Stress : Only a little   Social Connections: Unknown (2023)    Social Connection and Isolation Panel [NHANES]     Frequency of Communication with Friends and Family: More than three times a week     Frequency of Social Gatherings with Friends and Family: More than three times a week     Active Member of Clubs or Organizations: No     Attends Club or Organization Meetings: Never     Marital Status:    Housing Stability: Low Risk  (2023)    Housing Stability Vital Sign     Unable to Pay for Housing in the Last Year: No     Number of Places Lived in  the Last Year: 1     Unstable Housing in the Last Year: No       FAMILY HISTORY  Family History   Problem Relation Age of Onset    Heart disease Mother     Hypertension Mother     Alzheimer's disease Mother     Cancer Father     Heart disease Sister     Diabetes Brother     Stroke Sister     Cancer Daughter        REVIEW OF SYSTEMS  Constitutional: No fever  or chills, + body aches  Eyes: No redness, pain, or discharge  HENT: No ear pain, no headache, no rhinorrhea, no throat pain + hoarseness  Respiratory: + cough, no wheezing or shortness of breath  Cardiovascular: No chest pain, palpitations or edema  Skin: No rash or abrasion  Neurologic: No focal weakness or sensory changes.  All systems otherwise negative except as noted in the Review of Systems and History of Present Illness      PHYSICAL EXAM  Reviewed Triage Note  VITAL SIGNS: 120/78  Constitutional: Well developed, well nourished, Alert and oriented x3, No acute distress, non-toxic appearance.  HENT: Normocephalic, Atraumatic, Bilateral external ears normal, bilateral ear canals clear, external nose negative, oropharynx moist, No oral exudates or edema, erythema noted bilaterally  Eyes: PERRL, EOMI, Conjunctiva normal, No discharge.  Neck: Normal range of motion, no tenderness, supple, no carotid bruits  Respiratory:  no respiratory distress,  wheezing to bilateral upper and lower lobes - improved with duoneb treatment in clinic  Cardiovascular: HR 76 normal rhythm, no murmurs, no rubs, no gallops.  Integument: Warm, Dry, No erythema, no rash  Neurologic: Normal motor function, normal sensory function. No focal deficits noted. Intact distal pulses  Psychiatric: Affect normal, judgment normal, mood normal        RADIOLOGY  No orders to display   Patient is sent to [place] for outpatient chest xray      MEDICAL DECISION MAKING    Physical exam findings discussed with patient.  Patient aware that we do not have x-ray in the clinic today will send for  outpatient x-rays [facility].  DuoNeb breathing treatment given in the clinic.  Duoneb and Rocephin injection given in the clinic. Script for tessalon and doxycycline sent to pharmacy of choice with instructions on usage.  Pt agrees with plan of care.     DISPOSITION  Patient discharged in stable condition       CLINICAL IMPRESSION:  The primary encounter diagnosis was Wheezing. Diagnoses of History of COPD, Upper respiratory tract infection, unspecified type, and Cough, unspecified type were also pertinent to this visit.    Patient advised to follow-up with your PCP within 3 days for BP re-check if Blood Pressure was >120/80 without history of hypertension.

## 2023-11-07 NOTE — PROGRESS NOTES
"Subjective:      Patient ID: Kirstie Bowden is a 78 y.o. female.    Vitals:  height is 5' 8" (1.727 m) and weight is 76.2 kg (168 lb). Her temperature is 98 °F (36.7 °C). Her blood pressure is 120/78 and her pulse is 76. Her respiration is 20.     Chief Complaint: Cough    Cough  This is a new problem. The current episode started 1 to 4 weeks ago (x's 2 weeks). The problem has been gradually worsening. The cough is Productive of sputum. Treatments tried: ANB rx cough syrup. The treatment provided no relief.       Respiratory:  Positive for cough.       Objective:     Physical Exam    Assessment:     No diagnosis found.    Plan:       There are no diagnoses linked to this encounter.                "

## 2023-11-20 ENCOUNTER — TELEPHONE (OUTPATIENT)
Dept: FAMILY MEDICINE | Facility: CLINIC | Age: 78
End: 2023-11-20
Payer: MEDICARE

## 2023-11-20 DIAGNOSIS — I10 ESSENTIAL HYPERTENSION, BENIGN: ICD-10-CM

## 2023-11-20 DIAGNOSIS — C50.012 MALIGNANT NEOPLASM OF NIPPLE OF LEFT BREAST IN FEMALE, ESTROGEN RECEPTOR POSITIVE: ICD-10-CM

## 2023-11-20 DIAGNOSIS — M05.711 RHEUMATOID ARTHRITIS INVOLVING RIGHT SHOULDER WITH POSITIVE RHEUMATOID FACTOR: ICD-10-CM

## 2023-11-20 DIAGNOSIS — Z17.0 MALIGNANT NEOPLASM OF NIPPLE OF LEFT BREAST IN FEMALE, ESTROGEN RECEPTOR POSITIVE: ICD-10-CM

## 2023-11-20 RX ORDER — ONDANSETRON 8 MG/1
TABLET, ORALLY DISINTEGRATING ORAL
Qty: 30 TABLET | Refills: 0 | Status: SHIPPED | OUTPATIENT
Start: 2023-11-20 | End: 2023-12-18

## 2023-11-20 NOTE — TELEPHONE ENCOUNTER
----- Message from Sandoval Quinn sent at 11/20/2023  1:24 PM CST -----  Type:  RX Refill Request    Who Called: pt  Refill or New Rx: refill  RX Name and Strength: zolpidem (AMBIEN) 5 MG Tab  How is the patient currently taking it? (ex. 1XDay): as directed  Is this a 30 day or 90 day RX: 90  Preferred Pharmacy with phone number:   [facility]/pharmacy #[id number] - [place], [place] - [address] AT [facility]  [address]  [place] [address]  Phone: [phone] Fax: [phone]  Local or Mail Order: local  Ordering Provider: Janelle  Would the patient rather a call back or a response via [facility]?  Call back  Best Call Back Number: [phone]  Additional Information: pl call bk and advise thanks

## 2023-11-20 NOTE — TELEPHONE ENCOUNTER
The patient's CT shows significant improvement.  Told her this.  No further follow-up on this is needed.   Day 1 of Anesthesia Pain Management Service    SUBJECTIVE:  Pain Scale Score:          [X] Refer to charted pain scores    THERAPY:    s/p 100 mcg intrathecal neuraxial PF morphine; having more pain >24hr post-partum. Not receiving ketorolac/ibuprofen due to elevated Cr. Patient doesn't want narcotics.     MEDICATIONS  (STANDING):  acetaminophen     Tablet .. 975 milliGRAM(s) Oral every 6 hours  dextrose 5% + lactated ringers. 1000 milliLiter(s) (50 mL/Hr) IV Continuous <Continuous>  diphtheria/tetanus/pertussis (acellular) Vaccine (Adacel) 0.5 milliLiter(s) IntraMuscular once  heparin   Injectable 5000 Unit(s) SubCutaneous every 8 hours  lactated ringers. 1000 milliLiter(s) (50 mL/Hr) IV Continuous <Continuous>  lactated ringers. 1000 milliLiter(s) (75 mL/Hr) IV Continuous <Continuous>  levothyroxine 50 MICROGram(s) Oral daily  magnesium sulfate Infusion 1 Gm/Hr (25 mL/Hr) IV Continuous <Continuous>  magnesium sulfate Infusion 2 Gm/Hr (50 mL/Hr) IV Continuous <Continuous>  NIFEdipine XL 30 milliGRAM(s) Oral daily  oxytocin Infusion 333.333 milliUNIT(s)/Min (1000 mL/Hr) IV Continuous <Continuous>    MEDICATIONS  (PRN):  diphenhydrAMINE 25 milliGRAM(s) Oral every 6 hours PRN Pruritus  lanolin Ointment 1 Application(s) Topical every 6 hours PRN Sore Nipples  magnesium hydroxide Suspension 30 milliLiter(s) Oral two times a day PRN Constipation  oxyCODONE    IR 5 milliGRAM(s) Oral every 3 hours PRN Moderate to Severe Pain (4-10)  oxyCODONE    IR 5 milliGRAM(s) Oral once PRN Moderate to Severe Pain (4-10)  simethicone 80 milliGRAM(s) Chew every 4 hours PRN Gas      OBJECTIVE:    Sedation:        	[X] Alert	[ ] Drowsy	[ ] Arousable      [ ] Asleep       [ ] Unresponsive    Side Effects:	[X] None	[ ] Nausea	[ ] Vomiting         [ ] Pruritus  		[ ] Weakness            [ ] Numbness	          [ ] Other:    Vital Signs Last 24 Hrs  T(C): 36.5 (20 Nov 2023 08:57), Max: 36.9 (19 Nov 2023 20:38)  T(F): 97.7 (20 Nov 2023 08:57), Max: 98.4 (19 Nov 2023 20:38)  HR: 72 (20 Nov 2023 08:57) (56 - 72)  BP: 100/69 (20 Nov 2023 08:57) (92/57 - 151/89)  BP(mean): --  RR: 18 (20 Nov 2023 08:57) (18 - 18)  SpO2: 97% (20 Nov 2023 08:57) (95% - 98%)    Parameters below as of 20 Nov 2023 08:57  Patient On (Oxygen Delivery Method): room air        ASSESSMENT/ PLAN:  -Start ketorolac/ibuprofen when renal function allows; offer narcotics, continue Acetominophen     [X] Patient transitioned to prn analgesics  [X] Pain management per primary service, pain service to sign off   [X]Documentation and Verification of current medications

## 2023-11-21 ENCOUNTER — TELEPHONE (OUTPATIENT)
Dept: FAMILY MEDICINE | Facility: CLINIC | Age: 78
End: 2023-11-21
Payer: MEDICARE

## 2023-11-21 NOTE — TELEPHONE ENCOUNTER
Prior authorization for ambien 5 mg has been approved from  October 22, 2023 to November 20, 2024.

## 2023-11-30 ENCOUNTER — OFFICE VISIT (OUTPATIENT)
Dept: FAMILY MEDICINE | Facility: CLINIC | Age: 78
End: 2023-11-30
Payer: MEDICARE

## 2023-11-30 VITALS
OXYGEN SATURATION: 95 % | HEART RATE: 69 BPM | DIASTOLIC BLOOD PRESSURE: 68 MMHG | SYSTOLIC BLOOD PRESSURE: 136 MMHG | TEMPERATURE: 98 F | BODY MASS INDEX: 25.29 KG/M2 | HEIGHT: 68 IN | WEIGHT: 166.88 LBS

## 2023-11-30 DIAGNOSIS — Z00.00 ENCOUNTER FOR PREVENTIVE HEALTH EXAMINATION: Primary | ICD-10-CM

## 2023-11-30 DIAGNOSIS — H91.90 HEARING LOSS, UNSPECIFIED HEARING LOSS TYPE, UNSPECIFIED LATERALITY: ICD-10-CM

## 2023-11-30 DIAGNOSIS — M06.9 RHEUMATOID ARTHRITIS INVOLVING MULTIPLE JOINTS: ICD-10-CM

## 2023-11-30 DIAGNOSIS — I70.0 ATHEROSCLEROSIS OF AORTA: ICD-10-CM

## 2023-11-30 DIAGNOSIS — I10 ESSENTIAL HYPERTENSION, BENIGN: ICD-10-CM

## 2023-11-30 PROCEDURE — G0439 PPPS, SUBSEQ VISIT: HCPCS | Mod: ,,, | Performed by: NURSE PRACTITIONER

## 2023-11-30 PROCEDURE — 99999 PR PBB SHADOW E&M-EST. PATIENT-LVL V: CPT | Mod: PBBFAC,,, | Performed by: NURSE PRACTITIONER

## 2023-11-30 PROCEDURE — 99215 OFFICE O/P EST HI 40 MIN: CPT | Mod: PBBFAC,PO | Performed by: NURSE PRACTITIONER

## 2023-11-30 PROCEDURE — 99999 PR PBB SHADOW E&M-EST. PATIENT-LVL V: ICD-10-PCS | Mod: PBBFAC,,, | Performed by: NURSE PRACTITIONER

## 2023-11-30 PROCEDURE — G0439 PR MEDICARE ANNUAL WELLNESS SUBSEQUENT VISIT: ICD-10-PCS | Mod: ,,, | Performed by: NURSE PRACTITIONER

## 2023-11-30 NOTE — PROGRESS NOTES
"Kirstie Bowden presented for a  Medicare AWV and comprehensive Health Risk Assessment today. The following components were reviewed and updated:    Medical history  Family History  Social history  Allergies and Current Medications  Health Risk Assessment  Health Maintenance  Care Team         ** See Completed Assessments for Annual Wellness Visit within the encounter summary.**         The following assessments were completed:  Living Situation  CAGE  Depression Screening  Timed Get Up and Go  Whisper Test  Cognitive Function Screening  Nutrition Screening  ADL Screening  PAQ Screening    Clock in media     Vitals:    11/30/23 1517   BP: 136/68   Pulse: 69   Temp: 98 °F (36.7 °C)   TempSrc: Oral   SpO2: 95%   Weight: 75.7 kg (166 lb 14.2 oz)   Height: 5' 8" (1.727 m)     Body mass index is 25.38 kg/m².  Physical Exam  Constitutional:       Appearance: She is well-developed.   HENT:      Head: Normocephalic and atraumatic.      Nose: Nose normal.   Eyes:      General: Lids are normal.      Conjunctiva/sclera: Conjunctivae normal.   Cardiovascular:      Rate and Rhythm: Normal rate.   Pulmonary:      Effort: Pulmonary effort is normal.   Neurological:      Mental Status: She is alert and oriented to person, place, and time.   Psychiatric:         Speech: Speech normal.         Behavior: Behavior normal.               Diagnoses and health risks identified today and associated recommendations/orders:    1. Encounter for preventive health examination  Discussed health maintenance guidelines appropriate for age.    Review for Opioid Screening: Patient does not have rx for Opioids.    Review for Substance Use Disorders: Patient does not use substance.      2. Rheumatoid arthritis involving multiple joints  Stable, continue to monitor   3. Atherosclerosis of aorta  Stable, continue to monitor       4. Hearing loss, unspecified hearing loss type, unspecified laterality    - Ambulatory referral/consult to ENT; Future  - " Ambulatory referral/consult to Audiology; Future    5. Essential hypertension, benign  Controlled, continue current medication regimen  Low salt diet  Increase physical activity  Followed by pcp        Provided Kirstie with a 5-10 year written screening schedule and personal prevention plan. Recommendations were developed using the USPSTF age appropriate recommendations. Education, counseling, and referrals were provided as needed. After Visit Summary printed and given to patient which includes a list of additional screenings\tests needed.    Follow up for One year for Annual Wellness Visit.    Suzy Kowalski NP    I offered to discuss advanced care planning, including how to pick a person who would make decisions for you if you were unable to make them for yourself, called a health care power of , and what kind of decisions you might make such as use of life sustaining treatments such as ventilators and tube feeding when faced with a life limiting illness recorded on a living will that they will need to know. (How you want to be cared for as you near the end of your natural life)     X  Patient has advanced directives on file, which we reviewed, and they do not wish to make changes.

## 2023-11-30 NOTE — PATIENT INSTRUCTIONS
Counseling and Referral of Other Preventative  (Italic type indicates deductible and co-insurance are waived)    Patient Name: Kirstie Bowden  Today's Date: 11/30/2023    Health Maintenance       Date Due Completion Date    TETANUS VACCINE Never done ---    RSV Vaccine (Age 60+ and Pregnant patients) (1 - 1-dose 60+ series) Never done ---    LDCT Lung Screen 05/04/2021 5/4/2020    COVID-19 Vaccine (7 - 2023-24 season) 09/01/2023 11/17/2021    Pneumococcal Vaccines (Age 65+) (2 - PPSV23 or PCV20) 09/04/2023 7/10/2023    Colonoscopy 09/25/2023 9/25/2018    Override on 9/15/2015: Done ([facility]-Dr Chavez-in media)    Override on 10/25/2012: Done    Lipid Panel 07/21/2024 7/21/2023    Override on 1/26/2016: Done (Dr Jelani Stacy   lab report sent in media  kb)    DEXA Scan 10/12/2026 10/12/2023    Override on 1/28/2017: Done (Dr Stcay   report sent to scanning kb)    Override on 6/19/2014: Done        Orders Placed This Encounter   Procedures    Ambulatory referral/consult to ENT    Ambulatory referral/consult to Audiology     The following information is provided to all patients.  This information is to help you find resources for any of the problems found today that may be affecting your health:                Living healthy guide: www.[facility].[place].gov      Understanding Diabetes: www.diabetes.org      Eating healthy: www.cdc.gov/healthyweight      CDC home safety checklist: www.cdc.gov/steadi/patient.html      Agency on Aging: www.goea.[place].[facility]      Alcoholics anonymous (AA): www.aa.org      Physical Activity: www.esvin.nih.gov/dq6vdof      Tobacco use: www.quitwithusla.org

## 2023-12-01 ENCOUNTER — TELEPHONE (OUTPATIENT)
Dept: FAMILY MEDICINE | Facility: CLINIC | Age: 78
End: 2023-12-01
Payer: MEDICARE

## 2023-12-06 ENCOUNTER — HOSPITAL ENCOUNTER (OUTPATIENT)
Dept: RADIOLOGY | Facility: HOSPITAL | Age: 78
Discharge: HOME | End: 2023-12-06
Attending: SPECIALIST
Payer: MEDICARE

## 2023-12-06 DIAGNOSIS — C64.1 RENAL CANCER, RIGHT: ICD-10-CM

## 2023-12-06 DIAGNOSIS — C64.1 MALIGNANT NEOPLASM OF RIGHT KIDNEY, EXCEPT RENAL PELVIS: Primary | ICD-10-CM

## 2023-12-06 PROCEDURE — 76770 US EXAM ABDO BACK WALL COMP: CPT | Mod: TC,PO

## 2023-12-07 ENCOUNTER — PATIENT MESSAGE (OUTPATIENT)
Dept: RHEUMATOLOGY | Facility: CLINIC | Age: 78
End: 2023-12-07
Payer: MEDICARE

## 2023-12-18 DIAGNOSIS — I10 ESSENTIAL HYPERTENSION, BENIGN: ICD-10-CM

## 2023-12-18 DIAGNOSIS — Z17.0 MALIGNANT NEOPLASM OF NIPPLE OF LEFT BREAST IN FEMALE, ESTROGEN RECEPTOR POSITIVE: ICD-10-CM

## 2023-12-18 DIAGNOSIS — M05.711 RHEUMATOID ARTHRITIS INVOLVING RIGHT SHOULDER WITH POSITIVE RHEUMATOID FACTOR: ICD-10-CM

## 2023-12-18 DIAGNOSIS — C50.012 MALIGNANT NEOPLASM OF NIPPLE OF LEFT BREAST IN FEMALE, ESTROGEN RECEPTOR POSITIVE: ICD-10-CM

## 2023-12-18 RX ORDER — ONDANSETRON 8 MG/1
TABLET, ORALLY DISINTEGRATING ORAL
Qty: 30 TABLET | Refills: 0 | Status: SHIPPED | OUTPATIENT
Start: 2023-12-18 | End: 2024-01-03

## 2023-12-20 DIAGNOSIS — C64.1 MALIGNANT NEOPLASM OF RIGHT KIDNEY, EXCEPT RENAL PELVIS: Primary | ICD-10-CM

## 2023-12-27 NOTE — TELEPHONE ENCOUNTER
Flonase pended as prescription      ----- Message from Saul Marks sent at 12/27/2023  3:25 PM CST -----  Type: Needs Medical Advice  Who Called:  pt  Symptoms (please be specific):  pt wanted to know if she can get a RX for Flonase--said the rx is cheaper than what she pay OTC--please call and advise  Pharmacy name and phone #:    [facility]pharmacy #[id number] - [place], [place] - [address] AT [facility]  [address]  [place] [address]  Phone: [phone] Fax: [phone]      Best Call Back Number: [phone] (home)     Additional Information: thank you

## 2023-12-27 NOTE — TELEPHONE ENCOUNTER
No care due was identified.  [facility] Embedded Care Due Messages. Reference number: [id number].   12/27/2023 4:18:30 PM CST

## 2023-12-28 RX ORDER — FLUTICASONE PROPIONATE 50 MCG
1 SPRAY, SUSPENSION (ML) NASAL DAILY
Qty: 16 G | Refills: 3 | Status: SHIPPED | OUTPATIENT
Start: 2023-12-28 | End: 2024-02-02

## 2024-01-03 DIAGNOSIS — Z17.0 MALIGNANT NEOPLASM OF NIPPLE OF LEFT BREAST IN FEMALE, ESTROGEN RECEPTOR POSITIVE: ICD-10-CM

## 2024-01-03 DIAGNOSIS — C50.012 MALIGNANT NEOPLASM OF NIPPLE OF LEFT BREAST IN FEMALE, ESTROGEN RECEPTOR POSITIVE: ICD-10-CM

## 2024-01-03 DIAGNOSIS — I10 ESSENTIAL HYPERTENSION, BENIGN: ICD-10-CM

## 2024-01-03 DIAGNOSIS — M05.711 RHEUMATOID ARTHRITIS INVOLVING RIGHT SHOULDER WITH POSITIVE RHEUMATOID FACTOR: ICD-10-CM

## 2024-01-03 RX ORDER — ONDANSETRON 8 MG/1
TABLET, ORALLY DISINTEGRATING ORAL
Qty: 30 TABLET | Refills: 0 | Status: SHIPPED | OUTPATIENT
Start: 2024-01-03 | End: 2024-01-18

## 2024-01-03 RX ORDER — GABAPENTIN 300 MG/1
CAPSULE ORAL
Qty: 30 CAPSULE | Refills: 2 | Status: SHIPPED | OUTPATIENT
Start: 2024-01-03

## 2024-01-17 ENCOUNTER — CLINICAL SUPPORT (OUTPATIENT)
Dept: AUDIOLOGY | Facility: CLINIC | Age: 79
End: 2024-01-17
Payer: MEDICARE

## 2024-01-17 ENCOUNTER — OFFICE VISIT (OUTPATIENT)
Dept: OTOLARYNGOLOGY | Facility: CLINIC | Age: 79
End: 2024-01-17
Payer: MEDICARE

## 2024-01-17 VITALS — TEMPERATURE: 99 F | HEIGHT: 68 IN | WEIGHT: 170.19 LBS | RESPIRATION RATE: 16 BRPM | BODY MASS INDEX: 25.79 KG/M2

## 2024-01-17 DIAGNOSIS — H90.3 SENSORINEURAL HEARING LOSS, BILATERAL: Primary | ICD-10-CM

## 2024-01-17 DIAGNOSIS — J34.89 NOSE IRRITATION: Primary | ICD-10-CM

## 2024-01-17 DIAGNOSIS — H61.893 DRY BOTH EAR CANALS: ICD-10-CM

## 2024-01-17 DIAGNOSIS — H91.90 HEARING LOSS, UNSPECIFIED HEARING LOSS TYPE, UNSPECIFIED LATERALITY: ICD-10-CM

## 2024-01-17 DIAGNOSIS — H90.3 SENSORINEURAL HEARING LOSS (SNHL) OF BOTH EARS: ICD-10-CM

## 2024-01-17 PROCEDURE — 92557 COMPREHENSIVE HEARING TEST: CPT | Mod: PBBFAC,PO | Performed by: AUDIOLOGIST

## 2024-01-17 PROCEDURE — 99215 OFFICE O/P EST HI 40 MIN: CPT | Mod: PBBFAC,27,PO | Performed by: PHYSICIAN ASSISTANT

## 2024-01-17 PROCEDURE — 92567 TYMPANOMETRY: CPT | Mod: PBBFAC,PO | Performed by: AUDIOLOGIST

## 2024-01-17 PROCEDURE — 99213 OFFICE O/P EST LOW 20 MIN: CPT | Mod: S$PBB,,, | Performed by: PHYSICIAN ASSISTANT

## 2024-01-17 PROCEDURE — 99212 OFFICE O/P EST SF 10 MIN: CPT | Mod: PBBFAC,PO | Performed by: AUDIOLOGIST

## 2024-01-17 PROCEDURE — 99999 PR PBB SHADOW E&M-EST. PATIENT-LVL V: CPT | Mod: PBBFAC,,, | Performed by: PHYSICIAN ASSISTANT

## 2024-01-17 PROCEDURE — 99999 PR PBB SHADOW E&M-EST. PATIENT-LVL II: CPT | Mod: PBBFAC,,, | Performed by: AUDIOLOGIST

## 2024-01-17 NOTE — PROGRESS NOTES
[facility]    Subjective:      Patient: Kirstie Bowden Patient PCP: Elías Luis Jr., MD         :  1945     Sex:  female      MRN:  [id number]          Date of Visit: 2024      Chief Complaint: Hearing Loss    Patient ID: Kirstie Bowden is a 78 y.o. female who presents to office for evaluation of hearing loss. Pt states that she has noticed that she has had to increase the volume on the television and has had issues understanding speech on the radio. Pt states that she has occasional whooshing like tinnitus in ears that is not with her heartbeat. Pt denies ear pain/discharge, aural fullness or fluctuations in hearing. Pt denies h/o otologic surgery. Pt's mother had age-related hearing loss. Pt denies significant history of loud noise exposure. She does listen to bands.     Past Medical History  She has a past medical history of Arthritis, Asthma, Cancer, Hyperlipidemia, Hypertension, Limb alert care status, Pseudocholinesterase deficiency, and Thyroid disease.    Family History  Her family history includes Alzheimer's disease in her mother; Cancer in her daughter and father; Diabetes in her brother; Heart disease in her mother and sister; Hypertension in her mother; Stroke in her sister.    Past Surgical History:   Procedure Laterality Date    AXILLARY NODE DISSECTION Left 2019    Procedure: LYMPHADENECTOMY, AXILLARY;  Surgeon: Mp Gonzalez MD;  Location: [facility] OR;  Service: General;  Laterality: Left;  left lumpectomy with axillary lypmh node dissection    BALLOON DILATION OF URETER Left 2019    Procedure: DILATION, URETER, USING BALLOON;  Surgeon: Jorden Dickson MD;  Location: [facility] OR;  Service: Urology;  Laterality: Left;    BREAST BIOPSY Left 20 yrs ago    benign    BREAST LUMPECTOMY      x2    CHOLECYSTECTOMY      COLONOSCOPY  2018    LUC EASTON MD    CYSTOSCOPY W/ URETERAL STENT PLACEMENT Left 2019    Procedure: CYSTOSCOPY, WITH URETERAL  STENT INSERTION;  Surgeon: Jorden Dickson MD;  Location: [facility] OR;  Service: Urology;  Laterality: Left;    CYSTOSCOPY W/ URETERAL STENT REMOVAL Left 07/23/2019    Procedure: CYSTOSCOPY, WITH URETERAL STENT REMOVAL;  Surgeon: Jorden Dickson MD;  Location: [facility] OR;  Service: Urology;  Laterality: Left;    EPIDURAL STEROID INJECTION INTO LUMBAR SPINE N/A 09/30/2021    Procedure: Injection-steroid-epidural-lumbar;  Surgeon: Nathen Lyons MD;  Location: [facility] OR;  Service: Pain Management;  Laterality: N/A;  L5-S1      EPIDURAL STEROID INJECTION INTO LUMBAR SPINE N/A 11/16/2021    Procedure: Injection-steroid-epidural-lumbar;  Surgeon: Nathen Lyons MD;  Location: [facility] OR;  Service: Pain Management;  Laterality: N/A;  L5-S1    EXTRACORPOREAL SHOCK WAVE LITHOTRIPSY Left 07/23/2019    Procedure: LITHOTRIPSY, ESWL;  Surgeon: Jorden Dickson MD;  Location: [facility] OR;  Service: Urology;  Laterality: Left;    EYE SURGERY Bilateral     cataracts    HYSTERECTOMY      MASTECTOMY, PARTIAL Left 04/25/2019    Procedure: MASTECTOMY, PARTIAL;  Surgeon: Mp Gonzalez MD;  Location: [facility] OR;  Service: General;  Laterality: Left;    NEEDLE LOCALIZATION Left 03/14/2019    Procedure: NEEDLE LOCALIZATION;  Surgeon: Mp Gonzalez MD;  Location: [facility] OR;  Service: General;  Laterality: Left;  left lumpectomy with wire needle localization     PARTIAL NEPHRECTOMY Right 05/22/2023    RETROGRADE PYELOGRAPHY Bilateral 06/24/2019    Procedure: PYELOGRAM, RETROGRADE;  Surgeon: Jorden Dickson MD;  Location: [facility] OR;  Service: Urology;  Laterality: Bilateral;    SENTINEL LYMPH NODE BIOPSY Left 03/14/2019    Procedure: BIOPSY, LYMPH NODE, SENTINEL;  Surgeon: Mp Gonzalez MD;  Location: [facility] OR;  Service: General;  Laterality: Left;  left sentinel node lymph node biopsy    TONSILLECTOMY      TRANSFORAMINAL EPIDURAL INJECTION OF STEROID Right 01/04/2022    Procedure: Injection,steroid,epidural,transforaminal approach;  Surgeon: Nathen MONSON  MD Eloy;  Location: [facility] OR;  Service: Pain Management;  Laterality: Right;  L4-L5, L5-s1    URETEROSCOPY Left 2019    Procedure: URETEROSCOPY;  Surgeon: Jorden Dickson MD;  Location: [facility] OR;  Service: Urology;  Laterality: Left;     Social History     Tobacco Use    Smoking status: Former     Current packs/day: 0.00     Average packs/day: 0.5 packs/day for 59.3 years (29.6 ttl pk-yrs)     Types: Cigarettes     Start date: 1960     Quit date: 2019     Years since quittin.4    Smokeless tobacco: Never   Substance and Sexual Activity    Alcohol use: Yes     Alcohol/week: 2.0 standard drinks of alcohol     Types: 2 Glasses of wine per week     Comment: Social    Drug use: No    Sexual activity: Never     Medications  She has a current medication list which includes the following prescription(s): albuterol, amlodipine, atorvastatin, exemestane, fluticasone propionate, fluticasone-salmeterol 250-50 mcg/dose, folic acid, gabapentin, hydrochlorothiazide, hydroxychloroquine, levothyroxine, methotrexate, ondansetron, pantoprazole, prochlorperazine, ninjacof, scopolamine, thiamine, tramadol, UNABLE TO FIND, vitamin b complex, and zolpidem, and the following Facility-Administered Medications: lactated ringers and lidocaine (pf) 10 mg/ml (1%).    Review of patient's allergies indicates:   Allergen Reactions    Succinylcholine chloride Other (See Comments)    Sulfur dioxide Hives    Sulfamethoxazole-trimethoprim      Other reaction(s): per dr daryn Rodríguez (sulfonamide antibiotics)      NOT SURE REACTION     All medications, allergies, and past history have been reviewed.    Objective:      Vitals:      2023     1:08 PM 2023     3:17 PM 2024     1:32 PM   Vitals - 1 value per visit   SYSTOLIC 120 136    DIASTOLIC 78 68    Pulse 76 69    Temp 98 °F (36.7 °C) 98 °F (36.7 °C) 98.5 °F (36.9 °C)   Resp 20  16   SPO2  95 %    Weight (lb) 168 166.89 170.2   Weight (kg) 76.204 75.7 77.2   Height  "5' 8" (1.727 m) 5' 8" (1.727 m) 5' 8" (1.727 m)   BMI (Calculated) 25.6 25.4 25.9   Pain Score  Zero Zero       Body surface area is 1.92 meters squared.    Physical Exam  Constitutional:       General: She is not in acute distress.     Appearance: Normal appearance. She is not ill-appearing.   HENT:      Head: Normocephalic and atraumatic.      Right Ear: Tympanic membrane and external ear normal.      Left Ear: Tympanic membrane and external ear normal.      Ears:      Comments: Dry EACs AU.     Nose: Septal deviation (rightward deviation) present.      Comments: Bilateral anterior nasal septal irritation. No acute nosebleed.      Mouth/Throat:      Lips: Pink. No lesions.      Mouth: Mucous membranes are moist. No oral lesions.      Tongue: No lesions.      Palate: No lesions.      Pharynx: Oropharynx is clear. Uvula midline. No pharyngeal swelling, oropharyngeal exudate, posterior oropharyngeal erythema or uvula swelling.      Tonsils: 0 on the right. 0 on the left.      Comments: S/p tonsillectomy.   Eyes:      General:         Right eye: No discharge.         Left eye: No discharge.      Extraocular Movements: Extraocular movements intact.      Conjunctiva/sclera: Conjunctivae normal.   Pulmonary:      Effort: Pulmonary effort is normal.   Neurological:      General: No focal deficit present.      Mental Status: She is alert and oriented to person, place, and time. Mental status is at baseline.   Psychiatric:         Mood and Affect: Mood normal.         Behavior: Behavior normal.         Thought Content: Thought content normal.         Judgment: Judgment normal.       Labs:  WBC   Date Value Ref Range Status   12/06/2023 7.27 3.90 - 12.70 K/uL Final   12/06/2023 7.27 3.90 - 12.70 K/uL Final     Platelets   Date Value Ref Range Status   12/06/2023 251 150 - 450 K/uL Final   12/06/2023 251 150 - 450 K/uL Final     Creatinine   Date Value Ref Range Status   12/06/2023 1.4 0.5 - 1.4 mg/dL Final   12/06/2023 1.4 " 0.5 - 1.4 mg/dL Final     TSH   Date Value Ref Range Status   07/21/2023 0.250 (L) 0.340 - 5.600 uIU/mL Final     Glucose   Date Value Ref Range Status   12/06/2023 85 70 - 110 mg/dL Final   12/06/2023 85 70 - 110 mg/dL Final     Hemoglobin A1C   Date Value Ref Range Status   03/25/2013 5.8 4.0 - 6.2 % Final       Audiogram 01/17/2024    All lab results and imaging results have been reviewed.    Assessment:        ICD-10-CM ICD-9-CM   1. Nose irritation  J34.89 478.19   2. Sensorineural hearing loss (SNHL) of both ears  H90.3 389.18   3. Dry both ear canals  H61.893 380.89            Plan:      Pt has occasional issues with dry itchy ear canals. Pt advised that she can use over the counter mineral oil: 3-4 mineral oil to ear canals (let sit for 30 seconds to 1 minute in ear canal) as needed for ear itching/dryness. Pt has nasal irritation of bilateral anterior nasal septum. No acute nosebleed. Pt advised about proper technique when using flonase as to avoid nasal irritation. Use over the counter nasal saline 2 sprays to each nostril 4-6 times a day for the next 2 weeks to help with nasal irritation. Audiogram reviewed with pt in detail. I agree with recommendations of audiologist. Pt has mild to severe SNHL for her right ear and mild to profound SNHL for her left ear. Type A tymp AD and type A tymp AS. SRTs 35dB AD and 40dB AS. %AD and 100%AS. Pt is medically cleared for hearing aids. We will monitor hearing loss with annual audiograms. Pt will reach out to her insurance to see if they will offer partial coverage of her hearing aids at an outside vendor and if not, then she has been provided with our audiologist's card. She will be scheduled for a hearing aid consultation. She is to proceed with annual audiograms to monitor her hearing loss and may follow up sooner if having ENT issues/concerns.

## 2024-01-17 NOTE — PATIENT INSTRUCTIONS
Use over the counter mineral oil: 3-4 mineral oil to ear canals (let sit for 30 seconds to 1 minute in ear canal) as needed for ear itching/dryness.    Use over the counter nasal saline 2 sprays to each nostril 4-6 times a day for the next 2 weeks to help with nasal irritation.

## 2024-01-17 NOTE — PROGRESS NOTES
Kirstie Bowden was seen 01/17/2024 for an audiological evaluation. Pt was alone during today's visit. Pertinent complaints today include hearing loss. Pt denies history of loud noise exposure and confirms a family history of hearing loss. Otoscopy revealed no cerumen in both ears. The tympanic membrane was visualized AU prior to proceeding with the hearing testing.     Results reveal a mild-to-severe sensorineural hearing loss for the right ear and  mild-to-profound sensorineural hearing loss for the left ear.    Speech Reception Thresholds were  35 dBHL for the right ear and 40 dBHL for the left ear.    Word recognition scores were excellent for the right ear and excellent for the left ear.   Tympanograms were Type A for the right ear and Type A for the left ear.    Recommendations: 1) Follow up with ENT     2) Annual hearing evaluation     3) Hearing aid consult when ready    Audiogram results were reviewed in detail with patient and all questions were answered. Results will be reviewed by the referring provider at the completion of this note. All complaints were addressed during this visit to the patient's satisfaction.

## 2024-01-18 DIAGNOSIS — I10 ESSENTIAL HYPERTENSION, BENIGN: ICD-10-CM

## 2024-01-18 DIAGNOSIS — Z17.0 MALIGNANT NEOPLASM OF NIPPLE OF LEFT BREAST IN FEMALE, ESTROGEN RECEPTOR POSITIVE: ICD-10-CM

## 2024-01-18 DIAGNOSIS — C50.012 MALIGNANT NEOPLASM OF NIPPLE OF LEFT BREAST IN FEMALE, ESTROGEN RECEPTOR POSITIVE: ICD-10-CM

## 2024-01-18 DIAGNOSIS — M05.711 RHEUMATOID ARTHRITIS INVOLVING RIGHT SHOULDER WITH POSITIVE RHEUMATOID FACTOR: ICD-10-CM

## 2024-01-18 RX ORDER — ONDANSETRON 8 MG/1
TABLET, ORALLY DISINTEGRATING ORAL
Qty: 30 TABLET | Refills: 0 | Status: SHIPPED | OUTPATIENT
Start: 2024-01-18 | End: 2024-02-02

## 2024-01-23 LAB — CRC RECOMMENDATION EXT: NORMAL

## 2024-01-31 ENCOUNTER — LAB VISIT (OUTPATIENT)
Dept: LAB | Facility: HOSPITAL | Age: 79
End: 2024-01-31
Attending: INTERNAL MEDICINE
Payer: MEDICARE

## 2024-01-31 DIAGNOSIS — Z17.0 CARCINOMA OF CENTRAL PORTION OF LEFT BREAST IN FEMALE, ESTROGEN RECEPTOR POSITIVE: ICD-10-CM

## 2024-01-31 DIAGNOSIS — D53.9 NUTRITIONAL ANEMIA: ICD-10-CM

## 2024-01-31 DIAGNOSIS — C50.112 CARCINOMA OF CENTRAL PORTION OF LEFT BREAST IN FEMALE, ESTROGEN RECEPTOR POSITIVE: ICD-10-CM

## 2024-01-31 LAB
ALBUMIN SERPL BCP-MCNC: 4.1 G/DL (ref 3.5–5.2)
ALP SERPL-CCNC: 81 U/L (ref 55–135)
ALT SERPL W/O P-5'-P-CCNC: 12 U/L (ref 10–44)
ANION GAP SERPL CALC-SCNC: 9 MMOL/L (ref 8–16)
AST SERPL-CCNC: 16 U/L (ref 10–40)
BASOPHILS # BLD AUTO: 0.03 K/UL (ref 0–0.2)
BASOPHILS NFR BLD: 0.5 % (ref 0–1.9)
BILIRUB SERPL-MCNC: 0.3 MG/DL (ref 0.1–1)
BUN SERPL-MCNC: 15 MG/DL (ref 8–23)
CALCIUM SERPL-MCNC: 10.4 MG/DL (ref 8.7–10.5)
CHLORIDE SERPL-SCNC: 106 MMOL/L (ref 95–110)
CO2 SERPL-SCNC: 28 MMOL/L (ref 23–29)
CREAT SERPL-MCNC: 1.4 MG/DL (ref 0.5–1.4)
DIFFERENTIAL METHOD BLD: ABNORMAL
EOSINOPHIL # BLD AUTO: 0.2 K/UL (ref 0–0.5)
EOSINOPHIL NFR BLD: 2.5 % (ref 0–8)
ERYTHROCYTE [DISTWIDTH] IN BLOOD BY AUTOMATED COUNT: 14.3 % (ref 11.5–14.5)
EST. GFR  (NO RACE VARIABLE): 39 ML/MIN/1.73 M^2
FERRITIN SERPL-MCNC: 56 NG/ML (ref 20–300)
FOLATE SERPL-MCNC: >40 NG/ML (ref 4–24)
GLUCOSE SERPL-MCNC: 89 MG/DL (ref 70–110)
HCT VFR BLD AUTO: 36.6 % (ref 37–48.5)
HGB BLD-MCNC: 11.5 G/DL (ref 12–16)
IMM GRANULOCYTES # BLD AUTO: 0.01 K/UL (ref 0–0.04)
IMM GRANULOCYTES NFR BLD AUTO: 0.2 % (ref 0–0.5)
LYMPHOCYTES # BLD AUTO: 1.6 K/UL (ref 1–4.8)
LYMPHOCYTES NFR BLD: 27 % (ref 18–48)
MCH RBC QN AUTO: 32 PG (ref 27–31)
MCHC RBC AUTO-ENTMCNC: 31.4 G/DL (ref 32–36)
MCV RBC AUTO: 102 FL (ref 82–98)
MONOCYTES # BLD AUTO: 0.7 K/UL (ref 0.3–1)
MONOCYTES NFR BLD: 12 % (ref 4–15)
NEUTROPHILS # BLD AUTO: 3.5 K/UL (ref 1.8–7.7)
NEUTROPHILS NFR BLD: 57.8 % (ref 38–73)
NRBC BLD-RTO: 0 /100 WBC
PLATELET # BLD AUTO: 263 K/UL (ref 150–450)
PMV BLD AUTO: 10.6 FL (ref 9.2–12.9)
POTASSIUM SERPL-SCNC: 4.4 MMOL/L (ref 3.5–5.1)
PROT SERPL-MCNC: 7.5 G/DL (ref 6–8.4)
RBC # BLD AUTO: 3.59 M/UL (ref 4–5.4)
SODIUM SERPL-SCNC: 143 MMOL/L (ref 136–145)
VIT B12 SERPL-MCNC: >2000 PG/ML (ref 210–950)
WBC # BLD AUTO: 6.08 K/UL (ref 3.9–12.7)

## 2024-01-31 PROCEDURE — 36415 COLL VENOUS BLD VENIPUNCTURE: CPT | Performed by: INTERNAL MEDICINE

## 2024-01-31 PROCEDURE — 82728 ASSAY OF FERRITIN: CPT | Performed by: INTERNAL MEDICINE

## 2024-01-31 PROCEDURE — 85025 COMPLETE CBC W/AUTO DIFF WBC: CPT | Performed by: INTERNAL MEDICINE

## 2024-01-31 PROCEDURE — 82607 VITAMIN B-12: CPT | Performed by: INTERNAL MEDICINE

## 2024-01-31 PROCEDURE — 82746 ASSAY OF FOLIC ACID SERUM: CPT | Performed by: INTERNAL MEDICINE

## 2024-01-31 PROCEDURE — 80053 COMPREHEN METABOLIC PANEL: CPT | Performed by: INTERNAL MEDICINE

## 2024-02-01 LAB — CANCER AG27-29 SERPL-ACNC: <12 U/ML

## 2024-02-02 ENCOUNTER — OFFICE VISIT (OUTPATIENT)
Dept: HEMATOLOGY/ONCOLOGY | Facility: CLINIC | Age: 79
End: 2024-02-02
Payer: MEDICARE

## 2024-02-02 ENCOUNTER — TELEPHONE (OUTPATIENT)
Dept: HEMATOLOGY/ONCOLOGY | Facility: CLINIC | Age: 79
End: 2024-02-02

## 2024-02-02 VITALS
WEIGHT: 171.06 LBS | HEIGHT: 68 IN | SYSTOLIC BLOOD PRESSURE: 122 MMHG | HEART RATE: 67 BPM | RESPIRATION RATE: 18 BRPM | TEMPERATURE: 96 F | DIASTOLIC BLOOD PRESSURE: 59 MMHG | OXYGEN SATURATION: 99 % | BODY MASS INDEX: 25.92 KG/M2

## 2024-02-02 DIAGNOSIS — C64.1 RENAL CELL CARCINOMA OF RIGHT KIDNEY: ICD-10-CM

## 2024-02-02 DIAGNOSIS — I10 ESSENTIAL HYPERTENSION, BENIGN: ICD-10-CM

## 2024-02-02 DIAGNOSIS — C50.112 CARCINOMA OF CENTRAL PORTION OF LEFT BREAST IN FEMALE, ESTROGEN RECEPTOR POSITIVE: Primary | ICD-10-CM

## 2024-02-02 DIAGNOSIS — C50.012 MALIGNANT NEOPLASM OF NIPPLE OF LEFT BREAST IN FEMALE, ESTROGEN RECEPTOR POSITIVE: ICD-10-CM

## 2024-02-02 DIAGNOSIS — E78.49 OTHER HYPERLIPIDEMIA: ICD-10-CM

## 2024-02-02 DIAGNOSIS — Z17.0 CARCINOMA OF CENTRAL PORTION OF LEFT BREAST IN FEMALE, ESTROGEN RECEPTOR POSITIVE: Primary | ICD-10-CM

## 2024-02-02 DIAGNOSIS — Z17.0 MALIGNANT NEOPLASM OF NIPPLE OF LEFT BREAST IN FEMALE, ESTROGEN RECEPTOR POSITIVE: ICD-10-CM

## 2024-02-02 DIAGNOSIS — M05.711 RHEUMATOID ARTHRITIS INVOLVING RIGHT SHOULDER WITH POSITIVE RHEUMATOID FACTOR: ICD-10-CM

## 2024-02-02 DIAGNOSIS — I70.0 ATHEROSCLEROSIS OF AORTA: ICD-10-CM

## 2024-02-02 PROCEDURE — 99213 OFFICE O/P EST LOW 20 MIN: CPT | Mod: PBBFAC,PN | Performed by: INTERNAL MEDICINE

## 2024-02-02 PROCEDURE — 99214 OFFICE O/P EST MOD 30 MIN: CPT | Mod: S$PBB,,, | Performed by: INTERNAL MEDICINE

## 2024-02-02 PROCEDURE — 99999 PR PBB SHADOW E&M-EST. PATIENT-LVL III: CPT | Mod: PBBFAC,,, | Performed by: INTERNAL MEDICINE

## 2024-02-02 RX ORDER — ONDANSETRON 8 MG/1
TABLET, ORALLY DISINTEGRATING ORAL
Qty: 30 TABLET | Refills: 0 | Status: SHIPPED | OUTPATIENT
Start: 2024-02-02 | End: 2024-02-15

## 2024-02-02 RX ORDER — FLUTICASONE PROPIONATE 50 MCG
SPRAY, SUSPENSION (ML) NASAL
Qty: 48 ML | Refills: 3 | Status: SHIPPED | OUTPATIENT
Start: 2024-02-02

## 2024-02-02 NOTE — TELEPHONE ENCOUNTER
Refill Routing Note   Medication(s) are not appropriate for processing by [facility] for the following reason(s):        New or recently adjusted medication    [facility] action(s):  Defer        Medication Therapy Plan: START DATE  12/28/23      Appointments  past 12m or future 3m with PCP    Date Provider   Last Visit   7/10/2023 Elías Luis Jr., MD   Next Visit   7/11/2024 Elías Luis Jr., MD   ED visits in past 90 days: 0        Note composed:9:55 AM 02/02/2024

## 2024-02-02 NOTE — TELEPHONE ENCOUNTER
No care due was identified.  [facility] Embedded Care Due Messages. Reference number: [id number].   2/02/2024 8:47:58 AM CST

## 2024-02-02 NOTE — PROGRESS NOTES
Subjective:       Patient ID: Kirstie Bowden is a 79 y.o. female.    Chief Complaint   Left breast ca dx  T2 N0 status post lumpectomy and re-resection margin adjuvant TC chemo s/p 3  cycles but discontinued due to quality of life issues and started Arimidex   stated  having s/e  To arimidex took herself off presented to clinic started on Aromasin .  Tolerating Aromasin well rec score of 29    hydronephrosis+ ,  had stent placed  Here for follow-up  Oncologic History:  Dx 1/2019  INVASIVE CARCINOMA BREAST, CANCER CASE SUMMARY:  PROCEDURE: Partial mastectomy without skin or nipple.3/2019  SPECIMEN LATERALITY: Left.  TUMOR SIZE, GREATEST DIMENSION: 2.5 cm.  HISTOLOGIC TYPE: Invasive pleomorphic lobular carcinoma.  HISTOLOGIC GRADE (LUTHER HISTOLOGIC SCORE):  Glandular (acinar)/tubular differentiation: Score 3.  Nuclear pleomorphism: Score 2.  Mitotic rate: Score 1.  Overall grade: Grade 2  DUCTAL CARCINOMA IN SITU (DCIS): Not identified.  LOBULAR CARCINOMA IN SITU (LCIS): Present, pleomorphic lobular carcinoma in situ with rare focus of  classical lobular carcinoma situ.  Estimated size (extent) of pleomorphic LCIS: At least 2.8 cm.  TUMOR EXTENSION: Not applicable.  MARGINS:  INVASIVE CARCINOMA MARGINS: Uninvolved by invasive carcinoma.  Distance from inferior (closest) margin: 5 mm.  PLEOMORPHIC LOBULAR CARCINOMA IN SITU MARGINS: Uninvolved by pleomorphic LCIS.  Distance from inferior medial (closest) margin: 0.2 mm (see above comment).  REGIONAL LYMPH NODES: Uninvolved by tumor cells.  Number of lymph nodes examined: 2.  Number of sentinel nodes examined: 2.  TREATMENT EFFECT: No known presurgical therapy.  PATHOLOGIC STAGE CLASSIFICATION (pTNM, AJCC 8TH EDITION):  pT2: Tumor size = 2.5 cm in greatest dimension.  pN0: No regional lymph node metastasis identified.  pM: Not applicable.    ER: Positive.  MI: Positive.  HER2: Negative (IHC - Equivocal (score 2) and FISH - Negative).  Ki-67: Approximately  14%.  HPI:     Social History     Socioeconomic History    Marital status:    Occupational History     Employer: [employer]   Tobacco Use    Smoking status: Former     Current packs/day: 0.00     Average packs/day: 0.5 packs/day for 59.3 years (29.6 ttl pk-yrs)     Types: Cigarettes     Start date: 1960     Quit date: 2019     Years since quittin.4    Smokeless tobacco: Never   Substance and Sexual Activity    Alcohol use: Yes     Alcohol/week: 2.0 standard drinks of alcohol     Types: 2 Glasses of wine per week     Comment: Social    Drug use: No    Sexual activity: Never     Social Determinants of Health     Financial Resource Strain: Low Risk  (2024)    Overall Financial Resource Strain (CARDIA)     Difficulty of Paying Living Expenses: Not very hard   Food Insecurity: No Food Insecurity (2024)    Hunger Vital Sign     Worried About Running Out of Food in the Last Year: Never true     Ran Out of Food in the Last Year: Never true   Transportation Needs: No Transportation Needs (2024)    PRAPARE - Transportation     Lack of Transportation (Medical): No     Lack of Transportation (Non-Medical): No   Physical Activity: Insufficiently Active (2024)    Exercise Vital Sign     Days of Exercise per Week: 2 days     Minutes of Exercise per Session: 10 min   Stress: No Stress Concern Present (2024)    [place] [employer] - Occupational Stress Questionnaire     Feeling of Stress : Only a little   Social Connections: Moderately Isolated (2024)    Social Connection and Isolation Panel [NHANES]     Frequency of Communication with Friends and Family: More than three times a week     Frequency of Social Gatherings with Friends and Family: Three times a week     Attends Worship Services: More than 4 times per year     Active Member of Clubs or Organizations: No     Attends Club or Organization Meetings: Never     Marital Status:    Housing Stability:  Low Risk  (1/30/2024)    Housing Stability Vital Sign     Unable to Pay for Housing in the Last Year: No     Number of Places Lived in the Last Year: 1     Unstable Housing in the Last Year: No     Family History   Problem Relation Age of Onset    Heart disease Mother     Hypertension Mother     Alzheimer's disease Mother     Cancer Father     Heart disease Sister     Stroke Sister     Diabetes Brother     Cancer Daughter      Past Surgical History:   Procedure Laterality Date    AXILLARY NODE DISSECTION Left 03/14/2019    Procedure: LYMPHADENECTOMY, AXILLARY;  Surgeon: Mp Gonzalez MD;  Location: [facility] OR;  Service: General;  Laterality: Left;  left lumpectomy with axillary lypmh node dissection    BALLOON DILATION OF URETER Left 06/24/2019    Procedure: DILATION, URETER, USING BALLOON;  Surgeon: Jorden Dickson MD;  Location: [facility] OR;  Service: Urology;  Laterality: Left;    BREAST BIOPSY Left 20 yrs ago    benign    BREAST LUMPECTOMY      x2    CHOLECYSTECTOMY      COLONOSCOPY  09/25/2018    LUC EASTON MD    CYSTOSCOPY W/ URETERAL STENT PLACEMENT Left 06/24/2019    Procedure: CYSTOSCOPY, WITH URETERAL STENT INSERTION;  Surgeon: Jorden Dickson MD;  Location: [facility] OR;  Service: Urology;  Laterality: Left;    CYSTOSCOPY W/ URETERAL STENT REMOVAL Left 07/23/2019    Procedure: CYSTOSCOPY, WITH URETERAL STENT REMOVAL;  Surgeon: Jorden Dickson MD;  Location: [facility] OR;  Service: Urology;  Laterality: Left;    EPIDURAL STEROID INJECTION INTO LUMBAR SPINE N/A 09/30/2021    Procedure: Injection-steroid-epidural-lumbar;  Surgeon: Nathen Lyons MD;  Location: [facility] OR;  Service: Pain Management;  Laterality: N/A;  L5-S1      EPIDURAL STEROID INJECTION INTO LUMBAR SPINE N/A 11/16/2021    Procedure: Injection-steroid-epidural-lumbar;  Surgeon: Nathen Lyons MD;  Location: [facility] OR;  Service: Pain Management;  Laterality: N/A;  L5-S1    EXTRACORPOREAL SHOCK WAVE LITHOTRIPSY Left 07/23/2019    Procedure: LITHOTRIPSY,  ESWL;  Surgeon: Jroden Dickson MD;  Location: [facility] OR;  Service: Urology;  Laterality: Left;    EYE SURGERY Bilateral     cataracts    HYSTERECTOMY      MASTECTOMY, PARTIAL Left 04/25/2019    Procedure: MASTECTOMY, PARTIAL;  Surgeon: Mp Gonzalez MD;  Location: [facility] OR;  Service: General;  Laterality: Left;    NEEDLE LOCALIZATION Left 03/14/2019    Procedure: NEEDLE LOCALIZATION;  Surgeon: Mp Gonzalez MD;  Location: [facility] OR;  Service: General;  Laterality: Left;  left lumpectomy with wire needle localization     PARTIAL NEPHRECTOMY Right 05/22/2023    RETROGRADE PYELOGRAPHY Bilateral 06/24/2019    Procedure: PYELOGRAM, RETROGRADE;  Surgeon: Jorden Dickson MD;  Location: [facility] OR;  Service: Urology;  Laterality: Bilateral;    SENTINEL LYMPH NODE BIOPSY Left 03/14/2019    Procedure: BIOPSY, LYMPH NODE, SENTINEL;  Surgeon: Mp Gonzalez MD;  Location: [facility];  Service: General;  Laterality: Left;  left sentinel node lymph node biopsy    TONSILLECTOMY      TRANSFORAMINAL EPIDURAL INJECTION OF STEROID Right 01/04/2022    Procedure: Injection,steroid,epidural,transforaminal approach;  Surgeon: Nathen Lyons MD;  Location: [facility] OR;  Service: Pain Management;  Laterality: Right;  L4-L5, L5-s1    URETEROSCOPY Left 06/24/2019    Procedure: URETEROSCOPY;  Surgeon: Jorden Dickson MD;  Location: [facility];  Service: Urology;  Laterality: Left;     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    Hyperlipidemia     Hypertension     Limb alert care status     NO BP/IV LEFT ARM    Pseudocholinesterase deficiency     family history    Thyroid disease        Current Outpatient Medications:     albuterol (PROAIR HFA) 90 mcg/actuation inhaler, Inhale 2 puffs into the lungs every 6 (six) hours as needed for Wheezing. Rescue, Disp: 18 g, Rfl: 11    amLODIPine (NORVASC) 5 MG tablet, Take 1 tablet (5 mg total) by mouth once daily., Disp: 90 tablet, Rfl:  3    atorvastatin (LIPITOR) 20 MG tablet, Take 1 tablet (20 mg total) by mouth once daily., Disp: 90 tablet, Rfl: 3    exemestane (AROMASIN) 25 mg tablet, Take 1 tablet (25 mg total) by mouth once daily., Disp: 90 tablet, Rfl: 5    fluticasone propionate (FLONASE) 50 mcg/actuation nasal spray, 1 spray (50 mcg total) by Each Nostril route once daily., Disp: 16 g, Rfl: 3    fluticasone-salmeterol diskus inhaler 250-50 mcg, Inhale 1 puff into the lungs 2 (two) times daily. Controller, Disp: 180 each, Rfl: 3    folic acid (FOLVITE) 1 MG tablet, Take 1 tablet (1,000 mcg total) by mouth once daily., Disp: 90 tablet, Rfl: 3    gabapentin (NEURONTIN) 300 MG capsule, TAKE 1 CAPSULE BY MOUTH IN THE EVENING, Disp: 30 capsule, Rfl: 2    hydroCHLOROthiazide (HYDRODIURIL) 25 MG tablet, Take 1 tablet (25 mg total) by mouth once daily., Disp: 90 tablet, Rfl: 3    hydrOXYchloroQUINE (PLAQUENIL) 200 mg tablet, Take 1 tablet (200 mg total) by mouth 2 (two) times daily., Disp: 180 tablet, Rfl: 3    levothyroxine (SYNTHROID) 125 MCG tablet, Take 125 mcg by mouth., Disp: , Rfl:     methotrexate 2.5 MG Tab, Take 7 tablets (17.5 mg total) by mouth every 7 days., Disp: 84 tablet, Rfl: 1    ondansetron (ZOFRAN-ODT) 8 MG TbDL, DISSOLVE 1 TABLET IN MOUTH EVERY 12 HOURS AS NEEDED, Disp: 30 tablet, Rfl: 0    pantoprazole (PROTONIX) 40 MG tablet, TAKE 1 TABLET BY MOUTH ONCE DAILY IN THE MORNING FOR 90 DAYS, Disp: , Rfl:     prochlorperazine (COMPAZINE) 10 MG tablet, TAKE 1 TABLET BY MOUTH EVERY 6 HOURS AS NEEDED, Disp: 60 tablet, Rfl: 0    pyrilamine-chlophedianoL (NINJACOF) 12.5-12.5 mg/5 mL Liqd, Take 5 mLs by mouth every 6 to 8 hours as needed (cough)., Disp: 118 mL, Rfl: 0    scopolamine (TRANSDERM-SCOP) 1.3-1.5 mg (1 mg over 3 days), Place 1 patch onto the skin every 72 hours., Disp: 4 patch, Rfl: 0    thiamine 100 MG tablet, Take 100 mg by mouth once daily., Disp: , Rfl:     traMADoL (ULTRAM) 50 mg tablet, Take 1 tablet (50 mg total) by  mouth every 12 (twelve) hours as needed for Pain (severe pain). TAKE 1 TABLET BY MOUTH EVERY 8 HOURS AS NEEDED FOR SEVERE PAIN- DOSE DECREASE, Disp: 60 tablet, Rfl: 1    UNABLE TO FIND, Take by mouth once daily.  B Complex, Disp: , Rfl:     VITAMIN B COMPLEX ORAL, Every day, Disp: , Rfl:     zolpidem (AMBIEN) 5 MG Tab, Take 1 tablet (5 mg total) by mouth nightly as needed (sleep)., Disp: 30 tablet, Rfl: 3  No current facility-administered medications for this visit.    Facility-Administered Medications Ordered in Other Visits:     lactated ringers infusion, , Intravenous, Once PRN, VuNathen MD    lidocaine (PF) 10 mg/ml (1%) injection 10 mg, 1 mL, Intradermal, Once, Eladia Schwartz MD  Review of patient's allergies indicates:   Allergen Reactions    Succinylcholine chloride Other (See Comments)    Sulfur dioxide Hives    Sulfamethoxazole-trimethoprim      Other reaction(s): per dr daryn Rodríguez (sulfonamide antibiotics)      NOT SURE REACTION         REVIEW OF SYSTEMS:     CONSTITUTIONAL:  Patient voices complains with significant fatigue.  Shortness of breath on ambulation.  She is not coughing up productive sputum she reports no fever chills rigors  Has some sinus congestion  And having night sweats after starting Arimidex    SKIN: Denies rash, issues with nails, non-healing sores, bleeding, blotching    skin or abnormal bruising. Denies new moles or changes to existing moles.      BREASTS: healing well since lumpectomy    EYES: Denies eye pain, blurred vision, swelling, redness or discharge.      ENT AND MOUTH:  Has runny nose, and stuffiness, and sinus trouble no sores. Denies    nosebleeds. Denies, hoarseness, change in voice or swelling in front of the    neck.      CARDIOVASCULAR: Denies chest pain, discomfort or palpitations. Denies neck    swelling or episodes of passing out.           GI: Denies trouble swallowing, indigestion, heartburn, abdominal pain, +nausea with arimidex betty in am then   Dry heaves during day, stopped arimidex and feeks much better     no vomiting, diarrhea, altered bowel habits, blood in stool, discoloration of    stools, change in nature of stool, bloating, increased abdominal girth.      GENITOURINARY: No discharge. No pelvic pain or lumps. No rash around groin or  lesions. No urinary frequency, hesitation, painful urination or blood in    urine. Denies incontinence. No problems with intercourse.      MUSCULOSKELETAL: Denies neck or back pain. Denies weakness in arms or legs,    joint problems or distended inflamed veins in legs. Denies swelling or abnormal  glands.      NEUROLOGICAL: Denies tingling, numbness, altered mentation changes to nerve    function in the face, weakness to one or both of the body. Denies changes to    gait and denies multiple falls or accidents.        PHYSICAL EXAM:     Wt Readings from Last 3 Encounters:   02/02/24 77.6 kg (171 lb 1.2 oz)   01/17/24 77.2 kg (170 lb 3.1 oz)   11/30/23 75.7 kg (166 lb 14.2 oz)     Temp Readings from Last 3 Encounters:   02/02/24 96.3 °F (35.7 °C) (Temporal)   01/17/24 98.5 °F (36.9 °C) (Oral)   11/30/23 98 °F (36.7 °C) (Oral)     BP Readings from Last 3 Encounters:   02/02/24 (!) 122/59   11/30/23 136/68   11/07/23 120/78     Pulse Readings from Last 3 Encounters:   02/02/24 67   11/30/23 69   11/07/23 76     GENERAL: Comfortable looking patient. Patient is in no distress.  Awake, alert and oriented to time, person and place.  No anxiety, or agitation.      HEENT: Normal conjunctivae and eyelids. WNL.  PERRLA 3 to 4 mm. No icterus, no pallor, no congestion, and no discharge noted.     NECK:  Supple. Trachea is central.  No crepitus.  No JVD or masses.    RESPIRATORY:  No intercostal retractions.  No dullness to percussion.  Chest is clear to auscultation.  No rales, rhonchi or wheezes.  No crepitus.  Good air entry bilaterally.    CARDIOVASCULAR:  S1 and S2 are normally heard without murmurs or gallops.  All peripheral  pulses are present.    ABDOMEN:  Normal abdomen.  No hepatosplenomegaly.  No free fluid.  Bowel sounds are present.  No hernia noted. No masses.  No rebound or tenderness.  No guarding or rigidity.  Umbilicus is midline.    LYMPHATICS:  No axillary, cervical, supraclavicular, submental, or inguinal lymphadenopathy.    SKIN/MUSCULOSKELETAL:  There is no evidence of excoriation marks or ecchmosis.  No rashes.  No cyanosis.  No clubbing.  No joint or skeletal deformities noted.  Normal range of motion.    NEUROLOGIC:  Higher functions are appropriate.  No cranial nerve deficits.  Normal asher.  Normal strength.  Motor and sensory functions are normal.  Deep tendon reflexes are normal.    GENITAL/RECTAL:  Exams are deferred.      Laboratory:     CBC:  Lab Results   Component Value Date    WBC 6.08 01/31/2024    RBC 3.59 (L) 01/31/2024    HGB 11.5 (L) 01/31/2024    HCT 36.6 (L) 01/31/2024     (H) 01/31/2024    MCH 32.0 (H) 01/31/2024    MCHC 31.4 (L) 01/31/2024    RDW 14.3 01/31/2024     01/31/2024    MPV 10.6 01/31/2024    GRAN 3.5 01/31/2024    GRAN 57.8 01/31/2024    LYMPH 1.6 01/31/2024    LYMPH 27.0 01/31/2024    MONO 0.7 01/31/2024    MONO 12.0 01/31/2024    EOS 0.2 01/31/2024    BASO 0.03 01/31/2024    EOSINOPHIL 2.5 01/31/2024    BASOPHIL 0.5 01/31/2024       BMP: BMP  Lab Results   Component Value Date     01/31/2024    K 4.4 01/31/2024     01/31/2024    CO2 28 01/31/2024    BUN 15 01/31/2024    CREATININE 1.4 01/31/2024    CALCIUM 10.4 01/31/2024    ANIONGAP 9 01/31/2024    ESTGFRAFRICA >60 07/20/2022    EGFRNONAA 55 (A) 07/20/2022       LFT:   Lab Results   Component Value Date    ALT 12 01/31/2024    AST 16 01/31/2024    ALKPHOS 81 01/31/2024    BILITOT 0.3 01/31/2024    Bone density September 2 1019 negative for osteopenia  oncotype rec. score is 29  Impressionpet 1/2020       1.  Likely postoperative change in the superolateral left breast, decreased in size from the previous  exam.    2.  Scattered multifocal ground-glass attenuation opacities in both lungs suggesting a combination of atypical infection/pneumonia and/or post treatment/post radiation change.  Consider follow-up CT of the chest in 6-8 weeks to document resolution.    3.  Indeterminate isoattenuating non-FDG avid structure in the anterior interpolar aspect of the right kidney, possibly representing a hemorrhagic/proteinaceous cyst or low grade malignancy, consider further characterization with contrast enhanced renal protocol CT/MRI.    4.  Numerous bilateral nonobstructing renal stones.  No hydronephrosis or hydroureter on today's exam.    5.  Interval resolution of the maxillary sinus disease.     Pet 7/2020  IMPRESSION:  1. Postoperative changes of prior left breast lumpectomy.  2. Near complete resolution of the previously described groundglass  attenuation opacities in the lungs, now with only a small linear  bandlike opacity in the anterior left upper lobe (most likely related  to prior radiation/treatment change.  3. Focal increased FDG activity along the right third and fourth rib  posteriorly at the posterior thoracic junction suggesting degenerative  change with no soft tissue, lytic or sclerotic lesion identified.  4. Additional and incidental findings as noted above unchanged from  the previous exam.            has renal stones and has hydronephrosis, stented      7/21 neg pet      7/22  IMPRESSION: No evidence of recurrent or metastatic disease     Postsurgical changes in the left breast     Stable 3.5 cm round isoattenuating mass within the upper pole the right kidney suggestive of renal cyst.     Nonobstructing renal stones        IMPRESSION:pet 4/23     No evidence of FDG avid metastatic breast cancer.     4.7 cm indeterminate right renal lesion. Although this lesion has been present on prior exams and is not hypermetabolic, it has not been definitively characterized; further evaluation with retroperitoneal  sonography or contrast-enhanced renal protocol CT/MRI is recommended.     Postoperative changes of the left breast. Please correlate with dedicated diagnostic mammography.     Electronically signed by:  Erlin Li MD  4/24/2023 2:17 PM CDT Workstation: [id number]       Impression: mri 5/2/23     Enhancing mass of the right kidney compatible with neoplasm.  No evidence for abdominal metastatic disease.     This report was flagged in Epic as abnormal.    Assessment/Plan:     Breast ca:T2N0Mx left breast ca s/p lumpectomy 3/2019 recurrent score 29  pT2: Tumor size = 2.5 cm in greatest dimension.  pN0: No regional lymph node metastasis identified.  pM: Not applicable.    ER: Positive.  [place]: Positive.  HER2: Negative ([facility] - E  Patient completed 3 cycles but aborted therapy.  In favor a quality of life due to side effects   Compeleted xrt  she stoped anastrozole  Due to arthralgia and nausea.Tolerating aromasin well to continue for 10 years till May 2029  Normal bone density 10/23     RA:  now on mtx and hydroxychroloquine sees dr Romero    Hemorrhagic cyst :Needs a f/u as Dr Dickson has retiredUrology  was reportd on prev scans , had stents removed and lithotripsy for stones.     MRI of abd c/w renal mass she saw DR Ludwig  had nephrectomy  5/23. We need to get path report, was told she did not need anything else has scans  scehduled     Hypercalcemia: eating a lot of icecream, she is being rechecked soon by pcp     macrocytosis: related to thyroid issues will watch she is not anemic and embark on w/u if worse or cytopenia     Lung findindgs :saw Pulmonary  Naila aldridge 6/2020) consult for 2.  Finding as above on PET scan thesesd have nearly resolved now on 7/2020 pet and nothing new noted on scnas has on  going f/u with pulmonary  Next pet , mammo and labs see me in 4/24    She has scattered atheromatous calcifications in the aorta and coronary arteries she will discuss this and follow up with her PCP for  referral to cardiology      mammo done in 3/2022 done short term recommended  10/22 short term again in 4/23  next due 4/24      Has quit smoking       HTH. Lipids, hypothyroidism: cont DR mark      Advance Care Planning     Date: 04/25/2023    Power of   I initiated the process of advance care planning today and explained the importance of this process to the patient.  I introduced the concept of advance directives to the patient, as well. Then the patient received detailed information about the importance of designating a Health Care Power of  (HCPOA). She was also instructed to communicate with this person about their wishes for future healthcare, should she become sick and lose decision-making capacity. Pt has medical POA at home and will bring it for filing    Update: 5/3/23: pt has dropped off her papers to file

## 2024-02-06 ENCOUNTER — PATIENT OUTREACH (OUTPATIENT)
Dept: ADMINISTRATIVE | Facility: HOSPITAL | Age: 79
End: 2024-02-06
Payer: MEDICARE

## 2024-02-06 NOTE — PROGRESS NOTES
Population Health Chart Review & Patient Outreach Details    Outreach Performed: NO    Additional Pop Health Notes:           Updates Requested / Reviewed:      Updated Care Coordination Note         Health Maintenance Topics Overdue:    Health Maintenance Due   Topic Date Due    TETANUS VACCINE  Never done    RSV Vaccine (Age 60+ and Pregnant patients) (1 - 1-dose 60+ series) Never done    LDCT Lung Screen  05/04/2021    COVID-19 Vaccine (7 - 2023-24 season) 09/01/2023    Pneumococcal Vaccines (Age 65+) (2 of 2 - PPSV23 or PCV20) 09/04/2023         Health Maintenance Topic(s) Outreach Outcomes & Actions Taken:    Colorectal Cancer Screening - Outreach Outcomes & Actions Taken  : External Records Uploaded, Care Team Updated, & History Updated if Applicable

## 2024-02-06 NOTE — LETTER
FAX      AUTHORIZATION FOR RELEASE OF   CONFIDENTIAL INFORMATION              We are seeing Kirstie Bowden, date of birth 1945, in the clinic at [facility]. Elías Luis Jr., MD is the patient's PCP. Kirstie Bowden has an outstanding lab/procedure at the time we reviewed their chart. In order to help keep their health information updated, Kirstie Bowden has authorized us to request the following medical record(s):     RECALL DATE FOR COLONOSCOPY DONE ON 24                                  Please fax records to [facility] [phone].     If you have any questions, please contact Ron at [phone]                Patient Name: Kirstie Bowden  : 1945  Patient Phone #: [phone]

## 2024-02-15 DIAGNOSIS — C50.012 MALIGNANT NEOPLASM OF NIPPLE OF LEFT BREAST IN FEMALE, ESTROGEN RECEPTOR POSITIVE: ICD-10-CM

## 2024-02-15 DIAGNOSIS — Z17.0 MALIGNANT NEOPLASM OF NIPPLE OF LEFT BREAST IN FEMALE, ESTROGEN RECEPTOR POSITIVE: ICD-10-CM

## 2024-02-15 DIAGNOSIS — I10 ESSENTIAL HYPERTENSION, BENIGN: ICD-10-CM

## 2024-02-15 DIAGNOSIS — M05.711 RHEUMATOID ARTHRITIS INVOLVING RIGHT SHOULDER WITH POSITIVE RHEUMATOID FACTOR: ICD-10-CM

## 2024-02-15 RX ORDER — ONDANSETRON 8 MG/1
TABLET, ORALLY DISINTEGRATING ORAL
Qty: 30 TABLET | Refills: 0 | Status: SHIPPED | OUTPATIENT
Start: 2024-02-15 | End: 2024-03-18

## 2024-03-17 DIAGNOSIS — M05.711 RHEUMATOID ARTHRITIS INVOLVING RIGHT SHOULDER WITH POSITIVE RHEUMATOID FACTOR: ICD-10-CM

## 2024-03-17 DIAGNOSIS — I10 ESSENTIAL HYPERTENSION, BENIGN: ICD-10-CM

## 2024-03-17 DIAGNOSIS — Z17.0 MALIGNANT NEOPLASM OF NIPPLE OF LEFT BREAST IN FEMALE, ESTROGEN RECEPTOR POSITIVE: ICD-10-CM

## 2024-03-17 DIAGNOSIS — C50.012 MALIGNANT NEOPLASM OF NIPPLE OF LEFT BREAST IN FEMALE, ESTROGEN RECEPTOR POSITIVE: ICD-10-CM

## 2024-03-18 RX ORDER — ONDANSETRON 8 MG/1
TABLET, ORALLY DISINTEGRATING ORAL
Qty: 30 TABLET | Refills: 0 | Status: SHIPPED | OUTPATIENT
Start: 2024-03-18 | End: 2024-04-18

## 2024-03-19 NOTE — PROGRESS NOTES
"Subjective:      Patient ID: Kirstie Bowden is a 79 y.o. female.    Chief Complaint: No chief complaint on file.    HPI    Rheumatologic History:   - Diagnosis/es:              - osteoarthritis  - rheumatoid arthritis diagnosed in 2015  - Positive serologies: + RF (170), +CCP (87.9)  - Negative serologies: -  - Infectious screening labs:  Negative hepatitis-B, C, and QuantiFERON (02/2023)  - Imaging:              - x-ray arthritis survey (02/2023): Chondrocalcinosis in the knees and degenerative changes              - DEXA (9/2021): normal BMD  - Previous Treatments:              - Enbrel: Discontinued due to breast cancer  - Current Treatments:               - MTX 17.5 mg weekly plus folic acid daily  -  mg daily  - tramadol daily p.r.n. for pain (she takes this sparingly)  - Plaquenil eye exam: No HCQ toxicity 2023  Interval History:   At last visit, I restarted MTX and injected her right shoulder.  The injection and restarting her methotrexate did help.  However, the shoulder injection has worn off and she now reports pain on range of motion of both shoulders.    Objective:   /72   Pulse 86   Ht 5' 8" (1.727 m)   Wt 74 kg (163 lb 2.3 oz)   BMI 24.81 kg/m²   Physical Exam   Constitutional: normal appearance.   HENT:   Head: Normocephalic and atraumatic.   Cardiovascular: Normal rate, regular rhythm and normal heart sounds.   Pulmonary/Chest: Effort normal and breath sounds normal.   Musculoskeletal:      Comments: No synovitis, dactylitis, enthesitis, effusions  Pain on abduction of both shoulders   Neurological: She is alert.   Skin: Skin is warm and dry. No rash noted.   No skin thickening, telangiectasias, calcinosis, psoriasiform lesions, lupoid lesions        6/21/2023   Tender (EMERSON-28) 0 / 28    Swollen (EMERSON-28) 0 / 28    Provider Global 10 mm   Patient Global 10 mm   ESR --   CRP 19.2 mg/L   EMERSON-28 (ESR) --   EMERSON-28 (CRP) 2.18 (Remission)   CDAI Score 2      Labs independently " reviewed by me (01/31/2024)  HGB 11.5, WBC and platelets WNL  CMP CR 1.4, GFR 39 stable, AST and ALT WNL     Assessment:     1. Rheumatoid arthritis involving multiple sites with positive rheumatoid factor    2. High risk medications (not anticoagulants) long-term use    3. Immunosuppression    4. Bilateral shoulder bursitis      This is a 78-year-old woman with history of HLD, HTN, CKD, nephrolithiasis s/p lithotripsy, sulfa allergy, breast cancer diagnosed in 2020 in remission s/p lumpectomy, chemotherapy and radiation, clear cell renal cell carcinoma s/p nephrectomy (5/23/2023), hypothyroidism, osteoarthritis, and rheumatoid arthritis diagnosed in 2015 and on MTX 17.5mg weekly plus folic acid daily, HCQ 200mg BID (2020- ), tramadol 50mg daily PRN for pain. She is doing well. Continue current medications. Bilateral shoulder CSI completed today.    Plan:     Problem List Items Addressed This Visit    None  Visit Diagnoses       Rheumatoid arthritis involving multiple sites with positive rheumatoid factor    -  Primary    Relevant Orders    CBC Auto Differential    Comprehensive Metabolic Panel    Sedimentation rate    C-Reactive Protein    Large Joint Aspiration/Injection: bilateral glenohumeral    High risk medications (not anticoagulants) long-term use        Relevant Orders    CBC Auto Differential    Comprehensive Metabolic Panel    Sedimentation rate    C-Reactive Protein    Immunosuppression        Relevant Orders    CBC Auto Differential    Comprehensive Metabolic Panel    Sedimentation rate    C-Reactive Protein    Bilateral shoulder bursitis        Relevant Orders    Large Joint Aspiration/Injection: bilateral glenohumeral             1.) RA  - MTX 17.5 mg weekly and continue folic acid daily   - HCQ 400mg daily  - Avoid Yuriy and TNFi due to strong personal history of cancer  - CBC, CMP, ESR, CRP every 12 weeks  - Pre-DMARD labs due for repeat  - Immunizations: COVID x 3, flu (10/2022), Shingrix x 2,  patient will check if pneumonia vaccine is up to date   - Plaquenil eye exam yearly  - DEXA due for repeat      2.) Bilateral carpal tunnel syndrome: patient would like to hold off on hand surgery referral as she just had surgery  - Wrist braces for now    Follow up in 3 months    30 minutes of total time spent on the encounter, which includes face to face time and non-face to face time preparing to see the patient (eg, review of tests), Obtaining and/or reviewing separately obtained history, Documenting clinical information in the electronic or other health record, Independently interpreting results (not separately reported) and communicating results to the patient/family/caregiver, or Care coordination (not separately reported).     This note was prepared with [employer] Direct voice recognition transcription software. Garbled syntax, mangled pronouns, and other bizarre constructions may be attributed to that software system       Khushboo Aguirre M.D.  Rheumatology Dept  [place], [place]

## 2024-03-20 ENCOUNTER — OFFICE VISIT (OUTPATIENT)
Dept: RHEUMATOLOGY | Facility: CLINIC | Age: 79
End: 2024-03-20
Payer: MEDICARE

## 2024-03-20 VITALS
DIASTOLIC BLOOD PRESSURE: 72 MMHG | SYSTOLIC BLOOD PRESSURE: 110 MMHG | HEART RATE: 86 BPM | BODY MASS INDEX: 24.72 KG/M2 | HEIGHT: 68 IN | WEIGHT: 163.13 LBS

## 2024-03-20 DIAGNOSIS — Z79.899 HIGH RISK MEDICATIONS (NOT ANTICOAGULANTS) LONG-TERM USE: ICD-10-CM

## 2024-03-20 DIAGNOSIS — M05.79 RHEUMATOID ARTHRITIS INVOLVING MULTIPLE SITES WITH POSITIVE RHEUMATOID FACTOR: Primary | ICD-10-CM

## 2024-03-20 DIAGNOSIS — M75.52 BILATERAL SHOULDER BURSITIS: ICD-10-CM

## 2024-03-20 DIAGNOSIS — D84.9 IMMUNOSUPPRESSION: ICD-10-CM

## 2024-03-20 DIAGNOSIS — Z78.0 ASYMPTOMATIC MENOPAUSE: ICD-10-CM

## 2024-03-20 DIAGNOSIS — M75.51 BILATERAL SHOULDER BURSITIS: ICD-10-CM

## 2024-03-20 PROCEDURE — 99215 OFFICE O/P EST HI 40 MIN: CPT | Mod: 25,S$PBB,, | Performed by: STUDENT IN AN ORGANIZED HEALTH CARE EDUCATION/TRAINING PROGRAM

## 2024-03-20 PROCEDURE — 20610 DRAIN/INJ JOINT/BURSA W/O US: CPT | Mod: 50,PBBFAC,PN | Performed by: STUDENT IN AN ORGANIZED HEALTH CARE EDUCATION/TRAINING PROGRAM

## 2024-03-20 PROCEDURE — 99999 PR PBB SHADOW E&M-EST. PATIENT-LVL IV: CPT | Mod: PBBFAC,,, | Performed by: STUDENT IN AN ORGANIZED HEALTH CARE EDUCATION/TRAINING PROGRAM

## 2024-03-20 PROCEDURE — 99999PBSHW PR PBB SHADOW TECHNICAL ONLY FILED TO HB: Mod: PBBFAC,,,

## 2024-03-20 PROCEDURE — 99214 OFFICE O/P EST MOD 30 MIN: CPT | Mod: PBBFAC,PN,25 | Performed by: STUDENT IN AN ORGANIZED HEALTH CARE EDUCATION/TRAINING PROGRAM

## 2024-03-20 RX ORDER — LIDOCAINE HYDROCHLORIDE 10 MG/ML
4 INJECTION INFILTRATION; PERINEURAL
Status: DISCONTINUED | OUTPATIENT
Start: 2024-03-20 | End: 2024-03-20 | Stop reason: HOSPADM

## 2024-03-20 RX ORDER — TRIAMCINOLONE ACETONIDE 40 MG/ML
40 INJECTION, SUSPENSION INTRA-ARTICULAR; INTRAMUSCULAR
Status: DISCONTINUED | OUTPATIENT
Start: 2024-03-20 | End: 2024-03-20 | Stop reason: HOSPADM

## 2024-03-20 RX ADMIN — LIDOCAINE HYDROCHLORIDE 4 ML: 10 INJECTION, SOLUTION INFILTRATION; PERINEURAL at 08:03

## 2024-03-20 RX ADMIN — TRIAMCINOLONE ACETONIDE 40 MG: 40 INJECTION, SUSPENSION INTRA-ARTICULAR; INTRAMUSCULAR at 08:03

## 2024-03-20 ASSESSMENT — ROUTINE ASSESSMENT OF PATIENT INDEX DATA (RAPID3)
PATIENT GLOBAL ASSESSMENT SCORE: 5
FATIGUE SCORE: 1.1
PAIN SCORE: 6
MDHAQ FUNCTION SCORE: 1
TOTAL RAPID3 SCORE: 4.78
PSYCHOLOGICAL DISTRESS SCORE: 0

## 2024-03-20 NOTE — PROCEDURES
Large Joint Aspiration/Injection: bilateral glenohumeral    Date/Time: 3/20/2024 8:00 AM    Performed by: Khushboo Aguirre MD  Authorized by: Khushboo Aguirre MD    Consent Done?:  Yes (Verbal)  Indications:  Pain  Site marked: the procedure site was marked    Timeout: prior to procedure the correct patient, procedure, and site was verified    Prep: patient was prepped and draped in usual sterile fashion      Details:  Needle Size:  25 G  Ultrasonic Guidance for needle placement?: No    Approach:  Posterior  Location:  Shoulder  Laterality:  Bilateral  Site:  Bilateral glenohumeral  Medications (Right):  4 mL LIDOcaine HCL 10 mg/ml (1%) 10 mg/mL (1 %); 40 mg triamcinolone acetonide 40 mg/mL  Medications (Left):  4 mL LIDOcaine HCL 10 mg/ml (1%) 10 mg/mL (1 %); 40 mg triamcinolone acetonide 40 mg/mL  Patient tolerance:  Patient tolerated the procedure well with no immediate complications

## 2024-03-21 DIAGNOSIS — F51.01 PRIMARY INSOMNIA: ICD-10-CM

## 2024-03-21 RX ORDER — ZOLPIDEM TARTRATE 5 MG/1
TABLET ORAL
Qty: 30 TABLET | Refills: 3 | Status: SHIPPED | OUTPATIENT
Start: 2024-03-21

## 2024-03-21 NOTE — TELEPHONE ENCOUNTER
No care due was identified.  [facility] Embedded Care Due Messages. Reference number: [id number].   3/21/2024 2:05:36 PM CDT

## 2024-04-15 ENCOUNTER — HOSPITAL ENCOUNTER (OUTPATIENT)
Dept: RADIOLOGY | Facility: HOSPITAL | Age: 79
Discharge: HOME | End: 2024-04-15
Attending: STUDENT IN AN ORGANIZED HEALTH CARE EDUCATION/TRAINING PROGRAM
Payer: MEDICARE

## 2024-04-15 ENCOUNTER — HOSPITAL ENCOUNTER (OUTPATIENT)
Dept: RADIOLOGY | Facility: HOSPITAL | Age: 79
Discharge: HOME | End: 2024-04-15
Attending: INTERNAL MEDICINE
Payer: MEDICARE

## 2024-04-15 VITALS — WEIGHT: 163 LBS | BODY MASS INDEX: 24.71 KG/M2 | HEIGHT: 68 IN

## 2024-04-15 DIAGNOSIS — Z78.0 ASYMPTOMATIC MENOPAUSE: ICD-10-CM

## 2024-04-15 DIAGNOSIS — C50.112 CARCINOMA OF CENTRAL PORTION OF LEFT BREAST IN FEMALE, ESTROGEN RECEPTOR POSITIVE: ICD-10-CM

## 2024-04-15 DIAGNOSIS — Z17.0 CARCINOMA OF CENTRAL PORTION OF LEFT BREAST IN FEMALE, ESTROGEN RECEPTOR POSITIVE: ICD-10-CM

## 2024-04-15 PROCEDURE — 77062 BREAST TOMOSYNTHESIS BI: CPT | Mod: 26,,, | Performed by: RADIOLOGY

## 2024-04-15 PROCEDURE — 77062 BREAST TOMOSYNTHESIS BI: CPT | Mod: TC,PO

## 2024-04-15 PROCEDURE — 77066 DX MAMMO INCL CAD BI: CPT | Mod: 26,,, | Performed by: RADIOLOGY

## 2024-04-18 DIAGNOSIS — M05.711 RHEUMATOID ARTHRITIS INVOLVING RIGHT SHOULDER WITH POSITIVE RHEUMATOID FACTOR: ICD-10-CM

## 2024-04-18 DIAGNOSIS — I10 ESSENTIAL HYPERTENSION, BENIGN: ICD-10-CM

## 2024-04-18 DIAGNOSIS — E03.9 HYPOTHYROIDISM, UNSPECIFIED: ICD-10-CM

## 2024-04-18 DIAGNOSIS — C50.012 MALIGNANT NEOPLASM OF NIPPLE OF LEFT BREAST IN FEMALE, ESTROGEN RECEPTOR POSITIVE: ICD-10-CM

## 2024-04-18 DIAGNOSIS — Z17.0 MALIGNANT NEOPLASM OF NIPPLE OF LEFT BREAST IN FEMALE, ESTROGEN RECEPTOR POSITIVE: ICD-10-CM

## 2024-04-18 DIAGNOSIS — E78.5 HYPERLIPIDEMIA: ICD-10-CM

## 2024-04-18 RX ORDER — ONDANSETRON 8 MG/1
TABLET, ORALLY DISINTEGRATING ORAL
Qty: 30 TABLET | Refills: 0 | Status: SHIPPED | OUTPATIENT
Start: 2024-04-18 | End: 2024-05-17

## 2024-04-18 RX ORDER — GABAPENTIN 300 MG/1
CAPSULE ORAL
Qty: 30 CAPSULE | Refills: 2 | Status: SHIPPED | OUTPATIENT
Start: 2024-04-18 | End: 2024-06-18

## 2024-04-18 NOTE — TELEPHONE ENCOUNTER
Care Due:                  Date            Visit Type   Department     Provider  --------------------------------------------------------------------------------                                EP -                              PRIMARY      [facility]  Last Visit: 07-      CARE ([facility])   PRACTICE       Elías Luis                              EP -                              PRIMARY      [facility]  Next Visit: 07-      CARE ([facility])   PRACTICE       Elías Luis                                                            Last  Test          Frequency    Reason                     Performed    Due Date  --------------------------------------------------------------------------------    Lipid Panel.  12 months..  atorvastatin.............  07-   07-    [facility] Embedded Care Due Messages. Reference number: [id number].   4/18/2024 10:12:15 AM CDT

## 2024-04-19 DIAGNOSIS — J45.20 ASTHMA, CHRONIC, MILD INTERMITTENT, UNCOMPLICATED: ICD-10-CM

## 2024-04-19 RX ORDER — ATORVASTATIN CALCIUM 20 MG/1
20 TABLET, FILM COATED ORAL
Qty: 90 TABLET | Refills: 0 | Status: SHIPPED | OUTPATIENT
Start: 2024-04-19

## 2024-04-19 RX ORDER — AMLODIPINE BESYLATE 5 MG/1
5 TABLET ORAL
Qty: 90 TABLET | Refills: 0 | Status: SHIPPED | OUTPATIENT
Start: 2024-04-19

## 2024-04-19 NOTE — TELEPHONE ENCOUNTER
Refill Routing Note   Medication(s) are not appropriate for processing by [facility] for the following reason(s):        Required labs outdated  Required labs abnormal  No active prescription written by provider    [facility] action(s):  Defer  Approve     Requires labs : Yes             Appointments  past 12m or future 3m with PCP    Date Provider   Last Visit   7/10/2023 Elías Luis Jr., MD   Next Visit   4/19/2024 Elías Luis Jr., MD   ED visits in past 90 days: 0        Note composed:1:52 PM 04/19/2024

## 2024-04-19 NOTE — TELEPHONE ENCOUNTER
No care due was identified.  [facility] Embedded Care Due Messages. Reference number: [id number].   4/19/2024 9:31:05 AM CDT

## 2024-04-20 RX ORDER — FLUTICASONE PROPIONATE AND SALMETEROL 250; 50 UG/1; UG/1
1 POWDER RESPIRATORY (INHALATION) 2 TIMES DAILY
Qty: 180 EACH | Refills: 0 | Status: SHIPPED | OUTPATIENT
Start: 2024-04-20

## 2024-04-20 NOTE — TELEPHONE ENCOUNTER
Refill Decision Note   Kirstie Bowden  is requesting a refill authorization.  Brief Assessment and Rationale for Refill:  Approve     Medication Therapy Plan:         Comments:     Note composed:1:15 PM 04/20/2024

## 2024-04-21 RX ORDER — HYDROCHLOROTHIAZIDE 25 MG/1
25 TABLET ORAL
Qty: 90 TABLET | Refills: 3 | Status: SHIPPED | OUTPATIENT
Start: 2024-04-21

## 2024-04-21 RX ORDER — LEVOTHYROXINE SODIUM 125 UG/1
125 TABLET ORAL
Qty: 90 TABLET | Refills: 3 | Status: SHIPPED | OUTPATIENT
Start: 2024-04-21

## 2024-04-26 ENCOUNTER — OFFICE VISIT (OUTPATIENT)
Dept: HEMATOLOGY/ONCOLOGY | Facility: CLINIC | Age: 79
End: 2024-04-26
Payer: MEDICARE

## 2024-04-26 VITALS
OXYGEN SATURATION: 98 % | SYSTOLIC BLOOD PRESSURE: 130 MMHG | TEMPERATURE: 97 F | WEIGHT: 173.75 LBS | HEART RATE: 74 BPM | DIASTOLIC BLOOD PRESSURE: 60 MMHG | RESPIRATION RATE: 12 BRPM | HEIGHT: 68 IN | BODY MASS INDEX: 26.33 KG/M2

## 2024-04-26 DIAGNOSIS — E03.9 ACQUIRED HYPOTHYROIDISM: Primary | ICD-10-CM

## 2024-04-26 DIAGNOSIS — Z17.0 MALIGNANT NEOPLASM OF NIPPLE OF LEFT BREAST IN FEMALE, ESTROGEN RECEPTOR POSITIVE: ICD-10-CM

## 2024-04-26 DIAGNOSIS — E78.00 PURE HYPERCHOLESTEROLEMIA: ICD-10-CM

## 2024-04-26 DIAGNOSIS — I10 ESSENTIAL HYPERTENSION, BENIGN: ICD-10-CM

## 2024-04-26 DIAGNOSIS — C50.012 MALIGNANT NEOPLASM OF NIPPLE OF LEFT BREAST IN FEMALE, ESTROGEN RECEPTOR POSITIVE: ICD-10-CM

## 2024-04-26 PROCEDURE — 99999 PR PBB SHADOW E&M-EST. PATIENT-LVL V: CPT | Mod: PBBFAC,,, | Performed by: INTERNAL MEDICINE

## 2024-04-26 PROCEDURE — 99214 OFFICE O/P EST MOD 30 MIN: CPT | Mod: S$PBB,,, | Performed by: INTERNAL MEDICINE

## 2024-04-26 PROCEDURE — 99215 OFFICE O/P EST HI 40 MIN: CPT | Mod: PBBFAC,PN | Performed by: INTERNAL MEDICINE

## 2024-04-26 NOTE — PROGRESS NOTES
Subjective:       Patient ID: Kirstie Bowden is a 79 y.o. female.    Chief Complaint   Left breast ca dx  T2 N0 status post lumpectomy and re-resection margin adjuvant TC chemo s/p 3  cycles but discontinued due to quality of life issues and started Arimidex   stated  having s/e  To arimidex took herself off presented to clinic started on Aromasin .  Tolerating Aromasin well rec score of 29    hydronephrosis+ ,  had stent placed  Here for follow-up  Oncologic History:  Dx 1/2019  INVASIVE CARCINOMA BREAST, CANCER CASE SUMMARY:  PROCEDURE: Partial mastectomy without skin or nipple.3/2019  SPECIMEN LATERALITY: Left.  TUMOR SIZE, GREATEST DIMENSION: 2.5 cm.  HISTOLOGIC TYPE: Invasive pleomorphic lobular carcinoma.  HISTOLOGIC GRADE (LUTHER HISTOLOGIC SCORE):  Glandular (acinar)/tubular differentiation: Score 3.  Nuclear pleomorphism: Score 2.  Mitotic rate: Score 1.  Overall grade: Grade 2  DUCTAL CARCINOMA IN SITU (DCIS): Not identified.  LOBULAR CARCINOMA IN SITU (LCIS): Present, pleomorphic lobular carcinoma in situ with rare focus of  classical lobular carcinoma situ.  Estimated size (extent) of pleomorphic LCIS: At least 2.8 cm.  TUMOR EXTENSION: Not applicable.  MARGINS:  INVASIVE CARCINOMA MARGINS: Uninvolved by invasive carcinoma.  Distance from inferior (closest) margin: 5 mm.  PLEOMORPHIC LOBULAR CARCINOMA IN SITU MARGINS: Uninvolved by pleomorphic LCIS.  Distance from inferior medial (closest) margin: 0.2 mm (see above comment).  REGIONAL LYMPH NODES: Uninvolved by tumor cells.  Number of lymph nodes examined: 2.  Number of sentinel nodes examined: 2.  TREATMENT EFFECT: No known presurgical therapy.  PATHOLOGIC STAGE CLASSIFICATION (pTNM, AJCC 8TH EDITION):  pT2: Tumor size = 2.5 cm in greatest dimension.  pN0: No regional lymph node metastasis identified.  pM: Not applicable.    ER: Positive.  MS: Positive.  HER2: Negative (IHC - Equivocal (score 2) and FISH - Negative).  Ki-67: Approximately  14%.  HPI:     Social History     Socioeconomic History    Marital status:    Occupational History     Employer: [email or web address]   Tobacco Use    Smoking status: Former     Current packs/day: 0.00     Average packs/day: 0.5 packs/day for 59.3 years (29.6 ttl pk-yrs)     Types: Cigarettes     Start date: 1960     Quit date: 2019     Years since quittin.7    Smokeless tobacco: Never   Substance and Sexual Activity    Alcohol use: Yes     Alcohol/week: 2.0 standard drinks of alcohol     Types: 2 Glasses of wine per week     Comment: Social    Drug use: No    Sexual activity: Never     Social Determinants of Health     Financial Resource Strain: Low Risk  (2024)    Overall Financial Resource Strain (CARDIA)     Difficulty of Paying Living Expenses: Not very hard   Food Insecurity: No Food Insecurity (2024)    Hunger Vital Sign     Worried About Running Out of Food in the Last Year: Never true     Ran Out of Food in the Last Year: Never true   Transportation Needs: No Transportation Needs (2024)    PRAPARE - Transportation     Lack of Transportation (Medical): No     Lack of Transportation (Non-Medical): No   Physical Activity: Insufficiently Active (2024)    Exercise Vital Sign     Days of Exercise per Week: 2 days     Minutes of Exercise per Session: 10 min   Stress: No Stress Concern Present (2024)    [place] [employer] - Occupational Stress Questionnaire     Feeling of Stress : Only a little   Social Connections: Moderately Isolated (2024)    Social Connection and Isolation Panel [NHANES]     Frequency of Communication with Friends and Family: More than three times a week     Frequency of Social Gatherings with Friends and Family: Three times a week     Attends Bahai Services: More than 4 times per year     Active Member of Clubs or Organizations: No     Attends Club or Organization Meetings: Never     Marital Status:    Housing Stability:  Low Risk  (1/30/2024)    Housing Stability Vital Sign     Unable to Pay for Housing in the Last Year: No     Number of Places Lived in the Last Year: 1     Unstable Housing in the Last Year: No     Family History   Problem Relation Name Age of Onset    Heart disease Mother      Hypertension Mother      Alzheimer's disease Mother      Cancer Father      Heart disease Sister      Stroke Sister      Diabetes Brother      Cancer Daughter       Past Surgical History:   Procedure Laterality Date    AXILLARY NODE DISSECTION Left 03/14/2019    Procedure: LYMPHADENECTOMY, AXILLARY;  Surgeon: Mp Gonzalez MD;  Location: [facility] OR;  Service: General;  Laterality: Left;  left lumpectomy with axillary lypmh node dissection    BALLOON DILATION OF URETER Left 06/24/2019    Procedure: DILATION, URETER, USING BALLOON;  Surgeon: Jorden Dickson MD;  Location: [facility] OR;  Service: Urology;  Laterality: Left;    BREAST BIOPSY Left 20 yrs ago    benign    BREAST LUMPECTOMY      x2    CHOLECYSTECTOMY      COLONOSCOPY  09/25/2018    LUC EASTON MD    CYSTOSCOPY W/ URETERAL STENT PLACEMENT Left 06/24/2019    Procedure: CYSTOSCOPY, WITH URETERAL STENT INSERTION;  Surgeon: Jorden Dickson MD;  Location: [facility] OR;  Service: Urology;  Laterality: Left;    CYSTOSCOPY W/ URETERAL STENT REMOVAL Left 07/23/2019    Procedure: CYSTOSCOPY, WITH URETERAL STENT REMOVAL;  Surgeon: Jorden Dickson MD;  Location: [facility] OR;  Service: Urology;  Laterality: Left;    EPIDURAL STEROID INJECTION INTO LUMBAR SPINE N/A 09/30/2021    Procedure: Injection-steroid-epidural-lumbar;  Surgeon: Nathen Lyons MD;  Location: [facility] OR;  Service: Pain Management;  Laterality: N/A;  L5-S1      EPIDURAL STEROID INJECTION INTO LUMBAR SPINE N/A 11/16/2021    Procedure: Injection-steroid-epidural-lumbar;  Surgeon: Nathen Lyons MD;  Location: [facility] OR;  Service: Pain Management;  Laterality: N/A;  L5-S1    EXTRACORPOREAL SHOCK WAVE LITHOTRIPSY Left 07/23/2019    Procedure:  LITHOTRIPSY, ESWL;  Surgeon: Jorden Dickson MD;  Location: [facility] OR;  Service: Urology;  Laterality: Left;    EYE SURGERY Bilateral     cataracts    HYSTERECTOMY      MASTECTOMY, PARTIAL Left 04/25/2019    Procedure: MASTECTOMY, PARTIAL;  Surgeon: Mp Gonzalez MD;  Location: [facility] OR;  Service: General;  Laterality: Left;    NEEDLE LOCALIZATION Left 03/14/2019    Procedure: NEEDLE LOCALIZATION;  Surgeon: Mp Gonzalez MD;  Location: [facility] OR;  Service: General;  Laterality: Left;  left lumpectomy with wire needle localization     PARTIAL NEPHRECTOMY Right 05/22/2023    RETROGRADE PYELOGRAPHY Bilateral 06/24/2019    Procedure: PYELOGRAM, RETROGRADE;  Surgeon: Jorden Dickson MD;  Location: [facility] OR;  Service: Urology;  Laterality: Bilateral;    SENTINEL LYMPH NODE BIOPSY Left 03/14/2019    Procedure: BIOPSY, LYMPH NODE, SENTINEL;  Surgeon: Mp Gonzalez MD;  Location: [facility] OR;  Service: General;  Laterality: Left;  left sentinel node lymph node biopsy    TONSILLECTOMY      TRANSFORAMINAL EPIDURAL INJECTION OF STEROID Right 01/04/2022    Procedure: Injection,steroid,epidural,transforaminal approach;  Surgeon: Nathen Lyons MD;  Location: [facility] OR;  Service: Pain Management;  Laterality: Right;  L4-L5, L5-s1    URETEROSCOPY Left 06/24/2019    Procedure: URETEROSCOPY;  Surgeon: Jorden Dickson MD;  Location: [facility];  Service: Urology;  Laterality: Left;     Past Medical History:   Diagnosis Date    Arthritis     rheumatoid    Asthma     Breast cancer     Cancer     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    Hyperlipidemia     Hypertension     Limb alert care status     NO BP/IV LEFT ARM    Personal history of colonic polyps 01/23/2024    Pseudocholinesterase deficiency     family history    Thyroid disease        Current Outpatient Medications:     albuterol (PROAIR HFA) 90 mcg/actuation inhaler, Inhale 2 puffs into the lungs every 6 (six) hours as needed for Wheezing. Rescue, Disp: 18 g, Rfl: 11    amLODIPine (NORVASC) 5 MG  tablet, TAKE 1 TABLET BY MOUTH EVERY DAY, Disp: 90 tablet, Rfl: 0    atorvastatin (LIPITOR) 20 MG tablet, TAKE 1 TABLET BY MOUTH EVERY DAY, Disp: 90 tablet, Rfl: 0    exemestane (AROMASIN) 25 mg tablet, Take 1 tablet (25 mg total) by mouth once daily., Disp: 90 tablet, Rfl: 5    fluticasone propionate (FLONASE) 50 mcg/actuation nasal spray, USE 1 SPRAY (50 MCG TOTAL) IN EACH NOSTRIL ONCE DAILY, Disp: 48 mL, Rfl: 3    folic acid (FOLVITE) 1 MG tablet, Take 1 tablet (1,000 mcg total) by mouth once daily., Disp: 90 tablet, Rfl: 3    gabapentin (NEURONTIN) 300 MG capsule, TAKE 1 CAPSULE BY MOUTH IN THE EVENING, Disp: 30 capsule, Rfl: 2    hydroCHLOROthiazide (HYDRODIURIL) 25 MG tablet, TAKE 1 TABLET BY MOUTH EVERY DAY, Disp: 90 tablet, Rfl: 3    hydrOXYchloroQUINE (PLAQUENIL) 200 mg tablet, Take 1 tablet (200 mg total) by mouth 2 (two) times daily., Disp: 180 tablet, Rfl: 3    levothyroxine (SYNTHROID) 125 MCG tablet, TAKE 1 TABLET BY MOUTH BEFORE BREAKFAST., Disp: 90 tablet, Rfl: 3    methotrexate 2.5 MG Tab, Take 7 tablets (17.5 mg total) by mouth every 7 days., Disp: 84 tablet, Rfl: 1    ondansetron (ZOFRAN-ODT) 8 MG TbDL, DISSOLVE 1 TABLET IN MOUTH EVERY 12 HOURS AS NEEDED, Disp: 30 tablet, Rfl: 0    pantoprazole (PROTONIX) 40 MG tablet, TAKE 1 TABLET BY MOUTH ONCE DAILY IN THE MORNING FOR 90 DAYS, Disp: , Rfl:     prochlorperazine (COMPAZINE) 10 MG tablet, TAKE 1 TABLET BY MOUTH EVERY 6 HOURS AS NEEDED, Disp: 60 tablet, Rfl: 0    pyrilamine-chlophedianoL (NINJACOF) 12.5-12.5 mg/5 mL Liqd, Take 5 mLs by mouth every 6 to 8 hours as needed (cough)., Disp: 118 mL, Rfl: 0    scopolamine (TRANSDERM-SCOP) 1.3-1.5 mg (1 mg over 3 days), Place 1 patch onto the skin every 72 hours., Disp: 4 patch, Rfl: 0    thiamine 100 MG tablet, Take 100 mg by mouth once daily., Disp: , Rfl:     traMADoL (ULTRAM) 50 mg tablet, Take 1 tablet (50 mg total) by mouth every 12 (twelve) hours as needed for Pain (severe pain). TAKE 1 TABLET BY  MOUTH EVERY 8 HOURS AS NEEDED FOR SEVERE PAIN- DOSE DECREASE, Disp: 60 tablet, Rfl: 1    UNABLE TO FIND, Take by mouth once daily.  B Complex, Disp: , Rfl:     VITAMIN B COMPLEX ORAL, Every day, Disp: , Rfl:     WIXELA INHUB 250-50 mcg/dose diskus inhaler, INHALE 1 PUFF INTO THE LUNGS 2 (TWO) TIMES DAILY. CONTROLLER, Disp: 180 each, Rfl: 0    zolpidem (AMBIEN) 5 MG Tab, TAKE 1 TABLET BY MOUTH EVERY DAY AT NIGHT AS NEEDED FOR SLEEP, Disp: 30 tablet, Rfl: 3  No current facility-administered medications for this visit.    Facility-Administered Medications Ordered in Other Visits:     lactated ringers infusion, , Intravenous, Once PRN, Nathen Lyons MD    lidocaine (PF) 10 mg/ml (1%) injection 10 mg, 1 mL, Intradermal, Once, Eladia Schwartz MD  Review of patient's allergies indicates:   Allergen Reactions    Succinylcholine chloride Other (See Comments)    Sulfur dioxide Hives    Sulfamethoxazole-trimethoprim      Other reaction(s): per dr daryn Rodríguez (sulfonamide antibiotics)      NOT SURE REACTION         REVIEW OF SYSTEMS:     Patient denies issues related to appetite or recent weight change.  Feels well overall.  Denies issues with generalized weakness .  Denies fatigue over above what is normally experienced with day-to-day activities  Denies fever, chills, rigors  Denies issues with ambulation  Denies generalized swelling or new lumps and bumps felt in any part  of body  Denies visual or hearing loss  Denies issues with congestion, sinus issues, cough, sputum production runny nose or itching eyes  Denies chest pain or palpitations, or passing out  Denies abdominal pain, reflux symptoms, nausea vomiting loose stools or constipation  Denies seizure activity or focal weaknesses or symptoms related to TIA, no head aches or blurred vision reported  Denies issues with skin rash or bruising  Denies issues with swelling of feet, tingling or numbness   No issues with sleep,   No recent foreign travel   Good  family support reported        PHYSICAL EXAM:     Wt Readings from Last 3 Encounters:   04/26/24 78.8 kg (173 lb 11.6 oz)   04/15/24 73.9 kg (163 lb)   03/20/24 74 kg (163 lb 2.3 oz)     Temp Readings from Last 3 Encounters:   04/26/24 97.4 °F (36.3 °C) (Temporal)   02/02/24 96.3 °F (35.7 °C) (Temporal)   01/17/24 98.5 °F (36.9 °C) (Oral)     BP Readings from Last 3 Encounters:   04/26/24 130/60   03/20/24 110/72   02/02/24 (!) 122/59     Pulse Readings from Last 3 Encounters:   04/26/24 74   03/20/24 86   02/02/24 67     VITAL SIGNS:  as above   GENERAL: appears well-built, well-nourished.  No anxiety, no agitation, and in no distress.  Patient is awake, alert, oriented and cooperative.  HEENT:  Showed no congestion. Trachea is central no obvious icterus or pallor noted no hoarseness. no obvious JVD   NECK:  Supple.  No JVD. No obvious cervical submental or supraclavicular adenopathy.  RS:the visualized portion of  Chest expands well. chest appears symmetric, no audible wheezes.  No dyspnea recognized  ABDOMEN:  abdomen appears undistended.  EXTREMITIES:  Without edema.  NEUROLOGICAL:  The patient is appropriate, higher functions are normal.  No  obvious neurological deficits.  normal judgement normal thought content  No confusion, no speech impediment. Cranial nerves are intact and show no deficit. No gross motor deficits noted   SKIN MUSCULOSKELETAL: no joint or skeletal deformity, no clubbing of nails.  No visible rash ecchymosis or petechiae     Laboratory:     CBC:  Lab Results   Component Value Date    WBC 6.86 04/15/2024    RBC 3.97 (L) 04/15/2024    HGB 12.7 04/15/2024    HCT 41.1 04/15/2024     (H) 04/15/2024    MCH 32.0 (H) 04/15/2024    MCHC 30.9 (L) 04/15/2024    RDW 15.9 (H) 04/15/2024     04/15/2024    MPV 10.8 04/15/2024    GRAN 4.6 04/15/2024    GRAN 67.5 04/15/2024    LYMPH 1.6 04/15/2024    LYMPH 23.9 04/15/2024    MONO 0.4 04/15/2024    MONO 5.7 04/15/2024    EOS 0.2 04/15/2024     BASO 0.03 04/15/2024    EOSINOPHIL 2.2 04/15/2024    BASOPHIL 0.4 04/15/2024       BMP: BMP  Lab Results   Component Value Date     04/15/2024    K 4.6 04/15/2024     04/15/2024    CO2 27 04/15/2024    BUN 21 04/15/2024    CREATININE 1.5 (H) 04/15/2024    CALCIUM 10.2 04/15/2024    ANIONGAP 12 04/15/2024    ESTGFRAFRICA >60 07/20/2022    EGFRNONAA 55 (A) 07/20/2022       LFT:   Lab Results   Component Value Date    ALT 31 04/15/2024    AST 20 04/15/2024    ALKPHOS 81 04/15/2024    BILITOT 1.0 04/15/2024    Bone density September 2 1019 negative for osteopenia  oncotype rec. score is 29  Impressionpet 1/2020       1.  Likely postoperative change in the superolateral left breast, decreased in size from the previous exam.    2.  Scattered multifocal ground-glass attenuation opacities in both lungs suggesting a combination of atypical infection/pneumonia and/or post treatment/post radiation change.  Consider follow-up CT of the chest in 6-8 weeks to document resolution.    3.  Indeterminate isoattenuating non-FDG avid structure in the anterior interpolar aspect of the right kidney, possibly representing a hemorrhagic/proteinaceous cyst or low grade malignancy, consider further characterization with contrast enhanced renal protocol CT/MRI.    4.  Numerous bilateral nonobstructing renal stones.  No hydronephrosis or hydroureter on today's exam.    5.  Interval resolution of the maxillary sinus disease.     Pet 7/2020  IMPRESSION:  1. Postoperative changes of prior left breast lumpectomy.  2. Near complete resolution of the previously described groundglass  attenuation opacities in the lungs, now with only a small linear  bandlike opacity in the anterior left upper lobe (most likely related  to prior radiation/treatment change.  3. Focal increased FDG activity along the right third and fourth rib  posteriorly at the posterior thoracic junction suggesting degenerative  change with no soft tissue, lytic or  sclerotic lesion identified.  4. Additional and incidental findings as noted above unchanged from  the previous exam.            has renal stones and has hydronephrosis, stented      7/21 neg pet      7/22  IMPRESSION: No evidence of recurrent or metastatic disease     Postsurgical changes in the left breast     Stable 3.5 cm round isoattenuating mass within the upper pole the right kidney suggestive of renal cyst.     Nonobstructing renal stones        IMPRESSION:pet 4/23     No evidence of FDG avid metastatic breast cancer.     4.7 cm indeterminate right renal lesion. Although this lesion has been present on prior exams and is not hypermetabolic, it has not been definitively characterized; further evaluation with retroperitoneal sonography or contrast-enhanced renal protocol CT/MRI is recommended.     Postoperative changes of the left breast. Please correlate with dedicated diagnostic mammography.     Electronically signed by:  Erlin Li MD  4/24/2023 2:17 PM CDT Workstation: [id number]       Impression: mri 5/2/23     Enhancing mass of the right kidney compatible with neoplasm.  No evidence for abdominal metastatic disease.     This report was flagged in Epic as abnormal.    Assessment/Plan:     Breast ca:T2N0Mx left breast ca s/p lumpectomy 3/2019 recurrent score 29  pT2: Tumor size = 2.5 cm in greatest dimension.  pN0: No regional lymph node metastasis identified.  pM: Not applicable.    ER: Positive.  MA: Positive.  HER2: Negative ([facility] - E  Patient completed 3 cycles but aborted therapy.  In favor a quality of life due to side effects   Compeleted xrt  she stoped anastrozole  Due to arthralgia and nausea.Tolerating aromasin well to continue for 10 years till May 2029  Normal bone density 10/23     RA:  now on mtx and hydroxychroloquine sees dr Romero    Hemorrhagic cyst :Needs a f/u as Dr Dickson has retiredUrology  was reportd on prev scans , had stents removed and lithotripsy for stones.     MRI of abd  c/w renal mass she saw DR Ludwig  had nephrectomy  5/23. 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 All margins negative for invasive ca    Will alternate pet and ct non contrast to be able to see both malignancy rec .    Hypercalcemia: eating a lot of icecream, she is being rechecked soon by pcp     macrocytosis: related to thyroid issues will watch she is not anemic and embark on w/u if worse or cytopenia     Lung findindgs :saw Pulmonary  Naila aldridge 6/2020) consult for 2.  Finding as above on PET scan thesesd have nearly resolved now on 7/2020 pet and nothing new noted on scnas has on  going f/u with pulmonary  Next pet , mammo and labs see me in 4/24    She has scattered atheromatous calcifications in the aorta and coronary arteries she will discuss this and follow up with her PCP for referral to cardiology    CKD has only 1 kidney so caution to be exercised  mammo done in 3/2022 done short term recommended  10/22 short term again in 4/23 , 4/24 next due 4/25      Has quit smoking       HTH. Lipids, hypothyroidism: cont DR mark      Advance Care Planning     Date: 04/25/2023    Power of   I initiated the process of advance care planning today and explained the importance of this process to the patient.  I introduced the concept of advance directives to the patient, as well. Then the patient received detailed information about the importance of designating a Health Care Power of  (HCPOA). She was also instructed to communicate with this person about their wishes for future healthcare, should she become sick and lose decision-making capacity. Pt has medical POA at home and will bring it for filing    Update: 5/3/23: pt has dropped off her papers to file

## 2024-04-26 NOTE — Clinical Note
Cbc, cmp, [id number] and pet nd see me in 2-3 months Transition of Care Plan:     RUR: 14%  Prior Level of Functioning:   Disposition: transfer to [facility]  If SNF or IPR: Date FOC offered:   Date FOC received:   Accepting facility:   Date authorization started with reference number:   Date authorization received and expires: Follow up appointments:   DME needed:   Transportation at discharge: [facility]  IM/IMM Medicare/ letter given: n/a  Is patient a  and connected with [facility]? If yes, was [employer] transfer form completed and [facility] notified? Caregiver Contact:   Discharge Caregiver contacted prior to discharge? Care Conference needed? Barriers to discharge:  TUYET pt. Needs to transfer to [facility] to finish IV ABX    [facility] transfer center called to inform CM that there is not a bed available yet, but will continue to follow & notify CM when one becomes available. CM will continue to follow.     Marck March  Ext [phone]

## 2024-04-29 ENCOUNTER — OFFICE VISIT (OUTPATIENT)
Dept: PULMONOLOGY | Facility: CLINIC | Age: 79
End: 2024-04-29
Payer: MEDICARE

## 2024-04-29 VITALS
DIASTOLIC BLOOD PRESSURE: 64 MMHG | SYSTOLIC BLOOD PRESSURE: 128 MMHG | WEIGHT: 173.63 LBS | HEART RATE: 62 BPM | OXYGEN SATURATION: 90 % | BODY MASS INDEX: 26.4 KG/M2

## 2024-04-29 DIAGNOSIS — J44.9 CHRONIC OBSTRUCTIVE PULMONARY DISEASE, UNSPECIFIED COPD TYPE: Primary | ICD-10-CM

## 2024-04-29 PROBLEM — F17.200 CURRENT SMOKER: Status: RESOLVED | Noted: 2022-10-12 | Resolved: 2024-04-29

## 2024-04-29 PROBLEM — J45.20 MILD INTERMITTENT ASTHMA: Status: RESOLVED | Noted: 2023-03-28 | Resolved: 2024-04-29

## 2024-04-29 PROCEDURE — 99213 OFFICE O/P EST LOW 20 MIN: CPT | Mod: S$GLB,,, | Performed by: INTERNAL MEDICINE

## 2024-04-29 NOTE — PROGRESS NOTES
SUBJECTIVE:    Patient ID: Kirstie Bowden is a 79 y.o. female.    Chief Complaint: Follow-up (6 month follow up COPD)    HPI She is using her Advair faithfully bid and is breathing much better.She has used her albuterol about once a week.   The patient has COPD, she is moderately obstructed with no response to bronchodilators on her last PFTs.  She states she can tell when she does not use her Advair.  Past Medical History:   Diagnosis Date    Arthritis     rheumatoid    Asthma     Breast cancer     Cancer     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    Hyperlipidemia     Hypertension     Limb alert care status     NO BP/IV LEFT ARM    Personal history of colonic polyps 01/23/2024    Pseudocholinesterase deficiency     family history    Thyroid disease      Past Surgical History:   Procedure Laterality Date    AXILLARY NODE DISSECTION Left 03/14/2019    Procedure: LYMPHADENECTOMY, AXILLARY;  Surgeon: Mp Gonzalez MD;  Location: [facility];  Service: General;  Laterality: Left;  left lumpectomy with axillary lypmh node dissection    BALLOON DILATION OF URETER Left 06/24/2019    Procedure: DILATION, URETER, USING BALLOON;  Surgeon: Jorden Dickson MD;  Location: [facility] OR;  Service: Urology;  Laterality: Left;    BREAST BIOPSY Left 20 yrs ago    benign    BREAST LUMPECTOMY      x2    CHOLECYSTECTOMY      COLONOSCOPY  09/25/2018    LUC EASTON MD    CYSTOSCOPY W/ URETERAL STENT PLACEMENT Left 06/24/2019    Procedure: CYSTOSCOPY, WITH URETERAL STENT INSERTION;  Surgeon: Jorden Dickson MD;  Location: [facility] OR;  Service: Urology;  Laterality: Left;    CYSTOSCOPY W/ URETERAL STENT REMOVAL Left 07/23/2019    Procedure: CYSTOSCOPY, WITH URETERAL STENT REMOVAL;  Surgeon: Jorden Dickson MD;  Location: [facility] OR;  Service: Urology;  Laterality: Left;    EPIDURAL STEROID INJECTION INTO LUMBAR SPINE N/A 09/30/2021    Procedure: Injection-steroid-epidural-lumbar;  Surgeon: Nathen Lyons MD;  Location: [facility] OR;  Service: Pain Management;   Laterality: N/A;  L5-S1      EPIDURAL STEROID INJECTION INTO LUMBAR SPINE N/A 11/16/2021    Procedure: Injection-steroid-epidural-lumbar;  Surgeon: Nathen Lyons MD;  Location: [facility];  Service: Pain Management;  Laterality: N/A;  L5-S1    EXTRACORPOREAL SHOCK WAVE LITHOTRIPSY Left 07/23/2019    Procedure: LITHOTRIPSY, ESWL;  Surgeon: Jorden Dickson MD;  Location: [facility] OR;  Service: Urology;  Laterality: Left;    EYE SURGERY Bilateral     cataracts    HYSTERECTOMY      MASTECTOMY, PARTIAL Left 04/25/2019    Procedure: MASTECTOMY, PARTIAL;  Surgeon: Mp Gonzalez MD;  Location: [facility] OR;  Service: General;  Laterality: Left;    NEEDLE LOCALIZATION Left 03/14/2019    Procedure: NEEDLE LOCALIZATION;  Surgeon: Mp Gonzalez MD;  Location: [facility];  Service: General;  Laterality: Left;  left lumpectomy with wire needle localization     PARTIAL NEPHRECTOMY Right 05/22/2023    RETROGRADE PYELOGRAPHY Bilateral 06/24/2019    Procedure: PYELOGRAM, RETROGRADE;  Surgeon: Jorden Dickson MD;  Location: [facility];  Service: Urology;  Laterality: Bilateral;    SENTINEL LYMPH NODE BIOPSY Left 03/14/2019    Procedure: BIOPSY, LYMPH NODE, SENTINEL;  Surgeon: Mp Gonzalez MD;  Location: [facility];  Service: General;  Laterality: Left;  left sentinel node lymph node biopsy    TONSILLECTOMY      TRANSFORAMINAL EPIDURAL INJECTION OF STEROID Right 01/04/2022    Procedure: Injection,steroid,epidural,transforaminal approach;  Surgeon: Nathen Lyons MD;  Location: [facility];  Service: Pain Management;  Laterality: Right;  L4-L5, L5-s1    URETEROSCOPY Left 06/24/2019    Procedure: URETEROSCOPY;  Surgeon: Jorden Dickson MD;  Location: [facility] OR;  Service: Urology;  Laterality: Left;     Family History   Problem Relation Name Age of Onset    Heart disease Mother      Hypertension Mother      Alzheimer's disease Mother      Cancer Father      Heart disease Sister      Stroke Sister      Diabetes Brother      Cancer Daughter           Social History:   Marital Status:   Occupation: Data Unavailable  Alcohol History:  reports current alcohol use of about 2.0 standard drinks of alcohol per week.  Tobacco History:  reports that she quit smoking about 4 years ago. Her smoking use included cigarettes. She started smoking about 64 years ago. She has a 29.6 pack-year smoking history. She has never used smokeless tobacco.  Drug History:  reports no history of drug use.    Review of patient's allergies indicates:   Allergen Reactions    Succinylcholine chloride Other (See Comments)    Sulfur dioxide Hives    Sulfamethoxazole-trimethoprim      Other reaction(s): per dr daryn Rodríguez (sulfonamide antibiotics)      NOT SURE REACTION       Current Outpatient Medications   Medication Sig Dispense Refill    albuterol (PROAIR HFA) 90 mcg/actuation inhaler Inhale 2 puffs into the lungs every 6 (six) hours as needed for Wheezing. Rescue 18 g 11    amLODIPine (NORVASC) 5 MG tablet TAKE 1 TABLET BY MOUTH EVERY DAY 90 tablet 0    atorvastatin (LIPITOR) 20 MG tablet TAKE 1 TABLET BY MOUTH EVERY DAY 90 tablet 0    exemestane (AROMASIN) 25 mg tablet Take 1 tablet (25 mg total) by mouth once daily. 90 tablet 5    fluticasone propionate (FLONASE) 50 mcg/actuation nasal spray USE 1 SPRAY (50 MCG TOTAL) IN EACH NOSTRIL ONCE DAILY 48 mL 3    folic acid (FOLVITE) 1 MG tablet Take 1 tablet (1,000 mcg total) by mouth once daily. 90 tablet 3    gabapentin (NEURONTIN) 300 MG capsule TAKE 1 CAPSULE BY MOUTH IN THE EVENING 30 capsule 2    hydroCHLOROthiazide (HYDRODIURIL) 25 MG tablet TAKE 1 TABLET BY MOUTH EVERY DAY 90 tablet 3    hydrOXYchloroQUINE (PLAQUENIL) 200 mg tablet Take 1 tablet (200 mg total) by mouth 2 (two) times daily. 180 tablet 3    levothyroxine (SYNTHROID) 125 MCG tablet TAKE 1 TABLET BY MOUTH BEFORE BREAKFAST. 90 tablet 3    methotrexate 2.5 MG Tab Take 7 tablets (17.5 mg total) by mouth every 7 days. 84 tablet 1    ondansetron (ZOFRAN-ODT) 8 MG  TbDL DISSOLVE 1 TABLET IN MOUTH EVERY 12 HOURS AS NEEDED 30 tablet 0    prochlorperazine (COMPAZINE) 10 MG tablet TAKE 1 TABLET BY MOUTH EVERY 6 HOURS AS NEEDED 60 tablet 0    VITAMIN B COMPLEX ORAL Every day      WIXELA INHUB 250-50 mcg/dose diskus inhaler INHALE 1 PUFF INTO THE LUNGS 2 (TWO) TIMES DAILY. CONTROLLER 180 each 0    zolpidem (AMBIEN) 5 MG Tab TAKE 1 TABLET BY MOUTH EVERY DAY AT NIGHT AS NEEDED FOR SLEEP 30 tablet 3    pantoprazole (PROTONIX) 40 MG tablet TAKE 1 TABLET BY MOUTH ONCE DAILY IN THE MORNING FOR 90 DAYS (Patient not taking: Reported on 4/29/2024)      pyrilamine-chlophedianoL (NINJACOF) 12.5-12.5 mg/5 mL Liqd Take 5 mLs by mouth every 6 to 8 hours as needed (cough). (Patient not taking: Reported on 4/29/2024) 118 mL 0    scopolamine (TRANSDERM-SCOP) 1.3-1.5 mg (1 mg over 3 days) Place 1 patch onto the skin every 72 hours. (Patient not taking: Reported on 4/29/2024) 4 patch 0    thiamine 100 MG tablet Take 100 mg by mouth once daily. (Patient not taking: Reported on 4/29/2024)      traMADoL (ULTRAM) 50 mg tablet Take 1 tablet (50 mg total) by mouth every 12 (twelve) hours as needed for Pain (severe pain). TAKE 1 TABLET BY MOUTH EVERY 8 HOURS AS NEEDED FOR SEVERE PAIN- DOSE DECREASE (Patient not taking: Reported on 4/29/2024) 60 tablet 1    UNABLE TO FIND Take by mouth once daily.  B Complex (Patient not taking: Reported on 4/29/2024)       No current facility-administered medications for this visit.     Facility-Administered Medications Ordered in Other Visits   Medication Dose Route Frequency Provider Last Rate Last Admin    lactated ringers infusion   Intravenous Once PRN Nathen Lyons MD        lidocaine (PF) 10 mg/ml (1%) injection 10 mg  1 mL Intradermal Once Eladia Schwartz MD           Alpha-1 Antitrypsin:  Last PFT:  02/28/2023  Moderate obstruction with no change with bronchodilators, no restriction, moderate diffusion defect.  6 minute walk:  02/28/2023  Non hypoxemic  Last  PET/CT:7/21/23    1. No findings of recurrent FDG avid neoplasm or metastatic disease.  2. Additional observations as described.      Review of Systems  General: Feeling Well.  Eyes: Vision is good.  ENT: Acid reflux making throat raspy  Heart:: No chest pain or palpitations.  Lungs:  Her breathing is good.  GI: reflux,  makes her cough  : Nocturia at 0600.  Musculoskeletal: arthritis in upper ext and neck  Skin: No lesions or rashes.Bruises easily  Neuro: Neuropathy in fingers  Lymph: ankles swell sometimes  Psych: No anxiety or depression.  Endo: No weight change.    OBJECTIVE:      /64 (BP Location: Right arm, Patient Position: Sitting, BP Method: Medium (Manual))   Pulse 62   Wt 78.7 kg (173 lb 9.6 oz)   SpO2 (!) 90%   BMI 26.40 kg/m²     Physical Exam  GENERAL: Older patient in no distress.  HEENT: Pupils equal and reactive. Extraocular movements intact. Nose intact.      Pharynx moist.  NECK: Supple.   HEART: Regular rate and rhythm. No murmur or gallop auscultated.  LUNGS: Clear to auscultation and percussion. Lung excursion symmetrical. No change in fremitus. No adventitial noises.  ABDOMEN: Bowel sounds present. Non-tender, no masses palpated.  EXTREMITIES: Normal muscle tone and joint movement, no cyanosis or clubbing.   LYMPHATICS: No adenopathy palpated, no edema.  SKIN: Dry, intact, no lesions.   NEURO: Cranial nerves II-XII intact. Motor strength 5/5 bilaterally, upper and lower extremities.  PSYCH: Appropriate affect.    Assessment:       1. Chronic obstructive pulmonary disease, unspecified COPD type              S/p Chata Ratliff  Plan:       Chronic obstructive pulmonary disease, unspecified COPD type    Follow up in about 6 months (around 10/29/2024).  Continue Advair, rinse mouth after  Albuterol as needed

## 2024-05-17 DIAGNOSIS — I10 ESSENTIAL HYPERTENSION, BENIGN: ICD-10-CM

## 2024-05-17 DIAGNOSIS — C50.012 MALIGNANT NEOPLASM OF NIPPLE OF LEFT BREAST IN FEMALE, ESTROGEN RECEPTOR POSITIVE: ICD-10-CM

## 2024-05-17 DIAGNOSIS — M05.711 RHEUMATOID ARTHRITIS INVOLVING RIGHT SHOULDER WITH POSITIVE RHEUMATOID FACTOR: ICD-10-CM

## 2024-05-17 DIAGNOSIS — Z17.0 MALIGNANT NEOPLASM OF NIPPLE OF LEFT BREAST IN FEMALE, ESTROGEN RECEPTOR POSITIVE: ICD-10-CM

## 2024-05-17 DIAGNOSIS — J45.20 MILD INTERMITTENT ASTHMA, UNSPECIFIED WHETHER COMPLICATED: ICD-10-CM

## 2024-05-17 RX ORDER — ONDANSETRON 8 MG/1
TABLET, ORALLY DISINTEGRATING ORAL
Qty: 30 TABLET | Refills: 0 | Status: SHIPPED | OUTPATIENT
Start: 2024-05-17 | End: 2024-06-14

## 2024-05-17 NOTE — TELEPHONE ENCOUNTER
Care Due:                  Date            Visit Type   Department     Provider  --------------------------------------------------------------------------------                                EP -                              PRIMARY      [facility]  Last Visit: 07-      CARE ([facility])   PRACTICE       Elías Luis                              EP -                              PRIMARY      [facility]  Next Visit: 07-      CARE ([facility])   PRACTICE       Elías Luis                                                            Last  Test          Frequency    Reason                     Performed    Due Date  --------------------------------------------------------------------------------    TSH.........  12 months..  levothyroxine............  07-   07-    [facility] Embedded Care Due Messages. Reference number: [id number].   5/17/2024 10:32:19 AM CDT

## 2024-05-18 RX ORDER — ALBUTEROL SULFATE 90 UG/1
2 AEROSOL, METERED RESPIRATORY (INHALATION) EVERY 6 HOURS PRN
Qty: 54 G | Refills: 0 | Status: SHIPPED | OUTPATIENT
Start: 2024-05-18 | End: 2024-06-14

## 2024-05-18 NOTE — TELEPHONE ENCOUNTER
Provider Staff:  Action required for this patient    Requires labs      Please see care gap opportunities below in Care Due Message.    Thanks!  [facility]     Appointments      Date Provider   Last Visit   7/10/2023 Elías Luis Jr., MD   Next Visit   7/11/2024 Elías Luis Jr., MD     Refill Decision Note   Kirstie Bowden  is requesting a refill auth  orization.  Brief Assessment and Rationale for Refill:  Approve     Medication Therapy Plan:  LABS(TSH)      Comments:     Note composed:2:19 PM 05/18/2024

## 2024-06-10 ENCOUNTER — HOSPITAL ENCOUNTER (OUTPATIENT)
Dept: RADIOLOGY | Facility: HOSPITAL | Age: 79
Discharge: HOME | End: 2024-06-10
Attending: SPECIALIST
Payer: MEDICARE

## 2024-06-10 DIAGNOSIS — C64.1 MALIGNANT NEOPLASM OF RIGHT KIDNEY, EXCEPT RENAL PELVIS: ICD-10-CM

## 2024-06-10 DIAGNOSIS — M05.79 SEROPOSITIVE RHEUMATOID ARTHRITIS OF MULTIPLE SITES: ICD-10-CM

## 2024-06-10 DIAGNOSIS — Z79.899 ENCOUNTER FOR LONG-TERM (CURRENT) USE OF OTHER MEDICATIONS: ICD-10-CM

## 2024-06-10 DIAGNOSIS — D84.9 IMMUNOSUPPRESSION-RELATED INFECTIOUS DISEASE: Primary | ICD-10-CM

## 2024-06-10 DIAGNOSIS — B99.8 IMMUNOSUPPRESSION-RELATED INFECTIOUS DISEASE: Primary | ICD-10-CM

## 2024-06-10 PROCEDURE — 74176 CT ABD & PELVIS W/O CONTRAST: CPT | Mod: TC,PO

## 2024-06-10 PROCEDURE — 74018 RADEX ABDOMEN 1 VIEW: CPT | Mod: TC,PO

## 2024-06-10 PROCEDURE — 74018 RADEX ABDOMEN 1 VIEW: CPT | Mod: 26,,, | Performed by: RADIOLOGY

## 2024-06-10 PROCEDURE — 74176 CT ABD & PELVIS W/O CONTRAST: CPT | Mod: 26,,, | Performed by: RADIOLOGY

## 2024-06-14 DIAGNOSIS — M05.711 RHEUMATOID ARTHRITIS INVOLVING RIGHT SHOULDER WITH POSITIVE RHEUMATOID FACTOR: ICD-10-CM

## 2024-06-14 DIAGNOSIS — Z17.0 MALIGNANT NEOPLASM OF NIPPLE OF LEFT BREAST IN FEMALE, ESTROGEN RECEPTOR POSITIVE: ICD-10-CM

## 2024-06-14 DIAGNOSIS — C50.012 MALIGNANT NEOPLASM OF NIPPLE OF LEFT BREAST IN FEMALE, ESTROGEN RECEPTOR POSITIVE: ICD-10-CM

## 2024-06-14 DIAGNOSIS — I10 ESSENTIAL HYPERTENSION, BENIGN: ICD-10-CM

## 2024-06-14 RX ORDER — ONDANSETRON 8 MG/1
TABLET, ORALLY DISINTEGRATING ORAL
Qty: 30 TABLET | Refills: 0 | Status: SHIPPED | OUTPATIENT
Start: 2024-06-14

## 2024-06-18 RX ORDER — GABAPENTIN 300 MG/1
CAPSULE ORAL
Qty: 30 CAPSULE | Refills: 2 | Status: SHIPPED | OUTPATIENT
Start: 2024-06-18

## 2024-06-20 ENCOUNTER — OFFICE VISIT (OUTPATIENT)
Dept: SPINE | Facility: CLINIC | Age: 79
End: 2024-06-20
Payer: MEDICARE

## 2024-06-20 ENCOUNTER — TELEPHONE (OUTPATIENT)
Dept: PAIN MEDICINE | Facility: CLINIC | Age: 79
End: 2024-06-20
Payer: MEDICARE

## 2024-06-20 VITALS — WEIGHT: 173.5 LBS | BODY MASS INDEX: 26.38 KG/M2

## 2024-06-20 DIAGNOSIS — M54.16 LUMBAR RADICULITIS: Primary | ICD-10-CM

## 2024-06-20 PROCEDURE — 99999 PR PBB SHADOW E&M-EST. PATIENT-LVL III: CPT | Mod: PBBFAC,,, | Performed by: PHYSICAL MEDICINE & REHABILITATION

## 2024-06-20 PROCEDURE — 99213 OFFICE O/P EST LOW 20 MIN: CPT | Mod: PBBFAC,PN | Performed by: PHYSICAL MEDICINE & REHABILITATION

## 2024-06-20 NOTE — PROGRESS NOTES
SUBJECTIVE:    Patient ID: Kirstie Bowden is a 79 y.o. female.    Chief Complaint: Follow-up    She presents today with recurrent familiar right leg pain radiating into the anterior portion of the right leg below the knee.  These are familiar symptoms.  I note she had an excellent response to transforaminal injections on the right at L4-5 and L5-S1 on 2022 with Dr. Lyons.  On follow-up she describes greater than 50% improvement in her leg symptoms with improved functional mobility.  I have not seen her since 2022.  In the interim since I saw her she was diagnosed with right kidney cancer.  She underwent right nephrectomy in May of last year with Dr. Ramos.  The tumor was confined to the kidney.  Her current pain level is 9/10 and interferes with quality of life in terms of activities of daily living recreation and social activities.          Past Medical History:   Diagnosis Date    Arthritis     rheumatoid    Asthma     Breast cancer     Cancer     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    Hyperlipidemia     Hypertension     Limb alert care status     NO BP/IV LEFT ARM    Personal history of colonic polyps 2024    Pseudocholinesterase deficiency     family history    Thyroid disease      Social History     Socioeconomic History    Marital status:    Occupational History     Employer: [employer]   Tobacco Use    Smoking status: Former     Current packs/day: 0.00     Average packs/day: 0.5 packs/day for 59.3 years (29.6 ttl pk-yrs)     Types: Cigarettes     Start date: 1960     Quit date: 2019     Years since quittin.8    Smokeless tobacco: Never   Substance and Sexual Activity    Alcohol use: Yes     Alcohol/week: 2.0 standard drinks of alcohol     Types: 2 Glasses of wine per week     Comment: Social    Drug use: No    Sexual activity: Never     Social Determinants of Health     Financial Resource Strain: Low Risk  (2024)    Overall Financial Resource Strain (CARDIA)      Difficulty of Paying Living Expenses: Not very hard   Food Insecurity: No Food Insecurity (1/30/2024)    Hunger Vital Sign     Worried About Running Out of Food in the Last Year: Never true     Ran Out of Food in the Last Year: Never true   Transportation Needs: No Transportation Needs (1/30/2024)    PRAPARE - Transportation     Lack of Transportation (Medical): No     Lack of Transportation (Non-Medical): No   Physical Activity: Insufficiently Active (1/30/2024)    Exercise Vital Sign     Days of Exercise per Week: 2 days     Minutes of Exercise per Session: 10 min   Stress: No Stress Concern Present (1/30/2024)    [place] [place] [employer] - Occupational Stress Questionnaire     Feeling of Stress : Only a little   Housing Stability: Low Risk  (1/30/2024)    Housing Stability Vital Sign     Unable to Pay for Housing in the Last Year: No     Number of Places Lived in the Last Year: 1     Unstable Housing in the Last Year: No     Past Surgical History:   Procedure Laterality Date    AXILLARY NODE DISSECTION Left 03/14/2019    Procedure: LYMPHADENECTOMY, AXILLARY;  Surgeon: Mp Gonzalez MD;  Location: [facility];  Service: General;  Laterality: Left;  left lumpectomy with axillary lypmh node dissection    BALLOON DILATION OF URETER Left 06/24/2019    Procedure: DILATION, URETER, USING BALLOON;  Surgeon: Jorden Dickson MD;  Location: [facility] OR;  Service: Urology;  Laterality: Left;    BREAST BIOPSY Left 20 yrs ago    benign    BREAST LUMPECTOMY      x2    CHOLECYSTECTOMY      COLONOSCOPY  09/25/2018    LUC EASTON MD    CYSTOSCOPY W/ URETERAL STENT PLACEMENT Left 06/24/2019    Procedure: CYSTOSCOPY, WITH URETERAL STENT INSERTION;  Surgeon: Jorden Dickson MD;  Location: [facility] OR;  Service: Urology;  Laterality: Left;    CYSTOSCOPY W/ URETERAL STENT REMOVAL Left 07/23/2019    Procedure: CYSTOSCOPY, WITH URETERAL STENT REMOVAL;  Surgeon: Jorden Dickson MD;  Location: [facility] OR;  Service: Urology;   Laterality: Left;    EPIDURAL STEROID INJECTION INTO LUMBAR SPINE N/A 09/30/2021    Procedure: Injection-steroid-epidural-lumbar;  Surgeon: Nathen Lyons MD;  Location: [facility] OR;  Service: Pain Management;  Laterality: N/A;  L5-S1      EPIDURAL STEROID INJECTION INTO LUMBAR SPINE N/A 11/16/2021    Procedure: Injection-steroid-epidural-lumbar;  Surgeon: Nathen Lyons MD;  Location: [facility] OR;  Service: Pain Management;  Laterality: N/A;  L5-S1    EXTRACORPOREAL SHOCK WAVE LITHOTRIPSY Left 07/23/2019    Procedure: LITHOTRIPSY, ESWL;  Surgeon: Jorden Dickson MD;  Location: [facility] OR;  Service: Urology;  Laterality: Left;    EYE SURGERY Bilateral     cataracts    HYSTERECTOMY      MASTECTOMY, PARTIAL Left 04/25/2019    Procedure: MASTECTOMY, PARTIAL;  Surgeon: Mp Gonzalez MD;  Location: [facility] OR;  Service: General;  Laterality: Left;    NEEDLE LOCALIZATION Left 03/14/2019    Procedure: NEEDLE LOCALIZATION;  Surgeon: Mp Gonzalez MD;  Location: [facility] OR;  Service: General;  Laterality: Left;  left lumpectomy with wire needle localization     PARTIAL NEPHRECTOMY Right 05/22/2023    RETROGRADE PYELOGRAPHY Bilateral 06/24/2019    Procedure: PYELOGRAM, RETROGRADE;  Surgeon: Jorden Dickson MD;  Location: [facility] OR;  Service: Urology;  Laterality: Bilateral;    SENTINEL LYMPH NODE BIOPSY Left 03/14/2019    Procedure: BIOPSY, LYMPH NODE, SENTINEL;  Surgeon: Mp Gonzalez MD;  Location: [facility] OR;  Service: General;  Laterality: Left;  left sentinel node lymph node biopsy    TONSILLECTOMY      TRANSFORAMINAL EPIDURAL INJECTION OF STEROID Right 01/04/2022    Procedure: Injection,steroid,epidural,transforaminal approach;  Surgeon: Nathen Lyons MD;  Location: [facility] OR;  Service: Pain Management;  Laterality: Right;  L4-L5, L5-s1    URETEROSCOPY Left 06/24/2019    Procedure: URETEROSCOPY;  Surgeon: Jorden Dickson MD;  Location: [facility] OR;  Service: Urology;  Laterality: Left;     Family History   Problem Relation Name Age  of Onset    Heart disease Mother      Hypertension Mother      Alzheimer's disease Mother      Cancer Father      Heart disease Sister      Stroke Sister      Diabetes Brother      Cancer Daughter       Vitals:    06/20/24 0913   Weight: 78.7 kg (173 lb 8 oz)       Review of Systems   Constitutional:  Negative for chills, diaphoresis, fatigue, fever and unexpected weight change.   HENT:  Negative for trouble swallowing.    Eyes:  Negative for visual disturbance.   Respiratory:  Negative for shortness of breath.    Cardiovascular:  Negative for chest pain.   Gastrointestinal:  Negative for abdominal pain, constipation, nausea and vomiting.   Genitourinary:  Negative for difficulty urinating.   Musculoskeletal:  Negative for arthralgias, back pain, gait problem, joint swelling, myalgias, neck pain and neck stiffness.   Neurological:  Negative for dizziness, speech difficulty, weakness, light-headedness, numbness and headaches.          Objective:      Physical Exam  Neurological:      Mental Status: She is alert and oriented to person, place, and time.      Comments: She is awake and in no acute distress  Reflexes- +1-+2 reflexes at the following:   C5-Biceps   C6-Brachioradialis   C7-Triceps   L3/4-Patellar   S1-Achilles   Florentino sign negative bilaterally  Strength testing- 5/5 strength in the following muscle groups:  C5-Elbow flexion  C6-Wrist extension  C7-Elbow extension  C8-Finger flexion  T1-Finger abduction  L2-Hip flexion  L3-Knee extension  L4-Ankle dorsiflexion  L5-Great toe extension  S1-Ankle plantar flexion    Straight leg raise on the right positive for reproduction of right leg pain.  Straight leg raise on left negative                Assessment:       1. Lumbar radiculitis           Plan:     She remains neurologically intact.  Presents with recurrent familiar right leg radicular symptoms.  Historically favorable response to transforaminal injections on the right at L4-5 and L5-S1.  We will repeat  that procedure.  She can follow up with me afterwards      Lumbar radiculitis

## 2024-06-20 NOTE — TELEPHONE ENCOUNTER
----- Message from Jamal Amador MD sent at 6/20/2024  9:24 AM CDT -----  Please schedule for transforaminal injection right L4-5 and L5-S1

## 2024-06-20 NOTE — H&P (VIEW-ONLY)
SUBJECTIVE:    Patient ID: Kirstie Bowden is a 79 y.o. female.    Chief Complaint: Follow-up    She presents today with recurrent familiar right leg pain radiating into the anterior portion of the right leg below the knee.  These are familiar symptoms.  I note she had an excellent response to transforaminal injections on the right at L4-5 and L5-S1 on 2022 with Dr. Lyons.  On follow-up she describes greater than 50% improvement in her leg symptoms with improved functional mobility.  I have not seen her since 2022.  In the interim since I saw her she was diagnosed with right kidney cancer.  She underwent right nephrectomy in May of last year with Dr. Ramos.  The tumor was confined to the kidney.  Her current pain level is 9/10 and interferes with quality of life in terms of activities of daily living recreation and social activities.          Past Medical History:   Diagnosis Date    Arthritis     rheumatoid    Asthma     Breast cancer     Cancer     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    Hyperlipidemia     Hypertension     Limb alert care status     NO BP/IV LEFT ARM    Personal history of colonic polyps 2024    Pseudocholinesterase deficiency     family history    Thyroid disease      Social History     Socioeconomic History    Marital status:    Occupational History     Employer: [employer]   Tobacco Use    Smoking status: Former     Current packs/day: 0.00     Average packs/day: 0.5 packs/day for 59.3 years (29.6 ttl pk-yrs)     Types: Cigarettes     Start date: 1960     Quit date: 2019     Years since quittin.8    Smokeless tobacco: Never   Substance and Sexual Activity    Alcohol use: Yes     Alcohol/week: 2.0 standard drinks of alcohol     Types: 2 Glasses of wine per week     Comment: Social    Drug use: No    Sexual activity: Never     Social Determinants of Health     Financial Resource Strain: Low Risk  (2024)    Overall Financial Resource Strain (CARDIA)      Difficulty of Paying Living Expenses: Not very hard   Food Insecurity: No Food Insecurity (1/30/2024)    Hunger Vital Sign     Worried About Running Out of Food in the Last Year: Never true     Ran Out of Food in the Last Year: Never true   Transportation Needs: No Transportation Needs (1/30/2024)    PRAPARE - Transportation     Lack of Transportation (Medical): No     Lack of Transportation (Non-Medical): No   Physical Activity: Insufficiently Active (1/30/2024)    Exercise Vital Sign     Days of Exercise per Week: 2 days     Minutes of Exercise per Session: 10 min   Stress: No Stress Concern Present (1/30/2024)    [employer] [place] [employer] - Occupational Stress Questionnaire     Feeling of Stress : Only a little   Housing Stability: Low Risk  (1/30/2024)    Housing Stability Vital Sign     Unable to Pay for Housing in the Last Year: No     Number of Places Lived in the Last Year: 1     Unstable Housing in the Last Year: No     Past Surgical History:   Procedure Laterality Date    AXILLARY NODE DISSECTION Left 03/14/2019    Procedure: LYMPHADENECTOMY, AXILLARY;  Surgeon: Mp Gonzalez MD;  Location: [facility];  Service: General;  Laterality: Left;  left lumpectomy with axillary lypmh node dissection    BALLOON DILATION OF URETER Left 06/24/2019    Procedure: DILATION, URETER, USING BALLOON;  Surgeon: Jorden Dickson MD;  Location: [facility] OR;  Service: Urology;  Laterality: Left;    BREAST BIOPSY Left 20 yrs ago    benign    BREAST LUMPECTOMY      x2    CHOLECYSTECTOMY      COLONOSCOPY  09/25/2018    LUC EASTON MD    CYSTOSCOPY W/ URETERAL STENT PLACEMENT Left 06/24/2019    Procedure: CYSTOSCOPY, WITH URETERAL STENT INSERTION;  Surgeon: Jorden Dickson MD;  Location: [facility] OR;  Service: Urology;  Laterality: Left;    CYSTOSCOPY W/ URETERAL STENT REMOVAL Left 07/23/2019    Procedure: CYSTOSCOPY, WITH URETERAL STENT REMOVAL;  Surgeon: Jorden Dickson MD;  Location: [facility] OR;  Service: Urology;   Laterality: Left;    EPIDURAL STEROID INJECTION INTO LUMBAR SPINE N/A 09/30/2021    Procedure: Injection-steroid-epidural-lumbar;  Surgeon: Nathen Lyons MD;  Location: [facility] OR;  Service: Pain Management;  Laterality: N/A;  L5-S1      EPIDURAL STEROID INJECTION INTO LUMBAR SPINE N/A 11/16/2021    Procedure: Injection-steroid-epidural-lumbar;  Surgeon: Nathen Lyons MD;  Location: [facility] OR;  Service: Pain Management;  Laterality: N/A;  L5-S1    EXTRACORPOREAL SHOCK WAVE LITHOTRIPSY Left 07/23/2019    Procedure: LITHOTRIPSY, ESWL;  Surgeon: Jorden Dickson MD;  Location: [facility] OR;  Service: Urology;  Laterality: Left;    EYE SURGERY Bilateral     cataracts    HYSTERECTOMY      MASTECTOMY, PARTIAL Left 04/25/2019    Procedure: MASTECTOMY, PARTIAL;  Surgeon: Mp Gonzalez MD;  Location: [facility] OR;  Service: General;  Laterality: Left;    NEEDLE LOCALIZATION Left 03/14/2019    Procedure: NEEDLE LOCALIZATION;  Surgeon: Mp Gonzalez MD;  Location: [facility] OR;  Service: General;  Laterality: Left;  left lumpectomy with wire needle localization     PARTIAL NEPHRECTOMY Right 05/22/2023    RETROGRADE PYELOGRAPHY Bilateral 06/24/2019    Procedure: PYELOGRAM, RETROGRADE;  Surgeon: Jorden Dickson MD;  Location: [facility] OR;  Service: Urology;  Laterality: Bilateral;    SENTINEL LYMPH NODE BIOPSY Left 03/14/2019    Procedure: BIOPSY, LYMPH NODE, SENTINEL;  Surgeon: Mp Gonzalez MD;  Location: [facility] OR;  Service: General;  Laterality: Left;  left sentinel node lymph node biopsy    TONSILLECTOMY      TRANSFORAMINAL EPIDURAL INJECTION OF STEROID Right 01/04/2022    Procedure: Injection,steroid,epidural,transforaminal approach;  Surgeon: Nathen Lyons MD;  Location: [facility] OR;  Service: Pain Management;  Laterality: Right;  L4-L5, L5-s1    URETEROSCOPY Left 06/24/2019    Procedure: URETEROSCOPY;  Surgeon: Jorden Dickson MD;  Location: [facility] OR;  Service: Urology;  Laterality: Left;     Family History   Problem Relation Name Age  of Onset    Heart disease Mother      Hypertension Mother      Alzheimer's disease Mother      Cancer Father      Heart disease Sister      Stroke Sister      Diabetes Brother      Cancer Daughter       Vitals:    06/20/24 0913   Weight: 78.7 kg (173 lb 8 oz)       Review of Systems   Constitutional:  Negative for chills, diaphoresis, fatigue, fever and unexpected weight change.   HENT:  Negative for trouble swallowing.    Eyes:  Negative for visual disturbance.   Respiratory:  Negative for shortness of breath.    Cardiovascular:  Negative for chest pain.   Gastrointestinal:  Negative for abdominal pain, constipation, nausea and vomiting.   Genitourinary:  Negative for difficulty urinating.   Musculoskeletal:  Negative for arthralgias, back pain, gait problem, joint swelling, myalgias, neck pain and neck stiffness.   Neurological:  Negative for dizziness, speech difficulty, weakness, light-headedness, numbness and headaches.          Objective:      Physical Exam  Neurological:      Mental Status: She is alert and oriented to person, place, and time.      Comments: She is awake and in no acute distress  Reflexes- +1-+2 reflexes at the following:   C5-Biceps   C6-Brachioradialis   C7-Triceps   L3/4-Patellar   S1-Achilles   Florentino sign negative bilaterally  Strength testing- 5/5 strength in the following muscle groups:  C5-Elbow flexion  C6-Wrist extension  C7-Elbow extension  C8-Finger flexion  T1-Finger abduction  L2-Hip flexion  L3-Knee extension  L4-Ankle dorsiflexion  L5-Great toe extension  S1-Ankle plantar flexion    Straight leg raise on the right positive for reproduction of right leg pain.  Straight leg raise on left negative                Assessment:       1. Lumbar radiculitis           Plan:     She remains neurologically intact.  Presents with recurrent familiar right leg radicular symptoms.  Historically favorable response to transforaminal injections on the right at L4-5 and L5-S1.  We will repeat  that procedure.  She can follow up with me afterwards      Lumbar radiculitis

## 2024-06-24 NOTE — PROGRESS NOTES
"Subjective:      Patient ID: Kirstie Bowden is a 79 y.o. female.    Chief Complaint: Follow-up    HPI    Rheumatologic History:   - Diagnosis/es:              - osteoarthritis  - seropositive rheumatoid arthritis diagnosed in 2015  - Positive serologies: + RF (170), +CCP (87.9)  - Negative serologies: -  - Infectious screening labs:  Negative hepatitis-B, C, and QuantiFERON (6/2024)  - Imaging:              - x-ray arthritis survey (02/2023): Chondrocalcinosis in the knees and degenerative changes              - DEXA (9/2021): normal BMD  - Previous Treatments:              - Enbrel: Discontinued due to breast cancer  - Current Treatments:               - MTX 17.5 mg weekly plus folic acid daily  -  mg daily  - tramadol daily p.r.n. for pain (she takes this sparingly)  - Plaquenil eye exam: No HCQ toxicity 2023  Interval History:   She is doing well and denies joint pain, swelling, and medication adverse effects.     Objective:   /65 (BP Location: Right arm, Patient Position: Sitting, BP Method: Medium (Automatic))   Pulse 75   Ht 5' 8" (1.727 m)   Wt 81.3 kg (179 lb 3.7 oz)   BMI 27.25 kg/m²   Physical Exam   Constitutional: normal appearance.   HENT:   Head: Normocephalic and atraumatic.   Cardiovascular: Normal rate, regular rhythm and normal heart sounds.   Pulmonary/Chest: Effort normal and breath sounds normal.   Musculoskeletal:      Comments: No synovitis, dactylitis, enthesitis, effusions     Neurological: She is alert.   Skin: Skin is warm and dry. No rash noted.   No skin thickening, telangiectasias, calcinosis, psoriasiform lesions, lupoid lesions        6/21/2023 6/25/2024   Tender (EMERSON-28) 0 / 28  0 / 28    Swollen (EMERSON-28) 0 / 28  0 / 28    Provider Global 10 mm 10 mm   Patient Global 10 mm 10 mm   ESR -- 18 mm/hr   CRP 19.2 mg/L 8 mg/L   EMERSON-28 (ESR) -- 2.16 (Remission)   EMERSON-28 (CRP) 2.18 (Remission) 1.89 (Remission)   CDAI Score 2  2      Labs (6/10/24)  CBC HGB 11.8, WBC, " PLT WNL   CMP CR 1.3 <- 1.5, GFR 42.1 <- 35, LFTs WNL   ESR CRP WNL    Assessment:     1. Rheumatoid arthritis involving multiple sites with positive rheumatoid factor    2. High risk medications (not anticoagulants) long-term use    3. 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 She is in remission. Continue current medication.    Plan:     Problem List Items Addressed This Visit          Immunology/Multi System    Rheumatoid arthritis involving multiple sites with positive rheumatoid factor    Relevant Medications    folic acid (FOLVITE) 1 MG tablet    methotrexate 2.5 MG Tab    hydroxychloroquine (PLAQUENIL) 200 mg tablet    Other Relevant Orders    C-Reactive Protein    CBC Auto Differential    Creatinine, Serum    ALT (SGPT)    AST (SGOT)    Sedimentation rate    Drug-induced immunodeficiency    Relevant Medications    folic acid (FOLVITE) 1 MG tablet    methotrexate 2.5 MG Tab    hydroxychloroquine (PLAQUENIL) 200 mg tablet    Other Relevant Orders    C-Reactive Protein    CBC Auto Differential    Creatinine, Serum    ALT (SGPT)    AST (SGOT)    Sedimentation rate       Palliative Care    High risk medications (not anticoagulants) long-term use    Relevant Medications    folic acid (FOLVITE) 1 MG tablet    methotrexate 2.5 MG Tab    hydroxychloroquine (PLAQUENIL) 200 mg tablet    Other Relevant Orders    C-Reactive Protein    CBC Auto Differential    Creatinine, Serum    ALT (SGPT)    AST (SGOT)    Sedimentation rate     1.) RA  - MTX 17.5 mg weekly and continue folic acid daily   - HCQ  400mg daily  - Avoid Yuriy and TNFi due to strong personal history of cancer  - CBC, CMP, ESR, CRP every 12 weeks  - Pre-DMARD labs yearly  - Immunizations: COVID x 3, flu (10/2022), Shingrix x 2, patient will check if pneumonia vaccine is up to date   - Plaquenil eye exam yearly  - DEXA due for repeat      2.) Bilateral carpal tunnel syndrome: patient would like to hold off on hand surgery referral as she just had surgery  - Wrist braces for now    Follow up in 6 months    30 minutes of total time spent on the encounter, which includes face to face time and non-face to face time preparing to see the patient (eg, review of tests), Obtaining and/or reviewing separately obtained history, Documenting clinical information in the electronic or other health record, Independently interpreting results (not separately reported) and communicating results to the patient/family/caregiver, or Care coordination (not separately reported).     This note was prepared with [employer] Direct voice recognition transcription software. Garbled syntax, mangled pronouns, and other bizarre constructions may be attributed to that software system       Khuhsboo Aguirre M.D.  Rheumatology Dept  [place], [place]

## 2024-06-25 ENCOUNTER — OFFICE VISIT (OUTPATIENT)
Dept: RHEUMATOLOGY | Facility: CLINIC | Age: 79
End: 2024-06-25
Payer: MEDICARE

## 2024-06-25 VITALS
BODY MASS INDEX: 27.17 KG/M2 | HEIGHT: 68 IN | SYSTOLIC BLOOD PRESSURE: 133 MMHG | HEART RATE: 75 BPM | DIASTOLIC BLOOD PRESSURE: 65 MMHG | WEIGHT: 179.25 LBS

## 2024-06-25 DIAGNOSIS — D84.821 DRUG-INDUCED IMMUNODEFICIENCY: ICD-10-CM

## 2024-06-25 DIAGNOSIS — M05.79 RHEUMATOID ARTHRITIS INVOLVING MULTIPLE SITES WITH POSITIVE RHEUMATOID FACTOR: ICD-10-CM

## 2024-06-25 DIAGNOSIS — Z79.899 DRUG-INDUCED IMMUNODEFICIENCY: ICD-10-CM

## 2024-06-25 DIAGNOSIS — Z79.899 HIGH RISK MEDICATIONS (NOT ANTICOAGULANTS) LONG-TERM USE: ICD-10-CM

## 2024-06-25 PROCEDURE — 99214 OFFICE O/P EST MOD 30 MIN: CPT | Mod: PBBFAC,PO | Performed by: STUDENT IN AN ORGANIZED HEALTH CARE EDUCATION/TRAINING PROGRAM

## 2024-06-25 PROCEDURE — 99999 PR PBB SHADOW E&M-EST. PATIENT-LVL IV: CPT | Mod: PBBFAC,,, | Performed by: STUDENT IN AN ORGANIZED HEALTH CARE EDUCATION/TRAINING PROGRAM

## 2024-06-25 PROCEDURE — 99215 OFFICE O/P EST HI 40 MIN: CPT | Mod: S$PBB,,, | Performed by: STUDENT IN AN ORGANIZED HEALTH CARE EDUCATION/TRAINING PROGRAM

## 2024-06-25 RX ORDER — METHOTREXATE 2.5 MG/1
17.5 TABLET ORAL
Qty: 84 TABLET | Refills: 3 | Status: SHIPPED | OUTPATIENT
Start: 2024-06-25 | End: 2025-06-25

## 2024-06-25 RX ORDER — LANOLIN ALCOHOL/MO/W.PET/CERES
100 CREAM (GRAM) TOPICAL DAILY
COMMUNITY

## 2024-06-25 RX ORDER — FOLIC ACID 1 MG/1
1000 TABLET ORAL DAILY
Qty: 90 TABLET | Refills: 3 | Status: SHIPPED | OUTPATIENT
Start: 2024-06-25 | End: 2025-06-20

## 2024-06-25 RX ORDER — HYDROXYCHLOROQUINE SULFATE 200 MG/1
200 TABLET, FILM COATED ORAL 2 TIMES DAILY
Qty: 180 TABLET | Refills: 3 | Status: SHIPPED | OUTPATIENT
Start: 2024-06-25 | End: 2025-06-20

## 2024-07-01 ENCOUNTER — HOSPITAL ENCOUNTER (OUTPATIENT)
Dept: RADIOLOGY | Facility: HOSPITAL | Age: 79
Discharge: HOME | End: 2024-07-01
Attending: INTERNAL MEDICINE
Payer: MEDICARE

## 2024-07-01 VITALS — HEIGHT: 68 IN | BODY MASS INDEX: 26.98 KG/M2 | WEIGHT: 178 LBS

## 2024-07-01 DIAGNOSIS — Z17.0 MALIGNANT NEOPLASM OF NIPPLE OF LEFT BREAST IN FEMALE, ESTROGEN RECEPTOR POSITIVE: ICD-10-CM

## 2024-07-01 DIAGNOSIS — E03.9 ACQUIRED HYPOTHYROIDISM: ICD-10-CM

## 2024-07-01 DIAGNOSIS — E78.00 PURE HYPERCHOLESTEROLEMIA: ICD-10-CM

## 2024-07-01 DIAGNOSIS — C50.012 MALIGNANT NEOPLASM OF NIPPLE OF LEFT BREAST IN FEMALE, ESTROGEN RECEPTOR POSITIVE: ICD-10-CM

## 2024-07-01 LAB — GLUCOSE SERPL-MCNC: 82 MG/DL (ref 70–110)

## 2024-07-01 PROCEDURE — 78815 PET IMAGE W/CT SKULL-THIGH: CPT | Mod: 26,PS,, | Performed by: RADIOLOGY

## 2024-07-01 PROCEDURE — 82962 GLUCOSE BLOOD TEST: CPT | Mod: PO

## 2024-07-01 PROCEDURE — 78815 PET IMAGE W/CT SKULL-THIGH: CPT | Mod: TC,PO

## 2024-07-01 PROCEDURE — A9552 F18 FDG: HCPCS | Mod: PO | Performed by: INTERNAL MEDICINE

## 2024-07-01 RX ORDER — FLUDEOXYGLUCOSE F18 500 MCI/ML
12.2 INJECTION INTRAVENOUS
Status: COMPLETED | OUTPATIENT
Start: 2024-07-01 | End: 2024-07-01

## 2024-07-01 RX ADMIN — FLUDEOXYGLUCOSE F-18 12.2 MILLICURIE: 500 INJECTION INTRAVENOUS at 08:07

## 2024-07-03 ENCOUNTER — OFFICE VISIT (OUTPATIENT)
Dept: HEMATOLOGY/ONCOLOGY | Facility: CLINIC | Age: 79
End: 2024-07-03
Payer: MEDICARE

## 2024-07-03 VITALS
BODY MASS INDEX: 27.67 KG/M2 | SYSTOLIC BLOOD PRESSURE: 134 MMHG | WEIGHT: 182.56 LBS | DIASTOLIC BLOOD PRESSURE: 59 MMHG | RESPIRATION RATE: 12 BRPM | HEART RATE: 72 BPM | TEMPERATURE: 98 F | HEIGHT: 68 IN | OXYGEN SATURATION: 98 %

## 2024-07-03 DIAGNOSIS — Z17.0 CARCINOMA OF CENTRAL PORTION OF LEFT BREAST IN FEMALE, ESTROGEN RECEPTOR POSITIVE: ICD-10-CM

## 2024-07-03 DIAGNOSIS — Z79.899 DRUG-INDUCED IMMUNODEFICIENCY: ICD-10-CM

## 2024-07-03 DIAGNOSIS — E78.49 OTHER HYPERLIPIDEMIA: ICD-10-CM

## 2024-07-03 DIAGNOSIS — C50.012 MALIGNANT NEOPLASM OF NIPPLE OF LEFT BREAST IN FEMALE, ESTROGEN RECEPTOR POSITIVE: Primary | ICD-10-CM

## 2024-07-03 DIAGNOSIS — D84.821 DRUG-INDUCED IMMUNODEFICIENCY: ICD-10-CM

## 2024-07-03 DIAGNOSIS — C50.112 CARCINOMA OF CENTRAL PORTION OF LEFT BREAST IN FEMALE, ESTROGEN RECEPTOR POSITIVE: ICD-10-CM

## 2024-07-03 DIAGNOSIS — Z17.0 MALIGNANT NEOPLASM OF NIPPLE OF LEFT BREAST IN FEMALE, ESTROGEN RECEPTOR POSITIVE: Primary | ICD-10-CM

## 2024-07-03 DIAGNOSIS — N18.31 STAGE 3A CHRONIC KIDNEY DISEASE: ICD-10-CM

## 2024-07-03 PROCEDURE — 99999 PR PBB SHADOW E&M-EST. PATIENT-LVL IV: CPT | Mod: PBBFAC,,, | Performed by: INTERNAL MEDICINE

## 2024-07-03 PROCEDURE — 99214 OFFICE O/P EST MOD 30 MIN: CPT | Mod: PBBFAC,PN | Performed by: INTERNAL MEDICINE

## 2024-07-03 NOTE — PROGRESS NOTES
Subjective:       Patient ID: Kirstie Bowden is a 79 y.o. female.    Chief Complaint   Left breast ca dx  T2 N0 status post lumpectomy and re-resection margin adjuvant TC chemo s/p 3  cycles but discontinued due to quality of life issues and started Arimidex   stated  having s/e  To arimidex took herself off presented to clinic started on Aromasin .  Tolerating Aromasin well rec score of 29    hydronephrosis+ ,  had stent placed  Here for follow-up  Oncologic History:  Dx 1/2019  INVASIVE CARCINOMA BREAST, CANCER CASE SUMMARY:  PROCEDURE: Partial mastectomy without skin or nipple.3/2019  SPECIMEN LATERALITY: Left.  TUMOR SIZE, GREATEST DIMENSION: 2.5 cm.  HISTOLOGIC TYPE: Invasive pleomorphic lobular carcinoma.  HISTOLOGIC GRADE (LUTHER HISTOLOGIC SCORE):  Glandular (acinar)/tubular differentiation: Score 3.  Nuclear pleomorphism: Score 2.  Mitotic rate: Score 1.  Overall grade: Grade 2  DUCTAL CARCINOMA IN SITU (DCIS): Not identified.  LOBULAR CARCINOMA IN SITU (LCIS): Present, pleomorphic lobular carcinoma in situ with rare focus of  classical lobular carcinoma situ.  Estimated size (extent) of pleomorphic LCIS: At least 2.8 cm.  TUMOR EXTENSION: Not applicable.  MARGINS:  INVASIVE CARCINOMA MARGINS: Uninvolved by invasive carcinoma.  Distance from inferior (closest) margin: 5 mm.  PLEOMORPHIC LOBULAR CARCINOMA IN SITU MARGINS: Uninvolved by pleomorphic LCIS.  Distance from inferior medial (closest) margin: 0.2 mm (see above comment).  REGIONAL LYMPH NODES: Uninvolved by tumor cells.  Number of lymph nodes examined: 2.  Number of sentinel nodes examined: 2.  TREATMENT EFFECT: No known presurgical therapy.  PATHOLOGIC STAGE CLASSIFICATION (pTNM, AJCC 8TH EDITION):  pT2: Tumor size = 2.5 cm in greatest dimension.  pN0: No regional lymph node metastasis identified.  pM: Not applicable.    ER: Positive.  TX: Positive.  HER2: Negative (IHC - Equivocal (score 2) and FISH - Negative).  Ki-67: Approximately  14%.  HPI:     Social History     Socioeconomic History    Marital status:    Occupational History     Employer: [email or web address]   Tobacco Use    Smoking status: Former     Current packs/day: 0.00     Average packs/day: 0.5 packs/day for 59.3 years (29.6 ttl pk-yrs)     Types: Cigarettes     Start date: 1960     Quit date: 2019     Years since quittin.8    Smokeless tobacco: Never   Substance and Sexual Activity    Alcohol use: Yes     Alcohol/week: 2.0 standard drinks of alcohol     Types: 2 Glasses of wine per week     Comment: Social    Drug use: No    Sexual activity: Never     Social Determinants of Health     Financial Resource Strain: Low Risk  (2024)    Overall Financial Resource Strain (CARDIA)     Difficulty of Paying Living Expenses: Not very hard   Food Insecurity: No Food Insecurity (2024)    Hunger Vital Sign     Worried About Running Out of Food in the Last Year: Never true     Ran Out of Food in the Last Year: Never true   Transportation Needs: No Transportation Needs (2024)    PRAPARE - Transportation     Lack of Transportation (Medical): No     Lack of Transportation (Non-Medical): No   Physical Activity: Insufficiently Active (2024)    Exercise Vital Sign     Days of Exercise per Week: 2 days     Minutes of Exercise per Session: 10 min   Stress: No Stress Concern Present (2024)    [employer] - Occupational Stress Questionnaire     Feeling of Stress : Only a little   Housing Stability: Low Risk  (2024)    Housing Stability Vital Sign     Unable to Pay for Housing in the Last Year: No     Number of Places Lived in the Last Year: 1     Unstable Housing in the Last Year: No     Family History   Problem Relation Name Age of Onset    Heart disease Mother      Hypertension Mother      Alzheimer's disease Mother      Cancer Father      Heart disease Sister      Stroke Sister      Diabetes Brother      Cancer Daughter       Past  Surgical History:   Procedure Laterality Date    AXILLARY NODE DISSECTION Left 03/14/2019    Procedure: LYMPHADENECTOMY, AXILLARY;  Surgeon: Mp Gonzalez MD;  Location: [facility] OR;  Service: General;  Laterality: Left;  left lumpectomy with axillary lypmh node dissection    BALLOON DILATION OF URETER Left 06/24/2019    Procedure: DILATION, URETER, USING BALLOON;  Surgeon: Jorden Dickson MD;  Location: [facility] OR;  Service: Urology;  Laterality: Left;    BREAST BIOPSY Left 20 yrs ago    benign    BREAST LUMPECTOMY      x2    CHOLECYSTECTOMY      COLONOSCOPY  09/25/2018    LUC EASTON MD    CYSTOSCOPY W/ URETERAL STENT PLACEMENT Left 06/24/2019    Procedure: CYSTOSCOPY, WITH URETERAL STENT INSERTION;  Surgeon: Jorden Dickson MD;  Location: [facility] OR;  Service: Urology;  Laterality: Left;    CYSTOSCOPY W/ URETERAL STENT REMOVAL Left 07/23/2019    Procedure: CYSTOSCOPY, WITH URETERAL STENT REMOVAL;  Surgeon: Jorden Dickson MD;  Location: [facility] OR;  Service: Urology;  Laterality: Left;    EPIDURAL STEROID INJECTION INTO LUMBAR SPINE N/A 09/30/2021    Procedure: Injection-steroid-epidural-lumbar;  Surgeon: Nathen Lyons MD;  Location: [facility] OR;  Service: Pain Management;  Laterality: N/A;  L5-S1      EPIDURAL STEROID INJECTION INTO LUMBAR SPINE N/A 11/16/2021    Procedure: Injection-steroid-epidural-lumbar;  Surgeon: Nathen Lyons MD;  Location: [facility] OR;  Service: Pain Management;  Laterality: N/A;  L5-S1    EXTRACORPOREAL SHOCK WAVE LITHOTRIPSY Left 07/23/2019    Procedure: LITHOTRIPSY, ESWL;  Surgeon: Jorden Dickson MD;  Location: [facility] OR;  Service: Urology;  Laterality: Left;    EYE SURGERY Bilateral     cataracts    HYSTERECTOMY      MASTECTOMY, PARTIAL Left 04/25/2019    Procedure: MASTECTOMY, PARTIAL;  Surgeon: Mp Gonzalez MD;  Location: [facility] OR;  Service: General;  Laterality: Left;    NEEDLE LOCALIZATION Left 03/14/2019    Procedure: NEEDLE LOCALIZATION;  Surgeon: Mp Gonzalez MD;  Location:  [facility] OR;  Service: General;  Laterality: Left;  left lumpectomy with wire needle localization     PARTIAL NEPHRECTOMY Right 05/22/2023    RETROGRADE PYELOGRAPHY Bilateral 06/24/2019    Procedure: PYELOGRAM, RETROGRADE;  Surgeon: Jorden Dickson MD;  Location: [facility] OR;  Service: Urology;  Laterality: Bilateral;    SENTINEL LYMPH NODE BIOPSY Left 03/14/2019    Procedure: BIOPSY, LYMPH NODE, SENTINEL;  Surgeon: Mp Gonzalez MD;  Location: [facility] OR;  Service: General;  Laterality: Left;  left sentinel node lymph node biopsy    TONSILLECTOMY      TRANSFORAMINAL EPIDURAL INJECTION OF STEROID Right 01/04/2022    Procedure: Injection,steroid,epidural,transforaminal approach;  Surgeon: Nathen Lyons MD;  Location: [facility] OR;  Service: Pain Management;  Laterality: Right;  L4-L5, L5-s1    URETEROSCOPY Left 06/24/2019    Procedure: URETEROSCOPY;  Surgeon: Jorden Dickson MD;  Location: [facility] OR;  Service: Urology;  Laterality: Left;     Past Medical History:   Diagnosis Date    Arthritis     rheumatoid    Asthma     Breast cancer     Cancer     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    Hyperlipidemia     Hypertension     Limb alert care status     NO BP/IV LEFT ARM    Personal history of colonic polyps 01/23/2024    Pseudocholinesterase deficiency     family history    Thyroid disease        Current Outpatient Medications:     albuterol (PROVENTIL/VENTOLIN HFA) 90 mcg/actuation inhaler, INHALE 2 PUFFS INTO THE LUNGS EVERY 6 (SIX) HOURS AS NEEDED FOR WHEEZING. RESCUE, Disp: 51 g, Rfl: 0    amLODIPine (NORVASC) 5 MG tablet, TAKE 1 TABLET BY MOUTH EVERY DAY, Disp: 90 tablet, Rfl: 0    atorvastatin (LIPITOR) 20 MG tablet, TAKE 1 TABLET BY MOUTH EVERY DAY, Disp: 90 tablet, Rfl: 0    cyanocobalamin (VITAMIN B-12) 1000 MCG tablet, Take 100 mcg by mouth once daily., Disp: , Rfl:     exemestane (AROMASIN) 25 mg tablet, Take 1 tablet (25 mg total) by mouth once daily., Disp: 90 tablet, Rfl: 5    fluticasone propionate (FLONASE) 50 mcg/actuation nasal  spray, USE 1 SPRAY (50 MCG TOTAL) IN EACH NOSTRIL ONCE DAILY, Disp: 48 mL, Rfl: 3    folic acid (FOLVITE) 1 MG tablet, Take 1 tablet (1,000 mcg total) by mouth once daily., Disp: 90 tablet, Rfl: 3    gabapentin (NEURONTIN) 300 MG capsule, TAKE 1 CAPSULE BY MOUTH IN THE EVENING, Disp: 30 capsule, Rfl: 2    hydroCHLOROthiazide (HYDRODIURIL) 25 MG tablet, TAKE 1 TABLET BY MOUTH EVERY DAY, Disp: 90 tablet, Rfl: 3    hydroxychloroquine (PLAQUENIL) 200 mg tablet, Take 1 tablet (200 mg total) by mouth 2 (two) times daily., Disp: 180 tablet, Rfl: 3    levothyroxine (SYNTHROID) 125 MCG tablet, TAKE 1 TABLET BY MOUTH BEFORE BREAKFAST., Disp: 90 tablet, Rfl: 3    methotrexate 2.5 MG Tab, Take 7 tablets (17.5 mg total) by mouth every 7 days., Disp: 84 tablet, Rfl: 3    ondansetron (ZOFRAN-ODT) 8 MG TbDL, DISSOLVE 1 TABLET IN MOUTH EVERY 12 HOURS AS NEEDED, Disp: 30 tablet, Rfl: 0    prochlorperazine (COMPAZINE) 10 MG tablet, TAKE 1 TABLET BY MOUTH EVERY 6 HOURS AS NEEDED, Disp: 60 tablet, Rfl: 0    scopolamine (TRANSDERM-SCOP) 1.3-1.5 mg (1 mg over 3 days), Place 1 patch onto the skin every 72 hours. (Patient taking differently: Place 1 patch onto the skin every 72 hours as needed.), Disp: 4 patch, Rfl: 0    traMADoL (ULTRAM) 50 mg tablet, Take 1 tablet (50 mg total) by mouth every 12 (twelve) hours as needed for Pain (severe pain). TAKE 1 TABLET BY MOUTH EVERY 8 HOURS AS NEEDED FOR SEVERE PAIN- DOSE DECREASE, Disp: 60 tablet, Rfl: 1    WIXELA INHUB 250-50 mcg/dose diskus inhaler, INHALE 1 PUFF INTO THE LUNGS 2 (TWO) TIMES DAILY. CONTROLLER, Disp: 180 each, Rfl: 0    zolpidem (AMBIEN) 5 MG Tab, TAKE 1 TABLET BY MOUTH EVERY DAY AT NIGHT AS NEEDED FOR SLEEP, Disp: 30 tablet, Rfl: 3  No current facility-administered medications for this visit.    Facility-Administered Medications Ordered in Other Visits:     lactated ringers infusion, , Intravenous, Once PRN, Nathen Lyons MD    lidocaine (PF) 10 mg/ml (1%) injection 10 mg, 1 mL,  Intradermal, Once, Eladia Schwartz MD  Review of patient's allergies indicates:   Allergen Reactions    Succinylcholine chloride Other (See Comments)    Sulfur dioxide Hives    Sulfamethoxazole-trimethoprim      Other reaction(s): per dr daryn Rodríguez (sulfonamide antibiotics)      NOT SURE REACTION         REVIEW OF SYSTEMS:     Patient denies issues related to appetite or recent weight change.  Feels well overall.  Denies issues with generalized weakness .  Denies fatigue over above what is normally experienced with day-to-day activities  Denies fever, chills, rigors  Denies issues with ambulation  Denies generalized swelling or new lumps and bumps felt in any part  of body  Denies visual or hearing loss  Denies issues with congestion, sinus issues, cough, sputum production runny nose or itching eyes  Denies chest pain or palpitations, or passing out  Denies abdominal pain, reflux symptoms, nausea vomiting loose stools or constipation  Denies seizure activity or focal weaknesses or symptoms related to TIA, no head aches or blurred vision reported  Denies issues with skin rash or bruising  Denies issues with swelling of feet, tingling or numbness   No issues with sleep,   No recent foreign travel   Good family support reported        PHYSICAL EXAM:     Wt Readings from Last 3 Encounters:   07/01/24 80.7 kg (178 lb)   06/25/24 81.3 kg (179 lb 3.7 oz)   06/20/24 78.7 kg (173 lb 8 oz)     Temp Readings from Last 3 Encounters:   04/26/24 97.4 °F (36.3 °C) (Temporal)   02/02/24 96.3 °F (35.7 °C) (Temporal)   01/17/24 98.5 °F (36.9 °C) (Oral)     BP Readings from Last 3 Encounters:   06/25/24 133/65   04/29/24 128/64   04/26/24 130/60     Pulse Readings from Last 3 Encounters:   06/25/24 75   04/29/24 62   04/26/24 74     VITAL SIGNS:  as above   GENERAL: appears well-built, well-nourished.  No anxiety, no agitation, and in no distress.  Patient is awake, alert, oriented and cooperative.  HEENT:  Showed no  congestion. Trachea is central no obvious icterus or pallor noted no hoarseness. no obvious JVD   NECK:  Supple.  No JVD. No obvious cervical submental or supraclavicular adenopathy.  RS:the visualized portion of  Chest expands well. chest appears symmetric, no audible wheezes.  No dyspnea recognized  ABDOMEN:  abdomen appears undistended.  EXTREMITIES:  Without edema.  NEUROLOGICAL:  The patient is appropriate, higher functions are normal.  No  obvious neurological deficits.  normal judgement normal thought content  No confusion, no speech impediment. Cranial nerves are intact and show no deficit. No gross motor deficits noted   SKIN MUSCULOSKELETAL: no joint or skeletal deformity, no clubbing of nails.  No visible rash ecchymosis or petechiae     Laboratory:     CBC:  Lab Results   Component Value Date    WBC 6.38 07/01/2024    RBC 3.46 (L) 07/01/2024    HGB 10.9 (L) 07/01/2024    HCT 36.0 (L) 07/01/2024     (H) 07/01/2024    MCH 31.5 (H) 07/01/2024    MCHC 30.3 (L) 07/01/2024    RDW 14.9 (H) 07/01/2024     07/01/2024    MPV 12.5 07/01/2024    GRAN 4.3 07/01/2024    GRAN 66.6 07/01/2024    LYMPH 1.5 07/01/2024    LYMPH 22.7 07/01/2024    MONO 0.5 07/01/2024    MONO 7.5 07/01/2024    EOS 0.2 07/01/2024    BASO 0.02 07/01/2024    EOSINOPHIL 2.4 07/01/2024    BASOPHIL 0.3 07/01/2024       BMP: BMP  Lab Results   Component Value Date     07/01/2024    K 3.8 07/01/2024     07/01/2024    CO2 30 (H) 07/01/2024    BUN 20 07/01/2024    CREATININE 1.4 07/01/2024    CALCIUM 9.5 07/01/2024    ANIONGAP 9 07/01/2024    ESTGFRAFRICA >60 07/20/2022    EGFRNONAA 55 (A) 07/20/2022       LFT:   Lab Results   Component Value Date    ALT 13 07/01/2024    AST 14 07/01/2024    ALKPHOS 59 07/01/2024    BILITOT 0.5 07/01/2024    Bone density September 2 1019 negative for osteopenia  oncotype rec. score is 29  Impressionpet 1/2020       1.  Likely postoperative change in the superolateral left breast, decreased in  size from the previous exam.    2.  Scattered multifocal ground-glass attenuation opacities in both lungs suggesting a combination of atypical infection/pneumonia and/or post treatment/post radiation change.  Consider follow-up CT of the chest in 6-8 weeks to document resolution.    3.  Indeterminate isoattenuating non-FDG avid structure in the anterior interpolar aspect of the right kidney, possibly representing a hemorrhagic/proteinaceous cyst or low grade malignancy, consider further characterization with contrast enhanced renal protocol CT/MRI.    4.  Numerous bilateral nonobstructing renal stones.  No hydronephrosis or hydroureter on today's exam.    5.  Interval resolution of the maxillary sinus disease.     Pet 7/2020  IMPRESSION:  1. Postoperative changes of prior left breast lumpectomy.  2. Near complete resolution of the previously described groundglass  attenuation opacities in the lungs, now with only a small linear  bandlike opacity in the anterior left upper lobe (most likely related  to prior radiation/treatment change.  3. Focal increased FDG activity along the right third and fourth rib  posteriorly at the posterior thoracic junction suggesting degenerative  change with no soft tissue, lytic or sclerotic lesion identified.  4. Additional and incidental findings as noted above unchanged from  the previous exam.            has renal stones and has hydronephrosis, stented      7/21 neg pet      7/22  IMPRESSION: No evidence of recurrent or metastatic disease     Postsurgical changes in the left breast     Stable 3.5 cm round isoattenuating mass within the upper pole the right kidney suggestive of renal cyst.     Nonobstructing renal stones        IMPRESSION:pet 4/23     No evidence of FDG avid metastatic breast cancer.     4.7 cm indeterminate right renal lesion. Although this lesion has been present on prior exams and is not hypermetabolic, it has not been definitively characterized; further  evaluation with retroperitoneal sonography or contrast-enhanced renal protocol CT/MRI is recommended.     Postoperative changes of the left breast. Please correlate with dedicated diagnostic mammography.     Electronically signed by:  Erlin Li MD  4/24/2023 2:17 PM CDT Workstation: [id number]       Impression: mri 5/2/23     Enhancing mass of the right kidney compatible with neoplasm.  No evidence for abdominal metastatic disease.     This report was flagged in Epic as abnormal.    Assessment/Plan:     Breast ca:T2N0Mx left breast ca s/p lumpectomy 3/2019 recurrent score 29  pT2: Tumor size = 2.5 cm in greatest dimension.  pN0: No regional lymph node metastasis identified.  pM: Not applicable.    ER: Positive.  CT: Positive.  HER2: Negative (IHC - E  Patient completed 3 cycles but aborted therapy.  In favor a quality of life due to side effects   Compeleted xrt  she stoped anastrozole  Due to arthralgia and nausea.Tolerating aromasin well to continue for 10 years till May 2029  Normal bone density 10/23     RA:  now on mtx and hydroxychroloquine sees dr Romero   ANEMIA : PROB FROM CKD WILL MONITOR  Hemorrhagic cyst :Needs a f/u as Dr Dickson has retiredUrology  was reportd on prev scans , had stents removed and lithotripsy for stones.     MRI of abd c/w renal mass she saw DR Ludwig  had nephrectomy  5/23. 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 All margins negative for invasive ca    Will alternate pet and ct non contrast to be able to see both malignancy rec .    Hypercalcemia: eating a lot of icecream, she is being rechecked soon by pcp     macrocytosis: related to thyroid issues will watch she is not anemic and embark on w/u if worse or cytopenia     Lung findindgs :saw Pulmonary  Naila aldridge 6/2020) consult for 2.  Finding as above on PET scan thesesd have nearly resolved now on 7/2020 pet and nothing new noted on scnas has on  going f/u with pulmonary   Pet 7/24 neg    She has scattered  atheromatous calcifications in the aorta and coronary arteries she will discuss this and follow up with her PCP for referral to cardiology    CKD has only 1 kidney so caution to be exercised  mammo done in 3/2022 done short term recommended  10/22 short term again in 4/23 , 4/24 next due 4/25      Has quit smoking       HTH. Lipids, hypothyroidism: cont DR mark      Advance Care Planning     Date: 04/25/2023    Power of   I initiated the process of advance care planning today and explained the importance of this process to the patient.  I introduced the concept of advance directives to the patient, as well. Then the patient received detailed information about the importance of designating a Health Care Power of  (HCPOA). She was also instructed to communicate with this person about their wishes for future healthcare, should she become sick and lose decision-making capacity. Pt has medical POA at home and will bring it for filing    Update: 5/3/23: pt has dropped off her papers to file

## 2024-07-09 ENCOUNTER — HOSPITAL ENCOUNTER (OUTPATIENT)
Facility: HOSPITAL | Age: 79
Discharge: HOME | End: 2024-07-09
Attending: ANESTHESIOLOGY | Admitting: ANESTHESIOLOGY
Payer: MEDICARE

## 2024-07-09 DIAGNOSIS — M54.16 LUMBAR RADICULITIS: ICD-10-CM

## 2024-07-09 PROCEDURE — 63600175 PHARM REV CODE 636 W HCPCS: Performed by: ANESTHESIOLOGY

## 2024-07-09 PROCEDURE — 25000003 PHARM REV CODE 250: Performed by: ANESTHESIOLOGY

## 2024-07-09 PROCEDURE — 64484 NJX AA&/STRD TFRM EPI L/S EA: CPT | Mod: RT | Performed by: ANESTHESIOLOGY

## 2024-07-09 PROCEDURE — 64483 NJX AA&/STRD TFRM EPI L/S 1: CPT | Mod: RT | Performed by: ANESTHESIOLOGY

## 2024-07-09 PROCEDURE — 64484 NJX AA&/STRD TFRM EPI L/S EA: CPT | Mod: RT,,, | Performed by: ANESTHESIOLOGY

## 2024-07-09 PROCEDURE — 25500020 PHARM REV CODE 255: Performed by: ANESTHESIOLOGY

## 2024-07-09 PROCEDURE — 64483 NJX AA&/STRD TFRM EPI L/S 1: CPT | Mod: RT,,, | Performed by: ANESTHESIOLOGY

## 2024-07-09 RX ORDER — ALPRAZOLAM 1 MG/1
1 TABLET, ORALLY DISINTEGRATING ORAL
Status: COMPLETED | OUTPATIENT
Start: 2024-07-09 | End: 2024-07-09

## 2024-07-09 RX ORDER — LIDOCAINE HYDROCHLORIDE 10 MG/ML
INJECTION, SOLUTION EPIDURAL; INFILTRATION; INTRACAUDAL; PERINEURAL
Status: DISCONTINUED | OUTPATIENT
Start: 2024-07-09 | End: 2024-07-11 | Stop reason: HOSPADM

## 2024-07-09 RX ORDER — LIDOCAINE HYDROCHLORIDE 10 MG/ML
1 INJECTION, SOLUTION EPIDURAL; INFILTRATION; INTRACAUDAL; PERINEURAL ONCE
Status: DISCONTINUED | OUTPATIENT
Start: 2024-07-09 | End: 2024-07-11 | Stop reason: HOSPADM

## 2024-07-09 RX ORDER — SODIUM CHLORIDE, SODIUM LACTATE, POTASSIUM CHLORIDE, CALCIUM CHLORIDE 600; 310; 30; 20 MG/100ML; MG/100ML; MG/100ML; MG/100ML
INJECTION, SOLUTION INTRAVENOUS CONTINUOUS
Status: DISCONTINUED | OUTPATIENT
Start: 2024-07-09 | End: 2024-07-11 | Stop reason: HOSPADM

## 2024-07-09 RX ORDER — DEXAMETHASONE SODIUM PHOSPHATE 10 MG/ML
INJECTION INTRAMUSCULAR; INTRAVENOUS
Status: DISCONTINUED | OUTPATIENT
Start: 2024-07-09 | End: 2024-07-11 | Stop reason: HOSPADM

## 2024-07-09 RX ORDER — BUPIVACAINE HYDROCHLORIDE 2.5 MG/ML
INJECTION, SOLUTION EPIDURAL; INFILTRATION; INTRACAUDAL
Status: DISCONTINUED | OUTPATIENT
Start: 2024-07-09 | End: 2024-07-11 | Stop reason: HOSPADM

## 2024-07-09 RX ADMIN — ALPRAZOLAM 1 MG: 1 TABLET, ORALLY DISINTEGRATING ORAL at 10:07

## 2024-07-09 NOTE — PLAN OF CARE
Pt awake, alert and oriented. Denies nausea, vital signs stable and at baseline. Able to ambulate with stable gait to car. Pt and sister received discharge instructions verbally and via handout, verbalized understanding. Sister driving pt home.

## 2024-07-09 NOTE — DISCHARGE SUMMARY
[facility] - Periop Services  Discharge Note  Short Stay    Procedure(s) (LRB):  Injection,steroid,epidural,transforaminal approach l4-5 and l5-s1 (Right)      OUTCOME: Patient tolerated treatment/procedure well without complication and is now ready for discharge.    DISPOSITION: Home or Self Care    FINAL DIAGNOSIS:  <principal problem not specified>    FOLLOWUP: In clinic    DISCHARGE INSTRUCTIONS:    Discharge Procedure Orders   Notify your health care provider if you experience any of the following:  temperature >100.4     Notify your health care provider if you experience any of the following:  severe uncontrolled pain     Notify your health care provider if you experience any of the following:  redness, tenderness, or signs of infection (pain, swelling, redness, odor or green/yellow discharge around incision site)     Activity as tolerated        TIME SPENT ON DISCHARGE: 30 minutes

## 2024-07-09 NOTE — OP NOTE
PROCEDURE DATE: 7/9/2024    PROCEDURE: Right L4-5 and L5-s1 transforaminal epidural steroid injection under fluoroscopy    DIAGNOSIS: Lumbar radiculitis    Post op diagnosis: Same    PHYSICIAN: Nathen Lyons MD    MEDICATIONS INJECTED:  Dexamethasone 5mg (0.5ml) and 1.5ml 0.25% bupivicaine at each nerve root.     LOCAL ANESTHETIC INJECTED:  Lidocaine 1%. 2 ml per site.    SEDATION MEDICATIONS: RN IV sedation    ESTIMATED BLOOD LOSS:  None    COMPLICATIONS:  None    TECHNIQUE:   A time-out was taken to identify patient and procedure side prior to starting the procedure. The patient was placed in a prone position, prepped and draped in the usual sterile fashion using ChloraPrep and sterile towels.  The area to be injected was determined under fluoroscopic guidance in AP and oblique view.  Local anesthetic was given by raising a wheal and going down to the hub of a 25-gauge 1.5 inch needle.  In oblique view, a 3.5 inch 22-gauge bent-tip spinal needle was introduced towards 6 oclock position of the pedicle of each above named nerve root level.  The needle was walked medially then hinged into the neural foramen and position was confirmed in AP and lateral views.  1ml contrast dye was injected to confirm appropriate placement and that there was no vascular uptake.  After negative aspiration for blood or CSF, the medication was then injected. This was performed at the right L4-5 and  L5-S1 level(s). The patient tolerated the procedure well.    The patient was monitored after the procedure.  Patient was given post procedure and discharge instructions to follow at home. The patient was discharged in a stable condition.

## 2024-07-10 VITALS
HEIGHT: 68 IN | TEMPERATURE: 98 F | OXYGEN SATURATION: 98 % | BODY MASS INDEX: 26.97 KG/M2 | DIASTOLIC BLOOD PRESSURE: 63 MMHG | SYSTOLIC BLOOD PRESSURE: 128 MMHG | HEART RATE: 67 BPM | RESPIRATION RATE: 17 BRPM | WEIGHT: 177.94 LBS

## 2024-07-11 ENCOUNTER — OFFICE VISIT (OUTPATIENT)
Dept: FAMILY MEDICINE | Facility: CLINIC | Age: 79
End: 2024-07-11
Payer: MEDICARE

## 2024-07-11 VITALS
BODY MASS INDEX: 26.66 KG/M2 | DIASTOLIC BLOOD PRESSURE: 60 MMHG | HEIGHT: 68 IN | OXYGEN SATURATION: 96 % | HEART RATE: 65 BPM | WEIGHT: 175.94 LBS | TEMPERATURE: 98 F | SYSTOLIC BLOOD PRESSURE: 126 MMHG

## 2024-07-11 DIAGNOSIS — J44.9 CHRONIC OBSTRUCTIVE PULMONARY DISEASE, UNSPECIFIED COPD TYPE: ICD-10-CM

## 2024-07-11 DIAGNOSIS — N18.32 STAGE 3B CHRONIC KIDNEY DISEASE: ICD-10-CM

## 2024-07-11 DIAGNOSIS — E03.9 ACQUIRED HYPOTHYROIDISM: ICD-10-CM

## 2024-07-11 DIAGNOSIS — Z85.3 HISTORY OF BREAST CANCER: ICD-10-CM

## 2024-07-11 DIAGNOSIS — Z17.0 MALIGNANT NEOPLASM OF NIPPLE OF LEFT BREAST IN FEMALE, ESTROGEN RECEPTOR POSITIVE: ICD-10-CM

## 2024-07-11 DIAGNOSIS — M05.79 RHEUMATOID ARTHRITIS INVOLVING MULTIPLE SITES WITH POSITIVE RHEUMATOID FACTOR: ICD-10-CM

## 2024-07-11 DIAGNOSIS — E78.00 PURE HYPERCHOLESTEROLEMIA: ICD-10-CM

## 2024-07-11 DIAGNOSIS — C50.012 MALIGNANT NEOPLASM OF NIPPLE OF LEFT BREAST IN FEMALE, ESTROGEN RECEPTOR POSITIVE: ICD-10-CM

## 2024-07-11 DIAGNOSIS — F51.01 PRIMARY INSOMNIA: ICD-10-CM

## 2024-07-11 DIAGNOSIS — C64.1 RENAL CELL CARCINOMA OF RIGHT KIDNEY: ICD-10-CM

## 2024-07-11 DIAGNOSIS — I10 ESSENTIAL HYPERTENSION, BENIGN: Primary | ICD-10-CM

## 2024-07-11 PROCEDURE — G0009 ADMIN PNEUMOCOCCAL VACCINE: HCPCS | Mod: PBBFAC,PN

## 2024-07-11 PROCEDURE — 99213 OFFICE O/P EST LOW 20 MIN: CPT | Mod: PBBFAC,PN | Performed by: FAMILY MEDICINE

## 2024-07-11 PROCEDURE — 90677 PCV20 VACCINE IM: CPT | Mod: PBBFAC,PN

## 2024-07-11 PROCEDURE — 99214 OFFICE O/P EST MOD 30 MIN: CPT | Mod: S$PBB,,, | Performed by: FAMILY MEDICINE

## 2024-07-11 PROCEDURE — 99999PBSHW PR PBB SHADOW TECHNICAL ONLY FILED TO HB: Mod: PBBFAC,,,

## 2024-07-11 PROCEDURE — 99999 PR PBB SHADOW E&M-EST. PATIENT-LVL III: CPT | Mod: PBBFAC,,, | Performed by: FAMILY MEDICINE

## 2024-07-11 RX ORDER — ZOLPIDEM TARTRATE 5 MG/1
5 TABLET ORAL NIGHTLY
Qty: 30 TABLET | Refills: 3 | Status: SHIPPED | OUTPATIENT
Start: 2024-07-11

## 2024-07-11 RX ADMIN — PNEUMOCOCCAL 20-VALENT CONJUGATE VACCINE 0.5 ML
2.2; 2.2; 2.2; 2.2; 2.2; 2.2; 2.2; 2.2; 2.2; 2.2; 2.2; 2.2; 2.2; 2.2; 2.2; 2.2; 4.4; 2.2; 2.2; 2.2 INJECTION, SUSPENSION INTRAMUSCULAR at 10:07

## 2024-07-12 NOTE — PROGRESS NOTES
Araceli Aguilar RN  Outcome: Progressing      Kirstie Bowden  [id number]  1945 5/27/2019  No referring provider defined for this encounter.    DIAGNOSIS: Invasive lobular carcinoma  TREATMENT SITE(S): Left breast    INTENT: CURATIVE    TREATMENT SETTING: ADJUVANT     MODALITY: PHOTON    TECHNIQUE:  3D CONFORMAL RADIOTHERAPY (3DCRT)    IMRT MEDICAL NECESSITY:IMRT MEDICAL NECESSITY: N/A     HPI: 74 old patient with a 2.5 cm invasive lobular carcinoma stage [id number] ER positive, with rheumatoid arthritis, status post lumpectomy    I have personally performed treatment planning for the patient, reviewing relevant history/physical and imaging. I have defined GTV, CTV, PTV and organs at risk.  Patient completed 3 cycles of a planned 4 cycles of chemotherapy.  Close margin noted on the in situ component.    In order to accomplish this plan, I am ordering:  SIMULATION: CT SIMULATION FOR PLACEMENT OF TREATMENT FIELDS    CONTRAST none    TO ACCOMPLISH REPRODUCIBLE POSITION: VACLOC and BREAST BOARD    DEVICES FOR BEAM SHAPING: CUSTOMIZED MLC    CUSTOMIZED BOLUS: none    IMAGING: DAILY KV/KV OBI    I have ordered a weekly physics check.  SPECIAL PHYSICS CONSULT: NO  REASON: N/A    SPECIAL TREATMENT CIRCUMSTANCE: NO   Concurrent or recent administration of chemotherapeutic agents which are  known potent radiosensitizers and thus will require vigilant monitoring for  exaggerated radiation toxicities.    LABS: NONE    ANTICIPATED PRESCRIPTION: 50.4 gray to the left breast using breath hold, 10 gray boost to the lumpectomy site    TREATMENT: DAILY    PHYSICIAN: Colby Collins MD

## 2024-07-13 PROBLEM — N18.32 STAGE 3B CHRONIC KIDNEY DISEASE: Status: ACTIVE | Noted: 2024-07-13

## 2024-07-13 NOTE — PROGRESS NOTES
Subjective     Patient ID: Kirstie Bowden is a 79 y.o. female.    Chief Complaint: Hypertension, Hyperlipidemia, Hypothyroidism, Asthma, Rheumatoid Arthritis, COPD,  history of breast cancer, and  history of kidney cancer     Patient presents here for annual follow-up of hypertension, hyperlipidemia, hypothyroidism, history of asthma, rheumatoid arthritis, COPD, history of breast cancer, and history of renal cell carcinoma.  Her weight has increased 4 lb over the last year and her BMI today is 26.75.  Her hypertension is well controlled with her present medication and she is tolerating her medication well.  Blood pressure today is 126/60.  Her hyperlipidemia has also been well controlled with her present use of Lipitor 20 mg daily.  Her hypothyroidism is stable on her present dose of levothyroxine 125 mcg daily and she is due for a TSH at this time.  She does have COPD but this is stable with her present use of her Diskus inhaler as well as albuterol on an as-needed basis.  She is being followed by Rheumatology for her rheumatoid arthritis and presently takes 17.5 mg of methotrexate weekly as well as Plaquenil.  She is tolerating both medications well and her rheumatoid arthritis symptoms are under fairly good control.  She does have chronic kidney disease stage IIIB since she did have her kidney removed for kidney cancer.  This was removed in 2023 and her surveillance has shown no evidence of recurrence since that time.  She also does have a history of breast cancer in the past and she is followed by Oncology on a regular basis.  She is due for blood work next time she sees her oncologist, Dr. Li, and her lipid profile and TSH will be added to whatever blood work Dr. Li orders.  As far as health maintenance, she is up-to-date with all of her recommended screening exams and immunizations with the exception of tetanus vaccine, RSV vaccine, 5th COVID booster, lipid profile, and low-dose CT of the  lung.      Review of Systems   Constitutional:  Negative for chills, fatigue, fever and unexpected weight change.   HENT:  Negative for nasal congestion, ear pain, postnasal drip and sore throat.    Respiratory:  Positive for shortness of breath (with moderate exertion) and wheezing. Negative for cough.    Cardiovascular:  Negative for chest pain and palpitations.   Gastrointestinal:  Negative for abdominal pain, diarrhea, nausea and vomiting.   Genitourinary:  Genital sores: Occasional.   Musculoskeletal:  Positive for arthralgias. Negative for back pain.   Neurological:  Negative for dizziness, light-headedness and headaches.   Psychiatric/Behavioral:  Positive for sleep disturbance. The patient is not nervous/anxious.           Objective     Physical Exam  Vitals reviewed.   Constitutional:       General: She is not in acute distress.     Appearance: Normal appearance. She is well-developed.   HENT:      Right Ear: Tympanic membrane and external ear normal.      Left Ear: Tympanic membrane and external ear normal.      Mouth/Throat:      Pharynx: Oropharynx is clear. No posterior oropharyngeal erythema.   Neck:      Thyroid: No thyromegaly.      Vascular: No carotid bruit.   Cardiovascular:      Rate and Rhythm: Normal rate and regular rhythm.      Pulses: Normal pulses.      Heart sounds: Normal heart sounds. No murmur heard.  Pulmonary:      Effort: Pulmonary effort is normal.      Breath sounds: Normal breath sounds. No wheezing or rales.   Musculoskeletal:         General: No tenderness. Normal range of motion.      Cervical back: Normal range of motion and neck supple.      Right lower leg: No edema.      Left lower leg: No edema.   Lymphadenopathy:      Cervical: No cervical adenopathy.   Neurological:      General: No focal deficit present.      Mental Status: She is alert and oriented to person, place, and time.      Cranial Nerves: No cranial nerve deficit.      Deep Tendon Reflexes: Reflexes are normal  and symmetric.            Assessment and Plan     1. Essential hypertension, benign    2. Pure hypercholesterolemia  -     Lipid Panel; Future; Expected date: 07/11/2024    3. Acquired hypothyroidism  -     TSH; Future; Expected date: 07/11/2024    4. Primary insomnia  -     zolpidem (AMBIEN) 5 MG Tab; Take 1 tablet (5 mg total) by mouth every evening.  Dispense: 30 tablet; Refill: 3    5. Rheumatoid arthritis involving multiple sites with positive rheumatoid factor    6. Malignant neoplasm of nipple of left breast in female, estrogen receptor positive    7. History of breast cancer    8. Renal cell carcinoma of right kidney    9. Chronic obstructive pulmonary disease, unspecified COPD type    10. Stage 3b chronic kidney disease    Other orders  -     ([facility]) PCV20 (Prevnar 20) IM vaccine (>/= 6 wks)        1.  Continue present medication as her hypertension, hyperlipidemia, hypothyroidism, rheumatoid arthritis, COPD, chronic kidney disease, history of breast cancer, and history of kidney cancer are stable  2.  Continue low-sodium, low-fat low-cholesterol diet and exercise.  BMI today is 26.75  3.  Lipid profile and TSH with her next blood work from her oncologist  4.  Prevnar 20 today to complete her pneumococcal prophylaxis  5.  Follow-up in 1 year with new physician         Follow up in about 1 year (around 7/11/2025).

## 2024-07-18 DIAGNOSIS — M05.711 RHEUMATOID ARTHRITIS INVOLVING RIGHT SHOULDER WITH POSITIVE RHEUMATOID FACTOR: ICD-10-CM

## 2024-07-18 DIAGNOSIS — I10 ESSENTIAL HYPERTENSION, BENIGN: ICD-10-CM

## 2024-07-18 DIAGNOSIS — C50.012 MALIGNANT NEOPLASM OF NIPPLE OF LEFT BREAST IN FEMALE, ESTROGEN RECEPTOR POSITIVE: ICD-10-CM

## 2024-07-18 DIAGNOSIS — E78.5 HYPERLIPIDEMIA: ICD-10-CM

## 2024-07-18 DIAGNOSIS — J45.20 ASTHMA, CHRONIC, MILD INTERMITTENT, UNCOMPLICATED: ICD-10-CM

## 2024-07-18 DIAGNOSIS — Z17.0 MALIGNANT NEOPLASM OF NIPPLE OF LEFT BREAST IN FEMALE, ESTROGEN RECEPTOR POSITIVE: ICD-10-CM

## 2024-07-18 RX ORDER — ONDANSETRON 8 MG/1
TABLET, ORALLY DISINTEGRATING ORAL
Qty: 30 TABLET | Refills: 0 | Status: SHIPPED | OUTPATIENT
Start: 2024-07-18

## 2024-07-18 RX ORDER — AMLODIPINE BESYLATE 5 MG/1
5 TABLET ORAL
Qty: 90 TABLET | Refills: 3 | Status: SHIPPED | OUTPATIENT
Start: 2024-07-18

## 2024-07-18 RX ORDER — FLUTICASONE PROPIONATE AND SALMETEROL 250; 50 UG/1; UG/1
1 POWDER RESPIRATORY (INHALATION) 2 TIMES DAILY
Qty: 180 EACH | Refills: 3 | Status: SHIPPED | OUTPATIENT
Start: 2024-07-18

## 2024-07-18 NOTE — TELEPHONE ENCOUNTER
Refill Routing Note   Medication(s) are not appropriate for processing by [facility] for the following reason(s):        Required labs outdated    [facility] action(s):  Defer  Approve        Medication Therapy Plan: FLOS      Appointments  past 12m or future 3m with PCP    Date Provider   Last Visit   7/11/2024 Elías Luis Jr., MD   Next Visit   Visit date not found Elías Luis Jr., MD   ED visits in past 90 days: 0        Note composed:1:04 PM 07/18/2024

## 2024-07-18 NOTE — TELEPHONE ENCOUNTER
Care Due:                  Date            Visit Type   Department     Provider  --------------------------------------------------------------------------------                                EP -                              PRIMARY      [facility]  Last Visit: 07-      CARE ([facility])   PRACTICE       Elías Luis  Next Visit: None Scheduled  None         None Found                                                            Last  Test          Frequency    Reason                     Performed    Due Date  --------------------------------------------------------------------------------    Lipid Panel.  12 months..  atorvastatin.............  07-   07-    TSH.........  12 months..  levothyroxine............  07-   07-    [employer] Embedded Care Due Messages. Reference number: [id number].   7/18/2024 10:36:56 AM CDT

## 2024-07-19 RX ORDER — ATORVASTATIN CALCIUM 20 MG/1
20 TABLET, FILM COATED ORAL
Qty: 90 TABLET | Refills: 3 | Status: SHIPPED | OUTPATIENT
Start: 2024-07-19

## 2024-08-06 ENCOUNTER — OFFICE VISIT (OUTPATIENT)
Dept: SPINE | Facility: CLINIC | Age: 79
End: 2024-08-06
Payer: MEDICARE

## 2024-08-06 VITALS — BODY MASS INDEX: 26.66 KG/M2 | HEIGHT: 68 IN | WEIGHT: 175.94 LBS

## 2024-08-06 DIAGNOSIS — M54.16 LUMBAR RADICULITIS: Primary | ICD-10-CM

## 2024-08-06 PROCEDURE — 99213 OFFICE O/P EST LOW 20 MIN: CPT | Mod: PBBFAC,PN | Performed by: PHYSICAL MEDICINE & REHABILITATION

## 2024-08-06 PROCEDURE — 99213 OFFICE O/P EST LOW 20 MIN: CPT | Mod: S$PBB,,, | Performed by: PHYSICAL MEDICINE & REHABILITATION

## 2024-08-06 PROCEDURE — 99999 PR PBB SHADOW E&M-EST. PATIENT-LVL III: CPT | Mod: PBBFAC,,, | Performed by: PHYSICAL MEDICINE & REHABILITATION

## 2024-08-09 ENCOUNTER — PATIENT MESSAGE (OUTPATIENT)
Dept: ADMINISTRATIVE | Facility: OTHER | Age: 79
End: 2024-08-09
Payer: MEDICARE

## 2024-08-12 DIAGNOSIS — Z17.0 MALIGNANT NEOPLASM OF NIPPLE OF LEFT BREAST IN FEMALE, ESTROGEN RECEPTOR POSITIVE: ICD-10-CM

## 2024-08-12 DIAGNOSIS — M05.711 RHEUMATOID ARTHRITIS INVOLVING RIGHT SHOULDER WITH POSITIVE RHEUMATOID FACTOR: ICD-10-CM

## 2024-08-12 DIAGNOSIS — C50.012 MALIGNANT NEOPLASM OF NIPPLE OF LEFT BREAST IN FEMALE, ESTROGEN RECEPTOR POSITIVE: ICD-10-CM

## 2024-08-12 DIAGNOSIS — I10 ESSENTIAL HYPERTENSION, BENIGN: ICD-10-CM

## 2024-08-12 RX ORDER — ONDANSETRON 8 MG/1
TABLET, ORALLY DISINTEGRATING ORAL
Qty: 30 TABLET | Refills: 0 | Status: SHIPPED | OUTPATIENT
Start: 2024-08-12

## 2024-08-12 RX ORDER — EXEMESTANE 25 MG/1
25 TABLET ORAL
Qty: 60 TABLET | Refills: 5 | Status: SHIPPED | OUTPATIENT
Start: 2024-08-12

## 2024-08-16 ENCOUNTER — PATIENT MESSAGE (OUTPATIENT)
Dept: ADMINISTRATIVE | Facility: OTHER | Age: 79
End: 2024-08-16
Payer: MEDICARE

## 2024-08-21 DIAGNOSIS — I10 ESSENTIAL HYPERTENSION, BENIGN: ICD-10-CM

## 2024-08-21 DIAGNOSIS — C50.012 MALIGNANT NEOPLASM OF NIPPLE OF LEFT BREAST IN FEMALE, ESTROGEN RECEPTOR POSITIVE: ICD-10-CM

## 2024-08-21 DIAGNOSIS — M05.711 RHEUMATOID ARTHRITIS INVOLVING RIGHT SHOULDER WITH POSITIVE RHEUMATOID FACTOR: ICD-10-CM

## 2024-08-21 DIAGNOSIS — J45.20 MILD INTERMITTENT ASTHMA, UNSPECIFIED WHETHER COMPLICATED: ICD-10-CM

## 2024-08-21 DIAGNOSIS — Z17.0 MALIGNANT NEOPLASM OF NIPPLE OF LEFT BREAST IN FEMALE, ESTROGEN RECEPTOR POSITIVE: ICD-10-CM

## 2024-08-21 RX ORDER — ONDANSETRON 8 MG/1
TABLET, ORALLY DISINTEGRATING ORAL
Qty: 30 TABLET | Refills: 0 | Status: SHIPPED | OUTPATIENT
Start: 2024-08-21

## 2024-08-21 RX ORDER — ALBUTEROL SULFATE 90 UG/1
2 INHALANT RESPIRATORY (INHALATION) EVERY 6 HOURS PRN
Qty: 54 G | Refills: 3 | Status: SHIPPED | OUTPATIENT
Start: 2024-08-21

## 2024-08-21 NOTE — TELEPHONE ENCOUNTER
No care due was identified.  [facility] Embedded Care Due Messages. Reference number: [id number].   8/21/2024 10:33:56 AM CDT

## 2024-08-21 NOTE — TELEPHONE ENCOUNTER
Refill Decision Note   Kirstie Bowden  is requesting a refill authorization.  Brief Assessment and Rationale for Refill:  Approve     Medication Therapy Plan:         Comments:     Note composed:3:57 PM 08/21/2024

## 2024-09-12 DIAGNOSIS — I10 ESSENTIAL HYPERTENSION, BENIGN: ICD-10-CM

## 2024-09-12 DIAGNOSIS — C50.012 MALIGNANT NEOPLASM OF NIPPLE OF LEFT BREAST IN FEMALE, ESTROGEN RECEPTOR POSITIVE: ICD-10-CM

## 2024-09-12 DIAGNOSIS — M05.711 RHEUMATOID ARTHRITIS INVOLVING RIGHT SHOULDER WITH POSITIVE RHEUMATOID FACTOR: ICD-10-CM

## 2024-09-12 DIAGNOSIS — Z17.0 MALIGNANT NEOPLASM OF NIPPLE OF LEFT BREAST IN FEMALE, ESTROGEN RECEPTOR POSITIVE: ICD-10-CM

## 2024-09-12 RX ORDER — ONDANSETRON 8 MG/1
TABLET, ORALLY DISINTEGRATING ORAL
Qty: 30 TABLET | Refills: 0 | Status: SHIPPED | OUTPATIENT
Start: 2024-09-12

## 2024-09-18 RX ORDER — GABAPENTIN 300 MG/1
CAPSULE ORAL
Qty: 30 CAPSULE | Refills: 2 | Status: SHIPPED | OUTPATIENT
Start: 2024-09-18

## 2024-09-25 ENCOUNTER — LAB VISIT (OUTPATIENT)
Dept: LAB | Facility: HOSPITAL | Age: 79
End: 2024-09-25
Attending: STUDENT IN AN ORGANIZED HEALTH CARE EDUCATION/TRAINING PROGRAM
Payer: MEDICARE

## 2024-09-25 DIAGNOSIS — Z79.899 HIGH RISK MEDICATIONS (NOT ANTICOAGULANTS) LONG-TERM USE: ICD-10-CM

## 2024-09-25 DIAGNOSIS — M05.79 RHEUMATOID ARTHRITIS INVOLVING MULTIPLE SITES WITH POSITIVE RHEUMATOID FACTOR: ICD-10-CM

## 2024-09-25 DIAGNOSIS — Z79.899 DRUG-INDUCED IMMUNODEFICIENCY: ICD-10-CM

## 2024-09-25 DIAGNOSIS — E78.00 PURE HYPERCHOLESTEROLEMIA: ICD-10-CM

## 2024-09-25 DIAGNOSIS — D84.821 DRUG-INDUCED IMMUNODEFICIENCY: ICD-10-CM

## 2024-09-25 DIAGNOSIS — E03.9 ACQUIRED HYPOTHYROIDISM: ICD-10-CM

## 2024-09-25 LAB
ALT SERPL W/O P-5'-P-CCNC: 15 U/L (ref 10–44)
AST SERPL-CCNC: 17 U/L (ref 10–40)
BASOPHILS # BLD AUTO: 0.03 K/UL (ref 0–0.2)
BASOPHILS NFR BLD: 0.4 % (ref 0–1.9)
CHOLEST SERPL-MCNC: 125 MG/DL (ref 120–199)
CHOLEST/HDLC SERPL: 3.3 {RATIO} (ref 2–5)
CREAT SERPL-MCNC: 1.6 MG/DL (ref 0.5–1.4)
CRP SERPL-MCNC: 4.1 MG/DL
DIFFERENTIAL METHOD BLD: ABNORMAL
EOSINOPHIL # BLD AUTO: 0.2 K/UL (ref 0–0.5)
EOSINOPHIL NFR BLD: 2.4 % (ref 0–8)
ERYTHROCYTE [DISTWIDTH] IN BLOOD BY AUTOMATED COUNT: 14.8 % (ref 11.5–14.5)
ERYTHROCYTE [SEDIMENTATION RATE] IN BLOOD BY WESTERGREN METHOD: 26 MM/HR (ref 0–20)
EST. GFR  (NO RACE VARIABLE): 32.8 ML/MIN/1.73 M^2
HCT VFR BLD AUTO: 37.5 % (ref 37–48.5)
HDLC SERPL-MCNC: 38 MG/DL (ref 40–75)
HDLC SERPL: 30.4 % (ref 20–50)
HGB BLD-MCNC: 11.7 G/DL (ref 12–16)
IMM GRANULOCYTES # BLD AUTO: 0.03 K/UL (ref 0–0.04)
IMM GRANULOCYTES NFR BLD AUTO: 0.4 % (ref 0–0.5)
LDLC SERPL CALC-MCNC: 59.4 MG/DL (ref 63–159)
LYMPHOCYTES # BLD AUTO: 2 K/UL (ref 1–4.8)
LYMPHOCYTES NFR BLD: 22.9 % (ref 18–48)
MCH RBC QN AUTO: 30.7 PG (ref 27–31)
MCHC RBC AUTO-ENTMCNC: 31.2 G/DL (ref 32–36)
MCV RBC AUTO: 98 FL (ref 82–98)
MONOCYTES # BLD AUTO: 0.8 K/UL (ref 0.3–1)
MONOCYTES NFR BLD: 8.8 % (ref 4–15)
NEUTROPHILS # BLD AUTO: 5.5 K/UL (ref 1.8–7.7)
NEUTROPHILS NFR BLD: 65.1 % (ref 38–73)
NONHDLC SERPL-MCNC: 87 MG/DL
NRBC BLD-RTO: 0 /100 WBC
PLATELET # BLD AUTO: 241 K/UL (ref 150–450)
PMV BLD AUTO: 11 FL (ref 9.2–12.9)
RBC # BLD AUTO: 3.81 M/UL (ref 4–5.4)
T4 FREE SERPL-MCNC: 1.25 NG/DL (ref 0.71–1.51)
TRIGL SERPL-MCNC: 138 MG/DL (ref 30–150)
TSH SERPL DL<=0.005 MIU/L-ACNC: 0.09 UIU/ML (ref 0.34–5.6)
WBC # BLD AUTO: 8.5 K/UL (ref 3.9–12.7)

## 2024-09-25 PROCEDURE — 85651 RBC SED RATE NONAUTOMATED: CPT | Performed by: STUDENT IN AN ORGANIZED HEALTH CARE EDUCATION/TRAINING PROGRAM

## 2024-09-25 PROCEDURE — 82565 ASSAY OF CREATININE: CPT | Performed by: STUDENT IN AN ORGANIZED HEALTH CARE EDUCATION/TRAINING PROGRAM

## 2024-09-25 PROCEDURE — 84439 ASSAY OF FREE THYROXINE: CPT | Performed by: FAMILY MEDICINE

## 2024-09-25 PROCEDURE — 85025 COMPLETE CBC W/AUTO DIFF WBC: CPT | Performed by: STUDENT IN AN ORGANIZED HEALTH CARE EDUCATION/TRAINING PROGRAM

## 2024-09-25 PROCEDURE — 84443 ASSAY THYROID STIM HORMONE: CPT | Performed by: FAMILY MEDICINE

## 2024-09-25 PROCEDURE — 84460 ALANINE AMINO (ALT) (SGPT): CPT | Performed by: STUDENT IN AN ORGANIZED HEALTH CARE EDUCATION/TRAINING PROGRAM

## 2024-09-25 PROCEDURE — 84450 TRANSFERASE (AST) (SGOT): CPT | Performed by: STUDENT IN AN ORGANIZED HEALTH CARE EDUCATION/TRAINING PROGRAM

## 2024-09-25 PROCEDURE — 86140 C-REACTIVE PROTEIN: CPT | Performed by: STUDENT IN AN ORGANIZED HEALTH CARE EDUCATION/TRAINING PROGRAM

## 2024-09-25 PROCEDURE — 80061 LIPID PANEL: CPT | Performed by: FAMILY MEDICINE

## 2024-09-25 PROCEDURE — 36415 COLL VENOUS BLD VENIPUNCTURE: CPT | Performed by: STUDENT IN AN ORGANIZED HEALTH CARE EDUCATION/TRAINING PROGRAM

## 2024-09-26 ENCOUNTER — TELEPHONE (OUTPATIENT)
Dept: FAMILY MEDICINE | Facility: CLINIC | Age: 79
End: 2024-09-26
Payer: MEDICARE

## 2024-09-26 DIAGNOSIS — E03.9 ACQUIRED HYPOTHYROIDISM: Primary | ICD-10-CM

## 2024-09-26 RX ORDER — LEVOTHYROXINE SODIUM 112 UG/1
112 TABLET ORAL
COMMUNITY

## 2024-09-26 NOTE — TELEPHONE ENCOUNTER
Spoke with patient  She will start Levothyroxine 112 mcg    TSH has been scheduled to repeat on 11/20/2024

## 2024-09-26 NOTE — TELEPHONE ENCOUNTER
----- Message from Natasha Ruby sent at 9/26/2024 10:39 AM CDT -----  Type:  Patient Returning Call    Who Called:pt  Who Left Message for Patient:Larry  Does the patient know what this is regarding?:unknown   Would the patient rather a call back or a response via [facility]? call  Best Call Back Number:[phone]  Additional Information:

## 2024-09-26 NOTE — TELEPHONE ENCOUNTER
Spoke with patient   She is taking Levothyroxine 125 mcg daily     She does have Levothyroxine 112 mcg already   Please advise

## 2024-10-07 ENCOUNTER — OFFICE VISIT (OUTPATIENT)
Dept: RHEUMATOLOGY | Facility: CLINIC | Age: 79
End: 2024-10-07
Payer: MEDICARE

## 2024-10-07 VITALS
DIASTOLIC BLOOD PRESSURE: 75 MMHG | WEIGHT: 173.31 LBS | BODY MASS INDEX: 26.35 KG/M2 | HEART RATE: 93 BPM | SYSTOLIC BLOOD PRESSURE: 126 MMHG

## 2024-10-07 DIAGNOSIS — Z79.899 DRUG-INDUCED IMMUNODEFICIENCY: ICD-10-CM

## 2024-10-07 DIAGNOSIS — D84.821 DRUG-INDUCED IMMUNODEFICIENCY: ICD-10-CM

## 2024-10-07 DIAGNOSIS — M05.79 RHEUMATOID ARTHRITIS INVOLVING MULTIPLE SITES WITH POSITIVE RHEUMATOID FACTOR: ICD-10-CM

## 2024-10-07 DIAGNOSIS — Z79.899 HIGH RISK MEDICATIONS (NOT ANTICOAGULANTS) LONG-TERM USE: ICD-10-CM

## 2024-10-07 PROCEDURE — 99999PBSHW PR PBB SHADOW TECHNICAL ONLY FILED TO HB: Mod: PBBFAC,,,

## 2024-10-07 PROCEDURE — 20610 DRAIN/INJ JOINT/BURSA W/O US: CPT | Mod: 50,PBBFAC,PO | Performed by: STUDENT IN AN ORGANIZED HEALTH CARE EDUCATION/TRAINING PROGRAM

## 2024-10-07 PROCEDURE — 99213 OFFICE O/P EST LOW 20 MIN: CPT | Mod: PBBFAC,PO | Performed by: STUDENT IN AN ORGANIZED HEALTH CARE EDUCATION/TRAINING PROGRAM

## 2024-10-07 PROCEDURE — 99999 PR PBB SHADOW E&M-EST. PATIENT-LVL III: CPT | Mod: PBBFAC,,, | Performed by: STUDENT IN AN ORGANIZED HEALTH CARE EDUCATION/TRAINING PROGRAM

## 2024-10-07 PROCEDURE — 99215 OFFICE O/P EST HI 40 MIN: CPT | Mod: 25,S$PBB,, | Performed by: STUDENT IN AN ORGANIZED HEALTH CARE EDUCATION/TRAINING PROGRAM

## 2024-10-07 RX ORDER — TRIAMCINOLONE ACETONIDE 40 MG/ML
40 INJECTION, SUSPENSION INTRA-ARTICULAR; INTRAMUSCULAR
Status: DISCONTINUED | OUTPATIENT
Start: 2024-10-07 | End: 2024-10-07 | Stop reason: HOSPADM

## 2024-10-07 RX ORDER — LEVOTHYROXINE SODIUM 125 UG/1
125 TABLET ORAL EVERY MORNING
COMMUNITY
Start: 2024-09-26

## 2024-10-07 RX ORDER — METHOTREXATE 2.5 MG/1
22.5 TABLET ORAL
Qty: 108 TABLET | Refills: 3 | Status: SHIPPED | OUTPATIENT
Start: 2024-10-07 | End: 2025-10-07

## 2024-10-07 RX ORDER — LIDOCAINE HYDROCHLORIDE 10 MG/ML
4 INJECTION, SOLUTION INFILTRATION; PERINEURAL
Status: DISCONTINUED | OUTPATIENT
Start: 2024-10-07 | End: 2024-10-07 | Stop reason: HOSPADM

## 2024-10-07 RX ORDER — GABAPENTIN 300 MG/1
300 CAPSULE ORAL 2 TIMES DAILY
Qty: 60 CAPSULE | Refills: 2 | Status: SHIPPED | OUTPATIENT
Start: 2024-10-07

## 2024-10-07 RX ADMIN — TRIAMCINOLONE ACETONIDE 40 MG: 40 INJECTION, SUSPENSION INTRA-ARTICULAR; INTRAMUSCULAR at 03:10

## 2024-10-07 RX ADMIN — LIDOCAINE HYDROCHLORIDE 4 ML: 10 INJECTION, SOLUTION INFILTRATION; PERINEURAL at 03:10

## 2024-10-07 ASSESSMENT — ROUTINE ASSESSMENT OF PATIENT INDEX DATA (RAPID3)
PSYCHOLOGICAL DISTRESS SCORE: 0
MDHAQ FUNCTION SCORE: 0.9
PATIENT GLOBAL ASSESSMENT SCORE: 3.5
TOTAL RAPID3 SCORE: 5.5
FATIGUE SCORE: 1.1
PAIN SCORE: 10

## 2024-10-07 NOTE — PROGRESS NOTES
Subjective:      Patient ID: Kirstie Bowden is a 79 y.o. female.    Chief Complaint: Disease Management    HPI    Rheumatologic History:   - Diagnosis/es:              - osteoarthritis  - seropositive rheumatoid arthritis diagnosed in 2015  - Positive serologies: + RF (170), +CCP (87.9)  - Negative serologies: -  - Infectious screening labs:  Negative hepatitis-B, C, and QuantiFERON (6/2024)  - Imaging:              - x-ray arthritis survey (02/2023): Chondrocalcinosis in the knees and degenerative changes              - DEXA (9/2021): normal BMD  - Previous Treatments:              - Enbrel: Discontinued due to breast cancer  - Current Treatments:               - MTX 22.5 mg weekly plus folic acid daily (dose increased 10/7/24)   -  mg daily  - tramadol daily p.r.n. for pain (she takes this sparingly)  - Plaquenil eye exam: No HCQ toxicity 2023  Interval History:   She reports more pain in both shoulders and requests bilateral shoulder CSI.     Objective:   /75   Pulse 93   Wt 78.6 kg (173 lb 4.5 oz)   BMI 26.35 kg/m²   Physical Exam   Constitutional: normal appearance.   HENT:   Head: Normocephalic and atraumatic.   Cardiovascular: Normal rate, regular rhythm and normal heart sounds.   Pulmonary/Chest: Effort normal and breath sounds normal.   Musculoskeletal:      Comments: Tenderness to palpation of both shoulders     Neurological: She is alert.   Skin: Skin is warm and dry. No rash noted.   No skin thickening, telangiectasias, calcinosis, psoriasiform lesions, lupoid lesions          6/21/2023 6/25/2024   Tender (EMERSON-28) 0 / 28  0 / 28    Swollen (EMERSON-28) 0 / 28  0 / 28    Provider Global 10 / 100 10 / 100   Patient Global 10 / 100 10 / 100   ESR -- 18 mm/hr   CRP 19.2 mg/L 8 mg/L   EMERSON-28 (ESR) -- 2.16 (Remission)   EMERSON-28 (CRP) 2.18 (Remission) 1.89 (Remission)   CDAI Score 2  2      Labs (9/25/24)   CBC HGB 11.7, WBC, PLT WNL   CR, AST, ALT WNL   ESR 26 <- 18  CRP 4.10 <- 0.80      Assessment:     1. Rheumatoid arthritis involving multiple sites with positive rheumatoid factor    2. High risk medications (not anticoagulants) long-term use    3. 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 She reports more pain in both shoulders and requests bilateral shoulder CSI. Increase MTX to 22.5mg weekly. Bilateral shoulder CSI completed today.     Plan:     Problem List Items Addressed This Visit          Immunology/Multi System    Rheumatoid arthritis involving multiple sites with positive rheumatoid factor    Relevant Medications    methotrexate 2.5 MG Tab    Other Relevant Orders    C-Reactive Protein    CBC Auto Differential    Creatinine, Serum    ALT (SGPT)    AST (SGOT)    Sedimentation rate    Drug-induced immunodeficiency    Relevant Medications    methotrexate 2.5 MG Tab    Other Relevant Orders    C-Reactive Protein    CBC Auto Differential    Creatinine, Serum    ALT (SGPT)    AST (SGOT)    Sedimentation rate       Palliative Care    High risk medications (not anticoagulants) long-term use    Relevant Medications    methotrexate 2.5 MG Tab    Other Relevant Orders    C-Reactive Protein    CBC Auto Differential    Creatinine, Serum    ALT (SGPT)    AST (SGOT)    Sedimentation rate     1.) RA  - MTX 22.5 mg weekly and continue folic acid daily   - HCQ 400mg daily  - Avoid Yuriy and TNFi due to strong personal history of cancer  - CBC, CMP, ESR, CRP in 4 weeks then every 12 weeks  - Pre-DMARD labs yearly  - Immunizations: COVID x 3, flu (10/2022), Shingrix x 2, patient  will check if pneumonia vaccine is up to date   - Plaquenil eye exam yearly  - DEXA due for repeat      2.) Bilateral carpal tunnel syndrome: patient would like to hold off on hand surgery referral as she just had surgery  - Wrist braces for now    Follow up in 3 months    30 minutes of total time spent on the encounter, which includes face to face time and non-face to face time preparing to see the patient (eg, review of tests), Obtaining and/or reviewing separately obtained history, Documenting clinical information in the electronic or other health record, Independently interpreting results (not separately reported) and communicating results to the patient/family/caregiver, or Care coordination (not separately reported).     This note was prepared with [employer] Direct voice recognition transcription software. Garbled syntax, mangled pronouns, and other bizarre constructions may be attributed to that software system       Khushboo Aguirre M.D.  Rheumatology Dept  [place], [place]

## 2024-10-07 NOTE — PROCEDURES
Large Joint Aspiration/Injection: bilateral glenohumeral    Date/Time: 10/7/2024 3:30 PM    Performed by: Khushboo Aguirre MD  Authorized by: Khushboo Aguirre MD    Consent Done?:  Yes (Verbal)  Indications:  Arthritis and pain  Site marked: the procedure site was marked    Timeout: prior to procedure the correct patient, procedure, and site was verified    Prep: patient was prepped and draped in usual sterile fashion      Details:  Needle Size:  25 G  Ultrasonic Guidance for needle placement?: No    Approach:  Posterior  Location:  Shoulder  Laterality:  Bilateral  Site:  Bilateral glenohumeral  Medications (Right):  4 mL LIDOcaine HCL 10 mg/ml (1%) 10 mg/mL (1 %); 40 mg triamcinolone acetonide 40 mg/mL  Medications (Left):  4 mL LIDOcaine HCL 10 mg/ml (1%) 10 mg/mL (1 %); 40 mg triamcinolone acetonide 40 mg/mL  Patient tolerance:  Patient tolerated the procedure well with no immediate complications

## 2024-10-10 ENCOUNTER — TELEPHONE (OUTPATIENT)
Dept: FAMILY MEDICINE | Facility: CLINIC | Age: 79
End: 2024-10-10
Payer: MEDICARE

## 2024-10-10 NOTE — TELEPHONE ENCOUNTER
Patient was returning my call from 9/26/24.  Advised patient that she was given her lab results already.

## 2024-10-10 NOTE — TELEPHONE ENCOUNTER
----- Message from Joana sent at 10/10/2024  2:39 PM CDT -----  Regarding: Returning phone call  Contact: pt  Type:  Patient Returning Call    Who Called:pt    Who Left Message for Patient:edna    Does the patient know what this is regarding?:   ?    Would the patient rather a call back or a response via [employer]? Call back    Best Call Back Number:[phone]    Additional Information:  returning a call

## 2024-10-20 DIAGNOSIS — C50.012 MALIGNANT NEOPLASM OF NIPPLE OF LEFT BREAST IN FEMALE, ESTROGEN RECEPTOR POSITIVE: ICD-10-CM

## 2024-10-20 DIAGNOSIS — M05.711 RHEUMATOID ARTHRITIS INVOLVING RIGHT SHOULDER WITH POSITIVE RHEUMATOID FACTOR: ICD-10-CM

## 2024-10-20 DIAGNOSIS — Z17.0 MALIGNANT NEOPLASM OF NIPPLE OF LEFT BREAST IN FEMALE, ESTROGEN RECEPTOR POSITIVE: ICD-10-CM

## 2024-10-20 DIAGNOSIS — I10 ESSENTIAL HYPERTENSION, BENIGN: ICD-10-CM

## 2024-10-21 RX ORDER — ONDANSETRON 8 MG/1
TABLET, ORALLY DISINTEGRATING ORAL
Qty: 30 TABLET | Refills: 0 | Status: SHIPPED | OUTPATIENT
Start: 2024-10-21

## 2024-10-29 ENCOUNTER — OFFICE VISIT (OUTPATIENT)
Dept: PULMONOLOGY | Facility: CLINIC | Age: 79
End: 2024-10-29
Payer: MEDICARE

## 2024-10-29 VITALS
SYSTOLIC BLOOD PRESSURE: 122 MMHG | DIASTOLIC BLOOD PRESSURE: 64 MMHG | WEIGHT: 174.38 LBS | TEMPERATURE: 98 F | BODY MASS INDEX: 26.43 KG/M2 | OXYGEN SATURATION: 96 % | HEART RATE: 73 BPM | HEIGHT: 68 IN

## 2024-10-29 DIAGNOSIS — J44.9 CHRONIC OBSTRUCTIVE PULMONARY DISEASE, UNSPECIFIED COPD TYPE: Primary | ICD-10-CM

## 2024-10-29 DIAGNOSIS — R09.82 POSTNASAL DRIP: ICD-10-CM

## 2024-10-29 PROCEDURE — 99213 OFFICE O/P EST LOW 20 MIN: CPT | Mod: S$PBB,,, | Performed by: INTERNAL MEDICINE

## 2024-10-29 PROCEDURE — 99214 OFFICE O/P EST MOD 30 MIN: CPT | Mod: PBBFAC,PN | Performed by: INTERNAL MEDICINE

## 2024-10-29 PROCEDURE — 99999 PR PBB SHADOW E&M-EST. PATIENT-LVL IV: CPT | Mod: PBBFAC,,, | Performed by: INTERNAL MEDICINE

## 2024-11-07 ENCOUNTER — LAB VISIT (OUTPATIENT)
Dept: LAB | Facility: HOSPITAL | Age: 79
End: 2024-11-07
Attending: STUDENT IN AN ORGANIZED HEALTH CARE EDUCATION/TRAINING PROGRAM
Payer: MEDICARE

## 2024-11-07 DIAGNOSIS — Z79.899 DRUG-INDUCED IMMUNODEFICIENCY: ICD-10-CM

## 2024-11-07 DIAGNOSIS — Z79.899 HIGH RISK MEDICATIONS (NOT ANTICOAGULANTS) LONG-TERM USE: ICD-10-CM

## 2024-11-07 DIAGNOSIS — M05.79 RHEUMATOID ARTHRITIS INVOLVING MULTIPLE SITES WITH POSITIVE RHEUMATOID FACTOR: ICD-10-CM

## 2024-11-07 DIAGNOSIS — D84.821 DRUG-INDUCED IMMUNODEFICIENCY: ICD-10-CM

## 2024-11-07 LAB
ALT SERPL W/O P-5'-P-CCNC: 24 U/L (ref 10–44)
AST SERPL-CCNC: 20 U/L (ref 10–40)
BASOPHILS # BLD AUTO: 0.02 K/UL (ref 0–0.2)
BASOPHILS NFR BLD: 0.2 % (ref 0–1.9)
CREAT SERPL-MCNC: 1.4 MG/DL (ref 0.5–1.4)
CRP SERPL-MCNC: 5.9 MG/DL
DIFFERENTIAL METHOD BLD: ABNORMAL
EOSINOPHIL # BLD AUTO: 0.2 K/UL (ref 0–0.5)
EOSINOPHIL NFR BLD: 1.7 % (ref 0–8)
ERYTHROCYTE [DISTWIDTH] IN BLOOD BY AUTOMATED COUNT: 16.4 % (ref 11.5–14.5)
ERYTHROCYTE [SEDIMENTATION RATE] IN BLOOD BY WESTERGREN METHOD: 56 MM/HR (ref 0–20)
EST. GFR  (NO RACE VARIABLE): 38.5 ML/MIN/1.73 M^2
HCT VFR BLD AUTO: 38 % (ref 37–48.5)
HGB BLD-MCNC: 11.8 G/DL (ref 12–16)
IMM GRANULOCYTES # BLD AUTO: 0.04 K/UL (ref 0–0.04)
IMM GRANULOCYTES NFR BLD AUTO: 0.5 % (ref 0–0.5)
LYMPHOCYTES # BLD AUTO: 1.3 K/UL (ref 1–4.8)
LYMPHOCYTES NFR BLD: 15.1 % (ref 18–48)
MCH RBC QN AUTO: 30.6 PG (ref 27–31)
MCHC RBC AUTO-ENTMCNC: 31.1 G/DL (ref 32–36)
MCV RBC AUTO: 99 FL (ref 82–98)
MONOCYTES # BLD AUTO: 0.9 K/UL (ref 0.3–1)
MONOCYTES NFR BLD: 10.4 % (ref 4–15)
NEUTROPHILS # BLD AUTO: 6.4 K/UL (ref 1.8–7.7)
NEUTROPHILS NFR BLD: 72.1 % (ref 38–73)
NRBC BLD-RTO: 0 /100 WBC
PLATELET # BLD AUTO: 253 K/UL (ref 150–450)
PMV BLD AUTO: 10.9 FL (ref 9.2–12.9)
RBC # BLD AUTO: 3.85 M/UL (ref 4–5.4)
WBC # BLD AUTO: 8.83 K/UL (ref 3.9–12.7)

## 2024-11-07 PROCEDURE — 85025 COMPLETE CBC W/AUTO DIFF WBC: CPT | Performed by: STUDENT IN AN ORGANIZED HEALTH CARE EDUCATION/TRAINING PROGRAM

## 2024-11-07 PROCEDURE — 85651 RBC SED RATE NONAUTOMATED: CPT | Performed by: STUDENT IN AN ORGANIZED HEALTH CARE EDUCATION/TRAINING PROGRAM

## 2024-11-07 PROCEDURE — 82565 ASSAY OF CREATININE: CPT | Performed by: STUDENT IN AN ORGANIZED HEALTH CARE EDUCATION/TRAINING PROGRAM

## 2024-11-07 PROCEDURE — 36415 COLL VENOUS BLD VENIPUNCTURE: CPT | Performed by: STUDENT IN AN ORGANIZED HEALTH CARE EDUCATION/TRAINING PROGRAM

## 2024-11-07 PROCEDURE — 84460 ALANINE AMINO (ALT) (SGPT): CPT | Performed by: STUDENT IN AN ORGANIZED HEALTH CARE EDUCATION/TRAINING PROGRAM

## 2024-11-07 PROCEDURE — 86140 C-REACTIVE PROTEIN: CPT | Performed by: STUDENT IN AN ORGANIZED HEALTH CARE EDUCATION/TRAINING PROGRAM

## 2024-11-07 PROCEDURE — 84450 TRANSFERASE (AST) (SGOT): CPT | Performed by: STUDENT IN AN ORGANIZED HEALTH CARE EDUCATION/TRAINING PROGRAM

## 2024-11-08 DIAGNOSIS — M05.79 RHEUMATOID ARTHRITIS INVOLVING MULTIPLE SITES WITH POSITIVE RHEUMATOID FACTOR: ICD-10-CM

## 2024-11-08 DIAGNOSIS — Z79.899 DRUG-INDUCED IMMUNODEFICIENCY: ICD-10-CM

## 2024-11-08 DIAGNOSIS — Z79.899 HIGH RISK MEDICATIONS (NOT ANTICOAGULANTS) LONG-TERM USE: ICD-10-CM

## 2024-11-08 DIAGNOSIS — D84.821 DRUG-INDUCED IMMUNODEFICIENCY: ICD-10-CM

## 2024-11-08 RX ORDER — METHOTREXATE 2.5 MG/1
22.5 TABLET ORAL
Qty: 108 TABLET | Refills: 3 | Status: SHIPPED | OUTPATIENT
Start: 2024-11-08 | End: 2025-11-08

## 2024-11-11 ENCOUNTER — OFFICE VISIT (OUTPATIENT)
Dept: URGENT CARE | Facility: CLINIC | Age: 79
End: 2024-11-11
Payer: MEDICARE

## 2024-11-11 VITALS
HEART RATE: 94 BPM | DIASTOLIC BLOOD PRESSURE: 75 MMHG | OXYGEN SATURATION: 88 % | SYSTOLIC BLOOD PRESSURE: 123 MMHG | TEMPERATURE: 99 F | RESPIRATION RATE: 16 BRPM | WEIGHT: 174 LBS | HEIGHT: 68 IN | BODY MASS INDEX: 26.37 KG/M2

## 2024-11-11 DIAGNOSIS — J44.1 COPD EXACERBATION: Primary | ICD-10-CM

## 2024-11-11 PROCEDURE — 99214 OFFICE O/P EST MOD 30 MIN: CPT | Mod: S$GLB,,, | Performed by: STUDENT IN AN ORGANIZED HEALTH CARE EDUCATION/TRAINING PROGRAM

## 2024-11-11 RX ORDER — GUAIFENESIN 600 MG/1
1200 TABLET, EXTENDED RELEASE ORAL 2 TIMES DAILY
Qty: 30 TABLET | Refills: 0 | Status: SHIPPED | OUTPATIENT
Start: 2024-11-11

## 2024-11-11 RX ORDER — LEVOCETIRIZINE DIHYDROCHLORIDE 5 MG/1
5 TABLET, FILM COATED ORAL NIGHTLY
Qty: 30 TABLET | Refills: 0 | Status: SHIPPED | OUTPATIENT
Start: 2024-11-11 | End: 2025-11-11

## 2024-11-11 RX ORDER — DEXAMETHASONE SODIUM PHOSPHATE 4 MG/ML
8 INJECTION, SOLUTION INTRA-ARTICULAR; INTRALESIONAL; INTRAMUSCULAR; INTRAVENOUS; SOFT TISSUE
Status: COMPLETED | OUTPATIENT
Start: 2024-11-11 | End: 2024-11-11

## 2024-11-11 RX ORDER — PREDNISONE 20 MG/1
40 TABLET ORAL DAILY
Qty: 10 TABLET | Refills: 0 | Status: SHIPPED | OUTPATIENT
Start: 2024-11-11 | End: 2024-11-16

## 2024-11-11 RX ORDER — GABAPENTIN 300 MG/1
300 CAPSULE ORAL 2 TIMES DAILY
Qty: 60 CAPSULE | Refills: 2 | Status: SHIPPED | OUTPATIENT
Start: 2024-11-11

## 2024-11-11 RX ORDER — CEFDINIR 300 MG/1
300 CAPSULE ORAL 2 TIMES DAILY
Qty: 14 CAPSULE | Refills: 0 | Status: SHIPPED | OUTPATIENT
Start: 2024-11-11 | End: 2024-11-18

## 2024-11-11 RX ORDER — FLUTICASONE PROPIONATE 50 MCG
1 SPRAY, SUSPENSION (ML) NASAL DAILY
Qty: 11.1 ML | Refills: 0 | Status: SHIPPED | OUTPATIENT
Start: 2024-11-11

## 2024-11-11 RX ADMIN — DEXAMETHASONE SODIUM PHOSPHATE 8 MG: 4 INJECTION, SOLUTION INTRA-ARTICULAR; INTRALESIONAL; INTRAMUSCULAR; INTRAVENOUS; SOFT TISSUE at 12:11

## 2024-11-11 NOTE — PROGRESS NOTES
"Subjective:      Patient ID: Kirstie Bowden is a 79 y.o. female.    Vitals:  height is 5' 8" (1.727 m) and weight is 78.9 kg (174 lb). Her temperature is 98.5 °F (36.9 °C). Her blood pressure is 123/75 and her pulse is 94. Her respiration is 16 and oxygen saturation is 88% (abnormal).     Chief Complaint: Cough    Cough and cold symptoms. X2weeks.  Has been seen by her pulmonologist for the same.    Cough  Pertinent negatives include no fever. Treatments tried: robitussin d and mucinex. The treatment provided no relief.       Constitution: Negative for fever.   Respiratory:  Positive for cough.       Objective:     Physical Exam   Constitutional: She is oriented to person, place, and time. She appears well-developed. She is cooperative.  Non-toxic appearance. She does not appear ill. No distress.   HENT:   Head: Normocephalic and atraumatic.   Ears:   Right Ear: Hearing and external ear normal.   Left Ear: Hearing and external ear normal.   Nose: Congestion present. No mucosal edema, rhinorrhea or nasal deformity. No epistaxis. Right sinus exhibits no maxillary sinus tenderness and no frontal sinus tenderness. Left sinus exhibits no maxillary sinus tenderness and no frontal sinus tenderness.   Mouth/Throat: Uvula is midline, oropharynx is clear and moist and mucous membranes are normal. No trismus in the jaw. Normal dentition. No uvula swelling. No oropharyngeal exudate, posterior oropharyngeal edema or posterior oropharyngeal erythema.   Eyes: Conjunctivae and lids are normal. No scleral icterus.   Neck: Trachea normal and phonation normal. Neck supple. No edema present. No erythema present. No neck rigidity present.   Cardiovascular: Normal rate, regular rhythm, normal heart sounds and normal pulses.   Pulmonary/Chest: Effort normal and breath sounds normal. No respiratory distress. She has no decreased breath sounds. She has no wheezes. She has no rhonchi. She has no rales.         Comments: Reduced " aeration    Abdominal: Normal appearance.   Musculoskeletal: Normal range of motion.         General: No deformity. Normal range of motion.   Neurological: She is alert and oriented to person, place, and time. She exhibits normal muscle tone. Coordination normal.   Skin: Skin is warm, dry, intact, not diaphoretic and not pale.   Psychiatric: Her speech is normal and behavior is normal. Judgment and thought content normal.   Nursing note and vitals reviewed.      Assessment:     1. COPD exacerbation        Plan:       COPD exacerbation  -     XR CHEST PA AND LATERAL; Future; Expected date: 11/11/2024  -     dexAMETHasone injection 8 mg  -     cefdinir (OMNICEF) 300 MG capsule; Take 1 capsule (300 mg total) by mouth 2 (two) times daily. for 7 days  Dispense: 14 capsule; Refill: 0  -     predniSONE (DELTASONE) 20 MG tablet; Take 2 tablets (40 mg total) by mouth once daily. for 5 days  Dispense: 10 tablet; Refill: 0  -     levocetirizine (XYZAL) 5 MG tablet; Take 1 tablet (5 mg total) by mouth every evening.  Dispense: 30 tablet; Refill: 0  -     guaiFENesin (MUCINEX) 600 mg 12 hr tablet; Take 2 tablets (1,200 mg total) by mouth 2 (two) times daily.  Dispense: 30 tablet; Refill: 0  -     fluticasone propionate (FLONASE) 50 mcg/actuation nasal spray; 1 spray (50 mcg total) by Each Nostril route once daily.  Dispense: 11.1 mL; Refill: 0              Chart review reveals history of COPD, she has been under the care pulmonology.  Imaging today reveals no acute cardiopulmonary process.  Patient is in no acute respiratory distress, she is not short winded, she is not tripoding.  She is in no distress.  Antihistamines, inhaled steroid, antitussives as needed.  Tylenol and ibuprofen as needed for pain and body aches.  Instructions given for when to return to the clinic or present to the ED.  Empiric antibiotic and systemic steroid.  - To ED for any new or acutely worsening symptoms including but not limited to chest pain,  palpitations, shortness of breath, or fever greater than 103° F.  Patient in agreement with plan of care.    - The diagnosis, treatment plan, instructions for follow-up and reevaluation as well as ED precautions were discussed and understanding was verbalized. All questions or concerns have been addressed.  -Follow up with your primary care provider for continued evaluation and management.

## 2024-11-20 ENCOUNTER — LAB VISIT (OUTPATIENT)
Dept: LAB | Facility: HOSPITAL | Age: 79
End: 2024-11-20
Attending: PHYSICIAN ASSISTANT
Payer: MEDICARE

## 2024-11-20 DIAGNOSIS — E03.9 ACQUIRED HYPOTHYROIDISM: ICD-10-CM

## 2024-11-20 LAB — TSH SERPL DL<=0.005 MIU/L-ACNC: 1.07 UIU/ML (ref 0.34–5.6)

## 2024-11-20 PROCEDURE — 36415 COLL VENOUS BLD VENIPUNCTURE: CPT | Performed by: PHYSICIAN ASSISTANT

## 2024-11-20 PROCEDURE — 84443 ASSAY THYROID STIM HORMONE: CPT | Performed by: PHYSICIAN ASSISTANT

## 2024-11-23 DIAGNOSIS — M05.711 RHEUMATOID ARTHRITIS INVOLVING RIGHT SHOULDER WITH POSITIVE RHEUMATOID FACTOR: ICD-10-CM

## 2024-11-23 DIAGNOSIS — C50.012 MALIGNANT NEOPLASM OF NIPPLE OF LEFT BREAST IN FEMALE, ESTROGEN RECEPTOR POSITIVE: ICD-10-CM

## 2024-11-23 DIAGNOSIS — Z17.0 MALIGNANT NEOPLASM OF NIPPLE OF LEFT BREAST IN FEMALE, ESTROGEN RECEPTOR POSITIVE: ICD-10-CM

## 2024-11-23 DIAGNOSIS — I10 ESSENTIAL HYPERTENSION, BENIGN: ICD-10-CM

## 2024-11-25 RX ORDER — ONDANSETRON 8 MG/1
TABLET, ORALLY DISINTEGRATING ORAL
Qty: 30 TABLET | Refills: 0 | Status: SHIPPED | OUTPATIENT
Start: 2024-11-25

## 2024-11-26 ENCOUNTER — HOSPITAL ENCOUNTER (OUTPATIENT)
Dept: RADIOLOGY | Facility: HOSPITAL | Age: 79
Discharge: HOME | End: 2024-11-26
Attending: PHYSICAL MEDICINE & REHABILITATION
Payer: MEDICARE

## 2024-11-26 ENCOUNTER — PATIENT MESSAGE (OUTPATIENT)
Dept: SPINE | Facility: CLINIC | Age: 79
End: 2024-11-26

## 2024-11-26 ENCOUNTER — OFFICE VISIT (OUTPATIENT)
Dept: SPINE | Facility: CLINIC | Age: 79
End: 2024-11-26
Payer: MEDICARE

## 2024-11-26 VITALS — HEIGHT: 68 IN | WEIGHT: 173.94 LBS | BODY MASS INDEX: 26.36 KG/M2

## 2024-11-26 DIAGNOSIS — S70.01XA CONTUSION OF RIGHT HIP, INITIAL ENCOUNTER: ICD-10-CM

## 2024-11-26 DIAGNOSIS — S70.01XA HEMATOMA OF RIGHT HIP, INITIAL ENCOUNTER: ICD-10-CM

## 2024-11-26 DIAGNOSIS — S70.01XA CONTUSION OF RIGHT HIP, INITIAL ENCOUNTER: Primary | ICD-10-CM

## 2024-11-26 PROCEDURE — 73502 X-RAY EXAM HIP UNI 2-3 VIEWS: CPT | Mod: TC,RT

## 2024-11-26 PROCEDURE — 99214 OFFICE O/P EST MOD 30 MIN: CPT | Mod: PBBFAC,PN | Performed by: PHYSICAL MEDICINE & REHABILITATION

## 2024-11-26 PROCEDURE — 73502 X-RAY EXAM HIP UNI 2-3 VIEWS: CPT | Mod: 26,RT,, | Performed by: RADIOLOGY

## 2024-11-26 PROCEDURE — 99999 PR PBB SHADOW E&M-EST. PATIENT-LVL IV: CPT | Mod: PBBFAC,,, | Performed by: PHYSICAL MEDICINE & REHABILITATION

## 2024-11-26 NOTE — PROGRESS NOTES
SUBJECTIVE:    Patient ID: Kirstie Bowden is a 79 y.o. female.    Chief Complaint: Follow-up    She presents today with complaints of swelling and pain over the right hip that started after she fell on that side onto a hard floor about a week ago.  No back pain.  Pain level is 8/10.  She has been using moist heat.  She believes she has a hematoma.  She has been able to walk just fine.          Past Medical History:   Diagnosis Date    Arthritis     rheumatoid    Asthma     Breast cancer     Cancer     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    Hyperlipidemia     Hypertension     Limb alert care status     NO BP/IV LEFT ARM    Personal history of colonic polyps 2024    Pseudocholinesterase deficiency     family history    Thyroid disease      Social History     Socioeconomic History    Marital status:    Occupational History     Employer: [employer]   Tobacco Use    Smoking status: Former     Current packs/day: 0.00     Average packs/day: 0.5 packs/day for 59.3 years (29.6 ttl pk-yrs)     Types: Cigarettes     Start date: 1960     Quit date: 2019     Years since quittin.2    Smokeless tobacco: Never   Substance and Sexual Activity    Alcohol use: Yes     Alcohol/week: 2.0 standard drinks of alcohol     Types: 2 Glasses of wine per week     Comment: Social    Drug use: No    Sexual activity: Never     Social Drivers of Health     Financial Resource Strain: Low Risk  (2024)    Overall Financial Resource Strain (CARDIA)     Difficulty of Paying Living Expenses: Not very hard   Food Insecurity: No Food Insecurity (2024)    Hunger Vital Sign     Worried About Running Out of Food in the Last Year: Never true     Ran Out of Food in the Last Year: Never true   Transportation Needs: No Transportation Needs (2024)    PRAPARE - Transportation     Lack of Transportation (Medical): No     Lack of Transportation (Non-Medical): No   Physical Activity: Insufficiently Active (2024)    Exercise Vital Sign      Days of Exercise per Week: 1 day     Minutes of Exercise per Session: 10 min   Stress: No Stress Concern Present (7/8/2024)    [place] [employer] - Occupational Stress Questionnaire     Feeling of Stress : Only a little   Housing Stability: Low Risk  (1/30/2024)    Housing Stability Vital Sign     Unable to Pay for Housing in the Last Year: No     Number of Places Lived in the Last Year: 1     Unstable Housing in the Last Year: No     Past Surgical History:   Procedure Laterality Date    AXILLARY NODE DISSECTION Left 03/14/2019    Procedure: LYMPHADENECTOMY, AXILLARY;  Surgeon: Mp Gonzalez MD;  Location: [facility] OR;  Service: General;  Laterality: Left;  left lumpectomy with axillary lypmh node dissection    BALLOON DILATION OF URETER Left 06/24/2019    Procedure: DILATION, URETER, USING BALLOON;  Surgeon: Jorden Dickson MD;  Location: [facility] OR;  Service: Urology;  Laterality: Left;    BREAST BIOPSY Left 20 yrs ago    benign    BREAST LUMPECTOMY      x2    CHOLECYSTECTOMY      COLONOSCOPY  09/25/2018    LUC EASTON MD    CYSTOSCOPY W/ URETERAL STENT PLACEMENT Left 06/24/2019    Procedure: CYSTOSCOPY, WITH URETERAL STENT INSERTION;  Surgeon: Jorden Dickson MD;  Location: [facility] OR;  Service: Urology;  Laterality: Left;    CYSTOSCOPY W/ URETERAL STENT REMOVAL Left 07/23/2019    Procedure: CYSTOSCOPY, WITH URETERAL STENT REMOVAL;  Surgeon: Jorden Dickson MD;  Location: [facility] OR;  Service: Urology;  Laterality: Left;    EPIDURAL STEROID INJECTION INTO LUMBAR SPINE N/A 09/30/2021    Procedure: Injection-steroid-epidural-lumbar;  Surgeon: Nathen Lyons MD;  [facility];  Service: Pain Management;  Laterality: N/A;  L5-S1      EPIDURAL STEROID INJECTION INTO LUMBAR SPINE N/A 11/16/2021    Procedure: Injection-steroid-epidural-lumbar;  Surgeon: Nathen Lyons MD;  [facility];  Service: Pain Management;  Laterality: N/A;  L5-S1    EXTRACORPOREAL SHOCK WAVE LITHOTRIPSY Left  07/23/2019    Procedure: LITHOTRIPSY, ESWL;  Surgeon: Jorden Dickson MD;  Location: [facility] OR;  Service: Urology;  Laterality: Left;    EYE SURGERY Bilateral     cataracts    HYSTERECTOMY      MASTECTOMY, PARTIAL Left 04/25/2019    Procedure: MASTECTOMY, PARTIAL;  Surgeon: Mp Gonzalez MD;  Location: [facility] OR;  Service: General;  Laterality: Left;    NEEDLE LOCALIZATION Left 03/14/2019    Procedure: NEEDLE LOCALIZATION;  Surgeon: Mp Gonzalez MD;  Location: [facility] OR;  Service: General;  Laterality: Left;  left lumpectomy with wire needle localization     PARTIAL NEPHRECTOMY Right 05/22/2023    RETROGRADE PYELOGRAPHY Bilateral 06/24/2019    Procedure: PYELOGRAM, RETROGRADE;  Surgeon: Jorden Dickson MD;  Location: [facility] OR;  Service: Urology;  Laterality: Bilateral;    SENTINEL LYMPH NODE BIOPSY Left 03/14/2019    Procedure: BIOPSY, LYMPH NODE, SENTINEL;  Surgeon: Mp Gonzalez MD;  Location: [facility] OR;  Service: General;  Laterality: Left;  left sentinel node lymph node biopsy    TONSILLECTOMY      TRANSFORAMINAL EPIDURAL INJECTION OF STEROID Right 01/04/2022    Procedure: Injection,steroid,epidural,transforaminal approach;  Surgeon: Nathen Lyons MD;  [facility];  Service: Pain Management;  Laterality: Right;  L4-L5, L5-s1    TRANSFORAMINAL EPIDURAL INJECTION OF STEROID Right 07/09/2024    Procedure: Injection,steroid,epidural,transforaminal approach;  Surgeon: Nathen Lyons MD;  Location: [facility] OR;  Service: Pain Management;  Laterality: Right;    URETEROSCOPY Left 06/24/2019    Procedure: URETEROSCOPY;  Surgeon: Jorden Dickson MD;  Location: [facility] OR;  Service: Urology;  Laterality: Left;     Family History   Problem Relation Name Age of Onset    Heart disease Mother      Hypertension Mother      Alzheimer's disease Mother      Cancer Father      Heart disease Sister      Stroke Sister      Diabetes Brother      Cancer Daughter       Vitals:    11/26/24 1006   Weight: 78.9 kg (173 lb 15.1  "oz)   Height: 5' 8" (1.727 m)       Review of Systems   Constitutional:  Negative for chills, diaphoresis, fatigue, fever and unexpected weight change.   HENT:  Negative for trouble swallowing.    Eyes:  Negative for visual disturbance.   Respiratory:  Negative for shortness of breath.    Cardiovascular:  Negative for chest pain.   Gastrointestinal:  Negative for abdominal pain, constipation, nausea and vomiting.   Genitourinary:  Negative for difficulty urinating.   Musculoskeletal:  Negative for arthralgias, back pain, gait problem, joint swelling, myalgias, neck pain and neck stiffness.   Neurological:  Negative for dizziness, speech difficulty, weakness, light-headedness, numbness and headaches.          Objective:      Physical Exam  Neurological:      Mental Status: She is alert and oriented to person, place, and time.      Comments: With the nurse in the room we looked at her right and left hip.  She definitely has a rather large hematoma over the right lateral hip.  It is not particularly tender.  It is not red.  No point tenderness over the lumbar spine.             Assessment:       1. Contusion of right hip, initial encounter    2. Hematoma of right hip, initial encounter           Plan:     She has developed a hematoma over the lateral portion of the right hip from a fall.  We will get some x-rays for completeness sake that I can report over the phone.  Continue moist heat to the area and it should resolve over time.  She can follow up in the office as needed      Contusion of right hip, initial encounter  -     X-Ray Hip 2 or 3 views Right with Pelvis when performed; Future; Expected date: 11/26/2024    Hematoma of right hip, initial encounter  -     X-Ray Hip 2 or 3 views Right with Pelvis when performed; Future; Expected date: 11/26/2024          "

## 2024-12-20 ENCOUNTER — TELEPHONE (OUTPATIENT)
Dept: FAMILY MEDICINE | Facility: CLINIC | Age: 79
End: 2024-12-20
Payer: MEDICARE

## 2024-12-20 NOTE — TELEPHONE ENCOUNTER
----- Message from Jenn sent at 12/20/2024 11:25 AM CST -----  Type:  Pharmacy Calling to Clarify an RX    Name of Caller:  pt  Pharmacy Name:   [id number] - [place], [place] - [address] AT [facility]  [address]  [place] [address]  Phone: [phone] Fax: [phone]    Prescription Name:  fluticasone propionate (FLONASE) 50 mcg/actuation nasal spray  What do they need to clarify?:  PA  Best Call Back Number:  [phone]  Additional Information:  Having problem getting filled. Please call back to advise. Thanks!

## 2024-12-23 DIAGNOSIS — C64.1 MALIGNANT NEOPLASM OF RIGHT KIDNEY, EXCEPT RENAL PELVIS: Primary | ICD-10-CM

## 2024-12-24 RX ORDER — LEVOTHYROXINE SODIUM 112 UG/1
112 TABLET ORAL
Qty: 90 TABLET | Refills: 3 | Status: SHIPPED | OUTPATIENT
Start: 2024-12-24

## 2024-12-24 NOTE — TELEPHONE ENCOUNTER
Refill Routing Note   Medication(s) are not appropriate for processing by [facility] for the following reason(s):        No active prescription written by provider    [facility] action(s):  Defer               Appointments  past 12m or future 3m with PCP    Date Provider   Last Visit   7/11/2024 Elías Luis Jr., MD   Next Visit   Visit date not found Elías Luis Jr., MD   ED visits in past 90 days: 0        Note composed:1:24 PM 12/24/2024

## 2024-12-24 NOTE — TELEPHONE ENCOUNTER
No care due was identified.  [facility] Embedded Care Due Messages. Reference number: [id number].   12/24/2024 10:43:46 AM CST

## 2024-12-24 NOTE — TELEPHONE ENCOUNTER
----- Message from Mary sent at 12/23/2024  2:52 PM CST -----  Regarding: Needs refills  Type:  RX Refill Request    Who Called:  Pt  Refill or New Rx:  refill  RX Name and Strength:  levothyr-oxime- thyroid medication the 112mg  How is the patient currently taking it? (ex. 1XDay):  see chart  Is this a 30 day or 90 day RX:  see chart  Preferred Pharmacy with phone number:    [id number] - [place], [place] - [address] AT [facility]  [address]  [place] [address]  Phone: [phone] Fax: [phone]      Local or Mail Order:  local  Ordering Provider:  deedee  Best Call Back Number:  [phone]    Additional Information:

## 2025-01-06 ENCOUNTER — HOSPITAL ENCOUNTER (OUTPATIENT)
Dept: RADIOLOGY | Facility: HOSPITAL | Age: 80
Discharge: HOME | End: 2025-01-06
Attending: INTERNAL MEDICINE
Payer: MEDICARE

## 2025-01-06 DIAGNOSIS — Z17.0 MALIGNANT NEOPLASM OF NIPPLE OF LEFT BREAST IN FEMALE, ESTROGEN RECEPTOR POSITIVE: ICD-10-CM

## 2025-01-06 DIAGNOSIS — C50.012 MALIGNANT NEOPLASM OF NIPPLE OF LEFT BREAST IN FEMALE, ESTROGEN RECEPTOR POSITIVE: ICD-10-CM

## 2025-01-06 PROCEDURE — 74176 CT ABD & PELVIS W/O CONTRAST: CPT | Mod: TC,PO

## 2025-01-06 PROCEDURE — 74176 CT ABD & PELVIS W/O CONTRAST: CPT | Mod: 26,,, | Performed by: RADIOLOGY

## 2025-01-06 PROCEDURE — 71250 CT THORAX DX C-: CPT | Mod: 26,,, | Performed by: RADIOLOGY

## 2025-01-08 ENCOUNTER — TELEPHONE (OUTPATIENT)
Dept: FAMILY MEDICINE | Facility: CLINIC | Age: 80
End: 2025-01-08
Payer: MEDICARE

## 2025-01-08 ENCOUNTER — OFFICE VISIT (OUTPATIENT)
Dept: HEMATOLOGY/ONCOLOGY | Facility: CLINIC | Age: 80
End: 2025-01-08
Payer: MEDICARE

## 2025-01-08 VITALS
RESPIRATION RATE: 16 BRPM | SYSTOLIC BLOOD PRESSURE: 124 MMHG | BODY MASS INDEX: 27.06 KG/M2 | WEIGHT: 178.56 LBS | HEART RATE: 78 BPM | DIASTOLIC BLOOD PRESSURE: 74 MMHG | TEMPERATURE: 98 F | HEIGHT: 68 IN | OXYGEN SATURATION: 100 %

## 2025-01-08 DIAGNOSIS — D75.89 MACROCYTOSIS WITHOUT ANEMIA: ICD-10-CM

## 2025-01-08 DIAGNOSIS — I10 ESSENTIAL HYPERTENSION, BENIGN: ICD-10-CM

## 2025-01-08 DIAGNOSIS — E78.00 PURE HYPERCHOLESTEROLEMIA: ICD-10-CM

## 2025-01-08 DIAGNOSIS — M05.79 RHEUMATOID ARTHRITIS INVOLVING MULTIPLE SITES WITH POSITIVE RHEUMATOID FACTOR: ICD-10-CM

## 2025-01-08 DIAGNOSIS — D84.821 DRUG-INDUCED IMMUNODEFICIENCY: ICD-10-CM

## 2025-01-08 DIAGNOSIS — C50.112 CARCINOMA OF CENTRAL PORTION OF LEFT BREAST IN FEMALE, ESTROGEN RECEPTOR POSITIVE: ICD-10-CM

## 2025-01-08 DIAGNOSIS — N18.31 STAGE 3A CHRONIC KIDNEY DISEASE: ICD-10-CM

## 2025-01-08 DIAGNOSIS — C50.012 MALIGNANT NEOPLASM OF NIPPLE OF LEFT BREAST IN FEMALE, ESTROGEN RECEPTOR POSITIVE: Primary | ICD-10-CM

## 2025-01-08 DIAGNOSIS — Z17.0 MALIGNANT NEOPLASM OF NIPPLE OF LEFT BREAST IN FEMALE, ESTROGEN RECEPTOR POSITIVE: Primary | ICD-10-CM

## 2025-01-08 DIAGNOSIS — Z17.0 CARCINOMA OF CENTRAL PORTION OF LEFT BREAST IN FEMALE, ESTROGEN RECEPTOR POSITIVE: ICD-10-CM

## 2025-01-08 DIAGNOSIS — Z79.899 DRUG-INDUCED IMMUNODEFICIENCY: ICD-10-CM

## 2025-01-08 DIAGNOSIS — Z85.528 HISTORY OF RENAL CELL CANCER: ICD-10-CM

## 2025-01-08 PROBLEM — Z85.3 HISTORY OF BREAST CANCER: Status: RESOLVED | Noted: 2022-10-12 | Resolved: 2025-01-08

## 2025-01-08 PROBLEM — C64.1 RENAL CELL CARCINOMA OF RIGHT KIDNEY: Status: RESOLVED | Noted: 2023-07-10 | Resolved: 2025-01-08

## 2025-01-08 PROCEDURE — 99999 PR PBB SHADOW E&M-EST. PATIENT-LVL V: CPT | Mod: PBBFAC,,, | Performed by: INTERNAL MEDICINE

## 2025-01-08 PROCEDURE — 99215 OFFICE O/P EST HI 40 MIN: CPT | Mod: PBBFAC,PN | Performed by: INTERNAL MEDICINE

## 2025-01-08 NOTE — TELEPHONE ENCOUNTER
----- Message from Kenney sent at 1/8/2025  2:19 PM CST -----  Type: Needs Medical Advice  Who Called:  pt  Pharmacy name and phone #:    Best Call Back Number: [phone]  Additional Information: pt is calling the office for informing doctor that she had blood work today and her cancer doctor told her that her cholesterol medication was raising her liver count please call the pt back ASAP thanks

## 2025-01-08 NOTE — TELEPHONE ENCOUNTER
Spoke to patient.   Appt scheduled on 1/13/2025 with Crystal Hodge to discuss liver level elevation.

## 2025-01-08 NOTE — PROGRESS NOTES
Subjective:       Patient ID: Kirstie Bowden is a 79 y.o. female.    Chief Complaint   Left breast ca dx  T2 N0 status post lumpectomy and re-resection margin adjuvant TC chemo s/p 3  cycles but discontinued due to quality of life issues and started Arimidex   stated  having s/e  To arimidex took herself off presented to clinic started on Aromasin .  Tolerating Aromasin well rec score of 29    hydronephrosis+ ,  had stent placed  Here for follow-up  Oncologic History:  Dx 1/2019  INVASIVE CARCINOMA BREAST, CANCER CASE SUMMARY:  PROCEDURE: Partial mastectomy without skin or nipple.3/2019  SPECIMEN LATERALITY: Left.  TUMOR SIZE, GREATEST DIMENSION: 2.5 cm.  HISTOLOGIC TYPE: Invasive pleomorphic lobular carcinoma.  HISTOLOGIC GRADE (LUTHER HISTOLOGIC SCORE):  Glandular (acinar)/tubular differentiation: Score 3.  Nuclear pleomorphism: Score 2.  Mitotic rate: Score 1.  Overall grade: Grade 2  DUCTAL CARCINOMA IN SITU (DCIS): Not identified.  LOBULAR CARCINOMA IN SITU (LCIS): Present, pleomorphic lobular carcinoma in situ with rare focus of  classical lobular carcinoma situ.  Estimated size (extent) of pleomorphic LCIS: At least 2.8 cm.  TUMOR EXTENSION: Not applicable.  MARGINS:  INVASIVE CARCINOMA MARGINS: Uninvolved by invasive carcinoma.  Distance from inferior (closest) margin: 5 mm.  PLEOMORPHIC LOBULAR CARCINOMA IN SITU MARGINS: Uninvolved by pleomorphic LCIS.  Distance from inferior medial (closest) margin: 0.2 mm (see above comment).  REGIONAL LYMPH NODES: Uninvolved by tumor cells.  Number of lymph nodes examined: 2.  Number of sentinel nodes examined: 2.  TREATMENT EFFECT: No known presurgical therapy.  PATHOLOGIC STAGE CLASSIFICATION (pTNM, AJCC 8TH EDITION):  pT2: Tumor size = 2.5 cm in greatest dimension.  pN0: No regional lymph node metastasis identified.  pM: Not applicable.    ER: Positive.  MI: Positive.  HER2: Negative (IHC - Equivocal (score 2) and FISH - Negative).  Ki-67: Approximately  14%.  HPI:     Social History     Socioeconomic History    Marital status:    Occupational History     Employer: [employer]   Tobacco Use    Smoking status: Former     Current packs/day: 0.00     Average packs/day: 0.5 packs/day for 59.3 years (29.6 ttl pk-yrs)     Types: Cigarettes     Start date: 1960     Quit date: 2019     Years since quittin.4    Smokeless tobacco: Never   Substance and Sexual Activity    Alcohol use: Yes     Alcohol/week: 2.0 standard drinks of alcohol     Types: 2 Glasses of wine per week     Comment: Social    Drug use: No    Sexual activity: Never     Social Drivers of Health     Financial Resource Strain: Low Risk  (2024)    Overall Financial Resource Strain (CARDIA)     Difficulty of Paying Living Expenses: Not very hard   Food Insecurity: No Food Insecurity (2024)    Hunger Vital Sign     Worried About Running Out of Food in the Last Year: Never true     Ran Out of Food in the Last Year: Never true   Transportation Needs: No Transportation Needs (2024)    PRAPARE - Transportation     Lack of Transportation (Medical): No     Lack of Transportation (Non-Medical): No   Physical Activity: Insufficiently Active (2024)    Exercise Vital Sign     Days of Exercise per Week: 1 day     Minutes of Exercise per Session: 10 min   Stress: No Stress Concern Present (2024)    [employer] - Occupational Stress Questionnaire     Feeling of Stress : Only a little   Housing Stability: Low Risk  (2024)    Housing Stability Vital Sign     Unable to Pay for Housing in the Last Year: No     Number of Places Lived in the Last Year: 1     Unstable Housing in the Last Year: No     Family History   Problem Relation Name Age of Onset    Heart disease Mother      Hypertension Mother      Alzheimer's disease Mother      Cancer Father      Heart disease Sister      Stroke Sister      Diabetes Brother      Cancer Daughter       Past Surgical  History:   Procedure Laterality Date    AXILLARY NODE DISSECTION Left 03/14/2019    Procedure: LYMPHADENECTOMY, AXILLARY;  Surgeon: Mp Gonzalez MD;  Location: [facility] OR;  Service: General;  Laterality: Left;  left lumpectomy with axillary lypmh node dissection    BALLOON DILATION OF URETER Left 06/24/2019    Procedure: DILATION, URETER, USING BALLOON;  Surgeon: Jorden Dickson MD;  Location: [facility] OR;  Service: Urology;  Laterality: Left;    BREAST BIOPSY Left 20 yrs ago    benign    BREAST LUMPECTOMY      x2    CHOLECYSTECTOMY      COLONOSCOPY  09/25/2018    LUC EASTON MD    CYSTOSCOPY W/ URETERAL STENT PLACEMENT Left 06/24/2019    Procedure: CYSTOSCOPY, WITH URETERAL STENT INSERTION;  Surgeon: Jorden Dickson MD;  Location: [facility] OR;  Service: Urology;  Laterality: Left;    CYSTOSCOPY W/ URETERAL STENT REMOVAL Left 07/23/2019    Procedure: CYSTOSCOPY, WITH URETERAL STENT REMOVAL;  Surgeon: Jorden Dickson MD;  Location: [facility] OR;  Service: Urology;  Laterality: Left;    EPIDURAL STEROID INJECTION INTO LUMBAR SPINE N/A 09/30/2021    Procedure: Injection-steroid-epidural-lumbar;  Surgeon: Nathen Lyons MD;  Location: [facility] OR;  Service: Pain Management;  Laterality: N/A;  L5-S1      EPIDURAL STEROID INJECTION INTO LUMBAR SPINE N/A 11/16/2021    Procedure: Injection-steroid-epidural-lumbar;  Surgeon: Nathen Lyons MD;  Location: [facility] OR;  Service: Pain Management;  Laterality: N/A;  L5-S1    EXTRACORPOREAL SHOCK WAVE LITHOTRIPSY Left 07/23/2019    Procedure: LITHOTRIPSY, ESWL;  Surgeon: Jorden Dickson MD;  Location: [facility] OR;  Service: Urology;  Laterality: Left;    EYE SURGERY Bilateral     cataracts    HYSTERECTOMY      MASTECTOMY, PARTIAL Left 04/25/2019    Procedure: MASTECTOMY, PARTIAL;  Surgeon: Mp Gonzalez MD;  Location: [facility] OR;  Service: General;  Laterality: Left;    NEEDLE LOCALIZATION Left 03/14/2019    Procedure: NEEDLE LOCALIZATION;  Surgeon: Mp Gonzalez MD;  Location: [facility] OR;   Service: General;  Laterality: Left;  left lumpectomy with wire needle localization     PARTIAL NEPHRECTOMY Right 05/22/2023    RETROGRADE PYELOGRAPHY Bilateral 06/24/2019    Procedure: PYELOGRAM, RETROGRADE;  Surgeon: Jorden Dickson MD;  Location: [facility] OR;  Service: Urology;  Laterality: Bilateral;    SENTINEL LYMPH NODE BIOPSY Left 03/14/2019    Procedure: BIOPSY, LYMPH NODE, SENTINEL;  Surgeon: Mp Gonzalez MD;  Location: [facility] OR;  Service: General;  Laterality: Left;  left sentinel node lymph node biopsy    TONSILLECTOMY      TRANSFORAMINAL EPIDURAL INJECTION OF STEROID Right 01/04/2022    Procedure: Injection,steroid,epidural,transforaminal approach;  Surgeon: Nathen Lyons MD;  Location: [facility] OR;  Service: Pain Management;  Laterality: Right;  L4-L5, L5-s1    TRANSFORAMINAL EPIDURAL INJECTION OF STEROID Right 07/09/2024    Procedure: Injection,steroid,epidural,transforaminal approach;  Surgeon: Nathen Lyons MD;  Location: [facility] OR;  Service: Pain Management;  Laterality: Right;    URETEROSCOPY Left 06/24/2019    Procedure: URETEROSCOPY;  Surgeon: Jorden Dickson MD;  Location: [facility] OR;  Service: Urology;  Laterality: Left;     Past Medical History:   Diagnosis Date    Arthritis     rheumatoid    Asthma     Breast cancer     Cancer     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    Hyperlipidemia     Hypertension     Limb alert care status     NO BP/IV LEFT ARM    Personal history of colonic polyps 01/23/2024    Pseudocholinesterase deficiency     family history    Thyroid disease        Current Outpatient Medications:     albuterol (PROVENTIL/VENTOLIN HFA) 90 mcg/actuation inhaler, INHALE 2 PUFFS INTO THE LUNGS EVERY 6 (SIX) HOURS AS NEEDED FOR WHEEZING. RESCUE, Disp: 54 g, Rfl: 3    amLODIPine (NORVASC) 5 MG tablet, TAKE 1 TABLET BY MOUTH EVERY DAY, Disp: 90 tablet, Rfl: 3    atorvastatin (LIPITOR) 20 MG tablet, TAKE 1 TABLET BY MOUTH EVERY DAY, Disp: 90 tablet, Rfl: 3    cyanocobalamin (VITAMIN B-12) 1000 MCG tablet, Take  100 mcg by mouth once daily., Disp: , Rfl:     exemestane (AROMASIN) 25 mg tablet, TAKE 1 TABLET BY MOUTH EVERY DAY, Disp: 60 tablet, Rfl: 5    fluticasone propionate (FLONASE) 50 mcg/actuation nasal spray, USE 1 SPRAY (50 MCG TOTAL) IN EACH NOSTRIL ONCE DAILY, Disp: 48 mL, Rfl: 3    fluticasone propionate (FLONASE) 50 mcg/actuation nasal spray, 1 spray (50 mcg total) by Each Nostril route once daily., Disp: 11.1 mL, Rfl: 0    fluticasone-salmeterol 250-50 mcg/dose (WIXELA INHUB) 250-50 mcg/dose diskus inhaler, INHALE 1 PUFF INTO THE LUNGS 2 (TWO) TIMES DAILY. CONTROLLER, Disp: 180 each, Rfl: 3    folic acid (FOLVITE) 1 MG tablet, Take 1 tablet (1,000 mcg total) by mouth once daily., Disp: 90 tablet, Rfl: 3    gabapentin (NEURONTIN) 300 MG capsule, Take 1 capsule (300 mg total) by mouth 2 (two) times daily., Disp: 60 capsule, Rfl: 2    guaiFENesin (MUCINEX) 600 mg 12 hr tablet, Take 2 tablets (1,200 mg total) by mouth 2 (two) times daily., Disp: 30 tablet, Rfl: 0    hydroCHLOROthiazide (HYDRODIURIL) 25 MG tablet, TAKE 1 TABLET BY MOUTH EVERY DAY, Disp: 90 tablet, Rfl: 3    hydroxychloroquine (PLAQUENIL) 200 mg tablet, Take 1 tablet (200 mg total) by mouth 2 (two) times daily., Disp: 180 tablet, Rfl: 3    levocetirizine (XYZAL) 5 MG tablet, Take 1 tablet (5 mg total) by mouth every evening., Disp: 30 tablet, Rfl: 0    levothyroxine (SYNTHROID) 112 MCG tablet, Take 1 tablet (112 mcg total) by mouth before breakfast., Disp: 90 tablet, Rfl: 3    methotrexate 2.5 MG Tab, Take 9 tablets (22.5 mg total) by mouth every 7 days., Disp: 108 tablet, Rfl: 3    ondansetron (ZOFRAN-ODT) 8 MG TbDL, DISSOLVE 1 TABLET IN MOUTH EVERY 12 HOURS AS NEEDED, Disp: 30 tablet, Rfl: 0    prochlorperazine (COMPAZINE) 10 MG tablet, TAKE 1 TABLET BY MOUTH EVERY 6 HOURS AS NEEDED, Disp: 60 tablet, Rfl: 0    traMADoL (ULTRAM) 50 mg tablet, Take 1 tablet (50 mg total) by mouth every 12 (twelve) hours as needed for Pain (severe pain). TAKE 1 TABLET  BY MOUTH EVERY 8 HOURS AS NEEDED FOR SEVERE PAIN- DOSE DECREASE, Disp: 60 tablet, Rfl: 1    zolpidem (AMBIEN) 5 MG Tab, Take 1 tablet (5 mg total) by mouth every evening., Disp: 30 tablet, Rfl: 3  No current facility-administered medications for this visit.    Facility-Administered Medications Ordered in Other Visits:     lactated ringers infusion, , Intravenous, Once PRN, Nathen Lyons MD    lidocaine (PF) 10 mg/ml (1%) injection 10 mg, 1 mL, Intradermal, Once, Eladia Schwartz MD  Review of patient's allergies indicates:   Allergen Reactions    Succinylcholine chloride Other (See Comments)    Sulfur dioxide Hives    Sulfamethoxazole-trimethoprim      Other reaction(s): per dr daryn Rodríguez (sulfonamide antibiotics)      NOT SURE REACTION         REVIEW OF SYSTEMS:     Patient denies issues related to appetite or recent weight change.  Feels well overall.  Denies issues with generalized weakness .  Denies fatigue over above what is normally experienced with day-to-day activities  Denies fever, chills, rigors  Denies issues with ambulation  Denies generalized swelling or new lumps and bumps felt in any part  of body  Denies visual or hearing loss  Denies issues with congestion, sinus issues, cough, sputum production runny nose or itching eyes  Denies chest pain or palpitations, or passing out  Denies abdominal pain, reflux symptoms, nausea vomiting loose stools or constipation  Denies seizure activity or focal weaknesses or symptoms related to TIA, no head aches or blurred vision reported  Denies issues with skin rash or bruising  Denies issues with swelling of feet, tingling or numbness   No issues with sleep,   No recent foreign travel   Good family support reported        PHYSICAL EXAM:     Wt Readings from Last 3 Encounters:   01/08/25 81 kg (178 lb 9.2 oz)   11/26/24 78.9 kg (173 lb 15.1 oz)   11/11/24 78.9 kg (174 lb)     Temp Readings from Last 3 Encounters:   01/08/25 97.9 °F (36.6 °C) (Temporal)    11/11/24 98.5 °F (36.9 °C)   10/29/24 98.2 °F (36.8 °C)     BP Readings from Last 3 Encounters:   01/08/25 124/74   11/11/24 123/75   10/29/24 122/64     Pulse Readings from Last 3 Encounters:   01/08/25 78   11/11/24 94   10/29/24 73     VITAL SIGNS:  as above   GENERAL: appears well-built, well-nourished.  No anxiety, no agitation, and in no distress.  Patient is awake, alert, oriented and cooperative.  HEENT:  Showed no congestion. Trachea is central no obvious icterus or pallor noted no hoarseness. no obvious JVD   NECK:  Supple.  No JVD. No obvious cervical submental or supraclavicular adenopathy.  RS:the visualized portion of  Chest expands well. chest appears symmetric, no audible wheezes.  No dyspnea recognized  ABDOMEN:  abdomen appears undistended.  EXTREMITIES:  Without edema.  NEUROLOGICAL:  The patient is appropriate, higher functions are normal.  No  obvious neurological deficits.  normal judgement normal thought content  No confusion, no speech impediment. Cranial nerves are intact and show no deficit. No gross motor deficits noted   SKIN MUSCULOSKELETAL: no joint or skeletal deformity, no clubbing of nails.  No visible rash ecchymosis or petechiae     Laboratory:     CBC:  Lab Results   Component Value Date    WBC 8.77 01/06/2025    RBC 3.61 (L) 01/06/2025    HGB 11.6 (L) 01/06/2025    HCT 37.9 01/06/2025     (H) 01/06/2025    MCH 32.1 (H) 01/06/2025    MCHC 30.6 (L) 01/06/2025    RDW 16.2 (H) 01/06/2025     01/06/2025    MPV 11.3 01/06/2025    GRAN 6.9 01/06/2025    GRAN 78.9 (H) 01/06/2025    LYMPH 1.3 01/06/2025    LYMPH 14.8 (L) 01/06/2025    MONO 0.3 01/06/2025    MONO 3.8 (L) 01/06/2025    EOS 0.2 01/06/2025    BASO 0.03 01/06/2025    EOSINOPHIL 1.9 01/06/2025    BASOPHIL 0.3 01/06/2025       BMP: BMP  Lab Results   Component Value Date     01/06/2025    K 4.2 01/06/2025     01/06/2025    CO2 32 (H) 01/06/2025    BUN 20 01/06/2025    CREATININE 1.4 01/06/2025     CALCIUM 10.1 01/06/2025    ANIONGAP 7 (L) 01/06/2025    ESTGFRAFRICA >60 07/20/2022    EGFRNONAA 55 (A) 07/20/2022       LFT:   Lab Results   Component Value Date    ALT 46 (H) 01/06/2025    AST 41 (H) 01/06/2025    ALKPHOS 67 01/06/2025    BILITOT 0.4 01/06/2025    Bone density September 2 1019 negative for osteopenia  oncotype rec. score is 29  Impressionpet 1/2020       1.  Likely postoperative change in the superolateral left breast, decreased in size from the previous exam.    2.  Scattered multifocal ground-glass attenuation opacities in both lungs suggesting a combination of atypical infection/pneumonia and/or post treatment/post radiation change.  Consider follow-up CT of the chest in 6-8 weeks to document resolution.    3.  Indeterminate isoattenuating non-FDG avid structure in the anterior interpolar aspect of the right kidney, possibly representing a hemorrhagic/proteinaceous cyst or low grade malignancy, consider further characterization with contrast enhanced renal protocol CT/MRI.    4.  Numerous bilateral nonobstructing renal stones.  No hydronephrosis or hydroureter on today's exam.    5.  Interval resolution of the maxillary sinus disease.     Pet 7/2020  IMPRESSION:  1. Postoperative changes of prior left breast lumpectomy.  2. Near complete resolution of the previously described groundglass  attenuation opacities in the lungs, now with only a small linear  bandlike opacity in the anterior left upper lobe (most likely related  to prior radiation/treatment change.  3. Focal increased FDG activity along the right third and fourth rib  posteriorly at the posterior thoracic junction suggesting degenerative  change with no soft tissue, lytic or sclerotic lesion identified.  4. Additional and incidental findings as noted above unchanged from  the previous exam.            has renal stones and has hydronephrosis, stented      7/21 neg pet      7/22  IMPRESSION: No evidence of recurrent or metastatic  disease     Postsurgical changes in the left breast     Stable 3.5 cm round isoattenuating mass within the upper pole the right kidney suggestive of renal cyst.     Nonobstructing renal stones        IMPRESSION:pet 4/23     No evidence of FDG avid metastatic breast cancer.     4.7 cm indeterminate right renal lesion. Although this lesion has been present on prior exams and is not hypermetabolic, it has not been definitively characterized; further evaluation with retroperitoneal sonography or contrast-enhanced renal protocol CT/MRI is recommended.     Postoperative changes of the left breast. Please correlate with dedicated diagnostic mammography.     Electronically signed by:  Erlin Li MD  4/24/2023 2:17 PM CDT Workstation: [id number]       Impression: mri 5/2/23     Enhancing mass of the right kidney compatible with neoplasm.  No evidence for abdominal metastatic disease.     This report was flagged in Epic as abnormal.    Assessment/Plan:     Breast ca:T2N0Mx left breast ca s/p lumpectomy 3/2019 recurrent score 29  pT2: Tumor size = 2.5 cm in greatest dimension.  pN0: No regional lymph node metastasis identified.  pM: Not applicable.    ER: Positive.  DE: Positive.  HER2: Negative (IHC - E  Patient completed 3 cycles but aborted therapy.  In favor a quality of life due to side effects   Compeleted xrt  she stoped anastrozole  Due to arthralgia and nausea.Tolerating aromasin well to continue for 10 years till May 2029  Normal bone density 10/23     RA:  now on mtx and hydroxychroloquine sees dr Romero   ANEMIA : PROB FROM CKD WILL MONITOR  Hemorrhagic cyst :Needs a f/u as Dr Dickson has retiredUrology  was reportd on prev scans , had stents removed and lithotripsy for stones.      Hc of  rt renal ca: MRI of abd c/w renal mass she saw DR Ludwig  had nephrectomy  5/23. 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 All margins negative for invasive ca. Has fol.ow up 1/25    Will alternate pet and ct non  contrast to be able to see both malignancy rec .   Ct cap 1/25 neg see me in 6 months with cbc,c mp pet    Hypercalcemia: eating a lot of icecream, she is being rechecked soon by pcp     macrocytosis: related to thyroid issues will watch she is not anemic and embark on w/u if worse or cytopenia     Lung findindgs :saw Pulmonary  Nailaasaf aldridge 6/2020) consult for 2.  Finding as above on PET scan thesesd have nearly resolved now on 7/2020 pet and nothing new noted on scnas has on  going f/u with pulmonary   Pet 7/24 neg ahs appt 1/25    She has scattered atheromatous calcifications in the aorta and coronary arteries she will discuss this and follow up with her PCP for referral to cardiology    CKD stage IIIA has only 1 kidney so caution to be exercised    mammo done in 3/2022 done short term recommended  10/22 short term again in 4/23 , 4/24 next due 4/25      Has quit smoking    Incteased LFT : on chol meds, she will call Dr Lyon office    HTH. Lipids, hypothyroidism: cont DR mark on synthyroid and norvasc and lipitor well controlled cont diet and f/u    Immunodef due to MTX stable            Answers submitted by the patient for this visit:  Review of Systems Questionnaire (Submitted on 1/1/2025)  appetite change : No  unexpected weight change: No  mouth sores: No  visual disturbance: No  cough: No  shortness of breath: No  chest pain: No  abdominal pain: No  diarrhea: No  frequency: No  back pain: No  rash: No  headaches: No  adenopathy: No  nervous/ anxious: No

## 2025-01-13 ENCOUNTER — OFFICE VISIT (OUTPATIENT)
Dept: FAMILY MEDICINE | Facility: CLINIC | Age: 80
End: 2025-01-13
Payer: MEDICARE

## 2025-01-13 VITALS
HEIGHT: 68 IN | HEART RATE: 74 BPM | RESPIRATION RATE: 16 BRPM | TEMPERATURE: 98 F | SYSTOLIC BLOOD PRESSURE: 124 MMHG | DIASTOLIC BLOOD PRESSURE: 80 MMHG | WEIGHT: 177.25 LBS | BODY MASS INDEX: 26.86 KG/M2 | OXYGEN SATURATION: 98 %

## 2025-01-13 DIAGNOSIS — N18.32 STAGE 3B CHRONIC KIDNEY DISEASE: ICD-10-CM

## 2025-01-13 DIAGNOSIS — R74.8 ELEVATED LIVER ENZYMES: Primary | ICD-10-CM

## 2025-01-13 DIAGNOSIS — I10 ESSENTIAL HYPERTENSION, BENIGN: ICD-10-CM

## 2025-01-13 DIAGNOSIS — E78.5 HYPERLIPIDEMIA, UNSPECIFIED HYPERLIPIDEMIA TYPE: ICD-10-CM

## 2025-01-13 PROCEDURE — 99215 OFFICE O/P EST HI 40 MIN: CPT | Mod: PBBFAC,PO

## 2025-01-13 PROCEDURE — 99999 PR PBB SHADOW E&M-EST. PATIENT-LVL V: CPT | Mod: PBBFAC,,,

## 2025-01-13 PROCEDURE — 99214 OFFICE O/P EST MOD 30 MIN: CPT | Mod: S$PBB,,,

## 2025-01-13 NOTE — PROGRESS NOTES
Subjective:       Patient ID:  [id number]     Chief Complaint: Medication Problem      History of Present Illness    Ms. Bowden presents today for follow-up regarding elevated liver enzymes. She denies alcohol consumption and reports recent sinus and cold-like symptoms. She acknowledges not fasting prior to labs. She has a history of cancer with nephrectomy. No other concerns today.          Past Medical History:   Diagnosis Date    Arthritis     rheumatoid    Asthma     Breast cancer     Cancer     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    Hyperlipidemia     Hypertension     Limb alert care status     NO BP/IV LEFT ARM    Personal history of colonic polyps 01/23/2024    Pseudocholinesterase deficiency     family history    Thyroid disease       Active Problem List with Overview Notes    Diagnosis Date Noted    History of renal cell cancer 01/08/2025    Stage 3b chronic kidney disease 07/13/2024    Drug-induced immunodeficiency 06/25/2024    Chronic obstructive pulmonary disease 03/28/2023    Generalized osteoarthritis 10/12/2022    Hand paresthesia 10/12/2022    Osteoarthritis 10/12/2022    High risk medications (not anticoagulants) long-term use 10/12/2022    Stage 3a chronic kidney disease 10/12/2022    Lumbar radiculitis 09/30/2021    Macrocytosis without anemia 09/27/2021    Pure hypercholesterolemia 09/27/2021    COVID-19 09/04/2021    Atherosclerosis of aorta 01/31/2020    Left ureteral calculus 06/24/2019    Carcinoma of central portion of left breast in female, estrogen receptor positive 05/27/2019    Malignant neoplasm of left breast in female, estrogen receptor positive 04/25/2019    Rheumatoid arthritis involving multiple sites with positive rheumatoid factor 06/22/2014    Hyperlipidemia 03/26/2014    Essential hypertension, benign 09/27/2013    Acquired hypothyroidism 09/27/2013      Review of patient's allergies indicates:   Allergen Reactions    Succinylcholine chloride Other (See Comments)    Sulfur dioxide Hives     Sulfamethoxazole-trimethoprim      Other reaction(s): per dr daryn Rodríguez (sulfonamide antibiotics)      NOT SURE REACTION         Current Outpatient Medications:     albuterol (PROVENTIL/VENTOLIN HFA) 90 mcg/actuation inhaler, INHALE 2 PUFFS INTO THE LUNGS EVERY 6 (SIX) HOURS AS NEEDED FOR WHEEZING. RESCUE, Disp: 54 g, Rfl: 3    amLODIPine (NORVASC) 5 MG tablet, TAKE 1 TABLET BY MOUTH EVERY DAY, Disp: 90 tablet, Rfl: 3    atorvastatin (LIPITOR) 20 MG tablet, TAKE 1 TABLET BY MOUTH EVERY DAY, Disp: 90 tablet, Rfl: 3    cyanocobalamin (VITAMIN B-12) 1000 MCG tablet, Take 100 mcg by mouth once daily., Disp: , Rfl:     exemestane (AROMASIN) 25 mg tablet, TAKE 1 TABLET BY MOUTH EVERY DAY, Disp: 60 tablet, Rfl: 5    fluticasone propionate (FLONASE) 50 mcg/actuation nasal spray, USE 1 SPRAY (50 MCG TOTAL) IN EACH NOSTRIL ONCE DAILY, Disp: 48 mL, Rfl: 3    fluticasone propionate (FLONASE) 50 mcg/actuation nasal spray, 1 spray (50 mcg total) by Each Nostril route once daily., Disp: 11.1 mL, Rfl: 0    fluticasone-salmeterol 250-50 mcg/dose (WIXELA INHUB) 250-50 mcg/dose diskus inhaler, INHALE 1 PUFF INTO THE LUNGS 2 (TWO) TIMES DAILY. CONTROLLER, Disp: 180 each, Rfl: 3    folic acid (FOLVITE) 1 MG tablet, Take 1 tablet (1,000 mcg total) by mouth once daily., Disp: 90 tablet, Rfl: 3    gabapentin (NEURONTIN) 300 MG capsule, Take 1 capsule (300 mg total) by mouth 2 (two) times daily., Disp: 60 capsule, Rfl: 2    guaiFENesin (MUCINEX) 600 mg 12 hr tablet, Take 2 tablets (1,200 mg total) by mouth 2 (two) times daily., Disp: 30 tablet, Rfl: 0    hydroCHLOROthiazide (HYDRODIURIL) 25 MG tablet, TAKE 1 TABLET BY MOUTH EVERY DAY, Disp: 90 tablet, Rfl: 3    hydroxychloroquine (PLAQUENIL) 200 mg tablet, Take 1 tablet (200 mg total) by mouth 2 (two) times daily., Disp: 180 tablet, Rfl: 3    levocetirizine (XYZAL) 5 MG tablet, Take 1 tablet (5 mg total) by mouth every evening., Disp: 30 tablet, Rfl: 0    levothyroxine (SYNTHROID) 112  MCG tablet, Take 1 tablet (112 mcg total) by mouth before breakfast., Disp: 90 tablet, Rfl: 3    methotrexate 2.5 MG Tab, Take 9 tablets (22.5 mg total) by mouth every 7 days., Disp: 108 tablet, Rfl: 3    ondansetron (ZOFRAN-ODT) 8 MG TbDL, DISSOLVE 1 TABLET IN MOUTH EVERY 12 HOURS AS NEEDED, Disp: 30 tablet, Rfl: 0    prochlorperazine (COMPAZINE) 10 MG tablet, TAKE 1 TABLET BY MOUTH EVERY 6 HOURS AS NEEDED, Disp: 60 tablet, Rfl: 0    traMADoL (ULTRAM) 50 mg tablet, Take 1 tablet (50 mg total) by mouth every 12 (twelve) hours as needed for Pain (severe pain). TAKE 1 TABLET BY MOUTH EVERY 8 HOURS AS NEEDED FOR SEVERE PAIN- DOSE DECREASE, Disp: 60 tablet, Rfl: 1    zolpidem (AMBIEN) 5 MG Tab, Take 1 tablet (5 mg total) by mouth every evening., Disp: 30 tablet, Rfl: 3  No current facility-administered medications for this visit.    Facility-Administered Medications Ordered in Other Visits:     lactated ringers infusion, , Intravenous, Once PRN, Nathen Lyons MD    lidocaine (PF) 10 mg/ml (1%) injection 10 mg, 1 mL, Intradermal, Once, Eladia Schwartz MD    Lab Results   Component Value Date    WBC 8.77 01/06/2025    HGB 11.6 (L) 01/06/2025    HCT 37.9 01/06/2025     01/06/2025    CHOL 125 09/25/2024    TRIG 138 09/25/2024    HDL 38 (L) 09/25/2024    ALT 46 (H) 01/06/2025    AST 41 (H) 01/06/2025     01/06/2025    K 4.2 01/06/2025     01/06/2025    CREATININE 1.4 01/06/2025    BUN 20 01/06/2025    CO2 32 (H) 01/06/2025    TSH 1.074 11/20/2024    INR 1.0 06/19/2019    HGBA1C 5.8 03/25/2013       Review of Systems   Constitutional:  Negative for fatigue and fever.   HENT: Negative.  Negative for congestion, sneezing and sore throat.    Eyes: Negative.    Respiratory:  Negative for cough, shortness of breath and wheezing.    Cardiovascular:  Negative for chest pain, palpitations and leg swelling.   Gastrointestinal:  Negative for abdominal pain, nausea and vomiting.   Genitourinary: Negative.     Musculoskeletal: Negative.  Negative for arthralgias.   Skin: Negative.  Negative for rash.   Neurological:  Negative for dizziness, weakness, light-headedness, numbness and headaches.   Hematological: Negative.    Psychiatric/Behavioral: Negative.         Objective:      Physical Exam  Constitutional:       Appearance: Normal appearance.   HENT:      Head: Normocephalic and atraumatic.      Nose: Nose normal.   Eyes:      Extraocular Movements: Extraocular movements intact.   Cardiovascular:      Rate and Rhythm: Normal rate and regular rhythm.   Pulmonary:      Effort: Pulmonary effort is normal.      Breath sounds: Normal breath sounds.   Musculoskeletal:         General: Normal range of motion.      Cervical back: Normal range of motion.   Skin:     General: Skin is warm and dry.   Neurological:      General: No focal deficit present.      Mental Status: She is alert and oriented to person, place, and time.   Psychiatric:         Mood and Affect: Mood normal.         Assessment:       1. Elevated liver enzymes    2. Hyperlipidemia, unspecified hyperlipidemia type    3. Stage 3b chronic kidney disease    4. Essential hypertension, benign        Plan:       Kirstie was seen today for medication problem.    Diagnoses and all orders for this visit:    Elevated liver enzymes  - Continued current cholesterol medication prescribed by Dr. Luis.  - Explained that cholesterol medications can sometimes cause elevated liver enzymes, typically when first starting the medication.  - Acknowledged oncologist's suggestion that elevated liver count might be due to cholesterol medicine, noting that the patient has been on this medication for an extended period - will update labs next week to recheck   - Advised patient to also review with Rheum.    Hyperlipidemia, unspecified hyperlipidemia type  - continue statin for now  - low fat diet and exercise     Stage 3b chronic kidney disease  -     COMPREHENSIVE METABOLIC PANEL;  Future  - continue to follow-up with nephrology   - Cr 10.1 and GFR 38.5    Essential hypertension, benign  - well controlled today  - continue current regimen              Future Appointments       Date Provider Specialty Appt Notes    1/16/2025 Khushboo Aguirre MD Rheumatology 3 mo f/u    4/29/2025 Naila Burk MD Pulmonology 6 mo f/u    7/15/2025 Rocio Feliciano MD Family Medicine annual    7/21/2025 Malina Li MD Hematology and Oncology BreastCa/Labs/CT scan/6mfu/               Portions of this note were dictated using voice recognition software and may contain dictation related errors in spelling / grammar / syntax not discovered on text review.     Crystal Hodge PA-C

## 2025-01-15 NOTE — PROGRESS NOTES
"Subjective:      Patient ID: Kirstie Bowden is a 79 y.o. female.    Chief Complaint: Disease Management    HPI    Rheumatologic History:   - Diagnosis/es:              - osteoarthritis  - seropositive rheumatoid arthritis diagnosed in 2015  - Positive serologies: + RF (170), +CCP (87.9)  - Negative serologies: -  - Infectious screening labs:  Negative hepatitis-B, C, and QuantiFERON (6/2024)  - Imaging:              - x-ray arthritis survey (02/2023): Chondrocalcinosis in the knees and degenerative changes              - DEXA (9/2021): normal BMD  - Previous Treatments:              - Enbrel: Discontinued due to breast cancer  - Current Treatments:               - MTX 20 mg weekly plus folic acid daily (dose decreased 1/16/2025): LFTs sean on 22.5mg  -  mg daily  - tramadol daily p.r.n. for pain (she takes this sparingly)  - Plaquenil eye exam: No HCQ toxicity at OSH 10/2024  Interval History:   She is doing well and denies joint pain and swelling.    Objective:   /65 (BP Location: Right arm, Patient Position: Sitting)   Pulse 82   Ht 5' 8" (1.727 m)   Wt 80.8 kg (178 lb 2.1 oz)   BMI 27.08 kg/m²   Physical Exam   Constitutional: normal appearance.   HENT:   Head: Normocephalic and atraumatic.   Cardiovascular: Normal rate, regular rhythm and normal heart sounds.   Pulmonary/Chest: Effort normal and breath sounds normal.   Musculoskeletal:      Comments: No synovitis, dactylitis, enthesitis, effusions       Neurological: She is alert.   Skin: Skin is warm and dry. No rash noted.   No skin thickening, telangiectasias, calcinosis, psoriasiform lesions, lupoid lesions        6/21/2023 6/25/2024   Tender (EMERSNO-28) 0 / 28  0 / 28    Swollen (EMERSON-28) 0 / 28  0 / 28    Provider Global 10 / 100 10 / 100   Patient Global 10 / 100 10 / 100   ESR -- 18 mm/hr   CRP 19.2 mg/L 8 mg/L   EMERSON-28 (ESR) -- 2.16 (Remission)   EMERSON-28 (CRP) 2.18 (Remission) 1.89 (Remission)   CDAI Score 2  2      Labs (1/6/25) "   CBC HGB 11.6, WBC, PLT WNL    CMP CR 1.4, GFR 38.5 stable, AST 41 <- 20, ALT 46 <- 24    Assessment:     1. Rheumatoid arthritis involving multiple sites with positive rheumatoid factor    2. Drug-induced immunodeficiency    3. High risk medications (not anticoagulants) long-term use    4. Medication side effect, initial encounter    5. Elevated liver enzymes      This is a 79-year-old woman with history of HLD, HTN, CKD, nephrolithiasis s/p lithotripsy, sulfa allergy, breast cancer diagnosed in 2020 in remission s/p lumpectomy, chemotherapy and radiation, clear cell renal cell carcinoma s/p nephrectomy (5/23/2023), hypothyroidism, osteoarthritis, and rheumatoid arthritis diagnosed in 2015 and on MTX 25mg weekly plus folic acid daily, HCQ 200mg BID (2020- ), tramadol 50mg daily PRN for pain. She is doing well and denies joint pain and swelling, but her LFTs sean slightly when I increased her MTX dose. Decrease to 20mg weekly and monitor response.     Plan:     Problem List Items Addressed This Visit          Immunology/Multi System    Rheumatoid arthritis involving multiple sites with positive rheumatoid factor - Primary    Relevant Medications    methotrexate 2.5 MG Tab    Other Relevant Orders    C-Reactive Protein    CBC Auto Differential    Creatinine, Serum    ALT (SGPT)    AST (SGOT)    Sedimentation rate    Drug-induced immunodeficiency    Relevant Medications    methotrexate 2.5 MG Tab    Other Relevant Orders    C-Reactive Protein    CBC Auto Differential    Creatinine, Serum    ALT (SGPT)    AST (SGOT)    Sedimentation rate       Palliative Care    High risk medications (not anticoagulants) long-term use    Relevant Medications    methotrexate 2.5 MG Tab    Other Relevant Orders    C-Reactive Protein    CBC Auto Differential    Creatinine, Serum    ALT (SGPT)    AST (SGOT)    Sedimentation rate     Other Visit Diagnoses       Medication side effect, initial encounter        Elevated liver enzymes               1.) RA  2.) Drug induced immunodeficiency  3.) high risk medication use  4.) Medication side effect  5.) Elevated LFTs  - MTX 20 mg weekly and continue folic acid daily   - HCQ 400mg daily  - Avoid Yuriy and TNFi due to strong personal history of cancer  - CBC, CMP, ESR, CRP in 4 weeks then every 12 weeks  - Pre-DMARD labs yearly  - Immunizations: COVID x 3, flu (10/2022), Shingrix x 2, patient will check if pneumonia vaccine is up to date   - Plaquenil eye exam yearly  - DEXA due for repeat at follow up     6.) Bilateral carpal tunnel syndrome: patient would like to hold off on hand surgery referral as she just had surgery  - Wrist braces for now    Follow up in 3 months    30 minutes of total time spent on the encounter, which includes face to face time and non-face to face time preparing to see the patient (eg, review of tests), Obtaining and/or reviewing separately obtained history, Documenting clinical information in the electronic or other health record, Independently interpreting results (not separately reported) and communicating results to the patient/family/caregiver, or Care coordination (not separately reported).     This note was prepared with [employer] voice recognition transcription software. Garbled syntax, mangled pronouns, and other bizarre constructions may be attributed to that software system       Khushboo Aguirre M.D.  Rheumatology Dept  [place], [place]

## 2025-01-16 ENCOUNTER — OFFICE VISIT (OUTPATIENT)
Dept: RHEUMATOLOGY | Facility: CLINIC | Age: 80
End: 2025-01-16
Payer: MEDICARE

## 2025-01-16 VITALS
SYSTOLIC BLOOD PRESSURE: 132 MMHG | WEIGHT: 178.13 LBS | BODY MASS INDEX: 27 KG/M2 | DIASTOLIC BLOOD PRESSURE: 65 MMHG | HEART RATE: 82 BPM | HEIGHT: 68 IN

## 2025-01-16 DIAGNOSIS — Z79.899 DRUG-INDUCED IMMUNODEFICIENCY: ICD-10-CM

## 2025-01-16 DIAGNOSIS — Z79.899 HIGH RISK MEDICATIONS (NOT ANTICOAGULANTS) LONG-TERM USE: ICD-10-CM

## 2025-01-16 DIAGNOSIS — T50.905A MEDICATION SIDE EFFECT, INITIAL ENCOUNTER: ICD-10-CM

## 2025-01-16 DIAGNOSIS — D84.821 DRUG-INDUCED IMMUNODEFICIENCY: ICD-10-CM

## 2025-01-16 DIAGNOSIS — R74.8 ELEVATED LIVER ENZYMES: ICD-10-CM

## 2025-01-16 DIAGNOSIS — M05.79 RHEUMATOID ARTHRITIS INVOLVING MULTIPLE SITES WITH POSITIVE RHEUMATOID FACTOR: Primary | ICD-10-CM

## 2025-01-16 PROCEDURE — 99214 OFFICE O/P EST MOD 30 MIN: CPT | Mod: PBBFAC,PO | Performed by: STUDENT IN AN ORGANIZED HEALTH CARE EDUCATION/TRAINING PROGRAM

## 2025-01-16 PROCEDURE — 99999 PR PBB SHADOW E&M-EST. PATIENT-LVL IV: CPT | Mod: PBBFAC,,, | Performed by: STUDENT IN AN ORGANIZED HEALTH CARE EDUCATION/TRAINING PROGRAM

## 2025-01-16 PROCEDURE — 99215 OFFICE O/P EST HI 40 MIN: CPT | Mod: S$PBB,,, | Performed by: STUDENT IN AN ORGANIZED HEALTH CARE EDUCATION/TRAINING PROGRAM

## 2025-01-16 RX ORDER — METHOTREXATE 2.5 MG/1
20 TABLET ORAL
Qty: 96 TABLET | Refills: 3 | Status: SHIPPED | OUTPATIENT
Start: 2025-01-16 | End: 2026-01-16

## 2025-01-16 ASSESSMENT — ROUTINE ASSESSMENT OF PATIENT INDEX DATA (RAPID3)
MDHAQ FUNCTION SCORE: 0.6
PSYCHOLOGICAL DISTRESS SCORE: 0
PATIENT GLOBAL ASSESSMENT SCORE: 2.5
FATIGUE SCORE: 1.1
PAIN SCORE: 1
TOTAL RAPID3 SCORE: 1.83

## 2025-01-24 ENCOUNTER — HOSPITAL ENCOUNTER (OUTPATIENT)
Dept: RADIOLOGY | Facility: HOSPITAL | Age: 80
Discharge: HOME | End: 2025-01-24
Attending: SPECIALIST
Payer: MEDICARE

## 2025-01-24 DIAGNOSIS — C64.1 MALIGNANT NEOPLASM OF RIGHT KIDNEY, EXCEPT RENAL PELVIS: Primary | ICD-10-CM

## 2025-01-24 DIAGNOSIS — C64.1 MALIGNANT NEOPLASM OF RIGHT KIDNEY, EXCEPT RENAL PELVIS: ICD-10-CM

## 2025-01-24 PROCEDURE — 76770 US EXAM ABDO BACK WALL COMP: CPT | Mod: 26,,, | Performed by: RADIOLOGY

## 2025-01-24 PROCEDURE — 76770 US EXAM ABDO BACK WALL COMP: CPT | Mod: TC,PO

## 2025-01-31 DIAGNOSIS — C64.1 MALIGNANT NEOPLASM OF RIGHT KIDNEY, EXCEPT RENAL PELVIS: Primary | ICD-10-CM

## 2025-02-03 DIAGNOSIS — I10 ESSENTIAL HYPERTENSION, BENIGN: ICD-10-CM

## 2025-02-03 DIAGNOSIS — Z17.0 MALIGNANT NEOPLASM OF NIPPLE OF LEFT BREAST IN FEMALE, ESTROGEN RECEPTOR POSITIVE: ICD-10-CM

## 2025-02-03 DIAGNOSIS — C50.012 MALIGNANT NEOPLASM OF NIPPLE OF LEFT BREAST IN FEMALE, ESTROGEN RECEPTOR POSITIVE: ICD-10-CM

## 2025-02-03 DIAGNOSIS — M05.711 RHEUMATOID ARTHRITIS INVOLVING RIGHT SHOULDER WITH POSITIVE RHEUMATOID FACTOR: ICD-10-CM

## 2025-02-03 RX ORDER — ONDANSETRON 8 MG/1
TABLET, ORALLY DISINTEGRATING ORAL
Qty: 30 TABLET | Refills: 0 | Status: SHIPPED | OUTPATIENT
Start: 2025-02-03

## 2025-02-17 ENCOUNTER — RESULTS FOLLOW-UP (OUTPATIENT)
Dept: RHEUMATOLOGY | Facility: CLINIC | Age: 80
End: 2025-02-17
Payer: MEDICARE

## 2025-02-17 ENCOUNTER — LAB VISIT (OUTPATIENT)
Dept: LAB | Facility: HOSPITAL | Age: 80
End: 2025-02-17
Attending: STUDENT IN AN ORGANIZED HEALTH CARE EDUCATION/TRAINING PROGRAM
Payer: MEDICARE

## 2025-02-17 DIAGNOSIS — Z79.899 HIGH RISK MEDICATIONS (NOT ANTICOAGULANTS) LONG-TERM USE: ICD-10-CM

## 2025-02-17 DIAGNOSIS — Z79.899 DRUG-INDUCED IMMUNODEFICIENCY: ICD-10-CM

## 2025-02-17 DIAGNOSIS — M05.79 RHEUMATOID ARTHRITIS INVOLVING MULTIPLE SITES WITH POSITIVE RHEUMATOID FACTOR: ICD-10-CM

## 2025-02-17 DIAGNOSIS — D84.821 DRUG-INDUCED IMMUNODEFICIENCY: ICD-10-CM

## 2025-02-17 LAB
ALT SERPL W/O P-5'-P-CCNC: 17 U/L (ref 10–44)
AST SERPL-CCNC: 16 U/L (ref 10–40)
BASOPHILS # BLD AUTO: 0.03 K/UL (ref 0–0.2)
BASOPHILS NFR BLD: 0.3 % (ref 0–1.9)
CREAT SERPL-MCNC: 1.5 MG/DL (ref 0.5–1.4)
CRP SERPL-MCNC: 1.2 MG/DL
DIFFERENTIAL METHOD BLD: ABNORMAL
EOSINOPHIL # BLD AUTO: 0.1 K/UL (ref 0–0.5)
EOSINOPHIL NFR BLD: 1.4 % (ref 0–8)
ERYTHROCYTE [DISTWIDTH] IN BLOOD BY AUTOMATED COUNT: 15.5 % (ref 11.5–14.5)
ERYTHROCYTE [SEDIMENTATION RATE] IN BLOOD BY WESTERGREN METHOD: 14 MM/HR (ref 0–20)
EST. GFR  (NO RACE VARIABLE): 35.2 ML/MIN/1.73 M^2
HCT VFR BLD AUTO: 41.6 % (ref 37–48.5)
HGB BLD-MCNC: 12.6 G/DL (ref 12–16)
IMM GRANULOCYTES # BLD AUTO: 0.03 K/UL (ref 0–0.04)
IMM GRANULOCYTES NFR BLD AUTO: 0.3 % (ref 0–0.5)
LYMPHOCYTES # BLD AUTO: 1.9 K/UL (ref 1–4.8)
LYMPHOCYTES NFR BLD: 20.4 % (ref 18–48)
MCH RBC QN AUTO: 31.5 PG (ref 27–31)
MCHC RBC AUTO-ENTMCNC: 30.3 G/DL (ref 32–36)
MCV RBC AUTO: 104 FL (ref 82–98)
MONOCYTES # BLD AUTO: 0.5 K/UL (ref 0.3–1)
MONOCYTES NFR BLD: 5.4 % (ref 4–15)
NEUTROPHILS # BLD AUTO: 6.7 K/UL (ref 1.8–7.7)
NEUTROPHILS NFR BLD: 72.2 % (ref 38–73)
NRBC BLD-RTO: 0 /100 WBC
PLATELET # BLD AUTO: 229 K/UL (ref 150–450)
PMV BLD AUTO: 12 FL (ref 9.2–12.9)
RBC # BLD AUTO: 4 M/UL (ref 4–5.4)
WBC # BLD AUTO: 9.3 K/UL (ref 3.9–12.7)

## 2025-02-17 PROCEDURE — 85025 COMPLETE CBC W/AUTO DIFF WBC: CPT | Performed by: STUDENT IN AN ORGANIZED HEALTH CARE EDUCATION/TRAINING PROGRAM

## 2025-02-17 PROCEDURE — 84460 ALANINE AMINO (ALT) (SGPT): CPT | Performed by: STUDENT IN AN ORGANIZED HEALTH CARE EDUCATION/TRAINING PROGRAM

## 2025-02-17 PROCEDURE — 84450 TRANSFERASE (AST) (SGOT): CPT | Performed by: STUDENT IN AN ORGANIZED HEALTH CARE EDUCATION/TRAINING PROGRAM

## 2025-02-17 PROCEDURE — 86140 C-REACTIVE PROTEIN: CPT | Performed by: STUDENT IN AN ORGANIZED HEALTH CARE EDUCATION/TRAINING PROGRAM

## 2025-02-17 PROCEDURE — 36415 COLL VENOUS BLD VENIPUNCTURE: CPT | Performed by: STUDENT IN AN ORGANIZED HEALTH CARE EDUCATION/TRAINING PROGRAM

## 2025-02-17 PROCEDURE — 82565 ASSAY OF CREATININE: CPT | Performed by: STUDENT IN AN ORGANIZED HEALTH CARE EDUCATION/TRAINING PROGRAM

## 2025-02-17 PROCEDURE — 85651 RBC SED RATE NONAUTOMATED: CPT | Performed by: STUDENT IN AN ORGANIZED HEALTH CARE EDUCATION/TRAINING PROGRAM

## 2025-03-06 DIAGNOSIS — Z17.0 MALIGNANT NEOPLASM OF NIPPLE OF LEFT BREAST IN FEMALE, ESTROGEN RECEPTOR POSITIVE: ICD-10-CM

## 2025-03-06 DIAGNOSIS — I10 ESSENTIAL HYPERTENSION, BENIGN: ICD-10-CM

## 2025-03-06 DIAGNOSIS — C50.012 MALIGNANT NEOPLASM OF NIPPLE OF LEFT BREAST IN FEMALE, ESTROGEN RECEPTOR POSITIVE: ICD-10-CM

## 2025-03-06 DIAGNOSIS — M05.711 RHEUMATOID ARTHRITIS INVOLVING RIGHT SHOULDER WITH POSITIVE RHEUMATOID FACTOR: ICD-10-CM

## 2025-03-06 RX ORDER — ONDANSETRON 8 MG/1
TABLET, ORALLY DISINTEGRATING ORAL
Qty: 30 TABLET | Refills: 0 | Status: SHIPPED | OUTPATIENT
Start: 2025-03-06

## 2025-03-11 ENCOUNTER — TELEPHONE (OUTPATIENT)
Dept: RHEUMATOLOGY | Facility: CLINIC | Age: 80
End: 2025-03-11
Payer: MEDICARE

## 2025-03-11 NOTE — TELEPHONE ENCOUNTER
Spoke to the pt and wanted to come in asap. Was able to get her an appt for tomorrow. States understanding  ----- Message from Khushboo Aguirre MD sent at 3/11/2025  3:01 PM CDT -----  Contact: Patient  Yes that's fine  ----- Message -----  From: Keshia Lopez LPN  Sent: 3/11/2025   1:41 PM CDT  To: Khushboo Aguirre MD    Last injections was 10/07/24, ok to schedule this?  ----- Message -----  From: Savanna Jimenez  Sent: 3/11/2025  11:38 AM CDT  To: [facility] Staff    Type:  Same Day Appointment RequestCaller is requesting a same day appointment.  Caller declined first available appointment listed below.  Name of Caller:PatientWhen is the first available appointment?NASymptoms:Pain Injection / both shoulders and arms are in severe pain Best Call Back Number:[phone] Information: Please call to advise

## 2025-03-12 ENCOUNTER — OFFICE VISIT (OUTPATIENT)
Dept: RHEUMATOLOGY | Facility: CLINIC | Age: 80
End: 2025-03-12
Payer: MEDICARE

## 2025-03-12 VITALS
WEIGHT: 171.94 LBS | HEIGHT: 68 IN | HEART RATE: 78 BPM | DIASTOLIC BLOOD PRESSURE: 74 MMHG | SYSTOLIC BLOOD PRESSURE: 123 MMHG | BODY MASS INDEX: 26.06 KG/M2

## 2025-03-12 DIAGNOSIS — Z78.0 ASYMPTOMATIC MENOPAUSE: ICD-10-CM

## 2025-03-12 DIAGNOSIS — G56.03 CARPAL TUNNEL SYNDROME, BILATERAL: ICD-10-CM

## 2025-03-12 DIAGNOSIS — D84.821 DRUG-INDUCED IMMUNODEFICIENCY: ICD-10-CM

## 2025-03-12 DIAGNOSIS — M06.9 RHEUMATOID ARTHRITIS FLARE: ICD-10-CM

## 2025-03-12 DIAGNOSIS — M05.79 RHEUMATOID ARTHRITIS INVOLVING MULTIPLE SITES WITH POSITIVE RHEUMATOID FACTOR: Primary | ICD-10-CM

## 2025-03-12 DIAGNOSIS — Z79.899 DRUG-INDUCED IMMUNODEFICIENCY: ICD-10-CM

## 2025-03-12 DIAGNOSIS — Z79.899 HIGH RISK MEDICATIONS (NOT ANTICOAGULANTS) LONG-TERM USE: ICD-10-CM

## 2025-03-12 PROCEDURE — 99215 OFFICE O/P EST HI 40 MIN: CPT | Mod: S$PBB,,, | Performed by: STUDENT IN AN ORGANIZED HEALTH CARE EDUCATION/TRAINING PROGRAM

## 2025-03-12 PROCEDURE — 99999 PR PBB SHADOW E&M-EST. PATIENT-LVL IV: CPT | Mod: PBBFAC,,, | Performed by: STUDENT IN AN ORGANIZED HEALTH CARE EDUCATION/TRAINING PROGRAM

## 2025-03-12 PROCEDURE — 99214 OFFICE O/P EST MOD 30 MIN: CPT | Mod: PBBFAC,PO | Performed by: STUDENT IN AN ORGANIZED HEALTH CARE EDUCATION/TRAINING PROGRAM

## 2025-03-12 PROCEDURE — 99999PBSHW PR PBB SHADOW TECHNICAL ONLY FILED TO HB: Mod: PBBFAC,,,

## 2025-03-12 PROCEDURE — 96372 THER/PROPH/DIAG INJ SC/IM: CPT | Mod: PBBFAC,PO

## 2025-03-12 RX ORDER — PREDNISONE 5 MG/1
5 TABLET ORAL DAILY
Qty: 90 TABLET | Refills: 3 | Status: ACTIVE | OUTPATIENT
Start: 2025-03-12 | End: 2026-03-12

## 2025-03-12 RX ORDER — ABATACEPT 125 MG/ML
125 INJECTION, SOLUTION SUBCUTANEOUS
Qty: 12 EACH | Refills: 3 | Status: SHIPPED | OUTPATIENT
Start: 2025-03-12

## 2025-03-12 RX ORDER — TRIAMCINOLONE ACETONIDE 40 MG/ML
40 INJECTION, SUSPENSION INTRA-ARTICULAR; INTRAMUSCULAR
Status: COMPLETED | OUTPATIENT
Start: 2025-03-12 | End: 2025-03-12

## 2025-03-12 RX ADMIN — TRIAMCINOLONE ACETONIDE 40 MG: 40 INJECTION, SUSPENSION INTRA-ARTICULAR; INTRAMUSCULAR at 09:03

## 2025-03-12 ASSESSMENT — ROUTINE ASSESSMENT OF PATIENT INDEX DATA (RAPID3)
PSYCHOLOGICAL DISTRESS SCORE: 1.1
PAIN SCORE: 9.5
PATIENT GLOBAL ASSESSMENT SCORE: 8.5
MDHAQ FUNCTION SCORE: 1.5
FATIGUE SCORE: 1.1
TOTAL RAPID3 SCORE: 7.67

## 2025-03-12 NOTE — PROGRESS NOTES
"Subjective:      Patient ID: Kirstie Bowden is a 80 y.o. female.    Chief Complaint: Disease Management    HPI    Rheumatologic History:   - Diagnosis/es:              - osteoarthritis  - seropositive rheumatoid arthritis diagnosed in 2015  - Positive serologies: + RF (170), +CCP (87.9)  - Negative serologies: -  - Infectious screening labs:  Negative hepatitis-B, C, and QuantiFERON (6/2024)  - Imaging:              - x-ray arthritis survey (02/2023): Chondrocalcinosis in the knees and degenerative changes              - DEXA (9/2021): normal BMD  - Previous Treatments:              - Enbrel: Discontinued due to breast cancer   - HCQ: ineffective  - Current Treatments:    - Orencia: requesting PA              - MTX 20 mg weekly plus folic acid daily (dose decreased 1/16/2025): LFTs sean on 22.5mg   - Prednisone 5-10mg daily  - tramadol daily p.r.n. for pain (she takes this sparingly)  Interval History:   She reports more pain in both shoulders, and pain and swelling in her hands despite compliance with her medication.     Objective:   /74 (BP Location: Right arm, Patient Position: Sitting)   Pulse 78   Ht 5' 8" (1.727 m)   Wt 78 kg (171 lb 15.3 oz)   BMI 26.15 kg/m²   Physical Exam   Constitutional: normal appearance.   HENT:   Head: Normocephalic and atraumatic.   Pulmonary/Chest: Effort normal.   Musculoskeletal:      Comments: Tenderness to palpation of both shoulders   Neurological: She is alert.   Skin: Skin is warm and dry. No rash noted.   No skin thickening, telangiectasias, calcinosis, psoriasiform lesions, lupoid lesions        6/21/2023 6/25/2024   Tender (EMERSON-28) 0 / 28  0 / 28    Swollen (EMERSON-28) 0 / 28  0 / 28    Provider Global 10 / 100 10 / 100   Patient Global 10 / 100 10 / 100   ESR -- 18 mm/hr   CRP 19.2 mg/L 8 mg/L   EMERSON-28 (ESR) -- 2.16 (Remission)   EMERSON-28 (CRP) 2.18 (Remission) 1.89 (Remission)   CDAI Score 2  2      Labs (2/17/25)   CBC WBC, HGB, PLT WNL  CR 1.5, GFR 35.2 " stable  AST, ALT WNL   ESR WNL  CRP 1.20 <- 5.90    Assessment:     1. Rheumatoid arthritis involving multiple sites with positive rheumatoid factor    2. Rheumatoid arthritis flare    3. Drug-induced immunodeficiency    4. High risk medications (not anticoagulants) long-term use    5. Carpal tunnel syndrome, bilateral    6. Asymptomatic menopause      This is a 80-year-old woman with history of HLD, HTN, CKD, nephrolithiasis s/p lithotripsy, sulfa allergy, breast cancer diagnosed in 2020 in remission s/p lumpectomy, chemotherapy and radiation, clear cell renal cell carcinoma s/p nephrectomy (5/23/2023), hypothyroidism, osteoarthritis, and rheumatoid arthritis diagnosed in 2015 and on MTX 20mg weekly plus folic acid daily, HCQ 200mg BID (2020- ), tramadol 50mg daily PRN for pain. She reports more pain in both shoulders, and pain and swelling in her hands despite compliance with her medication. DC HCQ. Request PA for Orencia. Prednisone 5-10mg daily for now.     Plan:     Problem List Items Addressed This Visit          Neuro    Carpal tunnel syndrome, bilateral       Renal/    Asymptomatic menopause       Immunology/Multi System    Rheumatoid arthritis involving multiple sites with positive rheumatoid factor - Primary    Relevant Medications    abatacept (ORENCIA CLICKJECT) 125 mg/mL AtIn    predniSONE (DELTASONE) 5 MG tablet    triamcinolone acetonide injection 40 mg (Completed)    Other Relevant Orders    Calcium, Ionized    CBC Auto Differential    Sedimentation rate    C-Reactive Protein    Creatinine, Serum    AST (SGOT)    ALT (SGPT)    Drug-induced immunodeficiency    Relevant Orders    Calcium, Ionized    CBC Auto Differential    Sedimentation rate    C-Reactive Protein    Creatinine, Serum    AST (SGOT)    ALT (SGPT)       Palliative Care    High risk medications (not anticoagulants) long-term use    Relevant Orders    Calcium, Ionized    CBC Auto Differential    Sedimentation rate    C-Reactive Protein     Creatinine, Serum    AST (SGOT)    ALT (SGPT)     Other Visit Diagnoses         Rheumatoid arthritis flare        Relevant Medications    abatacept (ORENCIA CLICKJECT) 125 mg/mL AtIn    predniSONE (DELTASONE) 5 MG tablet    triamcinolone acetonide injection 40 mg (Completed)          1.) RA  2.) Drug induced immunodeficiency  3.) high risk medication use  4.) Medication side effect  5.) Elevated LFTs  - Request PA for Orencia weekly. I discussed potential side effects including malignancy, infection, blood count, liver and kidney function abnormalities.  Hold for infection. ACR patient information sheet on Orencia was provided.  - MTX 20 mg weekly and continue folic acid daily   - Kenalog 40mg IM now  - Prednisone 5-10mg daily for now  - DC HCQ  - Avoid Yuriy and TNFi due to strong personal history of cancer  - CBC, CMP, ESR, CRP every 12 weeks  - Pre-DMARD labs yearly  - Immunizations: COVID x 3, flu (10/2022), Shingrix x 2, PCV20 (7/2024), patient states she is also up to date on RSV and flu     6.) Bilateral carpal tunnel syndrome: patient would like to hold off on hand surgery referral as she just had surgery  - Wrist braces for now    7.) Asymptomatic menopause  - DEXA    Follow up in 3 months    40 minutes of total time spent on the encounter, which includes face to face time and non-face to face time preparing to see the patient (eg, review of tests), Obtaining and/or reviewing separately obtained history, Documenting clinical information in the electronic or other health record, Independently interpreting results (not separately reported) and communicating results to the patient/family/caregiver, or Care coordination (not separately reported).     This note was prepared with [employer] voice recognition transcription software. Garbled syntax, mangled pronouns, and other bizarre constructions may be attributed to that software system       Khushboo Aguirre M.D.  Rheumatology  Dept  [facility]

## 2025-04-08 ENCOUNTER — HOSPITAL ENCOUNTER (OUTPATIENT)
Dept: RADIOLOGY | Facility: HOSPITAL | Age: 80
Discharge: HOME | End: 2025-04-08
Attending: INTERNAL MEDICINE
Payer: MEDICARE

## 2025-04-08 DIAGNOSIS — Z17.0 MALIGNANT NEOPLASM OF NIPPLE OF LEFT BREAST IN FEMALE, ESTROGEN RECEPTOR POSITIVE: ICD-10-CM

## 2025-04-08 DIAGNOSIS — C50.012 MALIGNANT NEOPLASM OF NIPPLE OF LEFT BREAST IN FEMALE, ESTROGEN RECEPTOR POSITIVE: ICD-10-CM

## 2025-04-08 PROCEDURE — 77066 DX MAMMO INCL CAD BI: CPT | Mod: 26,,, | Performed by: RADIOLOGY

## 2025-04-08 PROCEDURE — 77062 BREAST TOMOSYNTHESIS BI: CPT | Mod: TC,PO

## 2025-04-08 PROCEDURE — 77062 BREAST TOMOSYNTHESIS BI: CPT | Mod: 26,,, | Performed by: RADIOLOGY

## 2025-04-11 DIAGNOSIS — M05.711 RHEUMATOID ARTHRITIS INVOLVING RIGHT SHOULDER WITH POSITIVE RHEUMATOID FACTOR: ICD-10-CM

## 2025-04-11 DIAGNOSIS — I10 ESSENTIAL HYPERTENSION, BENIGN: ICD-10-CM

## 2025-04-11 DIAGNOSIS — Z17.0 MALIGNANT NEOPLASM OF NIPPLE OF LEFT BREAST IN FEMALE, ESTROGEN RECEPTOR POSITIVE: ICD-10-CM

## 2025-04-11 DIAGNOSIS — C50.012 MALIGNANT NEOPLASM OF NIPPLE OF LEFT BREAST IN FEMALE, ESTROGEN RECEPTOR POSITIVE: ICD-10-CM

## 2025-04-11 RX ORDER — ONDANSETRON 8 MG/1
TABLET, ORALLY DISINTEGRATING ORAL
Qty: 30 TABLET | Refills: 0 | Status: SHIPPED | OUTPATIENT
Start: 2025-04-11

## 2025-04-18 ENCOUNTER — OFFICE VISIT (OUTPATIENT)
Dept: URGENT CARE | Facility: CLINIC | Age: 80
End: 2025-04-18
Payer: MEDICARE

## 2025-04-18 VITALS
TEMPERATURE: 99 F | HEIGHT: 68 IN | DIASTOLIC BLOOD PRESSURE: 65 MMHG | RESPIRATION RATE: 20 BRPM | OXYGEN SATURATION: 84 % | WEIGHT: 171 LBS | BODY MASS INDEX: 25.91 KG/M2 | HEART RATE: 93 BPM | SYSTOLIC BLOOD PRESSURE: 111 MMHG

## 2025-04-18 DIAGNOSIS — J18.9 PNEUMONIA OF RIGHT LUNG DUE TO INFECTIOUS ORGANISM, UNSPECIFIED PART OF LUNG: Primary | ICD-10-CM

## 2025-04-18 DIAGNOSIS — M05.711 RHEUMATOID ARTHRITIS INVOLVING RIGHT SHOULDER WITH POSITIVE RHEUMATOID FACTOR: ICD-10-CM

## 2025-04-18 DIAGNOSIS — C50.012 MALIGNANT NEOPLASM OF NIPPLE OF LEFT BREAST IN FEMALE, ESTROGEN RECEPTOR POSITIVE: ICD-10-CM

## 2025-04-18 DIAGNOSIS — R05.1 ACUTE COUGH: ICD-10-CM

## 2025-04-18 DIAGNOSIS — I10 ESSENTIAL HYPERTENSION, BENIGN: ICD-10-CM

## 2025-04-18 DIAGNOSIS — R11.10 VOMITING, UNSPECIFIED VOMITING TYPE, UNSPECIFIED WHETHER NAUSEA PRESENT: ICD-10-CM

## 2025-04-18 DIAGNOSIS — Z17.0 MALIGNANT NEOPLASM OF NIPPLE OF LEFT BREAST IN FEMALE, ESTROGEN RECEPTOR POSITIVE: ICD-10-CM

## 2025-04-18 LAB
CTP QC/QA: YES
CTP QC/QA: YES
FLUAV AG NPH QL: NEGATIVE
FLUBV AG NPH QL: NEGATIVE
SARS CORONAVIRUS 2 ANTIGEN: NEGATIVE

## 2025-04-18 RX ORDER — BENZONATATE 100 MG/1
100 CAPSULE ORAL 3 TIMES DAILY PRN
Qty: 15 CAPSULE | Refills: 0 | Status: SHIPPED | OUTPATIENT
Start: 2025-04-18 | End: 2025-04-23

## 2025-04-18 RX ORDER — VIT C/E/ZN/COPPR/LUTEIN/ZEAXAN 250MG-90MG
1 CAPSULE ORAL EVERY MORNING
COMMUNITY

## 2025-04-18 RX ORDER — DOXYCYCLINE 100 MG/1
CAPSULE ORAL
COMMUNITY
Start: 2025-03-21

## 2025-04-18 RX ORDER — HYDROXYCHLOROQUINE SULFATE 200 MG/1
200 TABLET, FILM COATED ORAL 2 TIMES DAILY
COMMUNITY

## 2025-04-18 RX ORDER — ALBUTEROL SULFATE 0.83 MG/ML
2.5 SOLUTION RESPIRATORY (INHALATION)
Status: COMPLETED | OUTPATIENT
Start: 2025-04-18 | End: 2025-04-18

## 2025-04-18 RX ORDER — ALBUTEROL SULFATE 0.83 MG/ML
2.5 SOLUTION RESPIRATORY (INHALATION) EVERY 6 HOURS PRN
Qty: 75 ML | Refills: 0 | Status: SHIPPED | OUTPATIENT
Start: 2025-04-18 | End: 2025-04-25

## 2025-04-18 RX ORDER — PREDNISONE 20 MG/1
20 TABLET ORAL DAILY
Qty: 5 TABLET | Refills: 0 | Status: SHIPPED | OUTPATIENT
Start: 2025-04-18 | End: 2025-04-23

## 2025-04-18 RX ORDER — AZITHROMYCIN 250 MG/1
TABLET, FILM COATED ORAL
Qty: 6 TABLET | Refills: 0 | Status: SHIPPED | OUTPATIENT
Start: 2025-04-18

## 2025-04-18 RX ADMIN — ALBUTEROL SULFATE 2.5 MG: 0.83 SOLUTION RESPIRATORY (INHALATION) at 11:04

## 2025-04-18 NOTE — PROGRESS NOTES
"Subjective:      Patient ID: Kirstie Bowden is a 80 y.o. female.    Vitals:  height is 5' 8" (1.727 m) and weight is 77.6 kg (171 lb). Her oral temperature is 98.8 °F (37.1 °C). Her blood pressure is 111/65 and her pulse is 93. Her respiration is 20 and oxygen saturation is 84% (abnormal).     Chief Complaint: Cough    80-year-old afebrile female who presents today with chief complaint of cough and wheezing since yesterday.  She denies any other symptoms or complaints.  Patient's room air oxygen saturation is noted to be 84%.  This was rechecked and was 90% on room air.  She explains that she has COPD.  She appears well hydrated, nontoxic, very comfortable on room air.  She is speaking in complete sentences and does not appear to be in any distress whatsoever.    Cough  This is a new problem. The current episode started yesterday. The problem has been gradually worsening. The problem occurs constantly. The cough is Productive of sputum. Associated symptoms include wheezing. Associated symptoms comments: emesis. She has tried OTC cough suppressant and OTC inhaler for the symptoms. The treatment provided mild relief. Her past medical history is significant for COPD.       Respiratory:  Positive for cough and wheezing.    Skin:  Negative for erythema.      Objective:     Physical Exam   Constitutional: She is oriented to person, place, and time.  Non-toxic appearance. She does not appear ill. No distress. normal  HENT:   Head: Normocephalic and atraumatic.   Nose: Nose normal.   Mouth/Throat: Mucous membranes are moist. No posterior oropharyngeal erythema. Oropharynx is clear.   Eyes: Conjunctivae are normal. Extraocular movement intact   Neck: Neck supple. No neck rigidity present.   Cardiovascular: Normal rate, regular rhythm, normal heart sounds and normal pulses.   Pulmonary/Chest: Effort normal and breath sounds normal.   Abdominal: Normal appearance.   Musculoskeletal: Normal range of motion.         " General: Normal range of motion.      Cervical back: She exhibits no tenderness.   Lymphadenopathy:     She has no cervical adenopathy.   Neurological: She is alert and oriented to person, place, and time. She displays no weakness. Gait normal.   Skin: Skin is warm, dry, not diaphoretic, not pale and no rash. Capillary refill takes less than 2 seconds. No bruising, No erythema and No lesion no jaundice  Psychiatric: Her behavior is normal. Mood, judgment and thought content normal.   Vitals reviewed.    Results for orders placed or performed in visit on 04/18/25   POCT Influenza A/B Rapid Antigen    Collection Time: 04/18/25 11:23 AM   Result Value Ref Range    Rapid Influenza A Ag Negative Negative    Rapid Influenza B Ag Negative Negative     Acceptable Yes    SARS Coronavirus 2 Antigen, POCT Manual Read    Collection Time: 04/18/25 11:23 AM   Result Value Ref Range    SARS Coronavirus 2 Antigen Negative Negative, Presumptive Negative     Acceptable Yes       Assessment:     1. Pneumonia of right lung due to infectious organism, unspecified part of lung    2. Vomiting, unspecified vomiting type, unspecified whether nausea present    3. Acute cough        Plan:       Pneumonia of right lung due to infectious organism, unspecified part of lung  -     azithromycin (Z-MERRILL) 250 MG tablet; Take 2 tablets by mouth on day 1; Take 1 tablet by mouth on days 2-5  Dispense: 6 tablet; Refill: 0  -     predniSONE (DELTASONE) 20 MG tablet; Take 1 tablet (20 mg total) by mouth once daily. for 5 days  Dispense: 5 tablet; Refill: 0  -     albuterol (PROVENTIL) 2.5 mg /3 mL (0.083 %) nebulizer solution; Take 3 mLs (2.5 mg total) by nebulization every 6 (six) hours as needed for Wheezing.  Dispense: 75 mL; Refill: 0    Vomiting, unspecified vomiting type, unspecified whether nausea present  -     POCT Influenza A/B Rapid Antigen  -     SARS Coronavirus 2 Antigen, POCT Manual Read    Acute cough  -     XR  CHEST PA AND LATERAL; Future; Expected date: 04/18/2025  -     benzonatate (TESSALON) 100 MG capsule; Take 1 capsule (100 mg total) by mouth 3 (three) times daily as needed for Cough.  Dispense: 15 capsule; Refill: 0  -     albuterol nebulizer solution 2.5 mg  -     albuterol (PROVENTIL) 2.5 mg /3 mL (0.083 %) nebulizer solution; Take 3 mLs (2.5 mg total) by nebulization every 6 (six) hours as needed for Wheezing.  Dispense: 75 mL; Refill: 0      INSTRUCTIONS  Medications as prescribed.  Rest.  Increase oral fluids.  Follow up with primary care on Monday as instructed for re-evaluation.  In the meantime, return here or go to ER immediately for worsening of symptoms, or for any new symptoms as discussed.

## 2025-04-21 DIAGNOSIS — Z79.899 HIGH RISK MEDICATIONS (NOT ANTICOAGULANTS) LONG-TERM USE: ICD-10-CM

## 2025-04-21 DIAGNOSIS — D84.821 DRUG-INDUCED IMMUNODEFICIENCY: ICD-10-CM

## 2025-04-21 DIAGNOSIS — M05.79 RHEUMATOID ARTHRITIS INVOLVING MULTIPLE SITES WITH POSITIVE RHEUMATOID FACTOR: ICD-10-CM

## 2025-04-21 DIAGNOSIS — Z79.899 DRUG-INDUCED IMMUNODEFICIENCY: ICD-10-CM

## 2025-04-21 RX ORDER — ONDANSETRON 8 MG/1
TABLET, ORALLY DISINTEGRATING ORAL
Qty: 30 TABLET | Refills: 0 | Status: ON HOLD | OUTPATIENT
Start: 2025-04-21

## 2025-04-21 RX ORDER — FOLIC ACID 1 MG/1
1000 TABLET ORAL DAILY
Qty: 90 TABLET | Refills: 3 | Status: ON HOLD | OUTPATIENT
Start: 2025-04-21 | End: 2026-04-16

## 2025-04-21 NOTE — TELEPHONE ENCOUNTER
Pharmacy requesting refill on FOLIC ACID 1MG  Pt's LOV 3/12/2025  Pt's NOV 6/13/2025  Medication pending

## 2025-04-23 ENCOUNTER — TELEPHONE (OUTPATIENT)
Dept: PULMONOLOGY | Facility: CLINIC | Age: 80
End: 2025-04-23
Payer: MEDICARE

## 2025-04-23 ENCOUNTER — TELEPHONE (OUTPATIENT)
Dept: RHEUMATOLOGY | Facility: CLINIC | Age: 80
End: 2025-04-23
Payer: MEDICARE

## 2025-04-23 RX ORDER — SODIUM CHLORIDE 0.9 % (FLUSH) 0.9 %
10 SYRINGE (ML) INJECTION
OUTPATIENT
Start: 2025-04-23

## 2025-04-23 RX ORDER — ACETAMINOPHEN 325 MG/1
650 TABLET ORAL
OUTPATIENT
Start: 2025-04-23

## 2025-04-23 RX ORDER — EPINEPHRINE 0.3 MG/.3ML
0.3 INJECTION SUBCUTANEOUS ONCE AS NEEDED
OUTPATIENT
Start: 2025-04-23

## 2025-04-23 RX ORDER — DIPHENHYDRAMINE HYDROCHLORIDE 50 MG/ML
50 INJECTION, SOLUTION INTRAMUSCULAR; INTRAVENOUS ONCE AS NEEDED
OUTPATIENT
Start: 2025-04-23

## 2025-04-23 RX ORDER — DIPHENHYDRAMINE HCL 25 MG
25 CAPSULE ORAL ONCE
OUTPATIENT
Start: 2025-04-23

## 2025-04-23 RX ORDER — ACETAMINOPHEN 325 MG/1
325 TABLET ORAL ONCE
OUTPATIENT
Start: 2025-04-23

## 2025-04-23 RX ORDER — HEPARIN 100 UNIT/ML
500 SYRINGE INTRAVENOUS
OUTPATIENT
Start: 2025-04-23

## 2025-04-23 RX ORDER — DIPHENHYDRAMINE HYDROCHLORIDE 50 MG/ML
25 INJECTION, SOLUTION INTRAMUSCULAR; INTRAVENOUS
OUTPATIENT
Start: 2025-04-23

## 2025-04-23 NOTE — TELEPHONE ENCOUNTER
IV Orencia therapy plan entered. Patient [facility] Message sent. Provided information about financial counselor. If patient prefers [facility] center can switch there if needed    Staff,  Infusion center will be requesting a signed consent. Please reach out to patient about needed consent

## 2025-04-23 NOTE — TELEPHONE ENCOUNTER
Khushboo Aguirre MD Marin, Roxana M., PharmD  Cc: Lata Washburn, PharmD  Lata, can we look into Orencia infusions please?          Previous Messages       ----- Message -----  From: Isabel Tinajero, PharmD  Sent: 4/15/2025   3:20 PM CDT  To: Khushboo Aguirre MD; Lata Ordonez*  Subject: Orencia                                          Good afternoon,    We have been working with the patient for PAP. Unfortunately, she has been denied and cannot afford her copay. Her copay for most of her medication options will be just as high. She may be a better candidate for infusions. Please let me know how you would like to proceed.    Thanks,    Isabel Tinajero, PharmD  Clinical Pharmacist  [facility]  [address] [place] [address]  Phone: [phone]  Fax: [phone]

## 2025-04-23 NOTE — TELEPHONE ENCOUNTER
----- Message from Ramírez sent at 4/23/2025 12:07 PM CDT -----  Type: General Call Back Name of Caller:ptSymptoms:severe cough, pain, and sobWould the patient rather a call back or a response via [facility]? [facility] Call Back Number:[phone] Additional Information: Pt states she has taken a lot of medication from [facility] last week and nothing is helping. Pt would like to be seen before 04/29 for sob.  ----- Message -----  From: Ramírez Worthy  Sent: 4/23/2025  12:09 PM CDT  To: [facility] Staff    Type: General Call Back Name of Caller:ptSymptoms:severe cough, pain, and sobWould the patient rather a call back or a response via [facility]? [facility] Call Back Number:[phone] Additional Information: Pt states she has taken a lot of medication from [facility] last week and nothing is helping. Pt would like to be seen before 04/29 for sob.

## 2025-04-24 ENCOUNTER — HOSPITAL ENCOUNTER (INPATIENT)
Facility: HOSPITAL | Age: 80
LOS: 5 days | Discharge: HOME HEALTH | DRG: 193 | End: 2025-04-29
Attending: EMERGENCY MEDICINE | Admitting: INTERNAL MEDICINE
Payer: MEDICARE

## 2025-04-24 ENCOUNTER — OFFICE VISIT (OUTPATIENT)
Dept: PULMONOLOGY | Facility: CLINIC | Age: 80
End: 2025-04-24
Payer: MEDICARE

## 2025-04-24 VITALS
BODY MASS INDEX: 25.46 KG/M2 | DIASTOLIC BLOOD PRESSURE: 80 MMHG | OXYGEN SATURATION: 75 % | WEIGHT: 168 LBS | SYSTOLIC BLOOD PRESSURE: 120 MMHG | HEART RATE: 84 BPM | HEIGHT: 68 IN

## 2025-04-24 DIAGNOSIS — R06.02 SHORTNESS OF BREATH AT REST: ICD-10-CM

## 2025-04-24 DIAGNOSIS — E78.5 HYPERLIPIDEMIA: ICD-10-CM

## 2025-04-24 DIAGNOSIS — J44.1 COPD EXACERBATION: Primary | ICD-10-CM

## 2025-04-24 DIAGNOSIS — R09.02 HYPOXEMIA: ICD-10-CM

## 2025-04-24 DIAGNOSIS — J44.9 CHRONIC OBSTRUCTIVE PULMONARY DISEASE, UNSPECIFIED COPD TYPE: ICD-10-CM

## 2025-04-24 DIAGNOSIS — R06.02 SHORTNESS OF BREATH: ICD-10-CM

## 2025-04-24 DIAGNOSIS — M05.79 RHEUMATOID ARTHRITIS INVOLVING MULTIPLE SITES WITH POSITIVE RHEUMATOID FACTOR: ICD-10-CM

## 2025-04-24 DIAGNOSIS — J96.01 ACUTE HYPOXIC RESPIRATORY FAILURE: ICD-10-CM

## 2025-04-24 DIAGNOSIS — R09.1 PLEURISY: ICD-10-CM

## 2025-04-24 DIAGNOSIS — R07.9 CHEST PAIN: ICD-10-CM

## 2025-04-24 LAB
ABSOLUTE EOSINOPHIL (SMH): 0.03 K/UL
ABSOLUTE MONOCYTE (SMH): 0.91 K/UL (ref 0.3–1)
ABSOLUTE NEUTROPHIL COUNT (SMH): 12 K/UL (ref 1.8–7.7)
ALBUMIN SERPL-MCNC: 4 G/DL (ref 3.5–5.2)
ALP SERPL-CCNC: 60 UNIT/L (ref 55–135)
ALT SERPL-CCNC: 47 UNIT/L (ref 10–44)
ANION GAP (SMH): 8 MMOL/L (ref 8–16)
AST SERPL-CCNC: 24 UNIT/L (ref 10–40)
BASOPHILS # BLD AUTO: 0.01 K/UL
BASOPHILS NFR BLD AUTO: 0.1 %
BILIRUB SERPL-MCNC: 0.6 MG/DL (ref 0.1–1)
BNP SERPL-MCNC: 138 PG/ML
BUN SERPL-MCNC: 29 MG/DL (ref 8–23)
CALCIUM SERPL-MCNC: 9.8 MG/DL (ref 8.7–10.5)
CHLORIDE SERPL-SCNC: 100 MMOL/L (ref 95–110)
CO2 SERPL-SCNC: 35 MMOL/L (ref 23–29)
CREAT SERPL-MCNC: 1.3 MG/DL (ref 0.5–1.4)
ERYTHROCYTE [DISTWIDTH] IN BLOOD BY AUTOMATED COUNT: 17 % (ref 11.5–14.5)
GFR SERPLBLD CREATININE-BSD FMLA CKD-EPI: 42 ML/MIN/1.73/M2
GLUCOSE SERPL-MCNC: 77 MG/DL (ref 70–110)
HCT VFR BLD AUTO: 37 % (ref 37–48.5)
HCV AB SERPL QL IA: NORMAL
HGB BLD-MCNC: 11.4 GM/DL (ref 12–16)
HIV 1+2 AB+HIV1 P24 AG SERPL QL IA: NORMAL
IMM GRANULOCYTES # BLD AUTO: 0.31 K/UL (ref 0–0.04)
IMM GRANULOCYTES NFR BLD AUTO: 2.2 % (ref 0–0.5)
INFLUENZA A MOLECULAR (OHS): NEGATIVE
INFLUENZA B MOLECULAR (OHS): NEGATIVE
LYMPHOCYTES # BLD AUTO: 1.05 K/UL (ref 1–4.8)
MCH RBC QN AUTO: 31.8 PG (ref 27–31)
MCHC RBC AUTO-ENTMCNC: 30.8 G/DL (ref 32–36)
MCV RBC AUTO: 103 FL (ref 82–98)
NUCLEATED RBC (/100WBC) (SMH): 0 /100 WBC
PLATELET # BLD AUTO: 290 K/UL (ref 150–450)
PMV BLD AUTO: 10.5 FL (ref 9.2–12.9)
POTASSIUM SERPL-SCNC: 3.9 MMOL/L (ref 3.5–5.1)
PROT SERPL-MCNC: 7.3 GM/DL (ref 6–8.4)
RBC # BLD AUTO: 3.59 M/UL (ref 4–5.4)
RELATIVE EOSINOPHIL (SMH): 0.2 % (ref 0–8)
RELATIVE LYMPHOCYTE (SMH): 7.3 % (ref 18–48)
RELATIVE MONOCYTE (SMH): 6.4 % (ref 4–15)
RELATIVE NEUTROPHIL (SMH): 83.8 % (ref 38–73)
SARS-COV-2 RDRP RESP QL NAA+PROBE: NEGATIVE
SODIUM SERPL-SCNC: 143 MMOL/L (ref 136–145)
TROPONIN HIGH SENSITIVE (SMH): 12.7 PG/ML
WBC # BLD AUTO: 14.33 K/UL (ref 3.9–12.7)

## 2025-04-24 PROCEDURE — 99285 EMERGENCY DEPT VISIT HI MDM: CPT | Mod: 25

## 2025-04-24 PROCEDURE — 25000242 PHARM REV CODE 250 ALT 637 W/ HCPCS

## 2025-04-24 PROCEDURE — 25000003 PHARM REV CODE 250

## 2025-04-24 PROCEDURE — 94640 AIRWAY INHALATION TREATMENT: CPT

## 2025-04-24 PROCEDURE — 96365 THER/PROPH/DIAG IV INF INIT: CPT

## 2025-04-24 PROCEDURE — 25000242 PHARM REV CODE 250 ALT 637 W/ HCPCS: Performed by: REGISTERED NURSE

## 2025-04-24 PROCEDURE — 25500020 PHARM REV CODE 255: Performed by: EMERGENCY MEDICINE

## 2025-04-24 PROCEDURE — 36415 COLL VENOUS BLD VENIPUNCTURE: CPT | Performed by: EMERGENCY MEDICINE

## 2025-04-24 PROCEDURE — 87502 INFLUENZA DNA AMP PROBE: CPT

## 2025-04-24 PROCEDURE — 99900035 HC TECH TIME PER 15 MIN (STAT)

## 2025-04-24 PROCEDURE — 25000003 PHARM REV CODE 250: Performed by: REGISTERED NURSE

## 2025-04-24 PROCEDURE — 83880 ASSAY OF NATRIURETIC PEPTIDE: CPT

## 2025-04-24 PROCEDURE — 99214 OFFICE O/P EST MOD 30 MIN: CPT | Mod: S$PBB,,, | Performed by: NURSE PRACTITIONER

## 2025-04-24 PROCEDURE — 99213 OFFICE O/P EST LOW 20 MIN: CPT | Mod: PBBFAC,PN | Performed by: NURSE PRACTITIONER

## 2025-04-24 PROCEDURE — 12000002 HC ACUTE/MED SURGE SEMI-PRIVATE ROOM

## 2025-04-24 PROCEDURE — 85025 COMPLETE CBC W/AUTO DIFF WBC: CPT

## 2025-04-24 PROCEDURE — 84484 ASSAY OF TROPONIN QUANT: CPT

## 2025-04-24 PROCEDURE — 86803 HEPATITIS C AB TEST: CPT | Performed by: EMERGENCY MEDICINE

## 2025-04-24 PROCEDURE — U0002 COVID-19 LAB TEST NON-CDC: HCPCS

## 2025-04-24 PROCEDURE — 99900031 HC PATIENT EDUCATION (STAT)

## 2025-04-24 PROCEDURE — 94761 N-INVAS EAR/PLS OXIMETRY MLT: CPT

## 2025-04-24 PROCEDURE — 27000221 HC OXYGEN, UP TO 24 HOURS

## 2025-04-24 PROCEDURE — 96375 TX/PRO/DX INJ NEW DRUG ADDON: CPT

## 2025-04-24 PROCEDURE — 63600175 PHARM REV CODE 636 W HCPCS: Mod: JZ,TB

## 2025-04-24 PROCEDURE — 94799 UNLISTED PULMONARY SVC/PX: CPT

## 2025-04-24 PROCEDURE — 99999 PR PBB SHADOW E&M-EST. PATIENT-LVL III: CPT | Mod: PBBFAC,,, | Performed by: NURSE PRACTITIONER

## 2025-04-24 PROCEDURE — 93005 ELECTROCARDIOGRAM TRACING: CPT | Performed by: INTERNAL MEDICINE

## 2025-04-24 PROCEDURE — 93010 ELECTROCARDIOGRAM REPORT: CPT | Mod: ,,, | Performed by: INTERNAL MEDICINE

## 2025-04-24 PROCEDURE — 87389 HIV-1 AG W/HIV-1&-2 AB AG IA: CPT | Performed by: EMERGENCY MEDICINE

## 2025-04-24 PROCEDURE — 82310 ASSAY OF CALCIUM: CPT

## 2025-04-24 RX ORDER — CEFTRIAXONE 1 G/1
1 INJECTION, POWDER, FOR SOLUTION INTRAMUSCULAR; INTRAVENOUS
Status: DISCONTINUED | OUTPATIENT
Start: 2025-04-24 | End: 2025-04-24

## 2025-04-24 RX ORDER — GLUCAGON 1 MG
1 KIT INJECTION
Status: DISCONTINUED | OUTPATIENT
Start: 2025-04-24 | End: 2025-04-29 | Stop reason: HOSPADM

## 2025-04-24 RX ORDER — BENZONATATE 200 MG/1
1 CAPSULE ORAL 3 TIMES DAILY PRN
Status: ON HOLD | COMMUNITY

## 2025-04-24 RX ORDER — CEFTRIAXONE 1 G/1
1 INJECTION, POWDER, FOR SOLUTION INTRAMUSCULAR; INTRAVENOUS
Status: DISCONTINUED | OUTPATIENT
Start: 2025-04-25 | End: 2025-04-29 | Stop reason: HOSPADM

## 2025-04-24 RX ORDER — IBUPROFEN 200 MG
16 TABLET ORAL
Status: DISCONTINUED | OUTPATIENT
Start: 2025-04-24 | End: 2025-04-29 | Stop reason: HOSPADM

## 2025-04-24 RX ORDER — NALOXONE HCL 0.4 MG/ML
0.02 VIAL (ML) INJECTION
Status: DISCONTINUED | OUTPATIENT
Start: 2025-04-24 | End: 2025-04-29 | Stop reason: HOSPADM

## 2025-04-24 RX ORDER — LEVOTHYROXINE SODIUM 125 UG/1
125 TABLET ORAL
COMMUNITY

## 2025-04-24 RX ORDER — CEFTRIAXONE 1 G/1
1 INJECTION, POWDER, FOR SOLUTION INTRAMUSCULAR; INTRAVENOUS
Status: COMPLETED | OUTPATIENT
Start: 2025-04-24 | End: 2025-04-24

## 2025-04-24 RX ORDER — HYDROXYCHLOROQUINE SULFATE 200 MG/1
200 TABLET, FILM COATED ORAL 2 TIMES DAILY
Status: DISCONTINUED | OUTPATIENT
Start: 2025-04-24 | End: 2025-04-29 | Stop reason: HOSPADM

## 2025-04-24 RX ORDER — ACETAMINOPHEN 325 MG/1
650 TABLET ORAL EVERY 4 HOURS PRN
Status: DISCONTINUED | OUTPATIENT
Start: 2025-04-24 | End: 2025-04-29 | Stop reason: HOSPADM

## 2025-04-24 RX ORDER — BENZONATATE 100 MG/1
200 CAPSULE ORAL 3 TIMES DAILY PRN
Status: DISCONTINUED | OUTPATIENT
Start: 2025-04-24 | End: 2025-04-29 | Stop reason: HOSPADM

## 2025-04-24 RX ORDER — AMLODIPINE BESYLATE 5 MG/1
5 TABLET ORAL NIGHTLY
Status: DISCONTINUED | OUTPATIENT
Start: 2025-04-24 | End: 2025-04-29 | Stop reason: HOSPADM

## 2025-04-24 RX ORDER — ATORVASTATIN CALCIUM 20 MG/1
20 TABLET, FILM COATED ORAL NIGHTLY
Status: DISCONTINUED | OUTPATIENT
Start: 2025-04-24 | End: 2025-04-29 | Stop reason: HOSPADM

## 2025-04-24 RX ORDER — DIPHENHYDRAMINE HCL 25 MG
50 CAPSULE ORAL
Status: DISCONTINUED | OUTPATIENT
Start: 2025-04-24 | End: 2025-04-24

## 2025-04-24 RX ORDER — DIPHENHYDRAMINE HCL 25 MG
50 CAPSULE ORAL NIGHTLY
Status: DISCONTINUED | OUTPATIENT
Start: 2025-04-24 | End: 2025-04-29 | Stop reason: HOSPADM

## 2025-04-24 RX ORDER — IPRATROPIUM BROMIDE AND ALBUTEROL SULFATE 2.5; .5 MG/3ML; MG/3ML
3 SOLUTION RESPIRATORY (INHALATION)
Status: DISCONTINUED | OUTPATIENT
Start: 2025-04-24 | End: 2025-04-29 | Stop reason: HOSPADM

## 2025-04-24 RX ORDER — SODIUM CHLORIDE 0.9 % (FLUSH) 0.9 %
10 SYRINGE (ML) INJECTION EVERY 12 HOURS PRN
Status: DISCONTINUED | OUTPATIENT
Start: 2025-04-24 | End: 2025-04-29 | Stop reason: HOSPADM

## 2025-04-24 RX ORDER — ENOXAPARIN SODIUM 100 MG/ML
40 INJECTION SUBCUTANEOUS EVERY 24 HOURS
Status: DISCONTINUED | OUTPATIENT
Start: 2025-04-25 | End: 2025-04-29 | Stop reason: HOSPADM

## 2025-04-24 RX ORDER — GABAPENTIN 300 MG/1
300 CAPSULE ORAL 2 TIMES DAILY
Status: DISCONTINUED | OUTPATIENT
Start: 2025-04-24 | End: 2025-04-29 | Stop reason: HOSPADM

## 2025-04-24 RX ORDER — DIPHENHYDRAMINE HCL 25 MG
25 TABLET ORAL NIGHTLY
COMMUNITY

## 2025-04-24 RX ORDER — ONDANSETRON HYDROCHLORIDE 2 MG/ML
4 INJECTION, SOLUTION INTRAVENOUS EVERY 8 HOURS PRN
Status: DISCONTINUED | OUTPATIENT
Start: 2025-04-24 | End: 2025-04-29 | Stop reason: HOSPADM

## 2025-04-24 RX ORDER — IBUPROFEN 200 MG
24 TABLET ORAL
Status: DISCONTINUED | OUTPATIENT
Start: 2025-04-24 | End: 2025-04-29 | Stop reason: HOSPADM

## 2025-04-24 RX ORDER — METHYLPREDNISOLONE SOD SUCC 125 MG
125 VIAL (EA) INJECTION
Status: DISCONTINUED | OUTPATIENT
Start: 2025-04-24 | End: 2025-04-24

## 2025-04-24 RX ORDER — METHYLPREDNISOLONE SOD SUCC 125 MG
125 VIAL (EA) INJECTION
Status: COMPLETED | OUTPATIENT
Start: 2025-04-24 | End: 2025-04-24

## 2025-04-24 RX ORDER — IPRATROPIUM BROMIDE AND ALBUTEROL SULFATE 2.5; .5 MG/3ML; MG/3ML
3 SOLUTION RESPIRATORY (INHALATION)
Status: COMPLETED | OUTPATIENT
Start: 2025-04-24 | End: 2025-04-24

## 2025-04-24 RX ADMIN — AMLODIPINE BESYLATE 5 MG: 5 TABLET ORAL at 08:04

## 2025-04-24 RX ADMIN — IPRATROPIUM BROMIDE AND ALBUTEROL SULFATE 3 ML: .5; 3 SOLUTION RESPIRATORY (INHALATION) at 01:04

## 2025-04-24 RX ADMIN — IPRATROPIUM BROMIDE AND ALBUTEROL SULFATE 3 ML: 2.5; .5 SOLUTION RESPIRATORY (INHALATION) at 07:04

## 2025-04-24 RX ADMIN — IOHEXOL 100 ML: 350 INJECTION, SOLUTION INTRAVENOUS at 03:04

## 2025-04-24 RX ADMIN — METHYLPREDNISOLONE SODIUM SUCCINATE 125 MG: 125 INJECTION, POWDER, FOR SOLUTION INTRAMUSCULAR; INTRAVENOUS at 02:04

## 2025-04-24 RX ADMIN — ATORVASTATIN CALCIUM 20 MG: 20 TABLET, FILM COATED ORAL at 08:04

## 2025-04-24 RX ADMIN — HYDROXYCHLOROQUINE SULFATE 200 MG: 200 TABLET, FILM COATED ORAL at 11:04

## 2025-04-24 RX ADMIN — GABAPENTIN 300 MG: 300 CAPSULE ORAL at 11:04

## 2025-04-24 RX ADMIN — CEFTRIAXONE SODIUM 1 G: 1 INJECTION, POWDER, FOR SOLUTION INTRAMUSCULAR; INTRAVENOUS at 02:04

## 2025-04-24 RX ADMIN — AZITHROMYCIN MONOHYDRATE 500 MG: 500 INJECTION, POWDER, LYOPHILIZED, FOR SOLUTION INTRAVENOUS at 02:04

## 2025-04-24 NOTE — PHARMACY MED REC
"              .        Admission Medication History     The home medication history was taken by Gladis Brown.    You may go to "Admission" then "Reconcile Home Medications" tabs to review and/or act upon these items.     The home medication list has been updated by the Pharmacy department.   Please read ALL comments highlighted in yellow.   Please address this information as you see fit.    Feel free to contact us if you have any questions or require assistance.      The medications listed below were removed from the home medication list. Please reorder if appropriate:  Patient reports no longer taking the following medication(s):  Z MERRILL  Mucinex  Tramadol  Ambien    Medications listed below were obtained from: Patient/family and Analytic software- [employer]  Medications Ordered Prior to Encounter[1]    Potential issues to be addressed PRIOR TO DISCHARGE  Patient reported not taking the following medications: (Zofran ODT, Prednisone & Doxycycline). These medications remain on the home medication list. Please address accordingly.     Gladis Brown  EXT 1920           [1]   Current Facility-Administered Medications on File Prior to Encounter   Medication Dose Route Frequency Provider Last Rate Last Admin    lactated ringers infusion   Intravenous Once PRN Nathen Lyons MD        lidocaine (PF) 10 mg/ml (1%) injection 10 mg  1 mL Intradermal Once Eladia Schwartz MD         Current Outpatient Medications on File Prior to Encounter   Medication Sig Dispense Refill    albuterol (PROVENTIL) 2.5 mg /3 mL (0.083 %) nebulizer solution Take 3 mLs (2.5 mg total) by nebulization every 6 (six) hours as needed for Wheezing. 75 mL 0    albuterol (PROVENTIL/VENTOLIN HFA) 90 mcg/actuation inhaler INHALE 2 PUFFS INTO THE LUNGS EVERY 6 (SIX) HOURS AS NEEDED FOR WHEEZING. RESCUE 54 g 3    amLODIPine (NORVASC) 5 MG tablet TAKE 1 TABLET BY MOUTH EVERY DAY (Patient taking differently: Take 5 mg by mouth every evening.) 90 tablet " 3    atorvastatin (LIPITOR) 20 MG tablet TAKE 1 TABLET BY MOUTH EVERY DAY (Patient taking differently: Take 20 mg by mouth every evening.) 90 tablet 3    benzonatate (TESSALON) 200 MG capsule Take 1 capsule by mouth 3 times daily as needed for Cough.      cholecalciferol, vitamin D3, (VITAMIN D3) 25 mcg (1,000 unit) capsule Take 1 capsule by mouth every morning.      cyanocobalamin (VITAMIN B-12) 1000 MCG tablet Take 100 mcg by mouth once daily.      exemestane (AROMASIN) 25 mg tablet TAKE 1 TABLET BY MOUTH EVERY DAY (Patient taking differently: Take 25 mg by mouth once daily.) 60 tablet 5    fluticasone propionate (FLONASE) 50 mcg/actuation nasal spray USE 1 SPRAY (50 MCG TOTAL) IN EACH NOSTRIL ONCE DAILY (Patient taking differently: 1 spray by Each Nostril route once daily.) 48 mL 3    fluticasone-salmeterol 250-50 mcg/dose (WIXELA INHUB) 250-50 mcg/dose diskus inhaler INHALE 1 PUFF INTO THE LUNGS 2 (TWO) TIMES DAILY. CONTROLLER 180 each 3    folic acid (FOLVITE) 1 MG tablet Take 1 tablet (1,000 mcg total) by mouth once daily. 90 tablet 3    gabapentin (NEURONTIN) 300 MG capsule Take 1 capsule (300 mg total) by mouth 2 (two) times daily. 60 capsule 2    hydroCHLOROthiazide (HYDRODIURIL) 25 MG tablet TAKE 1 TABLET BY MOUTH EVERY DAY (Patient taking differently: Take 25 mg by mouth once daily.) 90 tablet 3    hydroxychloroquine (PLAQUENIL) 200 mg tablet Take 200 mg by mouth 2 (two) times daily.      levocetirizine (XYZAL) 5 MG tablet Take 1 tablet (5 mg total) by mouth every evening. 30 tablet 0    levothyroxine (SYNTHROID) 125 MCG tablet Take 125 mcg by mouth before breakfast.      methotrexate 2.5 MG Tab Take 8 tablets (20 mg total) by mouth every 7 days. (Patient taking differently: Take 20 mg by mouth Every Friday.) 96 tablet 3    prochlorperazine (COMPAZINE) 10 MG tablet TAKE 1 TABLET BY MOUTH EVERY 6 HOURS AS NEEDED (Patient taking differently: Take 10 mg by mouth every 6 (six) hours as needed (nausea).) 60  tablet 0    doxycycline (VIBRAMYCIN) 100 MG Cap  (Patient not taking: Reported on 4/24/2025)      ondansetron (ZOFRAN-ODT) 8 MG TbDL DISSOLVE 1 TABLET IN MOUTH EVERY 12 HOURS AS NEEDED (Patient not taking: Reported on 4/24/2025) 30 tablet 0    predniSONE (DELTASONE) 5 MG tablet Take 1 tablet (5 mg total) by mouth once daily. (Patient not taking: Reported on 4/24/2025) 90 tablet 3    [DISCONTINUED] azithromycin (Z-MERRILL) 250 MG tablet Take 2 tablets by mouth on day 1; Take 1 tablet by mouth on days 2-5 6 tablet 0    [DISCONTINUED] benzonatate (TESSALON) 100 MG capsule Take 1 capsule (100 mg total) by mouth 3 (three) times daily as needed for Cough. 15 capsule 0    [DISCONTINUED] fluticasone propionate (FLONASE) 50 mcg/actuation nasal spray 1 spray (50 mcg total) by Each Nostril route once daily. 11.1 mL 0    [DISCONTINUED] guaiFENesin (MUCINEX) 600 mg 12 hr tablet Take 2 tablets (1,200 mg total) by mouth 2 (two) times daily. 30 tablet 0    [DISCONTINUED] levothyroxine (SYNTHROID) 112 MCG tablet Take 1 tablet (112 mcg total) by mouth before breakfast. 90 tablet 3    [DISCONTINUED] predniSONE (DELTASONE) 20 MG tablet Take 1 tablet (20 mg total) by mouth once daily. for 5 days 5 tablet 0    [DISCONTINUED] traMADoL (ULTRAM) 50 mg tablet Take 1 tablet (50 mg total) by mouth every 12 (twelve) hours as needed for Pain (severe pain). TAKE 1 TABLET BY MOUTH EVERY 8 HOURS AS NEEDED FOR SEVERE PAIN- DOSE DECREASE 60 tablet 1    [DISCONTINUED] zolpidem (AMBIEN) 5 MG Tab Take 1 tablet (5 mg total) by mouth every evening. 30 tablet 3

## 2025-04-24 NOTE — PROGRESS NOTES
SUBJECTIVE:    Patient ID: Kirstie Bowden is a 80 y.o. female.    Chief Complaint: Cough, Shortness of Breath, and Sputum Production       Patient here today with her daughter not feeling well since last week. She went to an urgent care last week with cough, pleurisy and shortness of breath. She did have fever initially but none recently.  She had a chest xray with questionable pneumonia. She was given antibiotics and oral steroids and discharged. She has finished the antibiotics and steroids and feeling worse. She is still having pleuritic pain to the left, coughing, and short of breath. She is hypoxemic,needing 2 liters at rest to keep sats in 90's.  She was hypoxemic at urgent care initially but then sean in 90s after several minutes, the patient states she checks her sats and she is not getting above 88% at home. She is using her advair twice a day and her rescue medication regularly right now with no improvement. She also has RA which is being managed with Plaquenil, methotrexate and prednisone by her rheumatologist.  Since she is hypoxemic and has failed outpatient therapy will have her go to the ER to be worked up and admitted.      Past Medical History:   Diagnosis Date    Arthritis     rheumatoid    Asthma     Breast cancer     Cancer 2020    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    GERD (gastroesophageal reflux disease) 2022    Hearing loss [id number]    Hyperlipidemia     Hypertension 2021    Limb alert care status     NO BP/IV LEFT ARM    Lung disease     Personal history of colonic polyps 01/23/2024    Pseudocholinesterase deficiency     family history    Radiation 2020    Rheumatoid arthritis 2020    Thyroid disease      Past Surgical History:   Procedure Laterality Date    AXILLARY NODE DISSECTION Left 03/14/2019    Procedure: LYMPHADENECTOMY, AXILLARY;  Surgeon: Mp Gonzalez MD;  Location: [facility];  Service: General;  Laterality: Left;  left lumpectomy with axillary lypmh node dissection    BALLOON DILATION  OF URETER Left 06/24/2019    Procedure: DILATION, URETER, USING BALLOON;  Surgeon: Jorden Dickson MD;  Location: [facility] OR;  Service: Urology;  Laterality: Left;    BREAST BIOPSY Left 20 yrs ago    benign    BREAST CYST EXCISION  15 yrs ago    BREAST LUMPECTOMY      x2    CHOLECYSTECTOMY  2000    COLONOSCOPY  09/25/2018    LUC EASTON MD    CYSTOSCOPY W/ URETERAL STENT PLACEMENT Left 06/24/2019    Procedure: CYSTOSCOPY, WITH URETERAL STENT INSERTION;  Surgeon: Jorden Dickson MD;  Location: [facility] OR;  Service: Urology;  Laterality: Left;    CYSTOSCOPY W/ URETERAL STENT REMOVAL Left 07/23/2019    Procedure: CYSTOSCOPY, WITH URETERAL STENT REMOVAL;  Surgeon: Jorden Dickson MD;  Location: [facility] OR;  Service: Urology;  Laterality: Left;    EPIDURAL STEROID INJECTION INTO LUMBAR SPINE N/A 09/30/2021    Procedure: Injection-steroid-epidural-lumbar;  Surgeon: Nathen Lyons MD;  Location: [facility] OR;  Service: Pain Management;  Laterality: N/A;  L5-S1      EPIDURAL STEROID INJECTION INTO LUMBAR SPINE N/A 11/16/2021    Procedure: Injection-steroid-epidural-lumbar;  Surgeon: Nathen Lyons MD;  Location: [facility] OR;  Service: Pain Management;  Laterality: N/A;  L5-S1    EXTRACORPOREAL SHOCK WAVE LITHOTRIPSY Left 07/23/2019    Procedure: LITHOTRIPSY, ESWL;  Surgeon: Jorden Dickson MD;  Location: [facility] OR;  Service: Urology;  Laterality: Left;    EYE SURGERY Bilateral 2000    cataracts    HYSTERECTOMY  1994    MASTECTOMY, PARTIAL Left 04/25/2019    Procedure: MASTECTOMY, PARTIAL;  Surgeon: Mp Gonzalez MD;  Location: [facility] OR;  Service: General;  Laterality: Left;    NEEDLE LOCALIZATION Left 03/14/2019    Procedure: NEEDLE LOCALIZATION;  Surgeon: Mp Gonzalez MD;  Location: [facility] OR;  Service: General;  Laterality: Left;  left lumpectomy with wire needle localization     PARTIAL NEPHRECTOMY Right 05/22/2023    RETROGRADE PYELOGRAPHY Bilateral 06/24/2019    Procedure: PYELOGRAM, RETROGRADE;  Surgeon: Jorden Dickson  MD;  Location: [facility] OR;  Service: Urology;  Laterality: Bilateral;    SENTINEL LYMPH NODE BIOPSY Left 03/14/2019    Procedure: BIOPSY, LYMPH NODE, SENTINEL;  Surgeon: Mp Gonzalez MD;  Location: [facility] OR;  Service: General;  Laterality: Left;  left sentinel node lymph node biopsy    TONSILLECTOMY  1948    TRANSFORAMINAL EPIDURAL INJECTION OF STEROID Right 01/04/2022    Procedure: Injection,steroid,epidural,transforaminal approach;  Surgeon: Nathen Lyons MD;  Location: [facility] OR;  Service: Pain Management;  Laterality: Right;  L4-L5, L5-s1    TRANSFORAMINAL EPIDURAL INJECTION OF STEROID Right 07/09/2024    Procedure: Injection,steroid,epidural,transforaminal approach;  Surgeon: Nathen Lyons MD;  Location: [facility] OR;  Service: Pain Management;  Laterality: Right;    URETEROSCOPY Left 06/24/2019    Procedure: URETEROSCOPY;  Surgeon: Jorden Dickson MD;  Location: [facility] OR;  Service: Urology;  Laterality: Left;     Family History   Problem Relation Name Age of Onset    Heart disease Mother Patricia     Hypertension Mother Patricia     Alzheimer's disease Mother Patricia     Cancer Father Kirstie     Heart disease Sister Ania     Arthritis Sister Ania     Kidney disease Sister Ania     Stroke Sister Alvina sister         Sister    Diabetes Brother Nicklos     Cancer Daughter      Stroke Sister Alvina         Social History:   Marital Status:   Occupation: Data Unavailable  Alcohol History:  reports current alcohol use of about 2.0 standard drinks of alcohol per week.  Tobacco History:  reports that she quit smoking about 5 years ago. Her smoking use included cigarettes. She started smoking about 65 years ago. She has a 29.6 pack-year smoking history. She has never used smokeless tobacco.  Drug History:  reports no history of drug use.    Review of patient's allergies indicates:   Allergen Reactions    Succinylcholine chloride Other (See Comments)    Sulfur dioxide Hives    Sulfamethoxazole-trimethoprim       Other reaction(s): per dr daryn Rodríguez (sulfonamide antibiotics)      NOT SURE REACTION       Current Outpatient Medications   Medication Sig Dispense Refill    albuterol (PROVENTIL) 2.5 mg /3 mL (0.083 %) nebulizer solution Take 3 mLs (2.5 mg total) by nebulization every 6 (six) hours as needed for Wheezing. 75 mL 0    albuterol (PROVENTIL/VENTOLIN HFA) 90 mcg/actuation inhaler INHALE 2 PUFFS INTO THE LUNGS EVERY 6 (SIX) HOURS AS NEEDED FOR WHEEZING. RESCUE 54 g 3    amLODIPine (NORVASC) 5 MG tablet TAKE 1 TABLET BY MOUTH EVERY DAY 90 tablet 3    atorvastatin (LIPITOR) 20 MG tablet TAKE 1 TABLET BY MOUTH EVERY DAY 90 tablet 3    benzonatate (TESSALON) 200 MG capsule Take 1 capsule by mouth 3 times daily as needed.      cholecalciferol, vitamin D3, (VITAMIN D3) 25 mcg (1,000 unit) capsule Take 1 capsule by mouth every morning.      cyanocobalamin (VITAMIN B-12) 1000 MCG tablet Take 100 mcg by mouth once daily.      exemestane (AROMASIN) 25 mg tablet TAKE 1 TABLET BY MOUTH EVERY DAY 60 tablet 5    fluticasone propionate (FLONASE) 50 mcg/actuation nasal spray USE 1 SPRAY (50 MCG TOTAL) IN EACH NOSTRIL ONCE DAILY 48 mL 3    fluticasone propionate (FLONASE) 50 mcg/actuation nasal spray 1 spray (50 mcg total) by Each Nostril route once daily. 11.1 mL 0    fluticasone-salmeterol 250-50 mcg/dose (WIXELA INHUB) 250-50 mcg/dose diskus inhaler INHALE 1 PUFF INTO THE LUNGS 2 (TWO) TIMES DAILY. CONTROLLER 180 each 3    folic acid (FOLVITE) 1 MG tablet Take 1 tablet (1,000 mcg total) by mouth once daily. 90 tablet 3    gabapentin (NEURONTIN) 300 MG capsule Take 1 capsule (300 mg total) by mouth 2 (two) times daily. 60 capsule 2    guaiFENesin (MUCINEX) 600 mg 12 hr tablet Take 2 tablets (1,200 mg total) by mouth 2 (two) times daily. 30 tablet 0    hydroCHLOROthiazide (HYDRODIURIL) 25 MG tablet TAKE 1 TABLET BY MOUTH EVERY DAY 90 tablet 3    hydroxychloroquine (PLAQUENIL) 200 mg tablet Take 200 mg by mouth 2 (two) times  daily.      levocetirizine (XYZAL) 5 MG tablet Take 1 tablet (5 mg total) by mouth every evening. 30 tablet 0    levothyroxine (SYNTHROID) 112 MCG tablet Take 1 tablet (112 mcg total) by mouth before breakfast. 90 tablet 3    methotrexate 2.5 MG Tab Take 8 tablets (20 mg total) by mouth every 7 days. 96 tablet 3    ondansetron (ZOFRAN-ODT) 8 MG TbDL DISSOLVE 1 TABLET IN MOUTH EVERY 12 HOURS AS NEEDED 30 tablet 0    prochlorperazine (COMPAZINE) 10 MG tablet TAKE 1 TABLET BY MOUTH EVERY 6 HOURS AS NEEDED 60 tablet 0    traMADoL (ULTRAM) 50 mg tablet Take 1 tablet (50 mg total) by mouth every 12 (twelve) hours as needed for Pain (severe pain). TAKE 1 TABLET BY MOUTH EVERY 8 HOURS AS NEEDED FOR SEVERE PAIN- DOSE DECREASE 60 tablet 1    zolpidem (AMBIEN) 5 MG Tab Take 1 tablet (5 mg total) by mouth every evening. 30 tablet 3    azithromycin (Z-MERRILL) 250 MG tablet Take 2 tablets by mouth on day 1; Take 1 tablet by mouth on days 2-5 (Patient not taking: Reported on 2025) 6 tablet 0    doxycycline (VIBRAMYCIN) 100 MG Cap SMARTSI Capsule(s) By Mouth Morning-Night (Patient not taking: Reported on 2025)      predniSONE (DELTASONE) 5 MG tablet Take 1 tablet (5 mg total) by mouth once daily. (Patient not taking: Reported on 2025) 90 tablet 3     No current facility-administered medications for this visit.     Facility-Administered Medications Ordered in Other Visits   Medication Dose Route Frequency Provider Last Rate Last Admin    lactated ringers infusion   Intravenous Once PRN Nathen Lyons MD        lidocaine (PF) 10 mg/ml (1%) injection 10 mg  1 mL Intradermal Once Eladia Schwartz MD           Alpha-1 Antitrypsin:  Last PFT:  2023  Moderate obstruction with no change with bronchodilators, no restriction, moderate diffusion defect.  6 minute walk:  2023  Non hypoxemic  Last PET/CT:23    1. No findings of recurrent FDG avid neoplasm or metastatic disease.  2. Additional observations as  "described.      Review of Systems   Respiratory:  Positive for cough and shortness of breath.      General: had night sweats other night, does not feel well   Eyes: Vision is good.  ENT: hoarseness   Heart:: No chest pain or palpitations.  Lungs:  productive cough all day and night with green mucous, pleuritic pain to the left  GI: reflux,  makes her cough  : Nocturia  Musculoskeletal: arthritis in upper ext and neck, better after injections  Skin: No lesions or rashes. Bruises easily  Neuro: Neuropathy is better  Lymph: ankles swell sometimes  Psych: No anxiety or depression.  Endo: No weight change.    OBJECTIVE:      /80 (BP Location: Right arm, Patient Position: Sitting)   Pulse 84   Ht 5' 8" (1.727 m)   Wt 76.2 kg (168 lb)   SpO2 (!) 75% Comment: 95% on 2LPM  BMI 25.54 kg/m²     Physical Exam  GENERAL: Older patient in no distress.  HEENT: Pupils equal and reactive. Extraocular movements intact. Nose intact.      Pharynx moist.  NECK: Supple.   HEART: Regular rate and rhythm. No murmur or gallop auscultated.  LUNGS: Clear to auscultation and percussion. Lung excursion symmetrical. No change in fremitus. No adventitial noises.  ABDOMEN: Bowel sounds present. Non-tender, no masses palpated.  EXTREMITIES: Normal muscle tone and joint movement, no cyanosis or clubbing.   LYMPHATICS: No adenopathy palpated, no edema.  SKIN: Dry, intact, no lesions.   NEURO: Cranial nerves II-XII intact. Motor strength 5/5 bilaterally, upper and lower extremities.  PSYCH: Appropriate affect.    Assessment:       1. COPD exacerbation    2. Chronic obstructive pulmonary disease, unspecified COPD type    3. Rheumatoid arthritis involving multiple sites with positive rheumatoid factor    4. Hypoxemia    5. Pleurisy          Plan:       COPD exacerbation    Chronic obstructive pulmonary disease, unspecified COPD type    Rheumatoid arthritis involving multiple sites with positive rheumatoid " factor    Hypoxemia    Pleurisy      Patient failed outpatient therapy, she is requiring oxygen currently and is not oxygen dependent normally.    Needs to go to the ER to have workup, report given to Don in ER.   No follow-ups on file.

## 2025-04-24 NOTE — ED PROVIDER NOTES
Encounter Date: 4/24/2025       History     Chief Complaint   Patient presents with    Shortness of Breath    Cough     Pt states she has been seen for sob cough and left side pain. Her breathing has been getting worse. She saw her np today and her o2 was at 74 so they gave her o2 and sent her to the er.       HPI  Patient is a 80-year-old female PMH of COPD not on O2, breast cancer, who presents with one-week history of productive cough, shortness of breath, subjective fevers.  Was on antibiotics, and breathing treatments, no relief.  She went to primary care again today and was noted to be 74% on room air, she was placed on supplemental oxygen and told to come to the ER.  She denies ever using oxygen at home.  She denies any chest pain, lower leg swelling.  She reports left-sided flank/abdominal pain and some nausea but no vomiting.  She reports she was told she has a pleurisy.      Review of patient's allergies indicates:   Allergen Reactions    Succinylcholine chloride Other (See Comments)    Sulfur dioxide Hives    Sulfamethoxazole-trimethoprim      Other reaction(s): per dr daryn Rodríguez (sulfonamide antibiotics)      NOT SURE REACTION     Past Medical History:   Diagnosis Date    Arthritis     rheumatoid    Asthma     Breast cancer     Cancer 2020    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    GERD (gastroesophageal reflux disease) 2022    Hearing loss [id number]    Hyperlipidemia     Hypertension 2021    Limb alert care status     NO BP/IV LEFT ARM    Lung disease     Personal history of colonic polyps 01/23/2024    Pseudocholinesterase deficiency     family history    Radiation 2020    Rheumatoid arthritis 2020    Thyroid disease      Past Surgical History:   Procedure Laterality Date    AXILLARY NODE DISSECTION Left 03/14/2019    Procedure: LYMPHADENECTOMY, AXILLARY;  Surgeon: Mp Gonzalez MD;  Location: [facility];  Service: General;  Laterality: Left;  left lumpectomy with axillary lypmh node dissection    BALLOON DILATION  OF URETER Left 06/24/2019    Procedure: DILATION, URETER, USING BALLOON;  Surgeon: Jorden Dickson MD;  Location: [facility] OR;  Service: Urology;  Laterality: Left;    BREAST BIOPSY Left 20 yrs ago    benign    BREAST CYST EXCISION  15 yrs ago    BREAST LUMPECTOMY      x2    CHOLECYSTECTOMY  2000    COLONOSCOPY  09/25/2018    LUC EASTON MD    CYSTOSCOPY W/ URETERAL STENT PLACEMENT Left 06/24/2019    Procedure: CYSTOSCOPY, WITH URETERAL STENT INSERTION;  Surgeon: Jorden Dickson MD;  Location: [facility] OR;  Service: Urology;  Laterality: Left;    CYSTOSCOPY W/ URETERAL STENT REMOVAL Left 07/23/2019    Procedure: CYSTOSCOPY, WITH URETERAL STENT REMOVAL;  Surgeon: Jorden Dickson MD;  Location: [facility] OR;  Service: Urology;  Laterality: Left;    EPIDURAL STEROID INJECTION INTO LUMBAR SPINE N/A 09/30/2021    Procedure: Injection-steroid-epidural-lumbar;  Surgeon: Nathen Lyons MD;  Location: [facility] OR;  Service: Pain Management;  Laterality: N/A;  L5-S1      EPIDURAL STEROID INJECTION INTO LUMBAR SPINE N/A 11/16/2021    Procedure: Injection-steroid-epidural-lumbar;  Surgeon: Nathen Lyons MD;  Location: [facility] OR;  Service: Pain Management;  Laterality: N/A;  L5-S1    EXTRACORPOREAL SHOCK WAVE LITHOTRIPSY Left 07/23/2019    Procedure: LITHOTRIPSY, ESWL;  Surgeon: Jorden Dickson MD;  Location: [facility] OR;  Service: Urology;  Laterality: Left;    EYE SURGERY Bilateral 2000    cataracts    HYSTERECTOMY  1994    MASTECTOMY, PARTIAL Left 04/25/2019    Procedure: MASTECTOMY, PARTIAL;  Surgeon: Mp Gonzalez MD;  Location: [facility] OR;  Service: General;  Laterality: Left;    NEEDLE LOCALIZATION Left 03/14/2019    Procedure: NEEDLE LOCALIZATION;  Surgeon: Mp Gonzalez MD;  Location: [facility] OR;  Service: General;  Laterality: Left;  left lumpectomy with wire needle localization     PARTIAL NEPHRECTOMY Right 05/22/2023    RETROGRADE PYELOGRAPHY Bilateral 06/24/2019    Procedure: PYELOGRAM, RETROGRADE;  Surgeon: Jorden Dickson  MD;  Location: [facility] OR;  Service: Urology;  Laterality: Bilateral;    SENTINEL LYMPH NODE BIOPSY Left 03/14/2019    Procedure: BIOPSY, LYMPH NODE, SENTINEL;  Surgeon: Mp Gonzalez MD;  Location: [facility] OR;  Service: General;  Laterality: Left;  left sentinel node lymph node biopsy    TONSILLECTOMY  1948    TRANSFORAMINAL EPIDURAL INJECTION OF STEROID Right 01/04/2022    Procedure: Injection,steroid,epidural,transforaminal approach;  Surgeon: Nathen Lyons MD;  Location: [facility] OR;  Service: Pain Management;  Laterality: Right;  L4-L5, L5-s1    TRANSFORAMINAL EPIDURAL INJECTION OF STEROID Right 07/09/2024    Procedure: Injection,steroid,epidural,transforaminal approach;  Surgeon: Nathen Lyons MD;  Location: [facility] OR;  Service: Pain Management;  Laterality: Right;    URETEROSCOPY Left 06/24/2019    Procedure: URETEROSCOPY;  Surgeon: Jorden Dickson MD;  Location: [facility] OR;  Service: Urology;  Laterality: Left;     Family History   Problem Relation Name Age of Onset    Heart disease Mother Patricia     Hypertension Mother Patricia     Alzheimer's disease Mother Patricia     Cancer Father Kirstie     Heart disease Sister Ania     Arthritis Sister Ania     Kidney disease Sister Ania     Stroke Sister Alvina sister         Sister    Diabetes Brother Nicklos     Cancer Daughter      Stroke Sister Alvina      Social History[1]  Review of Systems  Pertinent ROS in the HPI  Physical Exam     Initial Vitals [04/24/25 1203]   BP Pulse Resp Temp SpO2   112/74 78 17 98.6 °F (37 °C) (!) 93 %      MAP       --         Physical Exam    Constitutional: She appears well-developed.   HENT:   Head: Normocephalic and atraumatic.   Eyes: Pupils are equal, round, and reactive to light.   Neck:   Normal range of motion.  Cardiovascular:  Normal rate and regular rhythm.     Exam reveals no friction rub.       No murmur heard.  Pulmonary/Chest:   On 2 L nasal cannula, able to talk full sentences, no respiratory distress, lungs clear, no  wheezes, good air movement.   Abdominal:   Soft, nondistended, mildly tender to palpation left upper and right lower quadrant, no rebound or guarding   Musculoskeletal:         General: No edema.      Cervical back: Normal range of motion.     Neurological: She is alert and oriented to person, place, and time.   Skin: Skin is warm and dry. Capillary refill takes less than 2 seconds.         ED Course   Procedures  Labs Reviewed   COMPREHENSIVE METABOLIC PANEL - Abnormal       Result Value    Sodium 143      Potassium 3.9      Chloride 100      CO2 35 (*)     Glucose 77      BUN 29 (*)     Creatinine 1.3      Calcium 9.8      Protein Total 7.3      Albumin 4.0      Bilirubin Total 0.6      ALP 60      AST 24      ALT 47 (*)     Anion Gap 8      eGFR 42 (*)    B-TYPE NATRIURETIC PEPTIDE - Abnormal     (*)    CBC WITH DIFFERENTIAL - Abnormal    WBC 14.33 (*)     RBC 3.59 (*)     Hgb 11.4 (*)     Hct 37.0       (*)     MCH 31.8 (*)     MCHC 30.8 (*)     RDW 17.0 (*)     Platelet Count 290      MPV 10.5      Nucleated RBC 0      Neut % 83.8 (*)     Lymph % 7.3 (*)     Mono % 6.4      Eos % 0.2      Basophil % 0.1      Imm Grans % 2.2 (*)     Neut # 12.0 (*)     Lymph # 1.05      Mono # 0.91      Eos # 0.03      Baso # 0.01      Imm Grans # 0.31 (*)    INFLUENZA A & B BY MOLECULAR - Normal    INFLUENZA A MOLECULAR Negative      INFLUENZA B MOLECULAR  Negative     SARS-COV-2 RNA AMPLIFICATION, QUAL - Normal    SARS COV-2 Molecular Negative     TROPONIN I HIGH SENSITIVITY - Normal    Troponin High Sensitive 12.7     CBC W/ AUTO DIFFERENTIAL    Narrative:     The following orders were created for panel order CBC Auto Differential.  Procedure                               Abnormality         Status                     ---------                               -----------         ------                     CBC with Differential[[id number]]       Abnormal            Final result                 Please view results  for these tests on the individual orders.   HEPATITIS C ANTIBODY   HIV 1 / 2 ANTIBODY   EXTRA TUBES    Narrative:     The following orders were created for panel order EXTRA TUBES.  Procedure                               Abnormality         Status                     ---------                               -----------         ------                     Light Blue Top Hold[[id number]]                             In process                   Please view results for these tests on the individual orders.   LIGHT BLUE TOP HOLD   POCT CREATININE     EKG Readings: (Independently Interpreted)   Normal sinus rhythm with premature PVC, left axis, no ST elevations or depressions, no T-wave inversions     ECG Results              EKG 12-lead (In process)        Collection Time Result Time QRS Duration OHS QTC Calculation    04/24/25 11:39:57 04/24/25 12:10:47 92 436                     In process by Interface, Lab In [facility] (04/24/25 12:10:56)                   Narrative:    Test Reason : R06.02,    Vent. Rate :  77 BPM     Atrial Rate :  77 BPM     P-R Int : 142 ms          QRS Dur :  92 ms      QT Int : 386 ms       P-R-T Axes :  85 -23  72 degrees    QTcB Int : 436 ms    Sinus rhythm with Premature supraventricular complexes  Otherwise normal ECG  No previous ECGs available    Referred By: AAAREFERRAL SELF           Confirmed By:                                   Imaging Results              CT Abdomen Pelvis With IV Contrast NO Oral Contrast (Final result)  Result time 04/24/25 15:48:39      Final result by Wellington Roper MD (04/24/25 15:48:39)                   Impression:      1. No acute CT abnormalities.  2. Multiple chronic findings as above.      Electronically signed by: Wellington Roper  Date:    04/24/2025  Time:    15:48               Narrative:    EXAMINATION:  CT ABDOMEN PELVIS WITH IV CONTRAST    CLINICAL HISTORY:  Abdominal pain, acute, nonlocalized;.    TECHNIQUE:  Post infusion axial images were  obtained from the lung bases to the pubic symphysis 100 cc nonionic contrast was utilized for the examination.    COMPARISON:  Noncontrast CT, January 2025    FINDINGS  The liver has a normal appearance.  The gallbladder is absent.  The biliary tree is nondilated.  The spleen is normal in appearance.  The pancreas is unremarkable.  The adrenal glands are normal.  Small left-sided simple renal cysts are noted, as well as at least 2 nonobstructing left renal calculi, the largest at the lower pole measuring 6 mm.  There is no evidence of left ureteral calculus or hydronephrosis.  The right kidney is surgically absent, with no evidence of mass lesion at the right renal fossa.  There is dense multifocal aortic atherosclerosis.    There is no pathologic bowel wall thickening or evidence of obstruction.  There is a mild amount of retained fecal matter throughout the colon.  There is no free air or free fluid.  The appendix is normal in appearance.    Images of the pelvis demonstrate sigmoid diverticula without diverticulitis.  The urinary bladder is unremarkable.  There is no pelvic free fluid or lymphadenopathy.    Moderate-severe arthritic changes noted at the right hip, with mild to moderate arthritic change on the left.  5.5 x 2.2 cm fluid collection superficial to the right femur at the level of the greater trochanter is likely bursal fluid accumulation/bursitis.  This collection appears smaller when compared to January 6.    There is multilevel degenerative facet disease in the lumbar spine.                                       CTA Chest Non-Coronary (PE Studies) (Final result)  Result time 04/24/25 15:39:01      Final result by Wellington Roper MD (04/24/25 15:39:01)                   Impression:      1. No evidence of pulmonary thromboembolic disease.  2. Diffuse bilateral abnormal reticulonodular interstitial prominence, reflecting a detrimental change compared to January 2025.  Primary differential  considerations include atypical infection and lymphangitis spread of malignancy.      Electronically signed by: Wellington Yanbibiana  Date:    04/24/2025  Time:    15:39               Narrative:    EXAMINATION:  CTA CHEST NON CORONARY (PE STUDIES)    CLINICAL HISTORY:  Pulmonary embolism (PE) suspected, unknown D-dimer;.    TECHNIQUE:  CT angiography targeted to the pulmonary arteries was performed. 100 cc nonionic contrast was administered and the bolus was timed for optimal opacification of the pulmonary arteries. 3-D reformatted images were obtained on an independent workstation and stored as a part of patient's permanent medical record.    COMPARISON:  CT chest abdomen and pelvis without contrast, January 2025    FINDINGS:  Pulmonary arterial contrast opacification is adequate.  There are no identifiable filling defects to indicate pulmonary thromboembolic disease.  Heart size is normal.  Multifocal coronary artery atherosclerotic calcification is noted.  The thoracic aorta is normal in caliber.  No pathologic mediastinal or axillary lymphadenopathy is identified.  Postoperative changes are noted in the left breast.    Images at lung windows demonstrate diffuse bilateral abnormal reticulonodular interstitial prominence, reflecting an interval change compared to January 6.  Scattered small airspace opacities are also noted, new compared to the prior study.  Differential possibilities would include atypical infection and lymphangitic spread of malignancy.  No focal dominant mass is identified.  There are no pleural effusions.    No acute osseous abnormalities are demonstrated.                                       X-Ray Chest AP Portable (Final result)  Result time 04/24/25 13:55:12   Procedure changed from X-Ray Chest 1 View     Final result by Carlita Schneider MD (04/24/25 13:55:12)                   Impression:      Resolution of the right lower lobe airspace disease with stable elevation the right  hemidiaphragm    No acute pulmonary process      Electronically signed by: Carlita Schneider  Date:    04/24/2025  Time:    13:55               Narrative:    EXAMINATION:  XR CHEST AP PORTABLE    CLINICAL HISTORY:  shortness of breath;    FINDINGS:  Portable chest at 13:56 hours is compared to 04/18/2025 shows normal cardiomediastinal silhouette.   There is stable elevation the right hemidiaphragm.    There are no confluent infiltrates or pleural effusions.  Pulmonary vasculature is normal. No acute osseous abnormality.                                       Medications   sodium chloride 0.9% flush 10 mL (has no administration in time range)   naloxone 0.4 mg/mL injection 0.02 mg (has no administration in time range)   glucose chewable tablet 16 g (has no administration in time range)   glucose chewable tablet 24 g (has no administration in time range)   dextrose 50% injection 12.5 g (has no administration in time range)   dextrose 50% injection 25 g (has no administration in time range)   glucagon (human recombinant) injection 1 mg (has no administration in time range)   enoxaparin injection 40 mg (has no administration in time range)   ondansetron injection 4 mg (has no administration in time range)   acetaminophen tablet 650 mg (has no administration in time range)   azithromycin (ZITHROMAX) 500 mg in 0.9% NaCl 250 mL IVPB (admixture device) (0 mg Intravenous Stopped 4/24/25 1543)   albuterol-ipratropium 2.5 mg-0.5 mg/3 mL nebulizer solution 3 mL (3 mLs Nebulization Given 4/24/25 1357)   cefTRIAXone injection 1 g (1 g Intravenous Given 4/24/25 1419)   methylPREDNISolone sodium succinate injection 125 mg (125 mg Intravenous Given 4/24/25 1419)   iohexoL (OMNIPAQUE 350) injection 100 mL (100 mLs Intravenous Given 4/24/25 1517)     Medical Decision Making  HO-3 [facility]  Patient is a 80-year-old female with PMH of COPD, who was sent from her primary care clinic due to hypoxia.  Patient was in the mid 70s on room air at her  primary care, this past week she has had productive cough, shortness of breath subjective fevers.  Initially concern for possible pulmonary embolism, CT PE was negative for pulmonary embolism however there is some reticular nodular opacities, possible pneumonia.  CT abdomen pelvis ordered due to her abdominal pain which showed no acute abdominal pathology.  Patient was given DuoNebs, steroids and Rocephin and azithromycin.  Patient did have leukocytosis.  BNP is slightly elevated troponin within normal limits.  Trialed patient off of 2 L nasal cannula, her sats went to 88.  Based on this new oxygen requirement we will admit to medicine for acute hypoxic respiratory failure secondary to COPD.    Job Maria, PGY3  Emergency Medicine      Problems Addressed:  Acute hypoxic respiratory failure: acute illness or injury  COPD exacerbation: acute illness or injury  Shortness of breath: acute illness or injury  Shortness of breath at rest: acute illness or injury    Amount and/or Complexity of Data Reviewed  Independent Historian: caregiver  External Data Reviewed: labs, radiology and notes.  Labs: ordered. Decision-making details documented in ED Course.  Radiology: ordered and independent interpretation performed. Decision-making details documented in ED Course.  ECG/medicine tests: ordered and independent interpretation performed. Decision-making details documented in ED Course.    Risk  Prescription drug management.  Decision regarding hospitalization.              Attending Attestation:   Physician Attestation Statement for Resident:  As the supervising MD   Physician Attestation Statement: I have personally seen and examined this patient.   I agree with the above history.  -:   As the supervising MD I agree with the above PE.     As the supervising MD I agree with the above treatment, course, plan, and disposition.   -: Critical Care Time MDM    The high probability of sudden, clinically significant deterioration in  the patient's condition required the highest level of my preparedness to intervene urgently.    The services I provided to this patient were to treat and/or prevent clinically significant deterioration that could result in permanent disability, chronic pain and/or death.  Patient has acute hypoxic respiratory failure.  PE study performed and negative for PE.  Patient is not significantly anemic.  She was placed on aversion oxygen supplementation and given breathing treatments and steroids.  She continues to have hypoxia with good waveform and O2 sats 88% and will need admission for further oxygen supplementation, treatments, possible antibiotics.    Services included the following: chart data review, reviewing nursing notes and/or old charts, documentation time, consultant collaboration regarding findings and treatment options, medication orders and management, direct patient care, vital sign assessments and ordering, interpreting and reviewing diagnostic studies/lab tests.    Aggregate critical care time was 50 minutes, which includes only time during which I was engaged in work directly related to the patient's care, as described above, whether at the bedside or elsewhere in the Emergency Department.     Andrew Guerra M.D.            I have reviewed and agree with the residents interpretation of the following: lab data, x-rays, EKG and CT scans.  I have reviewed the following: old records at this facility.                ED Course as of 04/24/25 1741   Thu Apr 24, 2025   1454 Comprehensive Metabolic Panel(!) [JW]   1454 WBC(!): 14.33 [JW]   1455 Hemoglobin(!): 11.4 [JW]   1455 X-Ray Chest AP Portable  No acute focal consolidation [JW]      ED Course User Index  [JW] Job Maria MD                           Clinical Impression:  Final diagnoses:  [R06.02] Shortness of breath at rest  [R06.02] Shortness of breath  [J44.1] COPD exacerbation (Primary)  [J96.01] Acute hypoxic respiratory failure          ED  Disposition Condition    Observation                   Job Maria MD  Resident  25 [phone]         [1]   Social History  Tobacco Use    Smoking status: Former     Current packs/day: 0.00     Average packs/day: 0.5 packs/day for 59.3 years (29.6 ttl pk-yrs)     Types: Cigarettes     Start date: 1960     Quit date: 2019     Years since quittin.7    Smokeless tobacco: Never   Substance Use Topics    Alcohol use: Yes     Alcohol/week: 2.0 standard drinks of alcohol     Types: 2 Glasses of wine per week     Comment: Social    Drug use: No        Andrew Guerra MD  [phone]

## 2025-04-24 NOTE — Clinical Note
Diagnosis: COPD exacerbation [810836]   Future Attending Provider: SLIM MATHEW [[id number]]   Place in Observation: [facility] [[id number]]

## 2025-04-24 NOTE — CARE UPDATE
04/24/25 1357   Patient Assessment/Suction   Level of Consciousness (AVPU) alert   Respiratory Effort Unlabored   Expansion/Accessory Muscles/Retractions no use of accessory muscles   All Lung Fields Breath Sounds clear   LLL Breath Sounds Posterior:;crackles   RLL Breath Sounds Posterior:;crackles   Rhythm/Pattern, Respiratory shortness of breath   Cough Frequency frequent   PRE-TX-O2   Device (Oxygen Therapy) nasal cannula   $ Is the patient on Low Flow Oxygen? Yes   Flow (L/min) (Oxygen Therapy) 2   SpO2 95 %   Pulse Oximetry Type Continuous   $ Pulse Oximetry - Multiple Charge Pulse Oximetry - Multiple   Pulse 82   Resp (!) 23   Aerosol Therapy   $ Aerosol Therapy Charges Aerosol Treatment   Daily Review of Necessity (SVN) completed   Respiratory Treatment Status (SVN) given   Treatment Route (SVN) mask;oxygen   Patient Position HOB elevated   Post Treatment Assessment (SVN) increased aeration   Signs of Intolerance (SVN) none   Breath Sounds Post-Respiratory Treatment   Throughout All Fields Post-Treatment All Fields   Throughout All Fields Post-Treatment aeration increased   Post-treatment Heart Rate (beats/min) 77   Post-treatment Resp Rate (breaths/min) 22   Education   $ Education Bronchodilator;Oxygen;15 min

## 2025-04-25 LAB
ABSOLUTE EOSINOPHIL (SMH): 0 K/UL
ABSOLUTE MONOCYTE (SMH): 0.25 K/UL (ref 0.3–1)
ABSOLUTE NEUTROPHIL COUNT (SMH): 11.6 K/UL (ref 1.8–7.7)
ADENOVIRUS (SMH): NOT DETECTED
ALBUMIN SERPL-MCNC: 3.8 G/DL (ref 3.5–5.2)
ALP SERPL-CCNC: 63 UNIT/L (ref 55–135)
ALT SERPL-CCNC: 38 UNIT/L (ref 10–44)
ANION GAP (SMH): 10 MMOL/L (ref 8–16)
AST SERPL-CCNC: 17 UNIT/L (ref 10–40)
BASOPHILS # BLD AUTO: 0.02 K/UL
BASOPHILS NFR BLD AUTO: 0.1 %
BILIRUB SERPL-MCNC: 0.5 MG/DL (ref 0.1–1)
BORDETELLA PARAPERTUSSIS (IS1001) (SMH): NOT DETECTED
BORDETELLA PERTUSSIS (PTXP) (SMH): NOT DETECTED
BUN SERPL-MCNC: 33 MG/DL (ref 8–23)
CALCIUM SERPL-MCNC: 9.8 MG/DL (ref 8.7–10.5)
CHLAMYDIA PNEUMONIAE (SMH): NOT DETECTED
CHLORIDE SERPL-SCNC: 96 MMOL/L (ref 95–110)
CO2 SERPL-SCNC: 33 MMOL/L (ref 23–29)
CORONAVIRUS 229E, COMMON COLD VIRUS (SMH): NOT DETECTED
CORONAVIRUS HKU1, COMMON COLD VIRUS (SMH): NOT DETECTED
CORONAVIRUS NL63, COMMON COLD VIRUS (SMH): NOT DETECTED
CORONAVIRUS OC43, COMMON COLD VIRUS (SMH): NOT DETECTED
CREAT SERPL-MCNC: 1.2 MG/DL (ref 0.5–1.4)
ERYTHROCYTE [DISTWIDTH] IN BLOOD BY AUTOMATED COUNT: 17.5 % (ref 11.5–14.5)
FLUBV RNA NPH QL NAA+NON-PROBE: NOT DETECTED
GFR SERPLBLD CREATININE-BSD FMLA CKD-EPI: 46 ML/MIN/1.73/M2
GLUCOSE SERPL-MCNC: 97 MG/DL (ref 70–110)
HCT VFR BLD AUTO: 38.9 % (ref 37–48.5)
HGB BLD-MCNC: 11.9 GM/DL (ref 12–16)
HPIV1 RNA NPH QL NAA+NON-PROBE: NOT DETECTED
HPIV2 RNA NPH QL NAA+NON-PROBE: NOT DETECTED
HPIV3 RNA NPH QL NAA+NON-PROBE: NOT DETECTED
HPIV4 RNA NPH QL NAA+NON-PROBE: NOT DETECTED
HUMAN METAPNEUMOVIRUS (SMH): NOT DETECTED
IMM GRANULOCYTES # BLD AUTO: 0.19 K/UL (ref 0–0.04)
IMM GRANULOCYTES NFR BLD AUTO: 1.4 % (ref 0–0.5)
INFLUENZA A (SUBTYPES H1,H1-2009,H3) (SMH): NOT DETECTED
LYMPHOCYTES # BLD AUTO: 1.36 K/UL (ref 1–4.8)
MCH RBC QN AUTO: 31.1 PG (ref 27–31)
MCHC RBC AUTO-ENTMCNC: 30.6 G/DL (ref 32–36)
MCV RBC AUTO: 102 FL (ref 82–98)
MYCOPLASMA PNEUMONIAE (SMH): NOT DETECTED
NUCLEATED RBC (/100WBC) (SMH): 0 /100 WBC
PLATELET # BLD AUTO: 302 K/UL (ref 150–450)
PMV BLD AUTO: 10.7 FL (ref 9.2–12.9)
POTASSIUM SERPL-SCNC: 4.1 MMOL/L (ref 3.5–5.1)
PROCALCITONIN SERPL-MCNC: 0.11 NG/ML
PROT SERPL-MCNC: 7.2 GM/DL (ref 6–8.4)
RBC # BLD AUTO: 3.83 M/UL (ref 4–5.4)
RELATIVE EOSINOPHIL (SMH): 0 % (ref 0–8)
RELATIVE LYMPHOCYTE (SMH): 10.1 % (ref 18–48)
RELATIVE MONOCYTE (SMH): 1.9 % (ref 4–15)
RELATIVE NEUTROPHIL (SMH): 86.5 % (ref 38–73)
RESPIRATORY INFECTION PANEL SOURCE (SMH): ABNORMAL
RSV RNA NPH QL NAA+NON-PROBE: NOT DETECTED
RV+EV RNA NPH QL NAA+NON-PROBE: DETECTED
SARS-COV-2 RNA RESP QL NAA+PROBE: NOT DETECTED
SODIUM SERPL-SCNC: 139 MMOL/L (ref 136–145)
WBC # BLD AUTO: 13.42 K/UL (ref 3.9–12.7)

## 2025-04-25 PROCEDURE — 63600175 PHARM REV CODE 636 W HCPCS: Performed by: INTERNAL MEDICINE

## 2025-04-25 PROCEDURE — 36415 COLL VENOUS BLD VENIPUNCTURE: CPT | Performed by: STUDENT IN AN ORGANIZED HEALTH CARE EDUCATION/TRAINING PROGRAM

## 2025-04-25 PROCEDURE — 94761 N-INVAS EAR/PLS OXIMETRY MLT: CPT

## 2025-04-25 PROCEDURE — 63600175 PHARM REV CODE 636 W HCPCS: Performed by: REGISTERED NURSE

## 2025-04-25 PROCEDURE — 99900031 HC PATIENT EDUCATION (STAT)

## 2025-04-25 PROCEDURE — 84145 PROCALCITONIN (PCT): CPT | Performed by: STUDENT IN AN ORGANIZED HEALTH CARE EDUCATION/TRAINING PROGRAM

## 2025-04-25 PROCEDURE — 94640 AIRWAY INHALATION TREATMENT: CPT

## 2025-04-25 PROCEDURE — 36415 COLL VENOUS BLD VENIPUNCTURE: CPT | Performed by: INTERNAL MEDICINE

## 2025-04-25 PROCEDURE — 0202U NFCT DS 22 TRGT SARS-COV-2: CPT | Performed by: STUDENT IN AN ORGANIZED HEALTH CARE EDUCATION/TRAINING PROGRAM

## 2025-04-25 PROCEDURE — 25000242 PHARM REV CODE 250 ALT 637 W/ HCPCS: Performed by: REGISTERED NURSE

## 2025-04-25 PROCEDURE — 12000002 HC ACUTE/MED SURGE SEMI-PRIVATE ROOM

## 2025-04-25 PROCEDURE — 63600175 PHARM REV CODE 636 W HCPCS: Performed by: STUDENT IN AN ORGANIZED HEALTH CARE EDUCATION/TRAINING PROGRAM

## 2025-04-25 PROCEDURE — 25000003 PHARM REV CODE 250: Performed by: REGISTERED NURSE

## 2025-04-25 PROCEDURE — 27000221 HC OXYGEN, UP TO 24 HOURS

## 2025-04-25 PROCEDURE — 99223 1ST HOSP IP/OBS HIGH 75: CPT | Mod: ,,, | Performed by: STUDENT IN AN ORGANIZED HEALTH CARE EDUCATION/TRAINING PROGRAM

## 2025-04-25 PROCEDURE — 99900035 HC TECH TIME PER 15 MIN (STAT)

## 2025-04-25 PROCEDURE — 85025 COMPLETE CBC W/AUTO DIFF WBC: CPT | Performed by: REGISTERED NURSE

## 2025-04-25 PROCEDURE — 25000003 PHARM REV CODE 250: Performed by: INTERNAL MEDICINE

## 2025-04-25 PROCEDURE — 80053 COMPREHEN METABOLIC PANEL: CPT | Performed by: REGISTERED NURSE

## 2025-04-25 PROCEDURE — 36415 COLL VENOUS BLD VENIPUNCTURE: CPT | Performed by: REGISTERED NURSE

## 2025-04-25 RX ORDER — PREDNISONE 20 MG/1
40 TABLET ORAL DAILY
Status: COMPLETED | OUTPATIENT
Start: 2025-04-25 | End: 2025-04-29

## 2025-04-25 RX ADMIN — VANCOMYCIN HYDROCHLORIDE 1500 MG: 1.5 INJECTION, POWDER, LYOPHILIZED, FOR SOLUTION INTRAVENOUS at 03:04

## 2025-04-25 RX ADMIN — PREDNISONE 40 MG: 20 TABLET ORAL at 01:04

## 2025-04-25 RX ADMIN — GABAPENTIN 300 MG: 300 CAPSULE ORAL at 09:04

## 2025-04-25 RX ADMIN — ATORVASTATIN CALCIUM 20 MG: 20 TABLET, FILM COATED ORAL at 09:04

## 2025-04-25 RX ADMIN — IPRATROPIUM BROMIDE AND ALBUTEROL SULFATE 3 ML: 2.5; .5 SOLUTION RESPIRATORY (INHALATION) at 07:04

## 2025-04-25 RX ADMIN — CEFTRIAXONE 1 G: 1 INJECTION, POWDER, FOR SOLUTION INTRAMUSCULAR; INTRAVENOUS at 01:04

## 2025-04-25 RX ADMIN — IPRATROPIUM BROMIDE AND ALBUTEROL SULFATE 3 ML: 2.5; .5 SOLUTION RESPIRATORY (INHALATION) at 12:04

## 2025-04-25 RX ADMIN — ENOXAPARIN SODIUM 40 MG: 40 INJECTION SUBCUTANEOUS at 06:04

## 2025-04-25 RX ADMIN — AZITHROMYCIN MONOHYDRATE 500 MG: 500 INJECTION, POWDER, LYOPHILIZED, FOR SOLUTION INTRAVENOUS at 01:04

## 2025-04-25 RX ADMIN — DIPHENHYDRAMINE HYDROCHLORIDE 50 MG: 25 CAPSULE ORAL at 12:04

## 2025-04-25 RX ADMIN — GABAPENTIN 300 MG: 300 CAPSULE ORAL at 10:04

## 2025-04-25 RX ADMIN — HYDROXYCHLOROQUINE SULFATE 200 MG: 200 TABLET, FILM COATED ORAL at 10:04

## 2025-04-25 RX ADMIN — HYDROXYCHLOROQUINE SULFATE 200 MG: 200 TABLET, FILM COATED ORAL at 09:04

## 2025-04-25 RX ADMIN — AMLODIPINE BESYLATE 5 MG: 5 TABLET ORAL at 09:04

## 2025-04-25 RX ADMIN — BENZONATATE 200 MG: 100 CAPSULE ORAL at 12:04

## 2025-04-25 RX ADMIN — BENZONATATE 200 MG: 100 CAPSULE ORAL at 09:04

## 2025-04-25 RX ADMIN — DIPHENHYDRAMINE HYDROCHLORIDE 50 MG: 25 CAPSULE ORAL at 09:04

## 2025-04-25 NOTE — HPI
Patient is a 80-year-old female with a history of RA, hypertension, COPD, hypothyroidism breast CA, renal CA.  Patient reports she went to urgent care last week and was diagnosed with pneumonia and pleurisy and started on antibiotics and oral steroids.  Patient reports symptoms only worsened since completing antibiotic course.  She saw her pulmonologist today in clinic and was referred to ED. in ED labwork remarkable for leukocytosis with a left shift.  Of note patient currently on methotrexate and exemestane.  Currently requiring 2 L nasal cannula which she is not on any home O2..  Flu and COVID both negative.  BNP elevated at 138.  Chest x-ray with improved right lower lobe density.  CTA c/a/p  was ordered which ruled out PE, and no acute intra-abdominal abnormality, however there is detrimental change in diffuse bilateral reticulonodular interstitial prominence from 3 months prior differential including atypical infection versus lymphangitis spread of malignancy.  Patient will be admitted to Hospital Medicine.  Started on Rocephin and azithromycin.  Continue supplemental O2 and start DuoNebs.

## 2025-04-25 NOTE — SUBJECTIVE & OBJECTIVE
Interval History:   Patient seen and examined.  NAD.  Care plan and diagnostic tests discussed with the patient, she verbalized understanding.  Pulmonology consulted.  Protocol pending.  Review of Systems   Constitutional:  Positive for fever.   HENT:  Positive for voice change.    Respiratory:  Positive for cough and shortness of breath.    Neurological:  Positive for weakness.     Objective:     Vital Signs (Most Recent):  Temp: 98.6 °F (37 °C) (04/24/25 1203)  Pulse: 76 (04/25/25 1252)  Resp: 16 (04/25/25 1252)  BP: 114/75 (04/25/25 0959)  SpO2: (!) 94 % (04/25/25 1252) Vital Signs (24h Range):  Pulse:  [69-92] 76  Resp:  [15-21] 16  SpO2:  [92 %-100 %] 94 %  BP: (114-159)/(63-95) 114/75     Weight: 76.2 kg (168 lb)  Body mass index is 25.54 kg/m².    Intake/Output Summary (Last 24 hours) at 4/25/2025 1439  Last data filed at 4/24/2025 1549  Gross per 24 hour   Intake 225.89 ml   Output --   Net 225.89 ml         Physical Exam  Constitutional:       General: She is not in acute distress.     Appearance: Normal appearance. She is ill-appearing.   Cardiovascular:      Rate and Rhythm: Normal rate and regular rhythm.      Pulses: Normal pulses.      Heart sounds: Normal heart sounds.   Pulmonary:      Breath sounds: Wheezing and rhonchi present.   Abdominal:      General: Bowel sounds are normal.      Palpations: Abdomen is soft.   Musculoskeletal:      Right lower leg: No edema.      Left lower leg: No edema.   Skin:     General: Skin is warm and dry.   Neurological:      Mental Status: She is alert and oriented to person, place, and time.   Psychiatric:         Mood and Affect: Mood normal.         Behavior: Behavior normal.         Thought Content: Thought content normal.         Judgment: Judgment normal.               Significant Labs: All pertinent labs within the past 24 hours have been reviewed.  Recent Lab Results         04/25/25  0740   04/24/25  1537        Influenza A, Molecular   Negative        Influenza B, Molecular   Negative       Albumin 3.8         ALP 63         ALT 38         Anion Gap 10         AST 17         Baso # 0.02         Basophil % 0.1         BILIRUBIN TOTAL 0.5  Comment: For infants and newborns, interpretation of results should be based   on gestational age, weight and in agreement with clinical   observations.    Premature Infant recommended reference ranges:   0-24 hours:  <8.0 mg/dL   24-48 hours: <12.0 mg/dL   3-5 days:    <15.0 mg/dL   6-29 days:   <15.0 mg/dL         BUN 33         Calcium 9.8         Chloride 96         CO2 33         Creatinine 1.2         eGFR 46         Eos # 0.00         Eos % 0.0         Glucose 97         Hematocrit 38.9         Hemoglobin 11.9         Immature Grans (Abs) 0.19  Comment: Mild elevation in immature granulocytes is non specific and can be seen in a variety of conditions including stress response, acute inflammation, trauma and pregnancy. Correlation with other laboratory and clinical findings is essential.         Immature Granulocytes 1.4         Lymph # 1.36         LYMPH % 10.1         MCH 31.1         MCHC 30.6                  Mono # 0.25         Mono % 1.9         MPV 10.7         Neut # 11.6         Neut % 86.5         nRBC 0         Platelet Count 302         Potassium 4.1         PROTEIN TOTAL 7.2         RBC 3.83         RDW 17.5         Sodium 139         WBC 13.42                 Significant Imaging: I have reviewed all pertinent imaging results/findings within the past 24 hours.  Imaging Results              CT Abdomen Pelvis With IV Contrast NO Oral Contrast (Final result)  Result time 04/24/25 15:48:39      Final result by Wellington Roper MD (04/24/25 15:48:39)                   Impression:      1. No acute CT abnormalities.  2. Multiple chronic findings as above.      Electronically signed by: Wellington Roper  Date:    04/24/2025  Time:    15:48               Narrative:    EXAMINATION:  CT ABDOMEN PELVIS WITH IV  CONTRAST    CLINICAL HISTORY:  Abdominal pain, acute, nonlocalized;.    TECHNIQUE:  Post infusion axial images were obtained from the lung bases to the pubic symphysis 100 cc nonionic contrast was utilized for the examination.    COMPARISON:  Noncontrast CT, January 2025    FINDINGS  The liver has a normal appearance.  The gallbladder is absent.  The biliary tree is nondilated.  The spleen is normal in appearance.  The pancreas is unremarkable.  The adrenal glands are normal.  Small left-sided simple renal cysts are noted, as well as at least 2 nonobstructing left renal calculi, the largest at the lower pole measuring 6 mm.  There is no evidence of left ureteral calculus or hydronephrosis.  The right kidney is surgically absent, with no evidence of mass lesion at the right renal fossa.  There is dense multifocal aortic atherosclerosis.    There is no pathologic bowel wall thickening or evidence of obstruction.  There is a mild amount of retained fecal matter throughout the colon.  There is no free air or free fluid.  The appendix is normal in appearance.    Images of the pelvis demonstrate sigmoid diverticula without diverticulitis.  The urinary bladder is unremarkable.  There is no pelvic free fluid or lymphadenopathy.    Moderate-severe arthritic changes noted at the right hip, with mild to moderate arthritic change on the left.  5.5 x 2.2 cm fluid collection superficial to the right femur at the level of the greater trochanter is likely bursal fluid accumulation/bursitis.  This collection appears smaller when compared to January 6.    There is multilevel degenerative facet disease in the lumbar spine.                                       CTA Chest Non-Coronary (PE Studies) (Final result)  Result time 04/24/25 15:39:01      Final result by Wellington Roper MD (04/24/25 15:39:01)                   Impression:      1. No evidence of pulmonary thromboembolic disease.  2. Diffuse bilateral abnormal reticulonodular  interstitial prominence, reflecting a detrimental change compared to January 2025.  Primary differential considerations include atypical infection and lymphangitis spread of malignancy.      Electronically signed by: Wellington Stauffergiovanni  Date:    04/24/2025  Time:    15:39               Narrative:    EXAMINATION:  CTA CHEST NON CORONARY (PE STUDIES)    CLINICAL HISTORY:  Pulmonary embolism (PE) suspected, unknown D-dimer;.    TECHNIQUE:  CT angiography targeted to the pulmonary arteries was performed. 100 cc nonionic contrast was administered and the bolus was timed for optimal opacification of the pulmonary arteries. 3-D reformatted images were obtained on an independent workstation and stored as a part of patient's permanent medical record.    COMPARISON:  CT chest abdomen and pelvis without contrast, January 2025    FINDINGS:  Pulmonary arterial contrast opacification is adequate.  There are no identifiable filling defects to indicate pulmonary thromboembolic disease.  Heart size is normal.  Multifocal coronary artery atherosclerotic calcification is noted.  The thoracic aorta is normal in caliber.  No pathologic mediastinal or axillary lymphadenopathy is identified.  Postoperative changes are noted in the left breast.    Images at lung windows demonstrate diffuse bilateral abnormal reticulonodular interstitial prominence, reflecting an interval change compared to January 6.  Scattered small airspace opacities are also noted, new compared to the prior study.  Differential possibilities would include atypical infection and lymphangitic spread of malignancy.  No focal dominant mass is identified.  There are no pleural effusions.    No acute osseous abnormalities are demonstrated.                                       X-Ray Chest AP Portable (Final result)  Result time 04/24/25 13:55:12   Procedure changed from X-Ray Chest 1 View     Final result by Carlita Schneider MD (04/24/25 13:55:12)                    Impression:      Resolution of the right lower lobe airspace disease with stable elevation the right hemidiaphragm    No acute pulmonary process      Electronically signed by: Carlita Schneider  Date:    04/24/2025  Time:    13:55               Narrative:    EXAMINATION:  XR CHEST AP PORTABLE    CLINICAL HISTORY:  shortness of breath;    FINDINGS:  Portable chest at 13:56 hours is compared to 04/18/2025 shows normal cardiomediastinal silhouette.   There is stable elevation the right hemidiaphragm.    There are no confluent infiltrates or pleural effusions.  Pulmonary vasculature is normal. No acute osseous abnormality.

## 2025-04-25 NOTE — PROGRESS NOTES
"Pharmacokinetic Initial Assessment: IV Vancomycin    Assessment/Plan:    Initiate intravenous vancomycin with loading dose of 1500 mg once followed by a maintenance dose of vancomycin 1000 mg IV every 24 hours  Desired empiric serum trough concentration is 15 to 20 mcg/mL  Draw vancomycin trough level 60 min prior to third dose on 04/27/2025 at approximately 14:00  Pharmacy will continue to follow and monitor vancomycin.      Please contact pharmacy at extension [phone] with any questions regarding this assessment.     Thank you for the consult,   Michelle Corrigan       Patient brief summary:  Kirstie Bowden is a 80 y.o. female initiated on antimicrobial therapy with IV Vancomycin for treatment of suspected lower respiratory infection    Drug Allergies:   Review of patient's allergies indicates:   Allergen Reactions    Succinylcholine chloride Other (See Comments)    Sulfur dioxide Hives    Sulfamethoxazole-trimethoprim      Other reaction(s): per dr daryn Rodríguez (sulfonamide antibiotics)      NOT SURE REACTION       Actual Body Weight:   76.2 kg    Renal Function:   Estimated Creatinine Clearance: 38.3 mL/min (based on SCr of 1.2 mg/dL).,     Dialysis Method (if applicable):  N/A    CBC (last 72 hours):  Recent Labs   Lab Result Units 04/24/25  1403 04/25/25  0740   WBC K/uL 14.33* 13.42*   Hgb gm/dL 11.4* 11.9*   Hct % 37.0 38.9   Platelet Count K/uL 290 302   Mono % % 6.4 1.9*   Eos % % 0.2 0.0   Basophil % % 0.1 0.1       Metabolic Panel (last 72 hours):  Recent Labs   Lab Result Units 04/24/25  1403 04/25/25  0740   Sodium mmol/L 143 139   Potassium mmol/L 3.9 4.1   Chloride mmol/L 100 96   CO2 mmol/L 35* 33*   Glucose mg/dL 77 97   BUN mg/dL 29* 33*   Creatinine mg/dL 1.3 1.2   Albumin g/dL 4.0 3.8   Bilirubin Total mg/dL 0.6 0.5   ALP unit/L 60 63   AST unit/L 24 17   ALT unit/L 47* 38       Drug levels (last 3 results):  No results for input(s): "VANCOMYCINRA", "VANCORANDOM", "VANCOMYCINPE", " ""VANCOPEAK", "VANCOMYCINTR", "VANCOTROUGH" in the last 72 hours.    Microbiologic Results:  Microbiology Results (last 7 days)       Procedure Component Value Units Date/Time    Respiratory Infection Panel (PCR), Nasopharyngeal [[id number]]  (Abnormal) Collected: 04/25/25 1305    Order Status: Completed Specimen: Nasopharyngeal Swab Updated: 04/25/25 1449     Respiratory Infection Panel Source Nasopharyngeal Swab     Adenovirus Not Detected     Coronavirus 229E, Common Cold Virus Not Detected     Coronavirus HKU1, Common Cold Virus Not Detected     Coronavirus NL63, Common Cold Virus Not Detected     Coronavirus OC43, Common Cold Virus Not Detected     SARS-CoV2 (COVID-19) Qualitative PCR Not Detected     Human Metapneumovirus Not Detected     Human Rhinovirus/Enterovirus Detected     Influenza A (subtypes H1, H1-2009,H3) Not Detected     Influenza B Not Detected     Parainfluenza Virus 1 Not Detected     Parainfluenza Virus 2 Not Detected     Parainfluenza Virus 3 Not Detected     Parainfluenza Virus 4 Not Detected     Respiratory Syncytial Virus Not Detected     Bordetella Parapertussis (QR9383) Not Detected     Bordetella pertussis (ptxP) Not Detected     Chlamydia pneumoniae Not Detected     Mycoplasma pneumoniae Not Detected    Influenza A & B by Molecular [[id number]]  (Normal) Collected: 04/24/25 1537    Order Status: Completed Specimen: Nasal Swab Updated: 04/24/25 1631     INFLUENZA A MOLECULAR Negative     INFLUENZA B MOLECULAR  Negative            "

## 2025-04-25 NOTE — ASSESSMENT & PLAN NOTE
Patient's COPD is controlled currently.  Patient is currently off COPD Pathway. Continue scheduled inhalers Steroids, Antibiotics, and Supplemental oxygen and monitor respiratory status closely.     CTA chest negative for pulmonary embolisms however there is detrimental change in diffuse bilateral reticulonodular interstitial prominence from 3 months prior differential including atypical infection versus lymphangitis spread of malignancy  Consult pulmonology

## 2025-04-25 NOTE — ASSESSMENT & PLAN NOTE
Patient's COPD is with exacerbation noted by continued dyspnea currently.  Patient is currently off COPD Pathway. Continue scheduled inhalers Steroids, Antibiotics, and Supplemental oxygen and monitor respiratory status closely.     CTA chest negative for pulmonary embolisms however there is detrimental change in diffuse bilateral reticulonodular interstitial prominence from 3 months prior differential including atypical infection versus lymphangitis spread of malignancy  Consult pulmonology

## 2025-04-25 NOTE — PLAN OF CARE
[facility] - Emergency Dept  Initial Discharge Assessment       Primary Care Provider: Rocio Feliciano MD    Admission Diagnosis: COPD exacerbation [J44.1]    Admission Date: 4/24/2025  Expected Discharge Date:      met with Pt at bedside to complete discharge assessment. Pt AAOx4s. Demographics, PCP, and insurance verified. No home health. No dialysis. Pt reports ability to complete ADLs without assistance. Pt verbalized plan to discharge home via family transport. Pt has no other needs to be addressed at this time.           Payor: MEDICARE / Plan: MEDICARE PART A & B / Product Type: Government /     Extended Emergency Contact Information  Primary Emergency Contact: RosenthalBella   [facility]  Home Phone: [phone]  Relation: Daughter  Secondary Emergency Contact: Ania Carroll  Address: [address] [place] [address] [place] [address] [facility]  Home Phone: [phone]  Mobile Phone: [phone]  Relation: Sister  Preferred language: English   needed? No    Discharge Plan A: (P) Home with family  Discharge Plan B: (P) Home      [id number] - [place], [place] - [address] AT [facility]  [address]  [place] [address]  Phone: [phone] Fax: [phone]      Initial Assessment (most recent)       Adult Discharge Assessment - 04/24/25 1938          Discharge Assessment    Assessment Type Discharge Planning Assessment     Confirmed/corrected address, phone number and insurance Yes     Confirmed Demographics Correct on Facesheet     Source of Information patient     When was your last doctors appointment? --   couple months ago    Communicated LILA with patient/caregiver Date not available/Unable to determine     Reason For Admission COPD     People in Home grandchild(faby)     Facility Arrived From: Home     Do you expect to return to your current living situation? Yes     Do you have help at home or someone to help you  manage your care at home? Yes     Who are your caregiver(s) and their phone number(s)? Grand daughter: Yong Bowden     Prior to hospitilization cognitive status: Alert/Oriented     Current cognitive status: Alert/Oriented     Walking or Climbing Stairs Difficulty no     Dressing/Bathing Difficulty no     Home Accessibility not wheelchair accessible     Home Layout Able to live on 1st floor     Equipment Currently Used at Home none     Readmission within 30 days? No     Patient currently being followed by outpatient case management? No     Do you currently have service(s) that help you manage your care at home? No     Do you take prescription medications? Yes (P)      Do you have prescription coverage? Yes (P)      Coverage Payor: MEDICARE - MEDICARE PART A & B - (P)      Do you have any problems affording any of your prescribed medications? No (P)      Is the patient taking medications as prescribed? yes (P)      Who is going to help you get home at discharge? Daughter: Bella Rosenthal [phone] (P)      How do you get to doctors appointments? car, drives self (P)      Are you on dialysis? No (P)      Do you take coumadin? No (P)      Discharge Plan A Home with family (P)      Discharge Plan B Home (P)      DME Needed Upon Discharge  none (P)

## 2025-04-25 NOTE — ASSESSMENT & PLAN NOTE
Patient's blood pressure range in the last 24 hours was: BP  Min: 112/74  Max: 145/74.The patient's inpatient anti-hypertensive regimen is listed below:  Current Antihypertensives  amLODIPine tablet 5 mg, Nightly, OralResume home amlodipine    Plan  - BP is controlled, no changes needed to their regimen  -

## 2025-04-25 NOTE — H&P
Formerly [facility] - Emergency Dept  Hospital Medicine  History & Physical    Patient Name: Kirstie Bowden  MRN: [id number]  Patient Class: IP- Inpatient  Admission Date: 4/24/2025  Attending Physician: Cherie Long MD   Primary Care Provider: Rocio Feliciano MD         Patient information was obtained from patient, relative(s), and ER records.     Subjective:     Principal Problem:COPD exacerbation    Chief Complaint:   Chief Complaint   Patient presents with    Shortness of Breath    Cough     Pt states she has been seen for sob cough and left side pain. Her breathing has been getting worse. She saw her np today and her o2 was at 74 so they gave her o2 and sent her to the er.         HPI: Patient is a 80-year-old female with a history of RA, hypertension, COPD, hypothyroidism breast CA, renal CA.  Patient reports she went to urgent care last week and was diagnosed with pneumonia and pleurisy and started on antibiotics and oral steroids.  Patient reports symptoms only worsened since completing antibiotic course.  She saw her pulmonologist today in clinic and was referred to ED. in ED labwork remarkable for leukocytosis with a left shift.  Of note patient currently on methotrexate and exemestane.  Currently requiring 2 L nasal cannula which she is not on any home O2..  Flu and COVID both negative.  BNP elevated at 138.  Chest x-ray with improved right lower lobe density.  CTA c/a/p  was ordered which ruled out PE, and no acute intra-abdominal abnormality, however there is detrimental change in diffuse bilateral reticulonodular interstitial prominence from 3 months prior differential including atypical infection versus lymphangitis spread of malignancy.  Patient will be admitted to Hospital Medicine.  Started on Rocephin and azithromycin.  Continue supplemental O2 and start DuoNebs.    Past Medical History:   Diagnosis Date    Arthritis     rheumatoid    Asthma     Breast cancer     Cancer 2020     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    GERD (gastroesophageal reflux disease) 2022    Hearing loss [id number]    Hyperlipidemia     Hypertension 2021    Limb alert care status     NO BP/IV LEFT ARM    Lung disease     Personal history of colonic polyps 01/23/2024    Pseudocholinesterase deficiency     family history    Radiation 2020    Rheumatoid arthritis 2020    Thyroid disease        Past Surgical History:   Procedure Laterality Date    AXILLARY NODE DISSECTION Left 03/14/2019    Procedure: LYMPHADENECTOMY, AXILLARY;  Surgeon: Mp Gonzalez MD;  Location: [facility] OR;  Service: General;  Laterality: Left;  left lumpectomy with axillary lypmh node dissection    BALLOON DILATION OF URETER Left 06/24/2019    Procedure: DILATION, URETER, USING BALLOON;  Surgeon: Jorden Dickson MD;  Location: [facility] OR;  Service: Urology;  Laterality: Left;    BREAST BIOPSY Left 20 yrs ago    benign    BREAST CYST EXCISION  15 yrs ago    BREAST LUMPECTOMY      x2    CHOLECYSTECTOMY  2000    COLONOSCOPY  09/25/2018    LUC EASTON MD    CYSTOSCOPY W/ URETERAL STENT PLACEMENT Left 06/24/2019    Procedure: CYSTOSCOPY, WITH URETERAL STENT INSERTION;  Surgeon: Jorden Dickson MD;  Location: [facility] OR;  Service: Urology;  Laterality: Left;    CYSTOSCOPY W/ URETERAL STENT REMOVAL Left 07/23/2019    Procedure: CYSTOSCOPY, WITH URETERAL STENT REMOVAL;  Surgeon: Jorden Dickson MD;  Location: [facility] OR;  Service: Urology;  Laterality: Left;    EPIDURAL STEROID INJECTION INTO LUMBAR SPINE N/A 09/30/2021    Procedure: Injection-steroid-epidural-lumbar;  Surgeon: Nathen Lyons MD;  [facility]  Service: Pain Management;  Laterality: N/A;  L5-S1      EPIDURAL STEROID INJECTION INTO LUMBAR SPINE N/A 11/16/2021    Procedure: Injection-steroid-epidural-lumbar;  Surgeon: Nathen Lyons MD;  [facility];  Service: Pain Management;  Laterality: N/A;  L5-S1    EXTRACORPOREAL SHOCK WAVE LITHOTRIPSY Left 07/23/2019    Procedure: LITHOTRIPSY, ESWL;  Surgeon: Jorden  MD Yessi;  Location: [facility] OR;  Service: Urology;  Laterality: Left;    EYE SURGERY Bilateral 2000    cataracts    HYSTERECTOMY  1994    MASTECTOMY, PARTIAL Left 04/25/2019    Procedure: MASTECTOMY, PARTIAL;  Surgeon: Mp Gonzalez MD;  Location: [facility] OR;  Service: General;  Laterality: Left;    NEEDLE LOCALIZATION Left 03/14/2019    Procedure: NEEDLE LOCALIZATION;  Surgeon: Mp Gonzalez MD;  Location: [facility] OR;  Service: General;  Laterality: Left;  left lumpectomy with wire needle localization     PARTIAL NEPHRECTOMY Right 05/22/2023    RETROGRADE PYELOGRAPHY Bilateral 06/24/2019    Procedure: PYELOGRAM, RETROGRADE;  Surgeon: Jorden Dickson MD;  Location: [facility] OR;  Service: Urology;  Laterality: Bilateral;    SENTINEL LYMPH NODE BIOPSY Left 03/14/2019    Procedure: BIOPSY, LYMPH NODE, SENTINEL;  Surgeon: Mp Gonzalez MD;  Location: [facility];  Service: General;  Laterality: Left;  left sentinel node lymph node biopsy    TONSILLECTOMY  1948    TRANSFORAMINAL EPIDURAL INJECTION OF STEROID Right 01/04/2022    Procedure: Injection,steroid,epidural,transforaminal approach;  Surgeon: Nathen Lyons MD;  Location: [facility] OR;  Service: Pain Management;  Laterality: Right;  L4-L5, L5-s1    TRANSFORAMINAL EPIDURAL INJECTION OF STEROID Right 07/09/2024    Procedure: Injection,steroid,epidural,transforaminal approach;  Surgeon: Nathen Lyons MD;  Location: [facility] OR;  Service: Pain Management;  Laterality: Right;    URETEROSCOPY Left 06/24/2019    Procedure: URETEROSCOPY;  Surgeon: Jorden Dickson MD;  Location: [facility];  Service: Urology;  Laterality: Left;       Review of patient's allergies indicates:   Allergen Reactions    Succinylcholine chloride Other (See Comments)    Sulfur dioxide Hives    Sulfamethoxazole-trimethoprim      Other reaction(s): per dr daryn Rodríguez (sulfonamide antibiotics)      NOT SURE REACTION       Current Facility-Administered Medications on File Prior to Encounter    Medication    lactated ringers infusion    lidocaine (PF) 10 mg/ml (1%) injection 10 mg     Current Outpatient Medications on File Prior to Encounter   Medication Sig    albuterol (PROVENTIL) 2.5 mg /3 mL (0.083 %) nebulizer solution Take 3 mLs (2.5 mg total) by nebulization every 6 (six) hours as needed for Wheezing.    albuterol (PROVENTIL/VENTOLIN HFA) 90 mcg/actuation inhaler INHALE 2 PUFFS INTO THE LUNGS EVERY 6 (SIX) HOURS AS NEEDED FOR WHEEZING. RESCUE    amLODIPine (NORVASC) 5 MG tablet TAKE 1 TABLET BY MOUTH EVERY DAY (Patient taking differently: Take 5 mg by mouth every evening.)    atorvastatin (LIPITOR) 20 MG tablet TAKE 1 TABLET BY MOUTH EVERY DAY (Patient taking differently: Take 20 mg by mouth every evening.)    benzonatate (TESSALON) 200 MG capsule Take 1 capsule by mouth 3 times daily as needed for Cough.    cholecalciferol, vitamin D3, (VITAMIN D3) 25 mcg (1,000 unit) capsule Take 1 capsule by mouth every morning.    cyanocobalamin (VITAMIN B-12) 1000 MCG tablet Take 100 mcg by mouth once daily.    diphenhydrAMINE (SOMINEX) 25 mg tablet Take 25 mg by mouth every evening.    exemestane (AROMASIN) 25 mg tablet TAKE 1 TABLET BY MOUTH EVERY DAY (Patient taking differently: Take 25 mg by mouth once daily.)    fluticasone propionate (FLONASE) 50 mcg/actuation nasal spray USE 1 SPRAY (50 MCG TOTAL) IN EACH NOSTRIL ONCE DAILY (Patient taking differently: 1 spray by Each Nostril route once daily.)    fluticasone-salmeterol 250-50 mcg/dose (WIXELA INHUB) 250-50 mcg/dose diskus inhaler INHALE 1 PUFF INTO THE LUNGS 2 (TWO) TIMES DAILY. CONTROLLER    folic acid (FOLVITE) 1 MG tablet Take 1 tablet (1,000 mcg total) by mouth once daily.    gabapentin (NEURONTIN) 300 MG capsule Take 1 capsule (300 mg total) by mouth 2 (two) times daily.    hydroCHLOROthiazide (HYDRODIURIL) 25 MG tablet TAKE 1 TABLET BY MOUTH EVERY DAY (Patient taking differently: Take 25 mg by mouth once daily.)    hydroxychloroquine (PLAQUENIL)  200 mg tablet Take 200 mg by mouth 2 (two) times daily.    levocetirizine (XYZAL) 5 MG tablet Take 1 tablet (5 mg total) by mouth every evening.    levothyroxine (SYNTHROID) 125 MCG tablet Take 125 mcg by mouth before breakfast.    methotrexate 2.5 MG Tab Take 8 tablets (20 mg total) by mouth every 7 days. (Patient taking differently: Take 20 mg by mouth Every Friday.)    prochlorperazine (COMPAZINE) 10 MG tablet TAKE 1 TABLET BY MOUTH EVERY 6 HOURS AS NEEDED (Patient taking differently: Take 10 mg by mouth every 6 (six) hours as needed (nausea).)    doxycycline (VIBRAMYCIN) 100 MG Cap  (Patient not taking: Reported on 4/24/2025)    ondansetron (ZOFRAN-ODT) 8 MG TbDL DISSOLVE 1 TABLET IN MOUTH EVERY 12 HOURS AS NEEDED (Patient not taking: Reported on 4/24/2025)    predniSONE (DELTASONE) 5 MG tablet Take 1 tablet (5 mg total) by mouth once daily. (Patient not taking: Reported on 4/24/2025)    [DISCONTINUED] azithromycin (Z-MERRILL) 250 MG tablet Take 2 tablets by mouth on day 1; Take 1 tablet by mouth on days 2-5    [DISCONTINUED] benzonatate (TESSALON) 100 MG capsule Take 1 capsule (100 mg total) by mouth 3 (three) times daily as needed for Cough.    [DISCONTINUED] fluticasone propionate (FLONASE) 50 mcg/actuation nasal spray 1 spray (50 mcg total) by Each Nostril route once daily.    [DISCONTINUED] guaiFENesin (MUCINEX) 600 mg 12 hr tablet Take 2 tablets (1,200 mg total) by mouth 2 (two) times daily.    [DISCONTINUED] levothyroxine (SYNTHROID) 112 MCG tablet Take 1 tablet (112 mcg total) by mouth before breakfast.    [DISCONTINUED] predniSONE (DELTASONE) 20 MG tablet Take 1 tablet (20 mg total) by mouth once daily. for 5 days    [DISCONTINUED] traMADoL (ULTRAM) 50 mg tablet Take 1 tablet (50 mg total) by mouth every 12 (twelve) hours as needed for Pain (severe pain). TAKE 1 TABLET BY MOUTH EVERY 8 HOURS AS NEEDED FOR SEVERE PAIN- DOSE DECREASE    [DISCONTINUED] zolpidem (AMBIEN) 5 MG Tab Take 1 tablet (5 mg total) by  mouth every evening.     Family History       Problem Relation (Age of Onset)    Alzheimer's disease Mother    Arthritis Sister    Cancer Father, Daughter    Diabetes Brother    Heart disease Mother, Sister    Hypertension Mother    Kidney disease Sister    Stroke Sister, Sister          Tobacco Use    Smoking status: Former     Current packs/day: 0.00     Average packs/day: 0.5 packs/day for 59.3 years (29.6 ttl pk-yrs)     Types: Cigarettes     Start date: 1960     Quit date: 2019     Years since quittin.7    Smokeless tobacco: Never   Substance and Sexual Activity    Alcohol use: Yes     Alcohol/week: 2.0 standard drinks of alcohol     Types: 2 Glasses of wine per week     Comment: Social    Drug use: No    Sexual activity: Never     Review of Systems   Constitutional:  Positive for fever.   HENT:  Positive for voice change.    Respiratory:  Positive for cough and shortness of breath.    Neurological:  Positive for weakness.     Objective:     Vital Signs (Most Recent):  Temp: 98.6 °F (37 °C) (25 1203)  Pulse: 90 (25)  Resp: 16 (25)  BP: 124/74 (25)  SpO2: 97 % (25) Vital Signs (24h Range):  Temp:  [98.6 °F (37 °C)] 98.6 °F (37 °C)  Pulse:  [75-90] 90  Resp:  [16-23] 16  SpO2:  [75 %-100 %] 97 %  BP: (112-145)/(69-81) 124/74     Weight: 76.2 kg (168 lb)  Body mass index is 25.54 kg/m².     Physical Exam  Vitals reviewed.   Constitutional:       Appearance: Normal appearance. She is ill-appearing.   HENT:      Head: Normocephalic.      Mouth/Throat:      Mouth: Mucous membranes are moist.      Pharynx: Oropharynx is clear.   Eyes:      Pupils: Pupils are equal, round, and reactive to light.   Cardiovascular:      Rate and Rhythm: Normal rate and regular rhythm.      Pulses: Normal pulses.      Heart sounds: Normal heart sounds.   Pulmonary:      Breath sounds: Wheezing present.   Abdominal:      General: Bowel sounds are normal.      Palpations:  Abdomen is soft.   Musculoskeletal:         General: Normal range of motion.      Cervical back: Normal range of motion.   Skin:     General: Skin is warm and dry.      Capillary Refill: Capillary refill takes less than 2 seconds.   Neurological:      General: No focal deficit present.      Mental Status: She is alert and oriented to person, place, and time. Mental status is at baseline.   Psychiatric:         Mood and Affect: Mood normal.              CRANIAL NERVES     CN III, IV, VI   Pupils are equal, round, and reactive to light.       Significant Labs: All pertinent labs within the past 24 hours have been reviewed.  Recent Lab Results         04/24/25  1537   04/24/25  1403        Influenza A, Molecular Negative         Influenza B, Molecular Negative         Albumin   4.0       ALP   60       ALT   47       Anion Gap   8       AST   24       Baso #   0.01       Basophil %   0.1       BILIRUBIN TOTAL   0.6  Comment: For infants and newborns, interpretation of results should be based   on gestational age, weight and in agreement with clinical   observations.    Premature Infant recommended reference ranges:   0-24 hours:  <8.0 mg/dL   24-48 hours: <12.0 mg/dL   3-5 days:    <15.0 mg/dL   6-29 days:   <15.0 mg/dL       BNP   138  Comment: Values of less than 100 pg/ml are consistent with non-CHF populations.       BUN   29       Calcium   9.8       Chloride   100       CO2   35       SARS COV-2 MOLECULAR   Negative  Comment: This test utilizes isothermal nucleic acid amplification technology to detect the SARS-CoV-2 RdRp nucleic acid segment. The analytical sensitivity (limit of detection) is 500 copies/swab.     A POSITIVE result is indicative of the presence of SARS-CoV-2 RNA; clinical correlation with patient history and other diagnostic information is necessary to determine patient infection status.     A NEGATIVE result means that SARS-CoV-2 nucleic acids are not present above the limit of detection. A  NEGATIVE result should be treated as presumptive. It does not rule out the possibility of COVID-19 and should not be the sole basis for treatment decisions. If COVID-19 is strongly suspected based on clinical and exposure history, re-testing using an alternate molecular assay should be considered.     This test is FDA approved.     Performance characteristics of this test has been independently verified by [facility] in conjunction with [facility] Department of Pathology and Laboratory Medicine.          Creatinine   1.3       eGFR   42       Eos #   0.03       Eos %   0.2       Glucose   77       Hematocrit   37.0       Hemoglobin   11.4       Immature Grans (Abs)   0.31  Comment: Mild elevation in immature granulocytes is non specific and can be seen in a variety of conditions including stress response, acute inflammation, trauma and pregnancy. Correlation with other laboratory and clinical findings is essential.       Immature Granulocytes   2.2       Lymph #   1.05       LYMPH %   7.3       MCH   31.8       MCHC   30.8       MCV   103       Mono #   0.91       Mono %   6.4       MPV   10.5       Neut #   12.0       Neut %   83.8       nRBC   0       Platelet Count   290       Potassium   3.9       PROTEIN TOTAL   7.3       RBC   3.59       RDW   17.0       Sodium   143       Troponin I High Sensitivity   12.7  Comment: Troponin results differ between methods. Do not use   results between Troponin methods interchangeably.    Alkaline Phospatase levels above 400 U/L may   cause false positive results.    Access hsTnI should not be used for patients taking   Asfotase billy (Strensiq).       WBC   14.33               Significant Imaging: I have reviewed all pertinent imaging results/findings within the past 24 hours.  I have reviewed and interpreted all pertinent imaging results/findings within the past 24 hours.  Assessment/Plan:     Assessment & Plan  COPD exacerbation  Patient's COPD  is controlled currently.  Patient is currently off COPD Pathway. Continue scheduled inhalers Steroids, Antibiotics, and Supplemental oxygen and monitor respiratory status closely.     CTA chest negative for pulmonary embolisms however there is detrimental change in diffuse bilateral reticulonodular interstitial prominence from 3 months prior differential including atypical infection versus lymphangitis spread of malignancy  Consult pulmonology      Essential hypertension, benign  Patient's blood pressure range in the last 24 hours was: BP  Min: 112/74  Max: 145/74.The patient's inpatient anti-hypertensive regimen is listed below:  Current Antihypertensives  amLODIPine tablet 5 mg, Nightly, OralResume home amlodipine    Plan  - BP is controlled, no changes needed to their regimen  -   Acquired hypothyroidism  Resume home Synthroid    Rheumatoid arthritis involving multiple sites with positive rheumatoid factor  Resume home Plaquenil    Drug-induced immunodeficiency  Patient currently on daily Plaquenil, methotrexate weekly and daily exemestane  VTE Risk Mitigation (From admission, onward)           Ordered     enoxaparin injection 40 mg  Daily         04/24/25 1707     IP VTE HIGH RISK PATIENT  Once         04/24/25 1707     Place sequential compression device  Until discontinued         04/24/25 1707                                    Harsh Hurd NP  Department of Hospital Medicine  Formerly [facility] - Emergency Dept

## 2025-04-25 NOTE — CONSULTS
Pulmonary/Critical Care Consult      PATIENT NAME: Kirstie Bowden  MRN: [id number]  TODAY'S DATE: 2025  12:32 PM  ADMIT DATE: 2025  AGE: 80 y.o. : 1945    CONSULT REQUESTED BY: Emely Interiano MD    REASON FOR CONSULT:   Abnormal CT, respiratory failure    HPI:  Ms. Bowden Is a 80-year-old woman who presents with cough shortness of breath and increased sputum production.  She reports that this has been ongoing for the last month.  She reports that she was previously easily sick earlier in the month.  She got antibiotics and had some improvement.  She reports that she started developing cough shortness of breath over the last couple of days.  She was advised to come to the ED at the recommendation of her outpatient Pulmonary clinician.  She has a history of rheumatoid arthritis, treated with methotrexate and Plaquenil, she reports that she is not currently on prednisone.    She reports that her RA is relatively controlled, she does get intermittent flares which primarily related to musculoskeletal issues in her hands shoulder.    She reports no Raynaud's phenomenon.  No red hot itchy eyes.    No hemoptysis, no night sweats no shaking chills.    She has a history of breast cancer, had a left breast lumpectomy, and a right-sided nephrectomy.  Review of Systems   Constitutional:  Negative for appetite change, chills, fever and unexpected weight change.   HENT:  Negative for nosebleeds, postnasal drip, trouble swallowing and voice change.    Eyes:  Negative for visual disturbance.   Respiratory:  Positive for cough, shortness of breath and wheezing.    Cardiovascular:  Negative for chest pain and leg swelling.   Gastrointestinal:  Negative for diarrhea, nausea and vomiting.   Endocrine: Negative for cold intolerance and heat intolerance.   Genitourinary:  Negative for dysuria, flank pain and frequency.   Musculoskeletal:  Negative for neck pain and neck stiffness.   Allergic/Immunologic:  Negative for environmental allergies and immunocompromised state.   Neurological:  Negative for syncope, speech difficulty and weakness.   Hematological:  Negative for adenopathy.   Psychiatric/Behavioral:  Negative for confusion.            ALLERGIES  Review of patient's allergies indicates:   Allergen Reactions    Succinylcholine chloride Other (See Comments)    Sulfur dioxide Hives    Sulfamethoxazole-trimethoprim      Other reaction(s): per dr daryn Rodríguez (sulfonamide antibiotics)      NOT SURE REACTION       INPATIENT SCHEDULED MEDICATIONS   albuterol-ipratropium  3 mL Nebulization Q6H WAKE    amLODIPine  5 mg Oral QHS    atorvastatin  20 mg Oral QHS    azithromycin  500 mg Intravenous Q24H    cefTRIAXone (Rocephin) IV (PEDS and ADULTS)  1 g Intravenous Q24H    diphenhydrAMINE  50 mg Oral QHS    enoxparin  40 mg Subcutaneous Daily    gabapentin  300 mg Oral BID    hydroxychloroquine  200 mg Oral BID         MEDICAL AND SURGICAL HISTORY  Past Medical History:   Diagnosis Date    Arthritis     rheumatoid    Asthma     Breast cancer     Cancer 2020    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    GERD (gastroesophageal reflux disease) 2022    Hearing loss [id number]    Hyperlipidemia     Hypertension 2021    Limb alert care status     NO BP/IV LEFT ARM    Lung disease     Personal history of colonic polyps 01/23/2024    Pseudocholinesterase deficiency     family history    Radiation 2020    Rheumatoid arthritis 2020    Thyroid disease      Past Surgical History:   Procedure Laterality Date    AXILLARY NODE DISSECTION Left 03/14/2019    Procedure: LYMPHADENECTOMY, AXILLARY;  Surgeon: Mp Gonzalez MD;  Location: [facility] OR;  Service: General;  Laterality: Left;  left lumpectomy with axillary lypmh node dissection    BALLOON DILATION OF URETER Left 06/24/2019    Procedure: DILATION, URETER, USING BALLOON;  Surgeon: Jorden Dickson MD;  Location: [facility] OR;  Service: Urology;  Laterality: Left;    BREAST BIOPSY Left 20 yrs ago    benign     BREAST CYST EXCISION  15 yrs ago    BREAST LUMPECTOMY      x2    CHOLECYSTECTOMY  2000    COLONOSCOPY  09/25/2018    LUC EASTON MD    CYSTOSCOPY W/ URETERAL STENT PLACEMENT Left 06/24/2019    Procedure: CYSTOSCOPY, WITH URETERAL STENT INSERTION;  Surgeon: Jorden Dickson MD;  Location: [facility] OR;  Service: Urology;  Laterality: Left;    CYSTOSCOPY W/ URETERAL STENT REMOVAL Left 07/23/2019    Procedure: CYSTOSCOPY, WITH URETERAL STENT REMOVAL;  Surgeon: Jorden Dickson MD;  Location: [facility] OR;  Service: Urology;  Laterality: Left;    EPIDURAL STEROID INJECTION INTO LUMBAR SPINE N/A 09/30/2021    Procedure: Injection-steroid-epidural-lumbar;  Surgeon: Nathen Lynos MD;  Location: [facility] OR;  Service: Pain Management;  Laterality: N/A;  L5-S1      EPIDURAL STEROID INJECTION INTO LUMBAR SPINE N/A 11/16/2021    Procedure: Injection-steroid-epidural-lumbar;  Surgeon: Nathen Lyons MD;  Location: [facility] OR;  Service: Pain Management;  Laterality: N/A;  L5-S1    EXTRACORPOREAL SHOCK WAVE LITHOTRIPSY Left 07/23/2019    Procedure: LITHOTRIPSY, ESWL;  Surgeon: Jorden Dickson MD;  Location: [facility] OR;  Service: Urology;  Laterality: Left;    EYE SURGERY Bilateral 2000    cataracts    HYSTERECTOMY  1994    MASTECTOMY, PARTIAL Left 04/25/2019    Procedure: MASTECTOMY, PARTIAL;  Surgeon: Mp Gonzalez MD;  Location: [facility] OR;  Service: General;  Laterality: Left;    NEEDLE LOCALIZATION Left 03/14/2019    Procedure: NEEDLE LOCALIZATION;  Surgeon: Mp Gonzalez MD;  Location: [facility] OR;  Service: General;  Laterality: Left;  left lumpectomy with wire needle localization     PARTIAL NEPHRECTOMY Right 05/22/2023    RETROGRADE PYELOGRAPHY Bilateral 06/24/2019    Procedure: PYELOGRAM, RETROGRADE;  Surgeon: Jorden Dickson MD;  Location: [facility] OR;  Service: Urology;  Laterality: Bilateral;    SENTINEL LYMPH NODE BIOPSY Left 03/14/2019    Procedure: BIOPSY, LYMPH NODE, SENTINEL;  Surgeon: Mp Gonzalez MD;  Location: [facility]  OR;  Service: General;  Laterality: Left;  left sentinel node lymph node biopsy    TONSILLECTOMY  1948    TRANSFORAMINAL EPIDURAL INJECTION OF STEROID Right 01/04/2022    Procedure: Injection,steroid,epidural,transforaminal approach;  Surgeon: Nathen Lyons MD;  Location: [facility] OR;  Service: Pain Management;  Laterality: Right;  L4-L5, L5-s1    TRANSFORAMINAL EPIDURAL INJECTION OF STEROID Right 07/09/2024    Procedure: Injection,steroid,epidural,transforaminal approach;  Surgeon: Nathen Lyons MD;  Location: [facility] OR;  Service: Pain Management;  Laterality: Right;    URETEROSCOPY Left 06/24/2019    Procedure: URETEROSCOPY;  Surgeon: Jorden Dickson MD;  Location: [facility] OR;  Service: Urology;  Laterality: Left;       ALCOHOL, TOBACCO AND DRUG USE  Tobacco Use History[1]  Social History     Substance and Sexual Activity   Alcohol Use Yes    Alcohol/week: 2.0 standard drinks of alcohol    Types: 2 Glasses of wine per week    Comment: Social     Social History     Substance and Sexual Activity   Drug Use No       FAMILY HISTORY  Family History   Problem Relation Name Age of Onset    Heart disease Mother Patricia     Hypertension Mother Patricia     Alzheimer's disease Mother Patricia     Cancer Father Kirstie     Heart disease Sister Ania     Arthritis Sister Ania     Kidney disease Sister Ania     Stroke Sister Alvina sister         Sister    Diabetes Brother Nicklos     Cancer Daughter      Stroke Sister Alvina        VITAL SIGNS (MOST RECENT)  Temp: 98.6 °F (37 °C) (04/24/25 1203)  Pulse: 88 (04/25/25 0959)  Resp: 19 (04/25/25 0907)  BP: 114/75 (04/25/25 0959)  SpO2: 97 % (04/25/25 0706)    INTAKE AND OUTPUT (LAST 24 HOURS):  Intake/Output Summary (Last 24 hours) at 4/25/2025 1232  Last data filed at 4/24/2025 1549  Gross per 24 hour   Intake 225.89 ml   Output --   Net 225.89 ml       WEIGHT  Wt Readings from Last 1 Encounters:   04/24/25 76.2 kg (168 lb)       Physical Exam  Vitals reviewed.   Constitutional:        "General: She is not in acute distress.     Appearance: She is well-developed. She is not diaphoretic.   HENT:      Head: Normocephalic and atraumatic.      Mouth/Throat:      Pharynx: No oropharyngeal exudate or posterior oropharyngeal erythema.   Eyes:      General: No scleral icterus.     Pupils: Pupils are equal, round, and reactive to light.   Neck:      Vascular: No JVD.   Cardiovascular:      Rate and Rhythm: Normal rate and regular rhythm.      Heart sounds: Normal heart sounds. No murmur heard.  Pulmonary:      Effort: Pulmonary effort is normal. No respiratory distress.      Breath sounds: Wheezing present.   Abdominal:      General: Bowel sounds are normal. There is no distension.      Palpations: Abdomen is soft.      Tenderness: There is no abdominal tenderness.   Musculoskeletal:         General: No swelling.      Cervical back: Normal range of motion and neck supple. No rigidity.      Right lower leg: No edema.      Left lower leg: No edema.   Skin:     General: Skin is warm and dry.      Capillary Refill: Capillary refill takes less than 2 seconds.      Coloration: Skin is pale.      Findings: No rash.   Neurological:      General: No focal deficit present.      Mental Status: She is alert and oriented to person, place, and time.      Cranial Nerves: No cranial nerve deficit.      Motor: No weakness or abnormal muscle tone.           ACUTE PHASE REACTANT (LAST 24 HOURS)  No results for input(s): "FERRITIN", "CRP", "LDH", "DDIMER" in the last 24 hours.    CBC LAST (LAST 24 HOURS)  Recent Labs   Lab 04/25/25  0740   WBC 13.42*   RBC 3.83*   HGB 11.9*   HCT 38.9   *   MCH 31.1*   MCHC 30.6*   RDW 17.5*      MPV 10.7   NRBC 0       CHEMISTRY LAST (LAST 24 HOURS)  Recent Labs   Lab 04/25/25  0740      K 4.1   CO2 33*   BUN 33*   CREATININE 1.2   CALCIUM 9.8   ALBUMIN 3.8   ALKPHOS 63   ALT 38   AST 17   BILITOT 0.5       COAGULATION LAST (LAST 24 HOURS)  No results for input(s): " ""LABPT", "INR", "APTT" in the last 24 hours.    CARDIAC PROFILE (LAST 24 HOURS)  Recent Labs   Lab 04/24/25  1403   *       LAST 7 DAYS MICROBIOLOGY   Microbiology Results (last 7 days)       Procedure Component Value Units Date/Time    Influenza A & B by Molecular [[id number]]  (Normal) Collected: 04/24/25 1537    Order Status: Completed Specimen: Nasal Swab Updated: 04/24/25 1631     INFLUENZA A MOLECULAR Negative     INFLUENZA B MOLECULAR  Negative            MOST RECENT IMAGING  CT Abdomen Pelvis With IV Contrast NO Oral Contrast  Narrative: EXAMINATION:  CT ABDOMEN PELVIS WITH IV CONTRAST    CLINICAL HISTORY:  Abdominal pain, acute, nonlocalized;.    TECHNIQUE:  Post infusion axial images were obtained from the lung bases to the pubic symphysis 100 cc nonionic contrast was utilized for the examination.    COMPARISON:  Noncontrast CT, January 2025    FINDINGS  The liver has a normal appearance.  The gallbladder is absent.  The biliary tree is nondilated.  The spleen is normal in appearance.  The pancreas is unremarkable.  The adrenal glands are normal.  Small left-sided simple renal cysts are noted, as well as at least 2 nonobstructing left renal calculi, the largest at the lower pole measuring 6 mm.  There is no evidence of left ureteral calculus or hydronephrosis.  The right kidney is surgically absent, with no evidence of mass lesion at the right renal fossa.  There is dense multifocal aortic atherosclerosis.    There is no pathologic bowel wall thickening or evidence of obstruction.  There is a mild amount of retained fecal matter throughout the colon.  There is no free air or free fluid.  The appendix is normal in appearance.    Images of the pelvis demonstrate sigmoid diverticula without diverticulitis.  The urinary bladder is unremarkable.  There is no pelvic free fluid or lymphadenopathy.    Moderate-severe arthritic changes noted at the right hip, with mild to moderate arthritic change on the " left.  5.5 x 2.2 cm fluid collection superficial to the right femur at the level of the greater trochanter is likely bursal fluid accumulation/bursitis.  This collection appears smaller when compared to January 6.    There is multilevel degenerative facet disease in the lumbar spine.  Impression: 1. No acute CT abnormalities.  2. Multiple chronic findings as above.    Electronically signed by: Wellington Roper  Date:    04/24/2025  Time:    15:48  CTA Chest Non-Coronary (PE Studies)  Narrative: EXAMINATION:  CTA CHEST NON CORONARY (PE STUDIES)    CLINICAL HISTORY:  Pulmonary embolism (PE) suspected, unknown D-dimer;.    TECHNIQUE:  CT angiography targeted to the pulmonary arteries was performed. 100 cc nonionic contrast was administered and the bolus was timed for optimal opacification of the pulmonary arteries. 3-D reformatted images were obtained on an independent workstation and stored as a part of patient's permanent medical record.    COMPARISON:  CT chest abdomen and pelvis without contrast, January 2025    FINDINGS:  Pulmonary arterial contrast opacification is adequate.  There are no identifiable filling defects to indicate pulmonary thromboembolic disease.  Heart size is normal.  Multifocal coronary artery atherosclerotic calcification is noted.  The thoracic aorta is normal in caliber.  No pathologic mediastinal or axillary lymphadenopathy is identified.  Postoperative changes are noted in the left breast.    Images at lung windows demonstrate diffuse bilateral abnormal reticulonodular interstitial prominence, reflecting an interval change compared to January 6.  Scattered small airspace opacities are also noted, new compared to the prior study.  Differential possibilities would include atypical infection and lymphangitic spread of malignancy.  No focal dominant mass is identified.  There are no pleural effusions.    No acute osseous abnormalities are demonstrated.  Impression: 1. No evidence of pulmonary  "thromboembolic disease.  2. Diffuse bilateral abnormal reticulonodular interstitial prominence, reflecting a detrimental change compared to January 2025.  Primary differential considerations include atypical infection and lymphangitis spread of malignancy.    Electronically signed by: Wellington Roper  Date:    04/24/2025  Time:    15:39  X-Ray Chest AP Portable  Narrative: EXAMINATION:  XR CHEST AP PORTABLE    CLINICAL HISTORY:  shortness of breath;    FINDINGS:  Portable chest at 13:56 hours is compared to 04/18/2025 shows normal cardiomediastinal silhouette.   There is stable elevation the right hemidiaphragm.    There are no confluent infiltrates or pleural effusions.  Pulmonary vasculature is normal. No acute osseous abnormality.  Impression: Resolution of the right lower lobe airspace disease with stable elevation the right hemidiaphragm    No acute pulmonary process    Electronically signed by: Carlita Schneider  Date:    04/24/2025  Time:    13:55      CURRENT VISIT EKG  Results for orders placed or performed during the hospital encounter of 04/24/25   EKG 12-lead   Result Value Ref Range    QRS Duration 92 ms    [facility] QTC Calculation 436 ms    Narrative    Test Reason : R06.02,    Vent. Rate :  77 BPM     Atrial Rate :  77 BPM     P-R Int : 142 ms          QRS Dur :  92 ms      QT Int : 386 ms       P-R-T Axes :  85 -23  72 degrees    QTcB Int : 436 ms    Sinus rhythm with Premature supraventricular complexes  Otherwise normal ECG  No previous ECGs available    Referred By: AAAREFERRAL SELF           Confirmed By:        ECHOCARDIOGRAM RESULTS  No results found for this or any previous visit.        VENTILATOR INFORMATION              LAST ARTERIAL BLOOD GAS  ABG  No results for input(s): "PH", "PO2", "PCO2", "HCO3", "BE" in the last 168 hours.    ASSESSMENT:   Moderate COPD  Hypoxemic respiratory failure  Abnormal CT  History of rheumatoid arthritis on immunosuppression with methotrexate Plaquenil    Ms. " Dennise is a 80-year-old woman who was on immune suppression outpatient for RA, with relatively stable RA symptoms, presents with respiratory failure and bilateral tree-in-bud nodular opacification.  I suspect primarily infection due to bronchiolitis and pneumonia.  Other potential differential see consider are if this is I LD related to methotrexate, rheumatoid arthritis related lung disease.   PLAN:   - for now plan to continue broad-spectrum antibiotics  - Check pro calcitonin   - we will add 40 mg prednisone daily for 5 days  - I think if she does not have any clinical improvement she may benefit from a bronchoscopic evaluation to rule out infection, and may also potentially benefit from pulse dose steroids if infection is ruled out due to the above differentials  - check viral panel, COVID neg          Ke Gregorio MD  Date of Service: 2025  12:32 PM         [1]   Social History  Tobacco Use   Smoking Status Former    Current packs/day: 0.00    Average packs/day: 0.5 packs/day for 59.3 years (29.6 ttl pk-yrs)    Types: Cigarettes    Start date: 1960    Quit date: 2019    Years since quittin.7   Smokeless Tobacco Never

## 2025-04-25 NOTE — ASSESSMENT & PLAN NOTE
Patient's blood pressure range in the last 24 hours was: BP  Min: 114/75  Max: 159/88.The patient's inpatient anti-hypertensive regimen is listed below:  Current Antihypertensives  amLODIPine tablet 5 mg, Nightly, OralResume home amlodipine    Plan  - BP is controlled, no changes needed to their regimen  -

## 2025-04-25 NOTE — ASSESSMENT & PLAN NOTE
Patient has chronic hypothyroidism. TFTs reviewed-   Lab Results   Component Value Date    TSH 1.074 11/20/2024   . Will continue chronic levothyroxine and adjust for and clinical changes.

## 2025-04-25 NOTE — PROGRESS NOTES
[facility] - Emergency Dept  Hospital Medicine  Progress Note    Patient Name: Kirstie Bowden  MRN: [id number]  Patient Class: IP- Inpatient   Admission Date: 4/24/2025  Length of Stay: 1 days  Attending Physician: Emely Interiano MD  Primary Care Provider: Rocio Feliciano MD        Subjective     Principal Problem:COPD exacerbation        HPI:  Patient is a 80-year-old female with a history of RA, hypertension, COPD, hypothyroidism breast CA, renal CA.  Patient reports she went to urgent care last week and was diagnosed with pneumonia and pleurisy and started on antibiotics and oral steroids.  Patient reports symptoms only worsened since completing antibiotic course.  She saw her pulmonologist today in clinic and was referred to ED. in ED labwork remarkable for leukocytosis with a left shift.  Of note patient currently on methotrexate and exemestane.  Currently requiring 2 L nasal cannula which she is not on any home O2..  Flu and COVID both negative.  BNP elevated at 138.  Chest x-ray with improved right lower lobe density.  CTA c/a/p  was ordered which ruled out PE, and no acute intra-abdominal abnormality, however there is detrimental change in diffuse bilateral reticulonodular interstitial prominence from 3 months prior differential including atypical infection versus lymphangitis spread of malignancy.  Patient will be admitted to Hospital Medicine.  Started on Rocephin and azithromycin.  Continue supplemental O2 and start DuoNebs.    Overview/Hospital Course:  No notes on file    Interval History:   Patient seen and examined.  NAD.  Care plan and diagnostic tests discussed with the patient, she verbalized understanding.  Pulmonology consulted.  Protocol pending.  Review of Systems   Constitutional:  Positive for fever.   HENT:  Positive for voice change.    Respiratory:  Positive for cough and shortness of breath.    Neurological:  Positive for weakness.     Objective:     Vital Signs  (Most Recent):  Temp: 98.6 °F (37 °C) (04/24/25 1203)  Pulse: 76 (04/25/25 1252)  Resp: 16 (04/25/25 1252)  BP: 114/75 (04/25/25 0959)  SpO2: (!) 94 % (04/25/25 1252) Vital Signs (24h Range):  Pulse:  [69-92] 76  Resp:  [15-21] 16  SpO2:  [92 %-100 %] 94 %  BP: (114-159)/(63-95) 114/75     Weight: 76.2 kg (168 lb)  Body mass index is 25.54 kg/m².    Intake/Output Summary (Last 24 hours) at 4/25/2025 1439  Last data filed at 4/24/2025 1549  Gross per 24 hour   Intake 225.89 ml   Output --   Net 225.89 ml         Physical Exam  Constitutional:       General: She is not in acute distress.     Appearance: Normal appearance. She is ill-appearing.   Cardiovascular:      Rate and Rhythm: Normal rate and regular rhythm.      Pulses: Normal pulses.      Heart sounds: Normal heart sounds.   Pulmonary:      Breath sounds: Wheezing and rhonchi present.   Abdominal:      General: Bowel sounds are normal.      Palpations: Abdomen is soft.   Musculoskeletal:      Right lower leg: No edema.      Left lower leg: No edema.   Skin:     General: Skin is warm and dry.   Neurological:      Mental Status: She is alert and oriented to person, place, and time.   Psychiatric:         Mood and Affect: Mood normal.         Behavior: Behavior normal.         Thought Content: Thought content normal.         Judgment: Judgment normal.               Significant Labs: All pertinent labs within the past 24 hours have been reviewed.  Recent Lab Results         04/25/25  0740   04/24/25  1537        Influenza A, Molecular   Negative       Influenza B, Molecular   Negative       Albumin 3.8         ALP 63         ALT 38         Anion Gap 10         AST 17         Baso # 0.02         Basophil % 0.1         BILIRUBIN TOTAL 0.5  Comment: For infants and newborns, interpretation of results should be based   on gestational age, weight and in agreement with clinical   observations.    Premature Infant recommended reference ranges:   0-24 hours:  <8.0 mg/dL    24-48 hours: <12.0 mg/dL   3-5 days:    <15.0 mg/dL   6-29 days:   <15.0 mg/dL         BUN 33         Calcium 9.8         Chloride 96         CO2 33         Creatinine 1.2         eGFR 46         Eos # 0.00         Eos % 0.0         Glucose 97         Hematocrit 38.9         Hemoglobin 11.9         Immature Grans (Abs) 0.19  Comment: Mild elevation in immature granulocytes is non specific and can be seen in a variety of conditions including stress response, acute inflammation, trauma and pregnancy. Correlation with other laboratory and clinical findings is essential.         Immature Granulocytes 1.4         Lymph # 1.36         LYMPH % 10.1         MCH 31.1         MCHC 30.6                  Mono # 0.25         Mono % 1.9         MPV 10.7         Neut # 11.6         Neut % 86.5         nRBC 0         Platelet Count 302         Potassium 4.1         PROTEIN TOTAL 7.2         RBC 3.83         RDW 17.5         Sodium 139         WBC 13.42                 Significant Imaging: I have reviewed all pertinent imaging results/findings within the past 24 hours.  Imaging Results              CT Abdomen Pelvis With IV Contrast NO Oral Contrast (Final result)  Result time 04/24/25 15:48:39      Final result by Wellington Roper MD (04/24/25 15:48:39)                   Impression:      1. No acute CT abnormalities.  2. Multiple chronic findings as above.      Electronically signed by: Wellington Roper  Date:    04/24/2025  Time:    15:48               Narrative:    EXAMINATION:  CT ABDOMEN PELVIS WITH IV CONTRAST    CLINICAL HISTORY:  Abdominal pain, acute, nonlocalized;.    TECHNIQUE:  Post infusion axial images were obtained from the lung bases to the pubic symphysis 100 cc nonionic contrast was utilized for the examination.    COMPARISON:  Noncontrast CT, January 2025    FINDINGS  The liver has a normal appearance.  The gallbladder is absent.  The biliary tree is nondilated.  The spleen is normal in appearance.   The pancreas is unremarkable.  The adrenal glands are normal.  Small left-sided simple renal cysts are noted, as well as at least 2 nonobstructing left renal calculi, the largest at the lower pole measuring 6 mm.  There is no evidence of left ureteral calculus or hydronephrosis.  The right kidney is surgically absent, with no evidence of mass lesion at the right renal fossa.  There is dense multifocal aortic atherosclerosis.    There is no pathologic bowel wall thickening or evidence of obstruction.  There is a mild amount of retained fecal matter throughout the colon.  There is no free air or free fluid.  The appendix is normal in appearance.    Images of the pelvis demonstrate sigmoid diverticula without diverticulitis.  The urinary bladder is unremarkable.  There is no pelvic free fluid or lymphadenopathy.    Moderate-severe arthritic changes noted at the right hip, with mild to moderate arthritic change on the left.  5.5 x 2.2 cm fluid collection superficial to the right femur at the level of the greater trochanter is likely bursal fluid accumulation/bursitis.  This collection appears smaller when compared to January 6.    There is multilevel degenerative facet disease in the lumbar spine.                                       CTA Chest Non-Coronary (PE Studies) (Final result)  Result time 04/24/25 15:39:01      Final result by Wellington Roper MD (04/24/25 15:39:01)                   Impression:      1. No evidence of pulmonary thromboembolic disease.  2. Diffuse bilateral abnormal reticulonodular interstitial prominence, reflecting a detrimental change compared to January 2025.  Primary differential considerations include atypical infection and lymphangitis spread of malignancy.      Electronically signed by: Wellington Roper  Date:    04/24/2025  Time:    15:39               Narrative:    EXAMINATION:  CTA CHEST NON CORONARY (PE STUDIES)    CLINICAL HISTORY:  Pulmonary embolism (PE) suspected, unknown  D-dimer;.    TECHNIQUE:  CT angiography targeted to the pulmonary arteries was performed. 100 cc nonionic contrast was administered and the bolus was timed for optimal opacification of the pulmonary arteries. 3-D reformatted images were obtained on an independent workstation and stored as a part of patient's permanent medical record.    COMPARISON:  CT chest abdomen and pelvis without contrast, January 2025    FINDINGS:  Pulmonary arterial contrast opacification is adequate.  There are no identifiable filling defects to indicate pulmonary thromboembolic disease.  Heart size is normal.  Multifocal coronary artery atherosclerotic calcification is noted.  The thoracic aorta is normal in caliber.  No pathologic mediastinal or axillary lymphadenopathy is identified.  Postoperative changes are noted in the left breast.    Images at lung windows demonstrate diffuse bilateral abnormal reticulonodular interstitial prominence, reflecting an interval change compared to January 6.  Scattered small airspace opacities are also noted, new compared to the prior study.  Differential possibilities would include atypical infection and lymphangitic spread of malignancy.  No focal dominant mass is identified.  There are no pleural effusions.    No acute osseous abnormalities are demonstrated.                                       X-Ray Chest AP Portable (Final result)  Result time 04/24/25 13:55:12   Procedure changed from X-Ray Chest 1 View     Final result by Carlita Schneider MD (04/24/25 13:55:12)                   Impression:      Resolution of the right lower lobe airspace disease with stable elevation the right hemidiaphragm    No acute pulmonary process      Electronically signed by: Carlita Schneider  Date:    04/24/2025  Time:    13:55               Narrative:    EXAMINATION:  XR CHEST AP PORTABLE    CLINICAL HISTORY:  shortness of breath;    FINDINGS:  Portable chest at 13:56 hours is compared to 04/18/2025 shows normal  cardiomediastinal silhouette.   There is stable elevation the right hemidiaphragm.    There are no confluent infiltrates or pleural effusions.  Pulmonary vasculature is normal. No acute osseous abnormality.                                          Assessment & Plan  COPD exacerbation  Patient's COPD is with exacerbation noted by continued dyspnea currently.  Patient is currently off COPD Pathway. Continue scheduled inhalers Steroids, Antibiotics, and Supplemental oxygen and monitor respiratory status closely.     CTA chest negative for pulmonary embolisms however there is detrimental change in diffuse bilateral reticulonodular interstitial prominence from 3 months prior differential including atypical infection versus lymphangitis spread of malignancy  Consult pulmonology      Essential hypertension, benign  Patient's blood pressure range in the last 24 hours was: BP  Min: 114/75  Max: 159/88.The patient's inpatient anti-hypertensive regimen is listed below:  Current Antihypertensives  amLODIPine tablet 5 mg, Nightly, OralResume home amlodipine    Plan  - BP is controlled, no changes needed to their regimen  -   Acquired hypothyroidism  Patient has chronic hypothyroidism. TFTs reviewed-   Lab Results   Component Value Date    TSH 1.074 11/20/2024   . Will continue chronic levothyroxine and adjust for and clinical changes.   Rheumatoid arthritis involving multiple sites with positive rheumatoid factor  Resume home Plaquenil    Drug-induced immunodeficiency  Patient currently on daily Plaquenil, methotrexate weekly and daily exemestane  VTE Risk Mitigation (From admission, onward)           Ordered     enoxaparin injection 40 mg  Daily         04/24/25 1707     IP VTE HIGH RISK PATIENT  Once         04/24/25 1707     Place sequential compression device  Until discontinued         04/24/25 1707                    Discharge Planning   LILA:      Code Status: Full Code   Medical Readiness for Discharge Date:   Discharge  Plan A: Home with family                        Marisol Han NP  Department of Hospital Medicine   [facility] - Emergency Dept

## 2025-04-26 PROBLEM — B34.8 RHINOVIRUS: Status: ACTIVE | Noted: 2025-04-26

## 2025-04-26 LAB
ABSOLUTE EOSINOPHIL (SMH): 0 K/UL
ABSOLUTE MONOCYTE (SMH): 0.48 K/UL (ref 0.3–1)
ABSOLUTE NEUTROPHIL COUNT (SMH): 10.2 K/UL (ref 1.8–7.7)
ALBUMIN SERPL-MCNC: 3.5 G/DL (ref 3.5–5.2)
ALP SERPL-CCNC: 56 UNIT/L (ref 55–135)
ALT SERPL-CCNC: 36 UNIT/L (ref 10–44)
ANION GAP (SMH): 6 MMOL/L (ref 8–16)
AST SERPL-CCNC: 21 UNIT/L (ref 10–40)
BASOPHILS # BLD AUTO: 0.02 K/UL
BASOPHILS NFR BLD AUTO: 0.2 %
BILIRUB SERPL-MCNC: 0.4 MG/DL (ref 0.1–1)
BUN SERPL-MCNC: 38 MG/DL (ref 8–23)
CALCIUM SERPL-MCNC: 9.2 MG/DL (ref 8.7–10.5)
CHLORIDE SERPL-SCNC: 99 MMOL/L (ref 95–110)
CO2 SERPL-SCNC: 34 MMOL/L (ref 23–29)
CREAT SERPL-MCNC: 1.2 MG/DL (ref 0.5–1.4)
ERYTHROCYTE [DISTWIDTH] IN BLOOD BY AUTOMATED COUNT: 17.4 % (ref 11.5–14.5)
GFR SERPLBLD CREATININE-BSD FMLA CKD-EPI: 46 ML/MIN/1.73/M2
GLUCOSE SERPL-MCNC: 105 MG/DL (ref 70–110)
HCT VFR BLD AUTO: 36.7 % (ref 37–48.5)
HGB BLD-MCNC: 11.3 GM/DL (ref 12–16)
IMM GRANULOCYTES # BLD AUTO: 0.19 K/UL (ref 0–0.04)
IMM GRANULOCYTES NFR BLD AUTO: 1.6 % (ref 0–0.5)
LYMPHOCYTES # BLD AUTO: 1.1 K/UL (ref 1–4.8)
MCH RBC QN AUTO: 31.5 PG (ref 27–31)
MCHC RBC AUTO-ENTMCNC: 30.8 G/DL (ref 32–36)
MCV RBC AUTO: 102 FL (ref 82–98)
NUCLEATED RBC (/100WBC) (SMH): 0 /100 WBC
PLATELET # BLD AUTO: 298 K/UL (ref 150–450)
PMV BLD AUTO: 10.4 FL (ref 9.2–12.9)
POTASSIUM SERPL-SCNC: 4.3 MMOL/L (ref 3.5–5.1)
PROT SERPL-MCNC: 6.5 GM/DL (ref 6–8.4)
RBC # BLD AUTO: 3.59 M/UL (ref 4–5.4)
RELATIVE EOSINOPHIL (SMH): 0 % (ref 0–8)
RELATIVE LYMPHOCYTE (SMH): 9.2 % (ref 18–48)
RELATIVE MONOCYTE (SMH): 4 % (ref 4–15)
RELATIVE NEUTROPHIL (SMH): 85 % (ref 38–73)
SODIUM SERPL-SCNC: 139 MMOL/L (ref 136–145)
WBC # BLD AUTO: 12 K/UL (ref 3.9–12.7)

## 2025-04-26 PROCEDURE — 94761 N-INVAS EAR/PLS OXIMETRY MLT: CPT

## 2025-04-26 PROCEDURE — 25000003 PHARM REV CODE 250: Performed by: STUDENT IN AN ORGANIZED HEALTH CARE EDUCATION/TRAINING PROGRAM

## 2025-04-26 PROCEDURE — 99900035 HC TECH TIME PER 15 MIN (STAT)

## 2025-04-26 PROCEDURE — 27000221 HC OXYGEN, UP TO 24 HOURS

## 2025-04-26 PROCEDURE — 85025 COMPLETE CBC W/AUTO DIFF WBC: CPT | Performed by: REGISTERED NURSE

## 2025-04-26 PROCEDURE — 94640 AIRWAY INHALATION TREATMENT: CPT

## 2025-04-26 PROCEDURE — 63600175 PHARM REV CODE 636 W HCPCS: Performed by: REGISTERED NURSE

## 2025-04-26 PROCEDURE — 36415 COLL VENOUS BLD VENIPUNCTURE: CPT | Performed by: REGISTERED NURSE

## 2025-04-26 PROCEDURE — 99900031 HC PATIENT EDUCATION (STAT)

## 2025-04-26 PROCEDURE — 25000003 PHARM REV CODE 250

## 2025-04-26 PROCEDURE — 12000002 HC ACUTE/MED SURGE SEMI-PRIVATE ROOM

## 2025-04-26 PROCEDURE — 80053 COMPREHEN METABOLIC PANEL: CPT | Performed by: REGISTERED NURSE

## 2025-04-26 PROCEDURE — 63600175 PHARM REV CODE 636 W HCPCS: Performed by: STUDENT IN AN ORGANIZED HEALTH CARE EDUCATION/TRAINING PROGRAM

## 2025-04-26 PROCEDURE — 25000242 PHARM REV CODE 250 ALT 637 W/ HCPCS: Performed by: REGISTERED NURSE

## 2025-04-26 PROCEDURE — 25000003 PHARM REV CODE 250: Performed by: REGISTERED NURSE

## 2025-04-26 RX ORDER — GUAIFENESIN 600 MG/1
600 TABLET, EXTENDED RELEASE ORAL 2 TIMES DAILY
Status: DISCONTINUED | OUTPATIENT
Start: 2025-04-26 | End: 2025-04-29 | Stop reason: HOSPADM

## 2025-04-26 RX ADMIN — GUAIFENESIN 600 MG: 600 TABLET, EXTENDED RELEASE ORAL at 09:04

## 2025-04-26 RX ADMIN — IPRATROPIUM BROMIDE AND ALBUTEROL SULFATE 3 ML: 2.5; .5 SOLUTION RESPIRATORY (INHALATION) at 01:04

## 2025-04-26 RX ADMIN — HYDROXYCHLOROQUINE SULFATE 200 MG: 200 TABLET, FILM COATED ORAL at 09:04

## 2025-04-26 RX ADMIN — GABAPENTIN 300 MG: 300 CAPSULE ORAL at 09:04

## 2025-04-26 RX ADMIN — IPRATROPIUM BROMIDE AND ALBUTEROL SULFATE 3 ML: 2.5; .5 SOLUTION RESPIRATORY (INHALATION) at 07:04

## 2025-04-26 RX ADMIN — ENOXAPARIN SODIUM 40 MG: 40 INJECTION SUBCUTANEOUS at 05:04

## 2025-04-26 RX ADMIN — SODIUM CHLORIDE 1000 MG: 9 INJECTION, SOLUTION INTRAVENOUS at 09:04

## 2025-04-26 RX ADMIN — ATORVASTATIN CALCIUM 20 MG: 20 TABLET, FILM COATED ORAL at 09:04

## 2025-04-26 RX ADMIN — AMLODIPINE BESYLATE 5 MG: 5 TABLET ORAL at 09:04

## 2025-04-26 RX ADMIN — LEVOTHYROXINE SODIUM 125 MCG: 0.1 TABLET ORAL at 06:04

## 2025-04-26 RX ADMIN — AZITHROMYCIN MONOHYDRATE 500 MG: 500 INJECTION, POWDER, LYOPHILIZED, FOR SOLUTION INTRAVENOUS at 06:04

## 2025-04-26 RX ADMIN — PREDNISONE 40 MG: 20 TABLET ORAL at 09:04

## 2025-04-26 RX ADMIN — DIPHENHYDRAMINE HYDROCHLORIDE 50 MG: 25 CAPSULE ORAL at 09:04

## 2025-04-26 RX ADMIN — CEFTRIAXONE 1 G: 1 INJECTION, POWDER, FOR SOLUTION INTRAMUSCULAR; INTRAVENOUS at 02:04

## 2025-04-26 NOTE — PROGRESS NOTES
[facility] Medicine  Progress Note    Patient Name: Kirstie Bowden  MRN: [id number]  Patient Class: IP- Inpatient   Admission Date: 4/24/2025  Length of Stay: 2 days  Attending Physician: Emely Interiano MD  Primary Care Provider: Rocio Feliciano MD        Subjective     Principal Problem:COPD exacerbation        HPI:  Patient is a 80-year-old female with a history of RA, hypertension, COPD, hypothyroidism breast CA, renal CA.  Patient reports she went to urgent care last week and was diagnosed with pneumonia and pleurisy and started on antibiotics and oral steroids.  Patient reports symptoms only worsened since completing antibiotic course.  She saw her pulmonologist today in clinic and was referred to ED. in ED labwork remarkable for leukocytosis with a left shift.  Of note patient currently on methotrexate and exemestane.  Currently requiring 2 L nasal cannula which she is not on any home O2..  Flu and COVID both negative.  BNP elevated at 138.  Chest x-ray with improved right lower lobe density.  CTA c/a/p  was ordered which ruled out PE, and no acute intra-abdominal abnormality, however there is detrimental change in diffuse bilateral reticulonodular interstitial prominence from 3 months prior differential including atypical infection versus lymphangitis spread of malignancy.  Patient will be admitted to Hospital Medicine.  Started on Rocephin and azithromycin.  Continue supplemental O2 and start DuoNebs.    Overview/Hospital Course:  No notes on file    Interval History:   Pt seen and examine. NAD. NAEO. Leukocytosis resolved. Continue broad spectrum antibiotics. Resp panel + Rhinovirus.   Review of Systems   Constitutional:  Positive for fever.   HENT:  Positive for voice change.    Respiratory:  Positive for cough. Negative for chest tightness, shortness of breath and wheezing.    Cardiovascular: Negative.    Gastrointestinal: Negative.    Neurological:  Positive for  weakness.     Objective:     Vital Signs (Most Recent):  Temp: 98.2 °F (36.8 °C) (04/26/25 1119)  Pulse: 71 (04/26/25 1119)  Resp: 18 (04/26/25 1119)  BP: (!) 152/88 (04/26/25 1119)  SpO2: (!) 93 % (04/26/25 1119) Vital Signs (24h Range):  Temp:  [97.4 °F (36.3 °C)-98.7 °F (37.1 °C)] 98.2 °F (36.8 °C)  Pulse:  [63-90] 71  Resp:  [16-18] 18  SpO2:  [89 %-97 %] 93 %  BP: (111-158)/(56-88) 152/88     Weight: 75.9 kg (167 lb 5.3 oz)  Body mass index is 25.44 kg/m².    Intake/Output Summary (Last 24 hours) at 4/26/2025 1210  Last data filed at 4/26/2025 0420  Gross per 24 hour   Intake 500 ml   Output 450 ml   Net 50 ml         Physical Exam  Constitutional:       General: She is not in acute distress.     Appearance: Normal appearance. She is ill-appearing.   Cardiovascular:      Rate and Rhythm: Normal rate and regular rhythm.      Pulses: Normal pulses.      Heart sounds: Normal heart sounds.   Pulmonary:      Effort: Pulmonary effort is normal.      Breath sounds: Rhonchi present.   Abdominal:      General: Bowel sounds are normal.      Palpations: Abdomen is soft.   Musculoskeletal:      Right lower leg: No edema.      Left lower leg: No edema.   Skin:     General: Skin is warm and dry.   Neurological:      Mental Status: She is alert and oriented to person, place, and time.   Psychiatric:         Mood and Affect: Mood normal.         Behavior: Behavior normal.         Thought Content: Thought content normal.         Judgment: Judgment normal.               Significant Labs: All pertinent labs within the past 24 hours have been reviewed.  Recent Lab Results         04/26/25  0558   04/25/25  1435   04/25/25  1305        Respiratory Infection Panel Source     Nasopharyngeal Swab       Adenovirus     Not Detected       Coronavirus 229E, Common Cold Virus     Not Detected       Coronavirus HKU1, Common Cold Virus     Not Detected       Coronavirus NL63, Common Cold Virus     Not Detected       Coronavirus OC43, Common  Cold Virus     Not Detected       Human Metapneumovirus     Not Detected       Human Rhinovirus/Enterovirus     Detected       Influenza A (subtypes H1, H1-2009,H3)     Not Detected       Influenza B     Not Detected       Parainfluenza Virus 1     Not Detected       Parainfluenza Virus 2     Not Detected       Parainfluenza Virus 3     Not Detected       Parainfluenza Virus 4     Not Detected       Respiratory Syncytial Virus     Not Detected       Bordetella Parapertussis ([id number])     Not Detected       Bordetella pertussis (ptxP)     Not Detected       Chlamydia pneumoniae     Not Detected       Mycoplasma pneumoniae     Not Detected       Albumin 3.5           ALP 56           ALT 36           Anion Gap 6           AST 21           Baso # 0.02           Basophil % 0.2           BILIRUBIN TOTAL 0.4  Comment: For infants and newborns, interpretation of results should be based   on gestational age, weight and in agreement with clinical   observations.    Premature Infant recommended reference ranges:   0-24 hours:  <8.0 mg/dL   24-48 hours: <12.0 mg/dL   3-5 days:    <15.0 mg/dL   6-29 days:   <15.0 mg/dL           BUN 38           Calcium 9.2           Chloride 99           CO2 34           Creatinine 1.2           eGFR 46           Eos # 0.00           Eos % 0.0           Glucose 105           Hematocrit 36.7           Hemoglobin 11.3           Immature Grans (Abs) 0.19  Comment: Mild elevation in immature granulocytes is non specific and can be seen in a variety of conditions including stress response, acute inflammation, trauma and pregnancy. Correlation with other laboratory and clinical findings is essential.           Immature Granulocytes 1.6           Lymph # 1.10           LYMPH % 9.2           MCH 31.5           MCHC 30.8                      Mono # 0.48           Mono % 4.0           MPV 10.4           Neut # 10.2           Neut % 85.0           nRBC 0           Platelet Count 298            Potassium 4.3           Procalcitonin   0.111  Comment: The Procalcitonin test is intended to aid in the risk assessment of critically ill patients on their first day of ICU admission for progression to severe sepsis and septic shock.  A result of <0.50 ng/mL is associated with a low risk of severe sepsis and/or septic shock.  This does not exclude localized infection.  A result between 0.50 ng/mL and 2.0 ng/mL should be interpreted with consideration of the patient's history and is recommended to retest within 6 to 24 hours.      A result of >2.0 ng/mL is associated with a high risk of severe sepsis and/or septic shock.     Procalcitonin may not be accurate among patients with localized  infection, recent trauma or major surgery, immunosuppressed state,  invasive fungal infection, renal dysfunction. Decisions regarding  initiation or continuation of antibiotic therapy should not be based  solely on procalcitonin levels.         PROTEIN TOTAL 6.5           RBC 3.59           RDW 17.4           SARS-CoV2 (COVID-19) Qualitative PCR     Not Detected       Sodium 139           WBC 12.00                 Microbiology Results (last 7 days)       Procedure Component Value Units Date/Time    Respiratory Infection Panel (PCR), Nasopharyngeal [[id number]]  (Abnormal) Collected: 04/25/25 1305    Order Status: Completed Specimen: Nasopharyngeal Swab Updated: 04/25/25 1449     Respiratory Infection Panel Source Nasopharyngeal Swab     Adenovirus Not Detected     Coronavirus 229E, Common Cold Virus Not Detected     Coronavirus HKU1, Common Cold Virus Not Detected     Coronavirus NL63, Common Cold Virus Not Detected     Coronavirus OC43, Common Cold Virus Not Detected     SARS-CoV2 (COVID-19) Qualitative PCR Not Detected     Human Metapneumovirus Not Detected     Human Rhinovirus/Enterovirus Detected     Influenza A (subtypes H1, H1-2009,H3) Not Detected     Influenza B Not Detected     Parainfluenza Virus 1 Not Detected      Parainfluenza Virus 2 Not Detected     Parainfluenza Virus 3 Not Detected     Parainfluenza Virus 4 Not Detected     Respiratory Syncytial Virus Not Detected     Bordetella Parapertussis ([id number]) Not Detected     Bordetella pertussis (ptxP) Not Detected     Chlamydia pneumoniae Not Detected     Mycoplasma pneumoniae Not Detected    Influenza A & B by Molecular [[id number]]  (Normal) Collected: 04/24/25 1537    Order Status: Completed Specimen: Nasal Swab Updated: 04/24/25 1631     INFLUENZA A MOLECULAR Negative     INFLUENZA B MOLECULAR  Negative              Significant Imaging: I have reviewed all pertinent imaging results/findings within the past 24 hours.  Imaging Results              CT Abdomen Pelvis With IV Contrast NO Oral Contrast (Final result)  Result time 04/24/25 15:48:39      Final result by Wellington Roper MD (04/24/25 15:48:39)                   Impression:      1. No acute CT abnormalities.  2. Multiple chronic findings as above.      Electronically signed by: Wellington Roper  Date:    04/24/2025  Time:    15:48               Narrative:    EXAMINATION:  CT ABDOMEN PELVIS WITH IV CONTRAST    CLINICAL HISTORY:  Abdominal pain, acute, nonlocalized;.    TECHNIQUE:  Post infusion axial images were obtained from the lung bases to the pubic symphysis 100 cc nonionic contrast was utilized for the examination.    COMPARISON:  Noncontrast CT, January 2025    FINDINGS  The liver has a normal appearance.  The gallbladder is absent.  The biliary tree is nondilated.  The spleen is normal in appearance.  The pancreas is unremarkable.  The adrenal glands are normal.  Small left-sided simple renal cysts are noted, as well as at least 2 nonobstructing left renal calculi, the largest at the lower pole measuring 6 mm.  There is no evidence of left ureteral calculus or hydronephrosis.  The right kidney is surgically absent, with no evidence of mass lesion at the right renal fossa.  There is dense multifocal aortic  atherosclerosis.    There is no pathologic bowel wall thickening or evidence of obstruction.  There is a mild amount of retained fecal matter throughout the colon.  There is no free air or free fluid.  The appendix is normal in appearance.    Images of the pelvis demonstrate sigmoid diverticula without diverticulitis.  The urinary bladder is unremarkable.  There is no pelvic free fluid or lymphadenopathy.    Moderate-severe arthritic changes noted at the right hip, with mild to moderate arthritic change on the left.  5.5 x 2.2 cm fluid collection superficial to the right femur at the level of the greater trochanter is likely bursal fluid accumulation/bursitis.  This collection appears smaller when compared to January 6.    There is multilevel degenerative facet disease in the lumbar spine.                                       CTA Chest Non-Coronary (PE Studies) (Final result)  Result time 04/24/25 15:39:01      Final result by Wellington Roper MD (04/24/25 15:39:01)                   Impression:      1. No evidence of pulmonary thromboembolic disease.  2. Diffuse bilateral abnormal reticulonodular interstitial prominence, reflecting a detrimental change compared to January 2025.  Primary differential considerations include atypical infection and lymphangitis spread of malignancy.      Electronically signed by: Wellington Roper  Date:    04/24/2025  Time:    15:39               Narrative:    EXAMINATION:  CTA CHEST NON CORONARY (PE STUDIES)    CLINICAL HISTORY:  Pulmonary embolism (PE) suspected, unknown D-dimer;.    TECHNIQUE:  CT angiography targeted to the pulmonary arteries was performed. 100 cc nonionic contrast was administered and the bolus was timed for optimal opacification of the pulmonary arteries. 3-D reformatted images were obtained on an independent workstation and stored as a part of patient's permanent medical record.    COMPARISON:  CT chest abdomen and pelvis without contrast, January  2025    FINDINGS:  Pulmonary arterial contrast opacification is adequate.  There are no identifiable filling defects to indicate pulmonary thromboembolic disease.  Heart size is normal.  Multifocal coronary artery atherosclerotic calcification is noted.  The thoracic aorta is normal in caliber.  No pathologic mediastinal or axillary lymphadenopathy is identified.  Postoperative changes are noted in the left breast.    Images at lung windows demonstrate diffuse bilateral abnormal reticulonodular interstitial prominence, reflecting an interval change compared to January 6.  Scattered small airspace opacities are also noted, new compared to the prior study.  Differential possibilities would include atypical infection and lymphangitic spread of malignancy.  No focal dominant mass is identified.  There are no pleural effusions.    No acute osseous abnormalities are demonstrated.                                       X-Ray Chest AP Portable (Final result)  Result time 04/24/25 13:55:12   Procedure changed from X-Ray Chest 1 View     Final result by Carlita Schneider MD (04/24/25 13:55:12)                   Impression:      Resolution of the right lower lobe airspace disease with stable elevation the right hemidiaphragm    No acute pulmonary process      Electronically signed by: Carlita Schneider  Date:    04/24/2025  Time:    13:55               Narrative:    EXAMINATION:  XR CHEST AP PORTABLE    CLINICAL HISTORY:  shortness of breath;    FINDINGS:  Portable chest at 13:56 hours is compared to 04/18/2025 shows normal cardiomediastinal silhouette.   There is stable elevation the right hemidiaphragm.    There are no confluent infiltrates or pleural effusions.  Pulmonary vasculature is normal. No acute osseous abnormality.                                          Assessment & Plan  COPD exacerbation  Patient's COPD is with exacerbation noted by continued dyspnea currently.  Patient is currently off COPD Pathway. Continue  scheduled inhalers Steroids, Antibiotics, and Supplemental oxygen and monitor respiratory status closely.     CTA chest negative for pulmonary embolisms however there is detrimental change in diffuse bilateral reticulonodular interstitial prominence from 3 months prior differential including atypical infection versus lymphangitis spread of malignancy  Pulmonology consulted   Procal negative         Essential hypertension, benign  Patient's blood pressure range in the last 24 hours was: BP  Min: 111/83  Max: 158/77.The patient's inpatient anti-hypertensive regimen is listed below:  Current Antihypertensives  amLODIPine tablet 5 mg, Nightly, OralResume home amlodipine    Plan  - BP is controlled, no changes needed to their regimen  -   Acquired hypothyroidism  Patient has chronic hypothyroidism. TFTs reviewed-   Lab Results   Component Value Date    TSH 1.074 11/20/2024   . Will continue chronic levothyroxine and adjust for and clinical changes.   Rheumatoid arthritis involving multiple sites with positive rheumatoid factor  Resume home Plaquenil    Drug-induced immunodeficiency  Patient currently on daily Plaquenil, methotrexate weekly and daily exemestane  Rhinovirus  Supportive case   Encourage oral hydration  VTE Risk Mitigation (From admission, onward)           Ordered     enoxaparin injection 40 mg  Daily         04/24/25 1707     IP VTE HIGH RISK PATIENT  Once         04/24/25 1707     Place sequential compression device  Until discontinued         04/24/25 1707                    Discharge Planning   LILA:      Code Status: Full Code   Medical Readiness for Discharge Date:   Discharge Plan A: Home with family                        Marisol Han NP  Department of Hospital Medicine   [facility]

## 2025-04-26 NOTE — ASSESSMENT & PLAN NOTE
Patient's COPD is with exacerbation noted by continued dyspnea currently.  Patient is currently off COPD Pathway. Continue scheduled inhalers Steroids, Antibiotics, and Supplemental oxygen and monitor respiratory status closely.     CTA chest negative for pulmonary embolisms however there is detrimental change in diffuse bilateral reticulonodular interstitial prominence from 3 months prior differential including atypical infection versus lymphangitis spread of malignancy  Pulmonology consulted   Procal negative

## 2025-04-26 NOTE — SUBJECTIVE & OBJECTIVE
Interval History:   Pt seen and examine. NAD. NAEO. Leukocytosis resolved. Continue broad spectrum antibiotics. Resp panel + Rhinovirus.   Review of Systems   Constitutional:  Positive for fever.   HENT:  Positive for voice change.    Respiratory:  Positive for cough. Negative for chest tightness, shortness of breath and wheezing.    Cardiovascular: Negative.    Gastrointestinal: Negative.    Neurological:  Positive for weakness.     Objective:     Vital Signs (Most Recent):  Temp: 98.2 °F (36.8 °C) (04/26/25 1119)  Pulse: 71 (04/26/25 1119)  Resp: 18 (04/26/25 1119)  BP: (!) 152/88 (04/26/25 1119)  SpO2: (!) 93 % (04/26/25 1119) Vital Signs (24h Range):  Temp:  [97.4 °F (36.3 °C)-98.7 °F (37.1 °C)] 98.2 °F (36.8 °C)  Pulse:  [63-90] 71  Resp:  [16-18] 18  SpO2:  [89 %-97 %] 93 %  BP: (111-158)/(56-88) 152/88     Weight: 75.9 kg (167 lb 5.3 oz)  Body mass index is 25.44 kg/m².    Intake/Output Summary (Last 24 hours) at 4/26/2025 1210  Last data filed at 4/26/2025 0420  Gross per 24 hour   Intake 500 ml   Output 450 ml   Net 50 ml         Physical Exam  Constitutional:       General: She is not in acute distress.     Appearance: Normal appearance. She is ill-appearing.   Cardiovascular:      Rate and Rhythm: Normal rate and regular rhythm.      Pulses: Normal pulses.      Heart sounds: Normal heart sounds.   Pulmonary:      Effort: Pulmonary effort is normal.      Breath sounds: Rhonchi present.   Abdominal:      General: Bowel sounds are normal.      Palpations: Abdomen is soft.   Musculoskeletal:      Right lower leg: No edema.      Left lower leg: No edema.   Skin:     General: Skin is warm and dry.   Neurological:      Mental Status: She is alert and oriented to person, place, and time.   Psychiatric:         Mood and Affect: Mood normal.         Behavior: Behavior normal.         Thought Content: Thought content normal.         Judgment: Judgment normal.               Significant Labs: All pertinent labs within  the past 24 hours have been reviewed.  Recent Lab Results         04/26/25  0558   04/25/25  1435   04/25/25  1305        Respiratory Infection Panel Source     Nasopharyngeal Swab       Adenovirus     Not Detected       Coronavirus 229E, Common Cold Virus     Not Detected       Coronavirus HKU1, Common Cold Virus     Not Detected       Coronavirus NL63, Common Cold Virus     Not Detected       Coronavirus OC43, Common Cold Virus     Not Detected       Human Metapneumovirus     Not Detected       Human Rhinovirus/Enterovirus     Detected       Influenza A (subtypes H1, H1-2009,H3)     Not Detected       Influenza B     Not Detected       Parainfluenza Virus 1     Not Detected       Parainfluenza Virus 2     Not Detected       Parainfluenza Virus 3     Not Detected       Parainfluenza Virus 4     Not Detected       Respiratory Syncytial Virus     Not Detected       Bordetella Parapertussis (UC3610)     Not Detected       Bordetella pertussis (ptxP)     Not Detected       Chlamydia pneumoniae     Not Detected       Mycoplasma pneumoniae     Not Detected       Albumin 3.5           ALP 56           ALT 36           Anion Gap 6           AST 21           Baso # 0.02           Basophil % 0.2           BILIRUBIN TOTAL 0.4  Comment: For infants and newborns, interpretation of results should be based   on gestational age, weight and in agreement with clinical   observations.    Premature Infant recommended reference ranges:   0-24 hours:  <8.0 mg/dL   24-48 hours: <12.0 mg/dL   3-5 days:    <15.0 mg/dL   6-29 days:   <15.0 mg/dL           BUN 38           Calcium 9.2           Chloride 99           CO2 34           Creatinine 1.2           eGFR 46           Eos # 0.00           Eos % 0.0           Glucose 105           Hematocrit 36.7           Hemoglobin 11.3           Immature Grans (Abs) 0.19  Comment: Mild elevation in immature granulocytes is non specific and can be seen in a variety of conditions including stress  response, acute inflammation, trauma and pregnancy. Correlation with other laboratory and clinical findings is essential.           Immature Granulocytes 1.6           Lymph # 1.10           LYMPH % 9.2           MCH 31.5           MCHC 30.8                      Mono # 0.48           Mono % 4.0           MPV 10.4           Neut # 10.2           Neut % 85.0           nRBC 0           Platelet Count 298           Potassium 4.3           Procalcitonin   0.111  Comment: The Procalcitonin test is intended to aid in the risk assessment of critically ill patients on their first day of ICU admission for progression to severe sepsis and septic shock.  A result of <0.50 ng/mL is associated with a low risk of severe sepsis and/or septic shock.  This does not exclude localized infection.  A result between 0.50 ng/mL and 2.0 ng/mL should be interpreted with consideration of the patient's history and is recommended to retest within 6 to 24 hours.      A result of >2.0 ng/mL is associated with a high risk of severe sepsis and/or septic shock.     Procalcitonin may not be accurate among patients with localized  infection, recent trauma or major surgery, immunosuppressed state,  invasive fungal infection, renal dysfunction. Decisions regarding  initiation or continuation of antibiotic therapy should not be based  solely on procalcitonin levels.         PROTEIN TOTAL 6.5           RBC 3.59           RDW 17.4           SARS-CoV2 (COVID-19) Qualitative PCR     Not Detected       Sodium 139           WBC 12.00                 Microbiology Results (last 7 days)       Procedure Component Value Units Date/Time    Respiratory Infection Panel (PCR), Nasopharyngeal [[id number]]  (Abnormal) Collected: 04/25/25 1305    Order Status: Completed Specimen: Nasopharyngeal Swab Updated: 04/25/25 1449     Respiratory Infection Panel Source Nasopharyngeal Swab     Adenovirus Not Detected     Coronavirus 229E, Common Cold Virus Not Detected      Coronavirus HKU1, Common Cold Virus Not Detected     Coronavirus NL63, Common Cold Virus Not Detected     Coronavirus OC43, Common Cold Virus Not Detected     SARS-CoV2 (COVID-19) Qualitative PCR Not Detected     Human Metapneumovirus Not Detected     Human Rhinovirus/Enterovirus Detected     Influenza A (subtypes H1, H1-2009,H3) Not Detected     Influenza B Not Detected     Parainfluenza Virus 1 Not Detected     Parainfluenza Virus 2 Not Detected     Parainfluenza Virus 3 Not Detected     Parainfluenza Virus 4 Not Detected     Respiratory Syncytial Virus Not Detected     Bordetella Parapertussis (PK6435) Not Detected     Bordetella pertussis (ptxP) Not Detected     Chlamydia pneumoniae Not Detected     Mycoplasma pneumoniae Not Detected    Influenza A & B by Molecular [[id number]]  (Normal) Collected: 04/24/25 1537    Order Status: Completed Specimen: Nasal Swab Updated: 04/24/25 1631     INFLUENZA A MOLECULAR Negative     INFLUENZA B MOLECULAR  Negative              Significant Imaging: I have reviewed all pertinent imaging results/findings within the past 24 hours.  Imaging Results              CT Abdomen Pelvis With IV Contrast NO Oral Contrast (Final result)  Result time 04/24/25 15:48:39      Final result by Wellington Roper MD (04/24/25 15:48:39)                   Impression:      1. No acute CT abnormalities.  2. Multiple chronic findings as above.      Electronically signed by: Wellington Roper  Date:    04/24/2025  Time:    15:48               Narrative:    EXAMINATION:  CT ABDOMEN PELVIS WITH IV CONTRAST    CLINICAL HISTORY:  Abdominal pain, acute, nonlocalized;.    TECHNIQUE:  Post infusion axial images were obtained from the lung bases to the pubic symphysis 100 cc nonionic contrast was utilized for the examination.    COMPARISON:  Noncontrast CT, January 2025    FINDINGS  The liver has a normal appearance.  The gallbladder is absent.  The biliary tree is nondilated.  The spleen is normal in  appearance.  The pancreas is unremarkable.  The adrenal glands are normal.  Small left-sided simple renal cysts are noted, as well as at least 2 nonobstructing left renal calculi, the largest at the lower pole measuring 6 mm.  There is no evidence of left ureteral calculus or hydronephrosis.  The right kidney is surgically absent, with no evidence of mass lesion at the right renal fossa.  There is dense multifocal aortic atherosclerosis.    There is no pathologic bowel wall thickening or evidence of obstruction.  There is a mild amount of retained fecal matter throughout the colon.  There is no free air or free fluid.  The appendix is normal in appearance.    Images of the pelvis demonstrate sigmoid diverticula without diverticulitis.  The urinary bladder is unremarkable.  There is no pelvic free fluid or lymphadenopathy.    Moderate-severe arthritic changes noted at the right hip, with mild to moderate arthritic change on the left.  5.5 x 2.2 cm fluid collection superficial to the right femur at the level of the greater trochanter is likely bursal fluid accumulation/bursitis.  This collection appears smaller when compared to January 6.    There is multilevel degenerative facet disease in the lumbar spine.                                       CTA Chest Non-Coronary (PE Studies) (Final result)  Result time 04/24/25 15:39:01      Final result by Wellington Roper MD (04/24/25 15:39:01)                   Impression:      1. No evidence of pulmonary thromboembolic disease.  2. Diffuse bilateral abnormal reticulonodular interstitial prominence, reflecting a detrimental change compared to January 2025.  Primary differential considerations include atypical infection and lymphangitis spread of malignancy.      Electronically signed by: Wellington Roper  Date:    04/24/2025  Time:    15:39               Narrative:    EXAMINATION:  CTA CHEST NON CORONARY (PE STUDIES)    CLINICAL HISTORY:  Pulmonary embolism (PE)  suspected, unknown D-dimer;.    TECHNIQUE:  CT angiography targeted to the pulmonary arteries was performed. 100 cc nonionic contrast was administered and the bolus was timed for optimal opacification of the pulmonary arteries. 3-D reformatted images were obtained on an independent workstation and stored as a part of patient's permanent medical record.    COMPARISON:  CT chest abdomen and pelvis without contrast, January 2025    FINDINGS:  Pulmonary arterial contrast opacification is adequate.  There are no identifiable filling defects to indicate pulmonary thromboembolic disease.  Heart size is normal.  Multifocal coronary artery atherosclerotic calcification is noted.  The thoracic aorta is normal in caliber.  No pathologic mediastinal or axillary lymphadenopathy is identified.  Postoperative changes are noted in the left breast.    Images at lung windows demonstrate diffuse bilateral abnormal reticulonodular interstitial prominence, reflecting an interval change compared to January 6.  Scattered small airspace opacities are also noted, new compared to the prior study.  Differential possibilities would include atypical infection and lymphangitic spread of malignancy.  No focal dominant mass is identified.  There are no pleural effusions.    No acute osseous abnormalities are demonstrated.                                       X-Ray Chest AP Portable (Final result)  Result time 04/24/25 13:55:12   Procedure changed from X-Ray Chest 1 View     Final result by Carlita Schneider MD (04/24/25 13:55:12)                   Impression:      Resolution of the right lower lobe airspace disease with stable elevation the right hemidiaphragm    No acute pulmonary process      Electronically signed by: Carlita Schneider  Date:    04/24/2025  Time:    13:55               Narrative:    EXAMINATION:  XR CHEST AP PORTABLE    CLINICAL HISTORY:  shortness of breath;    FINDINGS:  Portable chest at 13:56 hours is compared to  04/18/2025 shows normal cardiomediastinal silhouette.   There is stable elevation the right hemidiaphragm.    There are no confluent infiltrates or pleural effusions.  Pulmonary vasculature is normal. No acute osseous abnormality.

## 2025-04-26 NOTE — CARE UPDATE
04/25/25 1953   Patient Assessment/Suction   Level of Consciousness (AVPU) alert   Respiratory Effort Normal;Unlabored   Expansion/Accessory Muscles/Retractions no use of accessory muscles   All Lung Fields Breath Sounds diminished   Rhythm/Pattern, Respiratory pattern regular;unlabored   Cough Frequency frequent   Cough Type dry   Skin Integrity   $ Wound Care Tech Time 15 min   Area Observed Left;Right;Behind ear;Cheek;Nares   Skin Appearance without discoloration   PRE-TX-O2   Device (Oxygen Therapy) nasal cannula   Flow (L/min) (Oxygen Therapy) 2   SpO2 (!) 94 %   Pulse Oximetry Type Intermittent   $ Pulse Oximetry - Multiple Charge Pulse Oximetry - Multiple   Pulse 82   Resp 18   Aerosol Therapy   $ Aerosol Therapy Charges Aerosol Treatment   Daily Review of Necessity (SVN) completed   Respiratory Treatment Status (SVN) given   Treatment Route (SVN) mask;oxygen   Patient Position HOB elevated   Post Treatment Assessment (SVN) breath sounds unchanged   Signs of Intolerance (SVN) none   Breath Sounds Post-Respiratory Treatment   Throughout All Fields Post-Treatment All Fields   Throughout All Fields Post-Treatment no change   Post-treatment Heart Rate (beats/min) 84   Post-treatment Resp Rate (breaths/min) 19   Education   $ Education Bronchodilator;Oxygen;15 min   Respiratory Evaluation   $ Care Plan Tech Time 15 min

## 2025-04-26 NOTE — PLAN OF CARE
04/26/25 0706   Patient Assessment/Suction   Level of Consciousness (AVPU) alert   Respiratory Effort Unlabored;Normal   Expansion/Accessory Muscles/Retractions no retractions;no use of accessory muscles   All Lung Fields Breath Sounds diminished   Rhythm/Pattern, Respiratory unlabored;pattern regular;depth regular   PRE-TX-O2   Device (Oxygen Therapy) nasal cannula   $ Is the patient on Low Flow Oxygen? Yes   Flow (L/min) (Oxygen Therapy) 1   SpO2 (!) 93 %   Pulse Oximetry Type Intermittent   $ Pulse Oximetry - Multiple Charge Pulse Oximetry - Multiple   Pulse 68   Resp 16   Aerosol Therapy   $ Aerosol Therapy Charges Aerosol Treatment   Daily Review of Necessity (SVN) completed   Respiratory Treatment Status (SVN) given   Treatment Route (SVN) mask;oxygen   Patient Position HOB elevated   Post Treatment Assessment (SVN) breath sounds unchanged   Signs of Intolerance (SVN) none   Breath Sounds Post-Respiratory Treatment   Post-treatment Heart Rate (beats/min) 70   Post-treatment Resp Rate (breaths/min) 18   Education   $ Education Bronchodilator;15 min   Respiratory Evaluation   $ Care Plan Tech Time 15 min

## 2025-04-26 NOTE — ASSESSMENT & PLAN NOTE
Patient's blood pressure range in the last 24 hours was: BP  Min: 111/83  Max: 158/77.The patient's inpatient anti-hypertensive regimen is listed below:  Current Antihypertensives  amLODIPine tablet 5 mg, Nightly, OralResume home amlodipine    Plan  - BP is controlled, no changes needed to their regimen  -

## 2025-04-27 LAB
ABSOLUTE EOSINOPHIL (SMH): 0.01 K/UL
ABSOLUTE MONOCYTE (SMH): 0.72 K/UL (ref 0.3–1)
ABSOLUTE NEUTROPHIL COUNT (SMH): 9.5 K/UL (ref 1.8–7.7)
ALBUMIN SERPL-MCNC: 3.2 G/DL (ref 3.5–5.2)
ALP SERPL-CCNC: 46 UNIT/L (ref 55–135)
ALT SERPL-CCNC: 43 UNIT/L (ref 10–44)
ANION GAP (SMH): 4 MMOL/L (ref 8–16)
AST SERPL-CCNC: 22 UNIT/L (ref 10–40)
BASOPHILS # BLD AUTO: 0.02 K/UL
BASOPHILS NFR BLD AUTO: 0.2 %
BILIRUB SERPL-MCNC: 0.3 MG/DL (ref 0.1–1)
BUN SERPL-MCNC: 30 MG/DL (ref 8–23)
CALCIUM SERPL-MCNC: 8.9 MG/DL (ref 8.7–10.5)
CHLORIDE SERPL-SCNC: 102 MMOL/L (ref 95–110)
CO2 SERPL-SCNC: 34 MMOL/L (ref 23–29)
CREAT SERPL-MCNC: 1 MG/DL (ref 0.5–1.4)
ERYTHROCYTE [DISTWIDTH] IN BLOOD BY AUTOMATED COUNT: 17.3 % (ref 11.5–14.5)
GFR SERPLBLD CREATININE-BSD FMLA CKD-EPI: 57 ML/MIN/1.73/M2
GLUCOSE SERPL-MCNC: 94 MG/DL (ref 70–110)
HCT VFR BLD AUTO: 34.8 % (ref 37–48.5)
HGB BLD-MCNC: 11 GM/DL (ref 12–16)
IMM GRANULOCYTES # BLD AUTO: 0.13 K/UL (ref 0–0.04)
IMM GRANULOCYTES NFR BLD AUTO: 1.1 % (ref 0–0.5)
LYMPHOCYTES # BLD AUTO: 1.34 K/UL (ref 1–4.8)
MCH RBC QN AUTO: 31.8 PG (ref 27–31)
MCHC RBC AUTO-ENTMCNC: 31.6 G/DL (ref 32–36)
MCV RBC AUTO: 101 FL (ref 82–98)
NUCLEATED RBC (/100WBC) (SMH): 0 /100 WBC
PLATELET # BLD AUTO: 293 K/UL (ref 150–450)
PMV BLD AUTO: 10.4 FL (ref 9.2–12.9)
POTASSIUM SERPL-SCNC: 4.1 MMOL/L (ref 3.5–5.1)
PROT SERPL-MCNC: 5.8 GM/DL (ref 6–8.4)
RBC # BLD AUTO: 3.46 M/UL (ref 4–5.4)
RELATIVE EOSINOPHIL (SMH): 0.1 % (ref 0–8)
RELATIVE LYMPHOCYTE (SMH): 11.5 % (ref 18–48)
RELATIVE MONOCYTE (SMH): 6.2 % (ref 4–15)
RELATIVE NEUTROPHIL (SMH): 80.9 % (ref 38–73)
SODIUM SERPL-SCNC: 140 MMOL/L (ref 136–145)
VANCOMYCIN TROUGH SERPL-MCNC: 12.9 UG/ML (ref ?–20)
WBC # BLD AUTO: 11.69 K/UL (ref 3.9–12.7)

## 2025-04-27 PROCEDURE — 25000003 PHARM REV CODE 250

## 2025-04-27 PROCEDURE — 25000242 PHARM REV CODE 250 ALT 637 W/ HCPCS: Performed by: REGISTERED NURSE

## 2025-04-27 PROCEDURE — 94761 N-INVAS EAR/PLS OXIMETRY MLT: CPT

## 2025-04-27 PROCEDURE — 80053 COMPREHEN METABOLIC PANEL: CPT | Performed by: REGISTERED NURSE

## 2025-04-27 PROCEDURE — 97116 GAIT TRAINING THERAPY: CPT

## 2025-04-27 PROCEDURE — 12000002 HC ACUTE/MED SURGE SEMI-PRIVATE ROOM

## 2025-04-27 PROCEDURE — 94640 AIRWAY INHALATION TREATMENT: CPT

## 2025-04-27 PROCEDURE — 63600175 PHARM REV CODE 636 W HCPCS: Performed by: STUDENT IN AN ORGANIZED HEALTH CARE EDUCATION/TRAINING PROGRAM

## 2025-04-27 PROCEDURE — 97161 PT EVAL LOW COMPLEX 20 MIN: CPT

## 2025-04-27 PROCEDURE — 63600175 PHARM REV CODE 636 W HCPCS: Performed by: REGISTERED NURSE

## 2025-04-27 PROCEDURE — 99900035 HC TECH TIME PER 15 MIN (STAT)

## 2025-04-27 PROCEDURE — 97165 OT EVAL LOW COMPLEX 30 MIN: CPT

## 2025-04-27 PROCEDURE — 27000221 HC OXYGEN, UP TO 24 HOURS

## 2025-04-27 PROCEDURE — 80202 ASSAY OF VANCOMYCIN: CPT | Performed by: STUDENT IN AN ORGANIZED HEALTH CARE EDUCATION/TRAINING PROGRAM

## 2025-04-27 PROCEDURE — 25000003 PHARM REV CODE 250: Performed by: STUDENT IN AN ORGANIZED HEALTH CARE EDUCATION/TRAINING PROGRAM

## 2025-04-27 PROCEDURE — 85025 COMPLETE CBC W/AUTO DIFF WBC: CPT | Performed by: REGISTERED NURSE

## 2025-04-27 PROCEDURE — 25000003 PHARM REV CODE 250: Performed by: REGISTERED NURSE

## 2025-04-27 PROCEDURE — 36415 COLL VENOUS BLD VENIPUNCTURE: CPT | Performed by: REGISTERED NURSE

## 2025-04-27 PROCEDURE — 97530 THERAPEUTIC ACTIVITIES: CPT

## 2025-04-27 PROCEDURE — 27000207 HC ISOLATION

## 2025-04-27 RX ADMIN — DIPHENHYDRAMINE HYDROCHLORIDE 50 MG: 25 CAPSULE ORAL at 09:04

## 2025-04-27 RX ADMIN — AMLODIPINE BESYLATE 5 MG: 5 TABLET ORAL at 09:04

## 2025-04-27 RX ADMIN — HYDROXYCHLOROQUINE SULFATE 200 MG: 200 TABLET, FILM COATED ORAL at 09:04

## 2025-04-27 RX ADMIN — IPRATROPIUM BROMIDE AND ALBUTEROL SULFATE 3 ML: 2.5; .5 SOLUTION RESPIRATORY (INHALATION) at 01:04

## 2025-04-27 RX ADMIN — GABAPENTIN 300 MG: 300 CAPSULE ORAL at 09:04

## 2025-04-27 RX ADMIN — ENOXAPARIN SODIUM 40 MG: 40 INJECTION SUBCUTANEOUS at 04:04

## 2025-04-27 RX ADMIN — LEVOTHYROXINE SODIUM 125 MCG: 0.1 TABLET ORAL at 05:04

## 2025-04-27 RX ADMIN — IPRATROPIUM BROMIDE AND ALBUTEROL SULFATE 3 ML: 2.5; .5 SOLUTION RESPIRATORY (INHALATION) at 07:04

## 2025-04-27 RX ADMIN — CEFTRIAXONE 1 G: 1 INJECTION, POWDER, FOR SOLUTION INTRAMUSCULAR; INTRAVENOUS at 01:04

## 2025-04-27 RX ADMIN — PREDNISONE 40 MG: 20 TABLET ORAL at 09:04

## 2025-04-27 RX ADMIN — GUAIFENESIN 600 MG: 600 TABLET, EXTENDED RELEASE ORAL at 09:04

## 2025-04-27 RX ADMIN — SODIUM CHLORIDE 1000 MG: 9 INJECTION, SOLUTION INTRAVENOUS at 04:04

## 2025-04-27 RX ADMIN — ATORVASTATIN CALCIUM 20 MG: 20 TABLET, FILM COATED ORAL at 09:04

## 2025-04-27 RX ADMIN — AZITHROMYCIN MONOHYDRATE 500 MG: 500 INJECTION, POWDER, LYOPHILIZED, FOR SOLUTION INTRAVENOUS at 02:04

## 2025-04-27 RX ADMIN — BENZONATATE 200 MG: 100 CAPSULE ORAL at 09:04

## 2025-04-27 NOTE — CARE UPDATE
04/27/25 0729   Patient Assessment/Suction   Level of Consciousness (AVPU) alert   Respiratory Effort Normal   Expansion/Accessory Muscles/Retractions expansion symmetric   PEPE Breath Sounds clear   LLL Breath Sounds diminished   RUL Breath Sounds clear   RML Breath Sounds clear   RLL Breath Sounds diminished   Rhythm/Pattern, Respiratory pattern regular   Cough Frequency infrequent   Cough Type good;loose;nonproductive   PRE-TX-O2   Device (Oxygen Therapy) nasal cannula   $ Is the patient on Low Flow Oxygen? Yes   Flow (L/min) (Oxygen Therapy) 1   SpO2 (!) 94 %   Pulse Oximetry Type Intermittent   $ Pulse Oximetry - Multiple Charge Pulse Oximetry - Multiple   Pulse 69   Resp 20   Aerosol Therapy   $ Aerosol Therapy Charges Aerosol Treatment   Daily Review of Necessity (SVN) completed   Respiratory Treatment Status (SVN) given   Treatment Route (SVN) mask   Patient Position semi-Wilson's   Signs of Intolerance (SVN) none   Breath Sounds Post-Respiratory Treatment   Throughout All Fields Post-Treatment no change   Post-treatment Heart Rate (beats/min) 65   Post-treatment Resp Rate (breaths/min) 18

## 2025-04-27 NOTE — PLAN OF CARE
Goals to be met by: 5/27/2025     Patient will increase functional independence with ADLs by performing:    UE Dressing with Modified Gordon.  LE Dressing with Modified Gordon.  Grooming while standing at sink with Modified Gordon.  Toileting from toilet with Modified Gordon for hygiene and clothing management.   Toilet transfer to toilet with Modified Gordon.

## 2025-04-27 NOTE — PT/OT/SLP EVAL
Occupational Therapy   Evaluation    Name: Kirstie Bowden  MRN: [id number]  Admitting Diagnosis: COPD exacerbation  Recent Surgery: * No surgery found *      Recommendations:     Discharge Recommendations: Low Intensity Therapy  Discharge Equipment Recommendations:  none  Barriers to discharge:  None    Assessment:     Kirstie Bowden is a 80 y.o. female with a medical diagnosis of COPD exacerbation.  She presents with a decline of functional performance with ADLs and IADLs. Performance deficits affecting function: impaired endurance, impaired self care skills, impaired functional mobility, impaired balance, pain, decreased ROM, impaired cardiopulmonary response to activity.      Rehab Prognosis: Good; patient would benefit from acute skilled OT services to address these deficits and reach maximum level of function.       Plan:     Patient to be seen 5 x/week to address the above listed problems via self-care/home management, therapeutic activities, therapeutic exercises, community/work re-entry  Plan of Care Expires: 05/27/25  Plan of Care Reviewed with: patient    Subjective     Chief Complaint: Breathing and SOB  Patient/Family Comments/goals: Patient goals are for improvements medically and to be independent again    Occupational Profile:  Living Environment: Patient lives in mobile home with grand daughter that has 4-5 steps at entrance with rails on B sides. Patient reports that grand daughter works full time so she is alone throughout the day. Patient's bathroom set-up includes a walk in shower, built in shower bench, grab bar, and handheld shower head. There is a high commode in restroom, and grab bar near.   Previous level of function: Independent with ADLs, IADLs, and driving. Patient was ambulated with no aid.   Roles and Routines:   Equipment Used at Home: cane, straight, other (see comments), grab bar (handheld shower head, built in shower bench, and high commode with grab  bar)  Assistance upon Discharge: Family and friends    Pain/Comfort:  Pain Rating 1: 5/10  Location - Side 1: Left (left side of abdomen/rib when coughing or moving)  Pain Addressed 1: Cessation of Activity    Objective:     Communicated with: RN prior to session.  Patient found supine with peripheral IV, PureWick, oxygen, bed alarm upon OT entry to room.    General Precautions: Standard, fall  Orthopedic Precautions: N/A  Braces: N/A  Respiratory Status: Nasal cannula, flow 2 L/min    Occupational Performance:    Bed Mobility:    Patient completed Rolling/Turning to Left with  modified independence  Patient completed Rolling/Turning to Right with modified independence  Patient completed Scooting/Bridging with modified independence  Patient completed Supine to Sit with modified independence  Patient completed Sit to Supine with modified independence    Functional Mobility/Transfers:  Patient completed Sit <> Stand Transfer with stand by assistance  with  no assistive device     Activities of Daily Living:  Feeding:  independence    Upper Body Dressing: supervision seated  Lower Body Dressing: contact guard assistance seated and standing    Cognitive/Visual Perceptual:  Cognitive/Psychosocial Skills:     -       Oriented to: Person, Place, Time, and Situation   -       Follows Commands/attention:Follows multistep  commands  -       Communication: clear/fluent  -       Memory: No Deficits noted  -       Safety awareness/insight to disability: intact   -       Mood/Affect/Coping skills/emotional control: Appropriate to situation    Physical Exam:  Balance:    -       Static standing G-/F+; Dynamic standing Fair  Sensation:    -       Impaired  Intermittent numbness in BUE with arthritis exercerbation  Motor Planning:    -       WFL  Dominant hand:    -       Right  Upper Extremity Range of Motion:     -       Right Upper Extremity: WFL except deficit in shoulder up to 90 degree AROM  -       Left Upper Extremity: WFL  except deficit in shoulder up to 90 degree AROM  Upper Extremity Strength:    -       Right Upper Extremity: WFL  -       Left Upper Extremity: WFL   Strength:    -       Right Upper Extremity: WFL  -       Left Upper Extremity: WFL  Fine Motor Coordination:    -       Intact  Gross motor coordination:   WFL    AMPAC 6 Click ADL:  AMPAC Total Score: 21    Treatment & Education:  Patient treatment focused on:    ADL compensatory strategy training  Balance training  Fall/Safety Precautions  Discharge Planning    Patient left supine with call button in reach and bed alarm on    GOALS:   Multidisciplinary Problems       Occupational Therapy Goals          Problem: Occupational Therapy    Goal Priority Disciplines Outcome Interventions   Occupational Therapy Goal     OT, PT/OT     Description: Goals to be met by: 5/27/2025     Patient will increase functional independence with ADLs by performing:    UE Dressing with Modified [place].  LE Dressing with Modified [place].  Grooming while standing at sink with Modified [place].  Toileting from toilet with Modified [place] for hygiene and clothing management.   Toilet transfer to toilet with Modified [place].                         DME Justifications:  No DME recommended requiring DME justifications    History:     Past Medical History:   Diagnosis Date    Arthritis     rheumatoid    Asthma     Breast cancer     Cancer 2020    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    GERD (gastroesophageal reflux disease) 2022    Hearing loss [id number]    Hyperlipidemia     Hypertension 2021    Limb alert care status     NO BP/IV LEFT ARM    Lung disease     Personal history of colonic polyps 01/23/2024    Pseudocholinesterase deficiency     family history    Radiation 2020    Rheumatoid arthritis 2020    Thyroid disease          Past Surgical History:   Procedure Laterality Date    AXILLARY NODE DISSECTION Left 03/14/2019    Procedure: LYMPHADENECTOMY, AXILLARY;  Surgeon: Mp MCRAE  MD Carlos;  Location: [facility] OR;  Service: General;  Laterality: Left;  left lumpectomy with axillary lypmh node dissection    BALLOON DILATION OF URETER Left 06/24/2019    Procedure: DILATION, URETER, USING BALLOON;  Surgeon: Jorden Dickson MD;  Location: [facility] OR;  Service: Urology;  Laterality: Left;    BREAST BIOPSY Left 20 yrs ago    benign    BREAST CYST EXCISION  15 yrs ago    BREAST LUMPECTOMY      x2    CHOLECYSTECTOMY  2000    COLONOSCOPY  09/25/2018    LUC EASTON MD    CYSTOSCOPY W/ URETERAL STENT PLACEMENT Left 06/24/2019    Procedure: CYSTOSCOPY, WITH URETERAL STENT INSERTION;  Surgeon: Jorden Dickson MD;  Location: [facility] OR;  Service: Urology;  Laterality: Left;    CYSTOSCOPY W/ URETERAL STENT REMOVAL Left 07/23/2019    Procedure: CYSTOSCOPY, WITH URETERAL STENT REMOVAL;  Surgeon: Jorden Dickson MD;  Location: [facility] OR;  Service: Urology;  Laterality: Left;    EPIDURAL STEROID INJECTION INTO LUMBAR SPINE N/A 09/30/2021    Procedure: Injection-steroid-epidural-lumbar;  Surgeon: Nathen Lyons MD;  Location: [facility] OR;  Service: Pain Management;  Laterality: N/A;  L5-S1      EPIDURAL STEROID INJECTION INTO LUMBAR SPINE N/A 11/16/2021    Procedure: Injection-steroid-epidural-lumbar;  Surgeon: Nathen Lyons MD;  Location: [facility] OR;  Service: Pain Management;  Laterality: N/A;  L5-S1    EXTRACORPOREAL SHOCK WAVE LITHOTRIPSY Left 07/23/2019    Procedure: LITHOTRIPSY, ESWL;  Surgeon: Jorden Dickson MD;  Location: [facility] OR;  Service: Urology;  Laterality: Left;    EYE SURGERY Bilateral 2000    cataracts    HYSTERECTOMY  1994    MASTECTOMY, PARTIAL Left 04/25/2019    Procedure: MASTECTOMY, PARTIAL;  Surgeon: Mp Gonzalez MD;  Location: [facility] OR;  Service: General;  Laterality: Left;    NEEDLE LOCALIZATION Left 03/14/2019    Procedure: NEEDLE LOCALIZATION;  Surgeon: Mp Gonzalez MD;  Location: [facility] OR;  Service: General;  Laterality: Left;  left lumpectomy with wire needle localization      PARTIAL NEPHRECTOMY Right 05/22/2023    RETROGRADE PYELOGRAPHY Bilateral 06/24/2019    Procedure: PYELOGRAM, RETROGRADE;  Surgeon: Jorden Dickson MD;  Location: [facility] OR;  Service: Urology;  Laterality: Bilateral;    SENTINEL LYMPH NODE BIOPSY Left 03/14/2019    Procedure: BIOPSY, LYMPH NODE, SENTINEL;  Surgeon: Mp Gonzalez MD;  [facility];  Service: General;  Laterality: Left;  left sentinel node lymph node biopsy    TONSILLECTOMY  1948    TRANSFORAMINAL EPIDURAL INJECTION OF STEROID Right 01/04/2022    Procedure: Injection,steroid,epidural,transforaminal approach;  Surgeon: Nathen Lyons MD;  Location: [facility] OR;  Service: Pain Management;  Laterality: Right;  L4-L5, L5-s1    TRANSFORAMINAL EPIDURAL INJECTION OF STEROID Right 07/09/2024    Procedure: Injection,steroid,epidural,transforaminal approach;  Surgeon: Nathen Lyons MD;  Location: [facility] OR;  Service: Pain Management;  Laterality: Right;    URETEROSCOPY Left 06/24/2019    Procedure: URETEROSCOPY;  Surgeon: Jorden Dickson MD;  [facility];  Service: Urology;  Laterality: Left;       Time Tracking:     OT Date of Treatment: 04/27/25  OT Start Time: 0845  OT Stop Time: 0906  OT Total Time (min): 21 min    Billable Minutes:Evaluation 21 mins    4/27/2025

## 2025-04-27 NOTE — HOSPITAL COURSE
Kirstie Bowden was closely monitored while in the hospital.  Patient was admitted to Hospital Medicine for acute exacerbation of COPD.  COVID and flu negative.  Procalcitonin unremarkable.  CT imaging revealed no acute intra-abdominal abnormality, however there is detrimental change in diffuse bilateral reticulonodular interstitial prominence from 3 months prior differential including atypical infection versus lymphangitis spread of malignancy.  Patient was started on Rocephin and azithromycin, DuoNebs and supplemental oxygen. Pulmonology was consulted on admission, recommending possible bronch to be determined by patient's clinical course.  Patient had significant improvement in clinical course.  Pulmonology recommends follow up CT in 4-6 weeks.  Patient was transitioned to oral antibiotics.  Home O2 evaluation has been ordered, patient did not qualify for oxygen.  Patient worked with PT/OT during hospital who recommended intensity therapy.  Case management followed for discharge planning.    Patient seen and examined on day of discharge.  Patient in no acute distress.  Chart reviewed.  Family at bedside.  Patient educated on discharge planning, she verbalized understanding.  Patient educated to continue symptomatic treatment with antihistamine, netipot, and Flonase.  Patient is to follow with PCP and pulmonology in 1-2 weeks.  Patient is safely discharged home with home health.

## 2025-04-27 NOTE — ASSESSMENT & PLAN NOTE
Patient's COPD is with exacerbation noted by continued dyspnea currently.  Patient is currently off COPD Pathway. Continue scheduled inhalers Steroids, Antibiotics, and Supplemental oxygen and monitor respiratory status closely.     CTA chest negative for pulmonary embolisms however there is detrimental change in diffuse bilateral reticulonodular interstitial prominence from 3 months prior differential including atypical infection versus lymphangitis spread of malignancy  Pulmonology following: suggesting possible bronch  Procal negative

## 2025-04-27 NOTE — CARE UPDATE
04/26/25 1914   Patient Assessment/Suction   Level of Consciousness (AVPU) alert   Respiratory Effort Normal;Unlabored   Expansion/Accessory Muscles/Retractions no use of accessory muscles;no retractions;expansion symmetric   All Lung Fields Breath Sounds diminished   PRE-TX-O2   Device (Oxygen Therapy) nasal cannula   $ Is the patient on Low Flow Oxygen? Yes   Flow (L/min) (Oxygen Therapy) 1   SpO2 (!) 94 %   Pulse Oximetry Type Intermittent   $ Pulse Oximetry - Multiple Charge Pulse Oximetry - Multiple   Pulse 75   Resp 18   Aerosol Therapy   $ Aerosol Therapy Charges Aerosol Treatment   Daily Review of Necessity (SVN) completed   Respiratory Treatment Status (SVN) given   Treatment Route (SVN) mask;oxygen   Patient Position HOB elevated   Post Treatment Assessment (SVN) breath sounds unchanged   Signs of Intolerance (SVN) none   Education   $ Education 15 min;Bronchodilator;Oxygen

## 2025-04-27 NOTE — SUBJECTIVE & OBJECTIVE
Interval History:   Patient seen and examined.  NAD.  No acute events overnight.  No significant improvement on current treatment plan.  Patient endorses continued cough.  Pulmonology following, recommendations appreciated.  Review of Systems   Constitutional:  Negative for chills and fever.   Respiratory:  Positive for choking.    Cardiovascular:  Positive for chest pain (Left-sided pleuritic chest pain present on admission).   Gastrointestinal: Negative.    Genitourinary: Negative.    Musculoskeletal: Negative.    Neurological:  Positive for weakness.     Objective:     Vital Signs (Most Recent):  Temp: 98 °F (36.7 °C) (04/27/25 0804)  Pulse: 74 (04/27/25 0804)  Resp: 18 (04/27/25 0804)  BP: 123/67 (04/27/25 0804)  SpO2: (!) 91 % (04/27/25 0804) Vital Signs (24h Range):  Temp:  [98 °F (36.7 °C)-98.2 °F (36.8 °C)] 98 °F (36.7 °C)  Pulse:  [63-83] 74  Resp:  [16-20] 18  SpO2:  [91 %-94 %] 91 %  BP: (120-152)/(67-88) 123/67     Weight: 75.9 kg (167 lb 5.3 oz)  Body mass index is 25.44 kg/m².    Intake/Output Summary (Last 24 hours) at 4/27/2025 1104  Last data filed at 4/27/2025 0428  Gross per 24 hour   Intake 480 ml   Output 1800 ml   Net -1320 ml         Physical Exam  Constitutional:       General: She is not in acute distress.     Appearance: Normal appearance. She is ill-appearing.   Cardiovascular:      Rate and Rhythm: Normal rate and regular rhythm.      Pulses: Normal pulses.      Heart sounds: Normal heart sounds.   Pulmonary:      Effort: Pulmonary effort is normal.      Breath sounds: Rhonchi present.   Abdominal:      General: Bowel sounds are normal.      Palpations: Abdomen is soft.   Musculoskeletal:      Right lower leg: No edema.      Left lower leg: No edema.   Skin:     General: Skin is warm and dry.   Neurological:      Mental Status: She is alert and oriented to person, place, and time.   Psychiatric:         Mood and Affect: Mood normal.         Behavior: Behavior normal.         Thought  Content: Thought content normal.         Judgment: Judgment normal.               Significant Labs: All pertinent labs within the past 24 hours have been reviewed.  CBC:   Recent Labs   Lab 04/26/25  0558 04/27/25  0626   WBC 12.00 11.69   HGB 11.3* 11.0*   HCT 36.7* 34.8*    293     CMP:   Recent Labs   Lab 04/26/25  0558 04/27/25  0626    140   K 4.3 4.1   CO2 34* 34*   BUN 38* 30*   CREATININE 1.2 1.0   CALCIUM 9.2 8.9   ALBUMIN 3.5 3.2*   BILITOT 0.4 0.3   ALKPHOS 56 46*   AST 21 22   ALT 36 43      Microbiology Results (last 7 days)       Procedure Component Value Units Date/Time    Respiratory Infection Panel (PCR), Nasopharyngeal [[id number]]  (Abnormal) Collected: 04/25/25 1305    Order Status: Completed Specimen: Nasopharyngeal Swab Updated: 04/25/25 1449     Respiratory Infection Panel Source Nasopharyngeal Swab     Adenovirus Not Detected     Coronavirus 229E, Common Cold Virus Not Detected     Coronavirus HKU1, Common Cold Virus Not Detected     Coronavirus NL63, Common Cold Virus Not Detected     Coronavirus OC43, Common Cold Virus Not Detected     SARS-CoV2 (COVID-19) Qualitative PCR Not Detected     Human Metapneumovirus Not Detected     Human Rhinovirus/Enterovirus Detected     Influenza A (subtypes H1, H1-2009,H3) Not Detected     Influenza B Not Detected     Parainfluenza Virus 1 Not Detected     Parainfluenza Virus 2 Not Detected     Parainfluenza Virus 3 Not Detected     Parainfluenza Virus 4 Not Detected     Respiratory Syncytial Virus Not Detected     Bordetella Parapertussis (UU2814) Not Detected     Bordetella pertussis (ptxP) Not Detected     Chlamydia pneumoniae Not Detected     Mycoplasma pneumoniae Not Detected    Influenza A & B by Molecular [[id number]]  (Normal) Collected: 04/24/25 1537    Order Status: Completed Specimen: Nasal Swab Updated: 04/24/25 1631     INFLUENZA A MOLECULAR Negative     INFLUENZA B MOLECULAR  Negative             Significant Imaging: I have  reviewed all pertinent imaging results/findings within the past 24 hours.  Imaging Results              CT Abdomen Pelvis With IV Contrast NO Oral Contrast (Final result)  Result time 04/24/25 15:48:39      Final result by Wellington Roper MD (04/24/25 15:48:39)                   Impression:      1. No acute CT abnormalities.  2. Multiple chronic findings as above.      Electronically signed by: Wellington Roper  Date:    04/24/2025  Time:    15:48               Narrative:    EXAMINATION:  CT ABDOMEN PELVIS WITH IV CONTRAST    CLINICAL HISTORY:  Abdominal pain, acute, nonlocalized;.    TECHNIQUE:  Post infusion axial images were obtained from the lung bases to the pubic symphysis 100 cc nonionic contrast was utilized for the examination.    COMPARISON:  Noncontrast CT, January 2025    FINDINGS  The liver has a normal appearance.  The gallbladder is absent.  The biliary tree is nondilated.  The spleen is normal in appearance.  The pancreas is unremarkable.  The adrenal glands are normal.  Small left-sided simple renal cysts are noted, as well as at least 2 nonobstructing left renal calculi, the largest at the lower pole measuring 6 mm.  There is no evidence of left ureteral calculus or hydronephrosis.  The right kidney is surgically absent, with no evidence of mass lesion at the right renal fossa.  There is dense multifocal aortic atherosclerosis.    There is no pathologic bowel wall thickening or evidence of obstruction.  There is a mild amount of retained fecal matter throughout the colon.  There is no free air or free fluid.  The appendix is normal in appearance.    Images of the pelvis demonstrate sigmoid diverticula without diverticulitis.  The urinary bladder is unremarkable.  There is no pelvic free fluid or lymphadenopathy.    Moderate-severe arthritic changes noted at the right hip, with mild to moderate arthritic change on the left.  5.5 x 2.2 cm fluid collection superficial to the right femur at the  level of the greater trochanter is likely bursal fluid accumulation/bursitis.  This collection appears smaller when compared to January 6.    There is multilevel degenerative facet disease in the lumbar spine.                                       CTA Chest Non-Coronary (PE Studies) (Final result)  Result time 04/24/25 15:39:01      Final result by Wellington Roper MD (04/24/25 15:39:01)                   Impression:      1. No evidence of pulmonary thromboembolic disease.  2. Diffuse bilateral abnormal reticulonodular interstitial prominence, reflecting a detrimental change compared to January 2025.  Primary differential considerations include atypical infection and lymphangitis spread of malignancy.      Electronically signed by: Wellington Roper  Date:    04/24/2025  Time:    15:39               Narrative:    EXAMINATION:  CTA CHEST NON CORONARY (PE STUDIES)    CLINICAL HISTORY:  Pulmonary embolism (PE) suspected, unknown D-dimer;.    TECHNIQUE:  CT angiography targeted to the pulmonary arteries was performed. 100 cc nonionic contrast was administered and the bolus was timed for optimal opacification of the pulmonary arteries. 3-D reformatted images were obtained on an independent workstation and stored as a part of patient's permanent medical record.    COMPARISON:  CT chest abdomen and pelvis without contrast, January 2025    FINDINGS:  Pulmonary arterial contrast opacification is adequate.  There are no identifiable filling defects to indicate pulmonary thromboembolic disease.  Heart size is normal.  Multifocal coronary artery atherosclerotic calcification is noted.  The thoracic aorta is normal in caliber.  No pathologic mediastinal or axillary lymphadenopathy is identified.  Postoperative changes are noted in the left breast.    Images at lung windows demonstrate diffuse bilateral abnormal reticulonodular interstitial prominence, reflecting an interval change compared to January 6.  Scattered small  airspace opacities are also noted, new compared to the prior study.  Differential possibilities would include atypical infection and lymphangitic spread of malignancy.  No focal dominant mass is identified.  There are no pleural effusions.    No acute osseous abnormalities are demonstrated.                                       X-Ray Chest AP Portable (Final result)  Result time 04/24/25 13:55:12   Procedure changed from X-Ray Chest 1 View     Final result by Carlita Schneider MD (04/24/25 13:55:12)                   Impression:      Resolution of the right lower lobe airspace disease with stable elevation the right hemidiaphragm    No acute pulmonary process      Electronically signed by: Carlita Schneider  Date:    04/24/2025  Time:    13:55               Narrative:    EXAMINATION:  XR CHEST AP PORTABLE    CLINICAL HISTORY:  shortness of breath;    FINDINGS:  Portable chest at 13:56 hours is compared to 04/18/2025 shows normal cardiomediastinal silhouette.   There is stable elevation the right hemidiaphragm.    There are no confluent infiltrates or pleural effusions.  Pulmonary vasculature is normal. No acute osseous abnormality.

## 2025-04-27 NOTE — PT/OT/SLP EVAL
Physical Therapy Evaluation    Patient Name:  Kirstie Bowden   MRN:  [id number]    Recommendations:     Discharge Recommendations: Low Intensity Therapy   Discharge Equipment Recommendations: none   Barriers to discharge:  medical status    Assessment:     Kirstie Bowden is a 80 y.o. female admitted with a medical diagnosis of COPD exacerbation.  She presents with the following impairments/functional limitations: weakness, impaired endurance, impaired functional mobility, gait instability, decreased lower extremity function, decreased safety awareness, pain, impaired cardiopulmonary response to activity.    Pt found supine in bed at the start of session. Pt agreeable to PT at this session. Supine<>sit bed mobility SBA. Sit<>stand transfers CGA. Pt able to ambulate 40' with RW, CGA, and 3L O2.    Rehab Prognosis: Fair; patient would benefit from acute skilled PT services to address these deficits and reach maximum level of function.    Recent Surgery: * No surgery found *      Plan:     During this hospitalization, patient to be seen 5 x/week to address the identified rehab impairments via gait training, therapeutic exercises, neuromuscular re-education and progress toward the following goals:    Plan of Care Expires:  05/27/25    Subjective     Chief Complaint: pain  Patient/Family Comments/goals: none verbalized  Pain/Comfort:  Pain Rating 1: 5/10  Location - Side 1: Left  Location 1: back  Pain Addressed 1: Cessation of Activity, Reposition, Distraction    Patients cultural, spiritual, Protestant conflicts given the current situation:      Living Environment:  Pt lives with granddaughter in mobile home. 5 steps to enter home with R handrails.    Prior to admission, patients level of function was Mod I.  Equipment used at home: raised toilet, grab bar ([employer]).  DME owned (not currently used): none.  Upon discharge, patient will have assistance from granddaughter.    Objective:     Communicated  with RN, Colby, prior to session.  Patient found supine with telemetry, peripheral IV, PureWick, oxygen, bed alarm  upon PT entry to room.    General Precautions: Standard, fall  Orthopedic Precautions:    Braces:    Respiratory Status: Nasal cannula, flow 3 L/min    Exams:  RLE ROM: WFL  RLE Strength: WFL  LLE ROM: WFL  LLE Strength: WFL    Functional Mobility:  Bed Mobility:     Supine to Sit: stand by assistance  Transfers:     Sit to Stand:  contact guard assistance with rolling walker  Gait: 40' with RW, CGA, and 3L O2      AM-PAC 6 CLICK MOBILITY  Total Score:18       Treatment & Education:  Spoke with pt about sitting up in chair to help clear out her cough. Spoke with pt about using call button to notify RN.    Patient left up in chair with all lines intact, call button in reach, chair alarm on, and RN notified.    GOALS:   Multidisciplinary Problems       Physical Therapy Goals          Problem: Physical Therapy    Goal Priority Disciplines Outcome Interventions   Physical Therapy Goal     PT, PT/OT     Description: Goals to be met by: 2025     Patient will increase functional independence with mobility by performin. Supine to sit with Modified [place]  2. Sit to supine with Modified [place]  3. Sit to stand transfer with Supervision  4. Gait  x 150 feet with Contact Guard Assistance using Rolling Walker.                          DME Justifications:  No DME recommended requiring DME justifications    History:     Past Medical History:   Diagnosis Date    Arthritis     rheumatoid    Asthma     Breast cancer     Cancer 2020    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    GERD (gastroesophageal reflux disease)     Hearing loss [id number]    Hyperlipidemia     Hypertension     Limb alert care status     NO BP/IV LEFT ARM    Lung disease     Personal history of colonic polyps 2024    Pseudocholinesterase deficiency     family history    Radiation 2020    Rheumatoid arthritis 2020    Thyroid disease         Past Surgical History:   Procedure Laterality Date    AXILLARY NODE DISSECTION Left 03/14/2019    Procedure: LYMPHADENECTOMY, AXILLARY;  Surgeon: Mp Gonzalez MD;  Location: [facility] OR;  Service: General;  Laterality: Left;  left lumpectomy with axillary lypmh node dissection    BALLOON DILATION OF URETER Left 06/24/2019    Procedure: DILATION, URETER, USING BALLOON;  Surgeon: Jorden Dickson MD;  Location: [facility] OR;  Service: Urology;  Laterality: Left;    BREAST BIOPSY Left 20 yrs ago    benign    BREAST CYST EXCISION  15 yrs ago    BREAST LUMPECTOMY      x2    CHOLECYSTECTOMY  2000    COLONOSCOPY  09/25/2018    LUC EASTON MD    CYSTOSCOPY W/ URETERAL STENT PLACEMENT Left 06/24/2019    Procedure: CYSTOSCOPY, WITH URETERAL STENT INSERTION;  Surgeon: Jorden Dickson MD;  Location: [facility] OR;  Service: Urology;  Laterality: Left;    CYSTOSCOPY W/ URETERAL STENT REMOVAL Left 07/23/2019    Procedure: CYSTOSCOPY, WITH URETERAL STENT REMOVAL;  Surgeon: Jorden Dickson MD;  Location: [facility] OR;  Service: Urology;  Laterality: Left;    EPIDURAL STEROID INJECTION INTO LUMBAR SPINE N/A 09/30/2021    Procedure: Injection-steroid-epidural-lumbar;  Surgeon: Nathen Lyons MD;  Location: [facility] OR;  Service: Pain Management;  Laterality: N/A;  L5-S1      EPIDURAL STEROID INJECTION INTO LUMBAR SPINE N/A 11/16/2021    Procedure: Injection-steroid-epidural-lumbar;  Surgeon: Nathen Lyons MD;  Location: [facility] OR;  Service: Pain Management;  Laterality: N/A;  L5-S1    EXTRACORPOREAL SHOCK WAVE LITHOTRIPSY Left 07/23/2019    Procedure: LITHOTRIPSY, ESWL;  Surgeon: Jorden Dickson MD;  Location: [facility] OR;  Service: Urology;  Laterality: Left;    EYE SURGERY Bilateral 2000    cataracts    HYSTERECTOMY  1994    MASTECTOMY, PARTIAL Left 04/25/2019    Procedure: MASTECTOMY, PARTIAL;  Surgeon: Mp Gonzalez MD;  Location: [facility] OR;  Service: General;  Laterality: Left;    NEEDLE LOCALIZATION Left 03/14/2019    Procedure: NEEDLE  LOCALIZATION;  Surgeon: Mp Gonzalez MD;  Location: [facility] OR;  Service: General;  Laterality: Left;  left lumpectomy with wire needle localization     PARTIAL NEPHRECTOMY Right 05/22/2023    RETROGRADE PYELOGRAPHY Bilateral 06/24/2019    Procedure: PYELOGRAM, RETROGRADE;  Surgeon: Jorden Dickson MD;  Location: [facility] OR;  Service: Urology;  Laterality: Bilateral;    SENTINEL LYMPH NODE BIOPSY Left 03/14/2019    Procedure: BIOPSY, LYMPH NODE, SENTINEL;  Surgeon: Mp Gonzalez MD;  Location: [facility] OR;  Service: General;  Laterality: Left;  left sentinel node lymph node biopsy    TONSILLECTOMY  1948    TRANSFORAMINAL EPIDURAL INJECTION OF STEROID Right 01/04/2022    Procedure: Injection,steroid,epidural,transforaminal approach;  Surgeon: Nathen Lyons MD;  Location: [facility] OR;  Service: Pain Management;  Laterality: Right;  L4-L5, L5-s1    TRANSFORAMINAL EPIDURAL INJECTION OF STEROID Right 07/09/2024    Procedure: Injection,steroid,epidural,transforaminal approach;  Surgeon: Nathen Lyons MD;  Location: [facility] OR;  Service: Pain Management;  Laterality: Right;    URETEROSCOPY Left 06/24/2019    Procedure: URETEROSCOPY;  Surgeon: Jorden Dickson MD;  Location: [facility];  Service: Urology;  Laterality: Left;       Time Tracking:     PT Received On: 04/27/25  PT Start Time: 1012     PT Stop Time: 1035  PT Total Time (min): 23 min     Billable Minutes: Evaluation 7, Gait Training 8, and Therapeutic Activity 8      04/27/2025

## 2025-04-27 NOTE — ASSESSMENT & PLAN NOTE
Patient's blood pressure range in the last 24 hours was: BP  Min: 120/71  Max: 152/88.The patient's inpatient anti-hypertensive regimen is listed below:  Current Antihypertensives  amLODIPine tablet 5 mg, Nightly, OralResume home amlodipine    Plan  - BP is controlled, no changes needed to their regimen  -

## 2025-04-27 NOTE — PROGRESS NOTES
[facility] Medicine  Progress Note    Patient Name: Kirstie Bowden  MRN: [id number]  Patient Class: IP- Inpatient   Admission Date: 4/24/2025  Length of Stay: 3 days  Attending Physician: Emely Interiano MD  Primary Care Provider: Rocio Feliciano MD        Subjective     Principal Problem:COPD exacerbation        HPI:  Patient is a 80-year-old female with a history of RA, hypertension, COPD, hypothyroidism breast CA, renal CA.  Patient reports she went to urgent care last week and was diagnosed with pneumonia and pleurisy and started on antibiotics and oral steroids.  Patient reports symptoms only worsened since completing antibiotic course.  She saw her pulmonologist today in clinic and was referred to ED. in ED labwork remarkable for leukocytosis with a left shift.  Of note patient currently on methotrexate and exemestane.  Currently requiring 2 L nasal cannula which she is not on any home O2..  Flu and COVID both negative.  BNP elevated at 138.  Chest x-ray with improved right lower lobe density.  CTA c/a/p  was ordered which ruled out PE, and no acute intra-abdominal abnormality, however there is detrimental change in diffuse bilateral reticulonodular interstitial prominence from 3 months prior differential including atypical infection versus lymphangitis spread of malignancy.  Patient will be admitted to Hospital Medicine.  Started on Rocephin and azithromycin.  Continue supplemental O2 and start DuoNebs.    Overview/Hospital Course:  Kirstie Bowden was closely monitored while in the hospital.  Patient was admitted to Hospital Medicine for acute exacerbation of COPD.  COVID and flu negative.  Procalcitonin unremarkable.  CT imaging revealed no acute intra-abdominal abnormality, however there is detrimental change in diffuse bilateral reticulonodular interstitial prominence from 3 months prior differential including atypical infection versus lymphangitis spread of  malignancy.  Patient was started on Rocephin and azithromycin, DuoNebs and supplemental oxygen. Pulmonology was consulted on admission, recommending possible bronch to be determined by patient's clinical course.    Interval History:   Patient seen and examined.  NAD.  No acute events overnight.  No significant improvement on current treatment plan.  Patient endorses continued cough.  Pulmonology following, recommendations appreciated.  Review of Systems   Constitutional:  Negative for chills and fever.   Respiratory:  Positive for choking.    Cardiovascular:  Positive for chest pain (Left-sided pleuritic chest pain present on admission).   Gastrointestinal: Negative.    Genitourinary: Negative.    Musculoskeletal: Negative.    Neurological:  Positive for weakness.     Objective:     Vital Signs (Most Recent):  Temp: 98 °F (36.7 °C) (04/27/25 0804)  Pulse: 74 (04/27/25 0804)  Resp: 18 (04/27/25 0804)  BP: 123/67 (04/27/25 0804)  SpO2: (!) 91 % (04/27/25 0804) Vital Signs (24h Range):  Temp:  [98 °F (36.7 °C)-98.2 °F (36.8 °C)] 98 °F (36.7 °C)  Pulse:  [63-83] 74  Resp:  [16-20] 18  SpO2:  [91 %-94 %] 91 %  BP: (120-152)/(67-88) 123/67     Weight: 75.9 kg (167 lb 5.3 oz)  Body mass index is 25.44 kg/m².    Intake/Output Summary (Last 24 hours) at 4/27/2025 1104  Last data filed at 4/27/2025 0428  Gross per 24 hour   Intake 480 ml   Output 1800 ml   Net -1320 ml         Physical Exam  Constitutional:       General: She is not in acute distress.     Appearance: Normal appearance. She is ill-appearing.   Cardiovascular:      Rate and Rhythm: Normal rate and regular rhythm.      Pulses: Normal pulses.      Heart sounds: Normal heart sounds.   Pulmonary:      Effort: Pulmonary effort is normal.      Breath sounds: Rhonchi present.   Abdominal:      General: Bowel sounds are normal.      Palpations: Abdomen is soft.   Musculoskeletal:      Right lower leg: No edema.      Left lower leg: No edema.   Skin:     General: Skin  is warm and dry.   Neurological:      Mental Status: She is alert and oriented to person, place, and time.   Psychiatric:         Mood and Affect: Mood normal.         Behavior: Behavior normal.         Thought Content: Thought content normal.         Judgment: Judgment normal.               Significant Labs: All pertinent labs within the past 24 hours have been reviewed.  CBC:   Recent Labs   Lab 04/26/25  0558 04/27/25  0626   WBC 12.00 11.69   HGB 11.3* 11.0*   HCT 36.7* 34.8*    293     CMP:   Recent Labs   Lab 04/26/25  0558 04/27/25  0626    140   K 4.3 4.1   CO2 34* 34*   BUN 38* 30*   CREATININE 1.2 1.0   CALCIUM 9.2 8.9   ALBUMIN 3.5 3.2*   BILITOT 0.4 0.3   ALKPHOS 56 46*   AST 21 22   ALT 36 43      Microbiology Results (last 7 days)       Procedure Component Value Units Date/Time    Respiratory Infection Panel (PCR), Nasopharyngeal [[id number]]  (Abnormal) Collected: 04/25/25 1305    Order Status: Completed Specimen: Nasopharyngeal Swab Updated: 04/25/25 1449     Respiratory Infection Panel Source Nasopharyngeal Swab     Adenovirus Not Detected     Coronavirus 229E, Common Cold Virus Not Detected     Coronavirus HKU1, Common Cold Virus Not Detected     Coronavirus NL63, Common Cold Virus Not Detected     Coronavirus OC43, Common Cold Virus Not Detected     SARS-CoV2 (COVID-19) Qualitative PCR Not Detected     Human Metapneumovirus Not Detected     Human Rhinovirus/Enterovirus Detected     Influenza A (subtypes H1, H1-2009,H3) Not Detected     Influenza B Not Detected     Parainfluenza Virus 1 Not Detected     Parainfluenza Virus 2 Not Detected     Parainfluenza Virus 3 Not Detected     Parainfluenza Virus 4 Not Detected     Respiratory Syncytial Virus Not Detected     Bordetella Parapertussis (LB6741) Not Detected     Bordetella pertussis (ptxP) Not Detected     Chlamydia pneumoniae Not Detected     Mycoplasma pneumoniae Not Detected    Influenza A & B by Molecular [[id number]]  (Normal)  Collected: 04/24/25 1537    Order Status: Completed Specimen: Nasal Swab Updated: 04/24/25 1631     INFLUENZA A MOLECULAR Negative     INFLUENZA B MOLECULAR  Negative             Significant Imaging: I have reviewed all pertinent imaging results/findings within the past 24 hours.  Imaging Results              CT Abdomen Pelvis With IV Contrast NO Oral Contrast (Final result)  Result time 04/24/25 15:48:39      Final result by Wellington Roper MD (04/24/25 15:48:39)                   Impression:      1. No acute CT abnormalities.  2. Multiple chronic findings as above.      Electronically signed by: Wellington Roper  Date:    04/24/2025  Time:    15:48               Narrative:    EXAMINATION:  CT ABDOMEN PELVIS WITH IV CONTRAST    CLINICAL HISTORY:  Abdominal pain, acute, nonlocalized;.    TECHNIQUE:  Post infusion axial images were obtained from the lung bases to the pubic symphysis 100 cc nonionic contrast was utilized for the examination.    COMPARISON:  Noncontrast CT, January 2025    FINDINGS  The liver has a normal appearance.  The gallbladder is absent.  The biliary tree is nondilated.  The spleen is normal in appearance.  The pancreas is unremarkable.  The adrenal glands are normal.  Small left-sided simple renal cysts are noted, as well as at least 2 nonobstructing left renal calculi, the largest at the lower pole measuring 6 mm.  There is no evidence of left ureteral calculus or hydronephrosis.  The right kidney is surgically absent, with no evidence of mass lesion at the right renal fossa.  There is dense multifocal aortic atherosclerosis.    There is no pathologic bowel wall thickening or evidence of obstruction.  There is a mild amount of retained fecal matter throughout the colon.  There is no free air or free fluid.  The appendix is normal in appearance.    Images of the pelvis demonstrate sigmoid diverticula without diverticulitis.  The urinary bladder is unremarkable.  There is no pelvic free  fluid or lymphadenopathy.    Moderate-severe arthritic changes noted at the right hip, with mild to moderate arthritic change on the left.  5.5 x 2.2 cm fluid collection superficial to the right femur at the level of the greater trochanter is likely bursal fluid accumulation/bursitis.  This collection appears smaller when compared to January 6.    There is multilevel degenerative facet disease in the lumbar spine.                                       CTA Chest Non-Coronary (PE Studies) (Final result)  Result time 04/24/25 15:39:01      Final result by Wellington Roper MD (04/24/25 15:39:01)                   Impression:      1. No evidence of pulmonary thromboembolic disease.  2. Diffuse bilateral abnormal reticulonodular interstitial prominence, reflecting a detrimental change compared to January 2025.  Primary differential considerations include atypical infection and lymphangitis spread of malignancy.      Electronically signed by: Wellington Roper  Date:    04/24/2025  Time:    15:39               Narrative:    EXAMINATION:  CTA CHEST NON CORONARY (PE STUDIES)    CLINICAL HISTORY:  Pulmonary embolism (PE) suspected, unknown D-dimer;.    TECHNIQUE:  CT angiography targeted to the pulmonary arteries was performed. 100 cc nonionic contrast was administered and the bolus was timed for optimal opacification of the pulmonary arteries. 3-D reformatted images were obtained on an independent workstation and stored as a part of patient's permanent medical record.    COMPARISON:  CT chest abdomen and pelvis without contrast, January 2025    FINDINGS:  Pulmonary arterial contrast opacification is adequate.  There are no identifiable filling defects to indicate pulmonary thromboembolic disease.  Heart size is normal.  Multifocal coronary artery atherosclerotic calcification is noted.  The thoracic aorta is normal in caliber.  No pathologic mediastinal or axillary lymphadenopathy is identified.  Postoperative changes are  noted in the left breast.    Images at lung windows demonstrate diffuse bilateral abnormal reticulonodular interstitial prominence, reflecting an interval change compared to January 6.  Scattered small airspace opacities are also noted, new compared to the prior study.  Differential possibilities would include atypical infection and lymphangitic spread of malignancy.  No focal dominant mass is identified.  There are no pleural effusions.    No acute osseous abnormalities are demonstrated.                                       X-Ray Chest AP Portable (Final result)  Result time 04/24/25 13:55:12   Procedure changed from X-Ray Chest 1 View     Final result by Carlita Schneider MD (04/24/25 13:55:12)                   Impression:      Resolution of the right lower lobe airspace disease with stable elevation the right hemidiaphragm    No acute pulmonary process      Electronically signed by: Carlita Schneider  Date:    04/24/2025  Time:    13:55               Narrative:    EXAMINATION:  XR CHEST AP PORTABLE    CLINICAL HISTORY:  shortness of breath;    FINDINGS:  Portable chest at 13:56 hours is compared to 04/18/2025 shows normal cardiomediastinal silhouette.   There is stable elevation the right hemidiaphragm.    There are no confluent infiltrates or pleural effusions.  Pulmonary vasculature is normal. No acute osseous abnormality.                                          Assessment & Plan  COPD exacerbation  Patient's COPD is with exacerbation noted by continued dyspnea currently.  Patient is currently off COPD Pathway. Continue scheduled inhalers Steroids, Antibiotics, and Supplemental oxygen and monitor respiratory status closely.     CTA chest negative for pulmonary embolisms however there is detrimental change in diffuse bilateral reticulonodular interstitial prominence from 3 months prior differential including atypical infection versus lymphangitis spread of malignancy  Pulmonology following: suggesting  possible bronch  Procal negative         Essential hypertension, benign  Patient's blood pressure range in the last 24 hours was: BP  Min: 120/71  Max: 152/88.The patient's inpatient anti-hypertensive regimen is listed below:  Current Antihypertensives  amLODIPine tablet 5 mg, Nightly, OralResume home amlodipine    Plan  - BP is controlled, no changes needed to their regimen  -   Acquired hypothyroidism  Patient has chronic hypothyroidism. TFTs reviewed-   Lab Results   Component Value Date    TSH 1.074 11/20/2024   . Will continue chronic levothyroxine and adjust for and clinical changes.   Rheumatoid arthritis involving multiple sites with positive rheumatoid factor  Resume home Plaquenil    Drug-induced immunodeficiency  Patient currently on daily Plaquenil, methotrexate weekly and daily exemestane  Rhinovirus  Supportive care  Droplet Precautions   Encourage oral hydration  VTE Risk Mitigation (From admission, onward)           Ordered     enoxaparin injection 40 mg  Daily         04/24/25 1707     IP VTE HIGH RISK PATIENT  Once         04/24/25 1707     Place sequential compression device  Until discontinued         04/24/25 1707                    Discharge Planning   LILA:      Code Status: Full Code   Medical Readiness for Discharge Date:   Discharge Plan A: Home with family                Marisol Han NP  Department of Hospital Medicine   [facility]

## 2025-04-27 NOTE — PROGRESS NOTES
Pharmacokinetic Assessment Follow Up: IV Vancomycin    Vancomycin serum concentration assessment(s):    The trough level was drawn correctly and can be used to guide therapy at this time. The measurement is below the desired definitive target range of 15 to 20 mcg/mL.    Vancomycin Regimen Plan:    Continue regimen to Vancomycin 1000 mg IV every 24 hours with next serum trough concentration measured at 1400 prior to third dose on 04/29.    Drug levels (last 3 results):  Recent Labs   Lab Result Units 04/27/25  1418   Vancomycin Trough ug/ml 12.9       Pharmacy will continue to follow and monitor vancomycin.    Please contact pharmacy at extension [phone] for questions regarding this assessment.    Thank you for the consult,   Mitch Gold       Patient brief summary:  Kirstie Bowden is a 80 y.o. female initiated on antimicrobial therapy with IV Vancomycin for treatment of lower respiratory infection    Drug Allergies:   Review of patient's allergies indicates:   Allergen Reactions    Succinylcholine chloride Other (See Comments)    Sulfur dioxide Hives    Sulfamethoxazole-trimethoprim      Other reaction(s): per dr daryn Rodríguez (sulfonamide antibiotics)      NOT SURE REACTION       Actual Body Weight:   75.9 kg    Renal Function:   Estimated Creatinine Clearance: 46 mL/min (based on SCr of 1 mg/dL).,     Dialysis Method (if applicable):  N/A    CBC (last 72 hours):  Recent Labs   Lab Result Units 04/25/25  0740 04/26/25  0558 04/27/25  0626   WBC K/uL 13.42* 12.00 11.69   Hgb gm/dL 11.9* 11.3* 11.0*   Hct % 38.9 36.7* 34.8*   Platelet Count K/uL 302 298 293   Mono % % 1.9* 4.0 6.2   Eos % % 0.0 0.0 0.1   Basophil % % 0.1 0.2 0.2       Metabolic Panel (last 72 hours):  Recent Labs   Lab Result Units 04/25/25  0740 04/26/25  0558 04/27/25  0626   Sodium mmol/L 139 139 140   Potassium mmol/L 4.1 4.3 4.1   Chloride mmol/L 96 99 102   CO2 mmol/L 33* 34* 34*   Glucose mg/dL 97 105 94   BUN mg/dL 33* 38* 30*    Creatinine mg/dL 1.2 1.2 1.0   Albumin g/dL 3.8 3.5 3.2*   Bilirubin Total mg/dL 0.5 0.4 0.3   ALP unit/L 63 56 46*   AST unit/L 17 21 22   ALT unit/L 38 36 43       Vancomycin Administrations:  vancomycin given in the last 96 hours                     vancomycin (VANCOCIN) 1,000 mg in 0.9% NaCl 250 mL IVPB (admixture device) (mg) 1,000 mg New Bag 04/26/25 2107    vancomycin 1,500 mg in 0.9% NaCl 250 mL IVPB (admixture device) (mg) 1,500 mg New Bag 04/25/25 1506                    Microbiologic Results:  Microbiology Results (last 7 days)       Procedure Component Value Units Date/Time    Respiratory Infection Panel (PCR), Nasopharyngeal [[id number]]  (Abnormal) Collected: 04/25/25 1305    Order Status: Completed Specimen: Nasopharyngeal Swab Updated: 04/25/25 1449     Respiratory Infection Panel Source Nasopharyngeal Swab     Adenovirus Not Detected     Coronavirus 229E, Common Cold Virus Not Detected     Coronavirus HKU1, Common Cold Virus Not Detected     Coronavirus NL63, Common Cold Virus Not Detected     Coronavirus OC43, Common Cold Virus Not Detected     SARS-CoV2 (COVID-19) Qualitative PCR Not Detected     Human Metapneumovirus Not Detected     Human Rhinovirus/Enterovirus Detected     Influenza A (subtypes H1, H1-2009,H3) Not Detected     Influenza B Not Detected     Parainfluenza Virus 1 Not Detected     Parainfluenza Virus 2 Not Detected     Parainfluenza Virus 3 Not Detected     Parainfluenza Virus 4 Not Detected     Respiratory Syncytial Virus Not Detected     Bordetella Parapertussis (UL9472) Not Detected     Bordetella pertussis (ptxP) Not Detected     Chlamydia pneumoniae Not Detected     Mycoplasma pneumoniae Not Detected    Influenza A & B by Molecular [[id number]]  (Normal) Collected: 04/24/25 1537    Order Status: Completed Specimen: Nasal Swab Updated: 04/24/25 1631     INFLUENZA A MOLECULAR Negative     INFLUENZA B MOLECULAR  Negative

## 2025-04-28 LAB
ALBUMIN SERPL-MCNC: 3.4 G/DL (ref 3.5–5.2)
ALP SERPL-CCNC: 49 UNIT/L (ref 55–135)
ALT SERPL-CCNC: 46 UNIT/L (ref 10–44)
ANION GAP (SMH): 6 MMOL/L (ref 8–16)
AST SERPL-CCNC: 20 UNIT/L (ref 10–40)
BILIRUB SERPL-MCNC: 0.4 MG/DL (ref 0.1–1)
BUN SERPL-MCNC: 29 MG/DL (ref 8–23)
CALCIUM SERPL-MCNC: 9.3 MG/DL (ref 8.7–10.5)
CHLORIDE SERPL-SCNC: 101 MMOL/L (ref 95–110)
CO2 SERPL-SCNC: 34 MMOL/L (ref 23–29)
CREAT SERPL-MCNC: 1 MG/DL (ref 0.5–1.4)
GFR SERPLBLD CREATININE-BSD FMLA CKD-EPI: 57 ML/MIN/1.73/M2
GLUCOSE SERPL-MCNC: 123 MG/DL (ref 70–110)
MAGNESIUM SERPL-MCNC: 2 MG/DL (ref 1.6–2.6)
OHS QRS DURATION: 92 MS
OHS QTC CALCULATION: 436 MS
POTASSIUM SERPL-SCNC: 4.1 MMOL/L (ref 3.5–5.1)
PROT SERPL-MCNC: 6.3 GM/DL (ref 6–8.4)
SODIUM SERPL-SCNC: 141 MMOL/L (ref 136–145)

## 2025-04-28 PROCEDURE — 97530 THERAPEUTIC ACTIVITIES: CPT

## 2025-04-28 PROCEDURE — 25000003 PHARM REV CODE 250: Performed by: REGISTERED NURSE

## 2025-04-28 PROCEDURE — 97116 GAIT TRAINING THERAPY: CPT | Mod: CQ

## 2025-04-28 PROCEDURE — 97535 SELF CARE MNGMENT TRAINING: CPT

## 2025-04-28 PROCEDURE — 94799 UNLISTED PULMONARY SVC/PX: CPT

## 2025-04-28 PROCEDURE — 99900031 HC PATIENT EDUCATION (STAT)

## 2025-04-28 PROCEDURE — 94640 AIRWAY INHALATION TREATMENT: CPT

## 2025-04-28 PROCEDURE — 94664 DEMO&/EVAL PT USE INHALER: CPT

## 2025-04-28 PROCEDURE — 83735 ASSAY OF MAGNESIUM: CPT

## 2025-04-28 PROCEDURE — 25000003 PHARM REV CODE 250

## 2025-04-28 PROCEDURE — 63600175 PHARM REV CODE 636 W HCPCS: Performed by: STUDENT IN AN ORGANIZED HEALTH CARE EDUCATION/TRAINING PROGRAM

## 2025-04-28 PROCEDURE — 63700000 PHARM REV CODE 250 ALT 637 W/O HCPCS

## 2025-04-28 PROCEDURE — 63600175 PHARM REV CODE 636 W HCPCS: Performed by: REGISTERED NURSE

## 2025-04-28 PROCEDURE — 25000242 PHARM REV CODE 250 ALT 637 W/ HCPCS: Performed by: REGISTERED NURSE

## 2025-04-28 PROCEDURE — 12000002 HC ACUTE/MED SURGE SEMI-PRIVATE ROOM

## 2025-04-28 PROCEDURE — 99900035 HC TECH TIME PER 15 MIN (STAT)

## 2025-04-28 PROCEDURE — 99233 SBSQ HOSP IP/OBS HIGH 50: CPT | Mod: ,,, | Performed by: STUDENT IN AN ORGANIZED HEALTH CARE EDUCATION/TRAINING PROGRAM

## 2025-04-28 PROCEDURE — 94761 N-INVAS EAR/PLS OXIMETRY MLT: CPT

## 2025-04-28 PROCEDURE — 36415 COLL VENOUS BLD VENIPUNCTURE: CPT

## 2025-04-28 PROCEDURE — 27000221 HC OXYGEN, UP TO 24 HOURS

## 2025-04-28 PROCEDURE — 82040 ASSAY OF SERUM ALBUMIN: CPT

## 2025-04-28 PROCEDURE — 27000207 HC ISOLATION

## 2025-04-28 RX ORDER — AZITHROMYCIN 250 MG/1
500 TABLET, FILM COATED ORAL DAILY
Status: DISCONTINUED | OUTPATIENT
Start: 2025-04-28 | End: 2025-04-29

## 2025-04-28 RX ADMIN — HYDROXYCHLOROQUINE SULFATE 200 MG: 200 TABLET, FILM COATED ORAL at 10:04

## 2025-04-28 RX ADMIN — AMLODIPINE BESYLATE 5 MG: 5 TABLET ORAL at 10:04

## 2025-04-28 RX ADMIN — DIPHENHYDRAMINE HYDROCHLORIDE 50 MG: 25 CAPSULE ORAL at 10:04

## 2025-04-28 RX ADMIN — CEFTRIAXONE 1 G: 1 INJECTION, POWDER, FOR SOLUTION INTRAMUSCULAR; INTRAVENOUS at 02:04

## 2025-04-28 RX ADMIN — GUAIFENESIN 600 MG: 600 TABLET, EXTENDED RELEASE ORAL at 09:04

## 2025-04-28 RX ADMIN — GABAPENTIN 300 MG: 300 CAPSULE ORAL at 10:04

## 2025-04-28 RX ADMIN — IPRATROPIUM BROMIDE AND ALBUTEROL SULFATE 3 ML: 2.5; .5 SOLUTION RESPIRATORY (INHALATION) at 01:04

## 2025-04-28 RX ADMIN — HYDROXYCHLOROQUINE SULFATE 200 MG: 200 TABLET, FILM COATED ORAL at 09:04

## 2025-04-28 RX ADMIN — IPRATROPIUM BROMIDE AND ALBUTEROL SULFATE 3 ML: 2.5; .5 SOLUTION RESPIRATORY (INHALATION) at 07:04

## 2025-04-28 RX ADMIN — ENOXAPARIN SODIUM 40 MG: 40 INJECTION SUBCUTANEOUS at 04:04

## 2025-04-28 RX ADMIN — GABAPENTIN 300 MG: 300 CAPSULE ORAL at 09:04

## 2025-04-28 RX ADMIN — AZITHROMYCIN DIHYDRATE 500 MG: 250 TABLET ORAL at 09:04

## 2025-04-28 RX ADMIN — GUAIFENESIN 600 MG: 600 TABLET, EXTENDED RELEASE ORAL at 10:04

## 2025-04-28 RX ADMIN — ATORVASTATIN CALCIUM 20 MG: 20 TABLET, FILM COATED ORAL at 10:04

## 2025-04-28 RX ADMIN — LEVOTHYROXINE SODIUM 125 MCG: 0.1 TABLET ORAL at 05:04

## 2025-04-28 RX ADMIN — PREDNISONE 40 MG: 20 TABLET ORAL at 09:04

## 2025-04-28 NOTE — PROGRESS NOTES
[facility] Medicine  Progress Note    Patient Name: Kirstie Bowden  MRN: [id number]  Patient Class: IP- Inpatient   Admission Date: 4/24/2025  Length of Stay: 4 days  Attending Physician: Emely Interiano MD  Primary Care Provider: Rocio Feliciano MD        Subjective     Principal Problem:COPD exacerbation        HPI:  Patient is a 80-year-old female with a history of RA, hypertension, COPD, hypothyroidism breast CA, renal CA.  Patient reports she went to urgent care last week and was diagnosed with pneumonia and pleurisy and started on antibiotics and oral steroids.  Patient reports symptoms only worsened since completing antibiotic course.  She saw her pulmonologist today in clinic and was referred to ED. in ED labwork remarkable for leukocytosis with a left shift.  Of note patient currently on methotrexate and exemestane.  Currently requiring 2 L nasal cannula which she is not on any home O2..  Flu and COVID both negative.  BNP elevated at 138.  Chest x-ray with improved right lower lobe density.  CTA c/a/p  was ordered which ruled out PE, and no acute intra-abdominal abnormality, however there is detrimental change in diffuse bilateral reticulonodular interstitial prominence from 3 months prior differential including atypical infection versus lymphangitis spread of malignancy.  Patient will be admitted to Hospital Medicine.  Started on Rocephin and azithromycin.  Continue supplemental O2 and start DuoNebs.    Overview/Hospital Course:  Kirstie Bowden was closely monitored while in the hospital.  Patient was admitted to Hospital Medicine for acute exacerbation of COPD.  COVID and flu negative.  Procalcitonin unremarkable.  CT imaging revealed no acute intra-abdominal abnormality, however there is detrimental change in diffuse bilateral reticulonodular interstitial prominence from 3 months prior differential including atypical infection versus lymphangitis spread of  malignancy.  Patient was started on Rocephin and azithromycin, DuoNebs and supplemental oxygen. Pulmonology was consulted on admission, recommending possible bronch to be determined by patient's clinical course.  Patient had significant improvement in clinical course.  Pulmonology recommends follow up CT in 4-6 weeks.  Patient was transitioned to oral antibiotics.  Home O2 evaluation has been ordered.  Patient worked with PT/OT during hospital who recommended intensity therapy.  Case management following for discharge planning.      No new subjective & objective note has been filed under this hospital service since the last note was generated.        Assessment & Plan  COPD exacerbation  Patient's COPD is with exacerbation noted by continued dyspnea currently.  Patient is currently off COPD Pathway. Continue scheduled inhalers Steroids, Antibiotics, and Supplemental oxygen and monitor respiratory status closely.     CTA chest negative for pulmonary embolisms however there is detrimental change in diffuse bilateral reticulonodular interstitial prominence from 3 months prior differential including atypical infection versus lymphangitis spread of malignancy  Pulmonology following: recommend repeat CT in 4-6 weeks  Procal negative   Antibiotic  Supplemental Oxygen         Essential hypertension, benign  Patient's blood pressure range in the last 24 hours was: BP  Min: 139/81  Max: 171/73.The patient's inpatient anti-hypertensive regimen is listed below:  Current Antihypertensives  amLODIPine tablet 5 mg, Nightly, OralResume home amlodipine    Plan  - BP is controlled, no changes needed to their regimen  -   Acquired hypothyroidism  Patient has chronic hypothyroidism. TFTs reviewed-   Lab Results   Component Value Date    TSH 1.074 11/20/2024   . Will continue chronic levothyroxine and adjust for and clinical changes.   Rheumatoid arthritis involving multiple sites with positive rheumatoid factor  Resume home  Plaquenil    Drug-induced immunodeficiency  Patient currently on daily Plaquenil, methotrexate weekly and daily exemestane  Rhinovirus  Supportive care  Droplet Precautions   Encourage oral hydration  VTE Risk Mitigation (From admission, onward)           Ordered     enoxaparin injection 40 mg  Daily         04/24/25 1707     IP VTE HIGH RISK PATIENT  Once         04/24/25 1707     Place sequential compression device  Until discontinued         04/24/25 1707                    Discharge Planning   LILA: 4/29/2025     Code Status: Full Code   Medical Readiness for Discharge Date:   Discharge Plan A: Home Health              Marisol Han NP  Department of Hospital Medicine   [facility]

## 2025-04-28 NOTE — PROGRESS NOTES
Pulmonary/Critical Care Progress Note      PATIENT NAME: Kirstie Bowden  MRN: [id number]  TODAY'S DATE: 2025  12:32 PM  ADMIT DATE: 2025  AGE: 80 y.o. : 1945    CONSULT REQUESTED BY: Emely Interiano MD    REASON FOR CONSULT:   Abnormal CT, respiratory failure    HPI:  Ms. Bowden Is a 80-year-old woman who presents with cough shortness of breath and increased sputum production.  She reports that this has been ongoing for the last month.  She reports that she was previously easily sick earlier in the month.  She got antibiotics and had some improvement.  She reports that she started developing cough shortness of breath over the last couple of days.  She was advised to come to the ED at the recommendation of her outpatient Pulmonary clinician.  She has a history of rheumatoid arthritis, treated with methotrexate and Plaquenil, she reports that she is not currently on prednisone.    She reports that her RA is relatively controlled, she does get intermittent flares which primarily related to musculoskeletal issues in her hands shoulder.    She reports no Raynaud's phenomenon.  No red hot itchy eyes.    No hemoptysis, no night sweats no shaking chills.    She has a history of breast cancer, had a left breast lumpectomy, and a right-sided nephrectomy.    25- doing ok today, she reports that she is feeling much better since admission, but she does have a nagging ongoing cough.  She reports the cough is worse when she is lying flat  Review of Systems   Constitutional:  Negative for appetite change, chills, fever and unexpected weight change.   HENT:  Negative for nosebleeds, postnasal drip, trouble swallowing and voice change.    Eyes:  Negative for visual disturbance.   Respiratory:  Positive for cough, shortness of breath and wheezing.    Cardiovascular:  Negative for chest pain and leg swelling.   Gastrointestinal:  Negative for diarrhea, nausea and vomiting.   Endocrine: Negative for  cold intolerance and heat intolerance.   Genitourinary:  Negative for dysuria, flank pain and frequency.   Musculoskeletal:  Negative for neck pain and neck stiffness.   Allergic/Immunologic: Negative for environmental allergies and immunocompromised state.   Neurological:  Negative for syncope, speech difficulty and weakness.   Hematological:  Negative for adenopathy.   Psychiatric/Behavioral:  Negative for confusion.            ALLERGIES  Review of patient's allergies indicates:   Allergen Reactions    Succinylcholine chloride Other (See Comments)    Sulfur dioxide Hives    Sulfamethoxazole-trimethoprim      Other reaction(s): per dr daryn Rodríguez (sulfonamide antibiotics)      NOT SURE REACTION       INPATIENT SCHEDULED MEDICATIONS   albuterol-ipratropium  3 mL Nebulization Q6H WAKE    amLODIPine  5 mg Oral QHS    atorvastatin  20 mg Oral QHS    azithromycin  500 mg Oral Daily    cefTRIAXone (Rocephin) IV (PEDS and ADULTS)  1 g Intravenous Q24H    diphenhydrAMINE  50 mg Oral QHS    enoxparin  40 mg Subcutaneous Daily    gabapentin  300 mg Oral BID    guaiFENesin  600 mg Oral BID    hydroxychloroquine  200 mg Oral BID    levothyroxine  125 mcg Oral Before breakfast    predniSONE  40 mg Oral Daily         MEDICAL AND SURGICAL HISTORY  Past Medical History:   Diagnosis Date    Arthritis     rheumatoid    Asthma     Breast cancer     Cancer 2020    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    GERD (gastroesophageal reflux disease) 2022    Hearing loss [id number]    Hyperlipidemia     Hypertension 2021    Limb alert care status     NO BP/IV LEFT ARM    Lung disease     Personal history of colonic polyps 01/23/2024    Pseudocholinesterase deficiency     family history    Radiation 2020    Rheumatoid arthritis 2020    Thyroid disease      Past Surgical History:   Procedure Laterality Date    AXILLARY NODE DISSECTION Left 03/14/2019    Procedure: LYMPHADENECTOMY, AXILLARY;  Surgeon: Mp Gonzalez MD;  Location: [facility];  Service: General;   Laterality: Left;  left lumpectomy with axillary lypmh node dissection    BALLOON DILATION OF URETER Left 06/24/2019    Procedure: DILATION, URETER, USING BALLOON;  Surgeon: Jorden Dickson MD;  Location: [facility] OR;  Service: Urology;  Laterality: Left;    BREAST BIOPSY Left 20 yrs ago    benign    BREAST CYST EXCISION  15 yrs ago    BREAST LUMPECTOMY      x2    CHOLECYSTECTOMY  2000    COLONOSCOPY  09/25/2018    LUC EASTON MD    CYSTOSCOPY W/ URETERAL STENT PLACEMENT Left 06/24/2019    Procedure: CYSTOSCOPY, WITH URETERAL STENT INSERTION;  Surgeon: Jorden Dickson MD;  Location: [facility] OR;  Service: Urology;  Laterality: Left;    CYSTOSCOPY W/ URETERAL STENT REMOVAL Left 07/23/2019    Procedure: CYSTOSCOPY, WITH URETERAL STENT REMOVAL;  Surgeon: Jorden Dickson MD;  Location: [facility] OR;  Service: Urology;  Laterality: Left;    EPIDURAL STEROID INJECTION INTO LUMBAR SPINE N/A 09/30/2021    Procedure: Injection-steroid-epidural-lumbar;  Surgeon: Nathen Lyons MD;  [facility];  Service: Pain Management;  Laterality: N/A;  L5-S1      EPIDURAL STEROID INJECTION INTO LUMBAR SPINE N/A 11/16/2021    Procedure: Injection-steroid-epidural-lumbar;  Surgeon: Nathen Lyons MD;  [facility];  Service: Pain Management;  Laterality: N/A;  L5-S1    EXTRACORPOREAL SHOCK WAVE LITHOTRIPSY Left 07/23/2019    Procedure: LITHOTRIPSY, ESWL;  Surgeon: Jorden Dickson MD;  Location: [facility] OR;  Service: Urology;  Laterality: Left;    EYE SURGERY Bilateral 2000    cataracts    HYSTERECTOMY  1994    MASTECTOMY, PARTIAL Left 04/25/2019    Procedure: MASTECTOMY, PARTIAL;  Surgeon: Mp Gonzalez MD;  Location: [facility] OR;  Service: General;  Laterality: Left;    NEEDLE LOCALIZATION Left 03/14/2019    Procedure: NEEDLE LOCALIZATION;  Surgeon: Mp Gonzalez MD;  Location: [facility] OR;  Service: General;  Laterality: Left;  left lumpectomy with wire needle localization     PARTIAL NEPHRECTOMY Right 05/22/2023    RETROGRADE  PYELOGRAPHY Bilateral 06/24/2019    Procedure: PYELOGRAM, RETROGRADE;  Surgeon: Jorden Dickson MD;  Location: [facility] OR;  Service: Urology;  Laterality: Bilateral;    SENTINEL LYMPH NODE BIOPSY Left 03/14/2019    Procedure: BIOPSY, LYMPH NODE, SENTINEL;  Surgeon: Mp Gonzalez MD;  Location: [facility] OR;  Service: General;  Laterality: Left;  left sentinel node lymph node biopsy    TONSILLECTOMY  1948    TRANSFORAMINAL EPIDURAL INJECTION OF STEROID Right 01/04/2022    Procedure: Injection,steroid,epidural,transforaminal approach;  Surgeon: Nathen Lyons MD;  Location: [facility] OR;  Service: Pain Management;  Laterality: Right;  L4-L5, L5-s1    TRANSFORAMINAL EPIDURAL INJECTION OF STEROID Right 07/09/2024    Procedure: Injection,steroid,epidural,transforaminal approach;  Surgeon: Nathen Lyons MD;  Location: [facility] OR;  Service: Pain Management;  Laterality: Right;    URETEROSCOPY Left 06/24/2019    Procedure: URETEROSCOPY;  Surgeon: Jorden Dickson MD;  Location: [facility] OR;  Service: Urology;  Laterality: Left;       ALCOHOL, TOBACCO AND DRUG USE  Tobacco Use History[1]  Social History     Substance and Sexual Activity   Alcohol Use Yes    Alcohol/week: 2.0 standard drinks of alcohol    Types: 2 Glasses of wine per week    Comment: Social     Social History     Substance and Sexual Activity   Drug Use No       FAMILY HISTORY  Family History   Problem Relation Name Age of Onset    Heart disease Mother Patricia     Hypertension Mother Patricia     Alzheimer's disease Mother Patricia     Cancer Father Kirstie     Heart disease Sister Ania     Arthritis Sister Ania     Kidney disease Sister Ania     Stroke Sister Alvina sister         Sister    Diabetes Brother Nicklos     Cancer Daughter      Stroke Sister Alvina        VITAL SIGNS (MOST RECENT)  Temp: 97.4 °F (36.3 °C) (04/28/25 1115)  Pulse: 80 (04/28/25 1324)  Resp: 18 (04/28/25 1324)  BP: (!) 140/80 (04/28/25 1115)  SpO2: (!) 94 % (04/28/25 1324)    INTAKE AND OUTPUT  "(LAST 24 HOURS):  Intake/Output Summary (Last 24 hours) at 4/28/2025 1429  Last data filed at 4/28/2025 0859  Gross per 24 hour   Intake 1658.01 ml   Output 2100 ml   Net -441.99 ml       WEIGHT  Wt Readings from Last 1 Encounters:   04/25/25 75.9 kg (167 lb 5.3 oz)       Physical Exam  Vitals reviewed.   Constitutional:       General: She is not in acute distress.     Appearance: She is well-developed. She is not diaphoretic.   HENT:      Head: Normocephalic and atraumatic.      Mouth/Throat:      Pharynx: No oropharyngeal exudate or posterior oropharyngeal erythema.   Eyes:      General: No scleral icterus.     Pupils: Pupils are equal, round, and reactive to light.   Neck:      Vascular: No JVD.   Cardiovascular:      Rate and Rhythm: Normal rate and regular rhythm.      Heart sounds: Normal heart sounds. No murmur heard.  Pulmonary:      Effort: Pulmonary effort is normal. No respiratory distress.      Breath sounds: Wheezing present.   Abdominal:      General: Bowel sounds are normal. There is no distension.      Palpations: Abdomen is soft.      Tenderness: There is no abdominal tenderness.   Musculoskeletal:         General: No swelling.      Cervical back: Normal range of motion and neck supple. No rigidity.      Right lower leg: No edema.      Left lower leg: No edema.   Skin:     General: Skin is warm and dry.      Capillary Refill: Capillary refill takes less than 2 seconds.      Coloration: Skin is pale.      Findings: No rash.   Neurological:      General: No focal deficit present.      Mental Status: She is alert and oriented to person, place, and time.      Cranial Nerves: No cranial nerve deficit.      Motor: No weakness or abnormal muscle tone.           ACUTE PHASE REACTANT (LAST 24 HOURS)  No results for input(s): "FERRITIN", "CRP", "LDH", "DDIMER" in the last 24 hours.    CBC LAST (LAST 24 HOURS)  No results for input(s): "WBC", "RBC", "HGB", "HCT", "MCV", "MCH", "MCHC", "RDW", "PLT", "MPV", " ""GRAN", "LYMPH", "MONO", "BASO", "NRBC" in the last 24 hours.      CHEMISTRY LAST (LAST 24 HOURS)  Recent Labs   Lab 04/28/25  0951      K 4.1   CO2 34*   BUN 29*   CREATININE 1.0   CALCIUM 9.3   MG 2.0   ALBUMIN 3.4*   ALKPHOS 49*   ALT 46*   AST 20   BILITOT 0.4       COAGULATION LAST (LAST 24 HOURS)  No results for input(s): "LABPT", "INR", "APTT" in the last 24 hours.    CARDIAC PROFILE (LAST 24 HOURS)  Recent Labs   Lab 04/24/25  1403   *       LAST 7 DAYS MICROBIOLOGY   Microbiology Results (last 7 days)       Procedure Component Value Units Date/Time    Respiratory Infection Panel (PCR), Nasopharyngeal [[id number]]  (Abnormal) Collected: 04/25/25 1305    Order Status: Completed Specimen: Nasopharyngeal Swab Updated: 04/25/25 1449     Respiratory Infection Panel Source Nasopharyngeal Swab     Adenovirus Not Detected     Coronavirus 229E, Common Cold Virus Not Detected     Coronavirus HKU1, Common Cold Virus Not Detected     Coronavirus NL63, Common Cold Virus Not Detected     Coronavirus OC43, Common Cold Virus Not Detected     SARS-CoV2 (COVID-19) Qualitative PCR Not Detected     Human Metapneumovirus Not Detected     Human Rhinovirus/Enterovirus Detected     Influenza A (subtypes H1, H1-2009,H3) Not Detected     Influenza B Not Detected     Parainfluenza Virus 1 Not Detected     Parainfluenza Virus 2 Not Detected     Parainfluenza Virus 3 Not Detected     Parainfluenza Virus 4 Not Detected     Respiratory Syncytial Virus Not Detected     Bordetella Parapertussis (HL3240) Not Detected     Bordetella pertussis (ptxP) Not Detected     Chlamydia pneumoniae Not Detected     Mycoplasma pneumoniae Not Detected    Influenza A & B by Molecular [[id number]]  (Normal) Collected: 04/24/25 1537    Order Status: Completed Specimen: Nasal Swab Updated: 04/24/25 1631     INFLUENZA A MOLECULAR Negative     INFLUENZA B MOLECULAR  Negative            MOST RECENT IMAGING  CT Abdomen Pelvis With IV Contrast NO Oral " Contrast  Narrative: EXAMINATION:  CT ABDOMEN PELVIS WITH IV CONTRAST    CLINICAL HISTORY:  Abdominal pain, acute, nonlocalized;.    TECHNIQUE:  Post infusion axial images were obtained from the lung bases to the pubic symphysis 100 cc nonionic contrast was utilized for the examination.    COMPARISON:  Noncontrast CT, January 2025    FINDINGS  The liver has a normal appearance.  The gallbladder is absent.  The biliary tree is nondilated.  The spleen is normal in appearance.  The pancreas is unremarkable.  The adrenal glands are normal.  Small left-sided simple renal cysts are noted, as well as at least 2 nonobstructing left renal calculi, the largest at the lower pole measuring 6 mm.  There is no evidence of left ureteral calculus or hydronephrosis.  The right kidney is surgically absent, with no evidence of mass lesion at the right renal fossa.  There is dense multifocal aortic atherosclerosis.    There is no pathologic bowel wall thickening or evidence of obstruction.  There is a mild amount of retained fecal matter throughout the colon.  There is no free air or free fluid.  The appendix is normal in appearance.    Images of the pelvis demonstrate sigmoid diverticula without diverticulitis.  The urinary bladder is unremarkable.  There is no pelvic free fluid or lymphadenopathy.    Moderate-severe arthritic changes noted at the right hip, with mild to moderate arthritic change on the left.  5.5 x 2.2 cm fluid collection superficial to the right femur at the level of the greater trochanter is likely bursal fluid accumulation/bursitis.  This collection appears smaller when compared to January 6.    There is multilevel degenerative facet disease in the lumbar spine.  Impression: 1. No acute CT abnormalities.  2. Multiple chronic findings as above.    Electronically signed by: Wellington Roper  Date:    04/24/2025  Time:    15:48  CTA Chest Non-Coronary (PE Studies)  Narrative: EXAMINATION:  CTA CHEST NON CORONARY (PE  STUDIES)    CLINICAL HISTORY:  Pulmonary embolism (PE) suspected, unknown D-dimer;.    TECHNIQUE:  CT angiography targeted to the pulmonary arteries was performed. 100 cc nonionic contrast was administered and the bolus was timed for optimal opacification of the pulmonary arteries. 3-D reformatted images were obtained on an independent workstation and stored as a part of patient's permanent medical record.    COMPARISON:  CT chest abdomen and pelvis without contrast, January 2025    FINDINGS:  Pulmonary arterial contrast opacification is adequate.  There are no identifiable filling defects to indicate pulmonary thromboembolic disease.  Heart size is normal.  Multifocal coronary artery atherosclerotic calcification is noted.  The thoracic aorta is normal in caliber.  No pathologic mediastinal or axillary lymphadenopathy is identified.  Postoperative changes are noted in the left breast.    Images at lung windows demonstrate diffuse bilateral abnormal reticulonodular interstitial prominence, reflecting an interval change compared to January 6.  Scattered small airspace opacities are also noted, new compared to the prior study.  Differential possibilities would include atypical infection and lymphangitic spread of malignancy.  No focal dominant mass is identified.  There are no pleural effusions.    No acute osseous abnormalities are demonstrated.  Impression: 1. No evidence of pulmonary thromboembolic disease.  2. Diffuse bilateral abnormal reticulonodular interstitial prominence, reflecting a detrimental change compared to January 2025.  Primary differential considerations include atypical infection and lymphangitis spread of malignancy.    Electronically signed by: Wellington Roper  Date:    04/24/2025  Time:    15:39  X-Ray Chest AP Portable  Narrative: EXAMINATION:  XR CHEST AP PORTABLE    CLINICAL HISTORY:  shortness of breath;    FINDINGS:  Portable chest at 13:56 hours is compared to 04/18/2025 shows normal  "cardiomediastinal silhouette.   There is stable elevation the right hemidiaphragm.    There are no confluent infiltrates or pleural effusions.  Pulmonary vasculature is normal. No acute osseous abnormality.  Impression: Resolution of the right lower lobe airspace disease with stable elevation the right hemidiaphragm    No acute pulmonary process    Electronically signed by: Carlita Schneider  Date:    04/24/2025  Time:    13:55      CURRENT VISIT EKG  Results for orders placed or performed during the hospital encounter of 04/24/25   EKG 12-lead   Result Value Ref Range    QRS Duration 92 ms    [facility] QTC Calculation 436 ms    Narrative    Test Reason : R06.02,    Vent. Rate :  77 BPM     Atrial Rate :  77 BPM     P-R Int : 142 ms          QRS Dur :  92 ms      QT Int : 386 ms       P-R-T Axes :  85 -23  72 degrees    QTcB Int : 436 ms    Sinus rhythm with Premature supraventricular complexes  Otherwise normal ECG  No previous ECGs available    Referred By: AAAREFERRAL SELF           Confirmed By:        ECHOCARDIOGRAM RESULTS  No results found for this or any previous visit.        VENTILATOR INFORMATION              LAST ARTERIAL BLOOD GAS  ABG  No results for input(s): "PH", "PO2", "PCO2", "HCO3", "BE" in the last 168 hours.    ASSESSMENT:   Moderate COPD  Hypoxemic respiratory failure  Abnormal CT  History of rheumatoid arthritis on immunosuppression with methotrexate Plaquenil  Upper airway cough syndrome with postnasal drip    Ms. Bowden is a 80-year-old woman who was on immune suppression outpatient for RA, with relatively stable RA symptoms, presents with respiratory failure and bilateral tree-in-bud nodular opacification.  I suspect primarily infection due to bronchiolitis and pneumonia.  Other potential differential see consider are if this is I LD related to methotrexate, rheumatoid arthritis related lung disease.   PLAN:   - okay to deescalate antibiotics, can do Augmentin to complete total of 10 days of " antibiotics   - complete 5 days of prednisone  - repeat CT chest in 4-6 weeks and follow up with pulmonary in clinic outpatient.       Ke Gregorio MD  Date of Service: 2025  12:32 PM         [1]   Social History  Tobacco Use   Smoking Status Former    Current packs/day: 0.00    Average packs/day: 0.5 packs/day for 59.3 years (29.6 ttl pk-yrs)    Types: Cigarettes    Start date: 1960    Quit date: 2019    Years since quittin.7   Smokeless Tobacco Never

## 2025-04-28 NOTE — PROGRESS NOTES
[facility]  Progress Note    Patient Name: Kirstie Bowden  MRN: [id number]  Patient Class: IP- Inpatient   Admission Date: 4/24/2025  Length of Stay: 4 days  Attending Physician: Emely Interiano MD  Primary Care Provider: Rocio Feliciano MD        Subjective     Principal Problem:COPD exacerbation        HPI:  Patient is a 80-year-old female with a history of RA, hypertension, COPD, hypothyroidism breast CA, renal CA.  Patient reports she went to urgent care last week and was diagnosed with pneumonia and pleurisy and started on antibiotics and oral steroids.  Patient reports symptoms only worsened since completing antibiotic course.  She saw her pulmonologist today in clinic and was referred to ED. in ED labwork remarkable for leukocytosis with a left shift.  Of note patient currently on methotrexate and exemestane.  Currently requiring 2 L nasal cannula which she is not on any home O2..  Flu and COVID both negative.  BNP elevated at 138.  Chest x-ray with improved right lower lobe density.  CTA c/a/p  was ordered which ruled out PE, and no acute intra-abdominal abnormality, however there is detrimental change in diffuse bilateral reticulonodular interstitial prominence from 3 months prior differential including atypical infection versus lymphangitis spread of malignancy.  Patient will be admitted to Hospital Medicine.  Started on Rocephin and azithromycin.  Continue supplemental O2 and start DuoNebs.    Overview/Hospital Course:  Kirstie Bowden was closely monitored while in the hospital.  Patient was admitted to Hospital Medicine for acute exacerbation of COPD.  COVID and flu negative.  Procalcitonin unremarkable.  CT imaging revealed no acute intra-abdominal abnormality, however there is detrimental change in diffuse bilateral reticulonodular interstitial prominence from 3 months prior differential including atypical infection versus lymphangitis spread of  malignancy.  Patient was started on Rocephin and azithromycin, DuoNebs and supplemental oxygen. Pulmonology was consulted on admission, recommending possible bronch to be determined by patient's clinical course.  Patient had significant improvement in clinical course.  Pulmonology recommends follow up CT in 4-6 weeks.  Patient was transitioned to oral antibiotics.  Home O2 evaluation has been ordered.  Patient worked with PT/OT during hospital who recommended intensity therapy.  Case management following for discharge planning.      Interval History:   Patient seen and examined.  NAD.  Patient is sitting up in chair with family at bedside.  Patient evaluated by pulmonology.    Review of Systems   Constitutional:  Negative for activity change, chills, fatigue and fever.   Respiratory:  Positive for cough.    Cardiovascular:  Positive for chest pain (Left-sided pleuritic chest pain present on admission).   Gastrointestinal: Negative.    Genitourinary: Negative.    Musculoskeletal: Negative.    Skin: Negative.    Neurological: Negative.      Objective:     Vital Signs (Most Recent):  Temp: 97.4 °F (36.3 °C) (04/28/25 1115)  Pulse: 75 (04/28/25 1115)  Resp: 18 (04/28/25 0706)  BP: (!) 140/80 (04/28/25 1115)  SpO2: 95 % (04/28/25 1115) Vital Signs (24h Range):  Temp:  [97.4 °F (36.3 °C)-98.4 °F (36.9 °C)] 97.4 °F (36.3 °C)  Pulse:  [64-86] 75  Resp:  [18] 18  SpO2:  [86 %-99 %] 95 %  BP: (139-171)/(69-81) 140/80     Weight: 75.9 kg (167 lb 5.3 oz)  Body mass index is 25.44 kg/m².    Intake/Output Summary (Last 24 hours) at 4/28/2025 1258  Last data filed at 4/28/2025 0859  Gross per 24 hour   Intake 1898.01 ml   Output 2100 ml   Net -201.99 ml         Physical Exam  Vitals and nursing note reviewed.   Constitutional:       Appearance: She is well-developed.   HENT:      Head: Normocephalic and atraumatic.   Neck:      Thyroid: No thyromegaly.      Vascular: No JVD.   Cardiovascular:      Rate and Rhythm: Normal rate and  "regular rhythm.      Heart sounds: No murmur heard.     No friction rub. No gallop.   Pulmonary:      Effort: Pulmonary effort is normal.      Breath sounds: Rhonchi (LLL) present.   Abdominal:      General: Bowel sounds are normal. There is no distension.      Palpations: Abdomen is soft. There is no mass.      Tenderness: There is no abdominal tenderness.   Musculoskeletal:         General: Normal range of motion.      Cervical back: Neck supple.   Skin:     General: Skin is warm and dry.   Neurological:      Mental Status: She is oriented to person, place, and time.      Cranial Nerves: No cranial nerve deficit.   Psychiatric:         Behavior: Behavior normal.               Significant Labs: All pertinent labs within the past 24 hours have been reviewed.  Blood Culture: No results for input(s): "LABBLOO" in the last 48 hours.  CBC:   Recent Labs   Lab 04/27/25  0626   WBC 11.69   HGB 11.0*   HCT 34.8*        CMP:   Recent Labs   Lab 04/27/25  0626 04/28/25  0951    141   K 4.1 4.1   CO2 34* 34*   BUN 30* 29*   CREATININE 1.0 1.0   CALCIUM 8.9 9.3   ALBUMIN 3.2* 3.4*   BILITOT 0.3 0.4   ALKPHOS 46* 49*   AST 22 20   ALT 43 46*     Magnesium:   Recent Labs   Lab 04/28/25  0951   MG 2.0     Microbiology Results (last 7 days)       Procedure Component Value Units Date/Time    Respiratory Infection Panel (PCR), Nasopharyngeal [[id number]]  (Abnormal) Collected: 04/25/25 1305    Order Status: Completed Specimen: Nasopharyngeal Swab Updated: 04/25/25 1449     Respiratory Infection Panel Source Nasopharyngeal Swab     Adenovirus Not Detected     Coronavirus 229E, Common Cold Virus Not Detected     Coronavirus HKU1, Common Cold Virus Not Detected     Coronavirus NL63, Common Cold Virus Not Detected     Coronavirus OC43, Common Cold Virus Not Detected     SARS-CoV2 (COVID-19) Qualitative PCR Not Detected     Human Metapneumovirus Not Detected     Human Rhinovirus/Enterovirus Detected     Influenza A (subtypes " H1, H1-2009,H3) Not Detected     Influenza B Not Detected     Parainfluenza Virus 1 Not Detected     Parainfluenza Virus 2 Not Detected     Parainfluenza Virus 3 Not Detected     Parainfluenza Virus 4 Not Detected     Respiratory Syncytial Virus Not Detected     Bordetella Parapertussis (OX8244) Not Detected     Bordetella pertussis (ptxP) Not Detected     Chlamydia pneumoniae Not Detected     Mycoplasma pneumoniae Not Detected    Influenza A & B by Molecular [[id number]]  (Normal) Collected: 04/24/25 1537    Order Status: Completed Specimen: Nasal Swab Updated: 04/24/25 1631     INFLUENZA A MOLECULAR Negative     INFLUENZA B MOLECULAR  Negative             Significant Imaging: I have reviewed all pertinent imaging results/findings within the past 24 hours.      Assessment & Plan  COPD exacerbation  Patient's COPD is with exacerbation noted by continued dyspnea currently.  Patient is currently off COPD Pathway. Continue scheduled inhalers Steroids, Antibiotics, and Supplemental oxygen and monitor respiratory status closely.     CTA chest negative for pulmonary embolisms however there is detrimental change in diffuse bilateral reticulonodular interstitial prominence from 3 months prior differential including atypical infection versus lymphangitis spread of malignancy  Pulmonology following: recommend repeat CT in 4-6 weeks  Procal negative   Antibiotic  Supplemental Oxygen         Essential hypertension, benign  Patient's blood pressure range in the last 24 hours was: BP  Min: 139/81  Max: 171/73.The patient's inpatient anti-hypertensive regimen is listed below:  Current Antihypertensives  amLODIPine tablet 5 mg, Nightly, OralResume home amlodipine    Plan  - BP is controlled, no changes needed to their regimen  -   Acquired hypothyroidism  Patient has chronic hypothyroidism. TFTs reviewed-   Lab Results   Component Value Date    TSH 1.074 11/20/2024   . Will continue chronic levothyroxine and adjust for and clinical  changes.   Rheumatoid arthritis involving multiple sites with positive rheumatoid factor  Resume home Plaquenil    Drug-induced immunodeficiency  Patient currently on daily Plaquenil, methotrexate weekly and daily exemestane  Rhinovirus  Supportive care  Droplet Precautions   Encourage oral hydration  VTE Risk Mitigation (From admission, onward)           Ordered     enoxaparin injection 40 mg  Daily         04/24/25 1707     IP VTE HIGH RISK PATIENT  Once         04/24/25 1707     Place sequential compression device  Until discontinued         04/24/25 1707                    Discharge Planning   LILA: 4/29/2025     Code Status: Full Code   Medical Readiness for Discharge Date:   Discharge Plan A: Home Health                Please place Justification for DME        Marisol Han NP  Department of Hospital Medicine   [facility]

## 2025-04-28 NOTE — ASSESSMENT & PLAN NOTE
Patient's blood pressure range in the last 24 hours was: BP  Min: 139/81  Max: 171/73.The patient's inpatient anti-hypertensive regimen is listed below:  Current Antihypertensives  amLODIPine tablet 5 mg, Nightly, OralResume home amlodipine    Plan  - BP is controlled, no changes needed to their regimen  -

## 2025-04-28 NOTE — PROGRESS NOTES
Pharmacy Consult Discontinuation: Vancomycin    Kirstie Bowden [id number] is a 80 y.o. female was consulted for vancomycin pharmacotherapy management by pharmacy.    Pharmacy consult for vancomycin dosing is no longer required.  Vancomycin was discontinued 04/28/2025.    Thank you for allowing us to participate in this patient's care. Should you have any questions or concerns please feel free to contact the pharmacy department at [phone].    Cuauhtemoc Durant, PharmD

## 2025-04-28 NOTE — PLAN OF CARE
04/28/25 1022   Discharge Reassessment   Assessment Type Discharge Planning Reassessment   Did the patient's condition or plan change since previous assessment? No   Discharge Plan discussed with: Patient   Communicated LILA with patient/caregiver Yes   Discharge Plan A Home Health   Discharge Plan B Home Health   DME Needed Upon Discharge  none   Transition of Care Barriers None   Why the patient remains in the hospital Requires continued medical care

## 2025-04-28 NOTE — ASSESSMENT & PLAN NOTE
Patient's COPD is with exacerbation noted by continued dyspnea currently.  Patient is currently off COPD Pathway. Continue scheduled inhalers Steroids, Antibiotics, and Supplemental oxygen and monitor respiratory status closely.     CTA chest negative for pulmonary embolisms however there is detrimental change in diffuse bilateral reticulonodular interstitial prominence from 3 months prior differential including atypical infection versus lymphangitis spread of malignancy  Pulmonology following: recommend repeat CT in 4-6 weeks  Procal negative   Antibiotic  Supplemental Oxygen

## 2025-04-28 NOTE — CONSULTS
hospitals VASCULAR ACCESS NOTE       Bed:3107/3107    22G x 1.75IN [facility].    Indication: PVA  Attempts: 1    Amos Estrada RN

## 2025-04-28 NOTE — CARE UPDATE
04/28/25 1358   Home Oxygen Qualification   $ Home O2 Qualification Tech time 15 minutes   Room Air SpO2 At Rest 94 %   Room Air SpO2 During Ambulation 93 %   SpO2 Post Ambulation 94 %   Post Ambulation Heart Rate 89 bpm

## 2025-04-28 NOTE — CARE UPDATE
04/27/25 1928   Patient Assessment/Suction   Level of Consciousness (AVPU) alert   Respiratory Effort Normal;Unlabored   Expansion/Accessory Muscles/Retractions no use of accessory muscles;no retractions   All Lung Fields Breath Sounds coarse   Rhythm/Pattern, Respiratory unlabored;pattern regular;no shortness of breath reported   Cough Frequency infrequent   Skin Integrity   $ Wound Care Tech Time 15 min   Area Observed Right;Left;Behind ear;Cheek;Nares   Skin Appearance without discoloration   PRE-TX-O2   SpO2 96 %   Pulse 74   Resp 18   Aerosol Therapy   $ Aerosol Therapy Charges Aerosol Treatment  (duo)   Daily Review of Necessity (SVN) completed   Respiratory Treatment Status (SVN) given   Treatment Route (SVN) mask;oxygen   Patient Position HOB elevated   Post Treatment Assessment (SVN) increased aeration   Signs of Intolerance (SVN) none   Breath Sounds Post-Respiratory Treatment   Throughout All Fields Post-Treatment All Fields   Throughout All Fields Post-Treatment aeration increased   Post-treatment Heart Rate (beats/min) 74   Post-treatment Resp Rate (breaths/min) 18

## 2025-04-28 NOTE — PT/OT/SLP PROGRESS
Occupational Therapy   Treatment    Name: Kirstie Bowden  MRN: [id number]  Admitting Diagnosis:  COPD exacerbation       Recommendations:     Discharge Recommendations: Low Intensity Therapy  Discharge Equipment Recommendations:  none  Barriers to discharge:  None    Assessment:     Kirstie Bowden is a 80 y.o. female with a medical diagnosis of COPD exacerbation.   Performance deficits affecting function are impaired endurance, impaired functional mobility, impaired balance, decreased coordination, decreased safety awareness, decreased lower extremity function, decreased upper extremity function.     Rehab Prognosis:  Good; patient would benefit from acute skilled OT services to address these deficits and reach maximum level of function.       Plan:     Patient to be seen 5 x/week to address the above listed problems via self-care/home management, therapeutic activities, therapeutic exercises  Plan of Care Expires: 05/27/25  Plan of Care Reviewed with: patient, daughter    Subjective     Chief Complaint: None stated  Patient/Family Comments/goals: none stated  Pain/Comfort:  Pain Rating 1: 0/10    Objective:     Communicated with: nurse prior to session.  Patient found supine with PureWick, peripheral IV, telemetry upon OT entry to room.    General Precautions: Standard, droplet, fall    Orthopedic Precautions:N/A  Braces: N/A  Respiratory Status: Room air     Occupational Performance:     Bed Mobility:    Patient completed Rolling/Turning to Left with  stand by assistance  Patient completed Rolling/Turning to Right with stand by assistance  Patient completed Scooting/Bridging with stand by assistance  Patient completed Supine to Sit with stand by assistance     Functional Mobility/Transfers:  Patient completed Sit <> Stand Transfer with contact guard assistance  with  rolling walker   Patient completed Bed <> Chair Transfer using Step Transfer technique with contact guard assistance with rolling  walker  Functional Mobility: No LOB or SOB noted when ambulating from bed to sink and then from sink to chair.     Activities of Daily Living:  Grooming: stand by assistance EOB hair care, SBA for oral/face hygiene standing by sink   Lower Body Dressing: moderate assistance for brief management while standing    Treatment & Education:  -Pt educated on role of OT and POC, use of call light to assist with any needs/transfers, importance of EOB/OOB activities to help negate the negative impact of prolonged hospital stay.   -Pt educated on importance of planning out activities and relocating/removing trip hazards like pet urinal pads in walking pathway.     Patient left up in chair with all lines intact, call button in reach, and chair alarm on    GOALS:   Multidisciplinary Problems       Occupational Therapy Goals          Problem: Occupational Therapy    Goal Priority Disciplines Outcome Interventions   Occupational Therapy Goal     OT, PT/OT     Description: Goals to be met by: 5/27/2025     Patient will increase functional independence with ADLs by performing:    UE Dressing with Modified [place].  LE Dressing with Modified [place].  Grooming while standing at sink with Modified [place].  Toileting from toilet with Modified [place] for hygiene and clothing management.   Toilet transfer to toilet with Modified [place].                         Time Tracking:     OT Date of Treatment: 04/28/25  OT Start Time: 1032  OT Stop Time: 1101  OT Total Time (min): 29 min    Billable Minutes:Self Care/Home Management 16  Therapeutic Activity 13    OT/ORI: OT     Number of ORI visits since last OT visit: 0    4/28/2025

## 2025-04-28 NOTE — PT/OT/SLP PROGRESS
"Physical Therapy Treatment    Patient Name:  Kirstie Bowden   MRN:  [id number]    Recommendations:     Discharge Recommendations: Low Intensity Therapy  Discharge Equipment Recommendations: none  Barriers to discharge: None    Assessment:     Kirstie Bowden is a 80 y.o. female admitted with a medical diagnosis of COPD exacerbation.  She presents with the following impairments/functional limitations: impaired endurance, impaired functional mobility, impaired balance, decreased coordination, decreased safety awareness, decreased lower extremity function, decreased upper extremity function.    Pt up in chair and agreeable to visit. Nurse present but exited prior to mobility.    Pt reports that she does not use an AD at baseline and has been walking to the bathroom with no AD with nursing staff.    Pt performed sit to stand transfer with no AD and contact guard assist. Pt ambulated 60' with no AD and contact guard assist. Pt intermittently reaching for furniture to steady herself. Pt reports that she will occasionally hold onto furniture at home but "only if it doesn't move".    Pt returned to chair at end of session. Pt request bed linens, brief, and purewick to be changed. Extender informed and went to pt's room.    Rehab Prognosis: Fair; patient would benefit from acute skilled PT services to address these deficits and reach maximum level of function.    Recent Surgery: * No surgery found *      Plan:     During this hospitalization, patient to be seen 5 x/week to address the identified rehab impairments via gait training, neuromuscular re-education, therapeutic activities, therapeutic exercises and progress toward the following goals:    Plan of Care Expires:  05/27/25    Subjective     Chief Complaint: wanting to get cleaned up  Patient/Family Comments/goals: to get better  Pain/Comfort:  Pain Rating 1: 0/10      Objective:     Communicated with nurse prior to session.  Patient found up in chair with " Jess, telemetry upon PT entry to room.     General Precautions: Standard, fall  Orthopedic Precautions: N/A  Braces: N/A  Respiratory Status: Room air     Functional Mobility:  Transfers:     Sit to Stand:  contact guard assistance with no AD  Gait: x 60' with no AD and CGA, intermittently reaching for furniture to steady herself      AM-PAC 6 CLICK MOBILITY          Treatment & Education:  Pt educated on importance of time OOB, importance of intermittent mobility, safe techniques for transfers/ambulation, discharge recommendations/options, and use of call light for assistance and fall prevention.      Patient left up in chair with all lines intact, call button in reach, and extender present..    GOALS:   Multidisciplinary Problems       Physical Therapy Goals          Problem: Physical Therapy    Goal Priority Disciplines Outcome Interventions   Physical Therapy Goal     PT, PT/OT     Description: Goals to be met by: 2025     Patient will increase functional independence with mobility by performin. Supine to sit with Modified [place]  2. Sit to supine with Modified [place]  3. Sit to stand transfer with Supervision  4. Gait  x 150 feet with Contact Guard Assistance using Rolling Walker.                            Time Tracking:     PT Received On: 25  PT Start Time: 1434     PT Stop Time: 1444  PT Total Time (min): 10 min     Billable Minutes: Gait Training 10    Treatment Type: Treatment  PT/PTA: PTA     Number of PTA visits since last PT visit: 2025

## 2025-04-28 NOTE — PLAN OF CARE
Met with Patient and daughter Bella to review discharge recommendation of Home health and they are agreeable to plan.    Patient/family provided with a list of facilities in-network with patient's payor plan. Providers that are owned, operated, or affiliated with [facility] are included on the list.     Notified that referrals will be sent to the below listed facilities from in-network list based on proximity to home/family support:   1.[facility]  2.  3.  4.  5. (can send more than 5)    Patient/family instructed to identify preference.    Preferred Facility: (if more than 1, listed in order of descending preference)  1.    If an additional preferred facility not listed above is identified, additional referral to be sent. If above facilities unable to accept, will send additional referrals to in-network providers.

## 2025-04-28 NOTE — PROGRESS NOTES
Pharmacist Intervention IV to PO Note    Kirstie Bowden is a 80 y.o. female being treated with IV medication azithromycin    Patient Data:    Vital Signs (Most Recent):  Temp: 97.8 °F (36.6 °C) (04/28/25 0755)  Pulse: 74 (04/28/25 0755)  Resp: 18 (04/28/25 0706)  BP: 139/81 (04/28/25 0755)  SpO2: 95 % (04/28/25 0803) Vital Signs (72h Range):  Temp:  [97.4 °F (36.3 °C)-98.7 °F (37.1 °C)]   Pulse:  [63-90]   Resp:  [16-20]   BP: (111-171)/(56-88)   SpO2:  [86 %-99 %]      CBC:  Recent Labs   Lab 04/25/25  0740 04/26/25  0558 04/27/25  0626   WBC 13.42* 12.00 11.69   RBC 3.83* 3.59* 3.46*   HGB 11.9* 11.3* 11.0*   HCT 38.9 36.7* 34.8*    298 293   * 102* 101*   MCH 31.1* 31.5* 31.8*   MCHC 30.6* 30.8* 31.6*     CMP:     Recent Labs   Lab 04/25/25  0740 04/26/25  0558 04/27/25  0626   CALCIUM 9.8 9.2 8.9   ALBUMIN 3.8 3.5 3.2*    139 140   K 4.1 4.3 4.1   CO2 33* 34* 34*   BUN 33* 38* 30*   CREATININE 1.2 1.2 1.0   ALKPHOS 63 56 46*   ALT 38 36 43   AST 17 21 22   BILITOT 0.5 0.4 0.3       Dietary Orders:  Diet Orders            Diet Adult Regular: Regular starting at 04/24 1645            Based on the following criteria, this patient qualifies for intravenous to oral conversion:  [x] The patients gastrointestinal tract is functioning (tolerating medications via oral or enteral route for 24 hours and tolerating food or enteral feeds for 24 hours).  [x] The patient is hemodynamically stable for 24 hours (heart rate <100 beats per minute, systolic blood pressure >99 mm Hg, and respiratory rate <20 breaths per minute).  [x] The patient shows clinical improvement (afebrile for at least 24 hours and white blood cell count downtrending or normalized). Additionally, the patient must be non-neutropenic (absolute neutrophil count >500 cells/mm3).  [x] For antimicrobials, the patient has received IV therapy for at least 24 hours.    IV medication Azithromycin 500 mg daily will be changed to oral  medication Azithromycin 500 mg daily    Pharmacist's Name: Richie Mello  Pharmacist's Extension: [phone]

## 2025-04-28 NOTE — SUBJECTIVE & OBJECTIVE
Interval History:   Patient seen and examined.  NAD.  Patient is sitting up in chair with family at bedside.  Patient evaluated by pulmonology.    Review of Systems   Constitutional:  Negative for activity change, chills, fatigue and fever.   Respiratory:  Positive for cough.    Cardiovascular:  Positive for chest pain (Left-sided pleuritic chest pain present on admission).   Gastrointestinal: Negative.    Genitourinary: Negative.    Musculoskeletal: Negative.    Skin: Negative.    Neurological: Negative.      Objective:     Vital Signs (Most Recent):  Temp: 97.4 °F (36.3 °C) (04/28/25 1115)  Pulse: 75 (04/28/25 1115)  Resp: 18 (04/28/25 0706)  BP: (!) 140/80 (04/28/25 1115)  SpO2: 95 % (04/28/25 1115) Vital Signs (24h Range):  Temp:  [97.4 °F (36.3 °C)-98.4 °F (36.9 °C)] 97.4 °F (36.3 °C)  Pulse:  [64-86] 75  Resp:  [18] 18  SpO2:  [86 %-99 %] 95 %  BP: (139-171)/(69-81) 140/80     Weight: 75.9 kg (167 lb 5.3 oz)  Body mass index is 25.44 kg/m².    Intake/Output Summary (Last 24 hours) at 4/28/2025 1258  Last data filed at 4/28/2025 0859  Gross per 24 hour   Intake 1898.01 ml   Output 2100 ml   Net -201.99 ml         Physical Exam  Vitals and nursing note reviewed.   Constitutional:       Appearance: She is well-developed.   HENT:      Head: Normocephalic and atraumatic.   Neck:      Thyroid: No thyromegaly.      Vascular: No JVD.   Cardiovascular:      Rate and Rhythm: Normal rate and regular rhythm.      Heart sounds: No murmur heard.     No friction rub. No gallop.   Pulmonary:      Effort: Pulmonary effort is normal.      Breath sounds: Rhonchi (LLL) present.   Abdominal:      General: Bowel sounds are normal. There is no distension.      Palpations: Abdomen is soft. There is no mass.      Tenderness: There is no abdominal tenderness.   Musculoskeletal:         General: Normal range of motion.      Cervical back: Neck supple.   Skin:     General: Skin is warm and dry.   Neurological:      Mental Status: She is  "oriented to person, place, and time.      Cranial Nerves: No cranial nerve deficit.   Psychiatric:         Behavior: Behavior normal.               Significant Labs: All pertinent labs within the past 24 hours have been reviewed.  Blood Culture: No results for input(s): "LABBLOO" in the last 48 hours.  CBC:   Recent Labs   Lab 04/27/25  0626   WBC 11.69   HGB 11.0*   HCT 34.8*        CMP:   Recent Labs   Lab 04/27/25  0626 04/28/25  0951    141   K 4.1 4.1   CO2 34* 34*   BUN 30* 29*   CREATININE 1.0 1.0   CALCIUM 8.9 9.3   ALBUMIN 3.2* 3.4*   BILITOT 0.3 0.4   ALKPHOS 46* 49*   AST 22 20   ALT 43 46*     Magnesium:   Recent Labs   Lab 04/28/25  0951   MG 2.0     Microbiology Results (last 7 days)       Procedure Component Value Units Date/Time    Respiratory Infection Panel (PCR), Nasopharyngeal [[id number]]  (Abnormal) Collected: 04/25/25 1305    Order Status: Completed Specimen: Nasopharyngeal Swab Updated: 04/25/25 1449     Respiratory Infection Panel Source Nasopharyngeal Swab     Adenovirus Not Detected     Coronavirus 229E, Common Cold Virus Not Detected     Coronavirus HKU1, Common Cold Virus Not Detected     Coronavirus NL63, Common Cold Virus Not Detected     Coronavirus OC43, Common Cold Virus Not Detected     SARS-CoV2 (COVID-19) Qualitative PCR Not Detected     Human Metapneumovirus Not Detected     Human Rhinovirus/Enterovirus Detected     Influenza A (subtypes H1, H1-2009,H3) Not Detected     Influenza B Not Detected     Parainfluenza Virus 1 Not Detected     Parainfluenza Virus 2 Not Detected     Parainfluenza Virus 3 Not Detected     Parainfluenza Virus 4 Not Detected     Respiratory Syncytial Virus Not Detected     Bordetella Parapertussis (MQ1701) Not Detected     Bordetella pertussis (ptxP) Not Detected     Chlamydia pneumoniae Not Detected     Mycoplasma pneumoniae Not Detected    Influenza A & B by Molecular [[id number]]  (Normal) Collected: 04/24/25 1537    Order Status: Completed " Specimen: Nasal Swab Updated: 04/24/25 1631     INFLUENZA A MOLECULAR Negative     INFLUENZA B MOLECULAR  Negative             Significant Imaging: I have reviewed all pertinent imaging results/findings within the past 24 hours.

## 2025-04-29 VITALS
DIASTOLIC BLOOD PRESSURE: 78 MMHG | TEMPERATURE: 98 F | HEIGHT: 68 IN | SYSTOLIC BLOOD PRESSURE: 163 MMHG | HEART RATE: 65 BPM | BODY MASS INDEX: 25.36 KG/M2 | OXYGEN SATURATION: 93 % | RESPIRATION RATE: 18 BRPM | WEIGHT: 167.31 LBS

## 2025-04-29 LAB
ABSOLUTE EOSINOPHIL (SMH): 0.01 K/UL
ABSOLUTE MONOCYTE (SMH): 0.67 K/UL (ref 0.3–1)
ABSOLUTE NEUTROPHIL COUNT (SMH): 6.8 K/UL (ref 1.8–7.7)
ALBUMIN SERPL-MCNC: 3.3 G/DL (ref 3.5–5.2)
ALP SERPL-CCNC: 50 UNIT/L (ref 55–135)
ALT SERPL-CCNC: 41 UNIT/L (ref 10–44)
ANION GAP (SMH): 5 MMOL/L (ref 8–16)
AST SERPL-CCNC: 16 UNIT/L (ref 10–40)
BASOPHILS # BLD AUTO: 0.02 K/UL
BASOPHILS NFR BLD AUTO: 0.2 %
BILIRUB SERPL-MCNC: 0.4 MG/DL (ref 0.1–1)
BUN SERPL-MCNC: 29 MG/DL (ref 8–23)
CALCIUM SERPL-MCNC: 9.2 MG/DL (ref 8.7–10.5)
CHLORIDE SERPL-SCNC: 102 MMOL/L (ref 95–110)
CO2 SERPL-SCNC: 33 MMOL/L (ref 23–29)
CREAT SERPL-MCNC: 1.1 MG/DL (ref 0.5–1.4)
ERYTHROCYTE [DISTWIDTH] IN BLOOD BY AUTOMATED COUNT: 16.9 % (ref 11.5–14.5)
GFR SERPLBLD CREATININE-BSD FMLA CKD-EPI: 51 ML/MIN/1.73/M2
GLUCOSE SERPL-MCNC: 102 MG/DL (ref 70–110)
HCT VFR BLD AUTO: 38 % (ref 37–48.5)
HGB BLD-MCNC: 11.8 GM/DL (ref 12–16)
IMM GRANULOCYTES # BLD AUTO: 0.12 K/UL (ref 0–0.04)
IMM GRANULOCYTES NFR BLD AUTO: 1.4 % (ref 0–0.5)
LYMPHOCYTES # BLD AUTO: 1.08 K/UL (ref 1–4.8)
MAGNESIUM SERPL-MCNC: 2.1 MG/DL (ref 1.6–2.6)
MCH RBC QN AUTO: 31.8 PG (ref 27–31)
MCHC RBC AUTO-ENTMCNC: 31.1 G/DL (ref 32–36)
MCV RBC AUTO: 102 FL (ref 82–98)
NUCLEATED RBC (/100WBC) (SMH): 0 /100 WBC
PLATELET # BLD AUTO: 290 K/UL (ref 150–450)
PMV BLD AUTO: 10.2 FL (ref 9.2–12.9)
POTASSIUM SERPL-SCNC: 4.2 MMOL/L (ref 3.5–5.1)
PROT SERPL-MCNC: 6 GM/DL (ref 6–8.4)
RBC # BLD AUTO: 3.71 M/UL (ref 4–5.4)
RELATIVE EOSINOPHIL (SMH): 0.1 % (ref 0–8)
RELATIVE LYMPHOCYTE (SMH): 12.4 % (ref 18–48)
RELATIVE MONOCYTE (SMH): 7.7 % (ref 4–15)
RELATIVE NEUTROPHIL (SMH): 78.2 % (ref 38–73)
SODIUM SERPL-SCNC: 140 MMOL/L (ref 136–145)
WBC # BLD AUTO: 8.73 K/UL (ref 3.9–12.7)

## 2025-04-29 PROCEDURE — 99900035 HC TECH TIME PER 15 MIN (STAT)

## 2025-04-29 PROCEDURE — 25000003 PHARM REV CODE 250: Performed by: STUDENT IN AN ORGANIZED HEALTH CARE EDUCATION/TRAINING PROGRAM

## 2025-04-29 PROCEDURE — 94640 AIRWAY INHALATION TREATMENT: CPT

## 2025-04-29 PROCEDURE — 97116 GAIT TRAINING THERAPY: CPT | Mod: CQ

## 2025-04-29 PROCEDURE — 80053 COMPREHEN METABOLIC PANEL: CPT

## 2025-04-29 PROCEDURE — 99900031 HC PATIENT EDUCATION (STAT)

## 2025-04-29 PROCEDURE — 85025 COMPLETE CBC W/AUTO DIFF WBC: CPT

## 2025-04-29 PROCEDURE — 63600175 PHARM REV CODE 636 W HCPCS: Performed by: STUDENT IN AN ORGANIZED HEALTH CARE EDUCATION/TRAINING PROGRAM

## 2025-04-29 PROCEDURE — 25000003 PHARM REV CODE 250: Performed by: REGISTERED NURSE

## 2025-04-29 PROCEDURE — 83735 ASSAY OF MAGNESIUM: CPT

## 2025-04-29 PROCEDURE — 25000003 PHARM REV CODE 250

## 2025-04-29 PROCEDURE — 94761 N-INVAS EAR/PLS OXIMETRY MLT: CPT

## 2025-04-29 PROCEDURE — 94799 UNLISTED PULMONARY SVC/PX: CPT

## 2025-04-29 PROCEDURE — 36415 COLL VENOUS BLD VENIPUNCTURE: CPT

## 2025-04-29 PROCEDURE — 25000242 PHARM REV CODE 250 ALT 637 W/ HCPCS: Performed by: REGISTERED NURSE

## 2025-04-29 PROCEDURE — 25000242 PHARM REV CODE 250 ALT 637 W/ HCPCS: Performed by: STUDENT IN AN ORGANIZED HEALTH CARE EDUCATION/TRAINING PROGRAM

## 2025-04-29 PROCEDURE — 94664 DEMO&/EVAL PT USE INHALER: CPT

## 2025-04-29 RX ORDER — PANTOPRAZOLE SODIUM 40 MG/1
40 TABLET, DELAYED RELEASE ORAL DAILY
Status: DISCONTINUED | OUTPATIENT
Start: 2025-04-29 | End: 2025-04-29 | Stop reason: HOSPADM

## 2025-04-29 RX ORDER — FLUTICASONE PROPIONATE 50 MCG
2 SPRAY, SUSPENSION (ML) NASAL DAILY
Status: DISCONTINUED | OUTPATIENT
Start: 2025-04-29 | End: 2025-04-29 | Stop reason: HOSPADM

## 2025-04-29 RX ORDER — GUAIFENESIN 600 MG/1
600 TABLET, EXTENDED RELEASE ORAL 2 TIMES DAILY
Qty: 10 TABLET | Refills: 0 | Status: SHIPPED | OUTPATIENT
Start: 2025-04-29 | End: 2025-05-04

## 2025-04-29 RX ADMIN — IPRATROPIUM BROMIDE AND ALBUTEROL SULFATE 3 ML: 2.5; .5 SOLUTION RESPIRATORY (INHALATION) at 06:04

## 2025-04-29 RX ADMIN — GABAPENTIN 300 MG: 300 CAPSULE ORAL at 09:04

## 2025-04-29 RX ADMIN — HYDROXYCHLOROQUINE SULFATE 200 MG: 200 TABLET, FILM COATED ORAL at 09:04

## 2025-04-29 RX ADMIN — GUAIFENESIN 600 MG: 600 TABLET, EXTENDED RELEASE ORAL at 09:04

## 2025-04-29 RX ADMIN — LEVOTHYROXINE SODIUM 125 MCG: 0.1 TABLET ORAL at 05:04

## 2025-04-29 RX ADMIN — PANTOPRAZOLE SODIUM 40 MG: 40 TABLET, DELAYED RELEASE ORAL at 09:04

## 2025-04-29 RX ADMIN — PREDNISONE 40 MG: 20 TABLET ORAL at 09:04

## 2025-04-29 RX ADMIN — FLUTICASONE PROPIONATE 100 MCG: 50 SPRAY, METERED NASAL at 11:04

## 2025-04-29 NOTE — PLAN OF CARE
Problem: Physical Therapy  Goal: Physical Therapy Goal  Description: Goals to be met by: 2025     Patient will increase functional independence with mobility by performin. Supine to sit with Modified [place]  2. Sit to supine with Modified [place]  3. Sit to stand transfer with Supervision  4. Gait  x 150 feet with Contact Guard Assistance using Rolling Walker.     Outcome: Progressing

## 2025-04-29 NOTE — DISCHARGE INSTRUCTIONS
Review attached patient instructions.  Take all medications as prescribed.  You were admitted to Hospital Medicine for COPD exacerbation.  You were evaluated by pulmonology.  You were treated with IV antibiotics and steroids.  Bronchoscopy was initially recommended by pulmonology and later deferred to be re-evaluated in 4-6 weeks.  Continue to take antihistamines, Flonase, and use Neti pot for symptomatic treatment of upper respiratory symptoms.  Monitor oxygen saturation and keep above 90%.  Refrain from using smoking tobacco.  Home health orders has been placed and they will be contacting you with follow up appointment.  Follow up with PCP as needed, and pulmonology in 1-2 weeks.      Our goal at [facility] is to always give you outstanding care and exceptional service. You may receive a survey from [employer] by mail, text or e-mail in the next 24-48 hours asking about the care you received with us. The survey should only take 5-10 minutes to complete and is very important to us.     Your feedback provides us with a way to recognize our staff who work tirelessly to provide the best care! Also, your responses help us learn how to improve when your experience was below our aspiration of excellence. We are always looking for ways to improve your stay. We WILL use your feedback to continue making improvements to help us provide the highest quality care. We keep your personal information and feedback confidential. We appreciate your time completing this survey and can't wait to hear from you!!!    We look forward to your continued care with us! Thanks so much for choosing [facility] for your healthcare needs!

## 2025-04-29 NOTE — ASSESSMENT & PLAN NOTE
Patient's blood pressure range in the last 24 hours was: BP  Min: 135/80  Max: 163/78.The patient's inpatient anti-hypertensive regimen is listed below:  Current Antihypertensives  amLODIPine tablet 5 mg, Nightly, OralResume home amlodipine    Plan  - BP is controlled, no changes needed to their regimen  -

## 2025-04-29 NOTE — PT/OT/SLP PROGRESS
Physical Therapy Treatment    Patient Name:  Kirstie Bowden   MRN:  [id number]    Recommendations:     Discharge Recommendations: Low Intensity Therapy  Discharge Equipment Recommendations: none  Barriers to discharge: None    Assessment:     Kirstie Bowden is a 80 y.o. female admitted with a medical diagnosis of COPD exacerbation.  She presents with the following impairments/functional limitations: impaired endurance, impaired functional mobility, impaired balance, decreased coordination, decreased safety awareness, decreased lower extremity function, decreased upper extremity function.    Pt agreeable to visit. Daughter present throughout session.    Pt performed supine to sit transfer with stand by assist and verbal cuing for sequencing. Pt performed sit to stand transfer with contact guard assist and hand hold assist.    Pt ambulated 60' with no AD and contact guard assist, hand hold assist with pt occasionally reaching for objects in room to steady herself.    Pt agreeable to sit up in chair. Pt transferred to chair with contact guard assist. SPO2 94% on RA at end of session.    Rehab Prognosis: Fair; patient would benefit from acute skilled PT services to address these deficits and reach maximum level of function.    Recent Surgery: * No surgery found *      Plan:     During this hospitalization, patient to be seen 5 x/week to address the identified rehab impairments via gait training, therapeutic activities, therapeutic exercises, neuromuscular re-education and progress toward the following goals:    Plan of Care Expires:  05/27/25    Subjective     Chief Complaint: some shortness of breath  Patient/Family Comments/goals: to get better  Pain/Comfort:  Pain Rating 1: 0/10      Objective:     Communicated with nurse prior to session.  Patient found HOB elevated with PureWick, telemetry, bed alarm upon PT entry to room.     General Precautions: Standard, fall  Orthopedic Precautions: N/A  Braces:  N/A  Respiratory Status: Room air     Functional Mobility:  Bed Mobility:     Supine to Sit: stand by assistance  Transfers:     Sit to Stand:  contact guard assistance with hand-held assist  Gait: x 60' with no AD contact guard assist and hand held assist occasionally reaching for objects to steady herself      AM-PAC 6 CLICK MOBILITY          Treatment & Education:  Pt educated on importance of time OOB, importance of intermittent mobility, safe techniques for transfers/ambulation, discharge recommendations/options, and use of call light for assistance and fall prevention.      Patient left up in chair with all lines intact, call button in reach, and daughter present..    GOALS:   Multidisciplinary Problems       Physical Therapy Goals          Problem: Physical Therapy    Goal Priority Disciplines Outcome Interventions   Physical Therapy Goal     PT, PT/OT     Description: Goals to be met by: 2025     Patient will increase functional independence with mobility by performin. Supine to sit with Modified [place]  2. Sit to supine with Modified [place]  3. Sit to stand transfer with Supervision  4. Gait  x 150 feet with Contact Guard Assistance using Rolling Walker.                          DME Justifications:  No DME recommended requiring DME justifications    Time Tracking:     PT Received On: 25  PT Start Time: 1118     PT Stop Time: 1134  PT Total Time (min): 16 min     Billable Minutes: Gait Training 16    Treatment Type: Treatment  PT/PTA: PTA     Number of PTA visits since last PT visit: 2     2025

## 2025-04-29 NOTE — PLAN OF CARE
Problem: Adult Inpatient Plan of Care  Goal: Plan of Care Review  Outcome: Met  Goal: Patient-Specific Goal (Individualized)  Outcome: Met  Goal: Absence of Hospital-Acquired Illness or Injury  Outcome: Met  Goal: Optimal Comfort and Wellbeing  Outcome: Met  Goal: Readiness for Transition of Care  Outcome: Met     Problem: Wound  Goal: Optimal Coping  Outcome: Met  Goal: Optimal Functional Ability  Outcome: Met  Goal: Absence of Infection Signs and Symptoms  Outcome: Met  Goal: Improved Oral Intake  Outcome: Met  Goal: Optimal Pain Control and Function  Outcome: Met  Goal: Skin Health and Integrity  Outcome: Met  Goal: Optimal Wound Healing  Outcome: Met     Problem: Infection  Goal: Absence of Infection Signs and Symptoms  Outcome: Met

## 2025-04-29 NOTE — PLAN OF CARE
Patient accepted by [facility] via [employer] with start of care 04/30/2025.       04/29/25 1212   Post-Acute Status   Post-Acute Authorization Home Health   Home Health Status Set-up Complete/Auth obtained

## 2025-04-29 NOTE — CARE UPDATE
04/28/25 1919   Patient Assessment/Suction   Level of Consciousness (AVPU) alert   Respiratory Effort Normal   Expansion/Accessory Muscles/Retractions no use of accessory muscles   All Lung Fields Breath Sounds clear   Rhythm/Pattern, Respiratory unlabored   Cough Frequency infrequent   Cough Type loose;nonproductive   PRE-TX-O2   Device (Oxygen Therapy) room air   SpO2 (!) 93 %   Pulse Oximetry Type Intermittent   $ Pulse Oximetry - Multiple Charge Pulse Oximetry - Multiple   Pulse 68   Resp 19   Aerosol Therapy   $ Aerosol Therapy Charges Aerosol Treatment   Daily Review of Necessity (SVN) completed   Respiratory Treatment Status (SVN) given   Treatment Route (SVN) mask;oxygen   Patient Position semi-Wilson's   Post Treatment Assessment (SVN) breath sounds unchanged;vital signs unchanged   Signs of Intolerance (SVN) none   Vibratory PEP Therapy   $ Vibratory PEP Charges Aerobika Therapy   $ Vibratory PEP Equipment Aerobika Equipment   $ Vibratory PEP Tech Time Charges 15 min   Daily Review of Necessity (PEP Therapy) completed   Type (PEP Therapy) vibratory/oscillatory   Device (PEP Therapy) flutter   Route (PEP Therapy) mouthpiece   Breaths per Cycle (PEP Therapy) 10   Cycles (PEP Therapy) 1   Settings (PEP Therapy) PEP 5   Patient Position (PEP Therapy) semi-Wilson's   Post Treatment Assessment (PEP) breath sounds unchanged;vital signs unchanged   Signs of Intolerance (PEP Therapy) none   Education   $ Education Bronchodilator;15 min   Respiratory Evaluation   $ Care Plan Tech Time 15 min

## 2025-04-29 NOTE — PLAN OF CARE
BUSHRA sent patient information to [facility] via [employer].       04/29/25 1143   Post-Acute Status   Post-Acute Authorization Home Health   Home Health Status Referrals Sent   Patient choice form signed by patient/caregiver List from System Post-Acute Care

## 2025-04-29 NOTE — PROGRESS NOTES
AVS virtually reviewed with patient and her daughter in its entirety with emphasis on diet, medications, follow-up appointments and reasons to return to the ED. Patient also encouraged to utilize their patient portal. Ease and convenience of use reiterated. Education complete and patient voiced understanding. All questions answered. Discharge teaching completed.

## 2025-04-29 NOTE — PT/OT/SLP PROGRESS
Occupational Therapy      Patient Name:  Kirstie Bowden   MRN:  [id number]    Patient not seen today secondary to  (Patient d/c home with home health services prior to seeing.). Will follow-up tomorrow if not d/c home.    4/29/2025

## 2025-04-29 NOTE — PLAN OF CARE
Patient cleared for discharge by case management to home with home health.        04/29/25 1213   Final Note   Assessment Type Final Discharge Note   Anticipated Discharge Disposition Home-Health   Hospital Resources/Appts/Education Provided Appointments scheduled and added to AVS

## 2025-04-29 NOTE — ASSESSMENT & PLAN NOTE
Supportive care:  Flonase, antihistamine, Neti pot  Droplet Precautions   Encourage oral hydration

## 2025-04-29 NOTE — ASSESSMENT & PLAN NOTE
Patient's COPD is with exacerbation noted by continued dyspnea currently.  Patient is currently off COPD Pathway. Continue scheduled inhalers Steroids, Antibiotics, and Supplemental oxygen and monitor respiratory status closely.     CTA chest negative for pulmonary embolisms however there is detrimental change in diffuse bilateral reticulonodular interstitial prominence from 3 months prior differential including atypical infection versus lymphangitis spread of malignancy  Pulmonology following: recommend repeat CT in 4-6 weeks  Procal negative   Antibiotic therapy complete  Supplemental Oxygen not needed at discharge

## 2025-04-29 NOTE — DISCHARGE SUMMARY
[facility] Medicine  Discharge Summary      Patient Name: Kirstie Bowden  MRN: [id number]  DUGLAS: [id number]  Patient Class: IP- Inpatient  Admission Date: 4/24/2025  Hospital Length of Stay: 5 days  Discharge Date and Time: 4/29/2025  1:55 PM  Attending Physician: Emely Interiano MD   Discharging Provider: Marisol Han NP  Primary Care Provider: Rocio Feliciano MD    Primary Care Team: Networked reference to record PCT     HPI:   Patient is a 80-year-old female with a history of RA, hypertension, COPD, hypothyroidism breast CA, renal CA.  Patient reports she went to urgent care last week and was diagnosed with pneumonia and pleurisy and started on antibiotics and oral steroids.  Patient reports symptoms only worsened since completing antibiotic course.  She saw her pulmonologist today in clinic and was referred to ED. in ED labwork remarkable for leukocytosis with a left shift.  Of note patient currently on methotrexate and exemestane.  Currently requiring 2 L nasal cannula which she is not on any home O2..  Flu and COVID both negative.  BNP elevated at 138.  Chest x-ray with improved right lower lobe density.  CTA c/a/p  was ordered which ruled out PE, and no acute intra-abdominal abnormality, however there is detrimental change in diffuse bilateral reticulonodular interstitial prominence from 3 months prior differential including atypical infection versus lymphangitis spread of malignancy.  Patient will be admitted to Hospital Medicine.  Started on Rocephin and azithromycin.  Continue supplemental O2 and start DuoNebs.    * No surgery found *      Hospital Course:   Kirstie Bowden was closely monitored while in the hospital.  Patient was admitted to Hospital Medicine for acute exacerbation of COPD.  COVID and flu negative.  Procalcitonin unremarkable.  CT imaging revealed no acute intra-abdominal abnormality, however there is detrimental change in diffuse bilateral  reticulonodular interstitial prominence from 3 months prior differential including atypical infection versus lymphangitis spread of malignancy.  Patient was started on Rocephin and azithromycin, DuoNebs and supplemental oxygen. Pulmonology was consulted on admission, recommending possible bronch to be determined by patient's clinical course.  Patient had significant improvement in clinical course.  Pulmonology recommends follow up CT in 4-6 weeks.  Patient was transitioned to oral antibiotics.  Home O2 evaluation has been ordered, patient did not qualify for oxygen.  Patient worked with PT/OT during hospital who recommended intensity therapy.  Case management followed for discharge planning.    Patient seen and examined on day of discharge.  Patient in no acute distress.  Chart reviewed.  Family at bedside.  Patient educated on discharge planning, she verbalized understanding.  Patient educated to continue symptomatic treatment with antihistamine, netipot, and Flonase.  Patient is to follow with PCP and pulmonology in 1-2 weeks.  Patient is safely discharged home with home health.     Goals of Care Treatment Preferences:  Code Status: Full Code      SDOH Screening:  The patient was screened for utility difficulties, food insecurity, transport difficulties, housing insecurity, and interpersonal safety and there were no concerns identified this admission.     Consults:   Consults (From admission, onward)          Status Ordering Provider     Inpatient consult to Midline team  Once        Provider:  (Not yet assigned)    Completed ALE GARCIA     Inpatient consult to   Once        Provider:  (Not yet assigned)    Acknowledged JESSICA QUINTERO     Inpatient consult to Pulmonology  Once        Provider:  Ke Gregorio MD    Completed KALI ANDREW            Assessment & Plan  COPD exacerbation  Patient's COPD is with exacerbation noted by continued dyspnea currently.  Patient is  currently off COPD Pathway. Continue scheduled inhalers Steroids, Antibiotics, and Supplemental oxygen and monitor respiratory status closely.     CTA chest negative for pulmonary embolisms however there is detrimental change in diffuse bilateral reticulonodular interstitial prominence from 3 months prior differential including atypical infection versus lymphangitis spread of malignancy  Pulmonology following: recommend repeat CT in 4-6 weeks  Procal negative   Antibiotic therapy complete  Supplemental Oxygen not needed at discharge        Essential hypertension, benign  Patient's blood pressure range in the last 24 hours was: BP  Min: 135/80  Max: 163/78.The patient's inpatient anti-hypertensive regimen is listed below:  Current Antihypertensives  amLODIPine tablet 5 mg, Nightly, OralResume home amlodipine    Plan  - BP is controlled, no changes needed to their regimen  -   Acquired hypothyroidism  Patient has chronic hypothyroidism. TFTs reviewed-   Lab Results   Component Value Date    TSH 1.074 11/20/2024   . Will continue chronic levothyroxine and adjust for and clinical changes.   Rheumatoid arthritis involving multiple sites with positive rheumatoid factor  Resume home Plaquenil    Drug-induced immunodeficiency  Patient currently on daily Plaquenil, methotrexate weekly and daily exemestane  Rhinovirus  Supportive care:  Flonase, antihistamine, Neti pot  Droplet Precautions   Encourage oral hydration  Final Active Diagnoses:    Diagnosis Date Noted POA    PRINCIPAL PROBLEM:  COPD exacerbation [J44.1] 04/24/2025 Yes    Rhinovirus [B34.8] 04/26/2025 Yes    Drug-induced immunodeficiency [D84.821, Z79.899] 06/25/2024 Yes    Rheumatoid arthritis involving multiple sites with positive rheumatoid factor [M05.79] 06/22/2014 Yes    Acquired hypothyroidism [E03.9] 09/27/2013 Yes    Essential hypertension, benign [I10] 09/27/2013 Yes      Problems Resolved During this Admission:       Discharged Condition:  stable    Disposition: Home-Health Care [facility]    Follow Up:   Contact information for follow-up providers       [facility]. Go on 5/6/2025.    Specialty: Primary Care  Why: Hospital follow up scheduled Francisco HANSON at 11:00 AM.  Contact information:  [address] [facility]  [address]  [facility] [id number] [phone]  Additional information:  [address]             [place] [place] [facility] Follow up.    Specialties: Physical Therapy, Home Health Services  Why: home health will contact patient to schedule home visits.  Contact information:  [address]  [address]  [place] [address]  [phone]               Naila Burk MD Follow up in 2 week(s).    Specialties: Pulmonary Disease, Sleep Medicine  Contact information:  [address] [facility] [address]  [address]  [place] [address] [phone]                       Contact information for after-discharge care       Home Medical Care       [facility] .    Service: Home Health Services  Contact information:  [address]  [facility] [address]  [phone]                                 Patient Instructions:      Ambulatory referral/consult to Outpatient Case Management   Referral Priority: Routine Referral Type: Consultation   Referral Reason: Specialty Services Required   Number of Visits Requested: 1     Ambulatory referral/consult to Home Health   Standing Status: Future   Referral Priority: Routine Referral Type: Home Health   Referral Reason: Specialty Services Required   Requested Specialty: Home Health Services   Number of Visits Requested: 1       Significant Diagnostic Studies: Labs: CMP   Recent Labs   Lab 04/28/25  0951 04/29/25  0626    140   K 4.1 4.2    102   CO2 34* 33*   * 102   BUN 29* 29*   CREATININE 1.0 1.1   CALCIUM 9.3 9.2   PROT 6.3 6.0   ALBUMIN 3.4* 3.3*   BILITOT 0.4 0.4   ALKPHOS 49* 50*   AST 20 16   ALT 46* 41   ANIONGAP 6* 5*   , CBC   Recent Labs    Lab 04/29/25  0626   WBC 8.73   HGB 11.8*   HCT 38.0      , and All labs within the past 24 hours have been reviewed    Pending Diagnostic Studies:       Procedure Component Value Units Date/Time    EXTRA TUBES [[id number]] Collected: 04/28/25 0949    Order Status: Sent Lab Status: In process Updated: 04/28/25 1011    Specimen: Blood, Venous     Narrative:      The following orders were created for panel order EXTRA TUBES.  Procedure                               Abnormality         Status                     ---------                               -----------         ------                     Lavender Top Hold[[id number]]                               In process                   Please view results for these tests on the individual orders.    EXTRA TUBES [[id number]] Collected: 04/25/25 1445    Order Status: Sent Lab Status: In process Updated: 04/25/25 1728    Specimen: Blood, Venous     Narrative:      The following orders were created for panel order EXTRA TUBES.  Procedure                               Abnormality         Status                     ---------                               -----------         ------                     Light Green Top Hold[[id number]]                            In process                 Lavender Top Hold[[id number]]                               In process                   Please view results for these tests on the individual orders.    EXTRA TUBES [[id number]] Collected: 04/25/25 1446    Order Status: Sent Lab Status: In process Updated: 04/25/25 1446    Specimen: Blood, Venous     Narrative:      The following orders were created for panel order EXTRA TUBES.  Procedure                               Abnormality         Status                     ---------                               -----------         ------                     Gold Top Hold[[id number]]                                   In process                   Please view results for these tests on the  individual orders.    EXTRA TUBES [[id number]] Collected: 04/24/25 1403    Order Status: Sent Lab Status: In process Updated: 04/24/25 1419    Specimen: Blood, Venous     Narrative:      The following orders were created for panel order EXTRA TUBES.  Procedure                               Abnormality         Status                     ---------                               -----------         ------                     Light Blue Top Hold[[id number]]                             In process                   Please view results for these tests on the individual orders.           Medications:  Reconciled Home Medications:      Medication List        START taking these medications      guaiFENesin 600 mg 12 hr tablet  Commonly known as: MUCINEX  Take 1 tablet (600 mg total) by mouth 2 (two) times daily. for 5 days     ondansetron 8 MG Tbdl  Commonly known as: ZOFRAN-ODT  DISSOLVE 1 TABLET IN MOUTH EVERY 12 HOURS AS NEEDED            CHANGE how you take these medications      methotrexate 2.5 MG Tab  Take 8 tablets (20 mg total) by mouth every 7 days.  What changed: when to take this     prochlorperazine 10 MG tablet  Commonly known as: COMPAZINE  TAKE 1 TABLET BY MOUTH EVERY 6 HOURS AS NEEDED  What changed: reasons to take this            CONTINUE taking these medications      * albuterol 90 mcg/actuation inhaler  Commonly known as: PROVENTIL/VENTOLIN HFA  INHALE 2 PUFFS INTO THE LUNGS EVERY 6 (SIX) HOURS AS NEEDED FOR WHEEZING. RESCUE     * albuterol 2.5 mg /3 mL (0.083 %) nebulizer solution  Commonly known as: PROVENTIL  Take 3 mLs (2.5 mg total) by nebulization every 6 (six) hours as needed for Wheezing.     amLODIPine 5 MG tablet  Commonly known as: NORVASC  TAKE 1 TABLET BY MOUTH EVERY DAY     atorvastatin 20 MG tablet  Commonly known as: LIPITOR  TAKE 1 TABLET BY MOUTH EVERY DAY     benzonatate 200 MG capsule  Commonly known as: TESSALON  Take 1 capsule by mouth 3 times daily as needed for Cough.      cholecalciferol (vitamin D3) 25 mcg (1,000 unit) capsule  Commonly known as: VITAMIN D3  Take 1 capsule by mouth every morning.     diphenhydrAMINE 25 mg tablet  Commonly known as: SOMINEX  Take 25 mg by mouth every evening.     exemestane 25 mg tablet  Commonly known as: AROMASIN  TAKE 1 TABLET BY MOUTH EVERY DAY     fluticasone propionate 50 mcg/actuation nasal spray  Commonly known as: FLONASE  USE 1 SPRAY (50 MCG TOTAL) IN EACH NOSTRIL ONCE DAILY     folic acid 1 MG tablet  Commonly known as: FOLVITE  Take 1 tablet (1,000 mcg total) by mouth once daily.     gabapentin 300 MG capsule  Commonly known as: NEURONTIN  Take 1 capsule (300 mg total) by mouth 2 (two) times daily.     hydroCHLOROthiazide 25 MG tablet  Commonly known as: HYDRODIURIL  TAKE 1 TABLET BY MOUTH EVERY DAY     hydroxychloroquine 200 mg tablet  Commonly known as: PLAQUENIL  Take 200 mg by mouth 2 (two) times daily.     levothyroxine 125 MCG tablet  Commonly known as: SYNTHROID  Take 125 mcg by mouth before breakfast.     VITAMIN B-12 1000 MCG tablet  Generic drug: cyanocobalamin  Take 100 mcg by mouth once daily.     WIXELA INHUB 250-50 mcg/dose diskus inhaler  Generic drug: fluticasone-salmeterol 250-50 mcg/dose  INHALE 1 PUFF INTO THE LUNGS 2 (TWO) TIMES DAILY. CONTROLLER           * This list has 2 medication(s) that are the same as other medications prescribed for you. Read the directions carefully, and ask your doctor or other care provider to review them with you.                STOP taking these medications      doxycycline 100 MG Cap  Commonly known as: VIBRAMYCIN     levocetirizine 5 MG tablet  Commonly known as: XYZAL            ASK your doctor about these medications      predniSONE 5 MG tablet  Commonly known as: DELTASONE  Take 1 tablet (5 mg total) by mouth once daily.              Indwelling Lines/Drains at time of discharge:   Lines/Drains/Airways       Drain  Duration                  Ureteral Drain/Stent 06/24/19 Left ureter 6  Fr. 2136 days                         Time spent on the discharge of patient: 35 minutes         Marisol Han NP  Department of Hospital Medicine  [facility]

## 2025-04-29 NOTE — PLAN OF CARE
Problem: Adult Inpatient Plan of Care  Goal: Plan of Care Review  Outcome: Progressing  Goal: Patient-Specific Goal (Individualized)  Outcome: Progressing  Goal: Absence of Hospital-Acquired Illness or Injury  Outcome: Progressing  Goal: Optimal Comfort and Wellbeing  Outcome: Progressing  Goal: Readiness for Transition of Care  Outcome: Progressing     Problem: Wound  Goal: Optimal Coping  Outcome: Progressing  Goal: Optimal Functional Ability  Outcome: Progressing  Goal: Absence of Infection Signs and Symptoms  Outcome: Progressing  Goal: Improved Oral Intake  Outcome: Progressing  Goal: Optimal Pain Control and Function  Outcome: Progressing  Goal: Skin Health and Integrity  Outcome: Progressing  Goal: Optimal Wound Healing  Outcome: Progressing     Problem: Infection  Goal: Absence of Infection Signs and Symptoms  Outcome: Progressing      Consult Re Thyroid abnormal labs    Referring physician Dr Mona Reeves MD    Chief Complaint   Patient presents with   â¢ Office Visit     Subclinical Hyperthyroidism     Very pleasant 41year old female pt seen in consult for hyperthyroidism  Noted palpitations 8 months ago    Was to follow up for phentermine but didn't   Started wegovy   Then had severe Heartburn and constipation  Ro body prescribed this and then asked her to come see me or PCP of heartburn    She has been off wegovy still has a lot of GI symptoms    Mother has hyperthyroidism  Paternal uncle has hypothyroidism  Component      Latest Ref Rng & Units 2/27/2023 5/2/2023   TSH      0.350 - 5.000 mcUnits/mL 0.257 (L) 0.230 (L)   T4, FREE      0.8 - 1.5 ng/dL 1.0 0.8   TRIIODOTHYRONINE, FREE      2.2 - 4.0 pg/mL 2.9 2.4         Depression                                                    Anxiety                                                       Insomnia                                                      Headache(784.0)                                               Asthma                                                        GERD (gastroesophageal reflux disease)                        [place] teeth removed                                          Gastritis                                                     Duodenitis                                                    IBS (irritable bowel syndrome)                                Hypertension                                                      ESOPHAGOGASTRODUODENOSCOPY TRANSORAL FLEX W/BX* 12/05/2014      Comment: Dr Balderas/ gastritis and duodenitis    COLONOSCOPY W BIOPSY                            12/05/2014      Comment: Dr Balderas/ normal path    CARPAL TUNNEL RELEASE                           03/20/2019      Comment: Dr. Steve Crooks History Socioeconomic History   â¢ Marital status: Single     Spouse name: Not on file   â¢ Number of children: Not on file   â¢ Years of education: Not on file   â¢ Highest education level: Not on file   Occupational History   â¢ Occupation:      Employer: [employer]   Tobacco Use   â¢ Smoking status: Never   â¢ Smokeless tobacco: Never   Vaping Use   â¢ Vaping Use: never used   Substance and Sexual Activity   â¢ Alcohol use: Yes     Alcohol/week: 1.0 - 2.0 standard drink of alcohol     Types: 1 - 2 Standard drinks or equivalent per week     Comment: Occ   â¢ Drug use: Not Currently     Types: Marijuana     Comment: last used 11/13/22   â¢ Sexual activity: Yes     Partners: Male     Birth control/protection: I.U.D. Other Topics Concern   â¢ Not on file   Social History Narrative    Lives with kids. No pets. Work - clerical     Social Determinants of Health     Financial Resource Strain: Not on [employer] Insecurity: Not on file   Transportation Needs: No Transportation Needs (9/21/2022)    PRAPARE - Transportation    â¢ Lack of Transportation (Medical): No    â¢ Lack of Transportation (Non-Medical):  No   Physical Activity: Not on file   Stress: Not on file   Social Connections: Not on file   Intimate Partner Violence: Not on file       Family History   Problem Relation Age of Onset   â¢ Allergic Rhinitis Mother    â¢ Hypertension Mother    â¢ Heart disease Mother    â¢ Anxiety disorder Mother    â¢ Depression Mother    â¢ Allergic Rhinitis Father    â¢ Hypertension Father    â¢ Kidney disease Father    â¢ Alcohol Abuse Father    â¢ Anxiety disorder Sister    â¢ Other Sister         circulation   â¢ Kidney disease Half-Brother    â¢ Heart disease Maternal Grandmother    â¢ Cancer, Prostate Maternal Grandfather    â¢ Stroke Paternal Grandmother    â¢ Patient is unaware of any medical problems Paternal Grandfather    â¢ Allergic Rhinitis Daughter    â¢ Eczema Daughter    â¢ Diabetes Paternal Uncle      ALLERGIES:   Allergen Reactions   â¢ Codeine HIVES     Migraine       Constitutional Problem(s):   HEART BURN on ozempic  Respiratory problem(s):  Negative  Cardiovascular problem(s):  Negative  Hematologic problem(s):  Negative  Immunologic Problem(s):  Negative  Gastrointestinal problem(s):  Constipation   Genitourinary problem(s):  Negative  Musculoskeletal problem(s):  Negative  Neurological problem(s):  Negative  Endocrine problem(s):  Negative  Skin problem(s):  Negative    Visit Vitals  /78   Pulse 69   Wt 94.1 kg (207 lb 6.4 oz)   LMP  (LMP Unknown)   SpO2 99%   BMI 33.99 kg/mÂ²       PHYSICAL EXAM:  General: alert, in no acute distress  Skin: warm with normal turgor  Head: atraumatic and normocephalic  Neck: supple with no significant adenopathy, no thyromegaly  Lungs: clear to auscultation, no wheezing or rhonchi noted  Heart: regular rhythm, no murmur present  Abdomen: soft, no guarding or masses, no organomegaly or CVA tenderness    Abnormal TSH  (primary encounter diagnosis)  Class 1 obesity with serious comorbidity and body mass index (BMI) of 34.0 to 34.9 in adult, unspecified obesity type    TSH suppressed  Palpitations on and off in past has not happened recently    Will obtain labs   nm scan thyroid      For weight management send pt to proper setting, weight management clinic    I STRONGLY believe it is poor quality of care and practice to start Maye Wright online for a pt ( pt paid cash payments ) and then recommend pt to follow up with PCP or me for side effects  Pt will discuss with her  body center     For persistent GI symptoms ( in the past had GI work up in 2014) may need Gi work up again   [address] [facility] to put GI referral if pt wishes    Frank Dong

## 2025-05-01 ENCOUNTER — PATIENT OUTREACH (OUTPATIENT)
Dept: ADMINISTRATIVE | Facility: CLINIC | Age: 80
End: 2025-05-01
Payer: MEDICARE

## 2025-05-01 ENCOUNTER — PATIENT MESSAGE (OUTPATIENT)
Dept: ADMINISTRATIVE | Facility: CLINIC | Age: 80
End: 2025-05-01
Payer: MEDICARE

## 2025-05-01 NOTE — PROGRESS NOTES
C3 nurse spoke with Kirstie Bowden for a TCC post hospital discharge follow up call. The patient has a scheduled [facility] appointment with Francisco Devi NP on 05/07/25 @ 9130.

## 2025-05-01 NOTE — PROGRESS NOTES
C3 nurse attempted to contact Kirstie Bowden for a TCC post hospital discharge follow up call. No answer. Left message on voicemail to return call . The patient has a scheduled HOSFU appointment with Francisco Devi NP on 05/06/25 @ 1100.

## 2025-05-02 ENCOUNTER — OUTPATIENT CASE MANAGEMENT (OUTPATIENT)
Dept: ADMINISTRATIVE | Facility: OTHER | Age: 80
End: 2025-05-02
Payer: MEDICARE

## 2025-05-02 DIAGNOSIS — J45.20 MILD INTERMITTENT ASTHMA, UNSPECIFIED WHETHER COMPLICATED: ICD-10-CM

## 2025-05-02 RX ORDER — BENZONATATE 200 MG/1
200 CAPSULE ORAL 3 TIMES DAILY PRN
Qty: 90 CAPSULE | Refills: 0 | Status: SHIPPED | OUTPATIENT
Start: 2025-05-02 | End: 2025-06-01

## 2025-05-02 RX ORDER — ALBUTEROL SULFATE 90 UG/1
2 INHALANT RESPIRATORY (INHALATION) EVERY 6 HOURS PRN
Qty: 54 G | Refills: 3 | Status: SHIPPED | OUTPATIENT
Start: 2025-05-02

## 2025-05-02 NOTE — PROGRESS NOTES
Outpatient Care Management  Initial Patient Assessment    Patient: Kirstie Bowden  MRN: [id number]  Date of Service: 05/02/2025  Completed by: Ivory Gonzalez RN  Referral Date: 04/28/2025  Date of Eligibility: 4/29/2025  Program:   High Risk  Status: Ongoing  Effective Dates: 5/2/2025 - present  Responsible Staff: Ivory Gonzalez RN        Reason for Visit   Patient presents with    OPCM Enrollment Call       Brief Summary:  Kirstie Bowden was referred by Dr. Emely Interiano for COPD exacerbation. Patient qualifies for program based on high risk score of 60.   Active problem list, medical, surgical and social history reviewed. Active comorbidities include colon polyps, arthritis, asthma, breast cancer, GERD, hearing loss, hyperlipidemia, hypertension, COPD, rheumatoid arthritis, thyroid disease, pseudocholinesterase defiency, CKD stage 3, renal cell carcinoma, osteoarthritis, lumbar radiculitis, and macrocytosis without anemia. Areas of need identified by patient include Get stronger so reduce the risk for falls and COPD disease management to reduce the exacerbations and manage symptoms to not further the disease.     Next steps:   Follow up on message sent to Dr. Feliciano on refill for Albuterol and benzonatate.  Follow up to see if Kirstie refilled medications sent in for refill to Dr. Feliciano, pending they were approved for refill.  Follow up for any questions on COPD Diet sent via My Chart.  Follow up on air peak flow use 3 times a day during commercials.  Further explore openness to Life Alert or something similar.  Follow up for questions on edu sent via [employer] on Strengthening Your Lower Body and Core.  Follow up on if walking and if so, how often and for how long.  Kirstie agreed to OPCM RN follow up on or around 5/9/25.    Disability Status  Is the patient alert and oriented (person, place, time, and situation)?: Alert and oriented x 4  Hearing Difficulty or Deaf: yes  Hearing Management:  other  Visual Difficulty or Blind: yes  Visual and Hearing Conclusion Statement: Wears glasses for close up and needs hearing aids but unable to afford the hearing aids.  Difficulty Concentrating, Remembering or Making Decisions: no  Communication Difficulty: no  Eating/Swallowing Difficulty: yes  Eating/Swallowing: eating; swallowing liquids  Eating/Swallowing Management: Has acid reflux and sometimes has difficult with it so takes small bites.  Walking or Climbing Stairs Difficulty: yes  Walking or Climbing Stairs: ambulation difficulty, requires equipment  Mobility Management: Has sciatic nerve on left leg and it is weaker  Dressing/Bathing Difficulty: no  Grooming: independent  Transferring (e.g., getting in and out of chairs): assistive equipment  Toileting : Independent  Continence : Continence - Not a problem  Difficulty Managing Errands Independently: no  Equipment Currently Used at Home: grab bar; nebulizer; raised toilet; shower chair; cane, straight; blood pressure machine; other (see comments) (oxygen device to check o2 sats)  ADL Conclusion Statement: Kirstie is independent with most of her ADLs and does typically drive herself. She lives with her granddaughter and dog. She is home alone during the day but does state weakness on BLE and is afraid of falls.  Change in Functional Status Since Onset of Current Illness/Injury: yes        Spiritual Beliefs  Spiritual, Cultural Beliefs, Restoration Practices, Values that Affect Care: no      Social History     Socioeconomic History    Marital status:    Occupational History     Employer: [employer]   Tobacco Use    Smoking status: Former     Current packs/day: 0.00     Average packs/day: 0.5 packs/day for 59.3 years (29.6 ttl pk-yrs)     Types: Cigarettes     Start date: 1960     Quit date: 2019     Years since quittin.7    Smokeless tobacco: Never   Substance and Sexual Activity    Alcohol use: Yes     Alcohol/week: 2.0 standard drinks of  alcohol     Types: 2 Glasses of wine per week     Comment: Social    Drug use: No    Sexual activity: Never     Social Drivers of Health     Financial Resource Strain: Low Risk  (4/25/2025)    Overall Financial Resource Strain (CARDIA)     Difficulty of Paying Living Expenses: Not hard at all   Food Insecurity: No Food Insecurity (4/25/2025)    Hunger Vital Sign     Worried About Running Out of Food in the Last Year: Never true     Ran Out of Food in the Last Year: Never true   Transportation Needs: No Transportation Needs (4/25/2025)    PRAPARE - Transportation     Lack of Transportation (Medical): No     Lack of Transportation (Non-Medical): No   Physical Activity: Insufficiently Active (7/8/2024)    Exercise Vital Sign     Days of Exercise per Week: 1 day     Minutes of Exercise per Session: 10 min   Stress: No Stress Concern Present (4/25/2025)    [employer] [place] [employer] - Occupational Stress Questionnaire     Feeling of Stress : Not at all   Housing Stability: Low Risk  (4/25/2025)    Housing Stability Vital Sign     Unable to Pay for Housing in the Last Year: No     Number of Times Moved in the Last Year: 0     Homeless in the Last Year: No       Roles and Relationships  Primary Source of Support/Comfort: child(faby)  Name of Support/Comfort Primary Source: Bella  Secondary Source of Support/Comfort: extended family  Name of Support/Comfort Secondary Source: Brother in Law      Advance Directives (For Healthcare)  Advance Directive  (If Adv Dir status is received, view document under Adv Dir in header or Chart Review Media tab): Advance Directive currently in Epic.  Patient Requests Assistance: Advise to complete post discharge        Patient Reported Insurance  Verified current insurance plan:: Blue Cross; Medicare            5/2/2025     2:07 PM 1/8/2025    11:44 AM 11/26/2024    10:07 AM 10/29/2024    12:58 PM 8/6/2024     9:59 AM 7/3/2024    11:41 AM 6/20/2024     9:14 AM   Depression  Patient Health Questionnaire   Over the last two weeks how often have you been bothered by little interest or pleasure in doing things Not at all Not at all Not at all Not at all Not at all Not at all Not at all   Over the last two weeks how often have you been bothered by feeling down, depressed or hopeless Not at all Not at all Not at all Not at all Not at all Not at all Not at all   PHQ-2 Total Score 0 0 0 0 0 0 0       Learning Assessment       05/02/2025 1442 [facility] (5/2/2025 - Present)   Created by Ivory Gonzalez, RN - RN (Nurse) Status: Complete                 PRIMARY LEARNER     Primary Learner Name:  Kirstie Bowden  - 05/02/2025 1442    Relationship:  Patient SL - 05/02/2025 1442    Does the primary learner have any barriers to learning?:  No Barriers SL - 05/02/2025 1442    What is the preferred language of the primary learner?:  English SL - 05/02/2025 1442    Is an  required?:  No SL - 05/02/2025 1442    How does the primary learner prefer to learn new concepts?:  Listening SL - 05/02/2025 1442    How often do you need to have someone help you read instructions, pamphlets, or written material from your doctor or pharmacy?:  Never SL - 05/02/2025 1442        CO-LEARNER #1     No question answered        CO-LEARNER #2     No question answered        SPECIAL TOPICS     No question answered        ANSWERED BY:     No question answered        Comments         Edit History       Ivory Gonzalez, RN - RN (Nurse)   05/02/2025 1442

## 2025-05-02 NOTE — LETTER
Kirstie Bowden  [address]  [place] [address]      Dear Kirstie,    Welcome to [facility] Complex Care Management Program.  It was a pleasure talking with you today.  My name is Ivory Siddiqui, and I look forward to being your Care Manager.  My goal is to help you function at the healthiest and highest level possible.  You can contact me directly at [phone].    As an [facility] patient, some of the services we may be able to provide include:     Development of an individualized care plan with a Registered Nurse   Connection with a   Connection with available resources and services    Coordinate communication among your care team members   Provide coaching and education   Help you understand your doctors treatment plan  Help you obtain information about your insurance coverage.     All services provided by [facility]s Complex Care Managers and other care team members are coordinated with and communicated to your primary care team.      As part of your enrollment, you will be receiving education materials and more information about these services in your [facility] account, by phone or through the mail.  If you do not wish to participate or receive information, please contact our office at [phone].      [facility] Patient Rights and Responsibilities available upon request.    Sincerely,        Ivory Siddiqui, RN, BSN  [facility]   Outpatient RN Complex Care Manager

## 2025-05-02 NOTE — TELEPHONE ENCOUNTER
----- Message from LEA Dodson sent at 5/2/2025  2:26 PM CDT -----  Regarding: Medication Refills  Good afternoon!  I just enrolled Kirstie into our Outpatient Complex Care Management Program.  While we were discussing her medications and going over her discharge instructions on medications she is to continue taking, two medications she mentioned she is just about out of and does not have any refills.  Is it possible these medications can be e-scribed to her pharmacy on file:  [facility] following are the medications she needs as soon as possible, which are on her discharge summary paperwork as follows:1.  albuterol 2.5 mg /3 mL (0.083 %) nebulizer solutionCommonly known as: PROVENTILTake 3 mLs (2.5 mg total) by nebulization every 6 (six) hours as needed for Wheezing.2. benzonatate 200 MG capsuleCommonly known as: TESSALONTake 1 capsule by mouth 3 times daily as needed for Cough. Respectfully,SHITAL HiltonN, LEAOchjessie Outpatient Complex Case [phone]

## 2025-05-02 NOTE — LETTER
Kirstie Bowden  [address]  [place] [address]      Dear Kirstie Bowden,     I work with [facility] Outpatient Care Management Department. We received a referral to call you to discuss your medical history. These services are free of charge and are offered to [facility] patients who have recently been discharged from any of our facilities or who have medical conditions that may require the skill of a nurse to assist with management.     I am a Registered Nurse who specializes in connecting patients with available medical and financial resources as well as addressing any educational needs that may be indicated.    I attempted to reach you by telephone, but I was unsuccessful. Please call our department so that we can go over some questions with you, regarding your health.    The Outpatient Care Management Department can be reached at [phone], from 8:00AM to 4:30 PM, on Monday thru Friday.     Additionally, [facility] also has a program where a nurse is available 24/7 to answer questions or provide medical advice, their number is [phone].      Thanks,        Ivory Siddiqui RN, BSN  Outpatient Care Management  Phone #: [phone]

## 2025-05-02 NOTE — PROGRESS NOTES
5/2/2025  1st attempt to complete Initial Assessment  for Outpatient Care Management, left message.  Will send via [employer] -  unable to assess letter.

## 2025-05-02 NOTE — TELEPHONE ENCOUNTER
Good afternoon!  I just enrolled Kirstie into our Outpatient Complex Care Management Program.  While we were discussing her medications and going over her discharge instructions on medications she is to continue taking, two medications she mentioned she is just about out of and does not have any refills.  Is it possible these medications can be e-scribed to her pharmacy on file:  [facility]     The following are the medications she needs as soon as possible, which are on her discharge summary paperwork as follows:     1.  albuterol 2.5 mg /3 mL (0.083 %) nebulizer solution   Commonly known as: PROVENTIL   Take 3 mLs (2.5 mg total) by nebulization every 6 (six) hours as needed for Wheezing.     2. benzonatate 200 MG capsule   Commonly known as: TESSALON   Take 1 capsule by mouth 3 times daily as needed for Cough.        Respectfully,     Ivory Siddiqui, SHITALN, RN   [facility]   [phone]         Lov 1-13-25  Nov 5/6/25

## 2025-05-05 ENCOUNTER — TELEPHONE (OUTPATIENT)
Dept: PULMONOLOGY | Facility: CLINIC | Age: 80
End: 2025-05-05
Payer: MEDICARE

## 2025-05-05 ENCOUNTER — PATIENT MESSAGE (OUTPATIENT)
Dept: ADMINISTRATIVE | Facility: OTHER | Age: 80
End: 2025-05-05
Payer: MEDICARE

## 2025-05-05 DIAGNOSIS — J44.9 CHRONIC OBSTRUCTIVE PULMONARY DISEASE, UNSPECIFIED COPD TYPE: Primary | ICD-10-CM

## 2025-05-05 RX ORDER — BENZONATATE 200 MG/1
200 CAPSULE ORAL 3 TIMES DAILY PRN
Qty: 90 CAPSULE | Refills: 6 | Status: SHIPPED | OUTPATIENT
Start: 2025-05-05 | End: 2025-06-04

## 2025-05-05 RX ORDER — ALBUTEROL SULFATE 1.25 MG/3ML
1.25 SOLUTION RESPIRATORY (INHALATION) EVERY 6 HOURS PRN
Qty: 75 ML | Refills: 6 | Status: SHIPPED | OUTPATIENT
Start: 2025-05-05 | End: 2026-05-05

## 2025-05-05 NOTE — TELEPHONE ENCOUNTER
----- Message from LEA Dodson sent at 5/2/2025  2:26 PM CDT -----  Regarding: Medication Refills  Good afternoon!  I just enrolled Kirstie into our Outpatient Complex Care Management Program.  While we were discussing her medications and going over her discharge instructions on medications she is to continue taking, two medications she mentioned she is just about out of and does not have any refills.  Is it possible these medications can be e-scribed to her pharmacy on file:  [facility] following are the medications she needs as soon as possible, which are on her discharge summary paperwork as follows:1.  albuterol 2.5 mg /3 mL (0.083 %) nebulizer solutionCommonly known as: PROVENTILTake 3 mLs (2.5 mg total) by nebulization every 6 (six) hours as needed for Wheezing.2. benzonatate 200 MG capsuleCommonly known as: TESSALONTake 1 capsule by mouth 3 times daily as needed for Cough. Respectfully,SHITAL HiltonN, LEAOchjessie Outpatient Complex Case [phone]

## 2025-05-06 ENCOUNTER — PATIENT MESSAGE (OUTPATIENT)
Dept: ADMINISTRATIVE | Facility: OTHER | Age: 80
End: 2025-05-06
Payer: MEDICARE

## 2025-05-08 ENCOUNTER — TELEPHONE (OUTPATIENT)
Dept: PULMONOLOGY | Facility: CLINIC | Age: 80
End: 2025-05-08
Payer: MEDICARE

## 2025-05-08 ENCOUNTER — OFFICE VISIT (OUTPATIENT)
Dept: FAMILY MEDICINE | Facility: CLINIC | Age: 80
End: 2025-05-08
Payer: MEDICARE

## 2025-05-08 VITALS
DIASTOLIC BLOOD PRESSURE: 56 MMHG | HEART RATE: 84 BPM | OXYGEN SATURATION: 89 % | BODY MASS INDEX: 25.16 KG/M2 | SYSTOLIC BLOOD PRESSURE: 99 MMHG | TEMPERATURE: 99 F | WEIGHT: 166 LBS | HEIGHT: 68 IN

## 2025-05-08 DIAGNOSIS — R59.0 LOCALIZED ENLARGED LYMPH NODES: ICD-10-CM

## 2025-05-08 DIAGNOSIS — J44.1 COPD EXACERBATION: Primary | ICD-10-CM

## 2025-05-08 PROCEDURE — 99214 OFFICE O/P EST MOD 30 MIN: CPT | Mod: PBBFAC,PN | Performed by: NURSE PRACTITIONER

## 2025-05-08 PROCEDURE — 99999 PR PBB SHADOW E&M-EST. PATIENT-LVL IV: CPT | Mod: PBBFAC,,, | Performed by: NURSE PRACTITIONER

## 2025-05-08 NOTE — TELEPHONE ENCOUNTER
Lmom that referral was recvd but since she is already est with Dr Burk she should contact her office for an appt

## 2025-05-08 NOTE — PROGRESS NOTES
This dictation has been generated using Modal Fluency Dictation some phonetic errors may occur. Please contact author for clarification if needed.     1. COPD exacerbation  -     Ambulatory referral/consult to Pulmonology; Future; Expected date: 05/15/2025  -     Creatinine, serum; Future; Expected date: 05/08/2025    2. Localized enlarged lymph nodes  -     CT Chest With Contrast; Future; Expected date: 05/08/2025  -     Ambulatory referral/consult to Pulmonology; Future; Expected date: 05/15/2025  -     Creatinine, serum; Future; Expected date: 05/08/2025         COPD exacerbation improved continue nebulized therapies.  Antibiotic completed.  Needs follow-up with pulmonology and CT of chest prior.  Discussed follow-up parameters.    I will review all results and address accordingly.   Follow up in about 1 month (around 6/8/2025).    ________________________________________________________________  ________________________________________________________________      Chief Complaint   Patient presents with    Hospital Follow Up     History of present illness    This 80 y.o. presents today for complaint of COPD follow up post discharge. Feeling 90 percent better. Notes home Pulse ox is 93 percent. She is still coughing and it is productive pale yellow sputum. Relief of sob with nebulizer therapy. No fever or chills.    Transitional Care Note    Family and/or Caretaker present at visit?  No.  Diagnostic tests reviewed/disposition: No diagnosic tests pending after this hospitalization.  Disease/illness education: abnormal CT of chest and need for follow up CT of chest at 4-6 week interval. Pt reports plan to call Pulmonologist and set apt.   Home health/community services discussion/referrals: Patient has home health established at [facility].   Establishment or re-establishment of referral orders for community resources: No other necessary community resources.   Discussion with other health care  providers: No discussion with other health care providers necessary.        Past Medical History:   Diagnosis Date    Arthritis     rheumatoid    Asthma     Breast cancer     Cancer 2020    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    GERD (gastroesophageal reflux disease) 2022    Hearing loss [id number]    Hyperlipidemia     Hypertension 2021    Limb alert care status     NO BP/IV LEFT ARM    Lung disease     Personal history of colonic polyps 01/23/2024    Pseudocholinesterase deficiency     family history    Radiation 2020    Rheumatoid arthritis 2020    Thyroid disease        Past Surgical History:   Procedure Laterality Date    AXILLARY NODE DISSECTION Left 03/14/2019    Procedure: LYMPHADENECTOMY, AXILLARY;  Surgeon: Mp Gonzalez MD;  Location: [facility];  Service: General;  Laterality: Left;  left lumpectomy with axillary lypmh node dissection    BALLOON DILATION OF URETER Left 06/24/2019    Procedure: DILATION, URETER, USING BALLOON;  Surgeon: Jorden Dickson MD;  Location: [facility];  Service: Urology;  Laterality: Left;    BREAST BIOPSY Left 20 yrs ago    benign    BREAST CYST EXCISION  15 yrs ago    BREAST LUMPECTOMY      x2    CHOLECYSTECTOMY  2000    COLONOSCOPY  09/25/2018    LUC EASTON MD    CYSTOSCOPY W/ URETERAL STENT PLACEMENT Left 06/24/2019    Procedure: CYSTOSCOPY, WITH URETERAL STENT INSERTION;  Surgeon: Jorden Dickson MD;  Location: [facility];  Service: Urology;  Laterality: Left;    CYSTOSCOPY W/ URETERAL STENT REMOVAL Left 07/23/2019    Procedure: CYSTOSCOPY, WITH URETERAL STENT REMOVAL;  Surgeon: Jorden Dickson MD;  Location: [facility] OR;  Service: Urology;  Laterality: Left;    EPIDURAL STEROID INJECTION INTO LUMBAR SPINE N/A 09/30/2021    Procedure: Injection-steroid-epidural-lumbar;  Surgeon: Nathen Lyons MD;  Location: [facility] OR;  Service: Pain Management;  Laterality: N/A;  L5-S1      EPIDURAL STEROID INJECTION INTO LUMBAR SPINE N/A 11/16/2021    Procedure: Injection-steroid-epidural-lumbar;  Surgeon: Nathen  ERMIAS Lyons MD;  Location: [facility] OR;  Service: Pain Management;  Laterality: N/A;  L5-S1    EXTRACORPOREAL SHOCK WAVE LITHOTRIPSY Left 07/23/2019    Procedure: LITHOTRIPSY, ESWL;  Surgeon: Jorden Dickson MD;  Location: [facility] OR;  Service: Urology;  Laterality: Left;    EYE SURGERY Bilateral 2000    cataracts    HYSTERECTOMY  1994    MASTECTOMY, PARTIAL Left 04/25/2019    Procedure: MASTECTOMY, PARTIAL;  Surgeon: Mp Gonzalez MD;  Location: [facility] OR;  Service: General;  Laterality: Left;    NEEDLE LOCALIZATION Left 03/14/2019    Procedure: NEEDLE LOCALIZATION;  Surgeon: Mp Gonzalez MD;  Location: [facility] OR;  Service: General;  Laterality: Left;  left lumpectomy with wire needle localization     PARTIAL NEPHRECTOMY Right 05/22/2023    RETROGRADE PYELOGRAPHY Bilateral 06/24/2019    Procedure: PYELOGRAM, RETROGRADE;  Surgeon: Jorden Dickson MD;  Location: [facility] OR;  Service: Urology;  Laterality: Bilateral;    SENTINEL LYMPH NODE BIOPSY Left 03/14/2019    Procedure: BIOPSY, LYMPH NODE, SENTINEL;  Surgeon: Mp Gonzalez MD;  Location: [facility];  Service: General;  Laterality: Left;  left sentinel node lymph node biopsy    TONSILLECTOMY  1948    TRANSFORAMINAL EPIDURAL INJECTION OF STEROID Right 01/04/2022    Procedure: Injection,steroid,epidural,transforaminal approach;  Surgeon: Nathen Lyons MD;  Location: [facility] OR;  Service: Pain Management;  Laterality: Right;  L4-L5, L5-s1    TRANSFORAMINAL EPIDURAL INJECTION OF STEROID Right 07/09/2024    Procedure: Injection,steroid,epidural,transforaminal approach;  Surgeon: Nathen Lyons MD;  Location: [facility] OR;  Service: Pain Management;  Laterality: Right;    URETEROSCOPY Left 06/24/2019    Procedure: URETEROSCOPY;  Surgeon: Jorden Dickson MD;  Location: [facility] OR;  Service: Urology;  Laterality: Left;       Family History   Problem Relation Name Age of Onset    Heart disease Mother Patricia     Hypertension Mother Patricia     Alzheimer's disease Mother Patricia     Cancer  Father Kirstie     Heart disease Sister Ania     Arthritis Sister Ania     Kidney disease Sister Ania     Stroke Sister Alvina sister         Sister    Diabetes Brother Nicklos     Cancer Daughter      Stroke Sister Alvina        Social History     Socioeconomic History    Marital status:    Occupational History     Employer: [employer]   Tobacco Use    Smoking status: Former     Current packs/day: 0.00     Average packs/day: 0.5 packs/day for 59.3 years (29.6 ttl pk-yrs)     Types: Cigarettes     Start date: 1960     Quit date: 2019     Years since quittin.7    Smokeless tobacco: Never   Substance and Sexual Activity    Alcohol use: Yes     Alcohol/week: 2.0 standard drinks of alcohol     Types: 2 Glasses of wine per week     Comment: Social    Drug use: No    Sexual activity: Never     Social Drivers of Health     Financial Resource Strain: Low Risk  (2025)    Overall Financial Resource Strain (CARDIA)     Difficulty of Paying Living Expenses: Not hard at all   Food Insecurity: No Food Insecurity (2025)    Hunger Vital Sign     Worried About Running Out of Food in the Last Year: Never true     Ran Out of Food in the Last Year: Never true   Transportation Needs: No Transportation Needs (2025)    PRAPARE - Transportation     Lack of Transportation (Medical): No     Lack of Transportation (Non-Medical): No   Physical Activity: Insufficiently Active (2024)    Exercise Vital Sign     Days of Exercise per Week: 1 day     Minutes of Exercise per Session: 10 min   Stress: No Stress Concern Present (2025)    [employer] - Occupational Stress Questionnaire     Feeling of Stress : Not at all   Housing Stability: Low Risk  (2025)    Housing Stability Vital Sign     Unable to Pay for Housing in the Last Year: No     Number of Times Moved in the Last Year: 0     Homeless in the Last Year: No       Current Medications[1]    Review of patient's  "allergies indicates:   Allergen Reactions    Succinylcholine chloride Other (See Comments)    Sulfur dioxide Hives    Sulfamethoxazole-trimethoprim      Other reaction(s): per dr daryn Rodríguez (sulfonamide antibiotics)      NOT SURE REACTION       Physical examination  Vitals Reviewed  BP (!) 99/56 (BP Location: Right arm, Patient Position: Sitting)   Pulse 84   Temp 98.5 °F (36.9 °C) (Oral)   Ht 5' 8" (1.727 m)   Wt 75.3 kg (166 lb 0.1 oz)   SpO2 (!) 89%   BMI 25.24 kg/m²  Body mass index is 25.24 kg/m².     BP Readings from Last 3 Encounters:   05/08/25 (!) 99/56   04/29/25 (!) 163/78   04/24/25 120/80       Wt Readings from Last 3 Encounters:   05/08/25 75.3 kg (166 lb 0.1 oz)   04/25/25 75.9 kg (167 lb 5.3 oz)   04/24/25 76.2 kg (168 lb)     Gen. Well-dressed well-nourished   Skin warm dry and intact.  No rashes noted.  Chest.  Respirations are even unlabored.  Lungs are clear to auscultation.  Cardiac regular rate and rhythm.  No chest wall adenopathy noted.  Neuro. Awake alert oriented x4.  Normal judgment and cognition noted.  Extremities no clubbing cyanosis or edema noted.     Call or return to clinic prn if these symptoms worsen or fail to improve as anticipated.           [1]   Current Outpatient Medications   Medication Sig Dispense Refill    albuterol (ACCUNEB) 1.25 mg/3 mL Nebu Take 3 mLs (1.25 mg total) by nebulization every 6 (six) hours as needed (shortness of breath). Rescue 75 mL 6    albuterol (PROVENTIL/VENTOLIN HFA) 90 mcg/actuation inhaler Inhale 2 puffs into the lungs every 6 (six) hours as needed for Wheezing. Rescue 54 g 3    amLODIPine (NORVASC) 5 MG tablet TAKE 1 TABLET BY MOUTH EVERY DAY 90 tablet 3    atorvastatin (LIPITOR) 20 MG tablet TAKE 1 TABLET BY MOUTH EVERY DAY 90 tablet 3    cholecalciferol, vitamin D3, (VITAMIN D3) 25 mcg (1,000 unit) capsule Take 1 capsule by mouth every morning.      cyanocobalamin (VITAMIN B-12) 1000 MCG tablet Take 100 mcg by mouth once daily.   "    diphenhydrAMINE (SOMINEX) 25 mg tablet Take 25 mg by mouth every evening.      exemestane (AROMASIN) 25 mg tablet TAKE 1 TABLET BY MOUTH EVERY DAY 60 tablet 5    fluticasone propionate (FLONASE) 50 mcg/actuation nasal spray USE 1 SPRAY (50 MCG TOTAL) IN EACH NOSTRIL ONCE DAILY 48 mL 3    fluticasone-salmeterol 250-50 mcg/dose (WIXELA INHUB) 250-50 mcg/dose diskus inhaler INHALE 1 PUFF INTO THE LUNGS 2 (TWO) TIMES DAILY. CONTROLLER 180 each 3    folic acid (FOLVITE) 1 MG tablet Take 1 tablet (1,000 mcg total) by mouth once daily. 90 tablet 3    gabapentin (NEURONTIN) 300 MG capsule Take 1 capsule (300 mg total) by mouth 2 (two) times daily. 60 capsule 2    hydroCHLOROthiazide (HYDRODIURIL) 25 MG tablet TAKE 1 TABLET BY MOUTH EVERY DAY 90 tablet 3    hydroxychloroquine (PLAQUENIL) 200 mg tablet Take 200 mg by mouth 2 (two) times daily.      levothyroxine (SYNTHROID) 125 MCG tablet Take 125 mcg by mouth before breakfast.      ondansetron (ZOFRAN-ODT) 8 MG TbDL DISSOLVE 1 TABLET IN MOUTH EVERY 12 HOURS AS NEEDED 30 tablet 0    benzonatate (TESSALON) 200 MG capsule Take 1 capsule (200 mg total) by mouth 3 (three) times daily as needed for Cough. 90 capsule 6    methotrexate 2.5 MG Tab Take 8 tablets (20 mg total) by mouth every 7 days. 96 tablet 3    predniSONE (DELTASONE) 5 MG tablet Take 1 tablet (5 mg total) by mouth once daily. 90 tablet 3    prochlorperazine (COMPAZINE) 10 MG tablet TAKE 1 TABLET BY MOUTH EVERY 6 HOURS AS NEEDED 60 tablet 0     No current facility-administered medications for this visit.     Facility-Administered Medications Ordered in Other Visits   Medication Dose Route Frequency Provider Last Rate Last Admin    lactated ringers infusion   Intravenous Once PRN Nathen Lyons MD        lidocaine (PF) 10 mg/ml (1%) injection 10 mg  1 mL Intradermal Once Eladia Schwartz MD

## 2025-05-09 ENCOUNTER — OUTPATIENT CASE MANAGEMENT (OUTPATIENT)
Dept: ADMINISTRATIVE | Facility: OTHER | Age: 80
End: 2025-05-09
Payer: MEDICARE

## 2025-05-09 NOTE — PROGRESS NOTES
Outpatient Care Management  Plan of Care Follow Up Visit    Patient: Kirsite Bowden  MRN: [id number]  Date of Service: 05/09/2025  Completed by: Ivory Siddiqui RN  Referral Date: 04/28/2025    No chief complaint on file.      Brief Summary: OPCM RN followed up with Kirstie today for care plan review.    Next Steps:   Follow up on if still using peak flow meter multiple times daily.  Follow up on thoughts about Pulmonary Rehab once PT in home is completed.  Follow up on if she is still coughing up phlym after using the albuterol nebulizer.  Kirstie agreed to OPCM RN follow up on or around 5/16/25.

## 2025-05-12 ENCOUNTER — INFUSION (OUTPATIENT)
Dept: INFUSION THERAPY | Facility: HOSPITAL | Age: 80
End: 2025-05-12
Attending: STUDENT IN AN ORGANIZED HEALTH CARE EDUCATION/TRAINING PROGRAM
Payer: MEDICARE

## 2025-05-12 ENCOUNTER — OUTPATIENT CASE MANAGEMENT (OUTPATIENT)
Dept: ADMINISTRATIVE | Facility: OTHER | Age: 80
End: 2025-05-12
Payer: MEDICARE

## 2025-05-12 VITALS
RESPIRATION RATE: 16 BRPM | HEIGHT: 68 IN | DIASTOLIC BLOOD PRESSURE: 79 MMHG | SYSTOLIC BLOOD PRESSURE: 143 MMHG | TEMPERATURE: 98 F | BODY MASS INDEX: 24.95 KG/M2 | OXYGEN SATURATION: 95 % | HEART RATE: 71 BPM | WEIGHT: 164.63 LBS

## 2025-05-12 DIAGNOSIS — M05.79 RHEUMATOID ARTHRITIS INVOLVING MULTIPLE SITES WITH POSITIVE RHEUMATOID FACTOR: Primary | ICD-10-CM

## 2025-05-12 PROCEDURE — 25000003 PHARM REV CODE 250: Performed by: STUDENT IN AN ORGANIZED HEALTH CARE EDUCATION/TRAINING PROGRAM

## 2025-05-12 PROCEDURE — 63600175 PHARM REV CODE 636 W HCPCS: Mod: JZ,JA,TB | Performed by: STUDENT IN AN ORGANIZED HEALTH CARE EDUCATION/TRAINING PROGRAM

## 2025-05-12 PROCEDURE — 96365 THER/PROPH/DIAG IV INF INIT: CPT

## 2025-05-12 RX ORDER — EPINEPHRINE 0.3 MG/.3ML
0.3 INJECTION SUBCUTANEOUS ONCE AS NEEDED
OUTPATIENT
Start: 2025-05-26

## 2025-05-12 RX ORDER — DIPHENHYDRAMINE HCL 25 MG
25 CAPSULE ORAL ONCE
OUTPATIENT
Start: 2025-05-26

## 2025-05-12 RX ORDER — SODIUM CHLORIDE 0.9 % (FLUSH) 0.9 %
10 SYRINGE (ML) INJECTION
OUTPATIENT
Start: 2025-05-26

## 2025-05-12 RX ORDER — HEPARIN 100 UNIT/ML
500 SYRINGE INTRAVENOUS
OUTPATIENT
Start: 2025-05-26

## 2025-05-12 RX ORDER — SODIUM CHLORIDE 0.9 % (FLUSH) 0.9 %
10 SYRINGE (ML) INJECTION
Status: DISCONTINUED | OUTPATIENT
Start: 2025-05-12 | End: 2025-05-12 | Stop reason: HOSPADM

## 2025-05-12 RX ORDER — DIPHENHYDRAMINE HYDROCHLORIDE 50 MG/ML
25 INJECTION, SOLUTION INTRAMUSCULAR; INTRAVENOUS
OUTPATIENT
Start: 2025-05-26

## 2025-05-12 RX ORDER — DIPHENHYDRAMINE HYDROCHLORIDE 50 MG/ML
50 INJECTION, SOLUTION INTRAMUSCULAR; INTRAVENOUS ONCE AS NEEDED
OUTPATIENT
Start: 2025-05-26

## 2025-05-12 RX ORDER — ACETAMINOPHEN 325 MG/1
325 TABLET ORAL ONCE
Status: COMPLETED | OUTPATIENT
Start: 2025-05-12 | End: 2025-05-12

## 2025-05-12 RX ORDER — ACETAMINOPHEN 325 MG/1
650 TABLET ORAL
OUTPATIENT
Start: 2025-05-26

## 2025-05-12 RX ORDER — DIPHENHYDRAMINE HCL 25 MG
25 CAPSULE ORAL ONCE
Status: COMPLETED | OUTPATIENT
Start: 2025-05-12 | End: 2025-05-12

## 2025-05-12 RX ORDER — ACETAMINOPHEN 325 MG/1
325 TABLET ORAL ONCE
OUTPATIENT
Start: 2025-05-26

## 2025-05-12 RX ADMIN — ACETAMINOPHEN 325 MG: 325 TABLET ORAL at 11:05

## 2025-05-12 RX ADMIN — SODIUM CHLORIDE: 9 INJECTION, SOLUTION INTRAVENOUS at 11:05

## 2025-05-12 RX ADMIN — DIPHENHYDRAMINE HYDROCHLORIDE 25 MG: 25 CAPSULE ORAL at 11:05

## 2025-05-12 RX ADMIN — SODIUM CHLORIDE 750 MG: 9 INJECTION, SOLUTION INTRAVENOUS at 11:05

## 2025-05-12 NOTE — PROGRESS NOTES
Outpatient Care Management   - Patient Assessment    Patient: Kirstie Bowden  MRN:  [id number]  Date of Service:  5/12/2025  Completed by:  Hailee Garber LCSW  Referral Date: 04/28/2025    Reason for Visit   Patient presents with    Social Work Assessment    Goals Complete       Brief Summary:  received a referral from OPCM RN for the following HR SW psychosocial needs: medical alert system and medication assistance.   completed the SDOH questionnaire. Assessment completed with pt on this date who was driving home.  Pt reports she lives with her g-dtr who works during the day so pt is home alone during the day.  Pt admits to 2 falls at home about 3 months ago.  Pt states she is very independent with ADL's and is able to cook and tend to household chores.  Discussed with pt about obtaining a medical alert device.  Pt states she is unable to afford the monthly fee for this service but also states she feels like she does not need a medical alert at this time.  Pt reports she always has her cell phone with her and can call someone in an emergency.  As far as medication assistance, pt states this is no longer an issue.  Pt was declined financial assistance by the pharmaceutical company for the cost of orencia but states she is now receiving a monthly injection for this medication which is covered by pt's insurance.  Pt denied needing any other assistance for meds.  Pt has this 's phone number to call for future needs.  Updated OPCM RN re: referral and no SW needs for this pt.

## 2025-05-12 NOTE — PLAN OF CARE
Problem: Fatigue  Goal: Improved Activity Tolerance  Outcome: Progressing  Intervention: Promote Improved Energy  Flowsheets (Taken 5/12/2025 1129)  Sleep/Rest Enhancement: regular sleep/rest pattern promoted  Environmental Support: rest periods encouraged

## 2025-05-13 NOTE — PHYSICIAN QUERY
Please clarify if the pneumonia has been ruled in/out.  If ruled in, please clarify the type of pneumonia that was being treated this admission.    Pneumonia, Other type (please specify)community acquired, unspecified bacteria

## 2025-05-20 ENCOUNTER — OUTPATIENT CASE MANAGEMENT (OUTPATIENT)
Dept: ADMINISTRATIVE | Facility: OTHER | Age: 80
End: 2025-05-20
Payer: MEDICARE

## 2025-05-20 ENCOUNTER — PATIENT MESSAGE (OUTPATIENT)
Dept: ADMINISTRATIVE | Facility: OTHER | Age: 80
End: 2025-05-20
Payer: MEDICARE

## 2025-05-20 NOTE — PROGRESS NOTES
Outpatient Care Management  Plan of Care Follow Up Visit    Patient: Kirstie Bowden  MRN: [id number]  Date of Service: 05/20/2025  Completed by: Ivory Siddiqui RN  Referral Date: 04/28/2025    Reason for Visit   Patient presents with    OPCM RN Follow Up Call       Brief Summary: OPCM RN followed up with Kirstie today for care plan review.    Next Steps:   Revisit Pulmonary Rehab thoughts and feelings.  Follow up on edu sent via [employer] on Breathing Exercises for any questions.  Follow up on edu sent via [employer] on Walking [employer] for any questions.  Kirstie agreed to OPCM RN follow up on or around 6/3/25.

## 2025-05-26 ENCOUNTER — INFUSION (OUTPATIENT)
Dept: INFUSION THERAPY | Facility: HOSPITAL | Age: 80
End: 2025-05-26
Attending: STUDENT IN AN ORGANIZED HEALTH CARE EDUCATION/TRAINING PROGRAM
Payer: MEDICARE

## 2025-05-26 VITALS
DIASTOLIC BLOOD PRESSURE: 76 MMHG | TEMPERATURE: 98 F | OXYGEN SATURATION: 93 % | BODY MASS INDEX: 25.05 KG/M2 | RESPIRATION RATE: 16 BRPM | HEIGHT: 68 IN | WEIGHT: 165.31 LBS | SYSTOLIC BLOOD PRESSURE: 123 MMHG | HEART RATE: 80 BPM

## 2025-05-26 DIAGNOSIS — M05.79 RHEUMATOID ARTHRITIS INVOLVING MULTIPLE SITES WITH POSITIVE RHEUMATOID FACTOR: Primary | ICD-10-CM

## 2025-05-26 PROCEDURE — 63600175 PHARM REV CODE 636 W HCPCS: Mod: JZ,JA,TB | Performed by: STUDENT IN AN ORGANIZED HEALTH CARE EDUCATION/TRAINING PROGRAM

## 2025-05-26 PROCEDURE — 25000003 PHARM REV CODE 250: Performed by: STUDENT IN AN ORGANIZED HEALTH CARE EDUCATION/TRAINING PROGRAM

## 2025-05-26 PROCEDURE — 96365 THER/PROPH/DIAG IV INF INIT: CPT

## 2025-05-26 RX ORDER — EPINEPHRINE 0.3 MG/.3ML
0.3 INJECTION SUBCUTANEOUS ONCE AS NEEDED
OUTPATIENT
Start: 2025-06-09

## 2025-05-26 RX ORDER — SODIUM CHLORIDE 0.9 % (FLUSH) 0.9 %
10 SYRINGE (ML) INJECTION
OUTPATIENT
Start: 2025-06-09

## 2025-05-26 RX ORDER — DIPHENHYDRAMINE HCL 25 MG
25 CAPSULE ORAL ONCE
OUTPATIENT
Start: 2025-06-09

## 2025-05-26 RX ORDER — SODIUM CHLORIDE 0.9 % (FLUSH) 0.9 %
10 SYRINGE (ML) INJECTION
Status: DISCONTINUED | OUTPATIENT
Start: 2025-05-26 | End: 2025-05-26 | Stop reason: HOSPADM

## 2025-05-26 RX ORDER — ACETAMINOPHEN 325 MG/1
325 TABLET ORAL ONCE
Status: COMPLETED | OUTPATIENT
Start: 2025-05-26 | End: 2025-05-26

## 2025-05-26 RX ORDER — DIPHENHYDRAMINE HYDROCHLORIDE 50 MG/ML
50 INJECTION, SOLUTION INTRAMUSCULAR; INTRAVENOUS ONCE AS NEEDED
OUTPATIENT
Start: 2025-06-09

## 2025-05-26 RX ORDER — DIPHENHYDRAMINE HYDROCHLORIDE 50 MG/ML
25 INJECTION, SOLUTION INTRAMUSCULAR; INTRAVENOUS
OUTPATIENT
Start: 2025-06-09

## 2025-05-26 RX ORDER — HEPARIN 100 UNIT/ML
500 SYRINGE INTRAVENOUS
OUTPATIENT
Start: 2025-06-09

## 2025-05-26 RX ORDER — ACETAMINOPHEN 325 MG/1
650 TABLET ORAL
OUTPATIENT
Start: 2025-06-09

## 2025-05-26 RX ORDER — ACETAMINOPHEN 325 MG/1
325 TABLET ORAL ONCE
OUTPATIENT
Start: 2025-06-09

## 2025-05-26 RX ORDER — DIPHENHYDRAMINE HCL 25 MG
25 CAPSULE ORAL ONCE
Status: COMPLETED | OUTPATIENT
Start: 2025-05-26 | End: 2025-05-26

## 2025-05-26 RX ADMIN — DIPHENHYDRAMINE HYDROCHLORIDE 25 MG: 25 CAPSULE ORAL at 11:05

## 2025-05-26 RX ADMIN — SODIUM CHLORIDE 750 MG: 9 INJECTION, SOLUTION INTRAVENOUS at 11:05

## 2025-05-26 RX ADMIN — ACETAMINOPHEN 325 MG: 325 TABLET ORAL at 11:05

## 2025-05-26 NOTE — PLAN OF CARE
Problem: Fatigue  Goal: Improved Activity Tolerance  Outcome: Progressing  Intervention: Promote Improved Energy  Flowsheets (Taken 5/26/2025 1128)  Sleep/Rest Enhancement: regular sleep/rest pattern promoted  Environmental Support: rest periods encouraged

## 2025-05-29 ENCOUNTER — HOSPITAL ENCOUNTER (OUTPATIENT)
Dept: RADIOLOGY | Facility: HOSPITAL | Age: 80
Discharge: HOME | End: 2025-05-29
Attending: NURSE PRACTITIONER
Payer: MEDICARE

## 2025-05-29 ENCOUNTER — RESULTS FOLLOW-UP (OUTPATIENT)
Dept: FAMILY MEDICINE | Facility: CLINIC | Age: 80
End: 2025-05-29

## 2025-05-29 DIAGNOSIS — C50.012 MALIGNANT NEOPLASM OF NIPPLE OF LEFT BREAST IN FEMALE, ESTROGEN RECEPTOR POSITIVE: ICD-10-CM

## 2025-05-29 DIAGNOSIS — R59.0 LOCALIZED ENLARGED LYMPH NODES: ICD-10-CM

## 2025-05-29 DIAGNOSIS — M05.711 RHEUMATOID ARTHRITIS INVOLVING RIGHT SHOULDER WITH POSITIVE RHEUMATOID FACTOR: ICD-10-CM

## 2025-05-29 DIAGNOSIS — I10 ESSENTIAL HYPERTENSION, BENIGN: ICD-10-CM

## 2025-05-29 DIAGNOSIS — Z17.0 MALIGNANT NEOPLASM OF NIPPLE OF LEFT BREAST IN FEMALE, ESTROGEN RECEPTOR POSITIVE: ICD-10-CM

## 2025-05-29 PROCEDURE — 71260 CT THORAX DX C+: CPT | Mod: 26,,, | Performed by: RADIOLOGY

## 2025-05-29 PROCEDURE — 71260 CT THORAX DX C+: CPT | Mod: TC,PO

## 2025-05-29 PROCEDURE — 25500020 PHARM REV CODE 255: Mod: PO | Performed by: NURSE PRACTITIONER

## 2025-05-29 RX ADMIN — IOHEXOL 100 ML: 350 INJECTION, SOLUTION INTRAVENOUS at 12:05

## 2025-05-30 ENCOUNTER — TELEPHONE (OUTPATIENT)
Dept: PULMONOLOGY | Facility: CLINIC | Age: 80
End: 2025-05-30
Payer: MEDICARE

## 2025-05-30 RX ORDER — GABAPENTIN 300 MG/1
300 CAPSULE ORAL 2 TIMES DAILY
Qty: 60 CAPSULE | Refills: 2 | Status: SHIPPED | OUTPATIENT
Start: 2025-05-30

## 2025-05-30 RX ORDER — ONDANSETRON 8 MG/1
TABLET, ORALLY DISINTEGRATING ORAL
Qty: 30 TABLET | Refills: 0 | Status: SHIPPED | OUTPATIENT
Start: 2025-05-30

## 2025-05-30 NOTE — TELEPHONE ENCOUNTER
Returned pt call about hosp f/u appt. Explained to pt per providers note she can see her Pulmonologist for f/u, pt v/u and has an appt with her Pulmonologist next week.   course crackles course crackles

## 2025-05-30 NOTE — TELEPHONE ENCOUNTER
----- Message from Arlene sent at 5/29/2025  4:55 PM CDT -----  Type:  Appointment RequestCaller is requesting a appointment. Name of Caller:pt When is the first available appointment?not seen Symptoms:F/U from hsp visit Would the patient rather a call back or a response via [facility]? Please call Best Call Back Number:[phone] Additional Information: pt was asked to call for a 4 week f/u with      Please call back to advise. Thanks!

## 2025-06-02 ENCOUNTER — OUTPATIENT CASE MANAGEMENT (OUTPATIENT)
Dept: ADMINISTRATIVE | Facility: OTHER | Age: 80
End: 2025-06-02
Payer: MEDICARE

## 2025-06-02 RX ORDER — FLUTICASONE PROPIONATE 50 MCG
1 SPRAY, SUSPENSION (ML) NASAL DAILY
Qty: 48 ML | Refills: 3 | Status: SHIPPED | OUTPATIENT
Start: 2025-06-02

## 2025-06-06 ENCOUNTER — LAB VISIT (OUTPATIENT)
Dept: LAB | Facility: HOSPITAL | Age: 80
End: 2025-06-06
Attending: STUDENT IN AN ORGANIZED HEALTH CARE EDUCATION/TRAINING PROGRAM
Payer: MEDICARE

## 2025-06-06 ENCOUNTER — RESULTS FOLLOW-UP (OUTPATIENT)
Dept: RHEUMATOLOGY | Facility: CLINIC | Age: 80
End: 2025-06-06

## 2025-06-06 DIAGNOSIS — D84.821 DRUG-INDUCED IMMUNODEFICIENCY: ICD-10-CM

## 2025-06-06 DIAGNOSIS — M05.79 RHEUMATOID ARTHRITIS INVOLVING MULTIPLE SITES WITH POSITIVE RHEUMATOID FACTOR: ICD-10-CM

## 2025-06-06 DIAGNOSIS — Z79.899 DRUG-INDUCED IMMUNODEFICIENCY: ICD-10-CM

## 2025-06-06 DIAGNOSIS — Z79.899 HIGH RISK MEDICATIONS (NOT ANTICOAGULANTS) LONG-TERM USE: ICD-10-CM

## 2025-06-06 LAB
ABSOLUTE EOSINOPHIL (SMH): 0.2 K/UL
ABSOLUTE MONOCYTE (SMH): 0.7 K/UL (ref 0.3–1)
ABSOLUTE NEUTROPHIL COUNT (SMH): 5 K/UL (ref 1.8–7.7)
ALT SERPL-CCNC: 9 UNIT/L (ref 10–44)
AST SERPL-CCNC: 19 UNIT/L (ref 10–40)
BASOPHILS # BLD AUTO: 0.04 K/UL
BASOPHILS NFR BLD AUTO: 0.4 %
C-REACTIVE PROTEIN (SMH): 0.4 MG/DL
CA-I BLD-SCNC: 1.21 MMOL/L (ref 1.06–1.42)
CREAT SERPL-MCNC: 1.2 MG/DL (ref 0.5–1.4)
ERYTHROCYTE [DISTWIDTH] IN BLOOD BY AUTOMATED COUNT: 15.3 % (ref 11.5–14.5)
ERYTHROCYTE [SEDIMENTATION RATE] IN BLOOD: 55 MM/HR (ref 0–20)
GFR SERPLBLD CREATININE-BSD FMLA CKD-EPI: 46 ML/MIN/1.73/M2
HCT VFR BLD AUTO: 43.2 % (ref 37–48.5)
HGB BLD-MCNC: 13.5 GM/DL (ref 12–16)
IMM GRANULOCYTES # BLD AUTO: 0.02 K/UL (ref 0–0.04)
IMM GRANULOCYTES NFR BLD AUTO: 0.2 % (ref 0–0.5)
LYMPHOCYTES # BLD AUTO: 3.9 K/UL (ref 1–4.8)
MCH RBC QN AUTO: 31.5 PG (ref 27–31)
MCHC RBC AUTO-ENTMCNC: 31.3 G/DL (ref 32–36)
MCV RBC AUTO: 101 FL (ref 82–98)
NUCLEATED RBC (/100WBC) (SMH): 0 /100 WBC
PLATELET # BLD AUTO: 211 K/UL (ref 150–450)
PMV BLD AUTO: 10.7 FL (ref 9.2–12.9)
RBC # BLD AUTO: 4.29 M/UL (ref 4–5.4)
RELATIVE EOSINOPHIL (SMH): 2 % (ref 0–8)
RELATIVE LYMPHOCYTE (SMH): 39.6 % (ref 18–48)
RELATIVE MONOCYTE (SMH): 7.1 % (ref 4–15)
RELATIVE NEUTROPHIL (SMH): 50.7 % (ref 38–73)
WBC # BLD AUTO: 9.84 K/UL (ref 3.9–12.7)

## 2025-06-06 PROCEDURE — 84450 TRANSFERASE (AST) (SGOT): CPT

## 2025-06-06 PROCEDURE — 82565 ASSAY OF CREATININE: CPT

## 2025-06-06 PROCEDURE — 82330 ASSAY OF CALCIUM: CPT

## 2025-06-06 PROCEDURE — 85651 RBC SED RATE NONAUTOMATED: CPT

## 2025-06-06 PROCEDURE — 85025 COMPLETE CBC W/AUTO DIFF WBC: CPT

## 2025-06-06 PROCEDURE — 86140 C-REACTIVE PROTEIN: CPT

## 2025-06-06 PROCEDURE — 84460 ALANINE AMINO (ALT) (SGPT): CPT

## 2025-06-06 PROCEDURE — 36415 COLL VENOUS BLD VENIPUNCTURE: CPT

## 2025-06-09 ENCOUNTER — INFUSION (OUTPATIENT)
Dept: INFUSION THERAPY | Facility: HOSPITAL | Age: 80
End: 2025-06-09
Attending: STUDENT IN AN ORGANIZED HEALTH CARE EDUCATION/TRAINING PROGRAM
Payer: MEDICARE

## 2025-06-09 VITALS
OXYGEN SATURATION: 93 % | RESPIRATION RATE: 18 BRPM | DIASTOLIC BLOOD PRESSURE: 78 MMHG | WEIGHT: 169.81 LBS | HEART RATE: 79 BPM | HEIGHT: 68 IN | SYSTOLIC BLOOD PRESSURE: 127 MMHG | BODY MASS INDEX: 25.73 KG/M2 | TEMPERATURE: 98 F

## 2025-06-09 DIAGNOSIS — Z79.899 HIGH RISK MEDICATIONS (NOT ANTICOAGULANTS) LONG-TERM USE: ICD-10-CM

## 2025-06-09 DIAGNOSIS — Z79.899 DRUG-INDUCED IMMUNODEFICIENCY: ICD-10-CM

## 2025-06-09 DIAGNOSIS — M05.79 RHEUMATOID ARTHRITIS INVOLVING MULTIPLE SITES WITH POSITIVE RHEUMATOID FACTOR: Primary | ICD-10-CM

## 2025-06-09 DIAGNOSIS — D84.821 DRUG-INDUCED IMMUNODEFICIENCY: ICD-10-CM

## 2025-06-09 PROCEDURE — 25000003 PHARM REV CODE 250: Performed by: STUDENT IN AN ORGANIZED HEALTH CARE EDUCATION/TRAINING PROGRAM

## 2025-06-09 PROCEDURE — 63600175 PHARM REV CODE 636 W HCPCS: Mod: JZ,JA,TB | Performed by: STUDENT IN AN ORGANIZED HEALTH CARE EDUCATION/TRAINING PROGRAM

## 2025-06-09 PROCEDURE — 96365 THER/PROPH/DIAG IV INF INIT: CPT

## 2025-06-09 PROCEDURE — A4216 STERILE WATER/SALINE, 10 ML: HCPCS | Performed by: STUDENT IN AN ORGANIZED HEALTH CARE EDUCATION/TRAINING PROGRAM

## 2025-06-09 RX ORDER — ACETAMINOPHEN 325 MG/1
325 TABLET ORAL ONCE
Status: COMPLETED | OUTPATIENT
Start: 2025-06-09 | End: 2025-06-09

## 2025-06-09 RX ORDER — HEPARIN 100 UNIT/ML
500 SYRINGE INTRAVENOUS
OUTPATIENT
Start: 2025-07-07

## 2025-06-09 RX ORDER — DIPHENHYDRAMINE HYDROCHLORIDE 50 MG/ML
25 INJECTION, SOLUTION INTRAMUSCULAR; INTRAVENOUS
OUTPATIENT
Start: 2025-07-07

## 2025-06-09 RX ORDER — DIPHENHYDRAMINE HCL 25 MG
25 CAPSULE ORAL ONCE
OUTPATIENT
Start: 2025-07-07

## 2025-06-09 RX ORDER — DIPHENHYDRAMINE HCL 25 MG
25 CAPSULE ORAL ONCE
Status: COMPLETED | OUTPATIENT
Start: 2025-06-09 | End: 2025-06-09

## 2025-06-09 RX ORDER — ACETAMINOPHEN 325 MG/1
325 TABLET ORAL ONCE
OUTPATIENT
Start: 2025-07-07

## 2025-06-09 RX ORDER — EPINEPHRINE 0.3 MG/.3ML
0.3 INJECTION SUBCUTANEOUS ONCE AS NEEDED
OUTPATIENT
Start: 2025-07-07

## 2025-06-09 RX ORDER — SODIUM CHLORIDE 0.9 % (FLUSH) 0.9 %
10 SYRINGE (ML) INJECTION
Status: DISCONTINUED | OUTPATIENT
Start: 2025-06-09 | End: 2025-06-09 | Stop reason: HOSPADM

## 2025-06-09 RX ORDER — ACETAMINOPHEN 325 MG/1
650 TABLET ORAL
OUTPATIENT
Start: 2025-07-07

## 2025-06-09 RX ORDER — SODIUM CHLORIDE 0.9 % (FLUSH) 0.9 %
10 SYRINGE (ML) INJECTION
OUTPATIENT
Start: 2025-07-07

## 2025-06-09 RX ORDER — DIPHENHYDRAMINE HYDROCHLORIDE 50 MG/ML
50 INJECTION, SOLUTION INTRAMUSCULAR; INTRAVENOUS ONCE AS NEEDED
OUTPATIENT
Start: 2025-07-07

## 2025-06-09 RX ADMIN — SODIUM CHLORIDE, PRESERVATIVE FREE 10 ML: 5 INJECTION INTRAVENOUS at 11:06

## 2025-06-09 RX ADMIN — SODIUM CHLORIDE: 9 INJECTION, SOLUTION INTRAVENOUS at 11:06

## 2025-06-09 RX ADMIN — SODIUM CHLORIDE 750 MG: 9 INJECTION, SOLUTION INTRAVENOUS at 11:06

## 2025-06-09 RX ADMIN — DIPHENHYDRAMINE HYDROCHLORIDE 25 MG: 25 CAPSULE ORAL at 11:06

## 2025-06-09 RX ADMIN — ACETAMINOPHEN 325 MG: 325 TABLET ORAL at 11:06

## 2025-06-09 NOTE — PLAN OF CARE
Problem: Fatigue  Goal: Improved Activity Tolerance  Outcome: Progressing  Intervention: Promote Improved Energy  Flowsheets (Taken 6/9/2025 1039)  Fatigue Management: fatigue-related activity identified  Sleep/Rest Enhancement: noise level reduced  Activity Management: Up in chair - L3  Environmental Support: environmental consistency promoted

## 2025-06-11 ENCOUNTER — HOSPITAL ENCOUNTER (INPATIENT)
Facility: HOSPITAL | Age: 80
LOS: 1 days | Discharge: NURSING, REHAB OR LONG-TERM CARE | DRG: 312 | End: 2025-06-13
Attending: EMERGENCY MEDICINE | Admitting: HOSPITALIST
Payer: MEDICARE

## 2025-06-11 DIAGNOSIS — R07.9 CHEST PAIN: ICD-10-CM

## 2025-06-11 DIAGNOSIS — R26.9 GAIT ABNORMALITY: ICD-10-CM

## 2025-06-11 DIAGNOSIS — R55 SYNCOPE: ICD-10-CM

## 2025-06-11 DIAGNOSIS — R29.6 FALLS: ICD-10-CM

## 2025-06-11 DIAGNOSIS — M25.572 LEFT ANKLE PAIN: ICD-10-CM

## 2025-06-11 DIAGNOSIS — C50.012 MALIGNANT NEOPLASM OF NIPPLE OF LEFT BREAST IN FEMALE, ESTROGEN RECEPTOR POSITIVE: ICD-10-CM

## 2025-06-11 DIAGNOSIS — E78.5 HYPERLIPIDEMIA: ICD-10-CM

## 2025-06-11 DIAGNOSIS — Z17.0 MALIGNANT NEOPLASM OF NIPPLE OF LEFT BREAST IN FEMALE, ESTROGEN RECEPTOR POSITIVE: ICD-10-CM

## 2025-06-11 DIAGNOSIS — M79.672 FOOT PAIN, LEFT: ICD-10-CM

## 2025-06-11 LAB
ABSOLUTE EOSINOPHIL (SMH): 0.08 K/UL
ABSOLUTE MONOCYTE (SMH): 1.29 K/UL (ref 0.3–1)
ABSOLUTE NEUTROPHIL COUNT (SMH): 7.5 K/UL (ref 1.8–7.7)
ALBUMIN SERPL-MCNC: 3.8 G/DL (ref 3.5–5.2)
ALLENS TEST: ABNORMAL
ALP SERPL-CCNC: 61 UNIT/L (ref 40–150)
ALT SERPL-CCNC: 11 UNIT/L (ref 10–44)
ANION GAP (SMH): 11 MMOL/L (ref 8–16)
AST SERPL-CCNC: 23 UNIT/L (ref 11–45)
BASOPHILS # BLD AUTO: 0.02 K/UL
BASOPHILS NFR BLD AUTO: 0.2 %
BILIRUB SERPL-MCNC: 0.5 MG/DL (ref 0.1–1)
BILIRUB UR QL STRIP.AUTO: NEGATIVE
BUN SERPL-MCNC: 26 MG/DL (ref 8–23)
CALCIUM SERPL-MCNC: 9.9 MG/DL (ref 8.7–10.5)
CHLORIDE SERPL-SCNC: 97 MMOL/L (ref 95–110)
CLARITY UR: CLEAR
CO2 SERPL-SCNC: 29 MMOL/L (ref 23–29)
COLOR UR AUTO: YELLOW
CREAT SERPL-MCNC: 1.4 MG/DL (ref 0.5–1.4)
DELSYS: ABNORMAL
ERYTHROCYTE [DISTWIDTH] IN BLOOD BY AUTOMATED COUNT: 15.1 % (ref 11.5–14.5)
FLOW: 1
GFR SERPLBLD CREATININE-BSD FMLA CKD-EPI: 38 ML/MIN/1.73/M2
GLUCOSE SERPL-MCNC: 126 MG/DL (ref 70–110)
GLUCOSE UR QL STRIP: NEGATIVE
HCO3 UR-SCNC: 29.7 MMOL/L (ref 24–28)
HCT VFR BLD AUTO: 36.3 % (ref 37–48.5)
HGB BLD-MCNC: 11.5 GM/DL (ref 12–16)
HGB UR QL STRIP: NEGATIVE
IMM GRANULOCYTES # BLD AUTO: 0.05 K/UL (ref 0–0.04)
IMM GRANULOCYTES NFR BLD AUTO: 0.4 % (ref 0–0.5)
KETONES UR QL STRIP: NEGATIVE
LEUKOCYTE ESTERASE UR QL STRIP: ABNORMAL
LYMPHOCYTES # BLD AUTO: 3.27 K/UL (ref 1–4.8)
MAGNESIUM SERPL-MCNC: 2.2 MG/DL (ref 1.6–2.6)
MCH RBC QN AUTO: 32.1 PG (ref 27–31)
MCHC RBC AUTO-ENTMCNC: 31.7 G/DL (ref 32–36)
MCV RBC AUTO: 101 FL (ref 82–98)
MICROSCOPIC COMMENT: NORMAL
MODE: ABNORMAL
NITRITE UR QL STRIP: NEGATIVE
NUCLEATED RBC (/100WBC) (SMH): 0 /100 WBC
PCO2 BLDA: 49.8 MMHG (ref 35–45)
PH SMN: 7.38 [PH] (ref 7.35–7.45)
PH UR STRIP: 5 [PH]
PHOSPHATE SERPL-MCNC: 2.7 MG/DL (ref 2.7–4.5)
PLATELET # BLD AUTO: 171 K/UL (ref 150–450)
PMV BLD AUTO: 10.9 FL (ref 9.2–12.9)
PO2 BLDA: 64 MMHG (ref 80–100)
POC BE: 5 MMOL/L (ref -2–2)
POC SATURATED O2: 91 % (ref 95–100)
POC TCO2: 31 MMOL/L (ref 23–27)
POTASSIUM SERPL-SCNC: 4 MMOL/L (ref 3.5–5.1)
PROCALCITONIN SERPL-MCNC: 0.21 NG/ML
PROT SERPL-MCNC: 7.6 GM/DL (ref 6–8.4)
PROT UR QL STRIP: NEGATIVE
RBC # BLD AUTO: 3.58 M/UL (ref 4–5.4)
RBC #/AREA URNS AUTO: <1 /HPF
RELATIVE EOSINOPHIL (SMH): 0.7 % (ref 0–8)
RELATIVE LYMPHOCYTE (SMH): 26.7 % (ref 18–48)
RELATIVE MONOCYTE (SMH): 10.5 % (ref 4–15)
RELATIVE NEUTROPHIL (SMH): 61.5 % (ref 38–73)
SAMPLE: ABNORMAL
SITE: ABNORMAL
SODIUM SERPL-SCNC: 137 MMOL/L (ref 136–145)
SP GR UR STRIP: 1.01
SQUAMOUS #/AREA URNS AUTO: 1 /HPF
T4 FREE SERPL-MCNC: 1.33 NG/DL (ref 0.71–1.51)
TSH SERPL-ACNC: 0.14 UIU/ML (ref 0.4–4)
UROBILINOGEN UR STRIP-ACNC: NEGATIVE EU/DL
WBC # BLD AUTO: 12.23 K/UL (ref 3.9–12.7)
WBC #/AREA URNS AUTO: 2 /HPF

## 2025-06-11 PROCEDURE — 25000003 PHARM REV CODE 250: Performed by: EMERGENCY MEDICINE

## 2025-06-11 PROCEDURE — 96360 HYDRATION IV INFUSION INIT: CPT

## 2025-06-11 PROCEDURE — 83735 ASSAY OF MAGNESIUM: CPT

## 2025-06-11 PROCEDURE — 93010 ELECTROCARDIOGRAM REPORT: CPT | Mod: ,,, | Performed by: INTERNAL MEDICINE

## 2025-06-11 PROCEDURE — 99900035 HC TECH TIME PER 15 MIN (STAT)

## 2025-06-11 PROCEDURE — 93005 ELECTROCARDIOGRAM TRACING: CPT

## 2025-06-11 PROCEDURE — 36415 COLL VENOUS BLD VENIPUNCTURE: CPT | Performed by: EMERGENCY MEDICINE

## 2025-06-11 PROCEDURE — 94799 UNLISTED PULMONARY SVC/PX: CPT

## 2025-06-11 PROCEDURE — 27000221 HC OXYGEN, UP TO 24 HOURS

## 2025-06-11 PROCEDURE — 82803 BLOOD GASES ANY COMBINATION: CPT

## 2025-06-11 PROCEDURE — G0378 HOSPITAL OBSERVATION PER HR: HCPCS

## 2025-06-11 PROCEDURE — 63600175 PHARM REV CODE 636 W HCPCS

## 2025-06-11 PROCEDURE — 25500020 PHARM REV CODE 255

## 2025-06-11 PROCEDURE — 25000003 PHARM REV CODE 250

## 2025-06-11 PROCEDURE — 80053 COMPREHEN METABOLIC PANEL: CPT | Performed by: EMERGENCY MEDICINE

## 2025-06-11 PROCEDURE — 84100 ASSAY OF PHOSPHORUS: CPT

## 2025-06-11 PROCEDURE — 84145 PROCALCITONIN (PCT): CPT

## 2025-06-11 PROCEDURE — 99285 EMERGENCY DEPT VISIT HI MDM: CPT | Mod: 25

## 2025-06-11 PROCEDURE — 85025 COMPLETE CBC W/AUTO DIFF WBC: CPT | Performed by: EMERGENCY MEDICINE

## 2025-06-11 PROCEDURE — 99900031 HC PATIENT EDUCATION (STAT)

## 2025-06-11 PROCEDURE — 84443 ASSAY THYROID STIM HORMONE: CPT | Performed by: EMERGENCY MEDICINE

## 2025-06-11 PROCEDURE — 36600 WITHDRAWAL OF ARTERIAL BLOOD: CPT

## 2025-06-11 PROCEDURE — 94761 N-INVAS EAR/PLS OXIMETRY MLT: CPT | Mod: XB

## 2025-06-11 PROCEDURE — 96372 THER/PROPH/DIAG INJ SC/IM: CPT

## 2025-06-11 PROCEDURE — 81001 URINALYSIS AUTO W/SCOPE: CPT | Performed by: EMERGENCY MEDICINE

## 2025-06-11 RX ORDER — CEFTRIAXONE 1 G/1
1 INJECTION, POWDER, FOR SOLUTION INTRAMUSCULAR; INTRAVENOUS
Status: DISCONTINUED | OUTPATIENT
Start: 2025-06-11 | End: 2025-06-13 | Stop reason: HOSPADM

## 2025-06-11 RX ORDER — SIMETHICONE 80 MG
1 TABLET,CHEWABLE ORAL 4 TIMES DAILY PRN
Status: DISCONTINUED | OUTPATIENT
Start: 2025-06-11 | End: 2025-06-13 | Stop reason: HOSPADM

## 2025-06-11 RX ORDER — IBUPROFEN 200 MG
24 TABLET ORAL
Status: DISCONTINUED | OUTPATIENT
Start: 2025-06-11 | End: 2025-06-13 | Stop reason: HOSPADM

## 2025-06-11 RX ORDER — SODIUM CHLORIDE 0.9 % (FLUSH) 0.9 %
10 SYRINGE (ML) INJECTION EVERY 12 HOURS PRN
Status: DISCONTINUED | OUTPATIENT
Start: 2025-06-11 | End: 2025-06-13 | Stop reason: HOSPADM

## 2025-06-11 RX ORDER — DOXYCYCLINE HYCLATE 100 MG
100 TABLET ORAL EVERY 12 HOURS
Status: DISCONTINUED | OUTPATIENT
Start: 2025-06-11 | End: 2025-06-13 | Stop reason: HOSPADM

## 2025-06-11 RX ORDER — GLUCAGON 1 MG
1 KIT INJECTION
Status: DISCONTINUED | OUTPATIENT
Start: 2025-06-11 | End: 2025-06-13 | Stop reason: HOSPADM

## 2025-06-11 RX ORDER — NALOXONE HCL 0.4 MG/ML
0.02 VIAL (ML) INJECTION
Status: DISCONTINUED | OUTPATIENT
Start: 2025-06-11 | End: 2025-06-13 | Stop reason: HOSPADM

## 2025-06-11 RX ORDER — LANOLIN ALCOHOL/MO/W.PET/CERES
800 CREAM (GRAM) TOPICAL
Status: DISCONTINUED | OUTPATIENT
Start: 2025-06-11 | End: 2025-06-13 | Stop reason: HOSPADM

## 2025-06-11 RX ORDER — ONDANSETRON HYDROCHLORIDE 2 MG/ML
4 INJECTION, SOLUTION INTRAVENOUS EVERY 8 HOURS PRN
Status: DISCONTINUED | OUTPATIENT
Start: 2025-06-11 | End: 2025-06-13 | Stop reason: HOSPADM

## 2025-06-11 RX ORDER — ALUMINUM HYDROXIDE, MAGNESIUM HYDROXIDE, AND SIMETHICONE 1200; 120; 1200 MG/30ML; MG/30ML; MG/30ML
30 SUSPENSION ORAL 4 TIMES DAILY PRN
Status: DISCONTINUED | OUTPATIENT
Start: 2025-06-11 | End: 2025-06-13 | Stop reason: HOSPADM

## 2025-06-11 RX ORDER — ENOXAPARIN SODIUM 100 MG/ML
40 INJECTION SUBCUTANEOUS EVERY 24 HOURS
Status: DISCONTINUED | OUTPATIENT
Start: 2025-06-11 | End: 2025-06-13 | Stop reason: HOSPADM

## 2025-06-11 RX ORDER — IBUPROFEN 200 MG
16 TABLET ORAL
Status: DISCONTINUED | OUTPATIENT
Start: 2025-06-11 | End: 2025-06-13 | Stop reason: HOSPADM

## 2025-06-11 RX ORDER — FAMOTIDINE 20 MG/1
20 TABLET, FILM COATED ORAL DAILY
Status: DISCONTINUED | OUTPATIENT
Start: 2025-06-11 | End: 2025-06-13 | Stop reason: HOSPADM

## 2025-06-11 RX ORDER — ABATACEPT 250 MG/15ML
750 INJECTION, POWDER, LYOPHILIZED, FOR SOLUTION INTRAVENOUS
Status: ON HOLD | COMMUNITY

## 2025-06-11 RX ORDER — ACETAMINOPHEN 325 MG/1
650 TABLET ORAL EVERY 4 HOURS PRN
Status: DISCONTINUED | OUTPATIENT
Start: 2025-06-11 | End: 2025-06-13 | Stop reason: HOSPADM

## 2025-06-11 RX ADMIN — SODIUM CHLORIDE 1000 ML: 9 INJECTION, SOLUTION INTRAVENOUS at 04:06

## 2025-06-11 RX ADMIN — FAMOTIDINE 20 MG: 20 TABLET, FILM COATED ORAL at 09:06

## 2025-06-11 RX ADMIN — TRAZODONE HYDROCHLORIDE 25 MG: 50 TABLET ORAL at 11:06

## 2025-06-11 RX ADMIN — IOHEXOL 60 ML: 350 INJECTION, SOLUTION INTRAVENOUS at 05:06

## 2025-06-11 RX ADMIN — DOXYCYCLINE HYCLATE 100 MG: 100 TABLET, COATED ORAL at 11:06

## 2025-06-11 RX ADMIN — ENOXAPARIN SODIUM 40 MG: 40 INJECTION SUBCUTANEOUS at 10:06

## 2025-06-11 RX ADMIN — ACETAMINOPHEN 650 MG: 325 TABLET ORAL at 11:06

## 2025-06-11 RX ADMIN — CEFTRIAXONE SODIUM 1 G: 1 INJECTION, POWDER, FOR SOLUTION INTRAMUSCULAR; INTRAVENOUS at 11:06

## 2025-06-11 NOTE — ED PROVIDER NOTES
"Encounter Date: 6/11/2025       History     Chief Complaint   Patient presents with    Fall     Multiple falls within the past few days. Recent fall yesterday. C/O left rib pain, right wrist, and left ankle pain.     History from patient and daughter.  80-year-old female history of arthritis, breast cancer, hypertension hyperlipidemia thyroid disease presents after multiple falls recently.  Patient states her legs "get weak" and "give out."  Denies general weakness denies dizziness denies syncope.  This has happened 3 times in last 3 days.  She hurt her foot and went to another ER x-rays were negative.  She fell in the next day and hurt her left ribs.  She is complaining of left-sided rib pain and trouble taking a deep breath.  She also feels short of breath.  Oxygen sats on arrival were 89%.  Daughter states patient was admitted in April for pneumonia and hypoxia and her sats at that time were in the 70s.  Daughter states patient has been confused lately as well.  This is unusual for the patient.  No fevers or chills.    The history is provided by the patient and a relative.     Review of patient's allergies indicates:   Allergen Reactions    Succinylcholine chloride Other (See Comments)    Sulfur dioxide Hives    Sulfamethoxazole-trimethoprim      Other reaction(s): per dr daryn Rodríguez (sulfonamide antibiotics)      NOT SURE REACTION     Past Medical History:   Diagnosis Date    Arthritis     rheumatoid    Asthma     Breast cancer     Cancer 2020    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    GERD (gastroesophageal reflux disease) 2022    Hearing loss [id number]    Hyperlipidemia     Hypertension 2021    Limb alert care status     NO BP/IV LEFT ARM    Lung disease     Personal history of colonic polyps 01/23/2024    Pseudocholinesterase deficiency     family history    Radiation 2020    Rheumatoid arthritis 2020    Thyroid disease      Past Surgical History:   Procedure Laterality Date    AXILLARY NODE DISSECTION Left 03/14/2019    " Procedure: LYMPHADENECTOMY, AXILLARY;  Surgeon: Mp Gonzalez MD;  Location: [facility] OR;  Service: General;  Laterality: Left;  left lumpectomy with axillary lypmh node dissection    BALLOON DILATION OF URETER Left 06/24/2019    Procedure: DILATION, URETER, USING BALLOON;  Surgeon: Jorden Dickson MD;  Location: [facility] OR;  Service: Urology;  Laterality: Left;    BREAST BIOPSY Left 20 yrs ago    benign    BREAST CYST EXCISION  15 yrs ago    BREAST LUMPECTOMY      x2    CHOLECYSTECTOMY  2000    COLONOSCOPY  09/25/2018    LUC EASTON MD    CYSTOSCOPY W/ URETERAL STENT PLACEMENT Left 06/24/2019    Procedure: CYSTOSCOPY, WITH URETERAL STENT INSERTION;  Surgeon: Jorden Dickson MD;  Location: [facility] OR;  Service: Urology;  Laterality: Left;    CYSTOSCOPY W/ URETERAL STENT REMOVAL Left 07/23/2019    Procedure: CYSTOSCOPY, WITH URETERAL STENT REMOVAL;  Surgeon: Jorden Dickson MD;  Location: [facility] OR;  Service: Urology;  Laterality: Left;    EPIDURAL STEROID INJECTION INTO LUMBAR SPINE N/A 09/30/2021    Procedure: Injection-steroid-epidural-lumbar;  Surgeon: Nathen Lyons MD;  Location: [facility] OR;  Service: Pain Management;  Laterality: N/A;  L5-S1      EPIDURAL STEROID INJECTION INTO LUMBAR SPINE N/A 11/16/2021    Procedure: Injection-steroid-epidural-lumbar;  Surgeon: Nathen Lyons MD;  Location: [facility] OR;  Service: Pain Management;  Laterality: N/A;  L5-S1    EXTRACORPOREAL SHOCK WAVE LITHOTRIPSY Left 07/23/2019    Procedure: LITHOTRIPSY, ESWL;  Surgeon: Jorden Dickson MD;  Location: [facility] OR;  Service: Urology;  Laterality: Left;    EYE SURGERY Bilateral 2000    cataracts    HYSTERECTOMY  1994    MASTECTOMY, PARTIAL Left 04/25/2019    Procedure: MASTECTOMY, PARTIAL;  Surgeon: Mp Gonzalez MD;  Location: [facility] OR;  Service: General;  Laterality: Left;    NEEDLE LOCALIZATION Left 03/14/2019    Procedure: NEEDLE LOCALIZATION;  Surgeon: Mp Gonzalez MD;  Location: [facility] OR;  Service: General;  Laterality:  Left;  left lumpectomy with wire needle localization     PARTIAL NEPHRECTOMY Right 05/22/2023    RETROGRADE PYELOGRAPHY Bilateral 06/24/2019    Procedure: PYELOGRAM, RETROGRADE;  Surgeon: Jorden Dickson MD;  Location: [facility] OR;  Service: Urology;  Laterality: Bilateral;    SENTINEL LYMPH NODE BIOPSY Left 03/14/2019    Procedure: BIOPSY, LYMPH NODE, SENTINEL;  Surgeon: Mp Gonzalez MD;  Location: [facility] OR;  Service: General;  Laterality: Left;  left sentinel node lymph node biopsy    TONSILLECTOMY  1948    TRANSFORAMINAL EPIDURAL INJECTION OF STEROID Right 01/04/2022    Procedure: Injection,steroid,epidural,transforaminal approach;  Surgeon: Nathen Lyons MD;  Location: [facility] OR;  Service: Pain Management;  Laterality: Right;  L4-L5, L5-s1    TRANSFORAMINAL EPIDURAL INJECTION OF STEROID Right 07/09/2024    Procedure: Injection,steroid,epidural,transforaminal approach;  Surgeon: Nathen Lyons MD;  Location: [facility] OR;  Service: Pain Management;  Laterality: Right;    URETEROSCOPY Left 06/24/2019    Procedure: URETEROSCOPY;  Surgeon: Jorden Dickson MD;  Location: [facility] OR;  Service: Urology;  Laterality: Left;     Family History   Problem Relation Name Age of Onset    Heart disease Mother Patricia     Hypertension Mother Patricia     Alzheimer's disease Mother Patricia     Cancer Father Kirstie     Heart disease Sister Ania     Arthritis Sister Ania     Kidney disease Sister Ania     Stroke Sister Alvina sister         Sister    Diabetes Brother Nicklos     Cancer Daughter      Stroke Sister Alvina      Social History[1]  Review of Systems   Constitutional:  Negative for activity change, appetite change, chills, diaphoresis, fatigue and fever.   HENT: Negative.     Respiratory:  Positive for shortness of breath.    Cardiovascular:  Negative for chest pain and leg swelling.   Gastrointestinal: Negative.    Genitourinary: Negative.    Musculoskeletal:  Positive for arthralgias.   Skin: Negative.    Neurological:   Negative for headaches.   Psychiatric/Behavioral:  Positive for confusion.        Physical Exam     Initial Vitals [06/11/25 1356]   BP Pulse Resp Temp SpO2   121/67 90 (!) 22 98.4 °F (36.9 °C) (!) 89 %      MAP       --         Physical Exam    Nursing note and vitals reviewed.  Constitutional: She appears well-developed and well-nourished. She is not diaphoretic.  Non-toxic appearance. She does not have a sickly appearance. She does not appear ill. No distress.   HENT:   Head: Normocephalic and atraumatic.   Eyes: EOM are normal.   Neck: Neck supple.   Normal range of motion.   Full passive range of motion without pain.     Cardiovascular:  Normal rate, regular rhythm and normal heart sounds.           Pulmonary/Chest: Breath sounds normal. No respiratory distress. She has no wheezes. She has no rhonchi. She has no rales.   Abdominal: Abdomen is soft. She exhibits no distension. There is abdominal tenderness in the left upper quadrant. There is no rebound and no guarding.   Musculoskeletal:         General: No edema.      Right shoulder: Tenderness and bony tenderness present. No swelling, deformity or effusion. Decreased range of motion.      Left shoulder: Normal.      Right elbow: Normal.      Left elbow: Normal.      Right wrist: Normal.      Left wrist: Normal.        Hands:       Cervical back: Normal, full passive range of motion without pain, normal range of motion and neck supple. No rigidity. No spinous process tenderness or muscular tenderness. Normal range of motion.      Thoracic back: Normal.      Lumbar back: Normal.      Right hip: Normal.      Left hip: Normal.      Right knee: Normal.      Left knee: Normal.      Right ankle: Normal.      Left ankle: Swelling and ecchymosis present. No deformity or lacerations. Tenderness present over the lateral malleolus and medial malleolus. No proximal fibula tenderness. Decreased range of motion.      Left foot: Decreased range of motion. Swelling,  tenderness and bony tenderness present.        Legs:       Comments: Distal fracture blister L foot     Neurological: She is alert and oriented to person, place, and time.   Skin: Skin is warm and dry.   Psychiatric: She has a normal mood and affect. Her behavior is normal. Judgment and thought content normal.         ED Course   Procedures  Labs Reviewed   COMPREHENSIVE METABOLIC PANEL - Abnormal       Result Value    Sodium 137      Potassium 4.0      Chloride 97      CO2 29      Glucose 126 (*)     BUN 26 (*)     Creatinine 1.4      Calcium 9.9      Protein Total 7.6      Albumin 3.8      Bilirubin Total 0.5      ALP 61      AST 23      ALT 11      Anion Gap 11      eGFR 38 (*)    URINALYSIS, REFLEX TO URINE CULTURE - Abnormal    Color, UA Yellow      Appearance, UA Clear      Spec Grav UA 1.010      pH, UA 5.0      Protein, UA Negative      Glucose, UA Negative      Ketones, UA Negative      Blood, UA Negative      Bilirubin, UA Negative      Urobilinogen, UA Negative      Nitrites, UA Negative      Leukocyte Esterase, UA Trace (*)    TSH - Abnormal    TSH 0.141 (*)     Free T4 1.33     CBC WITH DIFFERENTIAL - Abnormal    WBC 12.23      RBC 3.58 (*)     Hgb 11.5 (*)     Hct 36.3 (*)      (*)     MCH 32.1 (*)     MCHC 31.7 (*)     RDW 15.1 (*)     Platelet Count 171      MPV 10.9      Nucleated RBC 0      Neut % 61.5      Lymph % 26.7      Mono % 10.5      Eos % 0.7      Basophil % 0.2      Imm Grans % 0.4      Neut # 7.5      Lymph # 3.27      Mono # 1.29 (*)     Eos # 0.08      Baso # 0.02      Imm Grans # 0.05 (*)    CBC W/ AUTO DIFFERENTIAL    Narrative:     The following orders were created for panel order CBC auto differential.  Procedure                               Abnormality         Status                     ---------                               -----------         ------                     CBC with Differential[[id number]]       Abnormal            Final result                 Please view  results for these tests on the individual orders.   URINALYSIS MICROSCOPIC    RBC, UA <1      WBC, UA 2      Squamous Epithelial Cells, UA 1      Microscopic Comment              Imaging Results              MRA Brain without contrast (In process)                      MRI Brain Without Contrast (In process)                      CT Abdomen Pelvis With IV Contrast NO Oral Contrast (Final result)  Result time 06/11/25 18:03:29      Final result by Galo Bush MD (06/11/25 18:03:29)                   Impression:      No evidence of acute process involving the abdomen or pelvis with chronic and postoperative findings as above.      Electronically signed by: Galo Bush  Date:    06/11/2025  Time:    18:03               Narrative:    EXAMINATION:  CT ABDOMEN PELVIS WITH IV CONTRAST    CLINICAL HISTORY:  Abdominal trauma, blunt;    TECHNIQUE:  Low dose axial images, sagittal and coronal reformations were obtained from the lung bases to the pubic symphysis following the IV administration of 60 mL of Omnipaque 350.    COMPARISON:  CT abdomen pelvis from 04/24/2020    FINDINGS:  Lung bases: No worrisome pulmonary nodules or focal consolidations are identified.  Bibasilar atelectasis versus scarring is present.  Atherosclerotic calcification is present involving the coronary arteries.    Liver: No focal mass.    Gallbladder: The gallbladder is surgically absent.    Bile Ducts: No evidence of intra or extra hepatic biliary ductal dilation.    Spleen: Unremarkable.    Kidneys: The right kidney is surgically absent.  Nonobstructing renal calculi are redemonstrated within the left kidney at the lower pole.  No evidence of renal mass or obstruction.  A left renal cyst is present.    Adrenals: Unremarkable.    Pancreas: No mass or peripancreatic fat stranding.    Bowel: No evidence of bowel obstruction or inflammation. There are no findings to suggest appendicitis in the abdominal right lower quadrant.  The colon  demonstrates diverticulosis without evidence of diverticulitis.    Lymph nodes: No evidence of lymphadenopathy involving the abdomen or pelvis.    Vascular: The abdominal aorta is normal in diameter. Moderate to prominent atherosclerotic calcification is present involving the abdominal aorta and its major branching vessels.    Pelvic organs: The uterus is surgically absent.    Urinary Bladder: Unremarkable.    Bones:  No evidence of acute osseous process.  Chronic fractures of the left 6th, 7th, and 8th ribs are present anterolaterally.  Mild multilevel chronic degenerative changes are present.    Abdominal wall:  Unremarkable.    Other: None.                                       CT Head Without Contrast (Final result)  Result time 06/11/25 17:14:37      Final result by Galo Bush MD (06/11/25 17:14:37)                   Impression:      No evidence of acute intracranial abnormality.      Electronically signed by: Galo Bush  Date:    06/11/2025  Time:    17:14               Narrative:    EXAMINATION:  CT HEAD WITHOUT CONTRAST    CLINICAL HISTORY:  Head trauma, minor (Age >= 65y);Mental status change, unknown cause;    TECHNIQUE:  Low dose axial CT images obtained throughout the head without intravenous contrast. Sagittal and coronal reconstructions were performed.    COMPARISON:  None available.    FINDINGS:  Intracranial compartment:    Mild global cerebral atrophy is present.  The ventricles are normal in size for age without evidence of hydrocephalus. No extra-axial blood or fluid collections.    The brain parenchyma appears normal. No parenchymal mass, hemorrhage, edema or major vascular distribution infarct.    Skull/extracranial contents (limited evaluation):    No fracture. Mastoid air cells and paranasal sinuses are essentially clear.  The native ocular lenses are absent.  The orbits are otherwise unremarkable.                                       X-Ray Foot Complete Left (Final result)   Result time 06/11/25 14:53:52      Final result by Mp Giron MD (06/11/25 14:53:52)                   Impression:      Moderate soft tissue swelling of the left foot and ankle without acute osseous abnormality.      Electronically signed by: Mp Giron MD  Date:    06/11/2025  Time:    14:53               Narrative:    EXAMINATION:  XR ANKLE COMPLETE 3 VIEW LEFT; XR FOOT COMPLETE 3 VIEW LEFT    CLINICAL HISTORY:  Pain in left ankle and joints of left foot; Pain in left foot    TECHNIQUE:  AP, lateral and oblique views of the left ankle were performed.  Three views left foot also obtained.    COMPARISON:  None    FINDINGS:  No fracture or dislocation.  There is moderate soft tissue swelling diffusely about the left ankle.  Small Achilles enthesophyte.    Bones of the left foot are intact and located.  Moderate soft tissue swelling is present.  There is mild degenerative change of the 1st metatarsophalangeal joint.                                       X-Ray Ankle Complete Left (Final result)  Result time 06/11/25 14:53:52      Final result by Mp Giron MD (06/11/25 14:53:52)                   Impression:      Moderate soft tissue swelling of the left foot and ankle without acute osseous abnormality.      Electronically signed by: Mp Giron MD  Date:    06/11/2025  Time:    14:53               Narrative:    EXAMINATION:  XR ANKLE COMPLETE 3 VIEW LEFT; XR FOOT COMPLETE 3 VIEW LEFT    CLINICAL HISTORY:  Pain in left ankle and joints of left foot; Pain in left foot    TECHNIQUE:  AP, lateral and oblique views of the left ankle were performed.  Three views left foot also obtained.    COMPARISON:  None    FINDINGS:  No fracture or dislocation.  There is moderate soft tissue swelling diffusely about the left ankle.  Small Achilles enthesophyte.    Bones of the left foot are intact and located.  Moderate soft tissue swelling is present.  There is mild degenerative change of the 1st  metatarsophalangeal joint.                                       X-Ray Hand 3 view Right (Final result)  Result time 06/11/25 14:51:14      Final result by Mp Giron MD (06/11/25 14:51:14)                   Impression:      No acute osseous abnormality.      Electronically signed by: Mp Giron MD  Date:    06/11/2025  Time:    14:51               Narrative:    EXAMINATION:  XR HAND COMPLETE 3 VIEW RIGHT    CLINICAL HISTORY:  R hand pain;    TECHNIQUE:  PA, lateral, and oblique views of the right hand were performed.    COMPARISON:  None    FINDINGS:  There is no fracture or dislocation.  There is moderate 1st carpometacarpal joint and triscaphe joint degenerative change.  There also scattered interphalangeal joint degenerative changes.  The soft tissues are unremarkable.                                       X-Ray Chest AP Portable (Final result)  Result time 06/11/25 14:50:27      Final result by Mp Giron MD (06/11/25 14:50:27)                   Impression:      No acute process.  No significant change.      Electronically signed by: Mp Giron MD  Date:    06/11/2025  Time:    14:50               Narrative:    EXAMINATION:  XR CHEST AP PORTABLE    CLINICAL HISTORY:  rib pain falls;    TECHNIQUE:  Single frontal view of the chest was performed.    COMPARISON:  04/24/2025    FINDINGS:  The cardiomediastinal silhouette is with normal limits.  There is relative elevation of the right hemidiaphragm without significant change.  No consolidation, edema, or pleural effusion.                                       Medications   sodium chloride 0.9% flush 10 mL (has no administration in time range)   naloxone 0.4 mg/mL injection 0.02 mg (has no administration in time range)   glucose chewable tablet 16 g (has no administration in time range)   glucose chewable tablet 24 g (has no administration in time range)   dextrose 50% injection 12.5 g (has no administration in time range)    dextrose 50% injection 25 g (has no administration in time range)   glucagon (human recombinant) injection 1 mg (has no administration in time range)   enoxaparin injection 40 mg (has no administration in time range)   potassium bicarbonate disintegrating tablet 50 mEq (has no administration in time range)   potassium bicarbonate disintegrating tablet 35 mEq (has no administration in time range)   potassium bicarbonate disintegrating tablet 60 mEq (has no administration in time range)   magnesium oxide tablet 800 mg (has no administration in time range)   magnesium oxide tablet 800 mg (has no administration in time range)   acetaminophen tablet 650 mg (has no administration in time range)   ondansetron injection 4 mg (has no administration in time range)   simethicone chewable tablet 80 mg (has no administration in time range)   aluminum-magnesium hydroxide-simethicone 200-200-20 mg/5 mL suspension 30 mL (has no administration in time range)   famotidine tablet 20 mg (has no administration in time range)   iohexoL (OMNIPAQUE 350) 350 mg iodine/mL injection (60 mLs Intravenous Given 6/11/25 1707)   sodium chloride 0.9% bolus 1,000 mL 1,000 mL (0 mLs Intravenous Stopped 6/11/25 1745)     Medical Decision Making  80-year-old female presents to the ER after multiple recent falls.  She states that her legs simply get weak and she falls.  Denies syncope denies dizziness.  No focal neurologic findings on exam.  ER workup is unremarkable.  Head CT CT abdomen and pelvis labs urine all unremarkable.  Patient does have findings consistent with injuries from the falls, she has a significant left ankle sprain, periorbital ecchymosis to the right eye, right-hand bruising.  She will be admitted to Hospital Medicine for multiple falls.  Daughter also states some confusion recently which is unusual for the patient.    Amount and/or Complexity of Data Reviewed  Independent Historian: caregiver  Labs: ordered. Decision-making  details documented in ED Course.  Radiology: ordered. Decision-making details documented in ED Course.  ECG/medicine tests: ordered and independent interpretation performed. Decision-making details documented in ED Course.    Risk  Decision regarding hospitalization.               ED Course as of 25   1359 BP: 121/67 [EF]   1359 Temp: 98.4 °F (36.9 °C) [EF]   1359 Temp Source: Oral [EF]   1359 Pulse: 90 [EF]   1359 Resp(!): 22 [EF]   1359 SpO2(!): 89 % [EF]   1453 X-Ray Chest AP Portable [EF]   1456 X-Ray Hand 3 view Right [EF]   1456 X-Ray Foot Complete Left [EF]   1456 X-Ray Ankle Complete Left [EF]   1509 WBC: 12.23 [EF]   1509 Hemoglobin(!): 11.5 [EF]   1509 Platelet Count: 171 [EF]   1536 Glucose(!): 126 [EF]   1536 BUN(!): 26 [EF]   1536 Creatinine: 1.4 [EF]   1536 Calcium: 9.9 [EF]   1626 Leukocyte Esterase, UA(!): Trace [EF]   1626 Urobilinogen, UA: Negative [EF]   1717 CT Head Without Contrast [EF]   1806 CT Abdomen Pelvis With IV Contrast NO Oral Contrast [EF]       1841 Halima to admit [EF]    Sinus rhythm 87 beats per minute left axis no ST elevation or depression or T-wave inversion independently interpreted [EF]      ED Course User Index  [EF] Jovanni Salgado MD                           Clinical Impression:  Final diagnoses:  [R29.6] Falls  [M25.572] Left ankle pain  [M79.672] Foot pain, left  [R26.9] Gait abnormality          ED Disposition Condition    Observation                       [1]   Social History  Tobacco Use    Smoking status: Former     Current packs/day: 0.00     Average packs/day: 0.5 packs/day for 59.3 years (29.6 ttl pk-yrs)     Types: Cigarettes     Start date: 1960     Quit date: 2019     Years since quittin.8    Smokeless tobacco: Never   Substance Use Topics    Alcohol use: Yes     Alcohol/week: 2.0 standard drinks of alcohol     Types: 2 Glasses of wine per week     Comment: Social    Drug use: No        Jovanni Salgado,  MD  06/11/25 2001

## 2025-06-11 NOTE — PHARMACY MED REC
"Admission Medication History     The home medication history was taken by Sanya Manzanares.    You may go to "Admission" then "Reconcile Home Medications" tabs to review and/or act upon these items.     The home medication list has been updated by the Pharmacy department.   Please read ALL comments highlighted in yellow.   Please address this information as you see fit.    Feel free to contact us if you have any questions or require assistance.      The medications listed below were removed from the home medication list. Please reorder if appropriate:  Patient reports no longer taking the following medication(s):  Diphenhydramine 25 mg  Hydroxychloroquine 25 mg  Compazine 10 mg    Medications listed below were obtained from: Patient/family and Analytic software- [employer]  Current Facility-Administered Medications on File Prior to Encounter   Medication Dose Route Frequency Provider Last Rate Last Admin    lactated ringers infusion   Intravenous Once PRN Nathen Lyons MD        lidocaine (PF) 10 mg/ml (1%) injection 10 mg  1 mL Intradermal Once Eladia Schwartz MD         Current Outpatient Medications on File Prior to Encounter   Medication Sig Dispense Refill    abatacept, with maltose, (ORENCIA, WITH MALTOSE,) 250 mg SolR injection Inject 750 mg into the skin every 28 days.      albuterol (ACCUNEB) 1.25 mg/3 mL Nebu Take 3 mLs (1.25 mg total) by nebulization every 6 (six) hours as needed (shortness of breath). Rescue 75 mL 6    albuterol (PROVENTIL/VENTOLIN HFA) 90 mcg/actuation inhaler Inhale 2 puffs into the lungs every 6 (six) hours as needed for Wheezing. Rescue 54 g 3    amLODIPine (NORVASC) 5 MG tablet TAKE 1 TABLET BY MOUTH EVERY DAY (Patient taking differently: Take 5 mg by mouth nightly.) 90 tablet 3    atorvastatin (LIPITOR) 20 MG tablet TAKE 1 TABLET BY MOUTH EVERY DAY (Patient taking differently: Take 20 mg by mouth every evening.) 90 tablet 3    cholecalciferol, vitamin D3, (VITAMIN D3) 25 mcg (1,000 " unit) capsule Take 1 capsule by mouth every morning.      cyanocobalamin (VITAMIN B-12) 1000 MCG tablet Take 100 mcg by mouth once daily.      exemestane (AROMASIN) 25 mg tablet TAKE 1 TABLET BY MOUTH EVERY DAY (Patient taking differently: Take 25 mg by mouth nightly.) 60 tablet 5    fluticasone propionate (FLONASE) 50 mcg/actuation nasal spray 1 spray (50 mcg total) by Each Nostril route once daily. 48 mL 3    fluticasone-salmeterol 250-50 mcg/dose (WIXELA INHUB) 250-50 mcg/dose diskus inhaler INHALE 1 PUFF INTO THE LUNGS 2 (TWO) TIMES DAILY. CONTROLLER 180 each 3    folic acid (FOLVITE) 1 MG tablet Take 1 tablet (1,000 mcg total) by mouth once daily. 90 tablet 3    gabapentin (NEURONTIN) 300 MG capsule TAKE 1 CAPSULE BY MOUTH TWICE A DAY 60 capsule 2    hydroCHLOROthiazide (HYDRODIURIL) 25 MG tablet TAKE 1 TABLET BY MOUTH EVERY DAY (Patient taking differently: Take 25 mg by mouth once daily.) 90 tablet 3    levothyroxine (SYNTHROID) 125 MCG tablet Take 125 mcg by mouth before breakfast.      ondansetron (ZOFRAN-ODT) 8 MG TbDL DISSOLVE 1 TABLET IN MOUTH EVERY 12 HOURS AS NEEDED (Patient taking differently: Take 8 mg by mouth every 12 (twelve) hours as needed (Nausea). DISSOLVE 1 TABLET IN MOUTH EVERY 12 HOURS AS NEEDED) 30 tablet 0    methotrexate 2.5 MG Tab Take 8 tablets (20 mg total) by mouth every 7 days. (Patient not taking: Reported on 6/11/2025) 96 tablet 3    predniSONE (DELTASONE) 5 MG tablet Take 1 tablet (5 mg total) by mouth once daily. (Patient not taking: Reported on 6/11/2025) 90 tablet 3    [DISCONTINUED] diphenhydrAMINE (SOMINEX) 25 mg tablet Take 25 mg by mouth every evening.      [DISCONTINUED] hydroxychloroquine (PLAQUENIL) 200 mg tablet Take 200 mg by mouth 2 (two) times daily. (Patient not taking: Reported on 6/11/2025)      [DISCONTINUED] prochlorperazine (COMPAZINE) 10 MG tablet TAKE 1 TABLET BY MOUTH EVERY 6 HOURS AS NEEDED 60 tablet 0         Sanya Manzanares  EXT  1920                .

## 2025-06-12 PROBLEM — R29.6 MULTIPLE FALLS: Status: ACTIVE | Noted: 2025-06-12

## 2025-06-12 PROBLEM — R55 SYNCOPE: Status: ACTIVE | Noted: 2025-06-12

## 2025-06-12 LAB
ALBUMIN SERPL-MCNC: 3.1 G/DL (ref 3.5–5.2)
ALP SERPL-CCNC: 55 UNIT/L (ref 40–150)
ALT SERPL-CCNC: 13 UNIT/L (ref 10–44)
ANION GAP (SMH): 8 MMOL/L (ref 8–16)
AORTIC ROOT ANNULUS: 3 CM
AORTIC SIZE INDEX: 1.6 CM/M2
AORTIC VALVE CUSP SEPERATION: 1.57 CM
APICAL FOUR CHAMBER EJECTION FRACTION: 63 %
APICAL TWO CHAMBER EJECTION FRACTION: 60 %
ASCENDING AORTA: 3 CM
AST SERPL-CCNC: 24 UNIT/L (ref 11–45)
AV INDEX (PROSTH): 0.67
AV MEAN GRADIENT: 9 MMHG
AV PEAK GRADIENT: 16 MMHG
AV VALVE AREA BY VELOCITY RATIO: 2.1 CM²
AV VALVE AREA: 2.3 CM²
AV VELOCITY RATIO: 0.6
BILIRUB SERPL-MCNC: 0.3 MG/DL (ref 0.1–1)
BSA FOR ECHO PROCEDURE: 1.93 M2
BUN SERPL-MCNC: 19 MG/DL (ref 8–23)
CALCIUM SERPL-MCNC: 9.1 MG/DL (ref 8.7–10.5)
CHLORIDE SERPL-SCNC: 100 MMOL/L (ref 95–110)
CO2 SERPL-SCNC: 31 MMOL/L (ref 23–29)
CREAT SERPL-MCNC: 1.1 MG/DL (ref 0.5–1.4)
CV ECHO LV RWT: 0.4 CM
DOP CALC AO PEAK VEL: 2 M/S
DOP CALC AO VTI: 35.6 CM
DOP CALC LVOT AREA: 3.5 CM2
DOP CALC LVOT DIAMETER: 2.1 CM
DOP CALC LVOT PEAK VEL: 1.2 M/S
DOP CALC LVOT STROKE VOLUME: 83.1 CM3
DOP CALC MV VTI: 24.9 CM
DOP CALCLVOT PEAK VEL VTI: 24 CM
E WAVE DECELERATION TIME: 243 MSEC
E/A RATIO: 0.75
E/E' RATIO: 13 M/S
ECHO LV POSTERIOR WALL: 1 CM (ref 0.6–1.1)
ERYTHROCYTE [DISTWIDTH] IN BLOOD BY AUTOMATED COUNT: 15 % (ref 11.5–14.5)
FRACTIONAL SHORTENING: 34 % (ref 28–44)
GFR SERPLBLD CREATININE-BSD FMLA CKD-EPI: 51 ML/MIN/1.73/M2
GLUCOSE SERPL-MCNC: 108 MG/DL (ref 70–110)
HCT VFR BLD AUTO: 33.9 % (ref 37–48.5)
HGB BLD-MCNC: 10.3 GM/DL (ref 12–16)
INTERVENTRICULAR SEPTUM: 1 CM (ref 0.6–1.1)
IVRT: 126 MSEC
LA MAJOR: 5.5 CM
LEFT ATRIUM AREA SYSTOLIC (APICAL 2 CHAMBER): 21.66 CM2
LEFT ATRIUM AREA SYSTOLIC (APICAL 4 CHAMBER): 21.55 CM2
LEFT ATRIUM VOLUME INDEX MOD: 36 ML/M2
LEFT ATRIUM VOLUME MOD: 69 ML
LEFT INTERNAL DIMENSION IN SYSTOLE: 3.3 CM (ref 2.1–4)
LEFT VENTRICLE DIASTOLIC VOLUME INDEX: 61.78 ML/M2
LEFT VENTRICLE DIASTOLIC VOLUME: 118 ML
LEFT VENTRICLE END DIASTOLIC VOLUME APICAL 2 CHAMBER: 56.5 ML
LEFT VENTRICLE END DIASTOLIC VOLUME APICAL 4 CHAMBER: 76.13 ML
LEFT VENTRICLE END SYSTOLIC VOLUME APICAL 2 CHAMBER: 68.7 ML
LEFT VENTRICLE END SYSTOLIC VOLUME APICAL 4 CHAMBER: 69.44 ML
LEFT VENTRICLE MASS INDEX: 95.3 G/M2
LEFT VENTRICLE SYSTOLIC VOLUME INDEX: 23.6 ML/M2
LEFT VENTRICLE SYSTOLIC VOLUME: 45 ML
LEFT VENTRICULAR INTERNAL DIMENSION IN DIASTOLE: 5 CM (ref 3.5–6)
LEFT VENTRICULAR MASS: 182 G
LV LATERAL E/E' RATIO: 13.3 M/S
LV SEPTAL E/E' RATIO: 13.3 M/S
LVED V (TEICH): 117.74 ML
LVES V (TEICH): 44.99 ML
LVOT MG: 4.03 MMHG
LVOT MV: 0.97 CM/S
MAGNESIUM SERPL-MCNC: 2.1 MG/DL (ref 1.6–2.6)
MCH RBC QN AUTO: 31.1 PG (ref 27–31)
MCHC RBC AUTO-ENTMCNC: 30.4 G/DL (ref 32–36)
MCV RBC AUTO: 102 FL (ref 82–98)
MV MEAN GRADIENT: 3 MMHG
MV PEAK A VEL: 1.06 M/S
MV PEAK E VEL: 0.8 M/S
MV PEAK GRADIENT: 8 MMHG
MV STENOSIS PRESSURE HALF TIME: 60.96 MS
MV VALVE AREA BY CONTINUITY EQUATION: 3.34 CM2
MV VALVE AREA P 1/2 METHOD: 3.61 CM2
OHS CV RV/LV RATIO: 0.7 CM
OHS LV EJECTION FRACTION SIMPSONS BIPLANE MOD: 63 %
PHOSPHATE SERPL-MCNC: 2.7 MG/DL (ref 2.7–4.5)
PISA MRMAX VEL: 4.58 M/S
PISA TR MAX VEL: 2.8 M/S
PLATELET # BLD AUTO: 163 K/UL (ref 150–450)
PMV BLD AUTO: 11 FL (ref 9.2–12.9)
POTASSIUM SERPL-SCNC: 3.8 MMOL/L (ref 3.5–5.1)
PROT SERPL-MCNC: 6.5 GM/DL (ref 6–8.4)
PV MV: 0.7 M/S
PV PEAK GRADIENT: 4 MMHG
PV PEAK VELOCITY: 0.98 M/S
RA MAJOR: 4.45 CM
RA PRESSURE ESTIMATED: 3 MMHG
RBC # BLD AUTO: 3.31 M/UL (ref 4–5.4)
RIGHT VENTRICLE DIASTOLIC BASEL DIMENSION: 3.5 CM
RIGHT VENTRICLE DIASTOLIC LENGTH: 4.6 CM
RIGHT VENTRICULAR END-DIASTOLIC DIMENSION: 3.54 CM
RIGHT VENTRICULAR LENGTH IN DIASTOLE (APICAL 4-CHAMBER VIEW): 4.59 CM
RV TB RVSP: 6 MMHG
RV TISSUE DOPPLER FREE WALL SYSTOLIC VELOCITY 1 (APICAL 4 CHAMBER VIEW): 17.16 CM/S
SODIUM SERPL-SCNC: 139 MMOL/L (ref 136–145)
STJ: 2.9 CM
TDI LATERAL: 0.06 M/S
TDI SEPTAL: 0.06 M/S
TDI: 0.06 M/S
TR MAX PG: 31 MMHG
TRICUSPID ANNULAR PLANE SYSTOLIC EXCURSION: 2.5 CM
TROPONIN I SERPL HS-MCNC: 6 NG/L
TROPONIN I SERPL HS-MCNC: 8 NG/L
TROPONIN I SERPL HS-MCNC: 9 NG/L
TV REST PULMONARY ARTERY PRESSURE: 34 MMHG
WBC # BLD AUTO: 8.34 K/UL (ref 3.9–12.7)
Z-SCORE OF LEFT VENTRICULAR DIMENSION IN END DIASTOLE: -0.72
Z-SCORE OF LEFT VENTRICULAR DIMENSION IN END SYSTOLE: -0.02

## 2025-06-12 PROCEDURE — 25000003 PHARM REV CODE 250

## 2025-06-12 PROCEDURE — 99900031 HC PATIENT EDUCATION (STAT)

## 2025-06-12 PROCEDURE — 84100 ASSAY OF PHOSPHORUS: CPT

## 2025-06-12 PROCEDURE — 97116 GAIT TRAINING THERAPY: CPT

## 2025-06-12 PROCEDURE — 25000003 PHARM REV CODE 250: Performed by: HOSPITALIST

## 2025-06-12 PROCEDURE — 83735 ASSAY OF MAGNESIUM: CPT

## 2025-06-12 PROCEDURE — 25000242 PHARM REV CODE 250 ALT 637 W/ HCPCS

## 2025-06-12 PROCEDURE — 97535 SELF CARE MNGMENT TRAINING: CPT

## 2025-06-12 PROCEDURE — 84484 ASSAY OF TROPONIN QUANT: CPT | Performed by: HOSPITALIST

## 2025-06-12 PROCEDURE — 94640 AIRWAY INHALATION TREATMENT: CPT | Mod: XB

## 2025-06-12 PROCEDURE — 36415 COLL VENOUS BLD VENIPUNCTURE: CPT | Performed by: HOSPITALIST

## 2025-06-12 PROCEDURE — 36415 COLL VENOUS BLD VENIPUNCTURE: CPT

## 2025-06-12 PROCEDURE — 99900035 HC TECH TIME PER 15 MIN (STAT)

## 2025-06-12 PROCEDURE — 25000242 PHARM REV CODE 250 ALT 637 W/ HCPCS: Performed by: HOSPITALIST

## 2025-06-12 PROCEDURE — 94761 N-INVAS EAR/PLS OXIMETRY MLT: CPT

## 2025-06-12 PROCEDURE — 80053 COMPREHEN METABOLIC PANEL: CPT

## 2025-06-12 PROCEDURE — 63600175 PHARM REV CODE 636 W HCPCS

## 2025-06-12 PROCEDURE — 11000001 HC ACUTE MED/SURG PRIVATE ROOM

## 2025-06-12 PROCEDURE — 27000221 HC OXYGEN, UP TO 24 HOURS

## 2025-06-12 PROCEDURE — 97161 PT EVAL LOW COMPLEX 20 MIN: CPT

## 2025-06-12 PROCEDURE — 85027 COMPLETE CBC AUTOMATED: CPT

## 2025-06-12 PROCEDURE — 97165 OT EVAL LOW COMPLEX 30 MIN: CPT

## 2025-06-12 RX ORDER — BUDESONIDE 0.5 MG/2ML
0.5 INHALANT ORAL EVERY 12 HOURS
Status: DISCONTINUED | OUTPATIENT
Start: 2025-06-12 | End: 2025-06-13 | Stop reason: HOSPADM

## 2025-06-12 RX ORDER — PREDNISONE 20 MG/1
40 TABLET ORAL DAILY
Status: DISCONTINUED | OUTPATIENT
Start: 2025-06-12 | End: 2025-06-13 | Stop reason: HOSPADM

## 2025-06-12 RX ORDER — FLUTICASONE FUROATE AND VILANTEROL 100; 25 UG/1; UG/1
1 POWDER RESPIRATORY (INHALATION) DAILY
Status: DISCONTINUED | OUTPATIENT
Start: 2025-06-12 | End: 2025-06-12

## 2025-06-12 RX ORDER — HYDROCODONE BITARTRATE AND ACETAMINOPHEN 5; 325 MG/1; MG/1
1 TABLET ORAL EVERY 6 HOURS PRN
Refills: 0 | Status: DISCONTINUED | OUTPATIENT
Start: 2025-06-12 | End: 2025-06-13 | Stop reason: HOSPADM

## 2025-06-12 RX ORDER — SODIUM CHLORIDE, SODIUM LACTATE, POTASSIUM CHLORIDE, CALCIUM CHLORIDE 600; 310; 30; 20 MG/100ML; MG/100ML; MG/100ML; MG/100ML
INJECTION, SOLUTION INTRAVENOUS CONTINUOUS
Status: ACTIVE | OUTPATIENT
Start: 2025-06-12 | End: 2025-06-12

## 2025-06-12 RX ORDER — ARFORMOTEROL TARTRATE 15 UG/2ML
15 SOLUTION RESPIRATORY (INHALATION) 2 TIMES DAILY
Status: DISCONTINUED | OUTPATIENT
Start: 2025-06-12 | End: 2025-06-13 | Stop reason: HOSPADM

## 2025-06-12 RX ORDER — SODIUM CHLORIDE 0.9 % (FLUSH) 0.9 %
3 SYRINGE (ML) INJECTION
Status: DISCONTINUED | OUTPATIENT
Start: 2025-06-12 | End: 2025-06-13 | Stop reason: HOSPADM

## 2025-06-12 RX ORDER — LEVOTHYROXINE SODIUM 125 UG/1
125 TABLET ORAL
Status: DISCONTINUED | OUTPATIENT
Start: 2025-06-12 | End: 2025-06-13 | Stop reason: HOSPADM

## 2025-06-12 RX ORDER — ATORVASTATIN CALCIUM 20 MG/1
20 TABLET, FILM COATED ORAL NIGHTLY
Status: DISCONTINUED | OUTPATIENT
Start: 2025-06-12 | End: 2025-06-13 | Stop reason: HOSPADM

## 2025-06-12 RX ORDER — IPRATROPIUM BROMIDE AND ALBUTEROL SULFATE 2.5; .5 MG/3ML; MG/3ML
3 SOLUTION RESPIRATORY (INHALATION)
Status: DISCONTINUED | OUTPATIENT
Start: 2025-06-12 | End: 2025-06-13 | Stop reason: HOSPADM

## 2025-06-12 RX ADMIN — ATORVASTATIN CALCIUM 20 MG: 20 TABLET, FILM COATED ORAL at 08:06

## 2025-06-12 RX ADMIN — ARFORMOTEROL TARTRATE 15 MCG: 15 SOLUTION RESPIRATORY (INHALATION) at 07:06

## 2025-06-12 RX ADMIN — HYDROCODONE BITARTRATE AND ACETAMINOPHEN 1 TABLET: 5; 325 TABLET ORAL at 08:06

## 2025-06-12 RX ADMIN — HYDROCODONE BITARTRATE AND ACETAMINOPHEN 1 TABLET: 5; 325 TABLET ORAL at 01:06

## 2025-06-12 RX ADMIN — CEFTRIAXONE SODIUM 1 G: 1 INJECTION, POWDER, FOR SOLUTION INTRAMUSCULAR; INTRAVENOUS at 11:06

## 2025-06-12 RX ADMIN — TRAZODONE HYDROCHLORIDE 25 MG: 50 TABLET ORAL at 10:06

## 2025-06-12 RX ADMIN — ENOXAPARIN SODIUM 40 MG: 40 INJECTION SUBCUTANEOUS at 05:06

## 2025-06-12 RX ADMIN — DOXYCYCLINE HYCLATE 100 MG: 100 TABLET, COATED ORAL at 08:06

## 2025-06-12 RX ADMIN — IPRATROPIUM BROMIDE AND ALBUTEROL SULFATE 3 ML: 2.5; .5 SOLUTION RESPIRATORY (INHALATION) at 07:06

## 2025-06-12 RX ADMIN — DOXYCYCLINE HYCLATE 100 MG: 100 TABLET, COATED ORAL at 09:06

## 2025-06-12 RX ADMIN — PREDNISONE 40 MG: 20 TABLET ORAL at 09:06

## 2025-06-12 RX ADMIN — ATORVASTATIN CALCIUM 20 MG: 20 TABLET, FILM COATED ORAL at 12:06

## 2025-06-12 RX ADMIN — LEVOTHYROXINE SODIUM 125 MCG: 0.12 TABLET ORAL at 05:06

## 2025-06-12 RX ADMIN — SODIUM CHLORIDE, POTASSIUM CHLORIDE, SODIUM LACTATE AND CALCIUM CHLORIDE: 600; 310; 30; 20 INJECTION, SOLUTION INTRAVENOUS at 12:06

## 2025-06-12 RX ADMIN — FAMOTIDINE 20 MG: 20 TABLET, FILM COATED ORAL at 09:06

## 2025-06-12 RX ADMIN — IPRATROPIUM BROMIDE AND ALBUTEROL SULFATE 3 ML: 2.5; .5 SOLUTION RESPIRATORY (INHALATION) at 01:06

## 2025-06-12 RX ADMIN — BUDESONIDE INHALATION 0.5 MG: 0.5 SUSPENSION RESPIRATORY (INHALATION) at 07:06

## 2025-06-12 NOTE — PT/OT/SLP EVAL
Occupational Therapy   Evaluation    Name: Kirstie Bowden  MRN: [id number]  Admitting Diagnosis: Syncope  Recent Surgery: * No surgery found *      Recommendations:     Discharge Recommendations: High Intensity Therapy  Discharge Equipment Recommendations:  other (see comments) (TBD)  Barriers to discharge:       Assessment:     Kirstie Bowden is a 80 y.o. female with a medical diagnosis of Syncope.  She presents with the following performance deficits affecting function: weakness, impaired endurance, impaired self care skills, impaired functional mobility, gait instability, impaired balance, decreased upper extremity function, decreased lower extremity function, pain, decreased ROM, impaired cardiopulmonary response to activity, orthopedic precautions.  Pt up in chair and agreeable to OT Eval.     Rehab Prognosis: Good; patient would benefit from acute skilled OT services to address these deficits and reach maximum level of function.       Plan:     Patient to be seen 6 x/week to address the above listed problems via self-care/home management, therapeutic activities, therapeutic exercises  Plan of Care Expires: 07/10/25  Plan of Care Reviewed with: patient    Subjective     Chief Complaint: Pain  Patient/Family Comments/goals: To get better    Occupational Profile:  Living Environment: Pt lives alone-Granddaughter there at night, but works during the day. Pt has a step in shower with built in seat/grab bar. Pt has a raised toilet and borrowed BSC.   Previous level of function: Independent but multiple falls  Roles and Routines: Mother, grandmother  Equipment Used at Home: raised toilet, grab bar, other (see comments) (Pt has a built in seat in shower; Pt has long bath sponge, reacher, sock aid, long shoe horn, and a borrowed RW & BSC)  Assistance upon Discharge: Family    Pain/Comfort:  Pain Rating 1: 8/10  Location 1: foot    Patients cultural, spiritual, Adventist conflicts given the current  situation:      Objective:     Communicated with: Nurse Ashely prior to session.  Patient found up in chair with chair check, peripheral IV, telemetry, oxygen upon OT entry to room.    General Precautions: Standard, fall, respiratory  Orthopedic Precautions:  (L ankle sprain; L rib fractures 6, 7, & 8)  Braces:  (cam walker boot L ankle)  Respiratory Status: Nasal cannula, flow 1 L/min    Occupational Performance:      Activities of Daily Living:  Feeding:  independence    Grooming: stand by assistance set up in sitting  Upper Body Dressing: stand by assistance set up in sitting  Lower Body Dressing: maximal assistance    Toileting: moderate assistance      Cognitive/Visual Perceptual:  Pt alert and oriented. [facility]    Physical Exam:  Upper Extremity Strength:    -       Right Upper Extremity: WFL  -       Left Upper Extremity: WFL    AMPAC 6 Click ADL:  AMPAC Total Score: 19    Treatment & Education:  OT provided education in role of OT. Patient verbalized understanding and participated in OT.  OT provided instruction in home safety with ADL/IADL including review of home set up and DME/AE. Patient verbalized understanding.  OT provided education in calling for assist. Patient verbalized understanding.        Patient left up in chair with all lines intact, call button in reach, and chair alarm on    GOALS:   Multidisciplinary Problems       Occupational Therapy Goals          Problem: Occupational Therapy    Goal Priority Disciplines Outcome Interventions   Occupational Therapy Goal     OT, PT/OT     Description: Goals to be met by: 7/10/25     Patient will increase functional independence with ADLs by performing:    UE Dressing with Modified [place].  LE Dressing with Modified [place].  Grooming while seated with Modified [place].  Toileting from toilet with Modified [place] for hygiene and clothing management.   Bathing from  shower chair/bench with Modified [place].  Toilet transfer to toilet  with Modified [place].  Increased strength and functional activity tolerance for ADL's/IADL's                         DME Justifications:  No DME recommended requiring DME justifications    History:     Past Medical History:   Diagnosis Date    Arthritis     rheumatoid    Asthma     Breast cancer     Cancer 2020    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    GERD (gastroesophageal reflux disease) 2022    Hearing loss [id number]    Hyperlipidemia     Hypertension 2021    Limb alert care status     NO BP/IV LEFT ARM    Lung disease     Personal history of colonic polyps 01/23/2024    Pseudocholinesterase deficiency     family history    Radiation 2020    Rheumatoid arthritis 2020    Thyroid disease          Past Surgical History:   Procedure Laterality Date    AXILLARY NODE DISSECTION Left 03/14/2019    Procedure: LYMPHADENECTOMY, AXILLARY;  Surgeon: Mp Gonzalez MD;  [facility];  Service: General;  Laterality: Left;  left lumpectomy with axillary lypmh node dissection    BALLOON DILATION OF URETER Left 06/24/2019    Procedure: DILATION, URETER, USING BALLOON;  Surgeon: Jorden Dickson MD;  Location: [facility] OR;  Service: Urology;  Laterality: Left;    BREAST BIOPSY Left 20 yrs ago    benign    BREAST CYST EXCISION  15 yrs ago    BREAST LUMPECTOMY      x2    CHOLECYSTECTOMY  2000    COLONOSCOPY  09/25/2018    LUC EASTON MD    CYSTOSCOPY W/ URETERAL STENT PLACEMENT Left 06/24/2019    Procedure: CYSTOSCOPY, WITH URETERAL STENT INSERTION;  Surgeon: Jorden Dickson MD;  Location: [facility] OR;  Service: Urology;  Laterality: Left;    CYSTOSCOPY W/ URETERAL STENT REMOVAL Left 07/23/2019    Procedure: CYSTOSCOPY, WITH URETERAL STENT REMOVAL;  Surgeon: Jorden Dickson MD;  Location: [facility] OR;  Service: Urology;  Laterality: Left;    EPIDURAL STEROID INJECTION INTO LUMBAR SPINE N/A 09/30/2021    Procedure: Injection-steroid-epidural-lumbar;  Surgeon: Nathen Lyons MD;  Location: [facility] OR;  Service: Pain Management;  Laterality: N/A;   L5-S1      EPIDURAL STEROID INJECTION INTO LUMBAR SPINE N/A 11/16/2021    Procedure: Injection-steroid-epidural-lumbar;  Surgeon: Nathen Lyons MD;  [facility]  Service: Pain Management;  Laterality: N/A;  L5-S1    EXTRACORPOREAL SHOCK WAVE LITHOTRIPSY Left 07/23/2019    Procedure: LITHOTRIPSY, ESWL;  Surgeon: Jorden Dickson MD;  Location: [facility] OR;  Service: Urology;  Laterality: Left;    EYE SURGERY Bilateral 2000    cataracts    HYSTERECTOMY  1994    MASTECTOMY, PARTIAL Left 04/25/2019    Procedure: MASTECTOMY, PARTIAL;  Surgeon: Mp Gonzalez MD;  Location: [facility] OR;  Service: General;  Laterality: Left;    NEEDLE LOCALIZATION Left 03/14/2019    Procedure: NEEDLE LOCALIZATION;  Surgeon: Mp Gonzalez MD;  Location: [facility] OR;  Service: General;  Laterality: Left;  left lumpectomy with wire needle localization     PARTIAL NEPHRECTOMY Right 05/22/2023    RETROGRADE PYELOGRAPHY Bilateral 06/24/2019    Procedure: PYELOGRAM, RETROGRADE;  Surgeon: Jorden Dickson MD;  Location: [facility] OR;  Service: Urology;  Laterality: Bilateral;    SENTINEL LYMPH NODE BIOPSY Left 03/14/2019    Procedure: BIOPSY, LYMPH NODE, SENTINEL;  Surgeon: Mp Gonzalez MD;  Location: [facility] OR;  Service: General;  Laterality: Left;  left sentinel node lymph node biopsy    TONSILLECTOMY  1948    TRANSFORAMINAL EPIDURAL INJECTION OF STEROID Right 01/04/2022    Procedure: Injection,steroid,epidural,transforaminal approach;  Surgeon: Nathen Lyons MD;  [facility];  Service: Pain Management;  Laterality: Right;  L4-L5, L5-s1    TRANSFORAMINAL EPIDURAL INJECTION OF STEROID Right 07/09/2024    Procedure: Injection,steroid,epidural,transforaminal approach;  Surgeon: Nathen Lyons MD;  Location: [facility] OR;  Service: Pain Management;  Laterality: Right;    URETEROSCOPY Left 06/24/2019    Procedure: URETEROSCOPY;  Surgeon: Jorden Dickson MD;  Location: [facility] OR;  Service: Urology;  Laterality: Left;       Time Tracking:     OT  Date of Treatment: 06/12/25  OT Start Time: 1054  OT Stop Time: 1114  OT Total Time (min): 20 min    Billable Minutes:Evaluation 8  Self Care/Home Management 12    6/12/2025

## 2025-06-12 NOTE — ASSESSMENT & PLAN NOTE
Patient's COPD is with exacerbation noted by worsening of baseline hypoxia currently.  Patient is currently on COPD Pathway. Continue scheduled inhalers Steroids, Antibiotics, and Supplemental oxygen and monitor respiratory status closely.   Respiratory culture with gram stain ordered and pending   Croup

## 2025-06-12 NOTE — PLAN OF CARE
06/12/25 1510   MURPHY Message   Medicare Outpatient and Observation Notification regarding financial responsibility Given to patient/caregiver;Explained to patient/caregiver;Signed/date by patient/caregiver   Date MURPHY was signed 06/12/25   Time MURPHY was signed [id number]        [facility] lab results put in [facility] mailbox

## 2025-06-12 NOTE — ASSESSMENT & PLAN NOTE
Creatine stable for now. BMP reviewed- noted Estimated Creatinine Clearance: 35.7 mL/min (based on SCr of 1.4 mg/dL). according to latest data. Based on current GFR, CKD stage is stage 3 - GFR 30-59.  Monitor UOP and serial BMP and adjust therapy as needed. Renally dose meds. Avoid nephrotoxic medications and procedures.

## 2025-06-12 NOTE — PLAN OF CARE
Problem: Physical Therapy  Goal: Physical Therapy Goal  Description: Goals to be met by: 2025     Patient will increase functional independence with mobility by performin. Supine to sit with Modified [place]  2. Sit to stand transfer with Contact Guard Assistance  3. Bed to chair transfer with Contact Guard Assistance using Rolling Walker  4. Gait  x 150 feet with Contact Guard Assistance using Rolling Walker.   5. Lower extremity exercise program x20 reps  Outcome: Progressing   PT eval and treat, pt with R orbital ecchymosis, L ankle sprain. Falls x3 this week. Pt ambulated 40ft with RW min assist and another person following with chair. HIT

## 2025-06-12 NOTE — ASSESSMENT & PLAN NOTE
-Neurochecks Q4 hours  -PT/OT  consulted  -ECHO ordered and pending  -US of bilateral carotid arteries normal  -trend troponin x3, ordered  -cardiac monitoring   -MRI/MRA of brain negative for acute process  -orthostatic vital signs

## 2025-06-12 NOTE — PLAN OF CARE
Met with patient and daughterBella to review recommendation of in rehab.  They are agreeable to the plan.  Patient/family provided with a list of facilities in network with patient's payor plan.  Providers that are owned, operated or affiliated with [facility] are included on the list.  Preferred facility:    [facility]  2.  3.    If an additional preferred facility not listed above is identified, additional referral to be sent.  If above facilities unable to accept, will send additional referrals to in network providers.    Sent clinicals to [facility] via [employer]     06/12/25 5400   Post-Acute Status   Post-Acute Authorization Placement   Post-Acute Placement Status Referrals Sent   Discharge Plan   Discharge Plan A Rehab   Discharge Plan B Rehab            This office note has been dictated.  Lakesha Motta MD

## 2025-06-12 NOTE — ASSESSMENT & PLAN NOTE
-Neurochecks Q4 hours  -PT/OT  consulted  -MRI ordered  -ECHO ordered and pending  -US of bilateral carotid arteries ordered and pending  -trend troponin x3  -cardiac monitoring   -MRI/MRA of brain negative for acute process  -orthostatic vital signs

## 2025-06-12 NOTE — SUBJECTIVE & OBJECTIVE
Interval History:  Patient seen and examined.  MRI negative for stroke.  Carotid ultrasound with no hemodynamically significant stenosis.  No evidence on telemetry reported so far.  Follow up PT and OT evaluations.    Review of Systems   Constitutional:  Positive for appetite change.   Respiratory:  Negative for shortness of breath.    Cardiovascular:  Negative for chest pain.   Gastrointestinal:  Negative for nausea and vomiting.   Neurological:  Positive for weakness.     Objective:     Vital Signs (Most Recent):  Temp: 97.9 °F (36.6 °C) (06/12/25 0730)  Pulse: 75 (06/12/25 0730)  Resp: 16 (06/12/25 0730)  BP: (!) 146/65 (06/12/25 0730)  SpO2: 100 % (06/12/25 0730) Vital Signs (24h Range):  Temp:  [97.9 °F (36.6 °C)-98.4 °F (36.9 °C)] 97.9 °F (36.6 °C)  Pulse:  [72-90] 75  Resp:  [14-22] 16  SpO2:  [89 %-100 %] 100 %  BP: (119-158)/(57-86) 146/65     Weight: 78 kg (171 lb 15.3 oz)  Body mass index is 26.15 kg/m².    Intake/Output Summary (Last 24 hours) at 6/12/2025 1037  Last data filed at 6/12/2025 0421  Gross per 24 hour   Intake 550 ml   Output --   Net 550 ml         Physical Exam  Vitals reviewed.   Constitutional:       General: She is not in acute distress.     Appearance: Normal appearance.   HENT:      Head: Normocephalic.        Comments: Bruising and swelling to right orbital area  Cardiovascular:      Rate and Rhythm: Normal rate and regular rhythm.      Pulses: Normal pulses.      Heart sounds: Normal heart sounds.   Pulmonary:      Effort: Pulmonary effort is normal.      Breath sounds: Examination of the right-lower field reveals decreased breath sounds. Examination of the left-lower field reveals decreased breath sounds. Decreased breath sounds present.   Abdominal:      General: Bowel sounds are normal. There is no distension.      Palpations: Abdomen is soft.      Tenderness: There is no abdominal tenderness.   Musculoskeletal:      Cervical back: Normal range of motion and neck supple. No  rigidity or tenderness.      Right lower leg: No edema.      Left lower leg: No edema.      Left ankle: Swelling and ecchymosis present. Tenderness present. Decreased range of motion.   Skin:     General: Skin is warm and dry.      Capillary Refill: Capillary refill takes less than 2 seconds.      Findings: Bruising present.             Comments: Brusing and swelling to left ankle  Bruising to left flank  Bruising to right lower back  Swelling and bruising to right hand     Neurological:      General: No focal deficit present.      Mental Status: She is alert and oriented to person, place, and time. Mental status is at baseline.   Psychiatric:         Mood and Affect: Mood normal.         Behavior: Behavior normal.         Thought Content: Thought content normal.         Judgment: Judgment normal.               Significant Labs: All pertinent labs within the past 24 hours have been reviewed.    Significant Imaging: I have reviewed all pertinent imaging results/findings within the past 24 hours.

## 2025-06-12 NOTE — ASSESSMENT & PLAN NOTE
Patient's blood pressure range in the last 24 hours was: BP  Min: 119/57  Max: 158/67.The patient's inpatient anti-hypertensive regimen is listed below:  Current Antihypertensives   Amlodipine 5mg Nightly  HCTZ 25mg nightly    Plan  Home medications held due to syncopal event and repeat falls. Will monitor and treat as needed

## 2025-06-12 NOTE — ASSESSMENT & PLAN NOTE
Creatine stable for now. BMP reviewed- noted Estimated Creatinine Clearance: 45.5 mL/min (based on SCr of 1.1 mg/dL). according to latest data. Based on current GFR, CKD stage is stage 3 - GFR 30-59.  Monitor UOP and serial BMP and adjust therapy as needed. Renally dose meds. Avoid nephrotoxic medications and procedures.

## 2025-06-12 NOTE — PLAN OF CARE
[facility] - Med/Surg  Initial Discharge Assessment       Primary Care Provider: Rocio Feliciano MD    Admission Diagnosis: Gait abnormality [R26.9]    Admission Date: 6/11/2025  Expected Discharge Date: 6/13/2025    Transition of Care Barriers: None    Payor: MEDICARE / Plan: MEDICARE PART A & B / Product Type: Government /     Extended Emergency Contact Information  Primary Emergency Contact: Bella Rosenthal   [facility]  Home Phone: [phone]  Relation: Daughter  Secondary Emergency Contact: Ania Carroll  Address: [address] [place] [address] [place] [address] [facility]  Home Phone: [phone]  Mobile Phone: [phone]  Relation: Sister  Preferred language: English   needed? No    Discharge Plan A: Rehab  Discharge Plan B: Home Health      [id number] - [place] [place] - [address] AT [facility]  [address]  [place] [address]  Phone: [phone] Fax: [phone]    DC assessment completed at bedside. Information verified. Lives with granddaughter. PCP is Dr. Feliciano. [facility]. Denies blood thinners/hd. Active with [facility]. Insurance verified. POA is daughter. CM following for DC planning.    Initial Assessment (most recent)       Adult Discharge Assessment - 06/12/25 1502          Discharge Assessment    Assessment Type Discharge Planning Assessment     Confirmed/corrected address, phone number and insurance Yes     Confirmed Demographics Correct on Facesheet     Source of Information patient     Communicated LILA with patient/caregiver Yes     People in Home grandchild(faby)     Facility Arrived From: home     Do you expect to return to your current living situation? Other (see comments)   possible rehab    Do you have help at home or someone to help you manage your care at home? Yes     Who are your caregiver(s) and their phone number(s)? granddaughter     Prior to hospitilization cognitive status: Unable to Assess      Current cognitive status: Alert/Oriented     Equipment Currently Used at Home none     Readmission within 30 days? No     Patient currently being followed by outpatient case management? No     Do you currently have service(s) that help you manage your care at home? Yes     Name and Contact number of agency [facility]     Is the pt/caregiver preference to resume services with current agency Yes     Do you take prescription medications? Yes     Do you have prescription coverage? Yes     Do you have any problems affording any of your prescribed medications? No     Is the patient taking medications as prescribed? yes     Who is going to help you get home at discharge? daughter or sister     How do you get to doctors appointments? car, drives self     Are you on dialysis? No     Do you take coumadin? No     Discharge Plan A Rehab     Discharge Plan B Home Health     DME Needed Upon Discharge  none     Discharge Plan discussed with: Patient     Transition of Care Barriers None

## 2025-06-12 NOTE — HPI
"Kirstie Bowden is a 80 year old female with a previous medical history rhematoid arthritis, Hypothyroidism, COPD, HTN, HLD, breast cancer, and renal cancer who presented to the emergency room for multiple falls. The patient had 1 fall on Monday that she reports not remembering the events that occurred to lead her to be on the ground with a broken pot next her. She subsequently had two falls the next day. She has multiple injuries associated with the falls and was seen at [facility] on 6/9/25 and discharged. Today the patient is reporting persistent flank pain and her oxygen saturation was 89% on room air upon arrival to ED. Daughter at bedside reports the the first fall was possibly syncope but the subsequent two falls were related to both legs just giving out on her. Denies any dizziness or lightheadedness with the subsequent two falls. She also endorses patient has had mild confusion over the past few days and on Tuesday was so generally weak she could not get herself out of the recliner. CT of head negative for acute process. MRI/MRA of brain negative for acute process. CT of abdomen pelvis showed no evidence of acute process but did read chronic fractures of the 6th, 7th and 8th ribs. But,  the patient has bruising and pain in this area currently. Xray of left ankle and foot showed no fracture or dislocation. Xray of right hand showed no acute osseous abnormality. Urine studies negative for infection. CMP shows a creatinine of 1.4 and of note it was 1.0 approximately one month ago. BUN 26 and GFR 38. CBC shows stable anemia and no leukocytosis. TSH 0.141 and Free t4 1.33, mag and phos normal. Procacitonin 0.21. Patient does report a productive cough with green sputum. Chest xray showed no acute process or significant change from previous month and CT of abdomen pelvis read the following for bilateral lung bases "No worrisome pulmonary nodules or focal consolidations are identified. Bibasilar atelectasis " "versus scarring is present". Patient admitted by hospital medicine for further evaluation and management  "

## 2025-06-12 NOTE — ASSESSMENT & PLAN NOTE
Patient's COPD is with exacerbation noted by worsening of baseline hypoxia currently.  Patient is currently on COPD Pathway. Continue scheduled inhalers Steroids, Antibiotics, and Supplemental oxygen and monitor respiratory status closely.     ABG ordered and pending  Respiratory culture with gram stain ordered and pending

## 2025-06-12 NOTE — CARE UPDATE
06/12/25 0711   Patient Assessment/Suction   Level of Consciousness (AVPU) alert   Respiratory Effort Normal;Unlabored   Expansion/Accessory Muscles/Retractions no use of accessory muscles   All Lung Fields Breath Sounds Anterior:;Lateral:;coarse;equal bilaterally;diminished   LLL Breath Sounds diminished   RLL Breath Sounds diminished   Rhythm/Pattern, Respiratory no shortness of breath reported   Cough Frequency infrequent   Cough Type good;nonproductive   PRE-TX-O2   Device (Oxygen Therapy) nasal cannula   $ Is the patient on Low Flow Oxygen? Yes   Flow (L/min) (Oxygen Therapy) 1   SpO2 97 %   Pulse Oximetry Type Intermittent   $ Pulse Oximetry - Multiple Charge Pulse Oximetry - Multiple   Pulse 73   Resp 14   Aerosol Therapy   $ Aerosol Therapy Charges Aerosol Treatment   Daily Review of Necessity (SVN) completed   Respiratory Treatment Status (SVN) given   Treatment Route (SVN) mask;oxygen   Patient Position HOB elevated   Post Treatment Assessment (SVN) breath sounds improved   Signs of Intolerance (SVN) none   Breath Sounds Post-Respiratory Treatment   Throughout All Fields Post-Treatment All Fields   Throughout All Fields Post-Treatment aeration increased   Post-treatment Heart Rate (beats/min) 74   Post-treatment Resp Rate (breaths/min) 14   Education   $ Education Bronchodilator;Oxygen;15 min   Respiratory Evaluation   $ Care Plan Tech Time 15 min

## 2025-06-12 NOTE — H&P
[facility]  History & Physical    Patient Name: Kirstie Bowden  MRN: [id number]  Patient Class: OP- Observation  Admission Date: 6/11/2025  Attending Physician: Keshia Tomlinson MD   Primary Care Provider: Rocio Feliciano MD         Patient information was obtained from patient, relative(s), past medical records, and ER records.     Subjective:     Principal Problem:Syncope    Chief Complaint:   Chief Complaint   Patient presents with    Fall     Multiple falls within the past few days. Recent fall yesterday. C/O left rib pain, right wrist, and left ankle pain.        HPI: Kirstie Bowden is a 80 year old female with a previous medical history rhematoid arthritis, Hypothyroidism, COPD, HTN, HLD, breast cancer, and renal cancer who presented to the emergency room for multiple falls. The patient had 1 fall on Monday that she reports not remembering the events that occurred to lead her to be on the ground with a broken pot next her. She subsequently had two falls the next day. She has multiple injuries associated with the falls and was seen at [facility] on 6/9/25 and discharged. Today the patient is reporting persistent flank pain and her oxygen saturation was 89% on room air upon arrival to ED. Daughter at bedside reports the the first fall was possibly syncope but the subsequent two falls were related to both legs just giving out on her. Denies any dizziness or lightheadedness with the subsequent two falls. She also endorses patient has had mild confusion over the past few days and on Tuesday was so generally weak she could not get herself out of the recliner. CT of head negative for acute process. MRI/MRA of brain negative for acute process. CT of abdomen pelvis showed no evidence of acute process but did read chronic fractures of the 6th, 7th and 8th ribs. But,  the patient has bruising and pain in this area currently. Xray of left ankle and foot showed no  "fracture or dislocation. Xray of right hand showed no acute osseous abnormality. Urine studies negative for infection. CMP shows a creatinine of 1.4 and of note it was 1.0 approximately one month ago. BUN 26 and GFR 38. CBC shows stable anemia and no leukocytosis. TSH 0.141 and Free t4 1.33, mag and phos normal. Procacitonin 0.21. Patient does report a productive cough with green sputum. Chest xray showed no acute process or significant change from previous month and CT of abdomen pelvis read the following for bilateral lung bases "No worrisome pulmonary nodules or focal consolidations are identified. Bibasilar atelectasis versus scarring is present". Patient admitted by hospital medicine for further evaluation and management    Past Medical History:   Diagnosis Date    Arthritis     rheumatoid    Asthma     Breast cancer     Cancer 2020    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    GERD (gastroesophageal reflux disease) 2022    Hearing loss [id number]    Hyperlipidemia     Hypertension 2021    Limb alert care status     NO BP/IV LEFT ARM    Lung disease     Personal history of colonic polyps 01/23/2024    Pseudocholinesterase deficiency     family history    Radiation 2020    Rheumatoid arthritis 2020    Thyroid disease        Past Surgical History:   Procedure Laterality Date    AXILLARY NODE DISSECTION Left 03/14/2019    Procedure: LYMPHADENECTOMY, AXILLARY;  Surgeon: Mp Gonzalez MD;  Location: [facility] OR;  Service: General;  Laterality: Left;  left lumpectomy with axillary lypmh node dissection    BALLOON DILATION OF URETER Left 06/24/2019    Procedure: DILATION, URETER, USING BALLOON;  Surgeon: Jorden Dickson MD;  Location: [facility] OR;  Service: Urology;  Laterality: Left;    BREAST BIOPSY Left 20 yrs ago    benign    BREAST CYST EXCISION  15 yrs ago    BREAST LUMPECTOMY      x2    CHOLECYSTECTOMY  2000    COLONOSCOPY  09/25/2018    LUC EASTON MD    CYSTOSCOPY W/ URETERAL STENT PLACEMENT Left 06/24/2019    Procedure: " CYSTOSCOPY, WITH URETERAL STENT INSERTION;  Surgeon: Jorden Dickson MD;  Location: [facility] OR;  Service: Urology;  Laterality: Left;    CYSTOSCOPY W/ URETERAL STENT REMOVAL Left 07/23/2019    Procedure: CYSTOSCOPY, WITH URETERAL STENT REMOVAL;  Surgeon: Jorden Dickson MD;  Location: [facility] OR;  Service: Urology;  Laterality: Left;    EPIDURAL STEROID INJECTION INTO LUMBAR SPINE N/A 09/30/2021    Procedure: Injection-steroid-epidural-lumbar;  Surgeon: Nathen Lyons MD;  Location: [facility] OR;  Service: Pain Management;  Laterality: N/A;  L5-S1      EPIDURAL STEROID INJECTION INTO LUMBAR SPINE N/A 11/16/2021    Procedure: Injection-steroid-epidural-lumbar;  Surgeon: Nahten Lyons MD;  Location: [facility] OR;  Service: Pain Management;  Laterality: N/A;  L5-S1    EXTRACORPOREAL SHOCK WAVE LITHOTRIPSY Left 07/23/2019    Procedure: LITHOTRIPSY, ESWL;  Surgeon: Jorden Dickson MD;  Location: [facility] OR;  Service: Urology;  Laterality: Left;    EYE SURGERY Bilateral 2000    cataracts    HYSTERECTOMY  1994    MASTECTOMY, PARTIAL Left 04/25/2019    Procedure: MASTECTOMY, PARTIAL;  Surgeon: Mp Gonzalez MD;  Location: [facility] OR;  Service: General;  Laterality: Left;    NEEDLE LOCALIZATION Left 03/14/2019    Procedure: NEEDLE LOCALIZATION;  Surgeon: Mp Gonzalez MD;  Location: [facility] OR;  Service: General;  Laterality: Left;  left lumpectomy with wire needle localization     PARTIAL NEPHRECTOMY Right 05/22/2023    RETROGRADE PYELOGRAPHY Bilateral 06/24/2019    Procedure: PYELOGRAM, RETROGRADE;  Surgeon: Jorden Dickson MD;  Location: [facility];  Service: Urology;  Laterality: Bilateral;    SENTINEL LYMPH NODE BIOPSY Left 03/14/2019    Procedure: BIOPSY, LYMPH NODE, SENTINEL;  Surgeon: Mp Gonzalez MD;  Location: [facility];  Service: General;  Laterality: Left;  left sentinel node lymph node biopsy    TONSILLECTOMY  1948    TRANSFORAMINAL EPIDURAL INJECTION OF STEROID Right 01/04/2022    Procedure:  Injection,steroid,epidural,transforaminal approach;  Surgeon: Nathen Lyons MD;  Location: [facility] OR;  Service: Pain Management;  Laterality: Right;  L4-L5, L5-s1    TRANSFORAMINAL EPIDURAL INJECTION OF STEROID Right 07/09/2024    Procedure: Injection,steroid,epidural,transforaminal approach;  Surgeon: Nathen Lyons MD;  [facility]: [facility];  Service: Pain Management;  Laterality: Right;    URETEROSCOPY Left 06/24/2019    Procedure: URETEROSCOPY;  Surgeon: Jorden Dickson MD;  Location: [facility] OR;  Service: Urology;  Laterality: Left;       Review of patient's allergies indicates:   Allergen Reactions    Succinylcholine chloride Other (See Comments)    Sulfur dioxide Hives    Sulfamethoxazole-trimethoprim      Other reaction(s): per dr daryn Rodríguez (sulfonamide antibiotics)      NOT SURE REACTION       Current Facility-Administered Medications on File Prior to Encounter   Medication    lactated ringers infusion    lidocaine (PF) 10 mg/ml (1%) injection 10 mg     Current Outpatient Medications on File Prior to Encounter   Medication Sig    abatacept, with maltose, (ORENCIA, WITH MALTOSE,) 250 mg SolR injection Inject 750 mg into the skin every 28 days.    albuterol (ACCUNEB) 1.25 mg/3 mL Nebu Take 3 mLs (1.25 mg total) by nebulization every 6 (six) hours as needed (shortness of breath). Rescue    albuterol (PROVENTIL/VENTOLIN HFA) 90 mcg/actuation inhaler Inhale 2 puffs into the lungs every 6 (six) hours as needed for Wheezing. Rescue    amLODIPine (NORVASC) 5 MG tablet TAKE 1 TABLET BY MOUTH EVERY DAY (Patient taking differently: Take 5 mg by mouth nightly.)    atorvastatin (LIPITOR) 20 MG tablet TAKE 1 TABLET BY MOUTH EVERY DAY (Patient taking differently: Take 20 mg by mouth every evening.)    cholecalciferol, vitamin D3, (VITAMIN D3) 25 mcg (1,000 unit) capsule Take 1 capsule by mouth every morning.    cyanocobalamin (VITAMIN B-12) 1000 MCG tablet Take 100 mcg by mouth once daily.    exemestane (AROMASIN) 25 mg  tablet TAKE 1 TABLET BY MOUTH EVERY DAY (Patient taking differently: Take 25 mg by mouth nightly.)    fluticasone propionate (FLONASE) 50 mcg/actuation nasal spray 1 spray (50 mcg total) by Each Nostril route once daily.    fluticasone-salmeterol 250-50 mcg/dose (WIXELA INHUB) 250-50 mcg/dose diskus inhaler INHALE 1 PUFF INTO THE LUNGS 2 (TWO) TIMES DAILY. CONTROLLER    folic acid (FOLVITE) 1 MG tablet Take 1 tablet (1,000 mcg total) by mouth once daily.    gabapentin (NEURONTIN) 300 MG capsule TAKE 1 CAPSULE BY MOUTH TWICE A DAY    hydroCHLOROthiazide (HYDRODIURIL) 25 MG tablet TAKE 1 TABLET BY MOUTH EVERY DAY (Patient taking differently: Take 25 mg by mouth once daily.)    levothyroxine (SYNTHROID) 125 MCG tablet Take 125 mcg by mouth before breakfast.    ondansetron (ZOFRAN-ODT) 8 MG TbDL DISSOLVE 1 TABLET IN MOUTH EVERY 12 HOURS AS NEEDED (Patient taking differently: Take 8 mg by mouth every 12 (twelve) hours as needed (Nausea). DISSOLVE 1 TABLET IN MOUTH EVERY 12 HOURS AS NEEDED)    methotrexate 2.5 MG Tab Take 8 tablets (20 mg total) by mouth every 7 days. (Patient not taking: Reported on 2025)    predniSONE (DELTASONE) 5 MG tablet Take 1 tablet (5 mg total) by mouth once daily. (Patient not taking: Reported on 2025)     Family History       Problem Relation (Age of Onset)    Alzheimer's disease Mother    Arthritis Sister    Cancer Father, Daughter    Diabetes Brother    Heart disease Mother, Sister    Hypertension Mother    Kidney disease Sister    Stroke Sister, Sister          Tobacco Use    Smoking status: Former     Current packs/day: 0.00     Average packs/day: 0.5 packs/day for 59.3 years (29.6 ttl pk-yrs)     Types: Cigarettes     Start date: 1960     Quit date: 2019     Years since quittin.8    Smokeless tobacco: Never   Substance and Sexual Activity    Alcohol use: Yes     Alcohol/week: 2.0 standard drinks of alcohol     Types: 2 Glasses of wine per week     Comment: Social     Drug use: No    Sexual activity: Never     Review of Systems   Constitutional:  Positive for activity change.   HENT:  Positive for facial swelling.    Respiratory:  Positive for cough and shortness of breath.    Gastrointestinal:  Positive for nausea.   Genitourinary:  Positive for flank pain.   Musculoskeletal:  Positive for gait problem.   Skin:  Positive for color change.   Neurological:  Positive for syncope and weakness.   Psychiatric/Behavioral:  Positive for confusion.    All other systems reviewed and are negative.    Objective:     Vital Signs (Most Recent):  Temp: 98 °F (36.7 °C) (06/11/25 2330)  Pulse: 81 (06/11/25 2330)  Resp: 19 (06/11/25 2330)  BP: 128/77 (06/11/25 2330)  SpO2: 97 % (06/11/25 2330) Vital Signs (24h Range):  Temp:  [98 °F (36.7 °C)-98.4 °F (36.9 °C)] 98 °F (36.7 °C)  Pulse:  [76-90] 81  Resp:  [18-22] 19  SpO2:  [89 %-100 %] 97 %  BP: (121-158)/(61-86) 128/77     Weight: 78 kg (171 lb 15.3 oz)  Body mass index is 26.15 kg/m².     Physical Exam  Vitals reviewed.   Constitutional:       General: She is not in acute distress.     Appearance: Normal appearance.   HENT:      Head: Normocephalic.        Comments: Bruising and swelling to right orbital area  Cardiovascular:      Rate and Rhythm: Normal rate and regular rhythm.      Pulses: Normal pulses.      Heart sounds: Normal heart sounds.   Pulmonary:      Effort: Pulmonary effort is normal.      Breath sounds: Examination of the right-lower field reveals decreased breath sounds. Examination of the left-lower field reveals decreased breath sounds. Decreased breath sounds present.   Abdominal:      General: Bowel sounds are normal. There is no distension.      Palpations: Abdomen is soft.      Tenderness: There is no abdominal tenderness.   Musculoskeletal:      Cervical back: Normal range of motion and neck supple. No rigidity or tenderness.      Right lower leg: No edema.      Left lower leg: No edema.      Left ankle: Swelling and  ecchymosis present. Tenderness present. Decreased range of motion.   Skin:     General: Skin is warm and dry.      Capillary Refill: Capillary refill takes less than 2 seconds.      Findings: Bruising present.             Comments: Brusing and swelling to left ankle  Bruising to left flank  Bruising to right lower back  Swelling and bruising to right hand     Neurological:      General: No focal deficit present.      Mental Status: She is alert and oriented to person, place, and time. Mental status is at baseline.   Psychiatric:         Mood and Affect: Mood normal.         Behavior: Behavior normal.         Thought Content: Thought content normal.         Judgment: Judgment normal.                Significant Labs: All pertinent labs within the past 24 hours have been reviewed.    ABGs:     Bilirubin:   Recent Labs   Lab 06/11/25  1450   BILITOT 0.5       BMP:   Recent Labs   Lab 06/11/25  1450 06/11/25  1503   *  --      --    K 4.0  --    CL 97  --    CO2 29  --    BUN 26*  --    CREATININE 1.4  --    CALCIUM 9.9  --    MG  --  2.2     CBC:   Recent Labs   Lab 06/11/25  1450   WBC 12.23   HGB 11.5*   HCT 36.3*        CMP:   Recent Labs   Lab 06/11/25  1450      K 4.0   CL 97   CO2 29   *   BUN 26*   CREATININE 1.4   CALCIUM 9.9   PROT 7.6   ALBUMIN 3.8   BILITOT 0.5   ALKPHOS 61   AST 23   ALT 11   ANIONGAP 11       Magnesium:   Recent Labs   Lab 06/11/25  1503   MG 2.2       TSH:   Recent Labs   Lab 06/11/25  1503   TSH 0.141*       Urine Studies:   Recent Labs   Lab 06/11/25  1605   APPEARANCEUA Clear   SPECGRAV 1.010   PROTEINUA Negative   BILIRUBINUA Negative   UROBILINOGEN Negative   LEUKOCYTESUR Trace*   RBCUA <1   WBCUA 2       Significant Imaging: I have reviewed all pertinent imaging results/findings within the past 24 hours.  EXAMINATION:  XR CHEST AP PORTABLE     CLINICAL HISTORY:  rib pain falls;     TECHNIQUE:  Single frontal view of the chest was performed.      COMPARISON:  04/24/2025     FINDINGS:  The cardiomediastinal silhouette is with normal limits.  There is relative elevation of the right hemidiaphragm without significant change.  No consolidation, edema, or pleural effusion.     Impression:     No acute process.  No significant change.        Electronically signed by:Mp Giron MD  Date:                                            06/11/2025  Time:                                           14:50          EXAMINATION:  XR HAND COMPLETE 3 VIEW RIGHT     CLINICAL HISTORY:  R hand pain;     TECHNIQUE:  PA, lateral, and oblique views of the right hand were performed.     COMPARISON:  None     FINDINGS:  There is no fracture or dislocation.  There is moderate 1st carpometacarpal joint and triscaphe joint degenerative change.  There also scattered interphalangeal joint degenerative changes.  The soft tissues are unremarkable.     Impression:     No acute osseous abnormality.        Electronically signed by:Mp Giron MD  Date:                                            06/11/2025  Time:                                           14:51    EXAMINATION:  XR ANKLE COMPLETE 3 VIEW LEFT; XR FOOT COMPLETE 3 VIEW LEFT     CLINICAL HISTORY:  Pain in left ankle and joints of left foot; Pain in left foot     TECHNIQUE:  AP, lateral and oblique views of the left ankle were performed.  Three views left foot also obtained.     COMPARISON:  None     FINDINGS:  No fracture or dislocation.  There is moderate soft tissue swelling diffusely about the left ankle.  Small Achilles enthesophyte.     Bones of the left foot are intact and located.  Moderate soft tissue swelling is present.  There is mild degenerative change of the 1st metatarsophalangeal joint.     Impression:     Moderate soft tissue swelling of the left foot and ankle without acute osseous abnormality.        Electronically signed by:Mp Giron MD  Date:                                             06/11/2025  Time:                                           14:53    EXAMINATION:  CT ABDOMEN PELVIS WITH IV CONTRAST     CLINICAL HISTORY:  Abdominal trauma, blunt;     TECHNIQUE:  Low dose axial images, sagittal and coronal reformations were obtained from the lung bases to the pubic symphysis following the IV administration of 60 mL of Omnipaque 350.     COMPARISON:  CT abdomen pelvis from 04/24/2020     FINDINGS:  Lung bases: No worrisome pulmonary nodules or focal consolidations are identified.  Bibasilar atelectasis versus scarring is present.  Atherosclerotic calcification is present involving the coronary arteries.     Liver: No focal mass.     Gallbladder: The gallbladder is surgically absent.     Bile Ducts: No evidence of intra or extra hepatic biliary ductal dilation.     Spleen: Unremarkable.     Kidneys: The right kidney is surgically absent.  Nonobstructing renal calculi are redemonstrated within the left kidney at the lower pole.  No evidence of renal mass or obstruction.  A left renal cyst is present.     Adrenals: Unremarkable.     Pancreas: No mass or peripancreatic fat stranding.     Bowel: No evidence of bowel obstruction or inflammation. There are no findings to suggest appendicitis in the abdominal right lower quadrant.  The colon demonstrates diverticulosis without evidence of diverticulitis.     Lymph nodes: No evidence of lymphadenopathy involving the abdomen or pelvis.     Vascular: The abdominal aorta is normal in diameter. Moderate to prominent atherosclerotic calcification is present involving the abdominal aorta and its major branching vessels.     Pelvic organs: The uterus is surgically absent.     Urinary Bladder: Unremarkable.     Bones:  No evidence of acute osseous process.  Chronic fractures of the left 6th, 7th, and 8th ribs are present anterolaterally.  Mild multilevel chronic degenerative changes are present.     Abdominal wall:  Unremarkable.     Other: None.     Impression:      No evidence of acute process involving the abdomen or pelvis with chronic and postoperative findings as above.        Electronically signed by:Galo Bush  Date:                                            06/11/2025  Time:                                           18:03    EXAMINATION:  MRI BRAIN WITHOUT CONTRAST     CLINICAL HISTORY:  Head trauma, minor (Age >= 65y);     TECHNIQUE:  Multiplanar multisequence MR imaging of the brain was performed without intravenous contrast.     COMPARISON:  CT head from 06/11/2025.     FINDINGS:  There is motion artifact.     There is no evidence of restricted diffusion to suggest an acute infarction. Ventricles are normal in size for age without evidence of hydrocephalus.  Mild chronic microvascular ischemic changes noted.  No mass, hemorrhage, or recent or remote major vascular distribution infarct.  No extra-axial blood or fluid collections. Normal vascular flow voids.     Bone marrow signal intensity is normal. Paranasal sinuses and mastoid air cells are clear.  There are bilateral prosthetic lenses.     Impression:     No acute intracranial abnormality.        Electronically signed by:Pascual Barajas  Date:                                            06/11/2025  Time:                                           20:27    EXAMINATION:  MRA BRAIN WITHOUT CONTRAST     CLINICAL HISTORY:  unstable ataxic gait and repeat falls;     TECHNIQUE:  Non-contrast 3-D time-of-flight intracranial MR angiography was performed through the [place] [facility] [place] with MIP reformatting.     COMPARISON:  MRI brain from 06/11/2025.     FINDINGS:  Anterior circulation: The bilateral intracranial ICAs, MCAs, and ACAs are patent and unremarkable without high-grade stenosis or occlusion.     Posterior circulation: The left vertebral artery is dominant.  The bilateral tubal arteries, basilar artery, and bilateral PCAs are otherwise patent and unremarkable without high-grade stenosis or occlusion.     There  is no intracranial aneurysm.     Impression:     Unremarkable MRA brain.        Electronically signed by:Pascual Barajas  Date:                                            06/11/2025  Time:                                           21:19  Assessment/Plan:     Assessment & Plan  Syncope  -Neurochecks Q4 hours  -PT/OT  consulted  -MRI ordered  -ECHO ordered and pending  -US of bilateral carotid arteries ordered and pending  -trend troponin x3  -cardiac monitoring   -MRI/MRA of brain negative for acute process  -orthostatic vital signs           Essential hypertension, benign  Patient's blood pressure range in the last 24 hours was: BP  Min: 121/67  Max: 158/67.The patient's inpatient anti-hypertensive regimen is listed below:  Current Antihypertensives   Amlodipine 5mg Nightly  HCTZ 25mg nightly    Plan  Home medications held due to syncopal event and repeat falls. Will monitor and treat as needed  Hyperlipidemia  Chronic condition    -Statin continued    Acquired hypothyroidism  TSH 0.141    -Levothyroxine continued  -Aromasine non-formulary and unable to be ordered during admit    Stage 3b chronic kidney disease  Creatine stable for now. BMP reviewed- noted Estimated Creatinine Clearance: 35.7 mL/min (based on SCr of 1.4 mg/dL). according to latest data. Based on current GFR, CKD stage is stage 3 - GFR 30-59.  Monitor UOP and serial BMP and adjust therapy as needed. Renally dose meds. Avoid nephrotoxic medications and procedures.  COPD exacerbation  Patient's COPD is with exacerbation noted by worsening of baseline hypoxia currently.  Patient is currently on COPD Pathway. Continue scheduled inhalers Steroids, Antibiotics, and Supplemental oxygen and monitor respiratory status closely.     ABG ordered and pending  Respiratory culture with gram stain ordered and pending  Multiple falls  Three falls this week resulting in trauma to face left ankle and left flank    -Fall precautions  -PT/OT consult    VTE Risk Mitigation  (From admission, onward)           Ordered     enoxaparin injection 40 mg  Daily         06/11/25 2000     IP VTE HIGH RISK PATIENT  Once         06/11/25 2000     Place sequential compression device  Until discontinued         06/11/25 2000                         On 06/12/2025, patient should be placed in hospital observation services under my care in collaboration with Dr. Keshia Tomlinson MD.      Automatic Inhaler to Nebulizer Interchange    fluticasone/vilanterol (Breo Ellipta) 100 mcg/25 mcg changed to budesonide 0.5 mg twice daily AND arformoterol 15 mcg twice daily per [facility] Automatic Therapeutic Substitutions Protocol.    Please contact pharmacy at extension [phone] with any questions.     Thank you,   Shelia Sebastian NP  Department of Hospital Medicine  [facility]/Surg

## 2025-06-12 NOTE — PROGRESS NOTES
[facility] Medicine  Progress Note    Patient Name: Kirstie Bowden  MRN: [id number]  Patient Class: OP- Observation   Admission Date: 6/11/2025  Length of Stay: 0 days  Attending Physician: Keshia Tomlinson MD  Primary Care Provider: Rocio Feliciano MD        Subjective     Principal Problem:Syncope        HPI:  Kirstie Bowden is a 80 year old female with a previous medical history rhematoid arthritis, Hypothyroidism, COPD, HTN, HLD, breast cancer, and renal cancer who presented to the emergency room for multiple falls. The patient had 1 fall on Monday that she reports not remembering the events that occurred to lead her to be on the ground with a broken pot next her. She subsequently had two falls the next day. She has multiple injuries associated with the falls and was seen at [place] on 6/9/25 and discharged. Today the patient is reporting persistent flank pain and her oxygen saturation was 89% on room air upon arrival to ED. Daughter at bedside reports the the first fall was possibly syncope but the subsequent two falls were related to both legs just giving out on her. Denies any dizziness or lightheadedness with the subsequent two falls. She also endorses patient has had mild confusion over the past few days and on Tuesday was so generally weak she could not get herself out of the recliner. CT of head negative for acute process. MRI/MRA of brain negative for acute process. CT of abdomen pelvis showed no evidence of acute process but did read chronic fractures of the 6th, 7th and 8th ribs. But,  the patient has bruising and pain in this area currently. Xray of left ankle and foot showed no fracture or dislocation. Xray of right hand showed no acute osseous abnormality. Urine studies negative for infection. CMP shows a creatinine of 1.4 and of note it was 1.0 approximately one month ago. BUN 26 and GFR 38. CBC shows stable anemia and no leukocytosis. TSH 0.141 and  "Free t4 1.33, mag and phos normal. Procacitonin 0.21. Patient does report a productive cough with green sputum. Chest xray showed no acute process or significant change from previous month and CT of abdomen pelvis read the following for bilateral lung bases "No worrisome pulmonary nodules or focal consolidations are identified. Bibasilar atelectasis versus scarring is present". Patient admitted by hospital medicine for further evaluation and management    Overview/Hospital Course:  No notes on file    Interval History:  Patient seen and examined.  MRI negative for stroke.  Carotid ultrasound with no hemodynamically significant stenosis.  No evidence on telemetry reported so far.  Follow up PT and OT evaluations.    Review of Systems   Constitutional:  Positive for appetite change.   Respiratory:  Negative for shortness of breath.    Cardiovascular:  Negative for chest pain.   Gastrointestinal:  Negative for nausea and vomiting.   Neurological:  Positive for weakness.     Objective:     Vital Signs (Most Recent):  Temp: 97.9 °F (36.6 °C) (06/12/25 0730)  Pulse: 75 (06/12/25 0730)  Resp: 16 (06/12/25 0730)  BP: (!) 146/65 (06/12/25 0730)  SpO2: 100 % (06/12/25 0730) Vital Signs (24h Range):  Temp:  [97.9 °F (36.6 °C)-98.4 °F (36.9 °C)] 97.9 °F (36.6 °C)  Pulse:  [72-90] 75  Resp:  [14-22] 16  SpO2:  [89 %-100 %] 100 %  BP: (119-158)/(57-86) 146/65     Weight: 78 kg (171 lb 15.3 oz)  Body mass index is 26.15 kg/m².    Intake/Output Summary (Last 24 hours) at 6/12/2025 1037  Last data filed at 6/12/2025 0421  Gross per 24 hour   Intake 550 ml   Output --   Net 550 ml         Physical Exam  Vitals reviewed.   Constitutional:       General: She is not in acute distress.     Appearance: Normal appearance.   HENT:      Head: Normocephalic.        Comments: Bruising and swelling to right orbital area  Cardiovascular:      Rate and Rhythm: Normal rate and regular rhythm.      Pulses: Normal pulses.      Heart sounds: Normal " heart sounds.   Pulmonary:      Effort: Pulmonary effort is normal.      Breath sounds: Examination of the right-lower field reveals decreased breath sounds. Examination of the left-lower field reveals decreased breath sounds. Decreased breath sounds present.   Abdominal:      General: Bowel sounds are normal. There is no distension.      Palpations: Abdomen is soft.      Tenderness: There is no abdominal tenderness.   Musculoskeletal:      Cervical back: Normal range of motion and neck supple. No rigidity or tenderness.      Right lower leg: No edema.      Left lower leg: No edema.      Left ankle: Swelling and ecchymosis present. Tenderness present. Decreased range of motion.   Skin:     General: Skin is warm and dry.      Capillary Refill: Capillary refill takes less than 2 seconds.      Findings: Bruising present.             Comments: Brusing and swelling to left ankle  Bruising to left flank  Bruising to right lower back  Swelling and bruising to right hand     Neurological:      General: No focal deficit present.      Mental Status: She is alert and oriented to person, place, and time. Mental status is at baseline.   Psychiatric:         Mood and Affect: Mood normal.         Behavior: Behavior normal.         Thought Content: Thought content normal.         Judgment: Judgment normal.               Significant Labs: All pertinent labs within the past 24 hours have been reviewed.    Significant Imaging: I have reviewed all pertinent imaging results/findings within the past 24 hours.      Assessment & Plan  Syncope  -Neurochecks Q4 hours  -PT/OT  consulted  -ECHO ordered and pending  -US of bilateral carotid arteries normal  -trend troponin x3, ordered  -cardiac monitoring   -MRI/MRA of brain negative for acute process  -orthostatic vital signs           Essential hypertension, benign  Patient's blood pressure range in the last 24 hours was: BP  Min: 119/57  Max: 158/67.The patient's inpatient anti-hypertensive  regimen is listed below:  Current Antihypertensives   Amlodipine 5mg Nightly  HCTZ 25mg nightly    Plan  Home medications held due to syncopal event and repeat falls. Will monitor and treat as needed  Hyperlipidemia  Chronic condition    -Statin continued    Acquired hypothyroidism  TSH 0.141    -Levothyroxine continued  -Aromasine non-formulary and unable to be ordered during admit    Stage 3b chronic kidney disease  Creatine stable for now. BMP reviewed- noted Estimated Creatinine Clearance: 45.5 mL/min (based on SCr of 1.1 mg/dL). according to latest data. Based on current GFR, CKD stage is stage 3 - GFR 30-59.  Monitor UOP and serial BMP and adjust therapy as needed. Renally dose meds. Avoid nephrotoxic medications and procedures.  COPD exacerbation  Patient's COPD is with exacerbation noted by worsening of baseline hypoxia currently.  Patient is currently on COPD Pathway. Continue scheduled inhalers Steroids, Antibiotics, and Supplemental oxygen and monitor respiratory status closely.   Respiratory culture with gram stain ordered and pending  Multiple falls  Three falls this week resulting in trauma to face left ankle and left flank    -Fall precautions  -PT/OT consult    VTE Risk Mitigation (From admission, onward)           Ordered     enoxaparin injection 40 mg  Daily         06/11/25 2000     IP VTE HIGH RISK PATIENT  Once         06/11/25 2000     Place sequential compression device  Until discontinued         06/11/25 2000                    Discharge Planning   LILA:      Code Status: Full Code   Medical Readiness for Discharge Date:                    Please place Justification for DME        Keshia Tomlinson MD  Department of Hospital Medicine   [facility] - Med/Surg

## 2025-06-12 NOTE — PT/OT/SLP EVAL
Physical Therapy Evaluation    Patient Name:  Kirstie Bowden   MRN:  [id number]    Recommendations:     Discharge Recommendations: High Intensity Therapy   Discharge Equipment Recommendations: none   Barriers to discharge: Decreased caregiver support    Assessment:     Kirstie Bowden is a 80 y.o. female admitted with a medical diagnosis of Syncope.  She presents with the following impairments/functional limitations: weakness, impaired endurance, impaired functional mobility, gait instability, impaired balance, decreased lower extremity function, pain, impaired cardiopulmonary response to activity, orthopedic precautions .    Pt seen supine in bed, alert and agreeable to PT. Pt with R orbital ecchymosis, L ankle sprain with cam walker boot and hx of L 6,7,8th rib fractures. Pt with multiple falls this week  2x on Monday and 1x Tuesday.  Pt was home with G daughter that works. Pt is alone during daytime. Pt ambulated with RW 40ft min assist with another person following with chair. Attempted gait on RA with SAT 85%, and improved 95-96% on 1 liter NC  HIT    Rehab Prognosis: Good; patient would benefit from acute skilled PT services to address these deficits and reach maximum level of function.    Recent Surgery: * No surgery found *      Plan:     During this hospitalization, patient to be seen 6 x/week to address the identified rehab impairments via gait training, therapeutic activities, therapeutic exercises and progress toward the following goals:    Plan of Care Expires:  06/30/25    Subjective   Stated was independent/driving  Stated had fallen 3x this week  Chief Complaint: it hurts to breath deep- pt with L rib fx  Patient/Family Comments/goals: get stronger  Pain/Comfort:  Pain Rating 1:  (not stated)  Location - Side 1: Right  Location 1: chest (fx L 6, 7, 8 th ribs)  Pain Addressed 1: Reposition, Distraction, Cessation of Activity    Patients cultural, spiritual, Catholic conflicts given the  current situation:      Living Environment:  Home with G daughter who is employed- pt alone during day time  Prior to admission, patients level of function was indep.  Equipment used at home: none.  DME owned (not currently used): none.  Upon discharge, patient will have assistance from family.    Objective:     Communicated with nurse Lund prior to session.  Patient found HOB elevated with peripheral IV, telemetry, bed alarm  upon PT entry to room.    General Precautions: Standard, fall, respiratory  Orthopedic Precautions: (sprain L ankle, L rib fx 6,7,8th)   Braces:  (cam walker boot L ankle)  Respiratory Status: Nasal cannula, flow 1 L/min    Exams:  Postural Exam:  Patient presented with the following abnormalities:    -       Rounded shoulders  -       Forward head  -       BMI 26.00  RLE ROM: WFL  RLE Strength: WFL  LLE ROM: WFL  LLE Strength: WFL    Functional Mobility:  Bed Mobility:     Scooting: minimum assistance  Supine to Sit: minimum assistance  Transfers:     Sit to Stand:  minimum assistance with rolling walker  Bed to Chair: minimum assistance with  rolling walker  using  Stand Pivot  Gait: 40ft with RW min assist and another person following with chair and seated rest, pt on O2 at 1 liter with SAT 95-96% SAT on RA 85%      AM-PAC 6 CLICK MOBILITY  Total Score:16       Treatment & Education:  Patient was educated on the importance of OOB activity and functional mobility to negate negative effects of prolonged bed rest during hospitalization, safe transfers and ambulation, and D/C planning   Thera ex with AP,QS/GS,SLR  Gait with RW assist x2 for safety  OOB chair    Patient left up in chair with all lines intact, call button in reach, chair alarm on, and student nurse present.    GOALS:   Multidisciplinary Problems       Physical Therapy Goals          Problem: Physical Therapy    Goal Priority Disciplines Outcome Interventions   Physical Therapy Goal     PT, PT/OT Progressing    Description:  Goals to be met by: 2025     Patient will increase functional independence with mobility by performin. Supine to sit with Modified Leon  2. Sit to stand transfer with Contact Guard Assistance  3. Bed to chair transfer with Contact Guard Assistance using Rolling Walker  4. Gait  x 150 feet with Contact Guard Assistance using Rolling Walker.   5. Lower extremity exercise program x20 reps                       DME Justifications:  No DME recommended requiring DME justifications    History:     Past Medical History:   Diagnosis Date    Arthritis     rheumatoid    Asthma     Breast cancer     Cancer 2020    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    GERD (gastroesophageal reflux disease)     Hearing loss [id number]    Hyperlipidemia     Hypertension     Limb alert care status     NO BP/IV LEFT ARM    Lung disease     Personal history of colonic polyps 2024    Pseudocholinesterase deficiency     family history    Radiation     Rheumatoid arthritis     Thyroid disease        Past Surgical History:   Procedure Laterality Date    AXILLARY NODE DISSECTION Left 2019    Procedure: LYMPHADENECTOMY, AXILLARY;  Surgeon: Mp Gonzalez MD;  Location: [facility];  Service: General;  Laterality: Left;  left lumpectomy with axillary lypmh node dissection    BALLOON DILATION OF URETER Left 2019    Procedure: DILATION, URETER, USING BALLOON;  Surgeon: Jorden Dickson MD;  Location: [facility];  Service: Urology;  Laterality: Left;    BREAST BIOPSY Left 20 yrs ago    benign    BREAST CYST EXCISION  15 yrs ago    BREAST LUMPECTOMY      x2    CHOLECYSTECTOMY  2000    COLONOSCOPY  2018    LUC EASTON MD    CYSTOSCOPY W/ URETERAL STENT PLACEMENT Left 2019    Procedure: CYSTOSCOPY, WITH URETERAL STENT INSERTION;  Surgeon: Jorden Dickson MD;  Location: [facility];  Service: Urology;  Laterality: Left;    CYSTOSCOPY W/ URETERAL STENT REMOVAL Left 2019    Procedure: CYSTOSCOPY, WITH URETERAL  STENT REMOVAL;  Surgeon: Jorden Dickson MD;  Location: [facility] OR;  Service: Urology;  Laterality: Left;    EPIDURAL STEROID INJECTION INTO LUMBAR SPINE N/A 09/30/2021    Procedure: Injection-steroid-epidural-lumbar;  Surgeon: Nathen Lyons MD;  Location: [facility] OR;  Service: Pain Management;  Laterality: N/A;  L5-S1      EPIDURAL STEROID INJECTION INTO LUMBAR SPINE N/A 11/16/2021    Procedure: Injection-steroid-epidural-lumbar;  Surgeon: Nathen Lyons MD;  Location: [facility] OR;  Service: Pain Management;  Laterality: N/A;  L5-S1    EXTRACORPOREAL SHOCK WAVE LITHOTRIPSY Left 07/23/2019    Procedure: LITHOTRIPSY, ESWL;  Surgeon: Jorden Dickson MD;  Location: [facility] OR;  Service: Urology;  Laterality: Left;    EYE SURGERY Bilateral 2000    cataracts    HYSTERECTOMY  1994    MASTECTOMY, PARTIAL Left 04/25/2019    Procedure: MASTECTOMY, PARTIAL;  Surgeon: Mp Gonzalez MD;  Location: [facility] OR;  Service: General;  Laterality: Left;    NEEDLE LOCALIZATION Left 03/14/2019    Procedure: NEEDLE LOCALIZATION;  Surgeon: Mp Gonzalez MD;  Location: [facility] OR;  Service: General;  Laterality: Left;  left lumpectomy with wire needle localization     PARTIAL NEPHRECTOMY Right 05/22/2023    RETROGRADE PYELOGRAPHY Bilateral 06/24/2019    Procedure: PYELOGRAM, RETROGRADE;  Surgeon: Jorden Dickson MD;  Location: [facility] OR;  Service: Urology;  Laterality: Bilateral;    SENTINEL LYMPH NODE BIOPSY Left 03/14/2019    Procedure: BIOPSY, LYMPH NODE, SENTINEL;  Surgeon: Mp Gonzalez MD;  Location: [facility] OR;  Service: General;  Laterality: Left;  left sentinel node lymph node biopsy    TONSILLECTOMY  1948    TRANSFORAMINAL EPIDURAL INJECTION OF STEROID Right 01/04/2022    Procedure: Injection,steroid,epidural,transforaminal approach;  Surgeon: Nathen Lyons MD;  Location: [facility] OR;  Service: Pain Management;  Laterality: Right;  L4-L5, L5-s1    TRANSFORAMINAL EPIDURAL INJECTION OF STEROID Right 07/09/2024    Procedure:  Injection,steroid,epidural,transforaminal approach;  Surgeon: Nathen Lyons MD;  Location: [facility];  Service: Pain Management;  Laterality: Right;    URETEROSCOPY Left 06/24/2019    Procedure: URETEROSCOPY;  Surgeon: Jorden Dickson MD;  Location: [facility] OR;  Service: Urology;  Laterality: Left;       Time Tracking:     PT Received On: 06/12/25  PT Start Time: 1020     PT Stop Time: 1048  PT Total Time (min): 28 min     Billable Minutes: Evaluation 10 and Gait Training 18      06/12/2025

## 2025-06-12 NOTE — PROGRESS NOTES
Automatic Inhaler to Nebulizer Interchange    fluticasone/vilanterol (Breo Ellipta) 100 mcg/25 mcg changed to budesonide 0.5 mg twice daily AND arformoterol 15 mcg twice daily per [facility] Automatic Therapeutic Substitutions Protocol.    Please contact pharmacy at extension [phone] with any questions.     Thank you,   Shelia Saldana

## 2025-06-12 NOTE — PLAN OF CARE
Kirstie Bowden has warranted treatment spanning two or more midnights of hospital level care for the management of Syncope, COPD exac, Falls. She continues to require IVF, IV antibiotics, daily labs, supplemental oxygen, and monitoring of vital signs. Her condition is also complicated by the following comorbidities: rhematoid arthritis, Hypothyroidism, COPD, HTN, HLD, breast cancer, and renal cancer .

## 2025-06-12 NOTE — SUBJECTIVE & OBJECTIVE
Past Medical History:   Diagnosis Date    Arthritis     rheumatoid    Asthma     Breast cancer     Cancer 2020    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    GERD (gastroesophageal reflux disease) 2022    Hearing loss [id number]    Hyperlipidemia     Hypertension 2021    Limb alert care status     NO BP/IV LEFT ARM    Lung disease     Personal history of colonic polyps 01/23/2024    Pseudocholinesterase deficiency     family history    Radiation 2020    Rheumatoid arthritis 2020    Thyroid disease        Past Surgical History:   Procedure Laterality Date    AXILLARY NODE DISSECTION Left 03/14/2019    Procedure: LYMPHADENECTOMY, AXILLARY;  Surgeon: Mp Gonzalez MD;  Location: [facility] OR;  Service: General;  Laterality: Left;  left lumpectomy with axillary lypmh node dissection    BALLOON DILATION OF URETER Left 06/24/2019    Procedure: DILATION, URETER, USING BALLOON;  Surgeon: Jorden Dickson MD;  Location: [facility] OR;  Service: Urology;  Laterality: Left;    BREAST BIOPSY Left 20 yrs ago    benign    BREAST CYST EXCISION  15 yrs ago    BREAST LUMPECTOMY      x2    CHOLECYSTECTOMY  2000    COLONOSCOPY  09/25/2018    LUC EASTON MD    CYSTOSCOPY W/ URETERAL STENT PLACEMENT Left 06/24/2019    Procedure: CYSTOSCOPY, WITH URETERAL STENT INSERTION;  Surgeon: Jorden Dickson MD;  Location: [facility] OR;  Service: Urology;  Laterality: Left;    CYSTOSCOPY W/ URETERAL STENT REMOVAL Left 07/23/2019    Procedure: CYSTOSCOPY, WITH URETERAL STENT REMOVAL;  Surgeon: Jorden Dickson MD;  Location: [facility] OR;  Service: Urology;  Laterality: Left;    EPIDURAL STEROID INJECTION INTO LUMBAR SPINE N/A 09/30/2021    Procedure: Injection-steroid-epidural-lumbar;  Surgeon: Nathen Lyons MD;  Location: [facility] OR;  Service: Pain Management;  Laterality: N/A;  L5-S1      EPIDURAL STEROID INJECTION INTO LUMBAR SPINE N/A 11/16/2021    Procedure: Injection-steroid-epidural-lumbar;  Surgeon: Nathen Lyons MD;  Location: [facility] OR;  Service: Pain Management;  Laterality: N/A;   L5-S1    EXTRACORPOREAL SHOCK WAVE LITHOTRIPSY Left 07/23/2019    Procedure: LITHOTRIPSY, ESWL;  Surgeon: Jorden Dickson MD;  Location: [facility] OR;  Service: Urology;  Laterality: Left;    EYE SURGERY Bilateral 2000    cataracts    HYSTERECTOMY  1994    MASTECTOMY, PARTIAL Left 04/25/2019    Procedure: MASTECTOMY, PARTIAL;  Surgeon: Mp Gonzalez MD;  Location: [facility] OR;  Service: General;  Laterality: Left;    NEEDLE LOCALIZATION Left 03/14/2019    Procedure: NEEDLE LOCALIZATION;  Surgeon: Mp Gonzalez MD;  Location: [facility] OR;  Service: General;  Laterality: Left;  left lumpectomy with wire needle localization     PARTIAL NEPHRECTOMY Right 05/22/2023    RETROGRADE PYELOGRAPHY Bilateral 06/24/2019    Procedure: PYELOGRAM, RETROGRADE;  Surgeon: Jorden Dickson MD;  Location: [facility];  Service: Urology;  Laterality: Bilateral;    SENTINEL LYMPH NODE BIOPSY Left 03/14/2019    Procedure: BIOPSY, LYMPH NODE, SENTINEL;  Surgeon: Mp Gonzalez MD;  Location: [facility];  Service: General;  Laterality: Left;  left sentinel node lymph node biopsy    TONSILLECTOMY  1948    TRANSFORAMINAL EPIDURAL INJECTION OF STEROID Right 01/04/2022    Procedure: Injection,steroid,epidural,transforaminal approach;  Surgeon: Nathen Lyons MD;  Location: [facility] OR;  Service: Pain Management;  Laterality: Right;  L4-L5, L5-s1    TRANSFORAMINAL EPIDURAL INJECTION OF STEROID Right 07/09/2024    Procedure: Injection,steroid,epidural,transforaminal approach;  Surgeon: Nathen Lyons MD;  Location: [facility] OR;  Service: Pain Management;  Laterality: Right;    URETEROSCOPY Left 06/24/2019    Procedure: URETEROSCOPY;  Surgeon: Jorden Dickson MD;  Location: [facility] OR;  Service: Urology;  Laterality: Left;       Review of patient's allergies indicates:   Allergen Reactions    Succinylcholine chloride Other (See Comments)    Sulfur dioxide Hives    Sulfamethoxazole-trimethoprim      Other reaction(s): per dr daryn Rodríguez (sulfonamide  antibiotics)      NOT SURE REACTION       Current Facility-Administered Medications on File Prior to Encounter   Medication    lactated ringers infusion    lidocaine (PF) 10 mg/ml (1%) injection 10 mg     Current Outpatient Medications on File Prior to Encounter   Medication Sig    abatacept, with maltose, (ORENCIA, WITH MALTOSE,) 250 mg SolR injection Inject 750 mg into the skin every 28 days.    albuterol (ACCUNEB) 1.25 mg/3 mL Nebu Take 3 mLs (1.25 mg total) by nebulization every 6 (six) hours as needed (shortness of breath). Rescue    albuterol (PROVENTIL/VENTOLIN HFA) 90 mcg/actuation inhaler Inhale 2 puffs into the lungs every 6 (six) hours as needed for Wheezing. Rescue    amLODIPine (NORVASC) 5 MG tablet TAKE 1 TABLET BY MOUTH EVERY DAY (Patient taking differently: Take 5 mg by mouth nightly.)    atorvastatin (LIPITOR) 20 MG tablet TAKE 1 TABLET BY MOUTH EVERY DAY (Patient taking differently: Take 20 mg by mouth every evening.)    cholecalciferol, vitamin D3, (VITAMIN D3) 25 mcg (1,000 unit) capsule Take 1 capsule by mouth every morning.    cyanocobalamin (VITAMIN B-12) 1000 MCG tablet Take 100 mcg by mouth once daily.    exemestane (AROMASIN) 25 mg tablet TAKE 1 TABLET BY MOUTH EVERY DAY (Patient taking differently: Take 25 mg by mouth nightly.)    fluticasone propionate (FLONASE) 50 mcg/actuation nasal spray 1 spray (50 mcg total) by Each Nostril route once daily.    fluticasone-salmeterol 250-50 mcg/dose (WIXELA INHUB) 250-50 mcg/dose diskus inhaler INHALE 1 PUFF INTO THE LUNGS 2 (TWO) TIMES DAILY. CONTROLLER    folic acid (FOLVITE) 1 MG tablet Take 1 tablet (1,000 mcg total) by mouth once daily.    gabapentin (NEURONTIN) 300 MG capsule TAKE 1 CAPSULE BY MOUTH TWICE A DAY    hydroCHLOROthiazide (HYDRODIURIL) 25 MG tablet TAKE 1 TABLET BY MOUTH EVERY DAY (Patient taking differently: Take 25 mg by mouth once daily.)    levothyroxine (SYNTHROID) 125 MCG tablet Take 125 mcg by mouth before breakfast.     ondansetron (ZOFRAN-ODT) 8 MG TbDL DISSOLVE 1 TABLET IN MOUTH EVERY 12 HOURS AS NEEDED (Patient taking differently: Take 8 mg by mouth every 12 (twelve) hours as needed (Nausea). DISSOLVE 1 TABLET IN MOUTH EVERY 12 HOURS AS NEEDED)    methotrexate 2.5 MG Tab Take 8 tablets (20 mg total) by mouth every 7 days. (Patient not taking: Reported on 2025)    predniSONE (DELTASONE) 5 MG tablet Take 1 tablet (5 mg total) by mouth once daily. (Patient not taking: Reported on 2025)     Family History       Problem Relation (Age of Onset)    Alzheimer's disease Mother    Arthritis Sister    Cancer Father, Daughter    Diabetes Brother    Heart disease Mother, Sister    Hypertension Mother    Kidney disease Sister    Stroke Sister, Sister          Tobacco Use    Smoking status: Former     Current packs/day: 0.00     Average packs/day: 0.5 packs/day for 59.3 years (29.6 ttl pk-yrs)     Types: Cigarettes     Start date: 1960     Quit date: 2019     Years since quittin.8    Smokeless tobacco: Never   Substance and Sexual Activity    Alcohol use: Yes     Alcohol/week: 2.0 standard drinks of alcohol     Types: 2 Glasses of wine per week     Comment: Social    Drug use: No    Sexual activity: Never     Review of Systems   Constitutional:  Positive for activity change.   HENT:  Positive for facial swelling.    Respiratory:  Positive for cough and shortness of breath.    Gastrointestinal:  Positive for nausea.   Genitourinary:  Positive for flank pain.   Musculoskeletal:  Positive for gait problem.   Skin:  Positive for color change.   Neurological:  Positive for syncope and weakness.   Psychiatric/Behavioral:  Positive for confusion.    All other systems reviewed and are negative.    Objective:     Vital Signs (Most Recent):  Temp: 98 °F (36.7 °C) (25)  Pulse: 81 (25)  Resp: 19 (25)  BP: 128/77 (25)  SpO2: 97 % (25) Vital Signs (24h Range):  Temp:  [98 °F  (36.7 °C)-98.4 °F (36.9 °C)] 98 °F (36.7 °C)  Pulse:  [76-90] 81  Resp:  [18-22] 19  SpO2:  [89 %-100 %] 97 %  BP: (121-158)/(61-86) 128/77     Weight: 78 kg (171 lb 15.3 oz)  Body mass index is 26.15 kg/m².     Physical Exam  Vitals reviewed.   Constitutional:       General: She is not in acute distress.     Appearance: Normal appearance.   HENT:      Head: Normocephalic.        Comments: Bruising and swelling to right orbital area  Cardiovascular:      Rate and Rhythm: Normal rate and regular rhythm.      Pulses: Normal pulses.      Heart sounds: Normal heart sounds.   Pulmonary:      Effort: Pulmonary effort is normal.      Breath sounds: Examination of the right-lower field reveals decreased breath sounds. Examination of the left-lower field reveals decreased breath sounds. Decreased breath sounds present.   Abdominal:      General: Bowel sounds are normal. There is no distension.      Palpations: Abdomen is soft.      Tenderness: There is no abdominal tenderness.   Musculoskeletal:      Cervical back: Normal range of motion and neck supple. No rigidity or tenderness.      Right lower leg: No edema.      Left lower leg: No edema.      Left ankle: Swelling and ecchymosis present. Tenderness present. Decreased range of motion.   Skin:     General: Skin is warm and dry.      Capillary Refill: Capillary refill takes less than 2 seconds.      Findings: Bruising present.             Comments: Brusing and swelling to left ankle  Bruising to left flank  Bruising to right lower back  Swelling and bruising to right hand     Neurological:      General: No focal deficit present.      Mental Status: She is alert and oriented to person, place, and time. Mental status is at baseline.   Psychiatric:         Mood and Affect: Mood normal.         Behavior: Behavior normal.         Thought Content: Thought content normal.         Judgment: Judgment normal.                Significant Labs: All pertinent labs within the past 24  hours have been reviewed.    ABGs:     Bilirubin:   Recent Labs   Lab 06/11/25  1450   BILITOT 0.5       BMP:   Recent Labs   Lab 06/11/25  1450 06/11/25  1503   *  --      --    K 4.0  --    CL 97  --    CO2 29  --    BUN 26*  --    CREATININE 1.4  --    CALCIUM 9.9  --    MG  --  2.2     CBC:   Recent Labs   Lab 06/11/25  1450   WBC 12.23   HGB 11.5*   HCT 36.3*        CMP:   Recent Labs   Lab 06/11/25  1450      K 4.0   CL 97   CO2 29   *   BUN 26*   CREATININE 1.4   CALCIUM 9.9   PROT 7.6   ALBUMIN 3.8   BILITOT 0.5   ALKPHOS 61   AST 23   ALT 11   ANIONGAP 11       Magnesium:   Recent Labs   Lab 06/11/25  1503   MG 2.2       TSH:   Recent Labs   Lab 06/11/25  1503   TSH 0.141*       Urine Studies:   Recent Labs   Lab 06/11/25  1605   APPEARANCEUA Clear   SPECGRAV 1.010   PROTEINUA Negative   BILIRUBINUA Negative   UROBILINOGEN Negative   LEUKOCYTESUR Trace*   RBCUA <1   WBCUA 2       Significant Imaging: I have reviewed all pertinent imaging results/findings within the past 24 hours.  EXAMINATION:  XR CHEST AP PORTABLE     CLINICAL HISTORY:  rib pain falls;     TECHNIQUE:  Single frontal view of the chest was performed.     COMPARISON:  04/24/2025     FINDINGS:  The cardiomediastinal silhouette is with normal limits.  There is relative elevation of the right hemidiaphragm without significant change.  No consolidation, edema, or pleural effusion.     Impression:     No acute process.  No significant change.        Electronically signed by:Mp Giron MD  Date:                                            06/11/2025  Time:                                           14:50          EXAMINATION:  XR HAND COMPLETE 3 VIEW RIGHT     CLINICAL HISTORY:  R hand pain;     TECHNIQUE:  PA, lateral, and oblique views of the right hand were performed.     COMPARISON:  None     FINDINGS:  There is no fracture or dislocation.  There is moderate 1st carpometacarpal joint and triscaphe joint  degenerative change.  There also scattered interphalangeal joint degenerative changes.  The soft tissues are unremarkable.     Impression:     No acute osseous abnormality.        Electronically signed by:Mp Giron MD  Date:                                            06/11/2025  Time:                                           14:51    EXAMINATION:  XR ANKLE COMPLETE 3 VIEW LEFT; XR FOOT COMPLETE 3 VIEW LEFT     CLINICAL HISTORY:  Pain in left ankle and joints of left foot; Pain in left foot     TECHNIQUE:  AP, lateral and oblique views of the left ankle were performed.  Three views left foot also obtained.     COMPARISON:  None     FINDINGS:  No fracture or dislocation.  There is moderate soft tissue swelling diffusely about the left ankle.  Small Achilles enthesophyte.     Bones of the left foot are intact and located.  Moderate soft tissue swelling is present.  There is mild degenerative change of the 1st metatarsophalangeal joint.     Impression:     Moderate soft tissue swelling of the left foot and ankle without acute osseous abnormality.        Electronically signed by:Mp Giron MD  Date:                                            06/11/2025  Time:                                           14:53    EXAMINATION:  CT ABDOMEN PELVIS WITH IV CONTRAST     CLINICAL HISTORY:  Abdominal trauma, blunt;     TECHNIQUE:  Low dose axial images, sagittal and coronal reformations were obtained from the lung bases to the pubic symphysis following the IV administration of 60 mL of Omnipaque 350.     COMPARISON:  CT abdomen pelvis from 04/24/2020     FINDINGS:  Lung bases: No worrisome pulmonary nodules or focal consolidations are identified.  Bibasilar atelectasis versus scarring is present.  Atherosclerotic calcification is present involving the coronary arteries.     Liver: No focal mass.     Gallbladder: The gallbladder is surgically absent.     Bile Ducts: No evidence of intra or extra hepatic biliary  ductal dilation.     Spleen: Unremarkable.     Kidneys: The right kidney is surgically absent.  Nonobstructing renal calculi are redemonstrated within the left kidney at the lower pole.  No evidence of renal mass or obstruction.  A left renal cyst is present.     Adrenals: Unremarkable.     Pancreas: No mass or peripancreatic fat stranding.     Bowel: No evidence of bowel obstruction or inflammation. There are no findings to suggest appendicitis in the abdominal right lower quadrant.  The colon demonstrates diverticulosis without evidence of diverticulitis.     Lymph nodes: No evidence of lymphadenopathy involving the abdomen or pelvis.     Vascular: The abdominal aorta is normal in diameter. Moderate to prominent atherosclerotic calcification is present involving the abdominal aorta and its major branching vessels.     Pelvic organs: The uterus is surgically absent.     Urinary Bladder: Unremarkable.     Bones:  No evidence of acute osseous process.  Chronic fractures of the left 6th, 7th, and 8th ribs are present anterolaterally.  Mild multilevel chronic degenerative changes are present.     Abdominal wall:  Unremarkable.     Other: None.     Impression:     No evidence of acute process involving the abdomen or pelvis with chronic and postoperative findings as above.        Electronically signed by:Galo Bush  Date:                                            06/11/2025  Time:                                           18:03    EXAMINATION:  MRI BRAIN WITHOUT CONTRAST     CLINICAL HISTORY:  Head trauma, minor (Age >= 65y);     TECHNIQUE:  Multiplanar multisequence MR imaging of the brain was performed without intravenous contrast.     COMPARISON:  CT head from 06/11/2025.     FINDINGS:  There is motion artifact.     There is no evidence of restricted diffusion to suggest an acute infarction. Ventricles are normal in size for age without evidence of hydrocephalus.  Mild chronic microvascular ischemic changes  noted.  No mass, hemorrhage, or recent or remote major vascular distribution infarct.  No extra-axial blood or fluid collections. Normal vascular flow voids.     Bone marrow signal intensity is normal. Paranasal sinuses and mastoid air cells are clear.  There are bilateral prosthetic lenses.     Impression:     No acute intracranial abnormality.        Electronically signed by:Pascual Barajas  Date:                                            06/11/2025  Time:                                           20:27    EXAMINATION:  MRA BRAIN WITHOUT CONTRAST     CLINICAL HISTORY:  unstable ataxic gait and repeat falls;     TECHNIQUE:  Non-contrast 3-D time-of-flight intracranial MR angiography was performed through the [place] [facility] [place] with MIP reformatting.     COMPARISON:  MRI brain from 06/11/2025.     FINDINGS:  Anterior circulation: The bilateral intracranial ICAs, MCAs, and ACAs are patent and unremarkable without high-grade stenosis or occlusion.     Posterior circulation: The left vertebral artery is dominant.  The bilateral tubal arteries, basilar artery, and bilateral PCAs are otherwise patent and unremarkable without high-grade stenosis or occlusion.     There is no intracranial aneurysm.     Impression:     Unremarkable MRA brain.        Electronically signed by:Pascual Barajas  Date:                                            06/11/2025  Time:                                           21:19

## 2025-06-12 NOTE — PLAN OF CARE
Goals to be met by: 7/10/25     Patient will increase functional independence with ADLs by performing:    UE Dressing with Modified [place].  LE Dressing with Modified [place].  Grooming while seated with Modified [place].  Toileting from toilet with Modified [place] for hygiene and clothing management.   Bathing from  shower chair/bench with Modified [place].  Toilet transfer to toilet with Modified [place].  Increased strength and functional activity tolerance for ADL's/IADL's

## 2025-06-12 NOTE — ASSESSMENT & PLAN NOTE
Three falls this week resulting in trauma to face left ankle and left flank    -Fall precautions  -PT/OT consult

## 2025-06-12 NOTE — PLAN OF CARE
Problem: Adult Inpatient Plan of Care  Goal: Plan of Care Review  Outcome: Progressing  Goal: Patient-Specific Goal (Individualized)  Outcome: Progressing  Goal: Absence of Hospital-Acquired Illness or Injury  Outcome: Progressing  Goal: Optimal Comfort and Wellbeing  Outcome: Progressing  Goal: Readiness for Transition of Care  Outcome: Progressing     Problem: Wound  Goal: Optimal Coping  Outcome: Progressing  Goal: Optimal Functional Ability  Outcome: Progressing  Goal: Absence of Infection Signs and Symptoms  Outcome: Progressing  Goal: Improved Oral Intake  Outcome: Progressing  Goal: Optimal Pain Control and Function  Outcome: Progressing  Goal: Skin Health and Integrity  Outcome: Progressing  Goal: Optimal Wound Healing  Outcome: Progressing     Problem: COPD (Chronic Obstructive Pulmonary Disease)  Goal: Optimal Chronic Illness Coping  Outcome: Progressing  Goal: Optimal Level of Functional [place]  Outcome: Progressing  Goal: Absence of Infection Signs and Symptoms  Outcome: Progressing  Goal: Improved Oral Intake  Outcome: Progressing  Goal: Effective Oxygenation and Ventilation  Outcome: Progressing

## 2025-06-12 NOTE — ASSESSMENT & PLAN NOTE
TSH 0.141    -Levothyroxine continued  -Aromasine non-formulary and unable to be ordered during admit

## 2025-06-12 NOTE — PLAN OF CARE
Problem: Adult Inpatient Plan of Care  Goal: Plan of Care Review  Outcome: Progressing  Goal: Patient-Specific Goal (Individualized)  Outcome: Progressing  Flowsheets (Taken 6/12/2025 0234)  Individualized Care Needs: Safety, mobility, pain management, IV antibiotics, oxygenation  Anxieties, Fears or Concerns: Pt reporting concern about unknown cause for her increased falls  Patient/Family-Specific Goals (Include Timeframe): Pt will endorse decreased pain by end of day 6/14/2025     Problem: Wound  Goal: Absence of Infection Signs and Symptoms  Outcome: Progressing  Goal: Skin Health and Integrity  Outcome: Progressing     Problem: COPD (Chronic Obstructive Pulmonary Disease)  Goal: Optimal Level of Functional [place]  Outcome: Progressing  Goal: Effective Oxygenation and Ventilation  Outcome: Progressing     Pt requested sleep aid from provider, prn medication ordered and given. Pt O2 sat 98% on 1L O2. Pt NSR on tele.  Pt bed locked in lowest position, bed alarm on, call light within reach, side rails up x2.

## 2025-06-12 NOTE — ASSESSMENT & PLAN NOTE
Patient's blood pressure range in the last 24 hours was: BP  Min: 121/67  Max: 158/67.The patient's inpatient anti-hypertensive regimen is listed below:  Current Antihypertensives   Amlodipine 5mg Nightly  HCTZ 25mg nightly    Plan  Home medications held due to syncopal event and repeat falls. Will monitor and treat as needed

## 2025-06-13 VITALS
OXYGEN SATURATION: 96 % | TEMPERATURE: 98 F | RESPIRATION RATE: 17 BRPM | SYSTOLIC BLOOD PRESSURE: 135 MMHG | WEIGHT: 171 LBS | BODY MASS INDEX: 25.91 KG/M2 | DIASTOLIC BLOOD PRESSURE: 61 MMHG | HEART RATE: 74 BPM | HEIGHT: 68 IN

## 2025-06-13 LAB
ALBUMIN SERPL-MCNC: 3.2 G/DL (ref 3.5–5.2)
ALP SERPL-CCNC: 53 UNIT/L (ref 40–150)
ALT SERPL-CCNC: 12 UNIT/L (ref 10–44)
ANION GAP (SMH): 11 MMOL/L (ref 8–16)
AST SERPL-CCNC: 17 UNIT/L (ref 11–45)
BILIRUB SERPL-MCNC: 0.3 MG/DL (ref 0.1–1)
BUN SERPL-MCNC: 23 MG/DL (ref 8–23)
CALCIUM SERPL-MCNC: 9.3 MG/DL (ref 8.7–10.5)
CHLORIDE SERPL-SCNC: 101 MMOL/L (ref 95–110)
CO2 SERPL-SCNC: 27 MMOL/L (ref 23–29)
CREAT SERPL-MCNC: 1.1 MG/DL (ref 0.5–1.4)
ERYTHROCYTE [DISTWIDTH] IN BLOOD BY AUTOMATED COUNT: 15 % (ref 11.5–14.5)
GFR SERPLBLD CREATININE-BSD FMLA CKD-EPI: 51 ML/MIN/1.73/M2
GLUCOSE SERPL-MCNC: 120 MG/DL (ref 70–110)
HCT VFR BLD AUTO: 31.3 % (ref 37–48.5)
HGB BLD-MCNC: 9.7 GM/DL (ref 12–16)
MAGNESIUM SERPL-MCNC: 2 MG/DL (ref 1.6–2.6)
MCH RBC QN AUTO: 31.4 PG (ref 27–31)
MCHC RBC AUTO-ENTMCNC: 31 G/DL (ref 32–36)
MCV RBC AUTO: 101 FL (ref 82–98)
OHS QRS DURATION: 86 MS
OHS QTC CALCULATION: 411 MS
PHOSPHATE SERPL-MCNC: 2.3 MG/DL (ref 2.7–4.5)
PLATELET # BLD AUTO: 200 K/UL (ref 150–450)
PMV BLD AUTO: 11.6 FL (ref 9.2–12.9)
POTASSIUM SERPL-SCNC: 4.1 MMOL/L (ref 3.5–5.1)
PROT SERPL-MCNC: 6.6 GM/DL (ref 6–8.4)
RBC # BLD AUTO: 3.09 M/UL (ref 4–5.4)
SODIUM SERPL-SCNC: 139 MMOL/L (ref 136–145)
WBC # BLD AUTO: 9.17 K/UL (ref 3.9–12.7)

## 2025-06-13 PROCEDURE — 85027 COMPLETE CBC AUTOMATED: CPT

## 2025-06-13 PROCEDURE — 97116 GAIT TRAINING THERAPY: CPT | Mod: CQ

## 2025-06-13 PROCEDURE — 25000003 PHARM REV CODE 250

## 2025-06-13 PROCEDURE — 99900035 HC TECH TIME PER 15 MIN (STAT)

## 2025-06-13 PROCEDURE — 80053 COMPREHEN METABOLIC PANEL: CPT

## 2025-06-13 PROCEDURE — 25000242 PHARM REV CODE 250 ALT 637 W/ HCPCS: Performed by: HOSPITALIST

## 2025-06-13 PROCEDURE — 94761 N-INVAS EAR/PLS OXIMETRY MLT: CPT

## 2025-06-13 PROCEDURE — 27000221 HC OXYGEN, UP TO 24 HOURS

## 2025-06-13 PROCEDURE — 97110 THERAPEUTIC EXERCISES: CPT | Mod: CQ

## 2025-06-13 PROCEDURE — 36415 COLL VENOUS BLD VENIPUNCTURE: CPT

## 2025-06-13 PROCEDURE — 84100 ASSAY OF PHOSPHORUS: CPT

## 2025-06-13 PROCEDURE — 99900031 HC PATIENT EDUCATION (STAT)

## 2025-06-13 PROCEDURE — 25000003 PHARM REV CODE 250: Performed by: HOSPITALIST

## 2025-06-13 PROCEDURE — 25000242 PHARM REV CODE 250 ALT 637 W/ HCPCS

## 2025-06-13 PROCEDURE — 83735 ASSAY OF MAGNESIUM: CPT

## 2025-06-13 PROCEDURE — 63600175 PHARM REV CODE 636 W HCPCS

## 2025-06-13 PROCEDURE — 94640 AIRWAY INHALATION TREATMENT: CPT

## 2025-06-13 RX ORDER — PREDNISONE 20 MG/1
40 TABLET ORAL DAILY
Qty: 4 TABLET | Refills: 0 | Status: ON HOLD | OUTPATIENT
Start: 2025-06-14 | End: 2025-06-18

## 2025-06-13 RX ORDER — DOXYCYCLINE HYCLATE 100 MG
100 TABLET ORAL EVERY 12 HOURS
Qty: 8 TABLET | Refills: 0 | Status: ON HOLD | OUTPATIENT
Start: 2025-06-13 | End: 2025-06-17

## 2025-06-13 RX ORDER — HYDROCODONE BITARTRATE AND ACETAMINOPHEN 5; 325 MG/1; MG/1
1 TABLET ORAL EVERY 6 HOURS PRN
Status: ON HOLD
Start: 2025-06-13

## 2025-06-13 RX ORDER — ATORVASTATIN CALCIUM 20 MG/1
20 TABLET, FILM COATED ORAL NIGHTLY
Status: ON HOLD
Start: 2025-06-13

## 2025-06-13 RX ORDER — EXEMESTANE 25 MG/1
25 TABLET ORAL NIGHTLY
Status: ON HOLD
Start: 2025-06-13

## 2025-06-13 RX ADMIN — ONDANSETRON 4 MG: 2 INJECTION INTRAMUSCULAR; INTRAVENOUS at 09:06

## 2025-06-13 RX ADMIN — IPRATROPIUM BROMIDE AND ALBUTEROL SULFATE 3 ML: 2.5; .5 SOLUTION RESPIRATORY (INHALATION) at 07:06

## 2025-06-13 RX ADMIN — ACETAMINOPHEN 650 MG: 325 TABLET ORAL at 08:06

## 2025-06-13 RX ADMIN — ACETAMINOPHEN 650 MG: 325 TABLET ORAL at 12:06

## 2025-06-13 RX ADMIN — DOXYCYCLINE HYCLATE 100 MG: 100 TABLET, COATED ORAL at 08:06

## 2025-06-13 RX ADMIN — ARFORMOTEROL TARTRATE 15 MCG: 15 SOLUTION RESPIRATORY (INHALATION) at 07:06

## 2025-06-13 RX ADMIN — HYDROCODONE BITARTRATE AND ACETAMINOPHEN 1 TABLET: 5; 325 TABLET ORAL at 03:06

## 2025-06-13 RX ADMIN — BUDESONIDE INHALATION 0.5 MG: 0.5 SUSPENSION RESPIRATORY (INHALATION) at 07:06

## 2025-06-13 RX ADMIN — FAMOTIDINE 20 MG: 20 TABLET, FILM COATED ORAL at 08:06

## 2025-06-13 RX ADMIN — PREDNISONE 40 MG: 20 TABLET ORAL at 08:06

## 2025-06-13 RX ADMIN — HYDROCODONE BITARTRATE AND ACETAMINOPHEN 1 TABLET: 5; 325 TABLET ORAL at 08:06

## 2025-06-13 NOTE — ASSESSMENT & PLAN NOTE
Creatine stable for now. BMP reviewed- noted Estimated Creatinine Clearance: 45.4 mL/min (based on SCr of 1.1 mg/dL). according to latest data. Based on current GFR, CKD stage is stage 3 - GFR 30-59.  Monitor UOP and serial BMP and adjust therapy as needed. Renally dose meds. Avoid nephrotoxic medications and procedures.

## 2025-06-13 NOTE — PT/OT/SLP PROGRESS
Physical Therapy Treatment    Patient Name:  Kirstie Bowden   MRN:  [id number]    Recommendations:     Discharge Recommendations: High Intensity Therapy  Discharge Equipment Recommendations: none  Barriers to discharge: None    Assessment:     Kirstie Bowden is a 80 y.o. female admitted with a medical diagnosis of Syncope.  She presents with the following impairments/functional limitations: weakness, impaired endurance, impaired functional mobility, gait instability, impaired balance, decreased lower extremity function, pain, impaired cardiopulmonary response to activity, orthopedic precautions . Seated in chair at bedside.  Reported nausea, medication requested and delivered by charge nurse.  CAM walker applied in sitting.  Sit > stand rw, Min A.  Ambulated 100' x 2 , rw, Min A, O2 in tow.  Performed LE exercises.     Rehab Prognosis: Good; patient would benefit from acute skilled PT services to address these deficits and reach maximum level of function.    Recent Surgery: * No surgery found *      Plan:     During this hospitalization, patient to be seen 6 x/week to address the identified rehab impairments via gait training, therapeutic activities, therapeutic exercises and progress toward the following goals:    Plan of Care Expires:  06/30/25    Subjective     Chief Complaint: nausea,  pain L side chest  Patient/Family Comments/goals: to go to facility for therapy  Pain/Comfort:  Pain Rating 1: other (see comments) (did not rate)  Location - Side 1: Left  Location - Orientation 1: lateral  Location 1: chest  Pain Addressed 1: Reposition, Nurse notified      Objective:     Communicated with nurse Lund prior to session.  Patient found up in chair with chair check, oxygen, telemetry upon PT entry to room.     General Precautions: Standard, fall, respiratory  Orthopedic Precautions:  (sprain L ankle, L rib fx 6,7,8th)  Braces:  (CAM walker LLE)  Respiratory Status: Nasal cannula, flow 1 L/min      Functional Mobility:  Transfers:     Sit to Stand:  minimum assistance with rolling walker  Gait: 100' x 2, rw, Min A, O2 in tow.       AM-PAC 6 CLICK MOBILITY          Treatment & Education:  Ambulated with rw, Min A, O2 in tow, CAM walker LLE.   BLE exercises; TKE's, hip abd/add/ flexion, SLR's, QS, HS. RLE  only : AP's.     Patient left up in chair with all lines intact, call button in reach, chair alarm on, and nurse Ashely notified..    GOALS:   Multidisciplinary Problems       Physical Therapy Goals          Problem: Physical Therapy    Goal Priority Disciplines Outcome Interventions   Physical Therapy Goal     PT, PT/OT Progressing    Description: Goals to be met by: 2025     Patient will increase functional independence with mobility by performin. Supine to sit with Modified [place]  2. Sit to stand transfer with Contact Guard Assistance  3. Bed to chair transfer with Contact Guard Assistance using Rolling Walker  4. Gait  x 150 feet with Contact Guard Assistance using Rolling Walker.   5. Lower extremity exercise program x20 reps                       DME Justifications:  No DME recommended requiring DME justifications    Time Tracking:     PT Received On: 25  PT Start Time: 920     PT Stop Time: 943  PT Total Time (min): 23 min     Billable Minutes: Gait Training 10min  and Therapeutic Exercise 13min    Treatment Type: Treatment  PT/PTA: PTA     Number of PTA visits since last PT visit: 2025

## 2025-06-13 NOTE — ASSESSMENT & PLAN NOTE
Patient's COPD is with exacerbation noted by worsening of baseline hypoxia currently.  Patient is currently on COPD Pathway. Continue scheduled inhalers Steroids, Antibiotics, and Supplemental oxygen and monitor respiratory status closely.   Respiratory culture with gram stain ordered and pending

## 2025-06-13 NOTE — HOSPITAL COURSE
Admitted to the hospital medicine service.  MRI/MRA and were negative for acute process, ultrasound of the carotids was negative for hemodynamically significant stenosis, troponin was trended and was negative, TTE without evidence of systolic or diastolic dysfunction and no issues on telemetry.  She worked with PT and OT recommended rehab.  She reports improvement in her tremors and weakness during her hospital stay.  Outpatient symptoms might have been prompted by hypoxia.  She was discharged to rehab on 1-2 L of oxygen.  She will follow up with PCP.  Patient was seen and examined on day of discharge and deemed suitable for discharge to rehab

## 2025-06-13 NOTE — ASSESSMENT & PLAN NOTE
Patient's blood pressure range in the last 24 hours was: BP  Min: 111/56  Max: 135/61.The patient's inpatient anti-hypertensive regimen is listed below:  Current Antihypertensives   Amlodipine 5mg Nightly  HCTZ 25mg nightly    Plan  Home medications held due to syncopal event and repeat falls. Will monitor and treat as needed

## 2025-06-13 NOTE — PLAN OF CARE
06/13/25 0712   Patient Assessment/Suction   Level of Consciousness (AVPU) alert   Respiratory Effort Normal;Unlabored   Expansion/Accessory Muscles/Retractions no use of accessory muscles   All Lung Fields Breath Sounds Anterior:;Lateral:;diminished   Rhythm/Pattern, Respiratory pattern regular   Cough Frequency infrequent   Cough Type nonproductive   Skin Integrity   $ Wound Care Tech Time 15 min   Area Observed Left;Right;Behind ear;Cheek;Upper lip;Nares   Skin Appearance without discoloration   PRE-TX-O2   Device (Oxygen Therapy) nasal cannula   $ Is the patient on Low Flow Oxygen? Yes   Flow (L/min) (Oxygen Therapy) 1   Oxygen Concentration (%) 24   SpO2 (!) 94 %   Pulse Oximetry Type Intermittent   $ Pulse Oximetry - Multiple Charge Pulse Oximetry - Multiple   Pulse 68   Resp 18   Aerosol Therapy   $ Aerosol Therapy Charges Aerosol Treatment  (brovana)   Daily Review of Necessity (SVN) completed   Respiratory Treatment Status (SVN) given   Treatment Route (SVN) mask;oxygen   Patient Position HOB elevated   Post Treatment Assessment (SVN) breath sounds unchanged   Signs of Intolerance (SVN) none   Breath Sounds Post-Respiratory Treatment   Throughout All Fields Post-Treatment All Fields   Throughout All Fields Post-Treatment no change   Post-treatment Heart Rate (beats/min) 77   Post-treatment Resp Rate (breaths/min) 18   General Safety Checklist   Safety Promotion/Fall Prevention side rails raised   Equipment Change   $ RT Equipment Treatment nebulizer   Respiratory Interventions   Cough And Deep Breathing done with encouragement   Ready to Wean/Extubation Screen   FIO2<=50 (chart decimal) 0.24   Education   $ Education Bronchodilator;Oxygen;15 min

## 2025-06-13 NOTE — PLAN OF CARE
Problem: Physical Therapy  Goal: Physical Therapy Goal  Description: Goals to be met by: 2025     Patient will increase functional independence with mobility by performin. Supine to sit with Modified [place]  2. Sit to stand transfer with Contact Guard Assistance  3. Bed to chair transfer with Contact Guard Assistance using Rolling Walker  4. Gait  x 150 feet with Contact Guard Assistance using Rolling Walker.   5. Lower extremity exercise program x20 reps  Outcome: Progressing   Ambulate with rw and assistance for safety.

## 2025-06-13 NOTE — DISCHARGE SUMMARY
[facility] Medicine  Discharge Summary      Patient Name: Kirstie Bowden  MRN: [id number]  DUGLAS: [id number]  Patient Class: IP- Inpatient  Admission Date: 6/11/2025  Hospital Length of Stay: 1 days  Discharge Date and Time: 6/13/2025  1:12 PM  Attending Physician: No att. providers found   Discharging Provider: Keshia Tomlinson MD  Primary Care Provider: Rocio Feliciano MD    Primary Care Team: Networked reference to record PCT     HPI:   Kirstie Bowden is a 80 year old female with a previous medical history rhematoid arthritis, Hypothyroidism, COPD, HTN, HLD, breast cancer, and renal cancer who presented to the emergency room for multiple falls. The patient had 1 fall on Monday that she reports not remembering the events that occurred to lead her to be on the ground with a broken pot next her. She subsequently had two falls the next day. She has multiple injuries associated with the falls and was seen at [place] on 6/9/25 and discharged. Today the patient is reporting persistent flank pain and her oxygen saturation was 89% on room air upon arrival to ED. Daughter at bedside reports the the first fall was possibly syncope but the subsequent two falls were related to both legs just giving out on her. Denies any dizziness or lightheadedness with the subsequent two falls. She also endorses patient has had mild confusion over the past few days and on Tuesday was so generally weak she could not get herself out of the recliner. CT of head negative for acute process. MRI/MRA of brain negative for acute process. CT of abdomen pelvis showed no evidence of acute process but did read chronic fractures of the 6th, 7th and 8th ribs. But,  the patient has bruising and pain in this area currently. Xray of left ankle and foot showed no fracture or dislocation. Xray of right hand showed no acute osseous abnormality. Urine studies negative for infection. CMP shows a creatinine of 1.4 and of  "note it was 1.0 approximately one month ago. BUN 26 and GFR 38. CBC shows stable anemia and no leukocytosis. TSH 0.141 and Free t4 1.33, mag and phos normal. Procacitonin 0.21. Patient does report a productive cough with green sputum. Chest xray showed no acute process or significant change from previous month and CT of abdomen pelvis read the following for bilateral lung bases "No worrisome pulmonary nodules or focal consolidations are identified. Bibasilar atelectasis versus scarring is present". Patient admitted by hospital medicine for further evaluation and management    * No surgery found *      Hospital Course:    Admitted to the hospital medicine service.  MRI/MRA and were negative for acute process, ultrasound of the carotids was negative for hemodynamically significant stenosis, troponin was trended and was negative, TTE without evidence of systolic or diastolic dysfunction and no issues on telemetry.  She worked with PT and OT recommended rehab.  She reports improvement in her tremors and weakness during her hospital stay.  Outpatient symptoms might have been prompted by hypoxia.  She was discharged to rehab on 1-2 L of oxygen.  She will follow up with PCP.  Patient was seen and examined on day of discharge and deemed suitable for discharge to rehab     Goals of Care Treatment Preferences:  Code Status: Full Code      SDOH Screening:  The patient declined to be screened for utility difficulties, food insecurity, transport difficulties, housing insecurity, and interpersonal safety, so no concerns could be identified this admission.     Consults:     Assessment & Plan  Syncope  -Neurochecks Q4 hours  -PT/OT  consulted  -ECHO ordered and pending  -US of bilateral carotid arteries normal  -trend troponin x3, ordered  -cardiac monitoring   -MRI/MRA of brain negative for acute process  -orthostatic vital signs           Essential hypertension, benign  Patient's blood pressure range in the last 24 hours was: BP  " Min: 111/56  Max: 135/61.The patient's inpatient anti-hypertensive regimen is listed below:  Current Antihypertensives   Amlodipine 5mg Nightly  HCTZ 25mg nightly    Plan  Home medications held due to syncopal event and repeat falls. Will monitor and treat as needed  Hyperlipidemia  Chronic condition    -Statin continued    Acquired hypothyroidism  TSH 0.141    -Levothyroxine continued  -Aromasine non-formulary and unable to be ordered during admit    Stage 3b chronic kidney disease  Creatine stable for now. BMP reviewed- noted Estimated Creatinine Clearance: 45.4 mL/min (based on SCr of 1.1 mg/dL). according to latest data. Based on current GFR, CKD stage is stage 3 - GFR 30-59.  Monitor UOP and serial BMP and adjust therapy as needed. Renally dose meds. Avoid nephrotoxic medications and procedures.  COPD exacerbation  Patient's COPD is with exacerbation noted by worsening of baseline hypoxia currently.  Patient is currently on COPD Pathway. Continue scheduled inhalers Steroids, Antibiotics, and Supplemental oxygen and monitor respiratory status closely.   Respiratory culture with gram stain ordered and pending  Multiple falls  Three falls this week resulting in trauma to face left ankle and left flank    -Fall precautions  -PT/OT consult    Final Active Diagnoses:    Diagnosis Date Noted POA    PRINCIPAL PROBLEM:  Syncope [R55] 06/12/2025 Yes    Multiple falls [R29.6] 06/12/2025 Not Applicable    COPD exacerbation [J44.1] 04/24/2025 Yes    Stage 3b chronic kidney disease [N18.32] 07/13/2024 Yes    Hyperlipidemia [E78.5] 03/26/2014 Yes    Acquired hypothyroidism [E03.9] 09/27/2013 Yes    Essential hypertension, benign [I10] 09/27/2013 Yes      Problems Resolved During this Admission:       Discharged Condition: good    Disposition: Rehab Facility    Follow Up:   Follow-up Information       Rocio Feliciano MD Follow up in 1 week(s).    Specialty: Family Medicine  Contact information:  [address] [place]  [id number]  [phone]                           Patient Instructions:      Notify your health care provider if you experience any of the following:  temperature >100.4     Notify your health care provider if you experience any of the following:  persistent dizziness, light-headedness, or visual disturbances     Notify your health care provider if you experience any of the following:  increased confusion or weakness     Activity as tolerated       Significant Diagnostic Studies: Labs: BMP:   Recent Labs   Lab 06/12/25 0343 06/13/25  0343    120*    139   K 3.8 4.1    101   CO2 31* 27   BUN 19 23   CREATININE 1.1 1.1   CALCIUM 9.1 9.3   MG 2.1 2.0    and CBC   Recent Labs   Lab 06/12/25 0343 06/13/25 0343   WBC 8.34 9.17   HGB 10.3* 9.7*   HCT 33.9* 31.3*    200   Radiology Results (last 7 days)    Procedure Component Value Units Date/Time   US Carotid Bilateral [[id number]] Resulted: 06/12/25 0838   Order Status: Completed Updated: 06/12/25 0841   Narrative:     EXAMINATION:  US CAROTID BILATERAL    CLINICAL HISTORY:  syncope;    TECHNIQUE:  Grayscale and color Doppler ultrasound examination of the carotid and vertebral artery systems bilaterally.  Stenosis estimates are per the NASCET measurement criteria.    COMPARISON:  None.    FINDINGS:  Right:    Internal Carotid Artery (ICA) peak systolic velocity 96 cm/sec    ICA/CCA peak systolic velocity ratio: 0.9    Plaque formation: Moderate    Vertebral artery: Antegrade flow and normal waveform.    Left:    Internal Carotid Artery (ICA)  peak systolic velocity 97 cm/sec    ICA/CCA peak systolic velocity ratio: 1.0    Plaque formation: Moderate    Vertebral artery: Antegrade flow and normal waveform.   Impression:       No evidence of a hemodynamically significant carotid bifurcation stenosis.      Electronically signed by: Stacy Jones MD  Date: 06/12/2025  Time: 08:38   MRA Brain without contrast [[id number]] Resulted: 06/11/25 5439    Order Status: Completed Updated: 06/11/25 2121   Narrative:     EXAMINATION:  MRA BRAIN WITHOUT CONTRAST    CLINICAL HISTORY:  unstable ataxic gait and repeat falls;    TECHNIQUE:  Non-contrast 3-D time-of-flight intracranial MR angiography was performed through the [place] [facility] [place] with MIP reformatting.    COMPARISON:  MRI brain from 06/11/2025.    FINDINGS:  Anterior circulation: The bilateral intracranial ICAs, MCAs, and ACAs are patent and unremarkable without high-grade stenosis or occlusion.    Posterior circulation: The left vertebral artery is dominant.  The bilateral tubal arteries, basilar artery, and bilateral PCAs are otherwise patent and unremarkable without high-grade stenosis or occlusion.    There is no intracranial aneurysm.   Impression:       Unremarkable MRA brain.      Electronically signed by: Pascual Barajas  Date: 06/11/2025  Time: 21:19   MRI Brain Without Contrast [[id number]] Resulted: 06/11/25 2027   Order Status: Completed Updated: 06/11/25 2029   Narrative:     EXAMINATION:  MRI BRAIN WITHOUT CONTRAST    CLINICAL HISTORY:  Head trauma, minor (Age >= 65y);    TECHNIQUE:  Multiplanar multisequence MR imaging of the brain was performed without intravenous contrast.    COMPARISON:  CT head from 06/11/2025.    FINDINGS:  There is motion artifact.    There is no evidence of restricted diffusion to suggest an acute infarction. Ventricles are normal in size for age without evidence of hydrocephalus.  Mild chronic microvascular ischemic changes noted.  No mass, hemorrhage, or recent or remote major vascular distribution infarct.  No extra-axial blood or fluid collections. Normal vascular flow voids.    Bone marrow signal intensity is normal. Paranasal sinuses and mastoid air cells are clear.  There are bilateral prosthetic lenses.   Impression:       No acute intracranial abnormality.      Electronically signed by: Pascual Barajas  Date: 06/11/2025  Time: 20:27   CT Abdomen Pelvis With IV  Contrast NO Oral Contrast [[id number]] Resulted: 06/11/25 1803   Order Status: Completed Updated: 06/11/25 1805   Narrative:     EXAMINATION:  CT ABDOMEN PELVIS WITH IV CONTRAST    CLINICAL HISTORY:  Abdominal trauma, blunt;    TECHNIQUE:  Low dose axial images, sagittal and coronal reformations were obtained from the lung bases to the pubic symphysis following the IV administration of 60 mL of Omnipaque 350.    COMPARISON:  CT abdomen pelvis from 04/24/2020    FINDINGS:  Lung bases: No worrisome pulmonary nodules or focal consolidations are identified.  Bibasilar atelectasis versus scarring is present.  Atherosclerotic calcification is present involving the coronary arteries.    Liver: No focal mass.    Gallbladder: The gallbladder is surgically absent.    Bile Ducts: No evidence of intra or extra hepatic biliary ductal dilation.    Spleen: Unremarkable.    Kidneys: The right kidney is surgically absent.  Nonobstructing renal calculi are redemonstrated within the left kidney at the lower pole.  No evidence of renal mass or obstruction.  A left renal cyst is present.    Adrenals: Unremarkable.    Pancreas: No mass or peripancreatic fat stranding.    Bowel: No evidence of bowel obstruction or inflammation. There are no findings to suggest appendicitis in the abdominal right lower quadrant.  The colon demonstrates diverticulosis without evidence of diverticulitis.    Lymph nodes: No evidence of lymphadenopathy involving the abdomen or pelvis.    Vascular: The abdominal aorta is normal in diameter. Moderate to prominent atherosclerotic calcification is present involving the abdominal aorta and its major branching vessels.    Pelvic organs: The uterus is surgically absent.    Urinary Bladder: Unremarkable.    Bones:  No evidence of acute osseous process.  Chronic fractures of the left 6th, 7th, and 8th ribs are present anterolaterally.  Mild multilevel chronic degenerative changes are present.    Abdominal wall:   Unremarkable.    Other: None.   Impression:       No evidence of acute process involving the abdomen or pelvis with chronic and postoperative findings as above.      Electronically signed by: Galo Bush  Date: 06/11/2025  Time: 18:03   CT Head Without Contrast [[id number]] Resulted: 06/11/25 1714   Order Status: Completed Updated: 06/11/25 1717   Narrative:     EXAMINATION:  CT HEAD WITHOUT CONTRAST    CLINICAL HISTORY:  Head trauma, minor (Age >= 65y);Mental status change, unknown cause;    TECHNIQUE:  Low dose axial CT images obtained throughout the head without intravenous contrast. Sagittal and coronal reconstructions were performed.    COMPARISON:  None available.    FINDINGS:  Intracranial compartment:    Mild global cerebral atrophy is present.  The ventricles are normal in size for age without evidence of hydrocephalus. No extra-axial blood or fluid collections.    The brain parenchyma appears normal. No parenchymal mass, hemorrhage, edema or major vascular distribution infarct.    Skull/extracranial contents (limited evaluation):    No fracture. Mastoid air cells and paranasal sinuses are essentially clear.  The native ocular lenses are absent.  The orbits are otherwise unremarkable.   Impression:       No evidence of acute intracranial abnormality.      Electronically signed by: Galo Bush  Date: 06/11/2025  Time: 17:14   X-Ray Ankle Complete Left [[id number]] Resulted: 06/11/25 1453   Order Status: Completed Updated: 06/11/25 1456   Narrative:     EXAMINATION:  XR ANKLE COMPLETE 3 VIEW LEFT; XR FOOT COMPLETE 3 VIEW LEFT    CLINICAL HISTORY:  Pain in left ankle and joints of left foot; Pain in left foot    TECHNIQUE:  AP, lateral and oblique views of the left ankle were performed.  Three views left foot also obtained.    COMPARISON:  None    FINDINGS:  No fracture or dislocation.  There is moderate soft tissue swelling diffusely about the left ankle.  Small Achilles enthesophyte.    Bones of  the left foot are intact and located.  Moderate soft tissue swelling is present.  There is mild degenerative change of the 1st metatarsophalangeal joint.   Impression:       Moderate soft tissue swelling of the left foot and ankle without acute osseous abnormality.      Electronically signed by: Mp Giron MD  Date: 06/11/2025  Time: 14:53   X-Ray Foot Complete Left [[id number]] Resulted: 06/11/25 1453   Order Status: Completed Updated: 06/11/25 1456   Narrative:     EXAMINATION:  XR ANKLE COMPLETE 3 VIEW LEFT; XR FOOT COMPLETE 3 VIEW LEFT    CLINICAL HISTORY:  Pain in left ankle and joints of left foot; Pain in left foot    TECHNIQUE:  AP, lateral and oblique views of the left ankle were performed.  Three views left foot also obtained.    COMPARISON:  None    FINDINGS:  No fracture or dislocation.  There is moderate soft tissue swelling diffusely about the left ankle.  Small Achilles enthesophyte.    Bones of the left foot are intact and located.  Moderate soft tissue swelling is present.  There is mild degenerative change of the 1st metatarsophalangeal joint.   Impression:       Moderate soft tissue swelling of the left foot and ankle without acute osseous abnormality.      Electronically signed by: Mp Giron MD  Date: 06/11/2025  Time: 14:53   X-Ray Hand 3 view Right [[id number]] Resulted: 06/11/25 1451   Order Status: Completed Updated: 06/11/25 1453   Narrative:     EXAMINATION:  XR HAND COMPLETE 3 VIEW RIGHT    CLINICAL HISTORY:  R hand pain;    TECHNIQUE:  PA, lateral, and oblique views of the right hand were performed.    COMPARISON:  None    FINDINGS:  There is no fracture or dislocation.  There is moderate 1st carpometacarpal joint and triscaphe joint degenerative change.  There also scattered interphalangeal joint degenerative changes.  The soft tissues are unremarkable.   Impression:       No acute osseous abnormality.      Electronically signed by: Mp Giron MD  Date:  06/11/2025  Time: 14:51   X-Ray Chest AP Portable [[id number]] Resulted: 06/11/25 1450   Order Status: Completed Updated: 06/11/25 1452   Narrative:     EXAMINATION:  XR CHEST AP PORTABLE    CLINICAL HISTORY:  rib pain falls;    TECHNIQUE:  Single frontal view of the chest was performed.    COMPARISON:  04/24/2025    FINDINGS:  The cardiomediastinal silhouette is with normal limits.  There is relative elevation of the right hemidiaphragm without significant change.  No consolidation, edema, or pleural effusion.   Impression:       No acute process.  No significant change.      Electronically signed by: Mp Giron MD  Date: 06/11/2025  Time: 14:50       Pending Diagnostic Studies:       None           Medications:  Reconciled Home Medications:      Medication List        START taking these medications      doxycycline 100 MG tablet  Commonly known as: VIBRA-TABS  Take 1 tablet (100 mg total) by mouth every 12 (twelve) hours. for 4 days     HYDROcodone-acetaminophen 5-325 mg per tablet  Commonly known as: NORCO  Take 1 tablet by mouth every 6 (six) hours as needed for Pain.            CHANGE how you take these medications      ondansetron 8 MG Tbdl  Commonly known as: ZOFRAN-ODT  DISSOLVE 1 TABLET IN MOUTH EVERY 12 HOURS AS NEEDED  What changed: See the new instructions.     predniSONE 20 MG tablet  Commonly known as: DELTASONE  Take 2 tablets (40 mg total) by mouth once daily. for 4 days  Start taking on: June 14, 2025  What changed:   medication strength  how much to take            CONTINUE taking these medications      * albuterol 90 mcg/actuation inhaler  Commonly known as: PROVENTIL/VENTOLIN HFA  Inhale 2 puffs into the lungs every 6 (six) hours as needed for Wheezing. Rescue     * albuterol 1.25 mg/3 mL Nebu  Commonly known as: ACCUNEB  Take 3 mLs (1.25 mg total) by nebulization every 6 (six) hours as needed (shortness of breath). Rescue     atorvastatin 20 MG tablet  Commonly known as: LIPITOR  Take 1  tablet (20 mg total) by mouth every evening.     cholecalciferol (vitamin D3) 25 mcg (1,000 unit) capsule  Commonly known as: VITAMIN D3  Take 1 capsule by mouth every morning.     exemestane 25 mg tablet  Commonly known as: AROMASIN  Take 1 tablet (25 mg total) by mouth nightly.     fluticasone propionate 50 mcg/actuation nasal spray  Commonly known as: FLONASE  1 spray (50 mcg total) by Each Nostril route once daily.     folic acid 1 MG tablet  Commonly known as: FOLVITE  Take 1 tablet (1,000 mcg total) by mouth once daily.     gabapentin 300 MG capsule  Commonly known as: NEURONTIN  TAKE 1 CAPSULE BY MOUTH TWICE A DAY     levothyroxine 125 MCG tablet  Commonly known as: SYNTHROID  Take 125 mcg by mouth before breakfast.     ORENCIA (WITH MALTOSE) 250 mg Solr injection  Generic drug: abatacept (with maltose)  Inject 750 mg into the skin every 28 days.     VITAMIN B-12 1000 MCG tablet  Generic drug: cyanocobalamin  Take 100 mcg by mouth once daily.     WIXELA INHUB 250-50 mcg/dose diskus inhaler  Generic drug: fluticasone-salmeterol 250-50 mcg/dose  INHALE 1 PUFF INTO THE LUNGS 2 (TWO) TIMES DAILY. CONTROLLER           * This list has 2 medication(s) that are the same as other medications prescribed for you. Read the directions carefully, and ask your doctor or other care provider to review them with you.                STOP taking these medications      amLODIPine 5 MG tablet  Commonly known as: NORVASC     hydroCHLOROthiazide 25 MG tablet  Commonly known as: HYDRODIURIL     methotrexate 2.5 MG Tab              Indwelling Lines/Drains at time of discharge:   Lines/Drains/Airways       Drain  Duration             Female External Urinary Catheter w/ Suction 06/11/25 2200 1 day                    Time spent on the discharge of patient: 35 minutes         Keshia Tomlinson MD  Department of Hospital Medicine  [facility]/Surg

## 2025-06-13 NOTE — PLAN OF CARE
Problem: Adult Inpatient Plan of Care  Goal: Plan of Care Review  Outcome: Progressing  Goal: Patient-Specific Goal (Individualized)  Outcome: Progressing  Flowsheets (Taken 6/13/2025 0336)  Individualized Care Needs: Safety, mobility, pain management, IV antibtiotics, oxygenation  Anxieties, Fears or Concerns: None stated  Patient/Family-Specific Goals (Include Timeframe): Pt will endorse decreased pain by end of day 6/14/2025  Goal: Optimal Comfort and Wellbeing  Outcome: Progressing     Problem: Wound  Goal: Absence of Infection Signs and Symptoms  Outcome: Progressing  Goal: Optimal Pain Control and Function  Outcome: Progressing  Goal: Skin Health and Integrity  Outcome: Progressing     Pt NSR on tele.   Pt bed locked in lowest position, bed alarm on, call light within reach, side rails up x2.

## 2025-06-13 NOTE — PT/OT/SLP PROGRESS
Occupational Therapy      Patient Name:  Kirstie Bowden   MRN:  [id number]    Patient not seen today secondary to Other (Comment) (Pt discharged prior to OT Tx)..    6/13/2025

## 2025-06-13 NOTE — PLAN OF CARE
Patient cleared for discharge from case management.  call report to [phone] and pickup for 1pm.  Ashely, nurse notified.     06/13/25 1012   Final Note   Assessment Type Final Discharge Note   Anticipated Discharge Disposition Rehab   Post-Acute Status   Post-Acute Authorization Placement   Post-Acute Placement Status Set-up Complete/Auth obtained   Discharge Delays None known at this time

## 2025-06-13 NOTE — DISCHARGE INSTRUCTIONS
[facility]/Surg  Facility Transfer Orders        Admit to: Rehab    Diagnoses:   Active Hospital Problems    Diagnosis  POA    *Syncope [R55]  Yes    Multiple falls [R29.6]  Not Applicable    COPD exacerbation [J44.1]  Yes    Stage 3b chronic kidney disease [N18.32]  Yes    Hyperlipidemia [E78.5]  Yes    Acquired hypothyroidism [E03.9]  Yes    Essential hypertension, benign [I10]  Yes      Resolved Hospital Problems   No resolved problems to display.     Allergies:   Review of patient's allergies indicates:   Allergen Reactions    Succinylcholine chloride Other (See Comments)    Sulfur dioxide Hives    Sulfamethoxazole-trimethoprim      Other reaction(s): per dr daryn Rodríguez (sulfonamide antibiotics)      NOT SURE REACTION       Code Status: Full    Vitals: Routine       Diet: regular diet    Activity: Activity as tolerated    Nursing Precautions: Aspiration , Fall, Seizure, and Pressure ulcer prevention    Bed/Surface: Low Air Loss    Consults: PT to evaluate and treat- 7 times a week and OT to evaluate and treat- 7 times a week    Oxygen: 1 liters/min via nasal cannula    Dialysis: Patient is not on dialysis.     Labs: CBC and CMP Monday and Thursday x 2 weeks  Pending Diagnostic Studies:       None          Imaging: none    \     Medications: Discontinue all previous medication orders, if any. See new list below.  Current Discharge Medication List        START taking these medications    Details   doxycycline (VIBRA-TABS) 100 MG tablet Take 1 tablet (100 mg total) by mouth every 12 (twelve) hours. for 4 days  Qty: 8 tablet, Refills: 0      HYDROcodone-acetaminophen (NORCO) 5-325 mg per tablet Take 1 tablet by mouth every 6 (six) hours as needed for Pain.    Comments: n/a   Associated Diagnoses: Syncope           CONTINUE these medications which have CHANGED    Details   atorvastatin (LIPITOR) 20 MG tablet Take 1 tablet (20 mg total) by mouth every evening.    Associated Diagnoses:  Hyperlipidemia      exemestane (AROMASIN) 25 mg tablet Take 1 tablet (25 mg total) by mouth nightly.    Associated Diagnoses: Malignant neoplasm of nipple of left breast in female, estrogen receptor positive      predniSONE (DELTASONE) 20 MG tablet Take 2 tablets (40 mg total) by mouth once daily. for 4 days  Qty: 4 tablet, Refills: 0           CONTINUE these medications which have NOT CHANGED    Details   abatacept, with maltose, (ORENCIA, WITH MALTOSE,) 250 mg SolR injection Inject 750 mg into the skin every 28 days.      albuterol (ACCUNEB) 1.25 mg/3 mL Nebu Take 3 mLs (1.25 mg total) by nebulization every 6 (six) hours as needed (shortness of breath). Rescue  Qty: 75 mL, Refills: 6    Associated Diagnoses: Chronic obstructive pulmonary disease, unspecified COPD type      albuterol (PROVENTIL/VENTOLIN HFA) 90 mcg/actuation inhaler Inhale 2 puffs into the lungs every 6 (six) hours as needed for Wheezing. Rescue  Qty: 54 g, Refills: 3    Associated Diagnoses: Mild intermittent asthma, unspecified whether complicated      cholecalciferol, vitamin D3, (VITAMIN D3) 25 mcg (1,000 unit) capsule Take 1 capsule by mouth every morning.      cyanocobalamin (VITAMIN B-12) 1000 MCG tablet Take 100 mcg by mouth once daily.      fluticasone propionate (FLONASE) 50 mcg/actuation nasal spray 1 spray (50 mcg total) by Each Nostril route once daily.  Qty: 48 mL, Refills: 3      fluticasone-salmeterol 250-50 mcg/dose (WIXELA INHUB) 250-50 mcg/dose diskus inhaler INHALE 1 PUFF INTO THE LUNGS 2 (TWO) TIMES DAILY. CONTROLLER  Qty: 180 each, Refills: 3    Associated Diagnoses: Asthma, chronic, mild intermittent, uncomplicated      folic acid (FOLVITE) 1 MG tablet Take 1 tablet (1,000 mcg total) by mouth once daily.  Qty: 90 tablet, Refills: 3    Associated Diagnoses: Rheumatoid arthritis involving multiple sites with positive rheumatoid factor; High risk medications (not anticoagulants) long-term use; Drug-induced immunodeficiency       gabapentin (NEURONTIN) 300 MG capsule TAKE 1 CAPSULE BY MOUTH TWICE A DAY  Qty: 60 capsule, Refills: 2      levothyroxine (SYNTHROID) 125 MCG tablet Take 125 mcg by mouth before breakfast.      ondansetron (ZOFRAN-ODT) 8 MG TbDL DISSOLVE 1 TABLET IN MOUTH EVERY 12 HOURS AS NEEDED  Qty: 30 tablet, Refills: 0    Associated Diagnoses: Malignant neoplasm of nipple of left breast in female, estrogen receptor positive; Essential hypertension, benign; Rheumatoid arthritis involving right shoulder with positive rheumatoid factor           STOP taking these medications       amLODIPine (NORVASC) 5 MG tablet Comments:   Reason for Stopping:         hydroCHLOROthiazide (HYDRODIURIL) 25 MG tablet Comments:   Reason for Stopping:         methotrexate 2.5 MG Tab Comments:   Reason for Stopping:             Follow up:    Follow-up Information       Rocio Feliciano MD Follow up in 1 week(s).    Specialty: Family Medicine  Contact information:  [address] [facility] [address]  [phone]                               Immunizations Administered as of 6/13/2025       Name Date Dose VIS Date Route Exp Date    COVID-19, MRNA, LN-S, PF (Moderna) 3/4/2021 -- -- -- --    : [employer]     Lot: [id number]     Comment: Adminis     COVID-19, MRNA, LN-S, PF (Moderna) 2/4/2021 -- -- -- --    : Moderna [employer]     Lot: [id number]     Comment: Adminis                   Keshia Tomlinson MD

## 2025-06-23 ENCOUNTER — TELEPHONE (OUTPATIENT)
Dept: RHEUMATOLOGY | Facility: CLINIC | Age: 80
End: 2025-06-23
Payer: MEDICARE

## 2025-06-23 DIAGNOSIS — Z00.00 ENCOUNTER FOR MEDICARE ANNUAL WELLNESS EXAM: ICD-10-CM

## 2025-06-23 NOTE — TELEPHONE ENCOUNTER
Copied from CRM #[id number]. Topic: Medications - Medication Question  >> Jun 23, 2025 10:38 AM Paige wrote:  Type:  Needs Medical Advice    Who Called: Patient   Would the patient rather a call back or a response via [facility]? Call   Best Call Back Number: [phone] (home)  Additional Information: Pt would like a call back regarding a question she has for medicine Orencia. Thanks!

## 2025-06-24 NOTE — TELEPHONE ENCOUNTER
Has taken the Orencia infusion and states that she has had some involuntary movement of the extremities and has fallen a few times, has also be hospitalized for one of them. Will send message to our Clinical pharmacist to see what she advises. States understanding

## 2025-06-30 ENCOUNTER — OUTPATIENT CASE MANAGEMENT (OUTPATIENT)
Dept: ADMINISTRATIVE | Facility: OTHER | Age: 80
End: 2025-06-30
Payer: MEDICARE

## 2025-06-30 NOTE — PROGRESS NOTES
6/30/2025  1st attempt to complete Follow-Up  for Outpatient Care Management, left message.  Will send via [facility] -  unable to assess letter.

## 2025-06-30 NOTE — LETTER
Kirstie Bowden  [address]  [place] [address]      Dear Kirstie Bowden,     I am your nurse with [facility]s Outpatient Care Management Department. I was unsuccessful in reaching you today. At your earliest convenience, I would like to discuss your healthcare progress.      Please contact me at [phone] from 8:00AM to 4:30 PM on Monday thru Friday. I will call you again on Tuesday, July 8, 2025. I hope you have a good July 4th!    As you know, [facility] is a program offered to you through [facility] where a nurse is available 24/7 to answer questions or provide medical advice, their number is [phone].    Thanks,      Ivory Siddiqui, RN  Outpatient Care Management

## 2025-07-01 DIAGNOSIS — I10 ESSENTIAL HYPERTENSION, BENIGN: ICD-10-CM

## 2025-07-01 DIAGNOSIS — M05.711 RHEUMATOID ARTHRITIS INVOLVING RIGHT SHOULDER WITH POSITIVE RHEUMATOID FACTOR: ICD-10-CM

## 2025-07-01 DIAGNOSIS — Z17.0 MALIGNANT NEOPLASM OF NIPPLE OF LEFT BREAST IN FEMALE, ESTROGEN RECEPTOR POSITIVE: ICD-10-CM

## 2025-07-01 DIAGNOSIS — C50.012 MALIGNANT NEOPLASM OF NIPPLE OF LEFT BREAST IN FEMALE, ESTROGEN RECEPTOR POSITIVE: ICD-10-CM

## 2025-07-01 RX ORDER — ONDANSETRON 8 MG/1
TABLET, ORALLY DISINTEGRATING ORAL
Qty: 30 TABLET | Refills: 0 | Status: SHIPPED | OUTPATIENT
Start: 2025-07-01

## 2025-07-03 ENCOUNTER — PATIENT MESSAGE (OUTPATIENT)
Dept: RHEUMATOLOGY | Facility: CLINIC | Age: 80
End: 2025-07-03
Payer: MEDICARE

## 2025-07-07 ENCOUNTER — OFFICE VISIT (OUTPATIENT)
Dept: RHEUMATOLOGY | Facility: CLINIC | Age: 80
End: 2025-07-07
Payer: MEDICARE

## 2025-07-07 VITALS
HEART RATE: 91 BPM | SYSTOLIC BLOOD PRESSURE: 139 MMHG | BODY MASS INDEX: 26.21 KG/M2 | WEIGHT: 172.38 LBS | DIASTOLIC BLOOD PRESSURE: 83 MMHG

## 2025-07-07 DIAGNOSIS — Z79.899 HIGH RISK MEDICATIONS (NOT ANTICOAGULANTS) LONG-TERM USE: ICD-10-CM

## 2025-07-07 DIAGNOSIS — D84.821 DRUG-INDUCED IMMUNODEFICIENCY: ICD-10-CM

## 2025-07-07 DIAGNOSIS — M05.79 RHEUMATOID ARTHRITIS INVOLVING MULTIPLE SITES WITH POSITIVE RHEUMATOID FACTOR: Primary | ICD-10-CM

## 2025-07-07 DIAGNOSIS — Z78.0 ASYMPTOMATIC MENOPAUSE: ICD-10-CM

## 2025-07-07 DIAGNOSIS — Z79.899 DRUG-INDUCED IMMUNODEFICIENCY: ICD-10-CM

## 2025-07-07 PROCEDURE — 99999 PR PBB SHADOW E&M-EST. PATIENT-LVL III: CPT | Mod: PBBFAC,,, | Performed by: STUDENT IN AN ORGANIZED HEALTH CARE EDUCATION/TRAINING PROGRAM

## 2025-07-07 PROCEDURE — 99213 OFFICE O/P EST LOW 20 MIN: CPT | Mod: PBBFAC,PO | Performed by: STUDENT IN AN ORGANIZED HEALTH CARE EDUCATION/TRAINING PROGRAM

## 2025-07-07 PROCEDURE — 99215 OFFICE O/P EST HI 40 MIN: CPT | Mod: S$PBB,,, | Performed by: STUDENT IN AN ORGANIZED HEALTH CARE EDUCATION/TRAINING PROGRAM

## 2025-07-07 ASSESSMENT — ROUTINE ASSESSMENT OF PATIENT INDEX DATA (RAPID3)
FATIGUE SCORE: 1.1
PAIN SCORE: 4
MDHAQ FUNCTION SCORE: 1
PATIENT GLOBAL ASSESSMENT SCORE: 4
PSYCHOLOGICAL DISTRESS SCORE: 0
TOTAL RAPID3 SCORE: 3.78

## 2025-07-07 NOTE — PROGRESS NOTES
Subjective:      Patient ID: Kirstie Bowden is a 80 y.o. female.    Chief Complaint: Disease Management    HPI    Rheumatologic History:   - Diagnosis/es:              - osteoarthritis  - seropositive rheumatoid arthritis diagnosed in 2015  - Positive serologies: + RF (170), +CCP (87.9)  - Negative serologies: -  - Infectious screening labs:  Negative hepatitis-B, C, and QuantiFERON (6/2024)  - Imaging:              - x-ray arthritis survey (02/2023): Chondrocalcinosis in the knees and degenerative changes              - DEXA (9/2021): normal BMD  - Previous Treatments:              - Enbrel: Discontinued due to breast cancer              - HCQ: ineffective              - MTX 20 mg weekly plus folic acid daily (dose decreased 1/16/2025): LFTs sean on 22.5mg, discontinued due to lack of joint pain  - Current Treatments:               - Orencia IV (5/2025- )  - tramadol daily p.r.n. for pain (she takes this sparingly)  Interval History:  She has had 3 falls since she was last seen, as well as a syncopal episode. She states that she had a full cardiac and neurologic work up that was negative. She denies joint pain and swelling and is off prednisone.     Objective:   /83 (BP Location: Right arm, Patient Position: Sitting)   Pulse 91   Wt 78.2 kg (172 lb 6.4 oz)   BMI 26.21 kg/m²   Physical Exam   Constitutional: normal appearance.   HENT:   Head: Normocephalic and atraumatic.   Pulmonary/Chest: Effort normal.   Musculoskeletal:      Comments: No synovitis, dactylitis, enthesitis, effusions     Neurological: She is alert.   Skin: Skin is warm and dry. No rash noted.   No skin thickening, telangiectasias, calcinosis, psoriasiform lesions, lupoid lesions          6/21/2023 6/25/2024   Tender (EMERSON-28) 0 / 28  0 / 28    Swollen (EMERSON-28) 0 / 28  0 / 28    Provider Global 10 / 100 10 / 100   Patient Global 10 / 100 10 / 100   ESR -- 18 mm/hr   CRP 19.2 mg/L 8 mg/L   EMERSON-28 (ESR) -- 2.16 (Remission)   EMERSON-28  (CRP) 2.18 (Remission) 1.89 (Remission)   CDAI Score 2  2      Labs (6/14/25)   CBC HGB 11.2, WBC, PLT WNL  CMP CR 1.1, GFR 51, AST, ALT WNL    Assessment:     1. Rheumatoid arthritis involving multiple sites with positive rheumatoid factor    2. High risk medications (not anticoagulants) long-term use    3. Drug-induced immunodeficiency    4. Asymptomatic menopause      This is a 80-year-old woman with history of HLD, HTN, CKD, nephrolithiasis s/p lithotripsy, sulfa allergy, breast cancer diagnosed in 2020 in remission s/p lumpectomy, chemotherapy and radiation, clear cell renal cell carcinoma s/p nephrectomy (5/23/2023), hypothyroidism, osteoarthritis, and rheumatoid arthritis diagnosed in 2015 and on Orencia, tramadol 50mg daily PRN for pain. She has had 3 falls since she was last seen, as well as a syncopal episode. She states that she had a full cardiac and neurologic work up that was negative. She denies joint pain and swelling and is off prednisone. Abnormal movements, syncope, and changes in mentation are not known adverse effects of Orencia. I encouraged her to continue it as her joint pain has improved.     Plan:     Problem List Items Addressed This Visit          Renal/    Asymptomatic menopause    Relevant Orders    DXA Bone Density Axial Skeleton 1 or more sites       Immunology/Multi System    Rheumatoid arthritis involving multiple sites with positive rheumatoid factor - Primary    Relevant Orders    Calcium, Ionized    CBC Auto Differential    Sedimentation rate    C-Reactive Protein    Creatinine, Serum    AST (SGOT)    ALT (SGPT)    Drug-induced immunodeficiency    Relevant Orders    Calcium, Ionized    CBC Auto Differential    Sedimentation rate    C-Reactive Protein    Creatinine, Serum    AST (SGOT)    ALT (SGPT)       Palliative Care    High risk medications (not anticoagulants) long-term use    Relevant Orders    Calcium, Ionized    CBC Auto Differential    Sedimentation rate    C-Reactive  Protein    Creatinine, Serum    AST (SGOT)    ALT (SGPT)     1.) RA  2.) Drug induced immunodeficiency  3.) high risk medication use  4.) Medication side effect  5.) Elevated LFTs  - Orencia weekly  - Avoid Yuriy and TNFi due to strong personal history of cancer  - CBC, CMP, ESR, CRP every 12 weeks  - Pre-DMARD labs yearly  - Immunizations: COVID x 3, flu (10/2022), Shingrix x 2, PCV20 (7/2024), patient states she is also up to date on RSV and flu     6.) Bilateral carpal tunnel syndrome: patient would like to hold off on hand surgery referral as she just had surgery  - Wrist braces for now     7.) Asymptomatic menopause  - DEXA before follow up    Follow up in 3 months    40 minutes of total time spent on the encounter, which includes face to face time and non-face to face time preparing to see the patient (eg, review of tests), Obtaining and/or reviewing separately obtained history, Documenting clinical information in the electronic or other health record, Independently interpreting results (not separately reported) and communicating results to the patient/family/caregiver, or Care coordination (not separately reported).     This note was prepared with [employer] voice recognition transcription software. Garbled syntax, mangled pronouns, and other bizarre constructions may be attributed to that software system       Khushboo Aguirre M.D.  Rheumatology Dept  [place], [place]

## 2025-07-08 ENCOUNTER — EXTERNAL HOME HEALTH (OUTPATIENT)
Dept: HOME HEALTH SERVICES | Facility: HOSPITAL | Age: 80
End: 2025-07-08
Payer: MEDICARE

## 2025-07-08 ENCOUNTER — OUTPATIENT CASE MANAGEMENT (OUTPATIENT)
Dept: ADMINISTRATIVE | Facility: OTHER | Age: 80
End: 2025-07-08
Payer: MEDICARE

## 2025-07-08 NOTE — PROGRESS NOTES
Outpatient Care Management  Plan of Care Follow Up Visit    Patient: Kirstie Bowden  MRN: [id number]  Date of Service: 07/08/2025  Completed by: Ivory Siddiqui RN  Referral Date: 04/28/2025    Reason for Visit   Patient presents with    OPCM RN Follow Up Call       Brief Summary: OPCM RN followed up with OPCM RN followed up with Kirstie today for care plan review.   today for care plan review.    Next Steps:   Kirstie agreed to OPCM RN follow up on or around 7/29/25.  Follow up on increase of protein during healing period.  Follow up on exercises in between outpatient therapy sessions.

## 2025-07-09 ENCOUNTER — PATIENT MESSAGE (OUTPATIENT)
Dept: HEMATOLOGY/ONCOLOGY | Facility: CLINIC | Age: 80
End: 2025-07-09
Payer: MEDICARE

## 2025-07-10 ENCOUNTER — INFUSION (OUTPATIENT)
Dept: INFUSION THERAPY | Facility: HOSPITAL | Age: 80
End: 2025-07-10
Attending: STUDENT IN AN ORGANIZED HEALTH CARE EDUCATION/TRAINING PROGRAM
Payer: MEDICARE

## 2025-07-10 VITALS
RESPIRATION RATE: 15 BRPM | OXYGEN SATURATION: 97 % | WEIGHT: 172 LBS | TEMPERATURE: 98 F | SYSTOLIC BLOOD PRESSURE: 126 MMHG | HEART RATE: 73 BPM | BODY MASS INDEX: 26.07 KG/M2 | HEIGHT: 68 IN | DIASTOLIC BLOOD PRESSURE: 73 MMHG

## 2025-07-10 DIAGNOSIS — M05.79 RHEUMATOID ARTHRITIS INVOLVING MULTIPLE SITES WITH POSITIVE RHEUMATOID FACTOR: Primary | ICD-10-CM

## 2025-07-10 PROCEDURE — A4216 STERILE WATER/SALINE, 10 ML: HCPCS | Performed by: STUDENT IN AN ORGANIZED HEALTH CARE EDUCATION/TRAINING PROGRAM

## 2025-07-10 PROCEDURE — 25000003 PHARM REV CODE 250: Performed by: STUDENT IN AN ORGANIZED HEALTH CARE EDUCATION/TRAINING PROGRAM

## 2025-07-10 PROCEDURE — 63600175 PHARM REV CODE 636 W HCPCS: Mod: JZ,JA,TB | Performed by: STUDENT IN AN ORGANIZED HEALTH CARE EDUCATION/TRAINING PROGRAM

## 2025-07-10 PROCEDURE — 96365 THER/PROPH/DIAG IV INF INIT: CPT

## 2025-07-10 RX ORDER — SODIUM CHLORIDE 0.9 % (FLUSH) 0.9 %
10 SYRINGE (ML) INJECTION
OUTPATIENT
Start: 2025-08-07

## 2025-07-10 RX ORDER — ACETAMINOPHEN 325 MG/1
325 TABLET ORAL ONCE
OUTPATIENT
Start: 2025-08-07

## 2025-07-10 RX ORDER — HEPARIN 100 UNIT/ML
500 SYRINGE INTRAVENOUS
OUTPATIENT
Start: 2025-08-07

## 2025-07-10 RX ORDER — DIPHENHYDRAMINE HCL 25 MG
25 CAPSULE ORAL ONCE
Status: COMPLETED | OUTPATIENT
Start: 2025-07-10 | End: 2025-07-10

## 2025-07-10 RX ORDER — EPINEPHRINE 0.3 MG/.3ML
0.3 INJECTION SUBCUTANEOUS ONCE AS NEEDED
OUTPATIENT
Start: 2025-08-07

## 2025-07-10 RX ORDER — DIPHENHYDRAMINE HYDROCHLORIDE 50 MG/ML
50 INJECTION, SOLUTION INTRAMUSCULAR; INTRAVENOUS ONCE AS NEEDED
OUTPATIENT
Start: 2025-08-07

## 2025-07-10 RX ORDER — ACETAMINOPHEN 325 MG/1
650 TABLET ORAL
OUTPATIENT
Start: 2025-08-07

## 2025-07-10 RX ORDER — DIPHENHYDRAMINE HCL 25 MG
25 CAPSULE ORAL ONCE
OUTPATIENT
Start: 2025-08-07

## 2025-07-10 RX ORDER — DIPHENHYDRAMINE HYDROCHLORIDE 50 MG/ML
25 INJECTION, SOLUTION INTRAMUSCULAR; INTRAVENOUS
OUTPATIENT
Start: 2025-08-07

## 2025-07-10 RX ORDER — SODIUM CHLORIDE 0.9 % (FLUSH) 0.9 %
10 SYRINGE (ML) INJECTION
Status: DISCONTINUED | OUTPATIENT
Start: 2025-07-10 | End: 2025-07-10 | Stop reason: HOSPADM

## 2025-07-10 RX ORDER — ACETAMINOPHEN 325 MG/1
325 TABLET ORAL ONCE
Status: COMPLETED | OUTPATIENT
Start: 2025-07-10 | End: 2025-07-10

## 2025-07-10 RX ADMIN — SODIUM CHLORIDE: 9 INJECTION, SOLUTION INTRAVENOUS at 09:07

## 2025-07-10 RX ADMIN — SODIUM CHLORIDE, PRESERVATIVE FREE 10 ML: 5 INJECTION INTRAVENOUS at 10:07

## 2025-07-10 RX ADMIN — ACETAMINOPHEN 325 MG: 325 TABLET ORAL at 09:07

## 2025-07-10 RX ADMIN — DIPHENHYDRAMINE HYDROCHLORIDE 25 MG: 25 CAPSULE ORAL at 09:07

## 2025-07-10 RX ADMIN — SODIUM CHLORIDE 750 MG: 9 INJECTION, SOLUTION INTRAVENOUS at 09:07

## 2025-07-10 NOTE — PLAN OF CARE
Problem: Fall Injury Risk  Goal: Absence of Fall and Fall-Related Injury  Outcome: Progressing  Intervention: Identify and Manage Contributors  Flowsheets (Taken 7/10/2025 0920)  Self-Care Promotion:   independence encouraged   BADL personal objects within reach   adaptive equipment use encouraged  Medication Review/Management: medications reviewed  Intervention: Promote Injury-Free Environment  Flowsheets (Taken 7/10/2025 0920)  Safety Promotion/Fall Prevention: medications reviewed

## 2025-07-14 DIAGNOSIS — I10 ESSENTIAL HYPERTENSION, BENIGN: ICD-10-CM

## 2025-07-14 DIAGNOSIS — C50.012 MALIGNANT NEOPLASM OF NIPPLE OF LEFT BREAST IN FEMALE, ESTROGEN RECEPTOR POSITIVE: ICD-10-CM

## 2025-07-14 DIAGNOSIS — Z17.0 MALIGNANT NEOPLASM OF NIPPLE OF LEFT BREAST IN FEMALE, ESTROGEN RECEPTOR POSITIVE: ICD-10-CM

## 2025-07-14 DIAGNOSIS — M05.711 RHEUMATOID ARTHRITIS INVOLVING RIGHT SHOULDER WITH POSITIVE RHEUMATOID FACTOR: ICD-10-CM

## 2025-07-14 NOTE — PROGRESS NOTES
"[facility]  Office Visit Note     SUBJECTIVE:   HPI: Kirstie Bowden  is a 80 y.o. female here to [facility] care    Medical conditions:  Lumbar radiculitis, bilateral carpal tunnel syndrome, COPD, HTN, HLD, stage 3 CKD, RA, breast cancer history on the left breast (estrogen receptor positive), history of renal cell cancer (right kidney removal), hypothyroidism, OA    Meds:  Albuterol, atorvastatin, vitamin D, aromasin, flonase, wixela, folic acid, gabapentin, levothyroxine, zofran     Specialists:  Rheumatology  Pulmonology  Hem/onc - Dr. Li   Urology - Dr. Solitario   Optometry    History of Present Illness    CHIEF COMPLAINT:  Patient presents today to establish care.    CANCER HISTORY:  She has a history of left breast cancer diagnosed 6.5 years ago, treated with chemotherapy, radiation, lumpectomy, and removal of three lymph nodes. She continues Aromasin prescribed by Dr. Li (oncologist) with 3.5 years remaining in a 10-year treatment course. She also has a history of right renal cancer treated with total right nephrectomy 7 months ago by Dr. Reeder (urologist), with whom she follows up every 6 months.    RECENT HOSPITALIZATION:  She was recently hospitalized for pneumonia after presenting to Dr. Whittaker's office with critically low SpO2 of 74%. She was admitted directly from the office for further treatment.    RECENT FALLS:  She experienced three falls approximately 1.5 months ago. The initial fall occurred while exiting a vehicle near her porch, with possible loss of consciousness. She experienced two additional falls the same night due to leg weakness described as feeling "like rubber." Injuries included a bruised rib and black eye. She completed 11 days of physical therapy at [facility], reporting improvement in leg strength. She denies additional blackout spells since the incident and currently takes precautions when changing positions to prevent " dizziness. She continues to participate in physical therapy.     RHEUMATOID ARTHRITIS:  She has ongoing rheumatoid arthritis managed with monthly Orencia infusions and folic acid prescribed by Dr. Parish.    CURRENT MEDICATIONS:  She takes Synthroid 125 mg daily in the morning, Albuterol breathing treatments as needed, and Wixela for respiratory management. She reports compliance with medication regimen and denies adverse effects.    SOCIAL HISTORY:  She quit smoking 6.5 years ago following her cancer diagnosis. She abstains from alcohol following kidney removal, previously consuming approximately one drink very rarely. She drinks water exclusively.         Hospital Outpatient Visit on 07/15/2025   Component Date Value Ref Range Status    POCT Glucose 07/15/2025 132 (H)  70 - 110 mg/dL Final   Lab Visit on 07/15/2025   Component Date Value Ref Range Status    Sodium 07/15/2025 142  136 - 145 mmol/L Final    Potassium 07/15/2025 3.9  3.5 - 5.1 mmol/L Final    Chloride 07/15/2025 102  95 - 110 mmol/L Final    CO2 07/15/2025 31 (H)  23 - 29 mmol/L Final    Glucose 07/15/2025 114 (H)  70 - 110 mg/dL Final    BUN 07/15/2025 13  8 - 23 mg/dL Final    Creatinine 07/15/2025 1.0  0.5 - 1.4 mg/dL Final    Calcium 07/15/2025 10.2  8.7 - 10.5 mg/dL Final    Protein Total 07/15/2025 7.4  6.0 - 8.4 gm/dL Final    Albumin 07/15/2025 4.3  3.5 - 5.2 g/dL Final    Bilirubin Total 07/15/2025 0.3  0.1 - 1.0 mg/dL Final    ALP 07/15/2025 83  55 - 135 unit/L Final    AST 07/15/2025 17  10 - 40 unit/L Final    ALT 07/15/2025 10  10 - 44 unit/L Final    Anion Gap 07/15/2025 9  8 - 16 mmol/L Final    eGFR 07/15/2025 57 (L)  >60 mL/min/1.73/m2 Final    TSH 07/15/2025 0.413  0.340 - 5.600 uIU/mL Final    Free T4 07/15/2025 1.16  0.71 - 1.51 ng/dL Final    WBC 07/15/2025 8.74  3.90 - 12.70 K/uL Final    RBC 07/15/2025 3.89 (L)  4.00 - 5.40 M/uL Final    Hgb 07/15/2025 12.2  12.0 - 16.0 gm/dL Final    Hct 07/15/2025 39.4  37.0 - 48.5 % Final     MCV 07/15/2025 101 (H)  82 - 98 fL Final    MCH 07/15/2025 31.4 (H)  27.0 - 31.0 pg Final    MCHC 07/15/2025 31.0 (L)  32.0 - 36.0 g/dL Final    RDW 07/15/2025 14.0  11.5 - 14.5 % Final    Platelet Count 07/15/2025 252  150 - 450 K/uL Final    MPV 07/15/2025 11.4  9.2 - 12.9 fL Final    Nucleated RBC 07/15/2025 0  <=0 /100 WBC Final    Neut % 07/15/2025 61.9  38 - 73 % Final    Lymph % 07/15/2025 26.0  18 - 48 % Final    Mono % 07/15/2025 9.3  4 - 15 % Final    Eos % 07/15/2025 1.8  0 - 8 % Final    Basophil % 07/15/2025 0.5  <=1.9 % Final    Imm Grans % 07/15/2025 0.5  0.0 - 0.5 % Final    Neut # 07/15/2025 5.4  1.8 - 7.7 K/uL Final    Lymph # 07/15/2025 2.27  1 - 4.8 K/uL Final    Mono # 07/15/2025 0.81  0.3 - 1 K/uL Final    Eos # 07/15/2025 0.16  <=0.5 K/uL Final    Baso # 07/15/2025 0.04  <=0.2 K/uL Final    Imm Grans # 07/15/2025 0.04  0.00 - 0.04 K/uL Final   Admission on 06/13/2025, Discharged on 06/24/2025   Component Date Value Ref Range Status    Sodium 06/23/2025 143  136 - 145 mmol/L Final    Potassium 06/23/2025 4.3  3.5 - 5.1 mmol/L Final    Chloride 06/23/2025 106  95 - 110 mmol/L Final    CO2 06/23/2025 26  23 - 29 mmol/L Final    Glucose 06/23/2025 83  70 - 110 mg/dL Final    BUN 06/23/2025 19  8 - 23 mg/dL Final    Creatinine 06/23/2025 1.1  0.5 - 1.4 mg/dL Final    Calcium 06/23/2025 9.1  8.7 - 10.5 mg/dL Final    Anion Gap 06/23/2025 11  8 - 16 mmol/L Final    eGFR 06/23/2025 51 (L)  >60 mL/min/1.73/m2 Final    WBC 06/23/2025 9.03  3.90 - 12.70 K/uL Final    RBC 06/23/2025 3.72 (L)  4.00 - 5.40 M/uL Final    Hgb 06/23/2025 11.6 (L)  12.0 - 16.0 gm/dL Final    Hct 06/23/2025 38.0  37.0 - 48.5 % Final    MCV 06/23/2025 102 (H)  82 - 98 fL Final    MCH 06/23/2025 31.2 (H)  27.0 - 31.0 pg Final    MCHC 06/23/2025 30.5 (L)  32.0 - 36.0 g/dL Final    RDW 06/23/2025 14.7 (H)  11.5 - 14.5 % Final    Platelet Count 06/23/2025 232  150 - 450 K/uL Final    MPV 06/23/2025 11.2  9.2 - 12.9 fL Final     Magnesium 06/23/2025 2.1  1.6 - 2.6 mg/dL Final   Lab Requisition on 06/14/2025   Component Date Value Ref Range Status    Sodium 06/14/2025 140  136 - 145 mmol/L Final    Potassium 06/14/2025 4.1  3.5 - 5.1 mmol/L Final    Chloride 06/14/2025 100  95 - 110 mmol/L Final    CO2 06/14/2025 29  23 - 29 mmol/L Final    Glucose 06/14/2025 88  70 - 110 mg/dL Final    BUN 06/14/2025 25 (H)  8 - 23 mg/dL Final    Creatinine 06/14/2025 1.1  0.5 - 1.4 mg/dL Final    Calcium 06/14/2025 10.0  8.7 - 10.5 mg/dL Final    Protein Total 06/14/2025 7.4  6.0 - 8.4 gm/dL Final    Albumin 06/14/2025 3.6  3.5 - 5.2 g/dL Final    Bilirubin Total 06/14/2025 0.4  0.1 - 1.0 mg/dL Final    ALP 06/14/2025 57  40 - 150 unit/L Final    AST 06/14/2025 24  11 - 45 unit/L Final    ALT 06/14/2025 12  10 - 44 unit/L Final    Anion Gap 06/14/2025 11  8 - 16 mmol/L Final    eGFR 06/14/2025 51 (L)  >60 mL/min/1.73/m2 Final    WBC 06/14/2025 8.65  3.90 - 12.70 K/uL Final    RBC 06/14/2025 3.49 (L)  4.00 - 5.40 M/uL Final    Hgb 06/14/2025 11.2 (L)  12.0 - 16.0 gm/dL Final    Hct 06/14/2025 36.0 (L)  37.0 - 48.5 % Final    MCV 06/14/2025 103 (H)  82 - 98 fL Final    MCH 06/14/2025 32.1 (H)  27.0 - 31.0 pg Final    MCHC 06/14/2025 31.1 (L)  32.0 - 36.0 g/dL Final    RDW 06/14/2025 15.2 (H)  11.5 - 14.5 % Final    Platelet Count 06/14/2025 256  150 - 450 K/uL Final    MPV 06/14/2025 11.2  9.2 - 12.9 fL Final    Nucleated RBC 06/14/2025 0  <=0 /100 WBC Final    Neut % 06/14/2025 70.8  38 - 73 % Final    Lymph % 06/14/2025 21.7  18 - 48 % Final    Mono % 06/14/2025 6.8  4 - 15 % Final    Eos % 06/14/2025 0.0  0 - 8 % Final    Basophil % 06/14/2025 0.1  <=1.9 % Final    Imm Grans % 06/14/2025 0.6 (H)  0.0 - 0.5 % Final    Neut # 06/14/2025 6.1  1.8 - 7.7 K/uL Final    Lymph # 06/14/2025 1.88  1 - 4.8 K/uL Final    Mono # 06/14/2025 0.59  0.3 - 1 K/uL Final    Eos # 06/14/2025 0.00  <=0.5 K/uL Final    Baso # 06/14/2025 0.01  <=0.2 K/uL Final    Imm Grans #  06/14/2025 0.05 (H)  0.00 - 0.04 K/uL Final    Magnesium 06/14/2025 2.2  1.6 - 2.6 mg/dL Final    Phosphorus Level 06/14/2025 3.2  2.7 - 4.5 mg/dL Final   No results displayed because visit has over 200 results.      Lab Visit on 06/06/2025   Component Date Value Ref Range Status    Calcium Level Ionized 06/06/2025 1.21  1.06 - 1.42 mmol/L Final    Sed Rate 06/06/2025 55 (H)  0 - 20 mm/hr Final    CRP 06/06/2025 0.40  <1.00 mg/dL Final    Creatinine 06/06/2025 1.2  0.5 - 1.4 mg/dL Final    eGFR 06/06/2025 46 (L)  >60 mL/min/1.73/m2 Final    AST 06/06/2025 19  10 - 40 unit/L Final    ALT 06/06/2025 9 (L)  10 - 44 unit/L Final    WBC 06/06/2025 9.84  3.90 - 12.70 K/uL Final    RBC 06/06/2025 4.29  4.00 - 5.40 M/uL Final    Hgb 06/06/2025 13.5  12.0 - 16.0 gm/dL Final    Hct 06/06/2025 43.2  37.0 - 48.5 % Final    MCV 06/06/2025 101 (H)  82 - 98 fL Final    MCH 06/06/2025 31.5 (H)  27.0 - 31.0 pg Final    MCHC 06/06/2025 31.3 (L)  32.0 - 36.0 g/dL Final    RDW 06/06/2025 15.3 (H)  11.5 - 14.5 % Final    Platelet Count 06/06/2025 211  150 - 450 K/uL Final    MPV 06/06/2025 10.7  9.2 - 12.9 fL Final    Nucleated RBC 06/06/2025 0  <=0 /100 WBC Final    Neut % 06/06/2025 50.7  38 - 73 % Final    Lymph % 06/06/2025 39.6  18 - 48 % Final    Mono % 06/06/2025 7.1  4 - 15 % Final    Eos % 06/06/2025 2.0  0 - 8 % Final    Basophil % 06/06/2025 0.4  <=1.9 % Final    Imm Grans % 06/06/2025 0.2  0.0 - 0.5 % Final    Neut # 06/06/2025 5.0  1.8 - 7.7 K/uL Final    Lymph # 06/06/2025 3.90  1 - 4.8 K/uL Final    Mono # 06/06/2025 0.70  0.3 - 1 K/uL Final    Eos # 06/06/2025 0.20  <=0.5 K/uL Final    Baso # 06/06/2025 0.04  <=0.2 K/uL Final    Imm Grans # 06/06/2025 0.02  0.00 - 0.04 K/uL Final   No results displayed because visit has over 200 results.      Office Visit on 04/18/2025   Component Date Value Ref Range Status    Rapid Influenza A Ag 04/18/2025 Negative  Negative Final    Rapid Influenza B Ag 04/18/2025 Negative  Negative  Final     Acceptable 04/18/2025 Yes   Final    SARS Coronavirus 2 Antigen 04/18/2025 Negative  Negative, Presumptive Negative Final     Acceptable 04/18/2025 Yes   Final   Lab Visit on 02/17/2025   Component Date Value Ref Range Status    CRP 02/17/2025 1.20 (H)  <1.00 mg/dL Final    WBC 02/17/2025 9.30  3.90 - 12.70 K/uL Final    RBC 02/17/2025 4.00  4.00 - 5.40 M/uL Final    Hemoglobin 02/17/2025 12.6  12.0 - 16.0 g/dL Final    Hematocrit 02/17/2025 41.6  37.0 - 48.5 % Final    MCV 02/17/2025 104 (H)  82 - 98 fL Final    MCH 02/17/2025 31.5 (H)  27.0 - 31.0 pg Final    MCHC 02/17/2025 30.3 (L)  32.0 - 36.0 g/dL Final    RDW 02/17/2025 15.5 (H)  11.5 - 14.5 % Final    Platelets 02/17/2025 229  150 - 450 K/uL Final    MPV 02/17/2025 12.0  9.2 - 12.9 fL Final    Immature Granulocytes 02/17/2025 0.3  0.0 - 0.5 % Final    Gran # (ANC) 02/17/2025 6.7  1.8 - 7.7 K/uL Final    Immature Grans (Abs) 02/17/2025 0.03  0.00 - 0.04 K/uL Final    Lymph # 02/17/2025 1.9  1.0 - 4.8 K/uL Final    Mono # 02/17/2025 0.5  0.3 - 1.0 K/uL Final    Eos # 02/17/2025 0.1  0.0 - 0.5 K/uL Final    Baso # 02/17/2025 0.03  0.00 - 0.20 K/uL Final    nRBC 02/17/2025 0  0 /100 WBC Final    Gran % 02/17/2025 72.2  38.0 - 73.0 % Final    Lymph % 02/17/2025 20.4  18.0 - 48.0 % Final    Mono % 02/17/2025 5.4  4.0 - 15.0 % Final    Eosinophil % 02/17/2025 1.4  0.0 - 8.0 % Final    Basophil % 02/17/2025 0.3  0.0 - 1.9 % Final    Differential Method 02/17/2025 Automated   Final    Creatinine 02/17/2025 1.5 (H)  0.5 - 1.4 mg/dL Final    eGFR 02/17/2025 35.2 (A)  >60 mL/min/1.73 m^2 Final    ALT 02/17/2025 17  10 - 44 U/L Final    AST 02/17/2025 16  10 - 40 U/L Final    Sed Rate 02/17/2025 14  0 - 20 mm/Hr Final   Lab Visit on 01/24/2025   Component Date Value Ref Range Status    Sodium 01/24/2025 140  136 - 145 mmol/L Final    Potassium 01/24/2025 3.9  3.5 - 5.1 mmol/L Final    Chloride 01/24/2025 102  95 - 110 mmol/L Final     CO2 01/24/2025 29  23 - 29 mmol/L Final    Glucose 01/24/2025 76  70 - 110 mg/dL Final    BUN 01/24/2025 20  8 - 23 mg/dL Final    Creatinine 01/24/2025 1.4  0.5 - 1.4 mg/dL Final    Calcium 01/24/2025 10.2  8.7 - 10.5 mg/dL Final    Anion Gap 01/24/2025 9  8 - 16 mmol/L Final    eGFR 01/24/2025 38.3 (A)  >60 mL/min/1.73 m^2 Final   There may be more visits with results that are not included.         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    GERD (gastroesophageal reflux disease) 2022    Hearing loss [id number]    Hyperlipidemia     Hypertension 2021    Limb alert care status     NO BP/IV LEFT ARM    Lung disease     Personal history of colonic polyps 01/23/2024    Pseudocholinesterase deficiency     family history    Radiation 2020    Rheumatoid arthritis 2020    Thyroid disease      Past Surgical History:   Procedure Laterality Date    AXILLARY NODE DISSECTION Left 03/14/2019    Procedure: LYMPHADENECTOMY, AXILLARY;  Surgeon: Mp Gonzalez MD;  Location: [facility];  Service: General;  Laterality: Left;  left lumpectomy with axillary lypmh node dissection    BALLOON DILATION OF URETER Left 06/24/2019    Procedure: DILATION, URETER, USING BALLOON;  Surgeon: Jorden Dickson MD;  Location: [facility];  Service: Urology;  Laterality: Left;    BREAST BIOPSY Left 20 yrs ago    benign    BREAST CYST EXCISION  15 yrs ago    BREAST LUMPECTOMY      x2    CHOLECYSTECTOMY  2000    COLONOSCOPY  09/25/2018    LUC EASTON MD    CYSTOSCOPY W/ URETERAL STENT PLACEMENT Left 06/24/2019    Procedure: CYSTOSCOPY, WITH URETERAL STENT INSERTION;  Surgeon: Jorden Dickson MD;  Location: [facility];  Service: Urology;  Laterality: Left;    CYSTOSCOPY W/ URETERAL STENT REMOVAL Left 07/23/2019    Procedure: CYSTOSCOPY, WITH URETERAL STENT REMOVAL;  Surgeon: Jorden Dickson MD;  Location: [facility];   Service: Urology;  Laterality: Left;    EPIDURAL STEROID INJECTION INTO LUMBAR SPINE N/A 09/30/2021    Procedure: Injection-steroid-epidural-lumbar;  Surgeon: Nathen Lyons MD;  Location: [facility] beds at the [facility] OR;  Service: Pain Management;  Laterality: N/A;  L5-S1      EPIDURAL STEROID INJECTION INTO LUMBAR SPINE N/A 11/16/2021    Procedure: Injection-steroid-epidural-lumbar;  Surgeon: Nathen Lyons MD;  Location: [facility] 234 beds at the [facility] OR;  Service: Pain Management;  Laterality: N/A;  L5-S1    EXTRACORPOREAL SHOCK WAVE LITHOTRIPSY Left 07/23/2019    Procedure: LITHOTRIPSY, ESWL;  Surgeon: Jorden Dickson MD;  Location: [facility] OR;  Service: Urology;  Laterality: Left;    EYE SURGERY Bilateral 2000    cataracts    HYSTERECTOMY  1994    MASTECTOMY, PARTIAL Left 04/25/2019    Procedure: MASTECTOMY, PARTIAL;  Surgeon: Mp Gonzalez MD;  Location: [facility] OR;  Service: General;  Laterality: Left;    NEEDLE LOCALIZATION Left 03/14/2019    Procedure: NEEDLE LOCALIZATION;  Surgeon: Mp Gonzalez MD;  Location: [facility] OR;  Service: General;  Laterality: Left;  left lumpectomy with wire needle localization     PARTIAL NEPHRECTOMY Right 05/22/2023    RETROGRADE PYELOGRAPHY Bilateral 06/24/2019    Procedure: PYELOGRAM, RETROGRADE;  Surgeon: Jorden Dickson MD;  Location: [facility] OR;  Service: Urology;  Laterality: Bilateral;    SENTINEL LYMPH NODE BIOPSY Left 03/14/2019    Procedure: BIOPSY, LYMPH NODE, SENTINEL;  Surgeon: Mp Gonzalez MD;  Location: [facility] OR;  Service: General;  Laterality: Left;  left sentinel node lymph node biopsy    TONSILLECTOMY  1948    TRANSFORAMINAL EPIDURAL INJECTION OF STEROID Right 01/04/2022    Procedure: Injection,steroid,epidural,transforaminal approach;  Surgeon: Nathen Lyons MD;  Location: [facility] 234 beds at the [facility] OR;  Service: Pain Management;  Laterality: Right;  L4-L5, L5-s1    TRANSFORAMINAL EPIDURAL INJECTION OF STEROID Right 07/09/2024    Procedure: Injection,steroid,epidural,transforaminal approach;  Surgeon: Nathen Lyons MD;   Location: [facility] OR;  Service: Pain Management;  Laterality: Right;    URETEROSCOPY Left 06/24/2019    Procedure: URETEROSCOPY;  Surgeon: Jorden Dickson MD;  Location: [facility] OR;  Service: Urology;  Laterality: Left;     Past Surgical History:   Procedure Laterality Date    AXILLARY NODE DISSECTION Left 03/14/2019    Procedure: LYMPHADENECTOMY, AXILLARY;  Surgeon: Mp Gonzalez MD;  Location: [facility] OR;  Service: General;  Laterality: Left;  left lumpectomy with axillary lypmh node dissection    BALLOON DILATION OF URETER Left 06/24/2019    Procedure: DILATION, URETER, USING BALLOON;  Surgeon: Jorden Dickson MD;  Location: [facility] OR;  Service: Urology;  Laterality: Left;    BREAST BIOPSY Left 20 yrs ago    benign    BREAST CYST EXCISION  15 yrs ago    BREAST LUMPECTOMY      x2    CHOLECYSTECTOMY  2000    COLONOSCOPY  09/25/2018    LUC EASTON MD    CYSTOSCOPY W/ URETERAL STENT PLACEMENT Left 06/24/2019    Procedure: CYSTOSCOPY, WITH URETERAL STENT INSERTION;  Surgeon: Jorden Dickson MD;  Location: [facility] OR;  Service: Urology;  Laterality: Left;    CYSTOSCOPY W/ URETERAL STENT REMOVAL Left 07/23/2019    Procedure: CYSTOSCOPY, WITH URETERAL STENT REMOVAL;  Surgeon: Jorden Dickson MD;  Location: [facility] OR;  Service: Urology;  Laterality: Left;    EPIDURAL STEROID INJECTION INTO LUMBAR SPINE N/A 09/30/2021    Procedure: Injection-steroid-epidural-lumbar;  Surgeon: Nathen Lyons MD;  Location: [facility] OR;  Service: Pain Management;  Laterality: N/A;  L5-S1      EPIDURAL STEROID INJECTION INTO LUMBAR SPINE N/A 11/16/2021    Procedure: Injection-steroid-epidural-lumbar;  Surgeon: Nathen Lyons MD;  Location: [facility] OR;  Service: Pain Management;  Laterality: N/A;  L5-S1    EXTRACORPOREAL SHOCK WAVE LITHOTRIPSY Left 07/23/2019    Procedure: LITHOTRIPSY, ESWL;  Surgeon: Jorden Dickson MD;  Location: [facility] OR;  Service: Urology;  Laterality: Left;    EYE SURGERY Bilateral 2000    cataracts    HYSTERECTOMY  1994     MASTECTOMY, PARTIAL Left 04/25/2019    Procedure: MASTECTOMY, PARTIAL;  Surgeon: Mp Gonzalez MD;  Location: [facility] OR;  Service: General;  Laterality: Left;    NEEDLE LOCALIZATION Left 03/14/2019    Procedure: NEEDLE LOCALIZATION;  Surgeon: Mp Gonzalez MD;  Location: [facility] OR;  Service: General;  Laterality: Left;  left lumpectomy with wire needle localization     PARTIAL NEPHRECTOMY Right 05/22/2023    RETROGRADE PYELOGRAPHY Bilateral 06/24/2019    Procedure: PYELOGRAM, RETROGRADE;  Surgeon: Jorden Dickson MD;  Location: [facility] OR;  Service: Urology;  Laterality: Bilateral;    SENTINEL LYMPH NODE BIOPSY Left 03/14/2019    Procedure: BIOPSY, LYMPH NODE, SENTINEL;  Surgeon: Mp Gonzalez MD;  Location: [facility] OR;  Service: General;  Laterality: Left;  left sentinel node lymph node biopsy    TONSILLECTOMY  1948    TRANSFORAMINAL EPIDURAL INJECTION OF STEROID Right 01/04/2022    Procedure: Injection,steroid,epidural,transforaminal approach;  Surgeon: Nathen Lyons MD;  Location: [facility] OR;  Service: Pain Management;  Laterality: Right;  L4-L5, L5-s1    TRANSFORAMINAL EPIDURAL INJECTION OF STEROID Right 07/09/2024    Procedure: Injection,steroid,epidural,transforaminal approach;  Surgeon: Nathen Lyons MD;  Location: [facility] OR;  Service: Pain Management;  Laterality: Right;    URETEROSCOPY Left 06/24/2019    Procedure: URETEROSCOPY;  Surgeon: Jorden Dickson MD;  Location: [facility] OR;  Service: Urology;  Laterality: Left;     Family History   Problem Relation Name Age of Onset    Heart disease Mother Patricia     Hypertension Mother Patricia     Alzheimer's disease Mother Patricia     Cancer Father Kirstie     Heart disease Sister Ania     Arthritis Sister Ania     Kidney disease Sister Ania     Stroke Sister Alvina sister         Sister    Diabetes Brother Nicklos     Cancer Daughter      Stroke Sister Alvina        Marital Status:   Alcohol History:  reports current alcohol use of about 2.0 standard  drinks of alcohol per week.  Tobacco History:  reports that she quit smoking about 5 years ago. Her smoking use included cigarettes. She started smoking about 65 years ago. She has a 29.6 pack-year smoking history. She has never used smokeless tobacco.  Drug History:  reports no history of drug use.    Health Maintenance Topics with due status: Not Due       Topic Last Completion Date    TETANUS VACCINE 07/27/2021    DEXA Scan 10/12/2023    Colonoscopy 01/23/2024    Lipid Panel 09/25/2024    Influenza Vaccine 10/10/2024    LDCT Lung Screen 05/29/2025     Immunization History   Administered Date(s) Administered    COVID-19 MRNA, LN-S PF (MODERNA HALF 0.25 ML DOSE) 11/17/2021    COVID-19 Vaccine 02/04/2021, 03/04/2021, 11/17/2021    COVID-19, MRNA, LN-S, PF (MODERNA FULL 0.5 ML DOSE) 02/04/2021, 03/04/2021    Influenza 10/08/2008, 10/08/2008, 12/08/2009, 10/12/2015, 10/01/2016, 10/01/2016    Influenza (FLUAD) - Quadrivalent - Adjuvanted - PF *Preferred* (65+) 10/12/2022, 10/10/2023    Influenza - Quadrivalent 11/05/2014    Influenza - Trivalent - Fluzone High Dose - PF (65 years and older) 10/12/2015, 11/14/2016, 11/14/2016, 11/08/2017, 11/08/2017, 10/24/2018, 10/24/2018, 09/29/2020    Influenza A (H1N1) 2009 Monovalent - IM 12/08/2009    Influenza Whole 10/12/2015    Pneumococcal Conjugate - 13 Valent 07/10/2023    Pneumococcal Conjugate - 20 Valent 07/11/2024       Review of patient's allergies indicates:   Allergen Reactions    Succinylcholine chloride Other (See Comments)    Sulfur dioxide Hives    Sulfamethoxazole-trimethoprim      Other reaction(s): per dr daryn Rodríguez (sulfonamide antibiotics)      NOT SURE REACTION     Current Medications[2]    Review of Systems   Constitutional:  Negative for chills and fever.   Respiratory:  Negative for shortness of breath.    Cardiovascular:  Negative for chest pain.   Gastrointestinal:  Negative for nausea and vomiting.       OBJECTIVE:      Vitals:    07/15/25 1149  "  BP: 120/72   BP Location: Right arm   Patient Position: Sitting   Pulse: 80   SpO2: 98%   Weight: 78.6 kg (173 lb 4.5 oz)   Height: 5' 8" (1.727 m)     Physical Exam  Constitutional:       General: She is not in acute distress.     Appearance: She is not ill-appearing or toxic-appearing.   HENT:      Head: Normocephalic and atraumatic.      Mouth/Throat:      Mouth: Mucous membranes are moist.      Pharynx: Uvula midline. No pharyngeal swelling.   Cardiovascular:      Rate and Rhythm: Normal rate and regular rhythm.   Pulmonary:      Effort: Pulmonary effort is normal. No tachypnea, bradypnea, accessory muscle usage, prolonged expiration or respiratory distress.      Breath sounds: Normal breath sounds. No stridor. No wheezing, rhonchi or rales.   Abdominal:      General: Bowel sounds are normal. There is no distension.      Palpations: Abdomen is soft.      Tenderness: There is no abdominal tenderness. There is no guarding or rebound.   Neurological:      General: No focal deficit present.      Mental Status: She is alert.   Psychiatric:         Mood and Affect: Mood normal.         Behavior: Behavior normal.        Assessment:       1. Hypothyroidism, unspecified type    2. Mixed hyperlipidemia    3. Rheumatoid arthritis involving multiple sites with positive rheumatoid factor    4. High risk medications (not anticoagulants) long-term use    5. Drug-induced immunodeficiency    6. History of breast cancer    7. Renal cell carcinoma of right kidney    8. Atherosclerosis of aorta    9. Acquired hypothyroidism    10. Chronic obstructive pulmonary disease, unspecified COPD type        Plan:       Hypothyroidism, unspecified type  -     TSH; Future; Expected date: 07/15/2025  -     T4, Free; Future; Expected date: 07/15/2025  - Reviewed complex medical history including thyroid condition.  - Noted recent thyroid labs from June showing low TSH, indicating potential overtreatment with levothyroxine.  - Assessed for " symptoms of hyperthyroidism (anxiety, tachycardia, heat intolerance, unintentional weight loss), found no significant indicators.  - Discontinued levothyroxine (Synthroid) pending new thyroid lab results.  - Ordered TSH today to evaluate current thyroid function.  - Scheduled follow up in 6-8 weeks for repeat thyroid labs and potential levothyroxine dose adjustment.    Mixed hyperlipidemia  -     atorvastatin (LIPITOR) 20 MG tablet; Take 1 tablet (20 mg total) by mouth every evening.  Dispense: 90 tablet; Refill: 3    Rheumatoid arthritis involving multiple sites with positive rheumatoid factor  -     folic acid (FOLVITE) 1 MG tablet; Take 1 tablet (1,000 mcg total) by mouth once daily.  Dispense: 90 tablet; Refill: 3  - Reviewed current medication regimen, including Orencia infusions administered monthly for rheumatoid arthritis management.  - Documented that the patient follows up with Dr. Romero for rheumatology care.    High risk medications (not anticoagulants) long-term use  -     folic acid (FOLVITE) 1 MG tablet; Take 1 tablet (1,000 mcg total) by mouth once daily.  Dispense: 90 tablet; Refill: 3    Drug-induced immunodeficiency  -     folic acid (FOLVITE) 1 MG tablet; Take 1 tablet (1,000 mcg total) by mouth once daily.  Dispense: 90 tablet; Refill: 3    History of breast cancer  - Monitored the patient's history of left breast cancer treated with chemotherapy, radiation, lumpectomy, and lymph node removal.  - Continued Aromasin for breast cancer treatment, prescribed for a total duration of 10 years.    Renal cell carcinoma of right kidney  - Monitored the patient's history of renal cell cancer treated with right nephrectomy.  - Documented the patient's acquired absence of right kidney.  - Ensured the patient follows up with urology every 6 months for renal cell cancer surveillance.    Atherosclerosis of aorta  - Continue with statin therapy    Chronic obstructive pulmonary disease, unspecified COPD  type  - Continue with the current regimen  - Continue to follow up with pulmonology provider     Other orders  -     gabapentin (NEURONTIN) 300 MG capsule; Take 1 capsule (300 mg total) by mouth 2 (two) times daily.  Dispense: 180 capsule; Refill: 3    HISTORY OF SMOKING:  - Documented that the patient quit smoking 6.5 years ago after cancer diagnosis.    FOLLOW-UP:  - Arrive at scheduled appointment time, not early.         Rocio Feliciano M.D.  7/16/2025    This note was created using [employer] voice recognition software that occasionally misinterprets phrases or words            [1]   Current Outpatient Medications on File Prior to Visit   Medication Sig Dispense Refill    albuterol (ACCUNEB) 1.25 mg/3 mL Nebu Take 3 mLs (1.25 mg total) by nebulization every 6 (six) hours as needed (shortness of breath). Rescue 75 mL 6    albuterol (PROVENTIL/VENTOLIN HFA) 90 mcg/actuation inhaler Inhale 2 puffs into the lungs every 6 (six) hours as needed for Wheezing. Rescue 54 g 3    cholecalciferol, vitamin D3, (VITAMIN D3) 25 mcg (1,000 unit) capsule Take 1 capsule by mouth every morning.      cyanocobalamin (VITAMIN B-12) 1000 MCG tablet Take 100 mcg by mouth once daily.      exemestane (AROMASIN) 25 mg tablet Take 1 tablet (25 mg total) by mouth nightly.      fluticasone propionate (FLONASE) 50 mcg/actuation nasal spray 1 spray (50 mcg total) by Each Nostril route once daily. 48 mL 3    fluticasone-salmeterol 250-50 mcg/dose (WIXELA INHUB) 250-50 mcg/dose diskus inhaler INHALE 1 PUFF INTO THE LUNGS 2 (TWO) TIMES DAILY. CONTROLLER 180 each 3    levothyroxine (SYNTHROID) 125 MCG tablet Take 125 mcg by mouth before breakfast.      ondansetron (ZOFRAN-ODT) 8 MG TbDL DISSOLVE 1 TABLET IN MOUTH EVERY 12 HOURS AS NEEDED 30 tablet 0     Current Facility-Administered Medications on File Prior to Visit   Medication Dose Route Frequency Provider Last Rate Last Admin    lactated ringers infusion   Intravenous Once PRN Nathen Lyons MD         lidocaine (PF) 10 mg/ml (1%) injection 10 mg  1 mL Intradermal Once Eladia Schwartz MD       [2]   Current Outpatient Medications:     albuterol (ACCUNEB) 1.25 mg/3 mL Nebu, Take 3 mLs (1.25 mg total) by nebulization every 6 (six) hours as needed (shortness of breath). Rescue, Disp: 75 mL, Rfl: 6    albuterol (PROVENTIL/VENTOLIN HFA) 90 mcg/actuation inhaler, Inhale 2 puffs into the lungs every 6 (six) hours as needed for Wheezing. Rescue, Disp: 54 g, Rfl: 3    cholecalciferol, vitamin D3, (VITAMIN D3) 25 mcg (1,000 unit) capsule, Take 1 capsule by mouth every morning., Disp: , Rfl:     cyanocobalamin (VITAMIN B-12) 1000 MCG tablet, Take 100 mcg by mouth once daily., Disp: , Rfl:     exemestane (AROMASIN) 25 mg tablet, Take 1 tablet (25 mg total) by mouth nightly., Disp: , Rfl:     fluticasone propionate (FLONASE) 50 mcg/actuation nasal spray, 1 spray (50 mcg total) by Each Nostril route once daily., Disp: 48 mL, Rfl: 3    fluticasone-salmeterol 250-50 mcg/dose (WIXELA INHUB) 250-50 mcg/dose diskus inhaler, INHALE 1 PUFF INTO THE LUNGS 2 (TWO) TIMES DAILY. CONTROLLER, Disp: 180 each, Rfl: 3    levothyroxine (SYNTHROID) 125 MCG tablet, Take 125 mcg by mouth before breakfast., Disp: , Rfl:     ondansetron (ZOFRAN-ODT) 8 MG TbDL, DISSOLVE 1 TABLET IN MOUTH EVERY 12 HOURS AS NEEDED, Disp: 30 tablet, Rfl: 0    atorvastatin (LIPITOR) 20 MG tablet, Take 1 tablet (20 mg total) by mouth every evening., Disp: 90 tablet, Rfl: 3    folic acid (FOLVITE) 1 MG tablet, Take 1 tablet (1,000 mcg total) by mouth once daily., Disp: 90 tablet, Rfl: 3    gabapentin (NEURONTIN) 300 MG capsule, Take 1 capsule (300 mg total) by mouth 2 (two) times daily., Disp: 180 capsule, Rfl: 3    hydroCHLOROthiazide (HYDRODIURIL) 25 MG tablet, Take 1 tablet (25 mg total) by mouth once daily., Disp: 90 tablet, Rfl: 1  No current facility-administered medications for this visit.    Facility-Administered Medications Ordered in Other Visits:      lactated ringers infusion, , Intravenous, Once PRN, Nathen Lyons MD    lidocaine (PF) 10 mg/ml (1%) injection 10 mg, 1 mL, Intradermal, Once, Eladia Schwartz MD

## 2025-07-15 ENCOUNTER — OFFICE VISIT (OUTPATIENT)
Dept: FAMILY MEDICINE | Facility: CLINIC | Age: 80
End: 2025-07-15
Payer: MEDICARE

## 2025-07-15 ENCOUNTER — HOSPITAL ENCOUNTER (OUTPATIENT)
Dept: RADIOLOGY | Facility: HOSPITAL | Age: 80
Discharge: HOME | End: 2025-07-15
Attending: INTERNAL MEDICINE
Payer: MEDICARE

## 2025-07-15 VITALS — WEIGHT: 171 LBS | HEIGHT: 68 IN | BODY MASS INDEX: 25.91 KG/M2

## 2025-07-15 VITALS
WEIGHT: 173.31 LBS | SYSTOLIC BLOOD PRESSURE: 120 MMHG | DIASTOLIC BLOOD PRESSURE: 72 MMHG | HEIGHT: 68 IN | HEART RATE: 80 BPM | BODY MASS INDEX: 26.27 KG/M2 | OXYGEN SATURATION: 98 %

## 2025-07-15 DIAGNOSIS — I70.0 ATHEROSCLEROSIS OF AORTA: ICD-10-CM

## 2025-07-15 DIAGNOSIS — E78.5 HYPERLIPIDEMIA: ICD-10-CM

## 2025-07-15 DIAGNOSIS — D84.821 DRUG-INDUCED IMMUNODEFICIENCY: ICD-10-CM

## 2025-07-15 DIAGNOSIS — J44.9 CHRONIC OBSTRUCTIVE PULMONARY DISEASE, UNSPECIFIED COPD TYPE: ICD-10-CM

## 2025-07-15 DIAGNOSIS — M05.79 RHEUMATOID ARTHRITIS INVOLVING MULTIPLE SITES WITH POSITIVE RHEUMATOID FACTOR: ICD-10-CM

## 2025-07-15 DIAGNOSIS — Z17.0 MALIGNANT NEOPLASM OF NIPPLE OF LEFT BREAST IN FEMALE, ESTROGEN RECEPTOR POSITIVE: ICD-10-CM

## 2025-07-15 DIAGNOSIS — C64.1 RENAL CELL CARCINOMA OF RIGHT KIDNEY: ICD-10-CM

## 2025-07-15 DIAGNOSIS — Z85.3 HISTORY OF BREAST CANCER: ICD-10-CM

## 2025-07-15 DIAGNOSIS — C50.012 MALIGNANT NEOPLASM OF NIPPLE OF LEFT BREAST IN FEMALE, ESTROGEN RECEPTOR POSITIVE: ICD-10-CM

## 2025-07-15 DIAGNOSIS — E03.9 HYPOTHYROIDISM, UNSPECIFIED TYPE: Primary | ICD-10-CM

## 2025-07-15 DIAGNOSIS — Z79.899 DRUG-INDUCED IMMUNODEFICIENCY: ICD-10-CM

## 2025-07-15 DIAGNOSIS — Z79.899 HIGH RISK MEDICATIONS (NOT ANTICOAGULANTS) LONG-TERM USE: ICD-10-CM

## 2025-07-15 LAB — POCT GLUCOSE: 132 MG/DL (ref 70–110)

## 2025-07-15 PROCEDURE — A9552 F18 FDG: HCPCS | Mod: PO | Performed by: INTERNAL MEDICINE

## 2025-07-15 PROCEDURE — 99999 PR PBB SHADOW E&M-EST. PATIENT-LVL IV: CPT | Mod: PBBFAC,,, | Performed by: STUDENT IN AN ORGANIZED HEALTH CARE EDUCATION/TRAINING PROGRAM

## 2025-07-15 PROCEDURE — G2211 COMPLEX E/M VISIT ADD ON: HCPCS | Mod: ,,, | Performed by: STUDENT IN AN ORGANIZED HEALTH CARE EDUCATION/TRAINING PROGRAM

## 2025-07-15 PROCEDURE — 99214 OFFICE O/P EST MOD 30 MIN: CPT | Mod: S$PBB,,, | Performed by: STUDENT IN AN ORGANIZED HEALTH CARE EDUCATION/TRAINING PROGRAM

## 2025-07-15 PROCEDURE — 99214 OFFICE O/P EST MOD 30 MIN: CPT | Mod: PBBFAC,PO | Performed by: STUDENT IN AN ORGANIZED HEALTH CARE EDUCATION/TRAINING PROGRAM

## 2025-07-15 RX ORDER — GABAPENTIN 300 MG/1
300 CAPSULE ORAL 2 TIMES DAILY
Qty: 180 CAPSULE | Refills: 3 | Status: SHIPPED | OUTPATIENT
Start: 2025-07-15

## 2025-07-15 RX ORDER — ONDANSETRON 8 MG/1
TABLET, ORALLY DISINTEGRATING ORAL
Qty: 30 TABLET | Refills: 0 | Status: SHIPPED | OUTPATIENT
Start: 2025-07-15

## 2025-07-15 RX ORDER — LEVOTHYROXINE SODIUM 125 UG/1
125 TABLET ORAL
Status: CANCELLED | OUTPATIENT
Start: 2025-07-15

## 2025-07-15 RX ORDER — HYDROCHLOROTHIAZIDE 25 MG/1
25 TABLET ORAL DAILY
Qty: 90 TABLET | Refills: 1 | Status: SHIPPED | OUTPATIENT
Start: 2025-07-15

## 2025-07-15 RX ORDER — ATORVASTATIN CALCIUM 20 MG/1
20 TABLET, FILM COATED ORAL NIGHTLY
Qty: 90 TABLET | Refills: 3 | Status: SHIPPED | OUTPATIENT
Start: 2025-07-15

## 2025-07-15 RX ORDER — HYDROCHLOROTHIAZIDE 25 MG/1
25 TABLET ORAL DAILY
COMMUNITY
End: 2025-07-15 | Stop reason: SDUPTHER

## 2025-07-15 RX ORDER — FOLIC ACID 1 MG/1
1000 TABLET ORAL DAILY
Qty: 90 TABLET | Refills: 3 | Status: SHIPPED | OUTPATIENT
Start: 2025-07-15 | End: 2026-07-10

## 2025-07-15 RX ORDER — FLUDEOXYGLUCOSE F18 500 MCI/ML
13.6 INJECTION INTRAVENOUS
Status: COMPLETED | OUTPATIENT
Start: 2025-07-15 | End: 2025-07-15

## 2025-07-15 RX ADMIN — FLUDEOXYGLUCOSE F-18 13.6 MILLICURIE: 500 INJECTION INTRAVENOUS at 01:07

## 2025-07-16 DIAGNOSIS — E03.9 HYPOTHYROIDISM, UNSPECIFIED TYPE: Primary | ICD-10-CM

## 2025-07-16 RX ORDER — LEVOTHYROXINE SODIUM 112 UG/1
112 TABLET ORAL
Qty: 90 TABLET | Refills: 3 | Status: SHIPPED | OUTPATIENT
Start: 2025-07-16 | End: 2026-07-16

## 2025-07-23 ENCOUNTER — OFFICE VISIT (OUTPATIENT)
Dept: HEMATOLOGY/ONCOLOGY | Facility: CLINIC | Age: 80
End: 2025-07-23
Payer: MEDICARE

## 2025-07-23 VITALS
DIASTOLIC BLOOD PRESSURE: 61 MMHG | SYSTOLIC BLOOD PRESSURE: 129 MMHG | BODY MASS INDEX: 25.99 KG/M2 | HEART RATE: 74 BPM | WEIGHT: 171.5 LBS | HEIGHT: 68 IN

## 2025-07-23 DIAGNOSIS — C50.012 MALIGNANT NEOPLASM OF NIPPLE OF LEFT BREAST IN FEMALE, ESTROGEN RECEPTOR POSITIVE: Primary | ICD-10-CM

## 2025-07-23 DIAGNOSIS — Z79.899 DRUG-INDUCED IMMUNODEFICIENCY: ICD-10-CM

## 2025-07-23 DIAGNOSIS — Z17.0 MALIGNANT NEOPLASM OF NIPPLE OF LEFT BREAST IN FEMALE, ESTROGEN RECEPTOR POSITIVE: Primary | ICD-10-CM

## 2025-07-23 DIAGNOSIS — M05.79 RHEUMATOID ARTHRITIS INVOLVING MULTIPLE SITES WITH POSITIVE RHEUMATOID FACTOR: ICD-10-CM

## 2025-07-23 DIAGNOSIS — D84.821 DRUG-INDUCED IMMUNODEFICIENCY: ICD-10-CM

## 2025-07-23 DIAGNOSIS — N18.31 STAGE 3A CHRONIC KIDNEY DISEASE: ICD-10-CM

## 2025-07-23 DIAGNOSIS — M05.711 RHEUMATOID ARTHRITIS INVOLVING RIGHT SHOULDER WITH POSITIVE RHEUMATOID FACTOR: ICD-10-CM

## 2025-07-23 DIAGNOSIS — Z85.528 HISTORY OF RENAL CELL CANCER: ICD-10-CM

## 2025-07-23 DIAGNOSIS — M06.9 RHEUMATOID ARTHRITIS INVOLVING MULTIPLE JOINTS: ICD-10-CM

## 2025-07-23 PROCEDURE — 99999 PR PBB SHADOW E&M-EST. PATIENT-LVL V: CPT | Mod: PBBFAC,,, | Performed by: INTERNAL MEDICINE

## 2025-07-23 PROCEDURE — 99215 OFFICE O/P EST HI 40 MIN: CPT | Mod: PBBFAC,PN | Performed by: INTERNAL MEDICINE

## 2025-07-23 NOTE — Clinical Note
Pet scan is getting done tonorrow , phrev reminder. If all ok I gene see her ct non contrast cap and cbc, cmp in 6 mnths

## 2025-07-23 NOTE — PROGRESS NOTES
Subjective:       Patient ID: Kirstie Bowden is a 80 y.o. female.    Chief Complaint   Left breast ca dx  T2 N0 status post lumpectomy and re-resection margin adjuvant TC chemo s/p 3  cycles but discontinued due to quality of life issues and started Arimidex   stated  having s/e  To arimidex took herself off presented to clinic started on Aromasin .  Tolerating Aromasin well rec score of 29    hydronephrosis+ ,  had stent placed  Here for follow-up  Oncologic History:  Dx 1/2019  INVASIVE CARCINOMA BREAST, CANCER CASE SUMMARY:  PROCEDURE: Partial mastectomy without skin or nipple.3/2019  SPECIMEN LATERALITY: Left.  TUMOR SIZE, GREATEST DIMENSION: 2.5 cm.  HISTOLOGIC TYPE: Invasive pleomorphic lobular carcinoma.  HISTOLOGIC GRADE (LUTHER HISTOLOGIC SCORE):  Glandular (acinar)/tubular differentiation: Score 3.  Nuclear pleomorphism: Score 2.  Mitotic rate: Score 1.  Overall grade: Grade 2  DUCTAL CARCINOMA IN SITU (DCIS): Not identified.  LOBULAR CARCINOMA IN SITU (LCIS): Present, pleomorphic lobular carcinoma in situ with rare focus of  classical lobular carcinoma situ.  Estimated size (extent) of pleomorphic LCIS: At least 2.8 cm.  TUMOR EXTENSION: Not applicable.  MARGINS:  INVASIVE CARCINOMA MARGINS: Uninvolved by invasive carcinoma.  Distance from inferior (closest) margin: 5 mm.  PLEOMORPHIC LOBULAR CARCINOMA IN SITU MARGINS: Uninvolved by pleomorphic LCIS.  Distance from inferior medial (closest) margin: 0.2 mm (see above comment).  REGIONAL LYMPH NODES: Uninvolved by tumor cells.  Number of lymph nodes examined: 2.  Number of sentinel nodes examined: 2.  TREATMENT EFFECT: No known presurgical therapy.  PATHOLOGIC STAGE CLASSIFICATION (pTNM, AJCC 8TH EDITION):  pT2: Tumor size = 2.5 cm in greatest dimension.  pN0: No regional lymph node metastasis identified.  pM: Not applicable.    ER: Positive.  DC: Positive.  HER2: Negative (IHC - Equivocal (score 2) and FISH - Negative).  Ki-67: Approximately  14%.  HPI:     Social History     Socioeconomic History    Marital status:    Occupational History     Employer: [employer]   Tobacco Use    Smoking status: Former     Current packs/day: 0.00     Average packs/day: 0.5 packs/day for 59.3 years (29.6 ttl pk-yrs)     Types: Cigarettes     Start date: 1960     Quit date: 2019     Years since quittin.9    Smokeless tobacco: Never   Substance and Sexual Activity    Alcohol use: Yes     Alcohol/week: 2.0 standard drinks of alcohol     Types: 2 Glasses of wine per week     Comment: Social    Drug use: No    Sexual activity: Never     Social Drivers of Health     Financial Resource Strain: Low Risk  (2025)    Overall Financial Resource Strain (CARDIA)     Difficulty of Paying Living Expenses: Not very hard   Food Insecurity: No Food Insecurity (2025)    Hunger Vital Sign     Worried About Running Out of Food in the Last Year: Never true     Ran Out of Food in the Last Year: Never true   Transportation Needs: No Transportation Needs (2025)    PRAPARE - Transportation     Lack of Transportation (Medical): No     Lack of Transportation (Non-Medical): No   Physical Activity: Insufficiently Active (2025)    Exercise Vital Sign     Days of Exercise per Week: 2 days     Minutes of Exercise per Session: 60 min   Stress: No Stress Concern Present (2025)    [employer] - Occupational Stress Questionnaire     Feeling of Stress : Only a little   Housing Stability: Low Risk  (2025)    Housing Stability Vital Sign     Unable to Pay for Housing in the Last Year: No     Number of Times Moved in the Last Year: 0     Homeless in the Last Year: No     Family History   Problem Relation Name Age of Onset    Heart disease Mother Patricia     Hypertension Mother Patricia     Alzheimer's disease Mother Patricia     Cancer Father Kirstie     Heart disease Sister Ania     Arthritis Sister Ania     Kidney disease Sister Ania      Stroke Sister Alvina sister         Sister    Diabetes Brother Carolyns     Cancer Daughter      Stroke Sister Alvina      Past Surgical History:   Procedure Laterality Date    AXILLARY NODE DISSECTION Left 03/14/2019    Procedure: LYMPHADENECTOMY, AXILLARY;  Surgeon: Mp Gonzalez MD;  Location: [facility] OR;  Service: General;  Laterality: Left;  left lumpectomy with axillary lypmh node dissection    BALLOON DILATION OF URETER Left 06/24/2019    Procedure: DILATION, URETER, USING BALLOON;  Surgeon: Jorden Dickson MD;  Location: [facility] OR;  Service: Urology;  Laterality: Left;    BREAST BIOPSY Left 20 yrs ago    benign    BREAST CYST EXCISION  15 yrs ago    BREAST LUMPECTOMY      x2    CHOLECYSTECTOMY  2000    COLONOSCOPY  09/25/2018    LUC EASTON MD    CYSTOSCOPY W/ URETERAL STENT PLACEMENT Left 06/24/2019    Procedure: CYSTOSCOPY, WITH URETERAL STENT INSERTION;  Surgeon: Jorden Dickson MD;  Location: [facility] OR;  Service: Urology;  Laterality: Left;    CYSTOSCOPY W/ URETERAL STENT REMOVAL Left 07/23/2019    Procedure: CYSTOSCOPY, WITH URETERAL STENT REMOVAL;  Surgeon: Jorden Dickson MD;  Location: [facility] OR;  Service: Urology;  Laterality: Left;    EPIDURAL STEROID INJECTION INTO LUMBAR SPINE N/A 09/30/2021    Procedure: Injection-steroid-epidural-lumbar;  Surgeon: Nathen Lyons MD;  Location: [facility] OR;  Service: Pain Management;  Laterality: N/A;  L5-S1      EPIDURAL STEROID INJECTION INTO LUMBAR SPINE N/A 11/16/2021    Procedure: Injection-steroid-epidural-lumbar;  Surgeon: Nathen Lyons MD;  Location: [facility] OR;  Service: Pain Management;  Laterality: N/A;  L5-S1    EXTRACORPOREAL SHOCK WAVE LITHOTRIPSY Left 07/23/2019    Procedure: LITHOTRIPSY, ESWL;  Surgeon: Jorden Dickson MD;  Location: [facility] OR;  Service: Urology;  Laterality: Left;    EYE SURGERY Bilateral 2000    cataracts    HYSTERECTOMY  1994    MASTECTOMY, PARTIAL Left 04/25/2019    Procedure: MASTECTOMY, PARTIAL;  Surgeon: Mp Gonzalez,  MD;  Location: [facility] OR;  Service: General;  Laterality: Left;    NEEDLE LOCALIZATION Left 03/14/2019    Procedure: NEEDLE LOCALIZATION;  Surgeon: Mp Gonzalez MD;  Location: [facility] OR;  Service: General;  Laterality: Left;  left lumpectomy with wire needle localization     PARTIAL NEPHRECTOMY Right 05/22/2023    RETROGRADE PYELOGRAPHY Bilateral 06/24/2019    Procedure: PYELOGRAM, RETROGRADE;  Surgeon: Jorden Dickson MD;  Location: [facility] OR;  Service: Urology;  Laterality: Bilateral;    SENTINEL LYMPH NODE BIOPSY Left 03/14/2019    Procedure: BIOPSY, LYMPH NODE, SENTINEL;  Surgeon: Mp Gonzalez MD;  Location: [facility] OR;  Service: General;  Laterality: Left;  left sentinel node lymph node biopsy    TONSILLECTOMY  1948    TRANSFORAMINAL EPIDURAL INJECTION OF STEROID Right 01/04/2022    Procedure: Injection,steroid,epidural,transforaminal approach;  Surgeon: Nathen Lyons MD;  Location: [facility] OR;  Service: Pain Management;  Laterality: Right;  L4-L5, L5-s1    TRANSFORAMINAL EPIDURAL INJECTION OF STEROID Right 07/09/2024    Procedure: Injection,steroid,epidural,transforaminal approach;  Surgeon: Nathen Lyons MD;  Location: [facility] OR;  Service: Pain Management;  Laterality: Right;    URETEROSCOPY Left 06/24/2019    Procedure: URETEROSCOPY;  Surgeon: Jorden Dickson MD;  Location: [facility];  Service: Urology;  Laterality: Left;     Past Medical History:   Diagnosis Date    Arthritis     rheumatoid    Asthma     Breast cancer     Cancer 2020    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    GERD (gastroesophageal reflux disease) 2022    Hearing loss [id number]    Hyperlipidemia     Hypertension 2021    Limb alert care status     NO BP/IV LEFT ARM    Lung disease     Personal history of colonic polyps 01/23/2024    Pseudocholinesterase deficiency     family history    Radiation 2020    Rheumatoid arthritis 2020    Thyroid disease        Current Outpatient Medications:     albuterol (ACCUNEB) 1.25 mg/3 mL Nebu, Take 3 mLs (1.25 mg total) by  nebulization every 6 (six) hours as needed (shortness of breath). Rescue, Disp: 75 mL, Rfl: 6    albuterol (PROVENTIL/VENTOLIN HFA) 90 mcg/actuation inhaler, Inhale 2 puffs into the lungs every 6 (six) hours as needed for Wheezing. Rescue, Disp: 54 g, Rfl: 3    atorvastatin (LIPITOR) 20 MG tablet, Take 1 tablet (20 mg total) by mouth every evening., Disp: 90 tablet, Rfl: 3    cholecalciferol, vitamin D3, (VITAMIN D3) 25 mcg (1,000 unit) capsule, Take 1 capsule by mouth every morning., Disp: , Rfl:     cyanocobalamin (VITAMIN B-12) 1000 MCG tablet, Take 100 mcg by mouth once daily., Disp: , Rfl:     exemestane (AROMASIN) 25 mg tablet, Take 1 tablet (25 mg total) by mouth nightly., Disp: , Rfl:     fluticasone propionate (FLONASE) 50 mcg/actuation nasal spray, 1 spray (50 mcg total) by Each Nostril route once daily., Disp: 48 mL, Rfl: 3    fluticasone-salmeterol 250-50 mcg/dose (WIXELA INHUB) 250-50 mcg/dose diskus inhaler, INHALE 1 PUFF INTO THE LUNGS 2 (TWO) TIMES DAILY. CONTROLLER, Disp: 180 each, Rfl: 3    folic acid (FOLVITE) 1 MG tablet, Take 1 tablet (1,000 mcg total) by mouth once daily., Disp: 90 tablet, Rfl: 3    gabapentin (NEURONTIN) 300 MG capsule, Take 1 capsule (300 mg total) by mouth 2 (two) times daily., Disp: 180 capsule, Rfl: 3    hydroCHLOROthiazide (HYDRODIURIL) 25 MG tablet, Take 1 tablet (25 mg total) by mouth once daily., Disp: 90 tablet, Rfl: 1    levothyroxine (SYNTHROID) 112 MCG tablet, Take 1 tablet (112 mcg total) by mouth before breakfast., Disp: 90 tablet, Rfl: 3    ondansetron (ZOFRAN-ODT) 8 MG TbDL, DISSOLVE 1 TABLET IN MOUTH EVERY 12 HOURS AS NEEDED, Disp: 30 tablet, Rfl: 0  No current facility-administered medications for this visit.    Facility-Administered Medications Ordered in Other Visits:     lactated ringers infusion, , Intravenous, Once PRN, Vu, Nathen F., MD    lidocaine (PF) 10 mg/ml (1%) injection 10 mg, 1 mL, Intradermal, Once, Eladia Schwartz MD  Review of patient's  allergies indicates:   Allergen Reactions    Succinylcholine chloride Other (See Comments)    Sulfur dioxide Hives    Sulfamethoxazole-trimethoprim      Other reaction(s): per dr daryn Rodríguez (sulfonamide antibiotics)      NOT SURE REACTION         REVIEW OF SYSTEMS:     Patient denies issues related to appetite or recent weight change.  Feels well overall.  Denies issues with generalized weakness .  Denies fatigue over above what is normally experienced with day-to-day activities  Denies fever, chills, rigors  Denies issues with ambulation  Denies generalized swelling or new lumps and bumps felt in any part  of body  Denies visual or hearing loss  Denies issues with congestion, sinus issues, cough, sputum production runny nose or itching eyes  Denies chest pain or palpitations, or passing out  Denies abdominal pain, reflux symptoms, nausea vomiting loose stools or constipation  Denies seizure activity or focal weaknesses or symptoms related to TIA, no head aches or blurred vision reported  Denies issues with skin rash or bruising  Denies issues with swelling of feet, tingling or numbness   No issues with sleep,   No recent foreign travel   Good family support reported        PHYSICAL EXAM:     Wt Readings from Last 3 Encounters:   07/23/25 77.8 kg (171 lb 8.3 oz)   07/15/25 77.6 kg (171 lb)   07/15/25 78.6 kg (173 lb 4.5 oz)     Temp Readings from Last 3 Encounters:   07/10/25 97.7 °F (36.5 °C)   06/13/25 97.6 °F (36.4 °C) (Oral)   06/09/25 98.2 °F (36.8 °C)     BP Readings from Last 3 Encounters:   07/23/25 129/61   07/15/25 120/72   07/10/25 126/73     Pulse Readings from Last 3 Encounters:   07/23/25 74   07/15/25 80   07/10/25 73     VITAL SIGNS:  as above   GENERAL: appears well-built, well-nourished.  No anxiety, no agitation, and in no distress.  Patient is awake, alert, oriented and cooperative.  HEENT:  Showed no congestion. Trachea is central no obvious icterus or pallor noted no hoarseness. no  obvious JVD   NECK:  Supple.  No JVD. No obvious cervical submental or supraclavicular adenopathy.  RS:the visualized portion of  Chest expands well. chest appears symmetric, no audible wheezes.  No dyspnea recognized  ABDOMEN:  abdomen appears undistended.  EXTREMITIES:  Without edema.  NEUROLOGICAL:  The patient is appropriate, higher functions are normal.  No  obvious neurological deficits.  normal judgement normal thought content  No confusion, no speech impediment. Cranial nerves are intact and show no deficit. No gross motor deficits noted   SKIN MUSCULOSKELETAL: no joint or skeletal deformity, no clubbing of nails.  No visible rash ecchymosis or petechiae     Laboratory:     CBC:  Lab Results   Component Value Date    WBC 8.74 07/15/2025    RBC 3.89 (L) 07/15/2025    HGB 12.2 07/15/2025    HCT 39.4 07/15/2025     (H) 07/15/2025    MCH 31.4 (H) 07/15/2025    MCHC 31.0 (L) 07/15/2025    RDW 14.0 07/15/2025     07/15/2025    MPV 11.4 07/15/2025    GRAN 6.7 02/17/2025    GRAN 72.2 02/17/2025    LYMPH 1.9 02/17/2025    LYMPH 20.4 02/17/2025    MONO 0.5 02/17/2025    MONO 5.4 02/17/2025    EOS 0.1 02/17/2025    BASO 0.03 02/17/2025    EOSINOPHIL 1.4 02/17/2025    BASOPHIL 0.3 02/17/2025       BMP: BMP  Lab Results   Component Value Date     07/15/2025    K 3.9 07/15/2025     07/15/2025    CO2 31 (H) 07/15/2025    BUN 13 07/15/2025    CREATININE 1.0 07/15/2025    CALCIUM 10.2 07/15/2025    ANIONGAP 9 07/15/2025    ESTGFRAFRICA >60 07/20/2022    EGFRNONAA 55 (A) 07/20/2022       LFT:   Lab Results   Component Value Date    ALT 10 07/15/2025    AST 17 07/15/2025    ALKPHOS 83 07/15/2025    BILITOT 0.3 07/15/2025    Bone density September 2 1019 negative for osteopenia  oncotype rec. score is 29  Impressionpet 1/2020       1.  Likely postoperative change in the superolateral left breast, decreased in size from the previous exam.    2.  Scattered multifocal ground-glass attenuation opacities in  both lungs suggesting a combination of atypical infection/pneumonia and/or post treatment/post radiation change.  Consider follow-up CT of the chest in 6-8 weeks to document resolution.    3.  Indeterminate isoattenuating non-FDG avid structure in the anterior interpolar aspect of the right kidney, possibly representing a hemorrhagic/proteinaceous cyst or low grade malignancy, consider further characterization with contrast enhanced renal protocol CT/MRI.    4.  Numerous bilateral nonobstructing renal stones.  No hydronephrosis or hydroureter on today's exam.    5.  Interval resolution of the maxillary sinus disease.     Pet 7/2020  IMPRESSION:  1. Postoperative changes of prior left breast lumpectomy.  2. Near complete resolution of the previously described groundglass  attenuation opacities in the lungs, now with only a small linear  bandlike opacity in the anterior left upper lobe (most likely related  to prior radiation/treatment change.  3. Focal increased FDG activity along the right third and fourth rib  posteriorly at the posterior thoracic junction suggesting degenerative  change with no soft tissue, lytic or sclerotic lesion identified.  4. Additional and incidental findings as noted above unchanged from  the previous exam.            has renal stones and has hydronephrosis, stented      7/21 neg pet      7/22  IMPRESSION: No evidence of recurrent or metastatic disease     Postsurgical changes in the left breast     Stable 3.5 cm round isoattenuating mass within the upper pole the right kidney suggestive of renal cyst.     Nonobstructing renal stones        IMPRESSION:pet 4/23     No evidence of FDG avid metastatic breast cancer.     4.7 cm indeterminate right renal lesion. Although this lesion has been present on prior exams and is not hypermetabolic, it has not been definitively characterized; further evaluation with retroperitoneal sonography or contrast-enhanced renal protocol CT/MRI is recommended.      Postoperative changes of the left breast. Please correlate with dedicated diagnostic mammography.     Electronically signed by:  Erlin Li MD  4/24/2023 2:17 PM CDT Workstation: [id number]       Impression: mri 5/2/23     Enhancing mass of the right kidney compatible with neoplasm.  No evidence for abdominal metastatic disease.     This report was flagged in Epic as abnormal.    Assessment/Plan:     Breast ca:T2N0Mx left breast ca s/p lumpectomy 3/2019 recurrent score 29  pT2: Tumor size = 2.5 cm in greatest dimension.  pN0: No regional lymph node metastasis identified.  pM: Not applicable.    ER: Positive.  CO: Positive.  HER2: Negative (IHC - E  Patient completed 3 cycles but aborted therapy.  In favor a quality of life due to side effects   Compeleted xrt  she stoped anastrozole  Due to arthralgia and nausea.Tolerating aromasin well to continue for 10 years till May 2029  Normal bone density 10/23     RA:  now on off mtx and hydrochloroquine and on orencia now sees dr Romero   ANEMIA : PROB FROM CKD WILL MONITOR  Hemorrhagic cyst :Needs a f/u as Dr Dickson has retiredUrology  was reportd on prev scans , had stents removed and lithotripsy for stones.      Hc of  rt renal ca: MRI of abd c/w renal mass she saw DR Ludwig  had nephrectomy  5/23. 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 All margins negative for invasive ca. Has fol.ow up  routinely    Will alternate pet and ct non contrast to be able to see both malignancy rec .   Ct cap 7/25not done due to machine issues will phone review  see me in 6 months with cbc,c mpct cvap non contrast in 1/26    Hypercalcemia: eating a lot of icecream, she is being rechecked soon by pcp     macrocytosis: related to thyroid issues will watch she is not anemic and embark on w/u if worse or cytopenia     Lung findindgs :saw Pulmonary  Naila aldridge 6/2020) consult for 2.  Finding as above on PET scan thesesd have nearly resolved now on 7/2020 pet and nothing new  noted on scnas has on  going f/u with pulmonary   Pet 7/24 neg ahs appt 1/25    She has scattered atheromatous calcifications in the aorta and coronary arteries she will discuss this and follow up with her PCP for referral to cardiology    CKD stage IIIA has only 1 kidney so caution to be exercised    mammo done in 3/2022 done short term recommended  10/22 short term again in 4/23 , 4/24 next due 4/25      Has quit smoking    Incteased LFT : on chol meds, she will call Dr Lyon office    [facility]. Lipids, hypothyroidism: cont DR mark on synthyroid and norvasc and lipitor well controlled cont diet and f/u    Immunodef due to MTX stable                  Answers submitted by the patient for this visit:  Review of Systems Questionnaire (Submitted on 7/16/2025)  appetite change : No  unexpected weight change: No  mouth sores: No  visual disturbance: No  cough: Yes  shortness of breath: Yes  chest pain: No  abdominal pain: No  diarrhea: No  frequency: No  back pain: No  rash: No  headaches: No  adenopathy: No  nervous/ anxious: No

## 2025-07-24 ENCOUNTER — HOSPITAL ENCOUNTER (OUTPATIENT)
Dept: RADIOLOGY | Facility: HOSPITAL | Age: 80
Discharge: HOME | End: 2025-07-24
Attending: INTERNAL MEDICINE
Payer: MEDICARE

## 2025-07-24 ENCOUNTER — OFFICE VISIT (OUTPATIENT)
Dept: PULMONOLOGY | Facility: CLINIC | Age: 80
End: 2025-07-24
Payer: MEDICARE

## 2025-07-24 VITALS — WEIGHT: 171 LBS | HEIGHT: 68 IN | BODY MASS INDEX: 25.91 KG/M2

## 2025-07-24 DIAGNOSIS — R05.9 COUGH, UNSPECIFIED TYPE: ICD-10-CM

## 2025-07-24 DIAGNOSIS — M05.79 RHEUMATOID ARTHRITIS INVOLVING MULTIPLE SITES WITH POSITIVE RHEUMATOID FACTOR: ICD-10-CM

## 2025-07-24 DIAGNOSIS — J18.9 PNEUMONIA DUE TO INFECTIOUS ORGANISM, UNSPECIFIED LATERALITY, UNSPECIFIED PART OF LUNG: ICD-10-CM

## 2025-07-24 DIAGNOSIS — J44.1 COPD EXACERBATION: Primary | ICD-10-CM

## 2025-07-24 DIAGNOSIS — R09.02 HYPOXEMIA: ICD-10-CM

## 2025-07-24 LAB — POCT GLUCOSE: 106 MG/DL (ref 70–110)

## 2025-07-24 PROCEDURE — 82962 GLUCOSE BLOOD TEST: CPT | Mod: PO

## 2025-07-24 PROCEDURE — A9552 F18 FDG: HCPCS | Mod: PO | Performed by: INTERNAL MEDICINE

## 2025-07-24 PROCEDURE — 99214 OFFICE O/P EST MOD 30 MIN: CPT | Mod: PBBFAC,PN | Performed by: NURSE PRACTITIONER

## 2025-07-24 PROCEDURE — 99999 PR PBB SHADOW E&M-EST. PATIENT-LVL IV: CPT | Mod: PBBFAC,,, | Performed by: NURSE PRACTITIONER

## 2025-07-24 PROCEDURE — 78815 PET IMAGE W/CT SKULL-THIGH: CPT | Mod: 26,PS,, | Performed by: RADIOLOGY

## 2025-07-24 PROCEDURE — 99214 OFFICE O/P EST MOD 30 MIN: CPT | Mod: S$PBB,,, | Performed by: NURSE PRACTITIONER

## 2025-07-24 RX ORDER — FLUDEOXYGLUCOSE F18 500 MCI/ML
13.1 INJECTION INTRAVENOUS
Status: COMPLETED | OUTPATIENT
Start: 2025-07-24 | End: 2025-07-24

## 2025-07-24 RX ORDER — BENZONATATE 200 MG/1
200 CAPSULE ORAL
COMMUNITY
Start: 2025-07-01

## 2025-07-24 RX ORDER — AMLODIPINE BESYLATE 5 MG/1
5 TABLET ORAL
COMMUNITY

## 2025-07-24 RX ORDER — IPRATROPIUM BROMIDE AND ALBUTEROL SULFATE 2.5; .5 MG/3ML; MG/3ML
3 SOLUTION RESPIRATORY (INHALATION) EVERY 6 HOURS PRN
Qty: 120 ML | Refills: 6 | Status: SHIPPED | OUTPATIENT
Start: 2025-07-24 | End: 2026-07-24

## 2025-07-24 RX ORDER — AMOXICILLIN AND CLAVULANATE POTASSIUM 875; 125 MG/1; MG/1
1 TABLET, FILM COATED ORAL 2 TIMES DAILY
Qty: 14 TABLET | Refills: 0 | Status: SHIPPED | OUTPATIENT
Start: 2025-07-24

## 2025-07-24 RX ORDER — TRAZODONE HYDROCHLORIDE 50 MG/1
25 TABLET ORAL NIGHTLY PRN
COMMUNITY
Start: 2025-06-23

## 2025-07-24 RX ADMIN — FLUDEOXYGLUCOSE F-18 13.1 MILLICURIE: 500 INJECTION INTRAVENOUS at 11:07

## 2025-07-24 NOTE — PROGRESS NOTES
SUBJECTIVE:    Patient ID: Kirstie Bowden is a 80 y.o. female.    Chief Complaint: Hospital Follow Up     Patient here today for follow up again not feeling well. She was seen here in April and was sent to the ER for pneumonia and exacerbation from the office.  She was hospitalized and got better, then had a fall in June was hospitalized and went to rehab for several weeks. She was feeling well as far as her breathing goes up until this last week. She has started expectorating thick green mucous over the last several days, coughing and feeling more short of breath.  Her room air sats at rest are 88%, with 2 liters she goes up to 94%.  She does not want to go to the ER. I will order antibiotics and oxygen to treat outpatient. She is aware that if she gets worse or has no improvement she needs to go to the ER. She had a PET scan today but I am not able to see the results currently.  She is using Wixela twice a day.        Past Medical History:   Diagnosis Date    Arthritis     rheumatoid    Asthma     Breast cancer     Cancer 2020    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    GERD (gastroesophageal reflux disease) 2022    Hearing loss [id number]    Hyperlipidemia     Hypertension 2021    Limb alert care status     NO BP/IV LEFT ARM    Lung disease     Personal history of colonic polyps 01/23/2024    Pseudocholinesterase deficiency     family history    Radiation 2020    Rheumatoid arthritis 2020    Thyroid disease      Past Surgical History:   Procedure Laterality Date    AXILLARY NODE DISSECTION Left 03/14/2019    Procedure: LYMPHADENECTOMY, AXILLARY;  Surgeon: Mp Gonzalez MD;  Location: [facility] OR;  Service: General;  Laterality: Left;  left lumpectomy with axillary lypmh node dissection    BALLOON DILATION OF URETER Left 06/24/2019    Procedure: DILATION, URETER, USING BALLOON;  Surgeon: Jorden Dickson MD;  Location: [facility] OR;  Service: Urology;  Laterality: Left;    BREAST BIOPSY Left 20 yrs ago    benign    BREAST CYST  EXCISION  15 yrs ago    BREAST LUMPECTOMY      x2    CHOLECYSTECTOMY  2000    COLONOSCOPY  09/25/2018    LUC EASTON MD    CYSTOSCOPY W/ URETERAL STENT PLACEMENT Left 06/24/2019    Procedure: CYSTOSCOPY, WITH URETERAL STENT INSERTION;  Surgeon: Jorden Dickson MD;  Location: [facility] OR;  Service: Urology;  Laterality: Left;    CYSTOSCOPY W/ URETERAL STENT REMOVAL Left 07/23/2019    Procedure: CYSTOSCOPY, WITH URETERAL STENT REMOVAL;  Surgeon: Jorden Dickson MD;  Location: [facility] OR;  Service: Urology;  Laterality: Left;    EPIDURAL STEROID INJECTION INTO LUMBAR SPINE N/A 09/30/2021    Procedure: Injection-steroid-epidural-lumbar;  Surgeon: Nathen Lyons MD;  Location: [facility] OR;  Service: Pain Management;  Laterality: N/A;  L5-S1      EPIDURAL STEROID INJECTION INTO LUMBAR SPINE N/A 11/16/2021    Procedure: Injection-steroid-epidural-lumbar;  Surgeon: Nathen Lyons MD;  Location: [facility] OR;  Service: Pain Management;  Laterality: N/A;  L5-S1    EXTRACORPOREAL SHOCK WAVE LITHOTRIPSY Left 07/23/2019    Procedure: LITHOTRIPSY, ESWL;  Surgeon: Jodren Dickson MD;  Location: [facility] OR;  Service: Urology;  Laterality: Left;    EYE SURGERY Bilateral 2000    cataracts    HYSTERECTOMY  1994    MASTECTOMY, PARTIAL Left 04/25/2019    Procedure: MASTECTOMY, PARTIAL;  Surgeon: Mp Gonzalez MD;  Location: [facility] OR;  Service: General;  Laterality: Left;    NEEDLE LOCALIZATION Left 03/14/2019    Procedure: NEEDLE LOCALIZATION;  Surgeon: Mp Gnozalez MD;  Location: [facility] OR;  Service: General;  Laterality: Left;  left lumpectomy with wire needle localization     PARTIAL NEPHRECTOMY Right 05/22/2023    RETROGRADE PYELOGRAPHY Bilateral 06/24/2019    Procedure: PYELOGRAM, RETROGRADE;  Surgeon: Jorden Dickson MD;  Location: [facility] OR;  Service: Urology;  Laterality: Bilateral;    SENTINEL LYMPH NODE BIOPSY Left 03/14/2019    Procedure: BIOPSY, LYMPH NODE, SENTINEL;  Surgeon: Mp Gonzalez MD;  Location: [facility] OR;  Service:  General;  Laterality: Left;  left sentinel node lymph node biopsy    TONSILLECTOMY  1948    TRANSFORAMINAL EPIDURAL INJECTION OF STEROID Right 01/04/2022    Procedure: Injection,steroid,epidural,transforaminal approach;  Surgeon: Nathen Lyons MD;  Location: [facility] OR;  Service: Pain Management;  Laterality: Right;  L4-L5, L5-s1    TRANSFORAMINAL EPIDURAL INJECTION OF STEROID Right 07/09/2024    Procedure: Injection,steroid,epidural,transforaminal approach;  Surgeon: Nathen Lyons MD;  Location: [facility] OR;  Service: Pain Management;  Laterality: Right;    URETEROSCOPY Left 06/24/2019    Procedure: URETEROSCOPY;  Surgeon: Jorden Dickson MD;  Location: [facility] OR;  Service: Urology;  Laterality: Left;     Family History   Problem Relation Name Age of Onset    Heart disease Mother Patricia     Hypertension Mother Patricia     Alzheimer's disease Mother Patricia     Cancer Father Kirstie     Heart disease Sister Ania     Arthritis Sister Ania     Kidney disease Sister Ania     Stroke Sister Alvina sister         Sister    Diabetes Brother Nicklos     Cancer Daughter      Stroke Sister Alvina     Stroke Sister Alvina         Social History:   Marital Status:   Occupation: Data Unavailable  Alcohol History:  reports current alcohol use of about 2.0 standard drinks of alcohol per week.  Tobacco History:  reports that she quit smoking about 5 years ago. Her smoking use included cigarettes. She started smoking about 65 years ago. She has a 29.6 pack-year smoking history. She has never used smokeless tobacco.  Drug History:  reports no history of drug use.    Review of patient's allergies indicates:   Allergen Reactions    Succinylcholine chloride Other (See Comments)    Sulfur dioxide Hives    Sulfamethoxazole-trimethoprim      Other reaction(s): per dr daryn Rodríguez (sulfonamide antibiotics)      NOT SURE REACTION       Current Outpatient Medications   Medication Sig Dispense Refill    albuterol (PROVENTIL/VENTOLIN  HFA) 90 mcg/actuation inhaler Inhale 2 puffs into the lungs every 6 (six) hours as needed for Wheezing. Rescue 54 g 3    amLODIPine (NORVASC) 5 MG tablet Take 5 mg by mouth.      atorvastatin (LIPITOR) 20 MG tablet Take 1 tablet (20 mg total) by mouth every evening. 90 tablet 3    benzonatate (TESSALON) 200 MG capsule Take 200 mg by mouth.      cholecalciferol, vitamin D3, (VITAMIN D3) 25 mcg (1,000 unit) capsule Take 1 capsule by mouth every morning.      cyanocobalamin (VITAMIN B-12) 1000 MCG tablet Take 100 mcg by mouth once daily.      exemestane (AROMASIN) 25 mg tablet Take 1 tablet (25 mg total) by mouth nightly.      fluticasone propionate (FLONASE) 50 mcg/actuation nasal spray 1 spray (50 mcg total) by Each Nostril route once daily. 48 mL 3    fluticasone-salmeterol 250-50 mcg/dose (WIXELA INHUB) 250-50 mcg/dose diskus inhaler INHALE 1 PUFF INTO THE LUNGS 2 (TWO) TIMES DAILY. CONTROLLER 180 each 3    folic acid (FOLVITE) 1 MG tablet Take 1 tablet (1,000 mcg total) by mouth once daily. 90 tablet 3    gabapentin (NEURONTIN) 300 MG capsule Take 1 capsule (300 mg total) by mouth 2 (two) times daily. 180 capsule 3    hydroCHLOROthiazide (HYDRODIURIL) 25 MG tablet Take 1 tablet (25 mg total) by mouth once daily. 90 tablet 1    levothyroxine (SYNTHROID) 112 MCG tablet Take 1 tablet (112 mcg total) by mouth before breakfast. 90 tablet 3    ondansetron (ZOFRAN-ODT) 8 MG TbDL DISSOLVE 1 TABLET IN MOUTH EVERY 12 HOURS AS NEEDED 30 tablet 0    traZODone (DESYREL) 50 MG tablet Take 25 mg by mouth nightly as needed.      albuterol-ipratropium (DUO-NEB) 2.5 mg-0.5 mg/3 mL nebulizer solution Take 3 mLs by nebulization every 6 (six) hours as needed for Wheezing. Rescue 120 mL 6    amoxicillin-clavulanate 875-125mg (AUGMENTIN) 875-125 mg per tablet Take 1 tablet by mouth 2 (two) times daily. 14 tablet 0     No current facility-administered medications for this visit.     Facility-Administered Medications Ordered in Other  "Visits   Medication Dose Route Frequency Provider Last Rate Last Admin    lactated ringers infusion   Intravenous Once PRN Nathen Lyons MD        lidocaine (PF) 10 mg/ml (1%) injection 10 mg  1 mL Intradermal Once Eladia Schwartz MD           Alpha-1 Antitrypsin:  Last PFT:  02/28/2023  Moderate obstruction with no change with bronchodilators, no restriction, moderate diffusion defect.  6 minute walk:  02/28/2023  Non hypoxemic  Las CT 05/2025    Impression:     1.  Marked interval improvement to the previously described bilateral reticulonodular interstitial opacities compatible with resolving infection/pneumonia.  Minimal scattered punctate and tree-in-bud opacities are seen remaining in the apices and right lower lobe.  Consider CT follow-up in 3 months after treatment to document resolution (as radiographic findings may persist beyond clinical symptoms and underlying malignancy is not excluded).     2.  Small soft tissue pulmonary nodules as above without adverse interval change.     3.  No new concerning pulmonary nodule, mass or lymphadenopathy to specifically suggest malignancy.         General: no fever   Eyes: Vision is good.  ENT: hoarseness   Heart:: No chest pain or palpitations.  Lungs:  productive cough with green mucous, increased cough and dypsnea   GI: reflux  : Nocturia  Musculoskeletal: arthritis in upper ext and neck  Skin: No lesions or rashes. Bruises easily  Neuro: Neuropathy is better  Lymph: ankles swell sometimes  Psych: No anxiety or depression.  Endo: No weight change.    OBJECTIVE:      /60 (BP Location: Right arm, Patient Position: Sitting)   Pulse 91   Ht 5' 8" (1.727 m)   Wt 77.6 kg (171 lb)   SpO2 (!) 88% Comment: on room air put on 2LPM up to 94%  BMI 26.00 kg/m²     Physical Exam  GENERAL: Older patient in no distress.  HEENT: Pupils equal and reactive. Extraocular movements intact. Nose intact.      Pharynx moist.  NECK: Supple.   HEART: Regular rate and rhythm. " No murmur or gallop auscultated.  LUNGS: Clear to auscultation and percussion. Lung excursion symmetrical. No change in fremitus. No adventitial noises.  ABDOMEN: Bowel sounds present. Non-tender, no masses palpated.  EXTREMITIES: Normal muscle tone and joint movement, no cyanosis or clubbing.   LYMPHATICS: No adenopathy palpated, no edema.  SKIN: Dry, intact, no lesions.   NEURO: Cranial nerves II-XII intact. Motor strength 5/5 bilaterally, upper and lower extremities.  PSYCH: Appropriate affect.    Assessment:       1. COPD exacerbation    2. Cough, unspecified type    3. Hypoxemia    4. Pneumonia due to infectious organism, unspecified laterality, unspecified part of lung    5. Rheumatoid arthritis involving multiple sites with positive rheumatoid factor          Plan:       Sputum culture  Sleep elevated  Continue the Wixela twice a day  Duoneb as needed every 4-6 hours for shortness of breath or cough  Augmentin twice a day for a week, take after you eat  Eat yogurt or take a probiotic daily  Will order oxygen 2 liters, portable concentrator   To the ER if you get worse or no improvement   Follow up in about 6 weeks (around 9/4/2025).

## 2025-07-24 NOTE — PATIENT INSTRUCTIONS
Sputum culture  Sleep elevated  Continue the Wixela twice a day  Duoneb as needed every 4-6 hours for shortness of breath or cough  Augmentin twice a day for a week, take after you eat  Eat yogurt or take a probiotic daily  Will order oxygen 2 liters, portable concentrator     If you get any worse go to the er

## 2025-07-25 VITALS
HEIGHT: 68 IN | SYSTOLIC BLOOD PRESSURE: 100 MMHG | HEART RATE: 91 BPM | BODY MASS INDEX: 25.91 KG/M2 | OXYGEN SATURATION: 88 % | WEIGHT: 171 LBS | DIASTOLIC BLOOD PRESSURE: 60 MMHG

## 2025-07-28 ENCOUNTER — OUTPATIENT CASE MANAGEMENT (OUTPATIENT)
Dept: ADMINISTRATIVE | Facility: OTHER | Age: 80
End: 2025-07-28
Payer: MEDICARE

## 2025-07-28 DIAGNOSIS — Z17.0 MALIGNANT NEOPLASM OF NIPPLE OF LEFT BREAST IN FEMALE, ESTROGEN RECEPTOR POSITIVE: ICD-10-CM

## 2025-07-28 DIAGNOSIS — C50.012 MALIGNANT NEOPLASM OF NIPPLE OF LEFT BREAST IN FEMALE, ESTROGEN RECEPTOR POSITIVE: ICD-10-CM

## 2025-07-28 DIAGNOSIS — M05.711 RHEUMATOID ARTHRITIS INVOLVING RIGHT SHOULDER WITH POSITIVE RHEUMATOID FACTOR: ICD-10-CM

## 2025-07-28 DIAGNOSIS — I10 ESSENTIAL HYPERTENSION, BENIGN: ICD-10-CM

## 2025-07-28 RX ORDER — ONDANSETRON 8 MG/1
TABLET, ORALLY DISINTEGRATING ORAL
Qty: 30 TABLET | Refills: 0 | Status: SHIPPED | OUTPATIENT
Start: 2025-07-28

## 2025-07-28 NOTE — PROGRESS NOTES
Outpatient Care Management  Plan of Care Follow Up Visit    Patient: Kirstie Bowden  MRN: [id number]  Date of Service: 07/28/2025  Completed by: Ivory Siddiqui RN  Referral Date: 04/28/2025    No chief complaint on file.      Brief Summary: OPCM RN followed up with Kirstie today for care plan review.    Next Steps:   Kirstie agreed to OPCM RN follow up on or around 8/5/25.  Follow up to see how she is doing with her cough production and if still coughing up green sputum.  Follow up to see if she received at home oxygen and what her levels are sustaining.

## 2025-08-05 ENCOUNTER — OUTPATIENT CASE MANAGEMENT (OUTPATIENT)
Dept: ADMINISTRATIVE | Facility: OTHER | Age: 80
End: 2025-08-05
Payer: MEDICARE

## 2025-08-05 NOTE — PROGRESS NOTES
8/5/2025  1st attempt to complete Follow-Up  for Outpatient Care Management, left message.  Will send via [employer] -  unable to assess letter.

## 2025-08-05 NOTE — LETTER
Kirstie Bowden  [address]  [place] [address]      Dear Kirstie Bowden,     I am your nurse with [facility]s Outpatient Care Management Department. I was unsuccessful in reaching you today. At your earliest convenience, I would like to discuss your healthcare progress.      Please contact me at [phone] from 8:00AM to 4:30 PM on Monday thru Friday.     As you know, [facility] is a program offered to you through [facility] where a nurse is available 24/7 to answer questions or provide medical advice, their number is [phone].    Thanks,      Ivory Siddiqui, RN  Outpatient Care Management

## 2025-08-11 ENCOUNTER — HOSPITAL ENCOUNTER (OUTPATIENT)
Dept: RADIOLOGY | Facility: HOSPITAL | Age: 80
Discharge: HOME | End: 2025-08-11
Attending: SPECIALIST
Payer: MEDICARE

## 2025-08-11 ENCOUNTER — INFUSION (OUTPATIENT)
Dept: INFUSION THERAPY | Facility: HOSPITAL | Age: 80
End: 2025-08-11
Attending: STUDENT IN AN ORGANIZED HEALTH CARE EDUCATION/TRAINING PROGRAM
Payer: MEDICARE

## 2025-08-11 ENCOUNTER — HOSPITAL ENCOUNTER (OUTPATIENT)
Dept: RADIOLOGY | Facility: HOSPITAL | Age: 80
Discharge: HOME | End: 2025-08-11
Attending: INTERNAL MEDICINE
Payer: MEDICARE

## 2025-08-11 ENCOUNTER — PATIENT MESSAGE (OUTPATIENT)
Dept: ADMINISTRATIVE | Facility: OTHER | Age: 80
End: 2025-08-11
Payer: MEDICARE

## 2025-08-11 VITALS
DIASTOLIC BLOOD PRESSURE: 73 MMHG | TEMPERATURE: 98 F | SYSTOLIC BLOOD PRESSURE: 147 MMHG | HEART RATE: 75 BPM | RESPIRATION RATE: 17 BRPM | BODY MASS INDEX: 26.04 KG/M2 | HEIGHT: 68 IN | WEIGHT: 171.81 LBS

## 2025-08-11 DIAGNOSIS — Z17.0 MALIGNANT NEOPLASM OF NIPPLE OF LEFT BREAST IN FEMALE, ESTROGEN RECEPTOR POSITIVE: ICD-10-CM

## 2025-08-11 DIAGNOSIS — Z85.528 HISTORY OF RENAL CELL CANCER: ICD-10-CM

## 2025-08-11 DIAGNOSIS — N18.31 STAGE 3A CHRONIC KIDNEY DISEASE: ICD-10-CM

## 2025-08-11 DIAGNOSIS — C64.1 MALIGNANT NEOPLASM OF RIGHT KIDNEY, EXCEPT RENAL PELVIS: ICD-10-CM

## 2025-08-11 DIAGNOSIS — M05.79 RHEUMATOID ARTHRITIS INVOLVING MULTIPLE SITES WITH POSITIVE RHEUMATOID FACTOR: Primary | ICD-10-CM

## 2025-08-11 DIAGNOSIS — M05.79 RHEUMATOID ARTHRITIS INVOLVING MULTIPLE SITES WITH POSITIVE RHEUMATOID FACTOR: ICD-10-CM

## 2025-08-11 DIAGNOSIS — D84.821 DRUG-INDUCED IMMUNODEFICIENCY: ICD-10-CM

## 2025-08-11 DIAGNOSIS — C50.012 MALIGNANT NEOPLASM OF NIPPLE OF LEFT BREAST IN FEMALE, ESTROGEN RECEPTOR POSITIVE: ICD-10-CM

## 2025-08-11 DIAGNOSIS — M06.9 RHEUMATOID ARTHRITIS INVOLVING MULTIPLE JOINTS: ICD-10-CM

## 2025-08-11 DIAGNOSIS — M05.711 RHEUMATOID ARTHRITIS INVOLVING RIGHT SHOULDER WITH POSITIVE RHEUMATOID FACTOR: ICD-10-CM

## 2025-08-11 DIAGNOSIS — Z79.899 DRUG-INDUCED IMMUNODEFICIENCY: ICD-10-CM

## 2025-08-11 PROCEDURE — 74176 CT ABD & PELVIS W/O CONTRAST: CPT | Mod: TC,PO

## 2025-08-11 PROCEDURE — 71250 CT THORAX DX C-: CPT | Mod: 26,,, | Performed by: RADIOLOGY

## 2025-08-11 PROCEDURE — 71250 CT THORAX DX C-: CPT | Mod: TC,PO

## 2025-08-11 PROCEDURE — 96365 THER/PROPH/DIAG IV INF INIT: CPT

## 2025-08-11 PROCEDURE — 25000003 PHARM REV CODE 250: Performed by: STUDENT IN AN ORGANIZED HEALTH CARE EDUCATION/TRAINING PROGRAM

## 2025-08-11 PROCEDURE — 63600175 PHARM REV CODE 636 W HCPCS: Mod: JZ,JA,TB | Performed by: STUDENT IN AN ORGANIZED HEALTH CARE EDUCATION/TRAINING PROGRAM

## 2025-08-11 PROCEDURE — 74176 CT ABD & PELVIS W/O CONTRAST: CPT | Mod: 26,,, | Performed by: RADIOLOGY

## 2025-08-11 RX ORDER — SODIUM CHLORIDE 0.9 % (FLUSH) 0.9 %
10 SYRINGE (ML) INJECTION
Status: DISCONTINUED | OUTPATIENT
Start: 2025-08-11 | End: 2025-08-11 | Stop reason: HOSPADM

## 2025-08-11 RX ORDER — ACETAMINOPHEN 325 MG/1
325 TABLET ORAL ONCE
OUTPATIENT
Start: 2025-09-01

## 2025-08-11 RX ORDER — ACETAMINOPHEN 325 MG/1
325 TABLET ORAL ONCE
Status: COMPLETED | OUTPATIENT
Start: 2025-08-11 | End: 2025-08-11

## 2025-08-11 RX ORDER — DIPHENHYDRAMINE HCL 25 MG
25 CAPSULE ORAL ONCE
OUTPATIENT
Start: 2025-09-01

## 2025-08-11 RX ORDER — DIPHENHYDRAMINE HCL 25 MG
25 CAPSULE ORAL ONCE
Status: COMPLETED | OUTPATIENT
Start: 2025-08-11 | End: 2025-08-11

## 2025-08-11 RX ORDER — HEPARIN 100 UNIT/ML
500 SYRINGE INTRAVENOUS
OUTPATIENT
Start: 2025-09-01

## 2025-08-11 RX ORDER — EPINEPHRINE 0.3 MG/.3ML
0.3 INJECTION SUBCUTANEOUS ONCE AS NEEDED
OUTPATIENT
Start: 2025-09-01

## 2025-08-11 RX ORDER — ACETAMINOPHEN 325 MG/1
650 TABLET ORAL
OUTPATIENT
Start: 2025-09-01

## 2025-08-11 RX ORDER — DIPHENHYDRAMINE HYDROCHLORIDE 50 MG/ML
50 INJECTION, SOLUTION INTRAMUSCULAR; INTRAVENOUS ONCE AS NEEDED
OUTPATIENT
Start: 2025-09-01

## 2025-08-11 RX ORDER — DIPHENHYDRAMINE HYDROCHLORIDE 50 MG/ML
25 INJECTION, SOLUTION INTRAMUSCULAR; INTRAVENOUS
OUTPATIENT
Start: 2025-09-01

## 2025-08-11 RX ORDER — SODIUM CHLORIDE 0.9 % (FLUSH) 0.9 %
10 SYRINGE (ML) INJECTION
OUTPATIENT
Start: 2025-09-01

## 2025-08-11 RX ADMIN — SODIUM CHLORIDE: 9 INJECTION, SOLUTION INTRAVENOUS at 11:08

## 2025-08-11 RX ADMIN — SODIUM CHLORIDE 750 MG: 9 INJECTION, SOLUTION INTRAVENOUS at 11:08

## 2025-08-11 RX ADMIN — DIPHENHYDRAMINE HYDROCHLORIDE 25 MG: 25 CAPSULE ORAL at 11:08

## 2025-08-11 RX ADMIN — ACETAMINOPHEN 325 MG: 325 TABLET ORAL at 11:08

## 2025-08-12 ENCOUNTER — PATIENT MESSAGE (OUTPATIENT)
Dept: ADMINISTRATIVE | Facility: OTHER | Age: 80
End: 2025-08-12
Payer: MEDICARE

## 2025-08-12 ENCOUNTER — OUTPATIENT CASE MANAGEMENT (OUTPATIENT)
Dept: ADMINISTRATIVE | Facility: OTHER | Age: 80
End: 2025-08-12
Payer: MEDICARE

## 2025-08-19 ENCOUNTER — OUTPATIENT CASE MANAGEMENT (OUTPATIENT)
Dept: ADMINISTRATIVE | Facility: OTHER | Age: 80
End: 2025-08-19
Payer: MEDICARE

## 2025-08-26 ENCOUNTER — OUTPATIENT CASE MANAGEMENT (OUTPATIENT)
Dept: ADMINISTRATIVE | Facility: OTHER | Age: 80
End: 2025-08-26
Payer: MEDICARE

## (undated) DEVICE — SUT 3-0 VICRYL / LIGAPACK

## (undated) DEVICE — APPLICATOR CHLORAPREP ORN 26ML

## (undated) DEVICE — SYS LABEL CORRECT MED

## (undated) DEVICE — NDL TUOHY EPIDURAL 20G X 3.5

## (undated) DEVICE — NDL SAFETY 25G X 1.5 ECLIPSE

## (undated) DEVICE — ELECTRODE REM PLYHSV RETURN 9

## (undated) DEVICE — SYR ONLY LUER LOCK 20CC

## (undated) DEVICE — GLOVE SURG ULTRA TOUCH 7.5

## (undated) DEVICE — SEE MEDLINE ITEM 152487

## (undated) DEVICE — CATH CONE TIP

## (undated) DEVICE — GLOVE PROTEXIS PI CLASSIC 7.5

## (undated) DEVICE — SPONGE SUPER KERLIX 6X6.75IN

## (undated) DEVICE — GLOVE SURG ULTRA TOUCH 6

## (undated) DEVICE — LINER SUCTION 3000CC

## (undated) DEVICE — SOL IRR NACL .9% 3000ML

## (undated) DEVICE — UNDERGLOVE BIOGEL PI SZ 6.5 LF

## (undated) DEVICE — DRAPE LAP TIBURON 77X122IN

## (undated) DEVICE — DRESSING TRANS 4X4 TEGADERM

## (undated) DEVICE — SEE MEDLINE ITEM 157185

## (undated) DEVICE — NDL HYPODERMIC BLUNT 18G 1.5IN

## (undated) DEVICE — DRAPE STERI INSTRUMENT 1018

## (undated) DEVICE — SUT 2-0 12-18IN SILK

## (undated) DEVICE — SEE MEDLINE ITEM 157117

## (undated) DEVICE — GOWN X-LG STERILE BACK

## (undated) DEVICE — Device

## (undated) DEVICE — SEE MEDLINE ITEM 146292

## (undated) DEVICE — SLEEVE SCD EXPRESS CALF MEDIUM

## (undated) DEVICE — NDL SPINAL SPINOCAN 22GX3.5

## (undated) DEVICE — STRAP OR TABLE 5IN X 72IN

## (undated) DEVICE — GLOVE SURG ULTRA TOUCH 7

## (undated) DEVICE — DILATOR UROMAX ULTRA 15FR 4CM

## (undated) DEVICE — GUIDE WIRE MOTION .035 X 150CM

## (undated) DEVICE — NDL SAFETY 21G X 1 1/2 ECLPSE

## (undated) DEVICE — GLOVE SURGEONS ULTRA TOUCH 6.5

## (undated) DEVICE — TUBING MINIBORE EXTENSION

## (undated) DEVICE — SUT 2/0 30IN SILK BLK BRAI

## (undated) DEVICE — SUT ETHILON 2-0 BLK PS-2

## (undated) DEVICE — TOWEL OR DISP STRL BLUE 4/PK

## (undated) DEVICE — SYR 10CC LUER LOCK

## (undated) DEVICE — PACK BASIC

## (undated) DEVICE — SYR 50ML CATH TIP

## (undated) DEVICE — TAPE MEDIPORE 4IN X 2YDS

## (undated) DEVICE — PACK CUSTOM UNIV BASIN SLI

## (undated) DEVICE — SOL IRR WATER STRL 3000 ML

## (undated) DEVICE — SYR DISP LL 5CC

## (undated) DEVICE — DRAPE MINOR PROCEDURE

## (undated) DEVICE — SPONGE LAP 18X18 PREWASHED

## (undated) DEVICE — DRESSING TRANS 4X4 3/4

## (undated) DEVICE — CONTAINER SPECIMEN STRL 4OZ

## (undated) DEVICE — SET IRR URLGY 2LINE UNIV SPIKE

## (undated) DEVICE — CHLORAPREP 10.5 ML APPLICATOR

## (undated) DEVICE — NDL SPINAL 22GX5

## (undated) DEVICE — ELECTRODE BLADE INSULATED 1 IN

## (undated) DEVICE — SYR GLASS 5CC LUER LOK

## (undated) DEVICE — SEE MEDLINE ITEM 152622

## (undated) DEVICE — NEOGUARD COVER 4X30CM STERILE

## (undated) DEVICE — SUT MONOCRYL 3-0 SH U/D

## (undated) DEVICE — SUT 3-0 VICRYL / SH (J416)

## (undated) DEVICE — CLOSURE SKIN STERI STRIP 1/2X4

## (undated) DEVICE — DRAIN SINGLE ROUND 3/16 15F

## (undated) DEVICE — SOL 9P NACL IRR PIC IL

## (undated) DEVICE — SUT MONOCRYL 4-0 PS-2

## (undated) DEVICE — SUT SILK 2.0 BLK 18

## (undated) DEVICE — SYR MEDALLION WHITE 1ML

## (undated) DEVICE — SOL WATER STRL IRR 1000ML

## (undated) DEVICE — GLOVE 6.0 PROTEXIS PI BLUE

## (undated) DEVICE — SPONGE DERMACEA 4X4IN 12PLY

## (undated) DEVICE — GLOVE SENSICARE PI GRN 7.5

## (undated) DEVICE — CATH POLLACK OPEN-END FLEXI-TI

## (undated) DEVICE — EVACUATOR WOUND BULB 100CC

## (undated) DEVICE — STAPLER SKIN ROTATING HEAD

## (undated) DEVICE — SUT 2-0 ETHILON 18 FS

## (undated) DEVICE — GUIDEWIRE AMPLATZ .035X145 STR

## (undated) DEVICE — DRESSING GAUZE 6PLY 4X4

## (undated) DEVICE — SCRUB HIBICLENS 4% CHG 4OZ

## (undated) DEVICE — SEE MEDLINE ITEM 157116